# Patient Record
Sex: MALE | Race: WHITE | NOT HISPANIC OR LATINO | Employment: FULL TIME | URBAN - METROPOLITAN AREA
[De-identification: names, ages, dates, MRNs, and addresses within clinical notes are randomized per-mention and may not be internally consistent; named-entity substitution may affect disease eponyms.]

---

## 2017-01-14 DIAGNOSIS — J30.9 ALLERGIC RHINITIS, UNSPECIFIED ALLERGIC RHINITIS TRIGGER, UNSPECIFIED RHINITIS SEASONALITY: ICD-10-CM

## 2017-01-16 RX ORDER — MONTELUKAST SODIUM 10 MG/1
TABLET ORAL
Qty: 90 TABLET | Refills: 0 | Status: SHIPPED | OUTPATIENT
Start: 2017-01-16 | End: 2017-04-16 | Stop reason: SDUPTHER

## 2017-04-16 DIAGNOSIS — J30.9 ALLERGIC RHINITIS, UNSPECIFIED ALLERGIC RHINITIS TRIGGER, UNSPECIFIED RHINITIS SEASONALITY: ICD-10-CM

## 2017-04-17 RX ORDER — MONTELUKAST SODIUM 10 MG/1
TABLET ORAL
Qty: 90 TABLET | Refills: 0 | Status: SHIPPED | OUTPATIENT
Start: 2017-04-17 | End: 2017-07-28 | Stop reason: SDUPTHER

## 2017-04-17 RX ORDER — MONTELUKAST SODIUM 10 MG/1
TABLET ORAL
Qty: 90 TABLET | Refills: 0 | OUTPATIENT
Start: 2017-04-17

## 2017-07-28 DIAGNOSIS — J30.9 ALLERGIC RHINITIS, UNSPECIFIED ALLERGIC RHINITIS TRIGGER, UNSPECIFIED RHINITIS SEASONALITY: ICD-10-CM

## 2017-07-28 RX ORDER — MONTELUKAST SODIUM 10 MG/1
TABLET ORAL
Qty: 90 TABLET | Refills: 0 | Status: SHIPPED | OUTPATIENT
Start: 2017-07-28 | End: 2018-06-07 | Stop reason: SDUPTHER

## 2017-07-28 RX ORDER — MONTELUKAST SODIUM 10 MG/1
TABLET ORAL
Qty: 90 TABLET | Refills: 0 | Status: SHIPPED | OUTPATIENT
Start: 2017-07-28 | End: 2017-10-20 | Stop reason: SDUPTHER

## 2017-10-31 ENCOUNTER — OFFICE VISIT (OUTPATIENT)
Dept: INTERNAL MEDICINE | Facility: CLINIC | Age: 32
End: 2017-10-31

## 2017-10-31 VITALS
OXYGEN SATURATION: 96 % | HEART RATE: 66 BPM | WEIGHT: 195 LBS | SYSTOLIC BLOOD PRESSURE: 114 MMHG | HEIGHT: 71 IN | DIASTOLIC BLOOD PRESSURE: 80 MMHG | BODY MASS INDEX: 27.3 KG/M2

## 2017-10-31 DIAGNOSIS — K21.9 GASTROESOPHAGEAL REFLUX DISEASE WITHOUT ESOPHAGITIS: ICD-10-CM

## 2017-10-31 DIAGNOSIS — Z00.00 ANNUAL PHYSICAL EXAM: Primary | ICD-10-CM

## 2017-10-31 DIAGNOSIS — Z80.42 FAMILY HISTORY OF PROSTATE CANCER: ICD-10-CM

## 2017-10-31 DIAGNOSIS — E78.5 HYPERLIPIDEMIA, UNSPECIFIED HYPERLIPIDEMIA TYPE: ICD-10-CM

## 2017-10-31 LAB
ALBUMIN SERPL-MCNC: 4.6 G/DL (ref 3.5–5.2)
ALBUMIN/GLOB SERPL: 1.4 G/DL
ALP SERPL-CCNC: 61 U/L (ref 39–117)
ALT SERPL W P-5'-P-CCNC: 28 U/L (ref 1–41)
ANION GAP SERPL CALCULATED.3IONS-SCNC: 11.6 MMOL/L
AST SERPL-CCNC: 17 U/L (ref 1–40)
BASOPHILS # BLD AUTO: 0.03 10*3/MM3 (ref 0–0.2)
BASOPHILS NFR BLD AUTO: 0.6 % (ref 0–1.5)
BILIRUB SERPL-MCNC: 0.4 MG/DL (ref 0.1–1.2)
BUN BLD-MCNC: 12 MG/DL (ref 6–20)
BUN/CREAT SERPL: 13.2 (ref 7–25)
CALCIUM SPEC-SCNC: 9.4 MG/DL (ref 8.6–10.5)
CHLORIDE SERPL-SCNC: 102 MMOL/L (ref 98–107)
CHOLEST SERPL-MCNC: 236 MG/DL (ref 0–200)
CO2 SERPL-SCNC: 27.4 MMOL/L (ref 22–29)
CREAT BLD-MCNC: 0.91 MG/DL (ref 0.76–1.27)
EOSINOPHIL # BLD AUTO: 0.15 10*3/MM3 (ref 0–0.7)
EOSINOPHIL # BLD AUTO: 2.8 % (ref 0.3–6.2)
ERYTHROCYTE [DISTWIDTH] IN BLOOD BY AUTOMATED COUNT: 12.7 % (ref 11.5–14.5)
GFR SERPL CREATININE-BSD FRML MDRD: 95 ML/MIN/1.73
GLOBULIN UR ELPH-MCNC: 3.2 GM/DL
GLUCOSE BLD-MCNC: 93 MG/DL (ref 65–99)
HCT VFR BLD AUTO: 45.3 % (ref 40.4–52.2)
HDLC SERPL-MCNC: 47 MG/DL (ref 40–60)
HGB BLD-MCNC: 14.7 G/DL (ref 13.7–17.6)
IMM GRANULOCYTES # BLD: 0.07 10*3/MM3 (ref 0–0.03)
IMM GRANULOCYTES NFR BLD: 1.3 % (ref 0–0.5)
LDLC SERPL CALC-MCNC: 160 MG/DL (ref 0–100)
LDLC/HDLC SERPL: 3.41 {RATIO}
LYMPHOCYTES # BLD AUTO: 2.37 10*3/MM3 (ref 0.9–4.8)
LYMPHOCYTES NFR BLD AUTO: 43.6 % (ref 19.6–45.3)
MCH RBC QN AUTO: 29.8 PG (ref 27–32.7)
MCHC RBC AUTO-ENTMCNC: 32.5 G/DL (ref 32.6–36.4)
MCV RBC AUTO: 91.7 FL (ref 79.8–96.2)
MONOCYTES # BLD AUTO: 0.49 10*3/MM3 (ref 0.2–1.2)
MONOCYTES NFR BLD AUTO: 9 % (ref 5–12)
NEUTROPHILS # BLD AUTO: 2.33 10*3/MM3 (ref 1.9–8.1)
NEUTROPHILS NFR BLD AUTO: 42.7 % (ref 42.7–76)
PLATELET # BLD AUTO: 286 10*3/MM3 (ref 140–500)
POTASSIUM BLD-SCNC: 4.3 MMOL/L (ref 3.5–5.2)
PROT SERPL-MCNC: 7.8 G/DL (ref 6–8.5)
PSA SERPL-MCNC: 1.27 NG/ML (ref 0–4)
RBC # BLD AUTO: 4.94 10*6/MM3 (ref 4.6–6)
SODIUM BLD-SCNC: 141 MMOL/L (ref 136–145)
TRIGL SERPL-MCNC: 143 MG/DL (ref 0–150)
VLDLC SERPL-MCNC: 28.6 MG/DL (ref 5–40)
WBC # BLD AUTO: 5.44 10*3/MM3 (ref 4.5–10.7)

## 2017-10-31 PROCEDURE — 84153 ASSAY OF PSA TOTAL: CPT | Performed by: NURSE PRACTITIONER

## 2017-10-31 PROCEDURE — 99395 PREV VISIT EST AGE 18-39: CPT | Performed by: NURSE PRACTITIONER

## 2017-10-31 PROCEDURE — 80053 COMPREHEN METABOLIC PANEL: CPT | Performed by: NURSE PRACTITIONER

## 2017-10-31 PROCEDURE — 80061 LIPID PANEL: CPT | Performed by: NURSE PRACTITIONER

## 2017-10-31 NOTE — PROGRESS NOTES
Subjective   Bryan Valadez is a 32 y.o. male.     HPI Comments: .Well Adult Physical   Patient here for a comprehensive physical exam.The patient reports problems - reflux    Do you take any herbs or supplements that were not prescribed by a doctor? no   Are you taking calcium supplements? no   Are you taking aspirin daily? no     History:  Patient receives prostate care outside our clinic  Date last prostate exam:3/29/2016  Date last PSA: 3/29/2016    He works full time in [place], [place]. He has an 18 months old. He exercises routinely. He is due to move back in 4 months. He had recent dental and eye exam.        The following portions of the patient's history were reviewed and updated as appropriate: allergies, current medications, past family history, past medical history, past social history, past surgical history and problem list.    Review of Systems   Constitutional: Negative for activity change, appetite change, chills, fatigue, fever and unexpected weight change.   HENT: Positive for sinus pressure (doing better, ). Negative for congestion, ear discharge, ear pain, hearing loss, mouth sores, nosebleeds, postnasal drip, rhinorrhea, sneezing, sore throat, tinnitus and voice change.    Eyes: Negative for visual disturbance.   Respiratory: Negative for cough, chest tightness, shortness of breath and wheezing.    Cardiovascular: Negative for chest pain, palpitations and leg swelling.   Gastrointestinal: Negative for abdominal distention, abdominal pain, anal bleeding, blood in stool, constipation, diarrhea, nausea and vomiting.        Reflux/indigestion    Endocrine: Negative for cold intolerance, heat intolerance, polydipsia, polyphagia and polyuria.   Genitourinary: Negative for difficulty urinating, discharge, frequency, hematuria, penile pain, penile swelling, scrotal swelling, testicular pain and urgency.        Intermittent nocturia    Musculoskeletal: Negative for arthralgias, back pain, gait problem, joint  swelling, myalgias, neck pain and neck stiffness.   Skin: Negative for color change, pallor and rash.        NEGATIVE BREAST MASS, BREAST PAIN, NIPPLE DISCHARGE, SKIN CHANGES   Allergic/Immunologic: Positive for environmental allergies.        Sinus infection last week doing better   Neurological: Negative for dizziness, tremors, seizures, syncope, speech difficulty, weakness, light-headedness, numbness and headaches.   Hematological: Negative for adenopathy. Does not bruise/bleed easily.   Psychiatric/Behavioral: Negative for confusion, decreased concentration, dysphoric mood, sleep disturbance and suicidal ideas. The patient is not nervous/anxious.        Objective   Physical Exam   Constitutional: He is oriented to person, place, and time. He appears well-developed and well-nourished. No distress.   HENT:   Head: Normocephalic and atraumatic.   Right Ear: Hearing, tympanic membrane, external ear and ear canal normal.   Left Ear: Hearing, tympanic membrane, external ear and ear canal normal.   Nose: Nose normal. Right sinus exhibits no maxillary sinus tenderness and no frontal sinus tenderness. Left sinus exhibits no maxillary sinus tenderness and no frontal sinus tenderness.   Mouth/Throat: Uvula is midline and oropharynx is clear and moist.   Eyes: Conjunctivae, EOM and lids are normal. Pupils are equal, round, and reactive to light. Right eye exhibits no discharge. Left eye exhibits no discharge. No scleral icterus.   Neck: Normal range of motion. Neck supple. No JVD present. Carotid bruit is not present. No tracheal deviation present. No thyromegaly present.   Cardiovascular: Normal rate, regular rhythm, normal heart sounds, intact distal pulses and normal pulses.  Exam reveals no gallop and no friction rub.    No murmur heard.  No pedal edema noted    Pulmonary/Chest: Effort normal and breath sounds normal. No respiratory distress. He has no wheezes. He has no rales. He exhibits no tenderness.   Abdominal:  Soft. Bowel sounds are normal. He exhibits no distension and no mass. There is no tenderness. There is no rebound and no guarding. No hernia.   Genitourinary: Rectum normal and prostate normal. Rectal exam shows guaiac negative stool.   Genitourinary Comments: He declines hernia/rectal/prostate exam today   Musculoskeletal: Normal range of motion. He exhibits no edema, tenderness or deformity.   Lymphadenopathy:        Head (right side): No submental, no submandibular, no tonsillar, no preauricular, no posterior auricular and no occipital adenopathy present.        Head (left side): No submental, no submandibular, no tonsillar, no preauricular, no posterior auricular and no occipital adenopathy present.     He has no cervical adenopathy.   Neurological: He is alert and oriented to person, place, and time. He has normal reflexes. He displays normal reflexes. No cranial nerve deficit. He exhibits normal muscle tone. Coordination normal.   Reflex Scores:       Brachioradialis reflexes are 2+ on the right side and 2+ on the left side.       Patellar reflexes are 2+ on the right side and 2+ on the left side.  Skin: Skin is warm and dry. No rash noted. No erythema. No pallor.   Psychiatric: He has a normal mood and affect. His speech is normal and behavior is normal. Judgment and thought content normal. Cognition and memory are normal.   Vitals reviewed.      Assessment/Plan   Bryan was seen today for annual exam.    Diagnoses and all orders for this visit:    Annual physical exam  -     Lipid Panel; Future  -     Comprehensive Metabolic Panel; Future  -     CBC Auto Differential; Future  -     Lipid Panel  -     Comprehensive Metabolic Panel  -     CBC Auto Differential    Gastroesophageal reflux disease without esophagitis  Comments:  using prilosec intermittently; avoid spicy/acidic foods; small frequent meals; no eating close to bedtime     Hyperlipidemia, unspecified hyperlipidemia type  Comments:  will check labs  today, low cholesterol diet     Family history of prostate cancer  -     PSA; Future  -     PSA    He will be returning back to [place] in the next four months at that that time he may make appt for follow up of GERD. May need referral to GI specialist and/or ultrasound of gallbladder.

## 2017-10-31 NOTE — PATIENT INSTRUCTIONS
Food Choices for Gastroesophageal Reflux Disease, Adult  When you have gastroesophageal reflux disease (GERD), the foods you eat and your eating habits are very important. Choosing the right foods can help ease the discomfort of GERD.  WHAT GENERAL GUIDELINES DO I NEED TO FOLLOW?  · Choose fruits, vegetables, whole grains, low-fat dairy products, and low-fat meat, fish, and poultry.  · Limit fats such as oils, salad dressings, butter, nuts, and avocado.  · Keep a food diary to identify foods that cause symptoms.  · Avoid foods that cause reflux. These may be different for different people.  · Eat frequent small meals instead of three large meals each day.  · Eat your meals slowly, in a relaxed setting.  · Limit fried foods.  · Cook foods using methods other than frying.  · Avoid drinking alcohol.  · Avoid drinking large amounts of liquids with your meals.  · Avoid bending over or lying down until 2-3 hours after eating.  WHAT FOODS ARE NOT RECOMMENDED?  The following are some foods and drinks that may worsen your symptoms:  Vegetables  Tomatoes. Tomato juice. Tomato and spaghetti sauce. Chili peppers. Onion and garlic. Horseradish.  Fruits  Oranges, grapefruit, and lemon (fruit and juice).  Meats  High-fat meats, fish, and poultry. This includes hot dogs, ribs, ham, sausage, salami, and amezquita.  Dairy  Whole milk and chocolate milk. Sour cream. Cream. Butter. Ice cream. Cream cheese.   Beverages  Coffee and tea, with or without caffeine. Carbonated beverages or energy drinks.  Condiments  Hot sauce. Barbecue sauce.   Sweets/Desserts  Chocolate and cocoa. Donuts. Peppermint and spearmint.  Fats and Oils  High-fat foods, including French fries and potato chips.  Other  Vinegar. Strong spices, such as black pepper, white pepper, red pepper, cayenne, grady powder, cloves, ginger, and chili powder.  The items listed above may not be a complete list of foods and beverages to avoid. Contact your dietitian for more  information.     This information is not intended to replace advice given to you by your health care provider. Make sure you discuss any questions you have with your health care provider.     Document Released: 12/18/2006 Document Revised: 01/08/2016 Document Reviewed: 10/22/2014  [employer] Interactive Patient Education ©2017 [employer]  Health Maintenance, Male  A healthy lifestyle and preventative care can promote health and wellness.  · Maintain regular health, dental, and eye exams.  · Eat a healthy diet. Foods like vegetables, fruits, whole grains, low-fat dairy products, and lean protein foods contain the nutrients you need and are low in calories. Decrease your intake of foods high in solid fats, added sugars, and salt. Get information about a proper diet from your health care provider, if necessary.  · Regular physical exercise is one of the most important things you can do for your health. Most adults should get at least 150 minutes of moderate-intensity exercise (any activity that increases your heart rate and causes you to sweat) each week. In addition, most adults need muscle-strengthening exercises on 2 or more days a week.    · Maintain a healthy weight. The body mass index (BMI) is a screening tool to identify possible weight problems. It provides an estimate of body fat based on height and weight. Your health care provider can find your BMI and can help you achieve or maintain a healthy weight. For males 20 years and older:    A BMI below 18.5 is considered underweight.    A BMI of 18.5 to 24.9 is normal.    A BMI of 25 to 29.9 is considered overweight.    A BMI of 30 and above is considered obese.  · Maintain normal blood lipids and cholesterol by exercising and minimizing your intake of saturated fat. Eat a balanced diet with plenty of fruits and vegetables. Blood tests for lipids and cholesterol should begin at age 20 and be repeated every 5 years. If your lipid or cholesterol levels are high,  you are over age 50, or you are at high risk for heart disease, you may need your cholesterol levels checked more frequently. Ongoing high lipid and cholesterol levels should be treated with medicines if diet and exercise are not working.  · If you smoke, find out from your health care provider how to quit. If you do not use tobacco, do not start.  · Lung cancer screening is recommended for adults aged 55-80 years who are at high risk for developing lung cancer because of a history of smoking. A yearly low-dose CT scan of the lungs is recommended for people who have at least a 30-pack-year history of smoking and are current smokers or have quit within the past 15 years. A pack year of smoking is smoking an average of 1 pack of cigarettes a day for 1 year (for example, a 30-pack-year history of smoking could mean smoking 1 pack a day for 30 years or 2 packs a day for 15 years). Yearly screening should continue until the smoker has stopped smoking for at least 15 years. Yearly screening should be stopped for people who develop a health problem that would prevent them from having lung cancer treatment.  · If you choose to drink alcohol, do not have more than 2 drinks per day. One drink is considered to be 12 oz (360 mL) of beer, 5 oz (150 mL) of wine, or 1.5 oz (45 mL) of liquor.  · Avoid the use of street drugs. Do not share needles with anyone. Ask for help if you need support or instructions about stopping the use of drugs.  · High blood pressure causes heart disease and increases the risk of stroke. High blood pressure is more likely to develop in:    People who have blood pressure in the end of the normal range (100-139/85-89 mm Hg).    People who are overweight or obese.    People who are .  · If you are 18-39 years of age, have your blood pressure checked every 3-5 years. If you are 40 years of age or older, have your blood pressure checked every year. You should have your blood pressure measured  twice--once when you are at a hospital or clinic, and once when you are not at a hospital or clinic. Record the average of the two measurements. To check your blood pressure when you are not at a hospital or clinic, you can use:    An automated blood pressure machine at a pharmacy.    A home blood pressure monitor.  · If you are 45-79 years old, ask your health care provider if you should take aspirin to prevent heart disease.  · Diabetes screening involves taking a blood sample to check your fasting blood sugar level. This should be done once every 3 years after age 45 if you are at a normal weight and without risk factors for diabetes. Testing should be considered at a younger age or be carried out more frequently if you are overweight and have at least 1 risk factor for diabetes.  · Colorectal cancer can be detected and often prevented. Most routine colorectal cancer screening begins at the age of 50 and continues through age 75. However, your health care provider may recommend screening at an earlier age if you have risk factors for colon cancer. On a yearly basis, your health care provider may provide home test kits to check for hidden blood in the stool. A small camera at the end of a tube may be used to directly examine the colon (sigmoidoscopy or colonoscopy) to detect the earliest forms of colorectal cancer. Talk to your health care provider about this at age 50 when routine screening begins. A direct exam of the colon should be repeated every 5-10 years through age 75, unless early forms of precancerous polyps or small growths are found.  · People who are at an increased risk for hepatitis B should be screened for this virus. You are considered at high risk for hepatitis B if:    You were born in a country where hepatitis B occurs often. Talk with your health care provider about which countries are considered high risk.    Your parents were born in a high-risk country and you have not received a shot to  protect against hepatitis B (hepatitis B vaccine).    You have HIV or AIDS.    You use needles to inject street drugs.    You live with, or have sex with, someone who has hepatitis B.    You are a man who has sex with other men (MSM).    You get hemodialysis treatment.    You take certain medicines for conditions like cancer, organ transplantation, and autoimmune conditions.  · Hepatitis C blood testing is recommended for all people born from 1945 through 1965 and any individual with known risk factors for hepatitis C.  · Healthy men should no longer receive prostate-specific antigen (PSA) blood tests as part of routine cancer screening. Talk to your health care provider about prostate cancer screening.  · Testicular cancer screening is not recommended for adolescents or adult males who have no symptoms. Screening includes self-exam, a health care provider exam, and other screening tests. Consult with your health care provider about any symptoms you have or any concerns you have about testicular cancer.  · Practice safe sex. Use condoms and avoid high-risk sexual practices to reduce the spread of sexually transmitted infections (STIs).  · You should be screened for STIs, including gonorrhea and chlamydia if:    You are sexually active and are younger than 24 years.    You are older than 24 years, and your health care provider tells you that you are at risk for this type of infection.    Your sexual activity has changed since you were last screened, and you are at an increased risk for chlamydia or gonorrhea. Ask your health care provider if you are at risk.  · If you are at risk of being infected with HIV, it is recommended that you take a prescription medicine daily to prevent HIV infection. This is called pre-exposure prophylaxis (PrEP). You are considered at risk if:    You are a man who has sex with other men (MSM).    You are a heterosexual man who is sexually active with multiple partners.    You take drugs by  injection.    You are sexually active with a partner who has HIV.    Talk with your health care provider about whether you are at high risk of being infected with HIV. If you choose to begin PrEP, you should first be tested for HIV. You should then be tested every 3 months for as long as you are taking PrEP.  · Use sunscreen. Apply sunscreen liberally and repeatedly throughout the day. You should seek shade when your shadow is shorter than you. Protect yourself by wearing long sleeves, pants, a wide-brimmed hat, and sunglasses year round whenever you are outdoors.  · Tell your health care provider of new moles or changes in moles, especially if there is a change in shape or color. Also, tell your health care provider if a mole is larger than the size of a pencil eraser.  · A one-time screening for abdominal aortic aneurysm (AAA) and surgical repair of large AAAs by ultrasound is recommended for men aged 65-75 years who are current or former smokers.  · Stay current with your vaccines (immunizations).     This information is not intended to replace advice given to you by your health care provider. Make sure you discuss any questions you have with your health care provider.     Document Released: 06/15/2009 Document Revised: 01/08/2016 Document Reviewed: 09/20/2016  [employer] Interactive Patient Education ©2017 [employer]

## 2017-12-03 DIAGNOSIS — J30.9 ALLERGIC RHINITIS: ICD-10-CM

## 2017-12-04 RX ORDER — MONTELUKAST SODIUM 10 MG/1
TABLET ORAL
Qty: 90 TABLET | Refills: 3 | Status: SHIPPED | OUTPATIENT
Start: 2017-12-04 | End: 2018-06-29

## 2018-06-07 ENCOUNTER — OFFICE VISIT (OUTPATIENT)
Dept: INTERNAL MEDICINE | Facility: CLINIC | Age: 33
End: 2018-06-07

## 2018-06-07 VITALS
HEART RATE: 62 BPM | DIASTOLIC BLOOD PRESSURE: 74 MMHG | SYSTOLIC BLOOD PRESSURE: 124 MMHG | WEIGHT: 194 LBS | TEMPERATURE: 98.1 F | HEIGHT: 71 IN | OXYGEN SATURATION: 99 % | BODY MASS INDEX: 27.16 KG/M2

## 2018-06-07 DIAGNOSIS — J01.90 ACUTE SINUSITIS, RECURRENCE NOT SPECIFIED, UNSPECIFIED LOCATION: Primary | ICD-10-CM

## 2018-06-07 DIAGNOSIS — K21.9 GASTROESOPHAGEAL REFLUX DISEASE WITHOUT ESOPHAGITIS: ICD-10-CM

## 2018-06-07 PROCEDURE — 99213 OFFICE O/P EST LOW 20 MIN: CPT | Performed by: NURSE PRACTITIONER

## 2018-06-07 RX ORDER — GUAIFENESIN 600 MG/1
600 TABLET, EXTENDED RELEASE ORAL EVERY 12 HOURS SCHEDULED
Qty: 14 TABLET | Refills: 0
Start: 2018-06-07 | End: 2018-06-11 | Stop reason: SDUPTHER

## 2018-06-07 RX ORDER — AMOXICILLIN 875 MG/1
875 TABLET, COATED ORAL 2 TIMES DAILY
Qty: 14 TABLET | Refills: 0 | Status: SHIPPED | OUTPATIENT
Start: 2018-06-07 | End: 2018-06-11 | Stop reason: ALTCHOICE

## 2018-06-07 NOTE — PATIENT INSTRUCTIONS
Sinusitis, Adult  Sinusitis is soreness and inflammation of your sinuses. Sinuses are hollow spaces in the bones around your face. Your sinuses are located:  · Around your eyes.  · In the middle of your forehead.  · Behind your nose.  · In your cheekbones.  Your sinuses and nasal passages are lined with a stringy fluid (mucus). Mucus normally drains out of your sinuses. When your nasal tissues become inflamed or swollen, the mucus can become trapped or blocked so air cannot flow through your sinuses. This allows bacteria, viruses, and funguses to grow, which leads to infection.  Sinusitis can develop quickly and last for 7?10 days (acute) or for more than 12 weeks (chronic). Sinusitis often develops after a cold.  What are the causes?  This condition is caused by anything that creates swelling in the sinuses or stops mucus from draining, including:  · Allergies.  · Asthma.  · Bacterial or viral infection.  · Abnormally shaped bones between the nasal passages.  · Nasal growths that contain mucus (nasal polyps).  · Narrow sinus openings.  · Pollutants, such as chemicals or irritants in the air.  · A foreign object stuck in the nose.  · A fungal infection. This is rare.  What increases the risk?  The following factors may make you more likely to develop this condition:  · Having allergies or asthma.  · Having had a recent cold or respiratory tract infection.  · Having structural deformities or blockages in your nose or sinuses.  · Having a weak immune system.  · Doing a lot of swimming or diving.  · Overusing nasal sprays.  · Smoking.  What are the signs or symptoms?  The main symptoms of this condition are pain and a feeling of pressure around the affected sinuses. Other symptoms include:  · Upper toothache.  · Earache.  · Headache.  · Bad breath.  · Decreased sense of smell and taste.  · A cough that may get worse at night.  · Fatigue.  · Fever.  · Thick drainage from your nose. The drainage is often green and it may  contain pus (purulent).  · Stuffy nose or congestion.  · Postnasal drip. This is when extra mucus collects in the throat or back of the nose.  · Swelling and warmth over the affected sinuses.  · Sore throat.  · Sensitivity to light.  How is this diagnosed?  This condition is diagnosed based on symptoms, a medical history, and a physical exam. To find out if your condition is acute or chronic, your health care provider may:  · Look in your nose for signs of nasal polyps.  · Tap over the affected sinus to check for signs of infection.  · View the inside of your sinuses using an imaging device that has a light attached (endoscope).  If your health care provider suspects that you have chronic sinusitis, you may also:  · Be tested for allergies.  · Have a sample of mucus taken from your nose (nasal culture) and checked for bacteria.  · Have a mucus sample examined to see if your sinusitis is related to an allergy.  If your sinusitis does not respond to treatment and it lasts longer than 8 weeks, you may have an MRI or CT scan to check your sinuses. These scans also help to determine how severe your infection is.  In rare cases, a bone biopsy may be done to rule out more serious types of fungal sinus disease.  How is this treated?  Treatment for sinusitis depends on the cause and whether your condition is chronic or acute. If a virus is causing your sinusitis, your symptoms will go away on their own within 10 days. You may be given medicines to relieve your symptoms, including:  · Topical nasal decongestants. They shrink swollen nasal passages and let mucus drain from your sinuses.  · Antihistamines. These drugs block inflammation that is triggered by allergies. This can help to ease swelling in your nose and sinuses.  · Topical nasal corticosteroids. These are nasal sprays that ease inflammation and swelling in your nose and sinuses.  · Nasal saline washes. These rinses can help to get rid of thick mucus in your  nose.  If your condition is caused by bacteria, you will be given an antibiotic medicine. If your condition is caused by a fungus, you will be given an antifungal medicine.  Surgery may be needed to correct underlying conditions, such as narrow nasal passages. Surgery may also be needed to remove polyps.  Follow these instructions at home:  Medicines   · Take, use, or apply over-the-counter and prescription medicines only as told by your health care provider. These may include nasal sprays.  · If you were prescribed an antibiotic medicine, take it as told by your health care provider. Do not stop taking the antibiotic even if you start to feel better.  Hydrate and Humidify   · Drink enough water to keep your urine clear or pale yellow. Staying hydrated will help to thin your mucus.  · Use a cool mist humidifier to keep the humidity level in your home above 50%.  · Inhale steam for 10-15 minutes, 3-4 times a day or as told by your health care provider. You can do this in the bathroom while a hot shower is running.  · Limit your exposure to cool or dry air.  Rest   · Rest as much as possible.  · Sleep with your head raised (elevated).  · Make sure to get enough sleep each night.  General instructions   · Apply a warm, moist washcloth to your face 3-4 times a day or as told by your health care provider. This will help with discomfort.  · Wash your hands often with soap and water to reduce your exposure to viruses and other germs. If soap and water are not available, use hand .  · Do not smoke. Avoid being around people who are smoking (secondhand smoke).  · Keep all follow-up visits as told by your health care provider. This is important.  Contact a health care provider if:  · You have a fever.  · Your symptoms get worse.  · Your symptoms do not improve within 10 days.  Get help right away if:  · You have a severe headache.  · You have persistent vomiting.  · You have pain or swelling around your face or  eyes.  · You have vision problems.  · You develop confusion.  · Your neck is stiff.  · You have trouble breathing.  This information is not intended to replace advice given to you by your health care provider. Make sure you discuss any questions you have with your health care provider.  Document Released: 12/18/2006 Document Revised: 08/13/2017 Document Reviewed: 10/12/2016  [employer] Interactive Patient Education © 2017 [employer]    Food Choices for Gastroesophageal Reflux Disease, Adult  When you have gastroesophageal reflux disease (GERD), the foods you eat and your eating habits are very important. Choosing the right foods can help ease your discomfort.  What guidelines do I need to follow?  · Choose fruits, vegetables, whole grains, and low-fat dairy products.  · Choose low-fat meat, fish, and poultry.  · Limit fats such as oils, salad dressings, butter, nuts, and avocado.  · Keep a food diary. This helps you identify foods that cause symptoms.  · Avoid foods that cause symptoms. These may be different for everyone.  · Eat small meals often instead of 3 large meals a day.  · Eat your meals slowly, in a place where you are relaxed.  · Limit fried foods.  · Cook foods using methods other than frying.  · Avoid drinking alcohol.  · Avoid drinking large amounts of liquids with your meals.  · Avoid bending over or lying down until 2-3 hours after eating.  What foods are not recommended?  These are some foods and drinks that may make your symptoms worse:  Vegetables   Tomatoes. Tomato juice. Tomato and spaghetti sauce. Chili peppers. Onion and garlic. Horseradish.  Fruits   Oranges, grapefruit, and lemon (fruit and juice).  Meats   High-fat meats, fish, and poultry. This includes hot dogs, ribs, ham, sausage, salami, and amezquita.  Dairy   Whole milk and chocolate milk. Sour cream. Cream. Butter. Ice cream. Cream cheese.  Drinks   Coffee and tea. Bubbly (carbonated) drinks or energy drinks.  Condiments   Hot sauce.  Barbecue sauce.  Sweets/Desserts   Chocolate and cocoa. Donuts. Peppermint and spearmint.  Fats and Oils   High-fat foods. This includes French fries and potato chips.  Other   Vinegar. Strong spices. This includes black pepper, white pepper, red pepper, cayenne, grady powder, cloves, ginger, and chili powder.  The items listed above may not be a complete list of foods and drinks to avoid. Contact your dietitian for more information.   This information is not intended to replace advice given to you by your health care provider. Make sure you discuss any questions you have with your health care provider.  Document Released: 06/18/2013 Document Revised: 05/25/2017 Document Reviewed: 10/22/2014  [employer] Interactive Patient Education © 2017 [employer]

## 2018-06-07 NOTE — PROGRESS NOTES
Subjective   Bryan Valadez is a 32 y.o. male.     He went to [facility] about two weeks ago. No known ID contact.       Sinus Problem   This is a new problem. The current episode started 1 to 4 weeks ago. There has been no fever. His pain is at a severity of 6/10. The pain is moderate. Associated symptoms include congestion, headaches and sinus pressure. Pertinent negatives include no chills, coughing, ear pain (ear pressure ), shortness of breath, sneezing or sore throat. Treatments tried: flonase and claritin d  The treatment provided mild relief.        The following portions of the patient's history were reviewed and updated as appropriate: allergies, current medications, past social history and problem list.    Review of Systems   Constitutional: Negative for appetite change, chills, fatigue and fever.   HENT: Positive for congestion, postnasal drip, sinus pain and sinus pressure. Negative for ear discharge, ear pain (ear pressure ), facial swelling, hearing loss, rhinorrhea, sneezing, sore throat and tinnitus.    Respiratory: Negative for cough, chest tightness, shortness of breath and wheezing.    Cardiovascular: Negative for chest pain, palpitations and leg swelling.   Gastrointestinal: Negative for abdominal pain, diarrhea, nausea and vomiting.   Allergic/Immunologic: Positive for environmental allergies.   Neurological: Positive for headaches.   Hematological: Negative for adenopathy.       Objective   Physical Exam   Constitutional: He appears well-developed and well-nourished. He is cooperative. He does not have a sickly appearance. He does not appear ill.   HENT:   Head: Normocephalic.   Right Ear: Hearing and external ear normal. No drainage, swelling or tenderness. No mastoid tenderness. Tympanic membrane is bulging. Tympanic membrane is not injected, not scarred and not erythematous. Tympanic membrane mobility is normal. No middle ear effusion. No decreased hearing is noted.   Left Ear: Hearing and  external ear normal. No drainage, swelling or tenderness. No mastoid tenderness. Tympanic membrane is bulging. Tympanic membrane is not injected, not scarred and not erythematous. Tympanic membrane mobility is normal.  No middle ear effusion. No decreased hearing is noted.   Nose: No mucosal edema, rhinorrhea, sinus tenderness or nasal deformity. Right sinus exhibits maxillary sinus tenderness and frontal sinus tenderness. Left sinus exhibits maxillary sinus tenderness and frontal sinus tenderness.   Mouth/Throat: Mucous membranes are normal. Normal dentition. Posterior oropharyngeal erythema present. No tonsillar exudate.   Eyes: Conjunctivae and lids are normal. Pupils are equal, round, and reactive to light. Right eye exhibits no discharge and no exudate. Left eye exhibits no discharge and no exudate.   Neck: Trachea normal and normal range of motion. No edema present. No thyroid mass and no thyromegaly present.   Cardiovascular: Regular rhythm, normal heart sounds and normal pulses.    No murmur heard.  Pulmonary/Chest: Breath sounds normal. No respiratory distress. He has no decreased breath sounds. He has no wheezes. He has no rhonchi. He has no rales.   Lymphadenopathy:        Head (right side): No submental, no submandibular, no tonsillar, no preauricular, no posterior auricular and no occipital adenopathy present.        Head (left side): No submental, no submandibular, no tonsillar, no preauricular, no posterior auricular and no occipital adenopathy present.     He has no cervical adenopathy.   Neurological: He is alert.   Skin: Skin is warm, dry and intact. No cyanosis. Nails show no clubbing.       Assessment/Plan   Bryan was seen today for sinus problem and headache.    Diagnoses and all orders for this visit:    Acute sinusitis, recurrence not specified, unspecified location  Comments:  continue with flonase; may add saline, drink plenty of water; sudafed as needed   Orders:  -     guaiFENesin  (MUCINEX) 600 MG 12 hr tablet; Take 1 tablet by mouth Every 12 (Twelve) Hours for 7 days.  -     amoxicillin (AMOXIL) 875 MG tablet; Take 1 tablet by mouth 2 (Two) Times a Day for 7 days.    Gastroesophageal reflux disease without esophagitis  Comments:  avoid spicy/acidic foods, will send for EGD, secondary to recurrent symptoms  Orders:  -     Ambulatory Referral to Gastroenterology    He will return for worsening of symptoms.

## 2018-06-11 ENCOUNTER — OFFICE VISIT (OUTPATIENT)
Dept: INTERNAL MEDICINE | Facility: CLINIC | Age: 33
End: 2018-06-11

## 2018-06-11 VITALS
DIASTOLIC BLOOD PRESSURE: 80 MMHG | BODY MASS INDEX: 27.2 KG/M2 | SYSTOLIC BLOOD PRESSURE: 116 MMHG | TEMPERATURE: 97.9 F | OXYGEN SATURATION: 98 % | HEART RATE: 66 BPM | WEIGHT: 195 LBS

## 2018-06-11 DIAGNOSIS — J01.90 ACUTE SINUSITIS, RECURRENCE NOT SPECIFIED, UNSPECIFIED LOCATION: Primary | ICD-10-CM

## 2018-06-11 DIAGNOSIS — Z91.09 ENVIRONMENTAL ALLERGIES: ICD-10-CM

## 2018-06-11 PROCEDURE — 99213 OFFICE O/P EST LOW 20 MIN: CPT | Performed by: NURSE PRACTITIONER

## 2018-06-11 RX ORDER — DOXYCYCLINE HYCLATE 100 MG/1
100 CAPSULE ORAL 2 TIMES DAILY
Qty: 14 CAPSULE | Refills: 0 | Status: SHIPPED | OUTPATIENT
Start: 2018-06-11 | End: 2018-06-20 | Stop reason: HOSPADM

## 2018-06-11 RX ORDER — METHYLPREDNISOLONE 4 MG/1
TABLET ORAL
Qty: 21 EACH | Refills: 0 | Status: SHIPPED | OUTPATIENT
Start: 2018-06-11 | End: 2018-06-20 | Stop reason: HOSPADM

## 2018-06-11 RX ORDER — GUAIFENESIN 600 MG/1
1200 TABLET, EXTENDED RELEASE ORAL EVERY 12 HOURS SCHEDULED
Qty: 14 TABLET | Refills: 0
Start: 2018-06-11 | End: 2018-06-20 | Stop reason: HOSPADM

## 2018-06-11 RX ORDER — FLUTICASONE PROPIONATE 50 MCG
2 SPRAY, SUSPENSION (ML) NASAL DAILY
Qty: 1 BOTTLE | Refills: 3 | Status: SHIPPED | OUTPATIENT
Start: 2018-06-11 | End: 2018-06-29

## 2018-06-11 NOTE — PATIENT INSTRUCTIONS

## 2018-06-11 NOTE — PROGRESS NOTES
Subjective   Bryan Valadez is a 32 y.o. male.     He was seen in office on 6/7/2018 and treated for sinus infection and start oral amoxicillin. He reports his symptoms have gotten worst.       Sinus Problem   This is a recurrent problem. The current episode started 1 to 4 weeks ago. The problem has been gradually worsening since onset. There has been no fever (chills ). His pain is at a severity of 8/10. The pain is moderate. Associated symptoms include congestion, ear pain, headaches, neck pain and sinus pressure. Pertinent negatives include no chills, coughing, shortness of breath, sneezing or sore throat. Treatments tried: sudafed, nasal spray, mucinex         The following portions of the patient's history were reviewed and updated as appropriate: allergies, current medications, past social history and problem list.    Review of Systems   Constitutional: Negative for appetite change, chills, fatigue and fever.   HENT: Positive for congestion, ear pain, postnasal drip and sinus pressure. Negative for ear discharge, facial swelling, hearing loss, rhinorrhea, sneezing, sore throat and tinnitus.    Respiratory: Negative for cough, chest tightness, shortness of breath and wheezing.    Cardiovascular: Negative for chest pain, palpitations and leg swelling.   Gastrointestinal: Negative for abdominal pain, diarrhea, nausea and vomiting.   Musculoskeletal: Positive for neck pain. Negative for neck stiffness.   Allergic/Immunologic: Positive for environmental allergies.   Neurological: Positive for headaches.   Hematological: Negative for adenopathy.       Objective   Physical Exam   Constitutional: He appears well-developed and well-nourished. He is cooperative. He does not have a sickly appearance. He does not appear ill.   HENT:   Head: Normocephalic.   Right Ear: Hearing and external ear normal. No drainage, swelling or tenderness. No mastoid tenderness. Tympanic membrane is bulging. Tympanic membrane is not injected,  not scarred and not erythematous. Tympanic membrane mobility is normal. No middle ear effusion. No decreased hearing is noted.   Left Ear: Hearing and external ear normal. No drainage, swelling or tenderness. No mastoid tenderness. Tympanic membrane is bulging. Tympanic membrane is not injected, not scarred and not erythematous. Tympanic membrane mobility is normal.  No middle ear effusion. No decreased hearing is noted.   Nose: No mucosal edema, rhinorrhea, sinus tenderness or nasal deformity. Right sinus exhibits maxillary sinus tenderness and frontal sinus tenderness. Left sinus exhibits maxillary sinus tenderness and frontal sinus tenderness.   Mouth/Throat: Mucous membranes are normal. Normal dentition. Posterior oropharyngeal erythema (mild ) present. No tonsillar exudate.   Eyes: Conjunctivae and lids are normal. Pupils are equal, round, and reactive to light. Right eye exhibits no discharge and no exudate. Left eye exhibits no discharge and no exudate.   Neck: Trachea normal and normal range of motion. Muscular tenderness present. No edema present. No thyroid mass and no thyromegaly present.   Cardiovascular: Regular rhythm, normal heart sounds and normal pulses.    No murmur heard.  Pulmonary/Chest: Breath sounds normal. No respiratory distress. He has no decreased breath sounds. He has no wheezes. He has no rhonchi. He has no rales.   Lymphadenopathy:        Head (right side): No submental, no submandibular, no tonsillar, no preauricular, no posterior auricular and no occipital adenopathy present.        Head (left side): No submental, no submandibular, no tonsillar, no preauricular, no posterior auricular and no occipital adenopathy present.     He has no cervical adenopathy.   Neurological: He is alert.   Skin: Skin is warm, dry and intact. No cyanosis. Nails show no clubbing.       Assessment/Plan   Bryan was seen today for sinus problem.    Diagnoses and all orders for this visit:    Acute sinusitis,  recurrence not specified, unspecified location  Comments:  drink plenty of water   Orders:  -     guaiFENesin (MUCINEX) 600 MG 12 hr tablet; Take 2 tablets by mouth Every 12 (Twelve) Hours for 7 days.  -     fluticasone (FLONASE) 50 MCG/ACT nasal spray; 2 sprays into each nostril Daily.  -     MethylPREDNISolone (MEDROL, JOHANNA,) 4 MG tablet; Take as directed on package instructions.  -     doxycycline (VIBRAMYCIN) 100 MG capsule; Take 1 capsule by mouth 2 (Two) Times a Day for 7 days.    Environmental allergies  -     Ambulatory Referral to Allergy    He will return for worsening of symptoms.

## 2018-06-12 ENCOUNTER — TELEPHONE (OUTPATIENT)
Dept: INTERNAL MEDICINE | Facility: CLINIC | Age: 33
End: 2018-06-12

## 2018-06-12 NOTE — TELEPHONE ENCOUNTER
Patient's wife states he has had a headache x 5 days.    He was in the acute clinic on 6/7/18 & 6/11/18 to see Kathy. He was referred to Allergy yesterday.    He was advised to go to the ER since sx have been present x 5 days.    Please advise.    Thank you,    Justin

## 2018-06-12 NOTE — TELEPHONE ENCOUNTER
----- Message from Martha Heaton sent at 6/12/2018 11:36 AM EDT -----  Contact: Mary Valadez  Wife calling states patient has had a migraine for 5 days. Hurts to talk, light sentive, nausea.Spoke with Kelsey and advise patients wife to take him to the ER. Please advise      Mary:[phone]

## 2018-06-14 NOTE — TELEPHONE ENCOUNTER
1 week f/u scheduled for 6/19/18 @ 11 AM. Patient was informed if headaches worsen to go to the ER. He voiced he understood and thank you.

## 2018-06-15 ENCOUNTER — HOSPITAL ENCOUNTER (INPATIENT)
Facility: HOSPITAL | Age: 33
LOS: 5 days | Discharge: HOME HEALTH | End: 2018-06-20
Attending: EMERGENCY MEDICINE | Admitting: NEUROLOGICAL SURGERY

## 2018-06-15 ENCOUNTER — APPOINTMENT (OUTPATIENT)
Dept: CT IMAGING | Facility: HOSPITAL | Age: 33
End: 2018-06-15

## 2018-06-15 ENCOUNTER — APPOINTMENT (OUTPATIENT)
Dept: MRI IMAGING | Facility: HOSPITAL | Age: 33
End: 2018-06-15

## 2018-06-15 DIAGNOSIS — Z98.890 HX OF CRANIOTOMY: ICD-10-CM

## 2018-06-15 DIAGNOSIS — G93.89 RIGHT FRONTAL LOBE MASS: ICD-10-CM

## 2018-06-15 DIAGNOSIS — D49.6 PRIMARY BRAIN TUMOR (HCC): Primary | ICD-10-CM

## 2018-06-15 LAB
ABO GROUP BLD: NORMAL
ANION GAP SERPL CALCULATED.3IONS-SCNC: 11.8 MMOL/L
APTT PPP: 30.3 SECONDS (ref 22.7–35.4)
BASOPHILS # BLD AUTO: 0.01 10*3/MM3 (ref 0–0.2)
BASOPHILS NFR BLD AUTO: 0.1 % (ref 0–1.5)
BLD GP AB SCN SERPL QL: NEGATIVE
BUN BLD-MCNC: 13 MG/DL (ref 6–20)
BUN/CREAT SERPL: 13 (ref 7–25)
CALCIUM SPEC-SCNC: 9.7 MG/DL (ref 8.6–10.5)
CHLORIDE SERPL-SCNC: 100 MMOL/L (ref 98–107)
CO2 SERPL-SCNC: 27.2 MMOL/L (ref 22–29)
CREAT BLD-MCNC: 1 MG/DL (ref 0.76–1.27)
CRP SERPL-MCNC: <0.03 MG/DL (ref 0–0.5)
DEPRECATED RDW RBC AUTO: 41 FL (ref 37–54)
EOSINOPHIL # BLD AUTO: 0.06 10*3/MM3 (ref 0–0.7)
EOSINOPHIL NFR BLD AUTO: 0.9 % (ref 0.3–6.2)
ERYTHROCYTE [DISTWIDTH] IN BLOOD BY AUTOMATED COUNT: 12.2 % (ref 11.5–14.5)
ERYTHROCYTE [SEDIMENTATION RATE] IN BLOOD: 2 MM/HR (ref 0–15)
GFR SERPL CREATININE-BSD FRML MDRD: 86 ML/MIN/1.73
GLUCOSE BLD-MCNC: 90 MG/DL (ref 65–99)
HCT VFR BLD AUTO: 45.7 % (ref 40.4–52.2)
HGB BLD-MCNC: 15.1 G/DL (ref 13.7–17.6)
IMM GRANULOCYTES # BLD: 0.02 10*3/MM3 (ref 0–0.03)
IMM GRANULOCYTES NFR BLD: 0.3 % (ref 0–0.5)
INR PPP: 1.11 (ref 0.9–1.1)
LYMPHOCYTES # BLD AUTO: 2.34 10*3/MM3 (ref 0.9–4.8)
LYMPHOCYTES NFR BLD AUTO: 35 % (ref 19.6–45.3)
MCH RBC QN AUTO: 30.3 PG (ref 27–32.7)
MCHC RBC AUTO-ENTMCNC: 33 G/DL (ref 32.6–36.4)
MCV RBC AUTO: 91.8 FL (ref 79.8–96.2)
MONOCYTES # BLD AUTO: 0.62 10*3/MM3 (ref 0.2–1.2)
MONOCYTES NFR BLD AUTO: 9.3 % (ref 5–12)
NEUTROPHILS # BLD AUTO: 3.63 10*3/MM3 (ref 1.9–8.1)
NEUTROPHILS NFR BLD AUTO: 54.4 % (ref 42.7–76)
PLATELET # BLD AUTO: 257 10*3/MM3 (ref 140–500)
PMV BLD AUTO: 10.3 FL (ref 6–12)
POTASSIUM BLD-SCNC: 3.9 MMOL/L (ref 3.5–5.2)
PROTHROMBIN TIME: 14.1 SECONDS (ref 11.7–14.2)
RBC # BLD AUTO: 4.98 10*6/MM3 (ref 4.6–6)
RH BLD: NEGATIVE
SODIUM BLD-SCNC: 139 MMOL/L (ref 136–145)
T&S EXPIRATION DATE: NORMAL
WBC NRBC COR # BLD: 6.68 10*3/MM3 (ref 4.5–10.7)

## 2018-06-15 PROCEDURE — A9577 INJ MULTIHANCE: HCPCS | Performed by: NEUROLOGICAL SURGERY

## 2018-06-15 PROCEDURE — 25010000002 ONDANSETRON PER 1 MG: Performed by: NURSE PRACTITIONER

## 2018-06-15 PROCEDURE — 99255 IP/OBS CONSLTJ NEW/EST HI 80: CPT | Performed by: NURSE PRACTITIONER

## 2018-06-15 PROCEDURE — 86901 BLOOD TYPING SEROLOGIC RH(D): CPT | Performed by: NEUROLOGICAL SURGERY

## 2018-06-15 PROCEDURE — 85610 PROTHROMBIN TIME: CPT | Performed by: NEUROLOGICAL SURGERY

## 2018-06-15 PROCEDURE — 86923 COMPATIBILITY TEST ELECTRIC: CPT

## 2018-06-15 PROCEDURE — 25010000002 KETOROLAC TROMETHAMINE PER 15 MG: Performed by: EMERGENCY MEDICINE

## 2018-06-15 PROCEDURE — 25010000003 LEVETIRACETAM IN NACL 0.75% 1000 MG/100ML SOLUTION: Performed by: EMERGENCY MEDICINE

## 2018-06-15 PROCEDURE — 70450 CT HEAD/BRAIN W/O DYE: CPT

## 2018-06-15 PROCEDURE — 85730 THROMBOPLASTIN TIME PARTIAL: CPT | Performed by: NEUROLOGICAL SURGERY

## 2018-06-15 PROCEDURE — 85652 RBC SED RATE AUTOMATED: CPT | Performed by: NEUROLOGICAL SURGERY

## 2018-06-15 PROCEDURE — 25010000002 DEXAMETHASONE PER 1 MG: Performed by: EMERGENCY MEDICINE

## 2018-06-15 PROCEDURE — 0 GADOBENATE DIMEGLUMINE 529 MG/ML SOLUTION: Performed by: NEUROLOGICAL SURGERY

## 2018-06-15 PROCEDURE — 80048 BASIC METABOLIC PNL TOTAL CA: CPT | Performed by: EMERGENCY MEDICINE

## 2018-06-15 PROCEDURE — 25010000002 DEXAMETHASONE PER 1 MG: Performed by: NURSE PRACTITIONER

## 2018-06-15 PROCEDURE — 86900 BLOOD TYPING SEROLOGIC ABO: CPT

## 2018-06-15 PROCEDURE — 86901 BLOOD TYPING SEROLOGIC RH(D): CPT

## 2018-06-15 PROCEDURE — 86900 BLOOD TYPING SEROLOGIC ABO: CPT | Performed by: NEUROLOGICAL SURGERY

## 2018-06-15 PROCEDURE — 86850 RBC ANTIBODY SCREEN: CPT | Performed by: NEUROLOGICAL SURGERY

## 2018-06-15 PROCEDURE — 85025 COMPLETE CBC W/AUTO DIFF WBC: CPT | Performed by: EMERGENCY MEDICINE

## 2018-06-15 PROCEDURE — 25010000002 PROCHLORPERAZINE EDISYLATE PER 10 MG: Performed by: EMERGENCY MEDICINE

## 2018-06-15 PROCEDURE — 70553 MRI BRAIN STEM W/O & W/DYE: CPT

## 2018-06-15 PROCEDURE — 99285 EMERGENCY DEPT VISIT HI MDM: CPT

## 2018-06-15 PROCEDURE — 86140 C-REACTIVE PROTEIN: CPT | Performed by: NEUROLOGICAL SURGERY

## 2018-06-15 PROCEDURE — 25010000002 DIPHENHYDRAMINE PER 50 MG: Performed by: EMERGENCY MEDICINE

## 2018-06-15 RX ORDER — DIPHENHYDRAMINE HYDROCHLORIDE 50 MG/ML
25 INJECTION INTRAMUSCULAR; INTRAVENOUS ONCE
Status: COMPLETED | OUTPATIENT
Start: 2018-06-15 | End: 2018-06-15

## 2018-06-15 RX ORDER — SODIUM CHLORIDE 0.9 % (FLUSH) 0.9 %
10 SYRINGE (ML) INJECTION AS NEEDED
Status: DISCONTINUED | OUTPATIENT
Start: 2018-06-15 | End: 2018-06-20 | Stop reason: HOSPADM

## 2018-06-15 RX ORDER — DEXAMETHASONE SODIUM PHOSPHATE 4 MG/ML
4 INJECTION, SOLUTION INTRA-ARTICULAR; INTRALESIONAL; INTRAMUSCULAR; INTRAVENOUS; SOFT TISSUE EVERY 6 HOURS
Status: DISCONTINUED | OUTPATIENT
Start: 2018-06-15 | End: 2018-06-17

## 2018-06-15 RX ORDER — ONDANSETRON 4 MG/1
4 TABLET, FILM COATED ORAL EVERY 6 HOURS PRN
Status: DISCONTINUED | OUTPATIENT
Start: 2018-06-15 | End: 2018-06-19 | Stop reason: SDUPTHER

## 2018-06-15 RX ORDER — LEVETIRACETAM 10 MG/ML
1000 INJECTION INTRAVASCULAR EVERY 12 HOURS SCHEDULED
Status: DISCONTINUED | OUTPATIENT
Start: 2018-06-15 | End: 2018-06-15

## 2018-06-15 RX ORDER — PSEUDOEPHEDRINE HCL 120 MG/1
120 TABLET, FILM COATED, EXTENDED RELEASE ORAL EVERY 12 HOURS
COMMUNITY
End: 2018-06-29

## 2018-06-15 RX ORDER — LEVETIRACETAM 10 MG/ML
1000 INJECTION INTRAVASCULAR EVERY 12 HOURS SCHEDULED
Status: DISCONTINUED | OUTPATIENT
Start: 2018-06-15 | End: 2018-06-18

## 2018-06-15 RX ORDER — ONDANSETRON 2 MG/ML
4 INJECTION INTRAMUSCULAR; INTRAVENOUS EVERY 6 HOURS PRN
Status: DISCONTINUED | OUTPATIENT
Start: 2018-06-15 | End: 2018-06-19 | Stop reason: SDUPTHER

## 2018-06-15 RX ORDER — HYDROCODONE BITARTRATE AND ACETAMINOPHEN 5; 325 MG/1; MG/1
1 TABLET ORAL EVERY 4 HOURS PRN
Status: DISCONTINUED | OUTPATIENT
Start: 2018-06-15 | End: 2018-06-20 | Stop reason: HOSPADM

## 2018-06-15 RX ORDER — SODIUM CHLORIDE 9 MG/ML
125 INJECTION, SOLUTION INTRAVENOUS CONTINUOUS
Status: DISCONTINUED | OUTPATIENT
Start: 2018-06-15 | End: 2018-06-17

## 2018-06-15 RX ORDER — ONDANSETRON 4 MG/1
4 TABLET, ORALLY DISINTEGRATING ORAL EVERY 6 HOURS PRN
Status: DISCONTINUED | OUTPATIENT
Start: 2018-06-15 | End: 2018-06-19 | Stop reason: SDUPTHER

## 2018-06-15 RX ORDER — KETOROLAC TROMETHAMINE 15 MG/ML
15 INJECTION, SOLUTION INTRAMUSCULAR; INTRAVENOUS ONCE
Status: COMPLETED | OUTPATIENT
Start: 2018-06-15 | End: 2018-06-15

## 2018-06-15 RX ADMIN — HYDROCODONE BITARTRATE AND ACETAMINOPHEN 1 TABLET: 5; 325 TABLET ORAL at 20:59

## 2018-06-15 RX ADMIN — GADOBENATE DIMEGLUMINE 18 ML: 529 INJECTION, SOLUTION INTRAVENOUS at 18:00

## 2018-06-15 RX ADMIN — PROCHLORPERAZINE EDISYLATE 10 MG: 5 INJECTION INTRAMUSCULAR; INTRAVENOUS at 14:05

## 2018-06-15 RX ADMIN — DEXAMETHASONE SODIUM PHOSPHATE 10 MG: 10 INJECTION INTRAMUSCULAR; INTRAVENOUS at 16:01

## 2018-06-15 RX ADMIN — KETOROLAC TROMETHAMINE 15 MG: 15 INJECTION, SOLUTION INTRAMUSCULAR; INTRAVENOUS at 13:59

## 2018-06-15 RX ADMIN — SODIUM CHLORIDE 1000 ML: 9 INJECTION, SOLUTION INTRAVENOUS at 13:58

## 2018-06-15 RX ADMIN — DIPHENHYDRAMINE HYDROCHLORIDE 25 MG: 50 INJECTION INTRAMUSCULAR; INTRAVENOUS at 14:02

## 2018-06-15 RX ADMIN — LEVETIRACETAM 1000 MG: 10 INJECTION INTRAVENOUS at 15:42

## 2018-06-15 RX ADMIN — DEXAMETHASONE SODIUM PHOSPHATE 4 MG: 4 INJECTION, SOLUTION INTRAMUSCULAR; INTRAVENOUS at 22:56

## 2018-06-15 RX ADMIN — SODIUM CHLORIDE 125 ML/HR: 9 INJECTION, SOLUTION INTRAVENOUS at 20:58

## 2018-06-15 RX ADMIN — ONDANSETRON 4 MG: 2 INJECTION, SOLUTION INTRAMUSCULAR; INTRAVENOUS at 20:59

## 2018-06-15 NOTE — ED NOTES
MRI screening sheet completed and results called to Ciara in MRI.     Thalia Guevara RN  06/15/18 [id number]

## 2018-06-15 NOTE — ED NOTES
Pt c/o headache for about 1 week. Pt states that it is mild and that ibuprofen takes the edge off of it.  Pt states that he has some nausea and vomiting a couple of times in the past week. Pt states that he was treated for a sinus infection about 1 week ago with amoxicillin, steroids, Mucinex, and Flonase. Pt also c/o sinus pressure.     Thalia Guevara RN  06/15/18 [id number]       Thalia Guevara RN  06/15/18 [id number]

## 2018-06-15 NOTE — ED PROVIDER NOTES
EMERGENCY DEPARTMENT ENCOUNTER  [facility]    CHIEF COMPLAINT  Chief Complaint: Headache  History given by: patient  History limited by: nothing  Room Number: 46/46  PMD: Opal Benavidez MD      HPI:  Pt is a 32 y.o. male who presents with an intermittent HA that started one week ago as a pressure in his forehead that then moved to behind his left eye and a pain in the left side of his head. If he leans forward, the pressure increases, and sometimes it is worse when he stands and is alleviated when he sits back down.  When Pt was in a meeting three days ago, he had a scotoma in his right eye and his HA became severe. He laid down to take a nap and vomited from the pain after waking up. Pt states that when he lays down, the pressure in the left side of his head increases.  He denies photophobia and his HA does not worsen with loud sounds. Pt was seen by his PCP a few days ago and was prescribed mucinex, flonase, a medrol dose pack, and doxycycline four days ago with no relief.    Duration - 1 week  Onset - gradual  Timing - intermittent  Location of Headache - left-sided unilateral  Radiation - without radiation  Description of Headache - unilateral in the left frontal area  Intensity/Severity - moderate  Progression - unchanged  Associated Symptoms - scotomata, nausea, vomiting  Aura -None  Aggravating Factors- bending over  Alleviating Factors - unable to obtain relief with OTC meds  History of Headaches- no prior headache.  Treatment Prior to Arrival - Pt was seen by his PCP a few days ago and was prescribed mucinex, flonase, a medrol dose pack, and doxycycline four days ago with no relief.      PAST MEDICAL HISTORY  Active Ambulatory Problems     Diagnosis Date Noted   • Hyperlipidemia 03/29/2016   • Asthma 03/29/2016   • GERD (gastroesophageal reflux disease) 03/29/2016     Resolved Ambulatory Problems     Diagnosis Date Noted   • No Resolved Ambulatory Problems     Past Medical History:   Diagnosis Date   •  Allergic rhinitis    • Asthma    • Chest pain    • GERD (gastroesophageal reflux disease)    • H/O echocardiogram 2006   • History of ETT    • History of Holter monitoring    • Hyperlipidemia        PAST SURGICAL HISTORY  History reviewed. No pertinent surgical history.    FAMILY HISTORY  History reviewed. No pertinent family history.    SOCIAL HISTORY  Social History     Social History   • Marital status: Single     Spouse name: N/A   • Number of children: N/A   • Years of education: N/A     Occupational History   • Not on file.     Social History Main Topics   • Smoking status: Never Smoker   • Smokeless tobacco: Never Used   • Alcohol use Yes      Comment: Moderate   • Drug use: No   • Sexual activity: Defer     Other Topics Concern   • Not on file     Social History Narrative   • No narrative on file       ALLERGIES  Other    REVIEW OF SYSTEMS  Review of Systems   Constitutional: Negative for activity change, appetite change and fever.   HENT: Negative for congestion and sore throat.    Eyes: Positive for visual disturbance (scotoma in right eye (resolved)). Negative for photophobia.   Respiratory: Negative for cough and shortness of breath.    Cardiovascular: Negative for chest pain and leg swelling.   Gastrointestinal: Positive for nausea (resolved) and vomiting (resolved). Negative for abdominal pain and diarrhea.   Endocrine: Negative.    Genitourinary: Negative for decreased urine volume and dysuria.   Musculoskeletal: Negative for neck pain.   Skin: Negative for rash and wound.   Allergic/Immunologic: Negative.    Neurological: Positive for headaches. Negative for weakness and numbness.   Hematological: Negative.    Psychiatric/Behavioral: Negative.    All other systems reviewed and are negative.      PHYSICAL EXAM  ED Triage Vitals   Temp Heart Rate Resp BP SpO2   06/15/18 1304 06/15/18 1304 06/15/18 1304 06/15/18 1311 06/15/18 1304   96.8 °F (36 °C) 86 16 122/82 100 %      Temp src Heart Rate Source  Patient Position BP Location FiO2 (%)   06/15/18 1304 -- 06/15/18 1311 06/15/18 1311 --   Tympanic  Sitting Right arm        Physical Exam   Constitutional: No distress.   HENT:   Head: Normocephalic and atraumatic.   Right Ear: Tympanic membrane normal.   Left Ear: Tympanic membrane normal.   Nose: Right sinus exhibits maxillary sinus tenderness and frontal sinus tenderness. Left sinus exhibits maxillary sinus tenderness and frontal sinus tenderness.   Mouth/Throat: Oropharynx is clear and moist.   Mucosal thickening and redness with discharge in both nares.    Eyes: EOM are normal.   Neck: Normal range of motion and full passive range of motion without pain. Neck supple.   Pulmonary/Chest: No respiratory distress.   Abdominal: There is no tenderness.   Musculoskeletal: He exhibits no edema.   Neurological: He is alert. He has normal sensation, normal strength and normal reflexes. He displays facial symmetry.   Skin: Skin is warm and dry.   Nursing note and vitals reviewed.      LAB RESULTS  Lab Results (last 24 hours)     Procedure Component Value Units Date/Time    CBC & Differential [[id number]] Collected:  06/15/18 1357    Specimen:  Blood Updated:  06/15/18 1420    Narrative:       The following orders were created for panel order CBC & Differential.  Procedure                               Abnormality         Status                     ---------                               -----------         ------                     CBC Auto Differential[[id number]]        Normal              Final result                 Please view results for these tests on the individual orders.    Basic Metabolic Panel [[id number]] Collected:  06/15/18 1357    Specimen:  Blood Updated:  06/15/18 1441     Glucose 90 mg/dL      BUN 13 mg/dL      Creatinine 1.00 mg/dL      Sodium 139 mmol/L      Potassium 3.9 mmol/L      Chloride 100 mmol/L      CO2 27.2 mmol/L      Calcium 9.7 mg/dL      eGFR Non African Amer 86 mL/min/1.73       BUN/Creatinine Ratio 13.0     Anion Gap 11.8 mmol/L     Narrative:       GFR Normal >60  Chronic Kidney Disease <60  Kidney Failure <15    CBC Auto Differential [[id number]]  (Normal) Collected:  06/15/18 1357    Specimen:  Blood Updated:  06/15/18 1420     WBC 6.68 10*3/mm3      RBC 4.98 10*6/mm3      Hemoglobin 15.1 g/dL      Hematocrit 45.7 %      MCV 91.8 fL      MCH 30.3 pg      MCHC 33.0 g/dL      RDW 12.2 %      RDW-SD 41.0 fl      MPV 10.3 fL      Platelets 257 10*3/mm3      Neutrophil % 54.4 %      Lymphocyte % 35.0 %      Monocyte % 9.3 %      Eosinophil % 0.9 %      Basophil % 0.1 %      Immature Grans % 0.3 %      Neutrophils, Absolute 3.63 10*3/mm3      Lymphocytes, Absolute 2.34 10*3/mm3      Monocytes, Absolute 0.62 10*3/mm3      Eosinophils, Absolute 0.06 10*3/mm3      Basophils, Absolute 0.01 10*3/mm3      Immature Grans, Absolute 0.02 10*3/mm3           I ordered the above labs and reviewed the results    RADIOLOGY  CT Head Without Contrast   Preliminary Result   Large right frontal lobe mass with a 7.4 x 4 x 4 cm cystic   component.  Along its anterior superior margin it has an amorphous 4.8 x   3.0 x 3.0 cm solid component and there are amorphous crescentic   calcification along the anterior medial, anterior lateral and anterior   superior medial portion of this mass and there is surrounding right   frontal lobe white matter low-density felt to be surrounding vasogenic   edema and the combined dimensions of the mass and surrounding edema   measures up to 10 x 6.5 x 6.2 cm in anterior posterior, medial lateral   and cranial caudal dimension.  It has marked mass effect on the anterior   body and frontal horn of the right lateral ventricle, up to 12-13 mm   right to left midline shift and some subfalcine herniation at the level   of the anterior falx.  I strongly recommend a contrast-enhanced MRI of   the brain to further characterize this lesion and neurosurgical   consultation is warranted.  The  "results and recommendation were   communicated to Dr. Crisostomo by telephone 06/15/2018 at 3 PM       Radiation dose reduction techniques were utilized, including automated   exposure control and exposure modulation based on body size.                   I ordered the above noted radiological studies. Interpreted by radiologist. Discussed with radiologist (Dr. Da Silva). Reviewed by me in PACS.       PROCEDURES  Procedures      PROGRESS AND CONSULTS     1345  Ordered CT head for further evaluation because Pt states that he would \"sleep better at night\" if he knew there was nothing wrong with his brain.  Ordered labs for further evaluation and IV fluids, toradol, compazine, and benadryl for his HA.     1506  Rechecked Pt who is resting comfortably. Pt's family states that he has had four episodes of severe left sided HAs over the past several months and has been treated for \"recurrent sinus infections\". Informed Pt and Pt's family that the CT head shows a growth in the right frontal lobe of his brain and that it is a cyst like mass with some thickening around it. It has pushed some of his brain matter from the right side of his brain to the left and is likely causing his left sided HA. Informed Pt that I have placed a call to the neurosurgeon and that he will need an MRI as soon as possible and he will need to be admitted for further evaluation.   Placed call to neurosurgery for consult.    1526  Discussed Pt's case with Starr Sommer (ARPN on call or Dr. Galvan, neurosurgery) who would like us to admit Pt to Dr. Galvan for further evaluation and treatment. She agreed to start him on decadron and Kepra.    1530  Rechecked Pt who is resting comfortably. Informed Pt that I have spoken to Starr Sommer (APRN, Neurosurgery) and that he will be admitted for further evaluation. Informed Pt that we will start him on decadron to reduce inflammation and kepra to prevent seizures. They will order an MRI which will be performed in the next couple " of hours which will be helpful to determine what treatment options are available. Pt's wife states that Pt had an episode several months ago in which Pt woke up with right sided facial numbness and right arm numbness. She also mentions a time when he was in bed for three days with a severe ROPER.     1600  Starr Sommer (APRN, neurosurgery) at bedside for consult.       MEDICAL DECISION MAKING  Results were reviewed/discussed with the patient and they were also made aware of online access. Pt also made aware that some labs, such as cultures, will not be resulted during ER visit and follow up with PMD is necessary.     MDM  Number of Diagnoses or Management Options     Amount and/or Complexity of Data Reviewed  Clinical lab tests: ordered and reviewed (Labs are unremarkable.)  Tests in the radiology section of CPT®: ordered and reviewed (CT head shows a cystic tumor in the right frontal lobe.)  Discussion of test results with the performing providers: yes (Dr. Da Silva (radiology))  Decide to obtain previous medical records or to obtain history from someone other than the patient: yes  Review and summarize past medical records: yes (Pt was seen 6/11/18 by Dr. Lake for a sinus infection, recurrent sinusitis. He was put on a medrol dose pack, doxycycline, flonase, and mucinex.)  Discuss the patient with other providers: yes (Starr Sommer (neurosurgery APRN))           DIAGNOSIS  Final diagnoses:   Primary brain tumor       DISPOSITION  ADMISSION    Discussed treatment plan and reason for admission with pt/family and admitting physician.  Pt/family voiced understanding of the plan for admission for further testing/treatment as needed.       Latest Documented Vital Signs:  As of 4:10 PM  BP- 129/87 HR- 83 Temp- 96.8 °F (36 °C) (Tympanic) O2 sat- 100%    --  Documentation assistance provided by gabino Bray for Dr. Crisostomo.  Information recorded by the gabino was done at my direction and has been verified and validated by  me.       Lisa Bray  06/15/18 1611       Eliel Crisostomo MD  06/15/18 5703

## 2018-06-15 NOTE — CONSULTS
"Patient name: Bryan Valadez  Referring Provider: Dr Crisostomo  Reason for Consultation: abnormal CT head    Patient Care Team:  Opal Benavidez MD as PCP - General (Internal Medicine)    Chief complaint: headaches x6 months/ R blurry vision     Subjective .     History of present illness:    Patient is a 32 y.o. right handed male who presents with waxing and waning headaches that have progressively worsened over the past 6 months.  The patient has a history of allergy and sinus problems and has been treated for \"sinus infections\" numerous times.  More recently, he has been to an [facility] on a few occasions as well as to the emergency room twice in [place], [place], where he and his family recently moved from.  During all the evaluations, he was told that he had sinus pressure and subsequent infections.  No prior head maging was obtained.    This past week, the patient was in [place], [place] for work and was sitting in a meeting 2 days ago when he began having right-sided blurry vision that was accompanied with a severe right-sided headache.  This is the only time his vision has been affected.  He denies any balance or gait issues, any numbness, tingling or weakness of his extremities, with the exception of one occasion a couple of months ago when he had right-sided facial and left upper arm numbness that persisted for a couple of hours.  Just before his wife was going to take him to the ER, the numbness resolved.  It has not returned.  He also denies any recent fever, chills or any other signs or symptoms of a systemic infection.  They have traveled out of the country only once to [facility] last month.  He denies any engaging in any risky behavior or activity.    Otherwise, he reports that he has been doing and feeling well maintaining a full time job with lots of flying.  After the patient leaves the ER room for a CT scan, the patient's wife reports that he has significant forgetfulness, short-term memory " "loss and word finding difficulty over the past few months.  She also reports that he has \"difficulty forming his thoughts.\"  She attributed the issues to stress.        /87   Pulse 83   Temp 96.8 °F (36 °C) (Tympanic)   Resp 16   Ht 180.3 cm (71\")   Wt 86.2 kg (190 lb)   SpO2 100%   BMI 26.50 kg/m²         Review of Systems  Review of Systems   Constitutional: Positive for activity change and fatigue. Negative for fever and unexpected weight change.   HENT: Positive for congestion, sinus pain and sinus pressure.    Eyes: Negative.    Respiratory: Negative.    Cardiovascular: Negative.    Gastrointestinal: Negative.    Endocrine: Negative.    Genitourinary: Negative.    Musculoskeletal: Negative.    Skin: Negative.    Allergic/Immunologic: Negative.    Neurological: Negative.    Hematological: Negative.    Psychiatric/Behavioral: Negative.        History  PAST MEDICAL HISTORY  Past Medical History:   Diagnosis Date   • Allergic rhinitis    • Asthma     Pulmonary Dr. Alexandra   • Chest pain    • GERD (gastroesophageal reflux disease)    • H/O echocardiogram 2006   • History of ETT    • History of Holter monitoring    • Hyperlipidemia        PAST SURGICAL HISTORY  History reviewed. No pertinent surgical history.    FAMILY HISTORY  History reviewed. No pertinent family history.    SOCIAL HISTORY  Social History   Substance Use Topics   • Smoking status: Never Smoker   • Smokeless tobacco: Never Used   • Alcohol use Yes      Comment: Moderate       full time job doing real estate      Allergies:  Other    MEDICATIONS:    (Not in a hospital admission)    Objective     Results Review:  LABS:    Admission on 06/15/2018   Component Date Value Ref Range Status   • Glucose 06/15/2018 90  65 - 99 mg/dL Final   • BUN 06/15/2018 13  6 - 20 mg/dL Final   • Creatinine 06/15/2018 1.00  0.76 - 1.27 mg/dL Final   • Sodium 06/15/2018 139  136 - 145 mmol/L Final   • Potassium 06/15/2018 3.9  3.5 - 5.2 mmol/L Final   • " Chloride 06/15/2018 100  98 - 107 mmol/L Final   • CO2 06/15/2018 27.2  22.0 - 29.0 mmol/L Final   • Calcium 06/15/2018 9.7  8.6 - 10.5 mg/dL Final   • eGFR Non African Amer 06/15/2018 86  >60 mL/min/1.73 Final   • BUN/Creatinine Ratio 06/15/2018 13.0  7.0 - 25.0 Final   • Anion Gap 06/15/2018 11.8  mmol/L Final   • WBC 06/15/2018 6.68  4.50 - 10.70 10*3/mm3 Final   • RBC 06/15/2018 4.98  4.60 - 6.00 10*6/mm3 Final   • Hemoglobin 06/15/2018 15.1  13.7 - 17.6 g/dL Final   • Hematocrit 06/15/2018 45.7  40.4 - 52.2 % Final   • MCV 06/15/2018 91.8  79.8 - 96.2 fL Final   • MCH 06/15/2018 30.3  27.0 - 32.7 pg Final   • MCHC 06/15/2018 33.0  32.6 - 36.4 g/dL Final   • RDW 06/15/2018 12.2  11.5 - 14.5 % Final   • RDW-SD 06/15/2018 41.0  37.0 - 54.0 fl Final   • MPV 06/15/2018 10.3  6.0 - 12.0 fL Final   • Platelets 06/15/2018 257  140 - 500 10*3/mm3 Final   • Neutrophil % 06/15/2018 54.4  42.7 - 76.0 % Final   • Lymphocyte % 06/15/2018 35.0  19.6 - 45.3 % Final   • Monocyte % 06/15/2018 9.3  5.0 - 12.0 % Final   • Eosinophil % 06/15/2018 0.9  0.3 - 6.2 % Final   • Basophil % 06/15/2018 0.1  0.0 - 1.5 % Final   • Immature Grans % 06/15/2018 0.3  0.0 - 0.5 % Final   • Neutrophils, Absolute 06/15/2018 3.63  1.90 - 8.10 10*3/mm3 Final   • Lymphocytes, Absolute 06/15/2018 2.34  0.90 - 4.80 10*3/mm3 Final   • Monocytes, Absolute 06/15/2018 0.62  0.20 - 1.20 10*3/mm3 Final   • Eosinophils, Absolute 06/15/2018 0.06  0.00 - 0.70 10*3/mm3 Final   • Basophils, Absolute 06/15/2018 0.01  0.00 - 0.20 10*3/mm3 Final   • Immature Grans, Absolute 06/15/2018 0.02  0.00 - 0.03 10*3/mm3 Final       DIAGNOSTICS:  CT head from [facility] dated Vee 15, 2018 reveals a very large right frontal lobe mass measuring 7. 4 x 4 by 4 cm with cystic components.  Surrounding vasogenic edema measures 10 x 6.5 x 6.2 cm there is marked mass effect with 13 mm right to left midline shift    Repeat CT head and MRI brain with and without contrast  ordered and pending      Results Review:   I reviewed the patient's new clinical results.  I personally viewed and interpreted the patient's CT head    Vital Signs   Temp:  [96.8 °F (36 °C)] 96.8 °F (36 °C)  Heart Rate:  [63-86] 83  Resp:  [16] 16  BP: (111-129)/(69-87) 129/87    Physical Exam:  Physical Exam   Constitutional: He is oriented to person, place, and time. Vital signs are normal. He appears well-developed and well-nourished. He is cooperative.  Non-toxic appearance. He does not have a sickly appearance. He does not appear ill.   Very pleasant, well-appearing, younger male   HENT:   Head: Normocephalic and atraumatic.   Eyes: EOM are normal. Pupils are equal, round, and reactive to light.   Neck: Normal range of motion. Neck supple. No tracheal deviation present.   Cardiovascular: Normal rate, regular rhythm and intact distal pulses.    Pulmonary/Chest: Effort normal and breath sounds normal. No respiratory distress.   Abdominal: Soft.   Musculoskeletal: Normal range of motion. He exhibits no tenderness or deformity.   Strength equal and normal throughout, normal muscle bulk and tone  Moving all extremities well   Lymphadenopathy:     He has no cervical adenopathy.   Neurological: He is alert and oriented to person, place, and time. He has normal strength. He displays no tremor and normal reflexes. A cranial nerve deficit is present. No sensory deficit. He exhibits normal muscle tone. He has a normal Finger-Nose-Finger Test, a normal Heel to Correia Test and a normal Romberg Test. He displays no seizure activity. Gait normal. Coordination normal. GCS eye subscore is 4. GCS verbal subscore is 5. GCS motor subscore is 6.   Negative drift, negative tremors  Gait is stable and upright  Able to heel and toe walk  Finger to nose and heel to shin intact bilaterally  Alert and oriented ×4, answering all questions and following all commands   Skin: Skin is warm and dry.   Psychiatric: He has a normal mood and  affect. His speech is normal and behavior is normal. Judgment and thought content normal.   Vitals reviewed.    Neurologic Exam     Mental Status   Oriented to person, place, and time.   Oriented to person.   Oriented to place. Oriented to country, city and area.   Oriented to time. Oriented to year, month, date, day and season.   Attention: normal. Concentration: normal.   Speech: speech is normal   Level of consciousness: alert    Cranial Nerves     CN II   Visual fields full to confrontation.   Visual acuity: normal  Right visual field deficit: none  Left visual field deficit: none     CN III, IV, VI   Pupils are equal, round, and reactive to light.  Extraocular motions are normal.   Right pupil: Size: 3 mm. Shape: regular. Reactivity: brisk. Consensual response: intact.   Left pupil: Size: 3 mm. Shape: regular. Reactivity: non-reactive. Consensual response: intact.   CN III: no CN III palsy  CN VI: no CN VI palsy  Nystagmus: none   Diplopia: none  Upgaze: normal  Downgaze: normal    CN V   Facial sensation intact.   Right facial sensation deficit: none  Left facial sensation deficit: none    CN VII   Facial expression full, symmetric.   Right facial weakness: none  Left facial weakness: none    CN VIII   CN VIII normal.   Hearing: intact    CN IX, X   CN IX normal.   Palate: symmetric    CN XI   Right sternocleidomastoid strength: normal  Left sternocleidomastoid strength: normal  Right trapezius strength: normal    CN XII   CN XII normal.   Tongue: not atrophic  Tongue deviation: none  Geographic tongue     Motor Exam   Muscle bulk: normal  Overall muscle tone: normal    Strength   Strength 5/5 throughout.   Right neck flexion: 5/5  Left neck flexion: 5/5  Right neck extension: 5/5  Left neck extension: 5/5  Right deltoid: 5/5  Left deltoid: 5/5  Right biceps: 5/5  Left biceps: 5/5  Right triceps: 5/5  Left triceps: 5/5  Right wrist flexion: 5/5  Left wrist flexion: 5/5  Right wrist extension: 5/5  Left wrist  extension: 5/5  Right interossei: 5/5  Left interossei: 5/5  Right abdominals: 5/5  Left abdominals: 5/5  Right iliopsoas: 5/5  Left iliopsoas: 5/5  Right quadriceps: 5/5  Left quadriceps: 5/5  Right hamstrin/5  Left hamstrin/5  Right glutei: 5/5  Left glutei: 5/5  Right anterior tibial: 5/5  Left anterior tibial: 5/5  Right posterior tibial: 5/5  Left posterior tibial: 5/5  Right peroneal: 5/5  Left peroneal: 5/5  Right gastroc: 5/5  Left gastroc: 5/5    Sensory Exam   Light touch normal.     Gait, Coordination, and Reflexes     Gait  Gait: normal    Coordination   Romberg: negative  Finger to nose coordination: normal  Heel to shin coordination: normal    Tremor   Resting tremor: absent      Assessment/Plan     Active Problems:    Primary brain tumor      PLAN: Very pleasant well appearing younger male presented to the ER with 6 months of waxing and waning headaches.  More recently, he developed blurry vision out of the right eye.  After returning in town today he presents to the ER for further evaluation.  He requested a CT head secondary to multiple prior ER and outpatient evaluations that came up negative.     CT head imaging reveals a very large right frontal mass with cystic components measuring 7. 4 x 4 by 4 cm with extensive, surrounding vasogenic edema.  MRI brain imaging with and without contrast has been ordered and is pending.  We have started the patient on prophylactic IV keppra for seizure precautions as well as IV steroids to decrease the surrounding edema.  I've also ordered sed rate and CRP, though the likelihood of underlying infection given stable labs and vitals is highly unlikely. Remarkably, he is quite stable on exam.  The only abnormality noted was fixation of the left pupil.    Further recommendations to follow once MRI imaging is complete.  He will be followed very closely by our service.  Thank you for the consult.      I discussed the patients findings and my recommendations  with patient, family, nursing staff, consulting provider and Dr Cole Sommer, APRN  06/15/18  5:13 PM

## 2018-06-16 ENCOUNTER — ANESTHESIA (OUTPATIENT)
Dept: PERIOP | Facility: HOSPITAL | Age: 33
End: 2018-06-16

## 2018-06-16 ENCOUNTER — ANESTHESIA EVENT (OUTPATIENT)
Dept: PERIOP | Facility: HOSPITAL | Age: 33
End: 2018-06-16

## 2018-06-16 LAB
ANION GAP SERPL CALCULATED.3IONS-SCNC: 15.2 MMOL/L
BASOPHILS # BLD AUTO: 0 10*3/MM3 (ref 0–0.2)
BASOPHILS NFR BLD AUTO: 0 % (ref 0–1.5)
BUN BLD-MCNC: 10 MG/DL (ref 6–20)
BUN/CREAT SERPL: 10.6 (ref 7–25)
CALCIUM SPEC-SCNC: 8.3 MG/DL (ref 8.6–10.5)
CHLORIDE SERPL-SCNC: 107 MMOL/L (ref 98–107)
CO2 SERPL-SCNC: 20.8 MMOL/L (ref 22–29)
CREAT BLD-MCNC: 0.94 MG/DL (ref 0.76–1.27)
DEPRECATED RDW RBC AUTO: 39.1 FL (ref 37–54)
EOSINOPHIL # BLD AUTO: 0 10*3/MM3 (ref 0–0.7)
EOSINOPHIL NFR BLD AUTO: 0 % (ref 0.3–6.2)
ERYTHROCYTE [DISTWIDTH] IN BLOOD BY AUTOMATED COUNT: 12.1 % (ref 11.5–14.5)
GFR SERPL CREATININE-BSD FRML MDRD: 92 ML/MIN/1.73
GLUCOSE BLD-MCNC: 145 MG/DL (ref 65–99)
GLUCOSE BLDC GLUCOMTR-MCNC: 132 MG/DL (ref 70–130)
HCT VFR BLD AUTO: 38.4 % (ref 40.4–52.2)
HGB BLD-MCNC: 12.8 G/DL (ref 13.7–17.6)
IMM GRANULOCYTES # BLD: 0.04 10*3/MM3 (ref 0–0.03)
IMM GRANULOCYTES NFR BLD: 0.3 % (ref 0–0.5)
LYMPHOCYTES # BLD AUTO: 1.24 10*3/MM3 (ref 0.9–4.8)
LYMPHOCYTES NFR BLD AUTO: 9.1 % (ref 19.6–45.3)
MCH RBC QN AUTO: 30 PG (ref 27–32.7)
MCHC RBC AUTO-ENTMCNC: 33.3 G/DL (ref 32.6–36.4)
MCV RBC AUTO: 89.9 FL (ref 79.8–96.2)
MONOCYTES # BLD AUTO: 0.7 10*3/MM3 (ref 0.2–1.2)
MONOCYTES NFR BLD AUTO: 5.2 % (ref 5–12)
NEUTROPHILS # BLD AUTO: 11.64 10*3/MM3 (ref 1.9–8.1)
NEUTROPHILS NFR BLD AUTO: 85.7 % (ref 42.7–76)
PLATELET # BLD AUTO: 276 10*3/MM3 (ref 140–500)
PMV BLD AUTO: 10.2 FL (ref 6–12)
POTASSIUM BLD-SCNC: 3.9 MMOL/L (ref 3.5–5.2)
RBC # BLD AUTO: 4.27 10*6/MM3 (ref 4.6–6)
SODIUM BLD-SCNC: 143 MMOL/L (ref 136–145)
WBC NRBC COR # BLD: 13.58 10*3/MM3 (ref 4.5–10.7)

## 2018-06-16 PROCEDURE — C1713 ANCHOR/SCREW BN/BN,TIS/BN: HCPCS | Performed by: NEUROLOGICAL SURGERY

## 2018-06-16 PROCEDURE — P9041 ALBUMIN (HUMAN),5%, 50ML: HCPCS | Performed by: ANESTHESIOLOGY

## 2018-06-16 PROCEDURE — 25010000003 CEFAZOLIN IN DEXTROSE 2-4 GM/100ML-% SOLUTION: Performed by: NEUROLOGICAL SURGERY

## 2018-06-16 PROCEDURE — 25010000002 VANCOMYCIN PER 500 MG: Performed by: NEUROLOGICAL SURGERY

## 2018-06-16 PROCEDURE — 80048 BASIC METABOLIC PNL TOTAL CA: CPT | Performed by: NEUROLOGICAL SURGERY

## 2018-06-16 PROCEDURE — 25010000002 MIDAZOLAM PER 1 MG: Performed by: ANESTHESIOLOGY

## 2018-06-16 PROCEDURE — 25010000002 PROPOFOL 10 MG/ML EMULSION: Performed by: NURSE ANESTHETIST, CERTIFIED REGISTERED

## 2018-06-16 PROCEDURE — 25010000002 NEOSTIGMINE PER 0.5 MG: Performed by: ANESTHESIOLOGY

## 2018-06-16 PROCEDURE — 85014 HEMATOCRIT: CPT

## 2018-06-16 PROCEDURE — 25010000002 HYDROMORPHONE PER 4 MG: Performed by: NEUROLOGICAL SURGERY

## 2018-06-16 PROCEDURE — 61510 CRNEC TREPH EXC BRN TUM STTL: CPT | Performed by: SPECIALIST/TECHNOLOGIST, OTHER

## 2018-06-16 PROCEDURE — 82962 GLUCOSE BLOOD TEST: CPT

## 2018-06-16 PROCEDURE — 25010000002 MANNITOL PER 50 ML: Performed by: NURSE ANESTHETIST, CERTIFIED REGISTERED

## 2018-06-16 PROCEDURE — 25810000003 SODIUM CHLORIDE 0.9 % WITH KCL 20 MEQ 20-0.9 MEQ/L-% SOLUTION: Performed by: NEUROLOGICAL SURGERY

## 2018-06-16 PROCEDURE — 25010000003 LEVETIRACETAM IN NACL 0.75% 1000 MG/100ML SOLUTION: Performed by: EMERGENCY MEDICINE

## 2018-06-16 PROCEDURE — 25010000002 FENTANYL CITRATE (PF) 100 MCG/2ML SOLUTION: Performed by: ANESTHESIOLOGY

## 2018-06-16 PROCEDURE — 88307 TISSUE EXAM BY PATHOLOGIST: CPT | Performed by: NEUROLOGICAL SURGERY

## 2018-06-16 PROCEDURE — 82803 BLOOD GASES ANY COMBINATION: CPT

## 2018-06-16 PROCEDURE — 25010000002 FENTANYL CITRATE (PF) 100 MCG/2ML SOLUTION: Performed by: NURSE ANESTHETIST, CERTIFIED REGISTERED

## 2018-06-16 PROCEDURE — 00B70ZX EXCISION OF CEREBRAL HEMISPHERE, OPEN APPROACH, DIAGNOSTIC: ICD-10-PCS | Performed by: NEUROLOGICAL SURGERY

## 2018-06-16 PROCEDURE — 25010000002 ONDANSETRON PER 1 MG: Performed by: NURSE ANESTHETIST, CERTIFIED REGISTERED

## 2018-06-16 PROCEDURE — 88334 PATH CONSLTJ SURG CYTO XM EA: CPT | Performed by: NEUROLOGICAL SURGERY

## 2018-06-16 PROCEDURE — 25010000002 DEXAMETHASONE PER 1 MG: Performed by: NEUROLOGICAL SURGERY

## 2018-06-16 PROCEDURE — 25010000002 DEXAMETHASONE PER 1 MG: Performed by: NURSE PRACTITIONER

## 2018-06-16 PROCEDURE — 25010000002 DEXAMETHASONE PER 1 MG: Performed by: NURSE ANESTHETIST, CERTIFIED REGISTERED

## 2018-06-16 PROCEDURE — 88331 PATH CONSLTJ SURG 1 BLK 1SPC: CPT | Performed by: NEUROLOGICAL SURGERY

## 2018-06-16 PROCEDURE — 82947 ASSAY GLUCOSE BLOOD QUANT: CPT

## 2018-06-16 PROCEDURE — 61510 CRNEC TREPH EXC BRN TUM STTL: CPT | Performed by: NEUROLOGICAL SURGERY

## 2018-06-16 PROCEDURE — 25010000002 HYDROMORPHONE PER 4 MG: Performed by: NURSE ANESTHETIST, CERTIFIED REGISTERED

## 2018-06-16 PROCEDURE — 25010000002 ALBUMIN HUMAN 5% PER 50 ML: Performed by: ANESTHESIOLOGY

## 2018-06-16 PROCEDURE — 61781 SCAN PROC CRANIAL INTRA: CPT | Performed by: NEUROLOGICAL SURGERY

## 2018-06-16 PROCEDURE — 85025 COMPLETE CBC W/AUTO DIFF WBC: CPT | Performed by: NEUROLOGICAL SURGERY

## 2018-06-16 PROCEDURE — 85018 HEMOGLOBIN: CPT

## 2018-06-16 PROCEDURE — 69990 MICROSURGERY ADD-ON: CPT | Performed by: NEUROLOGICAL SURGERY

## 2018-06-16 DEVICE — PLT CVR LEVELONE BURHL LP CONTRL .3X17X1.5MM: Type: IMPLANTABLE DEVICE | Site: CRANIAL | Status: FUNCTIONAL

## 2018-06-16 DEVICE — PLT CMF LEVELONE LP NEURO SQ SEG TI SYS 2X2HL .6X1.5MM: Type: IMPLANTABLE DEVICE | Site: CRANIAL | Status: FUNCTIONAL

## 2018-06-16 DEVICE — SCRW CRS/DRV DRL FREE MICRO TI 1.5X4MM: Type: IMPLANTABLE DEVICE | Site: CRANIAL | Status: FUNCTIONAL

## 2018-06-16 RX ORDER — MEPERIDINE HYDROCHLORIDE 25 MG/ML
12.5 INJECTION INTRAMUSCULAR; INTRAVENOUS; SUBCUTANEOUS
Status: DISCONTINUED | OUTPATIENT
Start: 2018-06-16 | End: 2018-06-16 | Stop reason: HOSPADM

## 2018-06-16 RX ORDER — ONDANSETRON 2 MG/ML
4 INJECTION INTRAMUSCULAR; INTRAVENOUS ONCE AS NEEDED
Status: COMPLETED | OUTPATIENT
Start: 2018-06-16 | End: 2018-06-16

## 2018-06-16 RX ORDER — MONTELUKAST SODIUM 10 MG/1
10 TABLET ORAL NIGHTLY
Status: DISCONTINUED | OUTPATIENT
Start: 2018-06-16 | End: 2018-06-20 | Stop reason: HOSPADM

## 2018-06-16 RX ORDER — HYDROMORPHONE HYDROCHLORIDE 1 MG/ML
0.5 INJECTION, SOLUTION INTRAMUSCULAR; INTRAVENOUS; SUBCUTANEOUS
Status: DISCONTINUED | OUTPATIENT
Start: 2018-06-16 | End: 2018-06-18

## 2018-06-16 RX ORDER — FENTANYL CITRATE 50 UG/ML
INJECTION, SOLUTION INTRAMUSCULAR; INTRAVENOUS AS NEEDED
Status: DISCONTINUED | OUTPATIENT
Start: 2018-06-16 | End: 2018-06-16 | Stop reason: SURG

## 2018-06-16 RX ORDER — DEXTROSE MONOHYDRATE 25 G/50ML
25 INJECTION, SOLUTION INTRAVENOUS
Status: DISCONTINUED | OUTPATIENT
Start: 2018-06-16 | End: 2018-06-20 | Stop reason: HOSPADM

## 2018-06-16 RX ORDER — LABETALOL HYDROCHLORIDE 5 MG/ML
10 INJECTION, SOLUTION INTRAVENOUS
Status: DISCONTINUED | OUTPATIENT
Start: 2018-06-16 | End: 2018-06-20 | Stop reason: HOSPADM

## 2018-06-16 RX ORDER — PROMETHAZINE HYDROCHLORIDE 25 MG/1
12.5 TABLET ORAL ONCE AS NEEDED
Status: DISCONTINUED | OUTPATIENT
Start: 2018-06-16 | End: 2018-06-16 | Stop reason: HOSPADM

## 2018-06-16 RX ORDER — SENNA AND DOCUSATE SODIUM 50; 8.6 MG/1; MG/1
1 TABLET, FILM COATED ORAL NIGHTLY PRN
Status: DISCONTINUED | OUTPATIENT
Start: 2018-06-16 | End: 2018-06-20 | Stop reason: HOSPADM

## 2018-06-16 RX ORDER — GLYCOPYRROLATE 0.2 MG/ML
INJECTION INTRAMUSCULAR; INTRAVENOUS AS NEEDED
Status: DISCONTINUED | OUTPATIENT
Start: 2018-06-16 | End: 2018-06-16 | Stop reason: SURG

## 2018-06-16 RX ORDER — DOCUSATE SODIUM 100 MG/1
100 CAPSULE, LIQUID FILLED ORAL 2 TIMES DAILY PRN
Status: DISCONTINUED | OUTPATIENT
Start: 2018-06-16 | End: 2018-06-20 | Stop reason: HOSPADM

## 2018-06-16 RX ORDER — MANNITOL 250 MG/ML
INJECTION, SOLUTION INTRAVENOUS AS NEEDED
Status: DISCONTINUED | OUTPATIENT
Start: 2018-06-16 | End: 2018-06-16 | Stop reason: SURG

## 2018-06-16 RX ORDER — FENTANYL CITRATE 50 UG/ML
INJECTION, SOLUTION INTRAMUSCULAR; INTRAVENOUS
Status: COMPLETED
Start: 2018-06-16 | End: 2018-06-16

## 2018-06-16 RX ORDER — OXYCODONE AND ACETAMINOPHEN 7.5; 325 MG/1; MG/1
1 TABLET ORAL ONCE AS NEEDED
Status: DISCONTINUED | OUTPATIENT
Start: 2018-06-16 | End: 2018-06-16 | Stop reason: HOSPADM

## 2018-06-16 RX ORDER — FAMOTIDINE 10 MG/ML
20 INJECTION, SOLUTION INTRAVENOUS ONCE
Status: COMPLETED | OUTPATIENT
Start: 2018-06-16 | End: 2018-06-16

## 2018-06-16 RX ORDER — PANTOPRAZOLE SODIUM 40 MG/1
40 TABLET, DELAYED RELEASE ORAL EVERY MORNING
Status: DISCONTINUED | OUTPATIENT
Start: 2018-06-17 | End: 2018-06-18

## 2018-06-16 RX ORDER — CEFAZOLIN SODIUM 2 G/100ML
2 INJECTION, SOLUTION INTRAVENOUS ONCE
Status: COMPLETED | OUTPATIENT
Start: 2018-06-16 | End: 2018-06-16

## 2018-06-16 RX ORDER — FENTANYL CITRATE 50 UG/ML
50 INJECTION, SOLUTION INTRAMUSCULAR; INTRAVENOUS
Status: DISCONTINUED | OUTPATIENT
Start: 2018-06-16 | End: 2018-06-16 | Stop reason: HOSPADM

## 2018-06-16 RX ORDER — SODIUM CHLORIDE AND POTASSIUM CHLORIDE 150; 900 MG/100ML; MG/100ML
50 INJECTION, SOLUTION INTRAVENOUS CONTINUOUS
Status: DISCONTINUED | OUTPATIENT
Start: 2018-06-16 | End: 2018-06-20 | Stop reason: HOSPADM

## 2018-06-16 RX ORDER — ONDANSETRON 4 MG/1
4 TABLET, FILM COATED ORAL EVERY 6 HOURS PRN
Status: DISCONTINUED | OUTPATIENT
Start: 2018-06-16 | End: 2018-06-20 | Stop reason: HOSPADM

## 2018-06-16 RX ORDER — NALOXONE HCL 0.4 MG/ML
0.2 VIAL (ML) INJECTION AS NEEDED
Status: DISCONTINUED | OUTPATIENT
Start: 2018-06-16 | End: 2018-06-16 | Stop reason: HOSPADM

## 2018-06-16 RX ORDER — NICOTINE POLACRILEX 4 MG
15 LOZENGE BUCCAL
Status: DISCONTINUED | OUTPATIENT
Start: 2018-06-16 | End: 2018-06-20 | Stop reason: HOSPADM

## 2018-06-16 RX ORDER — CEFAZOLIN SODIUM 2 G/100ML
2 INJECTION, SOLUTION INTRAVENOUS EVERY 8 HOURS
Status: COMPLETED | OUTPATIENT
Start: 2018-06-16 | End: 2018-06-17

## 2018-06-16 RX ORDER — FLUMAZENIL 0.1 MG/ML
0.2 INJECTION INTRAVENOUS AS NEEDED
Status: DISCONTINUED | OUTPATIENT
Start: 2018-06-16 | End: 2018-06-16 | Stop reason: HOSPADM

## 2018-06-16 RX ORDER — DEXAMETHASONE SODIUM PHOSPHATE 10 MG/ML
INJECTION INTRAMUSCULAR; INTRAVENOUS AS NEEDED
Status: DISCONTINUED | OUTPATIENT
Start: 2018-06-16 | End: 2018-06-16 | Stop reason: SURG

## 2018-06-16 RX ORDER — MIDAZOLAM HYDROCHLORIDE 1 MG/ML
1 INJECTION INTRAMUSCULAR; INTRAVENOUS
Status: DISCONTINUED | OUTPATIENT
Start: 2018-06-16 | End: 2018-06-16 | Stop reason: HOSPADM

## 2018-06-16 RX ORDER — ALBUTEROL SULFATE 2.5 MG/3ML
2.5 SOLUTION RESPIRATORY (INHALATION) ONCE AS NEEDED
Status: DISCONTINUED | OUTPATIENT
Start: 2018-06-16 | End: 2018-06-16 | Stop reason: HOSPADM

## 2018-06-16 RX ORDER — SODIUM CHLORIDE 9 MG/ML
INJECTION, SOLUTION INTRAVENOUS CONTINUOUS PRN
Status: DISCONTINUED | OUTPATIENT
Start: 2018-06-16 | End: 2018-06-16 | Stop reason: SURG

## 2018-06-16 RX ORDER — EPHEDRINE SULFATE 50 MG/ML
5 INJECTION, SOLUTION INTRAVENOUS ONCE AS NEEDED
Status: DISCONTINUED | OUTPATIENT
Start: 2018-06-16 | End: 2018-06-16 | Stop reason: HOSPADM

## 2018-06-16 RX ORDER — BACITRACIN, NEOMYCIN, POLYMYXIN B 400; 3.5; 5 [USP'U]/G; MG/G; [USP'U]/G
OINTMENT TOPICAL AS NEEDED
Status: DISCONTINUED | OUTPATIENT
Start: 2018-06-16 | End: 2018-06-16 | Stop reason: HOSPADM

## 2018-06-16 RX ORDER — PROMETHAZINE HYDROCHLORIDE 25 MG/ML
5 INJECTION, SOLUTION INTRAMUSCULAR; INTRAVENOUS
Status: DISCONTINUED | OUTPATIENT
Start: 2018-06-16 | End: 2018-06-16 | Stop reason: HOSPADM

## 2018-06-16 RX ORDER — SODIUM CHLORIDE, SODIUM LACTATE, POTASSIUM CHLORIDE, CALCIUM CHLORIDE 600; 310; 30; 20 MG/100ML; MG/100ML; MG/100ML; MG/100ML
9 INJECTION, SOLUTION INTRAVENOUS CONTINUOUS
Status: DISCONTINUED | OUTPATIENT
Start: 2018-06-16 | End: 2018-06-16

## 2018-06-16 RX ORDER — BISACODYL 5 MG/1
5 TABLET, DELAYED RELEASE ORAL DAILY PRN
Status: DISCONTINUED | OUTPATIENT
Start: 2018-06-16 | End: 2018-06-20 | Stop reason: HOSPADM

## 2018-06-16 RX ORDER — MIDAZOLAM HYDROCHLORIDE 1 MG/ML
2 INJECTION INTRAMUSCULAR; INTRAVENOUS
Status: DISCONTINUED | OUTPATIENT
Start: 2018-06-16 | End: 2018-06-16 | Stop reason: HOSPADM

## 2018-06-16 RX ORDER — METOPROLOL TARTRATE 5 MG/5ML
INJECTION INTRAVENOUS
Status: DISPENSED
Start: 2018-06-16 | End: 2018-06-16

## 2018-06-16 RX ORDER — PROPOFOL 10 MG/ML
VIAL (ML) INTRAVENOUS AS NEEDED
Status: DISCONTINUED | OUTPATIENT
Start: 2018-06-16 | End: 2018-06-16 | Stop reason: SURG

## 2018-06-16 RX ORDER — LIDOCAINE HYDROCHLORIDE 40 MG/ML
SOLUTION TOPICAL
Status: DISPENSED
Start: 2018-06-16 | End: 2018-06-16

## 2018-06-16 RX ORDER — PROMETHAZINE HYDROCHLORIDE 25 MG/1
25 SUPPOSITORY RECTAL ONCE AS NEEDED
Status: DISCONTINUED | OUTPATIENT
Start: 2018-06-16 | End: 2018-06-16 | Stop reason: HOSPADM

## 2018-06-16 RX ORDER — LABETALOL HYDROCHLORIDE 5 MG/ML
5 INJECTION, SOLUTION INTRAVENOUS
Status: DISCONTINUED | OUTPATIENT
Start: 2018-06-16 | End: 2018-06-16 | Stop reason: HOSPADM

## 2018-06-16 RX ORDER — NALOXONE HCL 0.4 MG/ML
0.4 VIAL (ML) INJECTION
Status: DISCONTINUED | OUTPATIENT
Start: 2018-06-16 | End: 2018-06-18

## 2018-06-16 RX ORDER — SODIUM CHLORIDE, SODIUM ACETATE ANHYDROUS, SODIUM GLUCONATE, POTASSIUM CHLORIDE, AND MAGNESIUM CHLORIDE 526; 222; 502; 37; 30 MG/100ML; MG/100ML; MG/100ML; MG/100ML; MG/100ML
IRRIGANT IRRIGATION AS NEEDED
Status: DISCONTINUED | OUTPATIENT
Start: 2018-06-16 | End: 2018-06-16 | Stop reason: HOSPADM

## 2018-06-16 RX ORDER — DIPHENHYDRAMINE HYDROCHLORIDE 50 MG/ML
12.5 INJECTION INTRAMUSCULAR; INTRAVENOUS
Status: DISCONTINUED | OUTPATIENT
Start: 2018-06-16 | End: 2018-06-16 | Stop reason: HOSPADM

## 2018-06-16 RX ORDER — SODIUM CHLORIDE 0.9 % (FLUSH) 0.9 %
1-10 SYRINGE (ML) INJECTION AS NEEDED
Status: DISCONTINUED | OUTPATIENT
Start: 2018-06-16 | End: 2018-06-16 | Stop reason: HOSPADM

## 2018-06-16 RX ORDER — PROMETHAZINE HYDROCHLORIDE 25 MG/ML
12.5 INJECTION, SOLUTION INTRAMUSCULAR; INTRAVENOUS ONCE AS NEEDED
Status: DISCONTINUED | OUTPATIENT
Start: 2018-06-16 | End: 2018-06-16 | Stop reason: HOSPADM

## 2018-06-16 RX ORDER — LIDOCAINE HYDROCHLORIDE 10 MG/ML
0.5 INJECTION, SOLUTION EPIDURAL; INFILTRATION; INTRACAUDAL; PERINEURAL ONCE AS NEEDED
Status: DISCONTINUED | OUTPATIENT
Start: 2018-06-16 | End: 2018-06-16 | Stop reason: HOSPADM

## 2018-06-16 RX ORDER — HYDRALAZINE HYDROCHLORIDE 20 MG/ML
5 INJECTION INTRAMUSCULAR; INTRAVENOUS
Status: DISCONTINUED | OUTPATIENT
Start: 2018-06-16 | End: 2018-06-16 | Stop reason: HOSPADM

## 2018-06-16 RX ORDER — HYDROCODONE BITARTRATE AND ACETAMINOPHEN 7.5; 325 MG/1; MG/1
1 TABLET ORAL ONCE AS NEEDED
Status: COMPLETED | OUTPATIENT
Start: 2018-06-16 | End: 2018-06-16

## 2018-06-16 RX ORDER — SODIUM CHLORIDE, SODIUM LACTATE, POTASSIUM CHLORIDE, CALCIUM CHLORIDE 600; 310; 30; 20 MG/100ML; MG/100ML; MG/100ML; MG/100ML
100 INJECTION, SOLUTION INTRAVENOUS CONTINUOUS
Status: DISCONTINUED | OUTPATIENT
Start: 2018-06-16 | End: 2018-06-17

## 2018-06-16 RX ORDER — BISACODYL 10 MG
10 SUPPOSITORY, RECTAL RECTAL DAILY PRN
Status: DISCONTINUED | OUTPATIENT
Start: 2018-06-16 | End: 2018-06-20 | Stop reason: HOSPADM

## 2018-06-16 RX ORDER — ONDANSETRON 4 MG/1
4 TABLET, ORALLY DISINTEGRATING ORAL EVERY 6 HOURS PRN
Status: DISCONTINUED | OUTPATIENT
Start: 2018-06-16 | End: 2018-06-20 | Stop reason: HOSPADM

## 2018-06-16 RX ORDER — LIDOCAINE HYDROCHLORIDE 20 MG/ML
INJECTION, SOLUTION INFILTRATION; PERINEURAL AS NEEDED
Status: DISCONTINUED | OUTPATIENT
Start: 2018-06-16 | End: 2018-06-16 | Stop reason: SURG

## 2018-06-16 RX ORDER — ONDANSETRON 2 MG/ML
4 INJECTION INTRAMUSCULAR; INTRAVENOUS EVERY 6 HOURS PRN
Status: DISCONTINUED | OUTPATIENT
Start: 2018-06-16 | End: 2018-06-20 | Stop reason: HOSPADM

## 2018-06-16 RX ORDER — PROMETHAZINE HYDROCHLORIDE 25 MG/1
25 TABLET ORAL ONCE AS NEEDED
Status: DISCONTINUED | OUTPATIENT
Start: 2018-06-16 | End: 2018-06-16 | Stop reason: HOSPADM

## 2018-06-16 RX ORDER — ALBUMIN, HUMAN INJ 5% 5 %
SOLUTION INTRAVENOUS CONTINUOUS PRN
Status: DISCONTINUED | OUTPATIENT
Start: 2018-06-16 | End: 2018-06-16 | Stop reason: SURG

## 2018-06-16 RX ORDER — HYDROMORPHONE HYDROCHLORIDE 1 MG/ML
0.5 INJECTION, SOLUTION INTRAMUSCULAR; INTRAVENOUS; SUBCUTANEOUS
Status: DISCONTINUED | OUTPATIENT
Start: 2018-06-16 | End: 2018-06-16 | Stop reason: HOSPADM

## 2018-06-16 RX ORDER — ROCURONIUM BROMIDE 10 MG/ML
INJECTION, SOLUTION INTRAVENOUS AS NEEDED
Status: DISCONTINUED | OUTPATIENT
Start: 2018-06-16 | End: 2018-06-16 | Stop reason: SURG

## 2018-06-16 RX ORDER — DEXAMETHASONE SODIUM PHOSPHATE 4 MG/ML
4 INJECTION, SOLUTION INTRA-ARTICULAR; INTRALESIONAL; INTRAMUSCULAR; INTRAVENOUS; SOFT TISSUE EVERY 6 HOURS
Status: DISCONTINUED | OUTPATIENT
Start: 2018-06-16 | End: 2018-06-19

## 2018-06-16 RX ORDER — ESMOLOL HYDROCHLORIDE 10 MG/ML
INJECTION INTRAVENOUS AS NEEDED
Status: DISCONTINUED | OUTPATIENT
Start: 2018-06-16 | End: 2018-06-16 | Stop reason: SURG

## 2018-06-16 RX ADMIN — MONTELUKAST 10 MG: 10 TABLET, FILM COATED ORAL at 20:36

## 2018-06-16 RX ADMIN — FENTANYL CITRATE 100 MCG: 50 INJECTION, SOLUTION INTRAMUSCULAR; INTRAVENOUS at 09:30

## 2018-06-16 RX ADMIN — FENTANYL CITRATE 50 MCG: 50 INJECTION, SOLUTION INTRAMUSCULAR; INTRAVENOUS at 11:00

## 2018-06-16 RX ADMIN — CEFAZOLIN SODIUM 2 G: 2 INJECTION, SOLUTION INTRAVENOUS at 09:00

## 2018-06-16 RX ADMIN — MIDAZOLAM 1 MG: 1 INJECTION INTRAMUSCULAR; INTRAVENOUS at 07:17

## 2018-06-16 RX ADMIN — ROCURONIUM BROMIDE 50 MG: 10 INJECTION INTRAVENOUS at 07:55

## 2018-06-16 RX ADMIN — MANNITOL 43 G: 12.5 INJECTION, SOLUTION INTRAVENOUS at 09:00

## 2018-06-16 RX ADMIN — FENTANYL CITRATE 100 MCG: 50 INJECTION, SOLUTION INTRAMUSCULAR; INTRAVENOUS at 07:50

## 2018-06-16 RX ADMIN — HYDROMORPHONE HYDROCHLORIDE 0.5 MG: 1 INJECTION, SOLUTION INTRAMUSCULAR; INTRAVENOUS; SUBCUTANEOUS at 15:40

## 2018-06-16 RX ADMIN — SODIUM CHLORIDE 125 ML/HR: 9 INJECTION, SOLUTION INTRAVENOUS at 04:35

## 2018-06-16 RX ADMIN — ROCURONIUM BROMIDE 10 MG: 10 INJECTION INTRAVENOUS at 11:26

## 2018-06-16 RX ADMIN — FENTANYL CITRATE 50 MCG: 50 INJECTION INTRAMUSCULAR; INTRAVENOUS at 07:10

## 2018-06-16 RX ADMIN — PROPOFOL 200 MG: 10 INJECTION, EMULSION INTRAVENOUS at 07:55

## 2018-06-16 RX ADMIN — CEFAZOLIN SODIUM 2 G: 2 INJECTION, SOLUTION INTRAVENOUS at 20:36

## 2018-06-16 RX ADMIN — HYDROMORPHONE HYDROCHLORIDE 0.5 MG: 1 INJECTION, SOLUTION INTRAMUSCULAR; INTRAVENOUS; SUBCUTANEOUS at 18:03

## 2018-06-16 RX ADMIN — DEXAMETHASONE SODIUM PHOSPHATE 10 MG: 10 INJECTION INTRAMUSCULAR; INTRAVENOUS at 09:05

## 2018-06-16 RX ADMIN — ROCURONIUM BROMIDE 20 MG: 10 INJECTION INTRAVENOUS at 09:15

## 2018-06-16 RX ADMIN — SODIUM CHLORIDE: 9 INJECTION, SOLUTION INTRAVENOUS at 08:05

## 2018-06-16 RX ADMIN — LEVETIRACETAM 1000 MG: 10 INJECTION INTRAVENOUS at 09:46

## 2018-06-16 RX ADMIN — HYDROMORPHONE HYDROCHLORIDE 0.5 MG: 1 INJECTION, SOLUTION INTRAMUSCULAR; INTRAVENOUS; SUBCUTANEOUS at 16:56

## 2018-06-16 RX ADMIN — SODIUM CHLORIDE: 9 INJECTION, SOLUTION INTRAVENOUS at 11:55

## 2018-06-16 RX ADMIN — DEXAMETHASONE SODIUM PHOSPHATE 4 MG: 4 INJECTION, SOLUTION INTRAMUSCULAR; INTRAVENOUS at 18:21

## 2018-06-16 RX ADMIN — ROCURONIUM BROMIDE 10 MG: 10 INJECTION INTRAVENOUS at 09:35

## 2018-06-16 RX ADMIN — HYDROCODONE BITARTRATE AND ACETAMINOPHEN 1 TABLET: 5; 325 TABLET ORAL at 20:50

## 2018-06-16 RX ADMIN — ROCURONIUM BROMIDE 20 MG: 10 INJECTION INTRAVENOUS at 09:52

## 2018-06-16 RX ADMIN — FENTANYL CITRATE 50 MCG: 50 INJECTION, SOLUTION INTRAMUSCULAR; INTRAVENOUS at 13:04

## 2018-06-16 RX ADMIN — HYDROCODONE BITARTRATE AND ACETAMINOPHEN 1 TABLET: 7.5; 325 TABLET ORAL at 15:43

## 2018-06-16 RX ADMIN — SODIUM CHLORIDE, POTASSIUM CHLORIDE, SODIUM LACTATE AND CALCIUM CHLORIDE 9 ML/HR: 600; 310; 30; 20 INJECTION, SOLUTION INTRAVENOUS at 07:11

## 2018-06-16 RX ADMIN — DEXAMETHASONE SODIUM PHOSPHATE 4 MG: 4 INJECTION, SOLUTION INTRAMUSCULAR; INTRAVENOUS at 04:35

## 2018-06-16 RX ADMIN — FENTANYL CITRATE 50 MCG: 50 INJECTION, SOLUTION INTRAMUSCULAR; INTRAVENOUS at 09:23

## 2018-06-16 RX ADMIN — GLYCOPYRROLATE 0.6 MG: 0.2 INJECTION INTRAMUSCULAR; INTRAVENOUS at 14:17

## 2018-06-16 RX ADMIN — LIDOCAINE HYDROCHLORIDE 40 MG: 20 INJECTION, SOLUTION INFILTRATION; PERINEURAL at 07:55

## 2018-06-16 RX ADMIN — HYDROMORPHONE HYDROCHLORIDE 0.5 MG: 1 INJECTION, SOLUTION INTRAMUSCULAR; INTRAVENOUS; SUBCUTANEOUS at 22:31

## 2018-06-16 RX ADMIN — FENTANYL CITRATE 100 MCG: 50 INJECTION, SOLUTION INTRAMUSCULAR; INTRAVENOUS at 13:44

## 2018-06-16 RX ADMIN — ESMOLOL HYDROCHLORIDE 10 MG: 10 INJECTION, SOLUTION INTRAVENOUS at 09:30

## 2018-06-16 RX ADMIN — ROCURONIUM BROMIDE 20 MG: 10 INJECTION INTRAVENOUS at 12:33

## 2018-06-16 RX ADMIN — LEVETIRACETAM 1000 MG: 10 INJECTION INTRAVENOUS at 20:36

## 2018-06-16 RX ADMIN — FENTANYL CITRATE 50 MCG: 50 INJECTION, SOLUTION INTRAMUSCULAR; INTRAVENOUS at 13:25

## 2018-06-16 RX ADMIN — ESMOLOL HYDROCHLORIDE 10 MG: 10 INJECTION, SOLUTION INTRAVENOUS at 09:43

## 2018-06-16 RX ADMIN — ONDANSETRON HYDROCHLORIDE 4 MG: 2 SOLUTION INTRAMUSCULAR; INTRAVENOUS at 13:54

## 2018-06-16 RX ADMIN — POTASSIUM CHLORIDE AND SODIUM CHLORIDE 100 ML/HR: 900; 150 INJECTION, SOLUTION INTRAVENOUS at 20:37

## 2018-06-16 RX ADMIN — HYDROMORPHONE HYDROCHLORIDE 0.5 MG: 1 INJECTION, SOLUTION INTRAMUSCULAR; INTRAVENOUS; SUBCUTANEOUS at 16:18

## 2018-06-16 RX ADMIN — DEXAMETHASONE SODIUM PHOSPHATE 4 MG: 4 INJECTION, SOLUTION INTRAMUSCULAR; INTRAVENOUS at 22:31

## 2018-06-16 RX ADMIN — FAMOTIDINE 20 MG: 10 INJECTION, SOLUTION INTRAVENOUS at 07:10

## 2018-06-16 RX ADMIN — SODIUM CHLORIDE, POTASSIUM CHLORIDE, SODIUM LACTATE AND CALCIUM CHLORIDE: 600; 310; 30; 20 INJECTION, SOLUTION INTRAVENOUS at 12:30

## 2018-06-16 RX ADMIN — HYDROMORPHONE HYDROCHLORIDE 0.5 MG: 1 INJECTION, SOLUTION INTRAMUSCULAR; INTRAVENOUS; SUBCUTANEOUS at 15:20

## 2018-06-16 RX ADMIN — MIDAZOLAM 1 MG: 1 INJECTION INTRAMUSCULAR; INTRAVENOUS at 07:11

## 2018-06-16 RX ADMIN — GLYCOPYRROLATE 0.2 MG: 0.2 INJECTION INTRAMUSCULAR; INTRAVENOUS at 09:05

## 2018-06-16 RX ADMIN — NEOSTIGMINE METHYLSULFATE 3 MG: 1 INJECTION INTRAMUSCULAR; INTRAVENOUS; SUBCUTANEOUS at 14:17

## 2018-06-16 RX ADMIN — ALBUMIN (HUMAN): 0.05 SOLUTION INTRAVENOUS at 13:17

## 2018-06-16 NOTE — OP NOTE
"CRANIOTOMY FOR TUMOR  Procedure Note    Bryan Valadez  6/15/2018 - 6/16/2018  [id number]    SURGEON  Chetan Galvan IV, MD    ASSISTANT:  Bonita Rodriguez CSA  INDICATION:  Headaches, seizure    Pre-op Diagnosis:   RIGHT FRONTAL BRIN TUMOR    Post-Op Diagnosis Codes:  SAME    PROCEDURE PERFORMED:  Procedure(s):  CRANIOTOMY FOR  RESECTION OF LARGE RIGHT FRONTAL MASS WITH AUGUSTIN NAVIGATION  Operative microscope use  Anesthesia: General    Staff:   Circulator: Paola Jenkins RN  Scrub Person: Carlota Jacobs  Assistant: Bonita Rodriguez CSA    Estimated Blood Loss: 800 mL    Specimens:   Order Name Source Comment Collection Info Order Time   PREPARE RBC Other   6/16/2018  7:26 AM    Indication for Transfusion  Symptomatic Anemia       When to Transfuse?  Today      TISSUE PATHOLOGY EXAM Brain   RIGHT FRONTAL BRAIN MASS Collected By: Chetan Galvan IV, MD 6/16/2018 10:13 AM         Drains:   Closed/Suction Drain 1 Right Other (Comment) Bulb 10 Fr. (Active)       Urethral Catheter Silicone 16 Fr. (Active)       Findings: Vascular loose areolar mass, large cyst, well dermarcated in regions and invasive in others    Complications: none immediate    Details: 32-year-old male presented to the emergency room yesterday with progressive headaches and visual change.  The patient has been treated for \"sinus infections\" multiple times in the last 6 months without imaging and underwent a CT scan yesterday revealing a large right frontal mass with 13 mm of midline shift.  MRI was obtained secondary to the degree of brain compression symptoms the patient was offered craniotomy for resection the mass.  Lengthy discussion of risks benefits alternatives were undertaken with the family and the patient and they provided written consent.  After consent was noted be on the charts patient was escorted TO the operative suite and in today by anesthesia staff.  Arterial line was placed prior to this.  Smith catheter was placed.  " Patient's hair was trimmed and he was registered utilizing the frameless navigation system.  Rescue points were established.  The patient was pinned for bicoronal craniotomy secondary to the low frontal location of the mass and to assist with cosmesis.  Patient was cleansed with alcohol prepped and draped in usual sterile manner.  He was reregistered utilizing the frameless navigation system.  Planned skin incision was marked on the scalp.  Incision was infiltrated local anesthetic after timeout.  Patient received preoperative antibiotics.  Patient received intraoperative Keppra.  Bicoronal incision was performed and Janice clips were applied to the scalp.  Meticulous hemostasis maintained utilizing bipolar cautery.  The frameless navigation system was utilized to plan the craniotomy flap.  This was marked on the skull  was utilized to perform 4 bur holes.  Underlying dura was stripped.  Foot plated cutter was utilized to elevate a large craniotomy flap approximately 10 cm x 7 cm.  This was wrapped in antibiotic soaked sponge in place on the back table.  Bone edge hemostasis was maintained with Surgicel cell strips.  The dura was opened in a cruciate manner.  End flaps towards the sagittal sinus and laterally as well.  A areolar vascular gelatinous mass involving the frontal lobe was immediately perceptible on inspection.  The operative microscope strip brought in the field remaining portion operative intervention was performed utilizing microscopic illumination using microsurgical technique.  The arachnoid was opened at the junction of where the tumor and the more normal-appearing brain met.  Arterioles were dissected laterally to the normal-appearing brain and blood supply to the tumor was interrupted sequentially in a circumferential manner.  The more medial and posterior portion of the tumor did not have good tissue plane.  But there was a difference in the consistency of the brain and tumor was  resected until normal-appearing brain was noted be present.  The tumor was internally debulked and the cyst was drained.  The walls of the tumor were infolded and resected utilizing the sono pet.  Meticulous hemostasis was maintained throughout her was if and blood loss however based on the vascularity the mass.  Cardinal direction inspection was carried out and the frameless navigation system assisted with inspection as well.  Once all abnormal tissue was removed the wound was inspected for hemostasis and meticulous hemostasis was maintained utilizing bipolar cautery.  The operative bed was lined with a layer of Surgicel.  A small entry into the into the lateral ventricle had occurred and this was covered with a pledget of Gelfoam.  FloSeal was sprayed in the cavity and meticulous hemostasis noted be present copious irrigation was employed.  The dura was closed in a watertight manner utilizing Nurolon suture.  Tack up sutures were placed to the slack nature of the dura and the brain after resection of the approximately 10 x 7 cm mass.  Central tack up suture was also placed.  EpiDural drain was left and tunneled through one of the bur holes.  Cranial flap was replaced utilizing KLS Randy cranial fixation hardware the drain was tunneled and a stay suture was placed.  Leisure commenced utilizing absorbable Vicryl stitch approximate the galea aponeurotica and temporalis fascia.  Skin staples were placed as a final skin dressing.  There were no immediate competitions with the procedure needle and sponge counts correct conclusion the case.  I was Present the entire case.  The surgical first assist greatly reduced operative time and increase patient's safety by providing micro-suctioning and irrigation while under the operative microscope.  The entirety of the case was performed after opening of the dura until final reapproximation of the skull flap the operative microscope.  He was transferred to the postanesthesia  care unit in stable and satisfactory condition.        Chetan Galvan IV, MD     Date: 6/16/2018  Time: 2:29 PM

## 2018-06-16 NOTE — CONSULTS
Pulmonary Consultation     Patient Name: Bryan Valadez  Age/Sex: 32 y.o. male  : 1985  MRN: [id number]    Date of Admission: 6/15/2018  Date of Encounter Visit: 18  Encounter Provider: Maria Ines Kim MD  Referring Provider: Chetan Galvan IV, MD  Place of Service: [facility]  Patient Care Team:  Opal Benavidez MD as PCP - General (Internal Medicine)      Subjective:     Consulted for: Monitoring and hemodynamic control while in the ICU    Chief Complaint: Patient admitted with headache and was found to have a frontal lobe mass that was resected and he is in the ICU for treatment post craniotomy    History of Present Illness:  Bryan Valadez is a 32 y.o. male with negative past medical history except for history of childhood asthma that he outgrew, the is social drinker, very rare tobacco exposure, history of recurrent sinus infection in the past and sinus allergy who came in to [facility] from [facility] for work and for meeting.  He started to develop right-sided blurred vision couple of days prior to presentation with severe right-sided headache with no other focal motor deficit.  There was no problem with her gait or the balance or any abnormal sensation or numbness or tingling.  In the ER he had negative workup for sepsis he had no neck stiffness, head imaging revealed 7.4×4×4 cm mass in the right frontal lobe with midline shift of 13 mm.  Patient was taken off with a craniotomy, he was extubated postoperatively, is doing fine with no hypoxemia, hemodynamically he is stable, he is awake and following commands, still drowsy and drift to sleep due to the conversation from the residual anesthetics.  Response is appropriate so far, no obvious visual deficit.  His pain is currently fairly controlled    Pulmonary Functions Testing Results:    No results found for: FEV1, FVC, IOO7WIC, TLC, DLCO    Review of Systems:   Review of Systems   Constitutional: Negative for activity change, appetite  change and fever.   HENT: Negative for congestion and sore throat.    Eyes: Positive for visual disturbance (scotoma in right eye (resolved)). Negative for photophobia.   Respiratory: Negative for cough and shortness of breath.    Cardiovascular: Negative for chest pain and leg swelling.   Gastrointestinal: Positive for nausea (resolved) and vomiting (resolved). Negative for abdominal pain and diarrhea.   Endocrine: Negative.    Genitourinary: Negative for decreased urine volume and dysuria.   Musculoskeletal: Negative for neck pain.   Skin: Negative for rash and wound.   Allergic/Immunologic: Negative.    Neurological: Positive for headaches. Negative for weakness and numbness.   Hematological: Negative.    Psychiatric/Behavioral: Negative.    All other systems reviewed and are negative.  Past Medical History:  Past Medical History:   Diagnosis Date   • Allergic rhinitis    • Asthma     Pulmonary Dr. Alexandra   • Chest pain    • GERD (gastroesophageal reflux disease)    • H/O echocardiogram 2006   • History of ETT    • History of Holter monitoring    • Hyperlipidemia        History reviewed. No pertinent surgical history.    Home Medications:   Prescriptions Prior to Admission   Medication Sig Dispense Refill Last Dose   • doxycycline (VIBRAMYCIN) 100 MG capsule Take 1 capsule by mouth 2 (Two) Times a Day for 7 days. 14 capsule 0 6/15/2018 at 0800   • EPINEPHrine (EPIPEN) 0.3 MG/0.3ML solution auto-injector injection as needed.   Taking   • fluticasone (FLONASE) 50 MCG/ACT nasal spray 2 sprays into each nostril Daily. 1 bottle 3 6/15/2018 at 0800   • guaiFENesin (MUCINEX) 600 MG 12 hr tablet Take 2 tablets by mouth Every 12 (Twelve) Hours for 7 days. 14 tablet 0 6/15/2018 at 0800   • MethylPREDNISolone (MEDROL, JOHANNA,) 4 MG tablet Take as directed on package instructions. 21 each 0 6/15/2018 at 0800   • montelukast (SINGULAIR) 10 MG tablet TAKE 1 TABLET BY MOUTH EVERY DAY( NEED APPOINTMENT) 90 tablet 3 6/15/2018 at  0800   • omeprazole (PriLOSEC) 20 MG capsule Take 20 mg by mouth as needed.   6/15/2018 at 0800   • pseudoephedrine (SUDAFED) 120 MG 12 hr tablet Take 120 mg by mouth Every 12 (Twelve) Hours.   6/15/2018 at 0800   • Multiple Vitamins-Minerals (MULTIVITAMIN PO) Take  by mouth Daily.   Taking       Inpatient Medications:  Scheduled Meds:  ceFAZolin 2 g Intravenous Q8H   clevidipine      dexamethasone 4 mg Intravenous Q6H   dexamethasone 4 mg Intravenous Q6H   levETIRAcetam 1,000 mg Intravenous Q12H   lidocaine      metoprolol tartrate      montelukast 10 mg Oral Nightly   [START ON 6/17/2018] pantoprazole 40 mg Oral QAM     Continuous Infusions:  lactated ringers 100 mL/hr    niCARdipine 5-15 mg/hr    sodium chloride 125 mL/hr Last Rate: Stopped (06/16/18 0629)   sodium chloride 0.9 % with KCl 20 mEq 100 mL/hr      PRN Meds:.•  bisacodyl  •  bisacodyl  •  docusate sodium  •  HYDROcodone-acetaminophen  •  HYDROmorphone **AND** naloxone  •  labetalol  •  magnesium hydroxide  •  ondansetron **OR** ondansetron ODT **OR** ondansetron  •  ondansetron **OR** ondansetron ODT **OR** ondansetron  •  sennosides-docusate sodium  •  Insert peripheral IV **AND** sodium chloride    Allergies:  Allergies   Allergen Reactions   • Other      Insect stings: Bee stings, throat swelling (has Epi pen); fruit       Past Social History:  Social History     Social History   • Marital status: Single     Social History Main Topics   • Smoking status: Never Smoker   • Smokeless tobacco: Never Used   • Alcohol use Yes      Comment: Moderate   • Drug use: No   • Sexual activity: Defer     Other Topics Concern   • Not on file       Past Family History:  History reviewed. No pertinent family history.        Objective:   Temp:  [97.1 °F (36.2 °C)-98.4 °F (36.9 °C)] 98 °F (36.7 °C)  Heart Rate:  [50-91] 65  Resp:  [7-21] 16  BP: (116-144)/(63-83) 119/67  Arterial Line BP: ()/(63-71) 102/67  SpO2:  [96 %-100 %] 98 %  on  Flow (L/min):  [2] 2  Device (Oxygen Therapy): room air     Intake/Output Summary (Last 24 hours) at 06/16/18 1811  Last data filed at 06/16/18 1700   Gross per 24 hour   Intake             3925 ml   Output             2730 ml   Net             1195 ml     Body mass index is 26.5 kg/m².  1    06/15/18  1304   Weight: 86.2 kg (190 lb)     Weight change:     Physical Exam:   Physical Exam   General:    No acute distress, Drowsy but fully arousable to stimulation and oriented x4, pleasant                   Head:    Normocephalic, atraumatic.Surgical dressing on, he had JERRY drain in position, slightly swollen over the left eye but able to open the eye with no facial asymmetry    Eyes:          Conjunctivae and sclerae normal, no icterus, PERRLA   Throat:   No oral lesions, no thrush, oral mucosa moist.    Neck:   Supple, trachea midline.   Lungs:     Normal chest on inspection, CTAB, no wheezes. No rhonchi. No crackles. Respirations regular, even and unlabored.      Heart:    Regular rhythm and normal rate.  No murmurs, gallops, or rubs noted.   Abdomen:     Soft, non-tender, non-distended, positive bowel sounds.    Extremities:   No clubbing, cyanosis, or edema.     Pulses:   Pulses palpable and equal bilaterally.    Skin:   No bleeding or rash.   Neuro:   Non-focal.  Moves all extremities well.    Psychiatric:   Normal mood and affect.     Lab Review:     Results from last 7 days  Lab Units 06/16/18  1457 06/15/18  1357   SODIUM mmol/L 143 139   POTASSIUM mmol/L 3.9 3.9   CHLORIDE mmol/L 107 100   CO2 mmol/L 20.8* 27.2   BUN mg/dL 10 13   CREATININE mg/dL 0.94 1.00   GLUCOSE mg/dL 145* 90   CALCIUM mg/dL 8.3* 9.7           Results from last 7 days  Lab Units 06/16/18  1456 06/15/18  1357   WBC 10*3/mm3 13.58* 6.68   HEMOGLOBIN g/dL 12.8* 15.1   HEMATOCRIT % 38.4* 45.7   PLATELETS 10*3/mm3 276 257   MCV fL 89.9 91.8   MCH pg 30.0 30.3   MCHC g/dL 33.3 33.0   RDW % 12.1 12.2   RDW-SD fl 39.1 41.0   MPV fL 10.2 10.3   NEUTROPHIL % % 85.7* 54.4    LYMPHOCYTE % % 9.1* 35.0   MONOCYTES % % 5.2 9.3   EOSINOPHIL % % 0.0* 0.9   BASOPHIL % % 0.0 0.1   IMM GRAN % % 0.3 0.3   NEUTROS ABS 10*3/mm3 11.64* 3.63   LYMPHS ABS 10*3/mm3 1.24 2.34   MONOS ABS 10*3/mm3 0.70 0.62   EOS ABS 10*3/mm3 0.00 0.06   BASOS ABS 10*3/mm3 0.00 0.01   IMMATURE GRANS (ABS) 10*3/mm3 0.04* 0.02       Results from last 7 days  Lab Units 06/15/18  2159   INR  1.11*   APTT seconds 30.3               Invalid input(s): LDLCALC                    Results from last 7 days  Lab Units 06/15/18  1357   SED RATE mm/hr 2   CRP mg/dL <0.03                             Imaging:  Imaging Results (most recent)     Procedure Component Value Units Date/Time    MRI Brain With & Without Contrast [[id number]] Collected:  06/15/18 1828     Updated:  06/15/18 1851    Narrative:       MRI BRAIN W WO CONTRAST-     CLINICAL HISTORY: Large frontal lobe brain mass. Preop evaluation.     TECHNIQUE: Multiple axial T1, T2, gradient echo and diffusion images  were acquired. Axial coronal and sagittal T1-weighted images were also  acquired after intravenous injection of MultiHance contrast.      COMPARISON: CT scans of the head performed earlier today.     FINDINGS: As also shown on the CT imaging, there is a large  heterogeneous signal intensity partially solid and cystic mass involving  the superior three fourths of the right frontal lobe. Only the base of  the frontal lobe is spared. The mass has fairly well-circumscribed  margins. The tumor shows higher signal intensity within the brain  parenchyma along its periphery on the T2-weighted images. It measures  approximately 10.1 cm in greatest AP dimension, 6.8 cm in greatest  mediolateral dimension and 6.0 cm in greatest cephalocaudad dimension.  There is a central area of cystic degeneration and/or necrosis that  measures 7.1 x 4.3 x 3.5 cm. The tumor shows risk lobulated enhancement  along its superior margin, although much of the neoplasm demonstrates  only minimal,  if any enhancement. Several punctate calcifications were  noted in the neoplasm on the CT images. The presence of the  calcifications and the area of cystic degeneration and the relatively  limited enhancement suggests the possibility of an oligodendroglioma as  the etiology. There is marked mass effect with effacement of cortical  sulci and the right cerebral hemisphere and right-to-left shift of  midline structures by approximately 1.3 cm. There is subfalcine  herniation of the tumor and frontal lobe sulci, as well. There is no  evidence of extension into the corpus callosum. There is no evidence  recent or old infarct or hemorrhage. Normal flow voids are observed in  the major vascular structures. The tumor significantly displaces the  anterior cerebral arteries toward the left. There is no evidence of  vascular encasement.       Impression:       Large complex partially cystic and solid lobulated tumor  mass in the right frontal lobe as described in more detail above. There  is marked mass effect with effacement of cortical sulci and  right-to-left shift of the midline structures by 13 mm. No acute  intracranial abnormality is identified.     This report was finalized on 6/15/2018 6:47 PM by Dr. Sidney Ellsworth M.D.       CT Head Without Contrast [[id number]] Collected:  06/15/18 1713     Updated:  06/15/18 1722    Narrative:       CT HEAD WO CONTRAST-     CLINICAL HISTORY: Brain mass. Preop evaluation for stereotactically  guided surgery.     TECHNIQUE: Spiral CT images were obtained through the head without IV  contrast and were reconstructed in 1 mm thick axial slices.     Radiation dose reduction techniques were utilized, including automated  exposure control and exposure modulation based on body size.     COMPARISON: CT head performed earlier today     FINDINGS: Again seen is a large centrally necrotic neoplasm involving  nearly the entire right frontal lobe with subfalcine herniation. The  tumor produces  complete effacement of the frontal horn right lateral  ventricle. There is right-to-left shift of the midline structures by 12  mm. It appears largely necrotic centrally. Peripheral calcifications are  noted. There has been no interval change. There is no acute infarct or  hemorrhage.     This report was finalized on 6/15/2018 5:19 PM by Dr. Sidney Ellsworth M.D.       CT Head Without Contrast [[id number]] Collected:  06/15/18 1602     Updated:  06/15/18 1626    Narrative:       EMERGENCY NONCONTRAST HEAD CT 06/15/2018     CLINICAL HISTORY: Headache for 7 days.     TECHNIQUE: Spiral CT images were obtained from the base of the skull to  the vertex without intravenous contrast and images were reformatted and  are submitted in 3 mm thick axial CT section with brain algorithm and 2  mm thick sagittal and coronal reconstructions were performed     There are no prior studies from [facility] for  comparison.     FINDINGS: There is a large area of attenuation abnormality in the right  frontal lobe parenchyma.  Centrally, there is a low density somewhat  cystic-appearance that measures up to 7.4 x 4 x 4 cm in anterior  posterior, medial lateral and cranial caudal dimension.  Along the  anterior superior portion of the cystic component is an amorphous area  of soft tissue attenuation that measures up to 4.8 x 3 x 3 cm in  anterior posterior, medial lateral and cranial caudal dimension.  There  are some crescentic and amorphous areas of calcification along the  anterior medial, anterior lateral and anterior superior medial component  of the mass and then surrounding the mass is some parenchymal  low-density that is likely vasogenic edema.  The combined dimensions of  the edema, cystic and solid component of the mass and calcified  parenchyma measures up to 10 x 6.5 x 6.2 cm in anterior posterior,  medial lateral and cranial caudal dimension. This most probably  represents a primary brain tumor.  Given the  calcification it could  represent an oligodendroglioma.  The mixed cystic and solid components  suggest this tumor had high-grade malignant degeneration and had  surrounding edema. There is marked mass effect on the anterior body and  frontal horn of the right lateral ventricle and approximately 12-13 mm  focal right to left midline shift and there is right-to-left subfalcine  herniation at the level of the anterior falx. The remainder of the brain  parenchyma is normal in attenuation.  The remainder of the ventricular  system is normal in size.  No extra-axial fluid collections are  identified.  There is no evidence of acute intracranial hemorrhage. The  paranasal sinuses, the mastoid air cells and middle ear cavities are  clear.       Impression:       Large right frontal lobe mass with a 7.4 x 4 x 4 cm cystic  component.  Along its anterior superior margin it has an amorphous 4.8 x  3.0 x 3.0 cm solid component and there are amorphous crescentic  calcification along the anterior medial, anterior lateral and anterior  superior medial portion of this mass and there is surrounding right  frontal lobe white matter low-density felt to be surrounding vasogenic  edema and the combined dimensions of the mass and surrounding edema  measures up to 10 x 6.5 x 6.2 cm in anterior posterior, medial lateral  and cranial caudal dimension.  It has marked mass effect on the anterior  body and frontal horn of the right lateral ventricle, up to 12-13 mm  right to left midline shift and some subfalcine herniation at the level  of the anterior falx. 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  I strongly  recommend a contrast-enhanced MRI of the brain to further characterize  this lesion and neurosurgical consultation is warranted.  The results  and recommendation were communicated to Dr. Crisostomo by  telephone  06/15/2018 at 3 PM     Radiation dose reduction techniques were utilized, including automated  exposure control and exposure modulation based on body size.     This report was finalized on 6/15/2018 4:23 PM by Dr. Yefri Painting M.D.             I personally viewed and interpreted the patient's imaging studies.    Assessment:   1. Primary brain tumor status post resection,awaiting final pathology report  2. History of childhood asthma that he outgrew with history of recurrent sinusitis  3. No chronic medical problems oh chronic medical condition  4. Steroid-induced leukocytosis and stress-induced leukocytosis    Plan:      would be monitor in the ICU  Neurological exam was negative for any focal deficit  Patient is awake and responsive to stimulation  Will control blood pressure and avoid extreme fluctuations  Stress ulcer prophylaxis with Protonix while on the high-dose steroid  Seizure prophylaxis with Keppra  Okay to resume by mouth diet if okay with bedside assessment and if okay with neurosurgery  DVT prophylaxis with SCDs  When necessary Cardene for hypertension and would consider oral regimen if needed  Sliding scale insulin while on the Decadron for any steroid-induced hyperglycemia  Laxatives while on pain medication    Discussed with the patient and with the staff will monitor while in ICU    Thank you for allowing me to participate in the care of Bryan Valadez. Feel free to contact me directly with any further questions or concerns.    Maria Ines Kim MD  [facility]   06/16/18  6:11 PM    Dictated utilizing Dragon dictation

## 2018-06-16 NOTE — PLAN OF CARE
Problem: Patient Care Overview  Goal: Plan of Care Review  Outcome: Ongoing (interventions implemented as appropriate)   06/16/18 0413   Coping/Psychosocial   Plan of Care Reviewed With patient   Plan of Care Review   Progress no change   OTHER   Outcome Summary VSS, labs, PO pain control for H/A, IVF and IV steroids, NPO, plan for surgery today       Problem: Pain, Acute (Adult)  Goal: Identify Related Risk Factors and Signs and Symptoms  Outcome: Ongoing (interventions implemented as appropriate)

## 2018-06-16 NOTE — ANESTHESIA PREPROCEDURE EVALUATION
Anesthesia Evaluation     NPO Solid Status: > 8 hours             Airway   Mallampati: II  no difficulty expected  Dental      Pulmonary - normal exam   (+) asthma,   (-) sleep apnea, not a smoker  Cardiovascular - normal exam  Exercise tolerance: good (4-7 METS)    (+) hyperlipidemia,   (-) hypertension, CAD      Neuro/Psych  GI/Hepatic/Renal/Endo    (+)  GERD,      Musculoskeletal     Abdominal    Substance History      OB/GYN          Other                        Anesthesia Plan    ASA 2     general     Anesthetic plan and risks discussed with patient.

## 2018-06-16 NOTE — ANESTHESIA PROCEDURE NOTES
Airway  Urgency: elective    Airway not difficult    General Information and Staff    Patient location during procedure: OR  Anesthesiologist: JACOB MURRAY  CRNA: LATRICIA BALL    Indications and Patient Condition  Indications for airway management: airway protection    Preoxygenated: yes  MILS maintained throughout  Mask difficulty assessment: 2 - vent by mask + OA or adjuvant +/- NMBA    Final Airway Details  Final airway type: endotracheal airway      Successful airway: ETT  Cuffed: yes   Successful intubation technique: direct laryngoscopy  Endotracheal tube insertion site: oral  Blade: Awa  Blade size: #4  ETT size: 8.0 mm  Cormack-Lehane Classification: grade I - full view of glottis  Placement verified by: chest auscultation and capnometry   Cuff volume (mL): 7  Measured from: lips  ETT to lips (cm): 22  Number of attempts at approach: 1    Additional Comments  Atraumatic ET Tube placement.  Teeth as pre-op. BLEBS.  -ABD sounds.  +ET CO2.  Secured to face

## 2018-06-16 NOTE — H&P (VIEW-ONLY)
"Patient name: Bryan Valadez  Referring Provider: Dr Crisostomo  Reason for Consultation: abnormal CT head    Patient Care Team:  Opal Benavidez MD as PCP - General (Internal Medicine)    Chief complaint: headaches x6 months/ R blurry vision     Subjective .     History of present illness:    Patient is a 32 y.o. right handed male who presents with waxing and waning headaches that have progressively worsened over the past 6 months.  The patient has a history of allergy and sinus problems and has been treated for \"sinus infections\" numerous times.  More recently, he has been to an [facility] on a few occasions as well as to the emergency room twice in [place], [place], where he and his family recently moved from.  During all the evaluations, he was told that he had sinus pressure and subsequent infections.  No prior head maging was obtained.    This past week, the patient was in [place], [place] for work and was sitting in a meeting 2 days ago when he began having right-sided blurry vision that was accompanied with a severe right-sided headache.  This is the only time his vision has been affected.  He denies any balance or gait issues, any numbness, tingling or weakness of his extremities, with the exception of one occasion a couple of months ago when he had right-sided facial and left upper arm numbness that persisted for a couple of hours.  Just before his wife was going to take him to the ER, the numbness resolved.  It has not returned.  He also denies any recent fever, chills or any other signs or symptoms of a systemic infection.  They have traveled out of the country only once to [facility] last month.  He denies any engaging in any risky behavior or activity.    Otherwise, he reports that he has been doing and feeling well maintaining a full time job with lots of flying.  After the patient leaves the ER room for a CT scan, the patient's wife reports that he has significant forgetfulness, short-term memory " "loss and word finding difficulty over the past few months.  She also reports that he has \"difficulty forming his thoughts.\"  She attributed the issues to stress.        /87   Pulse 83   Temp 96.8 °F (36 °C) (Tympanic)   Resp 16   Ht 180.3 cm (71\")   Wt 86.2 kg (190 lb)   SpO2 100%   BMI 26.50 kg/m²         Review of Systems  Review of Systems   Constitutional: Positive for activity change and fatigue. Negative for fever and unexpected weight change.   HENT: Positive for congestion, sinus pain and sinus pressure.    Eyes: Negative.    Respiratory: Negative.    Cardiovascular: Negative.    Gastrointestinal: Negative.    Endocrine: Negative.    Genitourinary: Negative.    Musculoskeletal: Negative.    Skin: Negative.    Allergic/Immunologic: Negative.    Neurological: Negative.    Hematological: Negative.    Psychiatric/Behavioral: Negative.        History  PAST MEDICAL HISTORY  Past Medical History:   Diagnosis Date   • Allergic rhinitis    • Asthma     Pulmonary Dr. Alexandra   • Chest pain    • GERD (gastroesophageal reflux disease)    • H/O echocardiogram 2006   • History of ETT    • History of Holter monitoring    • Hyperlipidemia        PAST SURGICAL HISTORY  History reviewed. No pertinent surgical history.    FAMILY HISTORY  History reviewed. No pertinent family history.    SOCIAL HISTORY  Social History   Substance Use Topics   • Smoking status: Never Smoker   • Smokeless tobacco: Never Used   • Alcohol use Yes      Comment: Moderate       full time job doing real estate      Allergies:  Other    MEDICATIONS:    (Not in a hospital admission)    Objective     Results Review:  LABS:    Admission on 06/15/2018   Component Date Value Ref Range Status   • Glucose 06/15/2018 90  65 - 99 mg/dL Final   • BUN 06/15/2018 13  6 - 20 mg/dL Final   • Creatinine 06/15/2018 1.00  0.76 - 1.27 mg/dL Final   • Sodium 06/15/2018 139  136 - 145 mmol/L Final   • Potassium 06/15/2018 3.9  3.5 - 5.2 mmol/L Final   • " Chloride 06/15/2018 100  98 - 107 mmol/L Final   • CO2 06/15/2018 27.2  22.0 - 29.0 mmol/L Final   • Calcium 06/15/2018 9.7  8.6 - 10.5 mg/dL Final   • eGFR Non African Amer 06/15/2018 86  >60 mL/min/1.73 Final   • BUN/Creatinine Ratio 06/15/2018 13.0  7.0 - 25.0 Final   • Anion Gap 06/15/2018 11.8  mmol/L Final   • WBC 06/15/2018 6.68  4.50 - 10.70 10*3/mm3 Final   • RBC 06/15/2018 4.98  4.60 - 6.00 10*6/mm3 Final   • Hemoglobin 06/15/2018 15.1  13.7 - 17.6 g/dL Final   • Hematocrit 06/15/2018 45.7  40.4 - 52.2 % Final   • MCV 06/15/2018 91.8  79.8 - 96.2 fL Final   • MCH 06/15/2018 30.3  27.0 - 32.7 pg Final   • MCHC 06/15/2018 33.0  32.6 - 36.4 g/dL Final   • RDW 06/15/2018 12.2  11.5 - 14.5 % Final   • RDW-SD 06/15/2018 41.0  37.0 - 54.0 fl Final   • MPV 06/15/2018 10.3  6.0 - 12.0 fL Final   • Platelets 06/15/2018 257  140 - 500 10*3/mm3 Final   • Neutrophil % 06/15/2018 54.4  42.7 - 76.0 % Final   • Lymphocyte % 06/15/2018 35.0  19.6 - 45.3 % Final   • Monocyte % 06/15/2018 9.3  5.0 - 12.0 % Final   • Eosinophil % 06/15/2018 0.9  0.3 - 6.2 % Final   • Basophil % 06/15/2018 0.1  0.0 - 1.5 % Final   • Immature Grans % 06/15/2018 0.3  0.0 - 0.5 % Final   • Neutrophils, Absolute 06/15/2018 3.63  1.90 - 8.10 10*3/mm3 Final   • Lymphocytes, Absolute 06/15/2018 2.34  0.90 - 4.80 10*3/mm3 Final   • Monocytes, Absolute 06/15/2018 0.62  0.20 - 1.20 10*3/mm3 Final   • Eosinophils, Absolute 06/15/2018 0.06  0.00 - 0.70 10*3/mm3 Final   • Basophils, Absolute 06/15/2018 0.01  0.00 - 0.20 10*3/mm3 Final   • Immature Grans, Absolute 06/15/2018 0.02  0.00 - 0.03 10*3/mm3 Final       DIAGNOSTICS:  CT head from [facility] dated Vee 15, 2018 reveals a very large right frontal lobe mass measuring 7. 4 x 4 by 4 cm with cystic components.  Surrounding vasogenic edema measures 10 x 6.5 x 6.2 cm there is marked mass effect with 13 mm right to left midline shift    Repeat CT head and MRI brain with and without contrast  ordered and pending      Results Review:   I reviewed the patient's new clinical results.  I personally viewed and interpreted the patient's CT head    Vital Signs   Temp:  [96.8 °F (36 °C)] 96.8 °F (36 °C)  Heart Rate:  [63-86] 83  Resp:  [16] 16  BP: (111-129)/(69-87) 129/87    Physical Exam:  Physical Exam   Constitutional: He is oriented to person, place, and time. Vital signs are normal. He appears well-developed and well-nourished. He is cooperative.  Non-toxic appearance. He does not have a sickly appearance. He does not appear ill.   Very pleasant, well-appearing, younger male   HENT:   Head: Normocephalic and atraumatic.   Eyes: EOM are normal. Pupils are equal, round, and reactive to light.   Neck: Normal range of motion. Neck supple. No tracheal deviation present.   Cardiovascular: Normal rate, regular rhythm and intact distal pulses.    Pulmonary/Chest: Effort normal and breath sounds normal. No respiratory distress.   Abdominal: Soft.   Musculoskeletal: Normal range of motion. He exhibits no tenderness or deformity.   Strength equal and normal throughout, normal muscle bulk and tone  Moving all extremities well   Lymphadenopathy:     He has no cervical adenopathy.   Neurological: He is alert and oriented to person, place, and time. He has normal strength. He displays no tremor and normal reflexes. A cranial nerve deficit is present. No sensory deficit. He exhibits normal muscle tone. He has a normal Finger-Nose-Finger Test, a normal Heel to Correia Test and a normal Romberg Test. He displays no seizure activity. Gait normal. Coordination normal. GCS eye subscore is 4. GCS verbal subscore is 5. GCS motor subscore is 6.   Negative drift, negative tremors  Gait is stable and upright  Able to heel and toe walk  Finger to nose and heel to shin intact bilaterally  Alert and oriented ×4, answering all questions and following all commands   Skin: Skin is warm and dry.   Psychiatric: He has a normal mood and  affect. His speech is normal and behavior is normal. Judgment and thought content normal.   Vitals reviewed.    Neurologic Exam     Mental Status   Oriented to person, place, and time.   Oriented to person.   Oriented to place. Oriented to country, city and area.   Oriented to time. Oriented to year, month, date, day and season.   Attention: normal. Concentration: normal.   Speech: speech is normal   Level of consciousness: alert    Cranial Nerves     CN II   Visual fields full to confrontation.   Visual acuity: normal  Right visual field deficit: none  Left visual field deficit: none     CN III, IV, VI   Pupils are equal, round, and reactive to light.  Extraocular motions are normal.   Right pupil: Size: 3 mm. Shape: regular. Reactivity: brisk. Consensual response: intact.   Left pupil: Size: 3 mm. Shape: regular. Reactivity: non-reactive. Consensual response: intact.   CN III: no CN III palsy  CN VI: no CN VI palsy  Nystagmus: none   Diplopia: none  Upgaze: normal  Downgaze: normal    CN V   Facial sensation intact.   Right facial sensation deficit: none  Left facial sensation deficit: none    CN VII   Facial expression full, symmetric.   Right facial weakness: none  Left facial weakness: none    CN VIII   CN VIII normal.   Hearing: intact    CN IX, X   CN IX normal.   Palate: symmetric    CN XI   Right sternocleidomastoid strength: normal  Left sternocleidomastoid strength: normal  Right trapezius strength: normal    CN XII   CN XII normal.   Tongue: not atrophic  Tongue deviation: none  Geographic tongue     Motor Exam   Muscle bulk: normal  Overall muscle tone: normal    Strength   Strength 5/5 throughout.   Right neck flexion: 5/5  Left neck flexion: 5/5  Right neck extension: 5/5  Left neck extension: 5/5  Right deltoid: 5/5  Left deltoid: 5/5  Right biceps: 5/5  Left biceps: 5/5  Right triceps: 5/5  Left triceps: 5/5  Right wrist flexion: 5/5  Left wrist flexion: 5/5  Right wrist extension: 5/5  Left wrist  extension: 5/5  Right interossei: 5/5  Left interossei: 5/5  Right abdominals: 5/5  Left abdominals: 5/5  Right iliopsoas: 5/5  Left iliopsoas: 5/5  Right quadriceps: 5/5  Left quadriceps: 5/5  Right hamstrin/5  Left hamstrin/5  Right glutei: 5/5  Left glutei: 5/5  Right anterior tibial: 5/5  Left anterior tibial: 5/5  Right posterior tibial: 5/5  Left posterior tibial: 5/5  Right peroneal: 5/5  Left peroneal: 5/5  Right gastroc: 5/5  Left gastroc: 5/5    Sensory Exam   Light touch normal.     Gait, Coordination, and Reflexes     Gait  Gait: normal    Coordination   Romberg: negative  Finger to nose coordination: normal  Heel to shin coordination: normal    Tremor   Resting tremor: absent      Assessment/Plan     Active Problems:    Primary brain tumor      PLAN: Very pleasant well appearing younger male presented to the ER with 6 months of waxing and waning headaches.  More recently, he developed blurry vision out of the right eye.  After returning in town today he presents to the ER for further evaluation.  He requested a CT head secondary to multiple prior ER and outpatient evaluations that came up negative.     CT head imaging reveals a very large right frontal mass with cystic components measuring 7. 4 x 4 by 4 cm with extensive, surrounding vasogenic edema.  MRI brain imaging with and without contrast has been ordered and is pending.  We have started the patient on prophylactic IV keppra for seizure precautions as well as IV steroids to decrease the surrounding edema.  I've also ordered sed rate and CRP, though the likelihood of underlying infection given stable labs and vitals is highly unlikely. Remarkably, he is quite stable on exam.  The only abnormality noted was fixation of the left pupil.    Further recommendations to follow once MRI imaging is complete.  He will be followed very closely by our service.  Thank you for the consult.      I discussed the patients findings and my recommendations  with patient, family, nursing staff, consulting provider and Dr Cole Sommer, APRN  06/15/18  5:13 PM

## 2018-06-16 NOTE — ANESTHESIA PROCEDURE NOTES
Arterial Line    Patient location during procedure: holding area  Start time: 6/16/2018 7:11 AM  Stop Time:6/16/2018 7:14 AM       Line placed for hemodynamic monitoring.  Performed By   Anesthesiologist: SCOTT JOHNSON  Preanesthetic Checklist  Completed: patient identified and risks and benefits discussed  Arterial Line Prep   Sterile Tech: gloves  Prep: ChloraPrep  Patient monitoring: blood pressure monitoring, continuous pulse oximetry and EKG  Arterial Line Procedure   Laterality:left  Location:  radial artery  Catheter size: 20 G   Guidance: ultrasound guided  PROCEDURE NOTE/ULTRASOUND INTERPRETATION.  Using ultrasound guidance the potential vascular sites for insertion of the catheter were visualized to determine the patency of the vessel to be used for vascular access.  After selecting the appropriate site for insertion, the needle was visualized under ultrasound being inserted into the radial artery, followed by ultrasound confirmation of wire and catheter placement. There were no abnormalities seen on ultrasound; an image was taken; and the patient tolerated the procedure with no complications.   Successful placement: yes          Post Assessment   Dressing Type: occlusive dressing applied and secured with tape.   Complications no  Patient Tolerance: patient tolerated the procedure well with no apparent complications  Additional Notes  Using ultrasound guidance the potential vascular sites for insertion of the catheter were visualized to determine the patency of the vessel to be used for vascular access.  After selecting the appropriate site for insertion, the needle was visualized under ultrasound being inserted into the radial artery, followed by ultrasound confirmation of wire and catheter placement.  There were no abnormalities seen on ultrasound; an image was taken/ and the patient tolerated the procedure with no complications.

## 2018-06-16 NOTE — ANESTHESIA POSTPROCEDURE EVALUATION
"Patient: Bryan Valadez    Procedure Summary     Date:  06/16/18 Room / Location:  [facility] OR [address] [place] [address] MAIN OR    Anesthesia Start:  0747 Anesthesia Stop:  1446    Procedure:  CRANIOTOMY FOR  RESECTION OF LARGE RIGHT FRONTAL MASS WITH AUGUSTIN NAVIGATION (Right Head) Diagnosis:      Surgeon:  Chetan Galvan IV, MD Provider:  Sanya Burris MD    Anesthesia Type:  general ASA Status:  2          Anesthesia Type: general  Last vitals  BP   130/72 (06/16/18 1515)   Temp   36.6 °C (97.9 °F) (06/16/18 1443)   Pulse   64 (06/16/18 1515)   Resp   16 (06/16/18 1515)     SpO2   100 % (06/16/18 1515)     Post Anesthesia Care and Evaluation    Patient location during evaluation: bedside  Patient participation: complete - patient participated  Level of consciousness: awake  Pain management: adequate  Airway patency: patent  Anesthetic complications: No anesthetic complications    Cardiovascular status: acceptable  Respiratory status: acceptable  Hydration status: acceptable    Comments: /72 (BP Location: Right arm, Patient Position: Lying)   Pulse 64   Temp 36.6 °C (97.9 °F) (Oral)   Resp 16   Ht 180.3 cm (71\")   Wt 86.2 kg (190 lb)   SpO2 100%   BMI 26.50 kg/m²         "

## 2018-06-17 ENCOUNTER — APPOINTMENT (OUTPATIENT)
Dept: MRI IMAGING | Facility: HOSPITAL | Age: 33
End: 2018-06-17

## 2018-06-17 LAB
ANION GAP SERPL CALCULATED.3IONS-SCNC: 12.4 MMOL/L
BASOPHILS # BLD AUTO: 0 10*3/MM3 (ref 0–0.2)
BASOPHILS NFR BLD AUTO: 0 % (ref 0–1.5)
BUN BLD-MCNC: 10 MG/DL (ref 6–20)
BUN/CREAT SERPL: 12.8 (ref 7–25)
CALCIUM SPEC-SCNC: 8.2 MG/DL (ref 8.6–10.5)
CHLORIDE SERPL-SCNC: 104 MMOL/L (ref 98–107)
CO2 SERPL-SCNC: 23.6 MMOL/L (ref 22–29)
CREAT BLD-MCNC: 0.78 MG/DL (ref 0.76–1.27)
DEPRECATED RDW RBC AUTO: 42.1 FL (ref 37–54)
EOSINOPHIL # BLD AUTO: 0 10*3/MM3 (ref 0–0.7)
EOSINOPHIL NFR BLD AUTO: 0 % (ref 0.3–6.2)
ERYTHROCYTE [DISTWIDTH] IN BLOOD BY AUTOMATED COUNT: 12.6 % (ref 11.5–14.5)
GFR SERPL CREATININE-BSD FRML MDRD: 114 ML/MIN/1.73
GLUCOSE BLD-MCNC: 129 MG/DL (ref 65–99)
GLUCOSE BLDC GLUCOMTR-MCNC: 120 MG/DL (ref 70–130)
GLUCOSE BLDC GLUCOMTR-MCNC: 93 MG/DL (ref 70–130)
GLUCOSE BLDC GLUCOMTR-MCNC: 97 MG/DL (ref 70–130)
GLUCOSE BLDC GLUCOMTR-MCNC: 98 MG/DL (ref 70–130)
HCT VFR BLD AUTO: 36.5 % (ref 40.4–52.2)
HGB BLD-MCNC: 11.8 G/DL (ref 13.7–17.6)
IMM GRANULOCYTES # BLD: 0.02 10*3/MM3 (ref 0–0.03)
IMM GRANULOCYTES NFR BLD: 0.1 % (ref 0–0.5)
LYMPHOCYTES # BLD AUTO: 1.2 10*3/MM3 (ref 0.9–4.8)
LYMPHOCYTES NFR BLD AUTO: 8.7 % (ref 19.6–45.3)
MCH RBC QN AUTO: 30.3 PG (ref 27–32.7)
MCHC RBC AUTO-ENTMCNC: 32.3 G/DL (ref 32.6–36.4)
MCV RBC AUTO: 93.8 FL (ref 79.8–96.2)
MONOCYTES # BLD AUTO: 1.68 10*3/MM3 (ref 0.2–1.2)
MONOCYTES NFR BLD AUTO: 12.2 % (ref 5–12)
NEUTROPHILS # BLD AUTO: 10.89 10*3/MM3 (ref 1.9–8.1)
NEUTROPHILS NFR BLD AUTO: 79 % (ref 42.7–76)
PLATELET # BLD AUTO: 243 10*3/MM3 (ref 140–500)
PMV BLD AUTO: 10.3 FL (ref 6–12)
POTASSIUM BLD-SCNC: 4.2 MMOL/L (ref 3.5–5.2)
RBC # BLD AUTO: 3.89 10*6/MM3 (ref 4.6–6)
SODIUM BLD-SCNC: 140 MMOL/L (ref 136–145)
WBC NRBC COR # BLD: 13.79 10*3/MM3 (ref 4.5–10.7)

## 2018-06-17 PROCEDURE — 94799 UNLISTED PULMONARY SVC/PX: CPT

## 2018-06-17 PROCEDURE — 70553 MRI BRAIN STEM W/O & W/DYE: CPT

## 2018-06-17 PROCEDURE — 25810000003 SODIUM CHLORIDE 0.9 % WITH KCL 20 MEQ 20-0.9 MEQ/L-% SOLUTION: Performed by: NEUROLOGICAL SURGERY

## 2018-06-17 PROCEDURE — 99024 POSTOP FOLLOW-UP VISIT: CPT | Performed by: NEUROLOGICAL SURGERY

## 2018-06-17 PROCEDURE — 25010000002 DEXAMETHASONE PER 1 MG: Performed by: NEUROLOGICAL SURGERY

## 2018-06-17 PROCEDURE — 25010000003 CEFAZOLIN IN DEXTROSE 2-4 GM/100ML-% SOLUTION: Performed by: NEUROLOGICAL SURGERY

## 2018-06-17 PROCEDURE — 0 GADOBENATE DIMEGLUMINE 529 MG/ML SOLUTION: Performed by: NEUROLOGICAL SURGERY

## 2018-06-17 PROCEDURE — 25010000002 HYDROMORPHONE PER 4 MG: Performed by: NEUROLOGICAL SURGERY

## 2018-06-17 PROCEDURE — 82962 GLUCOSE BLOOD TEST: CPT

## 2018-06-17 PROCEDURE — A9577 INJ MULTIHANCE: HCPCS | Performed by: NEUROLOGICAL SURGERY

## 2018-06-17 PROCEDURE — 25010000003 LEVETIRACETAM IN NACL 0.75% 1000 MG/100ML SOLUTION: Performed by: EMERGENCY MEDICINE

## 2018-06-17 PROCEDURE — 80048 BASIC METABOLIC PNL TOTAL CA: CPT | Performed by: NEUROLOGICAL SURGERY

## 2018-06-17 PROCEDURE — 85025 COMPLETE CBC W/AUTO DIFF WBC: CPT | Performed by: NEUROLOGICAL SURGERY

## 2018-06-17 RX ORDER — DIAZEPAM 5 MG/1
5 TABLET ORAL ONCE
Status: COMPLETED | OUTPATIENT
Start: 2018-06-17 | End: 2018-06-17

## 2018-06-17 RX ORDER — BACLOFEN 10 MG/1
10 TABLET ORAL 3 TIMES DAILY PRN
Status: DISCONTINUED | OUTPATIENT
Start: 2018-06-17 | End: 2018-06-20 | Stop reason: HOSPADM

## 2018-06-17 RX ADMIN — HYDROMORPHONE HYDROCHLORIDE 0.5 MG: 1 INJECTION, SOLUTION INTRAMUSCULAR; INTRAVENOUS; SUBCUTANEOUS at 13:12

## 2018-06-17 RX ADMIN — PANTOPRAZOLE SODIUM 40 MG: 40 TABLET, DELAYED RELEASE ORAL at 06:15

## 2018-06-17 RX ADMIN — DEXAMETHASONE SODIUM PHOSPHATE 4 MG: 4 INJECTION, SOLUTION INTRAMUSCULAR; INTRAVENOUS at 13:12

## 2018-06-17 RX ADMIN — DEXAMETHASONE SODIUM PHOSPHATE 4 MG: 4 INJECTION, SOLUTION INTRAMUSCULAR; INTRAVENOUS at 06:15

## 2018-06-17 RX ADMIN — LEVETIRACETAM 1000 MG: 10 INJECTION INTRAVENOUS at 20:26

## 2018-06-17 RX ADMIN — HYDROMORPHONE HYDROCHLORIDE 0.5 MG: 1 INJECTION, SOLUTION INTRAMUSCULAR; INTRAVENOUS; SUBCUTANEOUS at 09:37

## 2018-06-17 RX ADMIN — HYDROMORPHONE HYDROCHLORIDE 0.5 MG: 1 INJECTION, SOLUTION INTRAMUSCULAR; INTRAVENOUS; SUBCUTANEOUS at 17:48

## 2018-06-17 RX ADMIN — DIAZEPAM 5 MG: 5 TABLET ORAL at 10:42

## 2018-06-17 RX ADMIN — HYDROCODONE BITARTRATE AND ACETAMINOPHEN 1 TABLET: 5; 325 TABLET ORAL at 20:26

## 2018-06-17 RX ADMIN — LEVETIRACETAM 1000 MG: 10 INJECTION INTRAVENOUS at 09:37

## 2018-06-17 RX ADMIN — HYDROMORPHONE HYDROCHLORIDE 0.5 MG: 1 INJECTION, SOLUTION INTRAMUSCULAR; INTRAVENOUS; SUBCUTANEOUS at 23:12

## 2018-06-17 RX ADMIN — BACLOFEN 10 MG: 10 TABLET ORAL at 20:26

## 2018-06-17 RX ADMIN — MONTELUKAST 10 MG: 10 TABLET, FILM COATED ORAL at 20:26

## 2018-06-17 RX ADMIN — HYDROMORPHONE HYDROCHLORIDE 0.5 MG: 1 INJECTION, SOLUTION INTRAMUSCULAR; INTRAVENOUS; SUBCUTANEOUS at 03:57

## 2018-06-17 RX ADMIN — HYDROCODONE BITARTRATE AND ACETAMINOPHEN 1 TABLET: 5; 325 TABLET ORAL at 06:15

## 2018-06-17 RX ADMIN — POTASSIUM CHLORIDE AND SODIUM CHLORIDE 100 ML/HR: 900; 150 INJECTION, SOLUTION INTRAVENOUS at 20:26

## 2018-06-17 RX ADMIN — HYDROCODONE BITARTRATE AND ACETAMINOPHEN 1 TABLET: 5; 325 TABLET ORAL at 10:42

## 2018-06-17 RX ADMIN — GADOBENATE DIMEGLUMINE 18 ML: 529 INJECTION, SOLUTION INTRAVENOUS at 11:55

## 2018-06-17 RX ADMIN — HYDROCODONE BITARTRATE AND ACETAMINOPHEN 1 TABLET: 5; 325 TABLET ORAL at 16:13

## 2018-06-17 RX ADMIN — POTASSIUM CHLORIDE AND SODIUM CHLORIDE 100 ML/HR: 900; 150 INJECTION, SOLUTION INTRAVENOUS at 08:20

## 2018-06-17 RX ADMIN — HYDROCODONE BITARTRATE AND ACETAMINOPHEN 1 TABLET: 5; 325 TABLET ORAL at 01:06

## 2018-06-17 RX ADMIN — CEFAZOLIN SODIUM 2 G: 2 INJECTION, SOLUTION INTRAVENOUS at 03:38

## 2018-06-17 RX ADMIN — DEXAMETHASONE SODIUM PHOSPHATE 4 MG: 4 INJECTION, SOLUTION INTRAMUSCULAR; INTRAVENOUS at 20:26

## 2018-06-17 NOTE — PROGRESS NOTES
[facility] PROGRESS NOTE    PATIENT IDENTIFICATION:   Name:  Bryan Valadez      MRN:  [id number]     32 y.o.  male                   Active Problems:    Primary brain tumor        Subjective   CC: headache  Interval History: Brisk drainage from ED/IC drain - serosang.  MRI pending today.    Objective     Vital signs in last 24 hours:  Temp:  [97.6 °F (36.4 °C)-98.6 °F (37 °C)] 98.6 °F (37 °C)  Heart Rate:  [39-91] 46  Resp:  [10-18] 12  BP: (102-132)/(63-80) 120/69  Arterial Line BP: ()/(57-77) 137/62      Intake/Output last 3 shifts:  I/O last 3 completed shifts:  In: 6460 [I.V.:6010; IV Piggyback:450]  Out: 4285 [Urine:2950; Drains:535; Blood:800]  Intake/Output this shift:  No intake/output data recorded.    LABS    Results from last 7 days  Lab Units 06/15/18  2159   INR  1.11*     Lab Results   Component Value Date    CALCIUM 8.2 (L) 06/17/2018     Results from last 7 days  Lab Units 06/17/18  0406 06/16/18  1457 06/16/18  1456 06/15/18  1357   SODIUM mmol/L 140 143  --  139   POTASSIUM mmol/L 4.2 3.9  --  3.9   CHLORIDE mmol/L 104 107  --  100   CO2 mmol/L 23.6 20.8*  --  27.2   BUN mg/dL 10 10  --  13   CREATININE mg/dL 0.78 0.94  --  1.00   GLUCOSE mg/dL 129* 145*  --  90   CALCIUM mg/dL 8.2* 8.3*  --  9.7   WBC 10*3/mm3 13.79*  --  13.58* 6.68   HEMOGLOBIN g/dL 11.8*  --  12.8* 15.1   PLATELETS 10*3/mm3 243  --  276 257     Physical Exam:  Awake and alert  Speech normal  No drift  Dressing dry      4 - Opens eyes on own  6 - Follows simple motor commands  5 - Alert and oriented        ASSESSMENT  Assessment/Plan     S/p crani for very large tumor  Cont steroids  MRI  Will decrease suction on drain  Monitor in ICU until drainage slows     LOS: 2 days         Chetan Galvan IV, MD

## 2018-06-17 NOTE — SIGNIFICANT NOTE
06/17/18 1107   Rehab Time/Intention   Evaluation Not Performed patient unavailable for evaluation  (pt going off the floor for MRI, will follow up tomorrow)   Rehab Treatment   Discipline physical therapist   Recommendation   PT - Next Appointment 06/18/18

## 2018-06-17 NOTE — PLAN OF CARE
Problem: Patient Care Overview  Goal: Plan of Care Review  Outcome: Ongoing (interventions implemented as appropriate)   06/17/18 0544   OTHER   Outcome Summary Pt neuro status stable overnight. Complains frequently of pain, given pain meds with positive results. JERRY with serosang drainage. Smith draining clear yellow urine. Blood pressure within defined parameters. Plan for MRI today.. Will continue to monitor per orders.     Goal: Individualization and Mutuality  Outcome: Ongoing (interventions implemented as appropriate)    Goal: Discharge Needs Assessment  Outcome: Ongoing (interventions implemented as appropriate)      Problem: Pain, Acute (Adult)  Goal: Identify Related Risk Factors and Signs and Symptoms  Outcome: Ongoing (interventions implemented as appropriate)   06/17/18 0544   Pain, Acute (Adult)   Related Risk Factors (Acute Pain) disease process;surgery   Signs and Symptoms (Acute Pain) altered time perception;impaired thought process/concentration;moaning;sleep pattern alteration;verbalization of pain descriptors     Goal: Acceptable Pain Control/Comfort Level  Outcome: Ongoing (interventions implemented as appropriate)   06/17/18 0544   Pain, Acute (Adult)   Acceptable Pain Control/Comfort Level making progress toward outcome       Problem: Skin Injury Risk (Adult)  Goal: Identify Related Risk Factors and Signs and Symptoms  Outcome: Ongoing (interventions implemented as appropriate)   06/17/18 0544   Skin Injury Risk (Adult)   Related Risk Factors (Skin Injury Risk) cognitive impairment;critical care admission;mechanical forces;medical devices     Goal: Skin Health and Integrity  Outcome: Ongoing (interventions implemented as appropriate)   06/17/18 0544   Skin Injury Risk (Adult)   Skin Health and Integrity making progress toward outcome

## 2018-06-17 NOTE — PROGRESS NOTES
[facility]     Mar/chart reviewed  F/u CC management.  Some expected post operative pain    Vital Sign Min/Max for last 24 hours  Temp  Min: 97.6 °F (36.4 °C)  Max: 98.6 °F (37 °C)   BP  Min: 102/63  Max: 132/72   Pulse  Min: 39  Max: 91   Resp  Min: 10  Max: 18   SpO2  Min: 95 %  Max: 100 %   No Data Recorded   No Data Recorded     Nad, axox3,   perrl, eomi, no icterus,  mmm, no jvd, trachea midline, neck supple,  chest cta bilaterally, no crackles, no wheezes,   rrr,   soft, nt, nd +bs,  no c/c/ e    Labs:  Wbc 13.79  hgb 11.8    A/P:  1. Brain tumor s/p resection  2. H/o asthma and recurrent sinusitis  3. Steroid inducted leukocytosis/reactive  4. Situational anxiety -- valium for MRI    Continue current, out of icu when ok with MATT.

## 2018-06-18 ENCOUNTER — TELEPHONE (OUTPATIENT)
Dept: INTERNAL MEDICINE | Facility: CLINIC | Age: 33
End: 2018-06-18

## 2018-06-18 LAB
BASE EXCESS BLDA CALC-SCNC: -2 MMOL/L (ref -5–5)
CO2 BLDA-SCNC: 24 MMOL/L (ref 24–29)
GLUCOSE BLDC GLUCOMTR-MCNC: 109 MG/DL (ref 70–130)
GLUCOSE BLDC GLUCOMTR-MCNC: 111 MG/DL (ref 70–130)
GLUCOSE BLDC GLUCOMTR-MCNC: 117 MG/DL (ref 70–130)
GLUCOSE BLDC GLUCOMTR-MCNC: 125 MG/DL (ref 70–130)
GLUCOSE BLDC GLUCOMTR-MCNC: 135 MG/DL (ref 70–130)
HCO3 BLDA-SCNC: 22.9 MMOL/L (ref 22–26)
HCT VFR BLDA CALC: 35 % (ref 38–51)
HGB BLDA-MCNC: 11.9 G/DL (ref 12–17)
PCO2 BLDA: 35.8 MM HG (ref 35–45)
PH BLDA: 7.41 PH UNITS (ref 7.35–7.6)
PO2 BLDA: 195 MMHG (ref 80–105)
POTASSIUM BLDA-SCNC: 4 MMOL/L (ref 3.5–4.9)
SAO2 % BLDA: 100 % (ref 95–98)

## 2018-06-18 PROCEDURE — 94799 UNLISTED PULMONARY SVC/PX: CPT

## 2018-06-18 PROCEDURE — 97166 OT EVAL MOD COMPLEX 45 MIN: CPT | Performed by: OCCUPATIONAL THERAPIST

## 2018-06-18 PROCEDURE — 25010000002 DEXAMETHASONE PER 1 MG: Performed by: NEUROLOGICAL SURGERY

## 2018-06-18 PROCEDURE — 97535 SELF CARE MNGMENT TRAINING: CPT | Performed by: OCCUPATIONAL THERAPIST

## 2018-06-18 PROCEDURE — 97162 PT EVAL MOD COMPLEX 30 MIN: CPT

## 2018-06-18 PROCEDURE — 25010000003 LEVETIRACETAM IN NACL 0.75% 1000 MG/100ML SOLUTION: Performed by: EMERGENCY MEDICINE

## 2018-06-18 PROCEDURE — 82962 GLUCOSE BLOOD TEST: CPT

## 2018-06-18 PROCEDURE — 25010000002 HYDROMORPHONE PER 4 MG: Performed by: NEUROLOGICAL SURGERY

## 2018-06-18 PROCEDURE — 25810000003 SODIUM CHLORIDE 0.9 % WITH KCL 20 MEQ 20-0.9 MEQ/L-% SOLUTION: Performed by: NEUROLOGICAL SURGERY

## 2018-06-18 PROCEDURE — 99024 POSTOP FOLLOW-UP VISIT: CPT | Performed by: NEUROLOGICAL SURGERY

## 2018-06-18 PROCEDURE — 97110 THERAPEUTIC EXERCISES: CPT

## 2018-06-18 RX ORDER — PANTOPRAZOLE SODIUM 40 MG/1
40 TABLET, DELAYED RELEASE ORAL
Status: DISCONTINUED | OUTPATIENT
Start: 2018-06-18 | End: 2018-06-20 | Stop reason: HOSPADM

## 2018-06-18 RX ORDER — LIDOCAINE HYDROCHLORIDE 10 MG/ML
5 INJECTION, SOLUTION INFILTRATION; PERINEURAL ONCE
Status: COMPLETED | OUTPATIENT
Start: 2018-06-18 | End: 2018-06-18

## 2018-06-18 RX ORDER — LEVETIRACETAM 500 MG/1
1000 TABLET ORAL EVERY 12 HOURS SCHEDULED
Status: DISCONTINUED | OUTPATIENT
Start: 2018-06-18 | End: 2018-06-20 | Stop reason: HOSPADM

## 2018-06-18 RX ADMIN — BACLOFEN 10 MG: 10 TABLET ORAL at 04:36

## 2018-06-18 RX ADMIN — HYDROCODONE BITARTRATE AND ACETAMINOPHEN 1 TABLET: 5; 325 TABLET ORAL at 21:39

## 2018-06-18 RX ADMIN — LIDOCAINE HYDROCHLORIDE 5 ML: 10 INJECTION, SOLUTION INFILTRATION; PERINEURAL at 08:45

## 2018-06-18 RX ADMIN — MONTELUKAST 10 MG: 10 TABLET, FILM COATED ORAL at 20:53

## 2018-06-18 RX ADMIN — DEXAMETHASONE SODIUM PHOSPHATE 4 MG: 4 INJECTION, SOLUTION INTRAMUSCULAR; INTRAVENOUS at 08:53

## 2018-06-18 RX ADMIN — DEXAMETHASONE SODIUM PHOSPHATE 4 MG: 4 INJECTION, SOLUTION INTRAMUSCULAR; INTRAVENOUS at 16:09

## 2018-06-18 RX ADMIN — DEXAMETHASONE SODIUM PHOSPHATE 4 MG: 4 INJECTION, SOLUTION INTRAMUSCULAR; INTRAVENOUS at 01:21

## 2018-06-18 RX ADMIN — DEXAMETHASONE SODIUM PHOSPHATE 4 MG: 4 INJECTION, SOLUTION INTRAMUSCULAR; INTRAVENOUS at 20:52

## 2018-06-18 RX ADMIN — HYDROMORPHONE HYDROCHLORIDE 0.5 MG: 1 INJECTION, SOLUTION INTRAMUSCULAR; INTRAVENOUS; SUBCUTANEOUS at 09:17

## 2018-06-18 RX ADMIN — PANTOPRAZOLE SODIUM 40 MG: 40 TABLET, DELAYED RELEASE ORAL at 05:48

## 2018-06-18 RX ADMIN — POTASSIUM CHLORIDE AND SODIUM CHLORIDE 50 ML/HR: 900; 150 INJECTION, SOLUTION INTRAVENOUS at 20:55

## 2018-06-18 RX ADMIN — HYDROCODONE BITARTRATE AND ACETAMINOPHEN 1 TABLET: 5; 325 TABLET ORAL at 17:35

## 2018-06-18 RX ADMIN — LEVETIRACETAM 1000 MG: 500 TABLET, FILM COATED ORAL at 20:52

## 2018-06-18 RX ADMIN — HYDROCODONE BITARTRATE AND ACETAMINOPHEN 1 TABLET: 5; 325 TABLET ORAL at 03:01

## 2018-06-18 RX ADMIN — POTASSIUM CHLORIDE AND SODIUM CHLORIDE 100 ML/HR: 900; 150 INJECTION, SOLUTION INTRAVENOUS at 05:48

## 2018-06-18 RX ADMIN — HYDROMORPHONE HYDROCHLORIDE 0.5 MG: 1 INJECTION, SOLUTION INTRAMUSCULAR; INTRAVENOUS; SUBCUTANEOUS at 05:48

## 2018-06-18 RX ADMIN — LEVETIRACETAM 1000 MG: 10 INJECTION INTRAVENOUS at 08:58

## 2018-06-18 NOTE — TELEPHONE ENCOUNTER
----- Message from Martha Heaton sent at 6/18/2018 10:11 AM EDT -----  Contact: Mary  Patient is currently in ICU and [facility] was diagnosed with brain tumor on the frontal lobe. Please advise    Mary:[phone]

## 2018-06-18 NOTE — PLAN OF CARE
Problem: Patient Care Overview  Goal: Plan of Care Review   06/18/18 0919   Coping/Psychosocial   Plan of Care Reviewed With patient   OTHER   Outcome Summary Pt presents to OT eval following crani for frontal tumor with generalized weakness and post op pain. Pt able to sit up EOB and transfer to chair with min A but complains of pressure and headache. Pt requires max A for LBD at this time. Pt may benefit from OT to address ADLs.

## 2018-06-18 NOTE — PROGRESS NOTES
[facility]      Mar/chart reviewed  F/u CC management.  Some expected post operative pain    Vital Sign Min/Max for last 24 hours  Temp  Min: 97.9 °F (36.6 °C)  Max: 98.9 °F (37.2 °C)   BP  Min: 115/74  Max: 130/83   Pulse  Min: 42  Max: 65   Resp  Min: 10  Max: 16   SpO2  Min: 96 %  Max: 100 %   No Data Recorded   Weight  Min: 86.2 kg (190 lb 0.6 oz)  Max: 86.2 kg (190 lb 0.6 oz)     Nad, axox3,   perrl, eomi, no icterus,  mmm, no jvd, trachea midline, neck supple,  chest cta bilaterally, no crackles, no wheezes,   rrr,   soft, nt, nd +bs,  no c/c/ e     Labs:  Nothing new today    A/P:  1. Brain tumor s/p resection  2. H/o asthma and recurrent sinusitis  3. Steroid inducted leukocytosis/reactive  4. Situational anxiety -- valium for MRI     Continue current, out of icu when ok with MATT.

## 2018-06-18 NOTE — PLAN OF CARE
Problem: Patient Care Overview  Goal: Plan of Care Review   06/18/18 1117   Coping/Psychosocial   Plan of Care Reviewed With patient   OTHER   Outcome Summary Pt is s/p CRANIOTOMY FOR RESECTION OF LARGE RIGHT FRONTAL MASS WITH AUGUSTIN NAVIGATION. Pt is good candidate for physical therapy to address gait impairments that include bilateral decreased stride length and decreased gait speed that compromise pt safety during ambulation, along with impairments to functional mobility and decreased activity tolerance. Pt vss prior to treatment, able to tolerate ambulation with only moderate complaints of pain. Pt stride length and speed significantly diminished, pt reports inability to move stiff neck main reason why walking so slow.

## 2018-06-18 NOTE — THERAPY EVALUATION
Acute Care - Physical Therapy Initial Evaluation  [facility]     Patient Name: Bryan Valadez  : 1985  MRN: [id number]  Today's Date: 2018   Onset of Illness/Injury or Date of Surgery: (P) 18  Date of Referral to PT: (P) 18  Referring Physician: Cole MONZON (P)      Admit Date: 6/15/2018    Visit Dx:     ICD-10-CM ICD-9-CM   1. Primary brain tumor D49.6 239.6   2. Right frontal lobe mass G93.9 348.9     Patient Active Problem List   Diagnosis   • Hyperlipidemia   • Asthma   • GERD (gastroesophageal reflux disease)   • Primary brain tumor     Past Medical History:   Diagnosis Date   • Allergic rhinitis    • Asthma     Pulmonary Dr. Alexandra   • Chest pain    • GERD (gastroesophageal reflux disease)    • H/O echocardiogram    • History of ETT    • History of Holter monitoring    • Hyperlipidemia      History reviewed. No pertinent surgical history.     PT ASSESSMENT (last 12 hours)      Physical Therapy Evaluation     Row Name 18 1100          PT Evaluation Time/Intention    Subjective Information (P)  complains of;pain  -     Document Type (P)  evaluation  -     Mode of Treatment (P)  physical therapy  -     Patient Effort (P)  good  -     Symptoms Noted During/After Treatment (P)  increased pain  -     Row Name 18 1100          General Information    Patient Profile Reviewed? (P)  yes  -     Onset of Illness/Injury or Date of Surgery (P)  18  -     Referring Physician (PVanessa Galvan IV  -     Patient Observations (P)  alert;cooperative;agree to therapy  -     Patient/Family Observations (P)  Pt sitting in chair; no acute signs of distress; vss; family in room   -     Prior Level of Function (P)  independent:  -     Equipment Currently Used at Home (P)  none  -     Pertinent History of Current Functional Problem (P)  Pt is s/p CRANIOTOMY FOR  RESECTION OF LARGE RIGHT FRONTAL MASS WITH Microstim NAVIGATION.  -     Existing Precautions/Restrictions (P)  fall   -     Row Name 06/18/18 1100          Relationship/Environment    Primary Source of Support/Comfort (P)  spouse  -     Lives With (P)  spouse  -     Family Caregiver if Needed (P)  parent(s);spouse  -     Row Name 06/18/18 1100          Resource/Environmental Concerns    Current Living Arrangements (P)  home/apartment/condo  -     Resource/Environmental Concerns (P)  --  -     Row Name 06/18/18 1100          Home Main Entrance    Number of Stairs, Main Entrance (P)  one  -[facility] Name 06/18/18 1100          Cognitive Assessment/Intervention- PT/OT    Orientation Status (Cognition) (P)  oriented x 3  -     Follows Commands (Cognition) (P)  WFL  -     Cognitive Assessment/Intervention Comment (P)  Pt fixated on catheter, reports it feels funny   -[facility] Name 06/18/18 1100          Bed Mobility Assessment/Treatment    Comment (Bed Mobility) (P)  not assessed; pt began/ended treatment in chair   -[facility] Name 06/18/18 1100          Transfer Assessment/Treatment    Transfer Assessment/Treatment (P)  sit-stand transfer;stand-sit transfer  -     Sit-Stand [place] (Transfers) (P)  minimum assist (75% patient effort);2 person assist;verbal cues  -     Stand-Sit [place] (Transfers) (P)  contact guard;2 person assist;verbal cues  -[facility] Name 06/18/18 1100          Sit-Stand Transfer    Assistive Device (Sit-Stand Transfers) (P)  other (see comments)   A  -     Row Name 06/18/18 1100          Stand-Sit Transfer    Assistive Device (Stand-Sit Transfers) (P)  other (see comments)   A  -     Row Name 06/18/18 1100          Gait/Stairs Assessment/Training    [place] Level (Gait) (P)  contact guard;2 person assist  -     Assistive Device (Gait) (P)  other (see comments)   A  -     Distance in Feet (Gait) (P)  60  -JH     Pattern (Gait) (P)  step-through  -     Deviations/Abnormal Patterns (Gait) (P)  stride length decreased;gait speed decreased;eug[place] decreased;base  of support, narrow  -JH     Row Name 06/18/18 1100          General ROM    GENERAL ROM COMMENTS (P)  B LE AROM grossly WFL  -JH     Row Name 06/18/18 1100          General Assessment (Manual Muscle Testing)    Comment, General Manual Muscle Testing (MMT) Assessment (P)  B LE MMT grossly at least 3/5  -JH     Row Name 06/18/18 1100          Motor Assessment/Intervention    Additional Documentation (P)  Balance (Group)  -JH     Row Name 06/18/18 1100          Balance    Balance (P)  static sitting balance;static standing balance  -JH     Row Name 06/18/18 1100          Static Sitting Balance    Level of [place] (Unsupported Sitting, Static Balance) (P)  supervision  -     Sitting Position (Unsupported Sitting, Static Balance) (P)  sitting in chair  -     Time Able to Maintain Position (Unsupported Sitting, Static Balance) (P)  1 to 2 minutes  -JH     Row Name 06/18/18 1100          Static Standing Balance    Level of [place] (Supported Standing, Static Balance) (P)  contact guard assist  -     Time Able to Maintain Position (Supported Standing, Static Balance) (P)  15 to 30 seconds  -     Assistive Device Utilized (Supported Standing, Static Balance) (P)  other (see comments)   HHA  -JH     Row Name 06/18/18 1100          Pain Assessment    Additional Documentation (P)  Pain Scale: Numbers Pre/Post-Treatment (Group)  -JH     Row Name 06/18/18 1100          Pain Scale: Numbers Pre/Post-Treatment    Pain Scale: Numbers, Pretreatment (P)  2/10  -     Pain Scale: Numbers, Post-Treatment (P)  2/10  -     Pain Location (P)  head  -     Pre/Post Treatment Pain Comment (P)  Pt reports increased pain levels when standing up, reports 5/10 headache pain  -     Pain Intervention(s) (P)  Repositioned;Ambulation/increased activity  -JH     Row Name             Wound 06/16/18 1335 Right head incision    Wound - Properties Group Date first assessed: 06/16/18  -LD Time first assessed: 1335  -LD Side: Right   -LD Location: head  -LD Type: incision  -LD    Row Name             Wound 06/16/18 1800 Right posterior heel     Wound - Properties Group Date first assessed: 06/16/18  -EK Time first assessed: 1800  -EK Side: Right  -EK Orientation: posterior  -EK Location: heel  -EK    Row Name 06/18/18 1100          Coping    Observed Emotional State (P)  accepting;calm;cooperative  -     Verbalized Emotional State (P)  acceptance  -     Row Name 06/18/18 1100          Plan of Care Review    Plan of Care Reviewed With (P)  patient;family  -     Row Name 06/18/18 1100          Physical Therapy Clinical Impression    Date of Referral to PT (P)  06/16/18  -     PT Diagnosis (PT Clinical Impression) (P)  Pt presents with gait impairments that include bilateral decreased stride length and decreased gait speed that compromise pt safety during ambulation, along with impairments to functional mobility and decreased activity tolerance.   -     Prognosis (PT Clinical Impression) (P)  good  -     Functional Level at Time of Evaluation (PT Clinical Impression) (P)  min assist x2  -     Criteria for Skilled Interventions Met (PT Clinical Impression) (P)  yes;treatment indicated  -     Pathology/Pathophysiology Noted (Describe Specifically for Each System) (P)  musculoskeletal  -     Impairments Found (describe specific impairments) (P)  aerobic capacity/endurance;gait, locomotion, and balance  -     Functional Limitations in Following Categories (Describe Specific Limitations) (P)  self-care;home management;work;community/leisure  -     Disability: Inability to Perform Actions/Activities of Required Roles (describe specific disability) (P)  community/leisure;work  -     Rehab Potential (PT Clinical Summary) (P)  good, to achieve stated therapy goals  -     Predicted Duration of Therapy (PT) (P)  5 days  -     Care Plan Review (PT) (P)  evaluation/treatment results reviewed  -     Care Plan Review, Other  Participant (PT Clinical Impression) (P)  family  -[facility] Name 06/18/18 1100          Physical Therapy Goals    Bed Mobility Goal Selection (PT) (P)  bed mobility, PT goal 1  -     Transfer Goal Selection (PT) (P)  transfer, PT goal 1  -     Gait Training Goal Selection (PT) (P)  gait training, PT goal 1  -     Stairs Goal Selection (PT) (P)  stairs, PT goal 1  -     Additional Documentation (P)  Stairs Goal Selection (PT) (Row)  -JH     Row Name 06/18/18 1100          Bed Mobility Goal 1 (PT)    Activity/Assistive Device (Bed Mobility Goal 1, PT) (P)  sit to supine/supine to sit  -     [place] Level/Cues Needed (Bed Mobility Goal 1, PT) (P)  supervision required  -     Time Frame (Bed Mobility Goal 1, PT) (P)  long term goal (LTG);5 days  -JH     Row Name 06/18/18 1100          Transfer Goal 1 (PT)    Activity/Assistive Device (Transfer Goal 1, PT) (P)  transfers, all  -     [place] Level/Cues Needed (Transfer Goal 1, PT) (P)  supervision required  -     Time Frame (Transfer Goal 1, PT) (P)  long term goal (LTG);5 days  -JH     Row Name 06/18/18 1100          Gait Training Goal 1 (PT)    Activity/Assistive Device (Gait Training Goal 1, PT) (P)  gait (walking locomotion);improve balance and speed  -     [place] Level (Gait Training Goal 1, PT) (P)  supervision required  -     Distance (Gait Goal 1, PT) (P)  300  -JH     Time Frame (Gait Training Goal 1, PT) (P)  long term goal (LTG);5 days  -JH     Row Name 06/18/18 1100          Stairs Goal 1 (PT)    Activity/Assistive Device (Stairs Goal 1, PT) (P)  stairs, all skills;descending stairs;ascending stairs  -     [place] Level/Cues Needed (Stairs Goal 1, PT) (P)  contact guard assist  -     Number of Stairs (Stairs Goal 1, PT) (P)  4  -JH     Time Frame (Stairs Goal 1, PT) (P)  long term goal (LTG);5 days  -JH     Row Name 06/18/18 1100          Positioning and Restraints    Pre-Treatment Position (P)  sitting in  chair/recliner  -     Post Treatment Position (P)  chair  -     In Chair (P)  sitting;call light within reach;encouraged to call for assist;with family/caregiver  -     Row Name 06/18/18 1100          Living Environment    Home Accessibility (P)  stairs to enter home  -       User Key  (r) = Recorded By, (t) = Taken By, (c) = Cosigned By    Initials Name Provider Type     Paola Jenkins, RN Registered Nurse    MINERVA Hung, RN Registered Nurse     PETR Rea, PT Student PT Student          Physical Therapy Education     Title: PT OT SLP Therapies (Active)     Topic: Physical Therapy (Active)     Point: Mobility training (Done)    Learning Progress Summary     Learner Status Readiness Method Response Comment Documented by    Patient Done Acceptance E,CRUZ [facility] 06/18/18 1121          Point: Precautions (Done)    Learning Progress Summary     Learner Status Readiness Method Response Comment Documented by    Patient Done Acceptance E,CRUZ [facility] 06/18/18 1121                      User Key     Initials Effective Dates Name Provider Type [facility] 05/15/18 -  PETR Rea, PT Student PT Student PT                PT Recommendation and Plan  Anticipated Discharge Disposition (PT): (P) home with assist  Planned Therapy Interventions (PT Eval): (P) bed mobility training, gait training, transfer training, strengthening, stair training  Therapy Frequency (PT Clinical Impression): (P) daily  Outcome Summary/Treatment Plan (PT)  Anticipated Discharge Disposition (PT): (P) home with assist  Plan of Care Reviewed With: (P) patient  Outcome Summary: (P) Pt is s/p CRANIOTOMY FOR  RESECTION OF LARGE RIGHT FRONTAL MASS WITH AUGUSTIN NAVIGATION. Pt is good candidate for physical therapy to address gait impairments that include bilateral decreased stride length and decreased gait speed that compromise pt safety during ambulation, along with impairments to functional mobility and decreased activity tolerance. Pt  vss prior to treatment, able to tolerate ambulation with only moderate complaints of pain. Pt stride length and speed significantly diminished, pt reports inability to move stiff neck main reason why walking so slow.           Outcome Measures     Row Name 06/18/18 1100 06/18/18 0900          How much help from another person do you currently need...    Turning from your back to your side while in flat bed without using bedrails? (P)  3  -JH  --     Moving from lying on back to sitting on the side of a flat bed without bedrails? (P)  3  -JH  --     Moving to and from a bed to a chair (including a wheelchair)? (P)  3  -JH  --     Standing up from a chair using your arms (e.g., wheelchair, bedside chair)? (P)  3  -JH  --     Climbing 3-5 steps with a railing? (P)  2  -JH  --     To walk in hospital room? (P)  3  -JH  --     AM-PAC 6 Clicks Score (P)  17  -JH  --        How much help from another is currently needed...    Putting on and taking off regular lower body clothing?  -- 2  -SO     Bathing (including washing, rinsing, and drying)  -- 2  -SO     Toileting (which includes using toilet bed pan or urinal)  -- 2  -SO     Putting on and taking off regular upper body clothing  -- 3  -SO     Taking care of personal grooming (such as brushing teeth)  -- 3  -SO     Eating meals  -- 3  -SO     Score  -- 15  -SO        Functional Assessment    Outcome Measure Options (P)  AM-PAC 6 Clicks Basic Mobility (PT)  - AM-PAC 6 Clicks Daily Activity (OT)  -SO       User Key  (r) = Recorded By, (t) = Taken By, (c) = Cosigned By    Initials Name Provider Type    SO Margo Vincent, OTR Occupational Therapist    HUGO Rea, PT Student PT Student           Time Calculation:         PT Charges     Row Name 06/18/18 1103             Time Calculation    Start Time (P)  1045  -      Stop Time (P)  1101  -      Time Calculation (min) (P)  16 min  -      PT Received On (P)  06/18/18  -      PT - Next Appointment (P)   06/19/18  -      PT Goal Re-Cert Due Date (P)  06/23/18  -         Time Calculation- PT    TCU Minutes- PT (P)  14 min  -        User Key  (r) = Recorded By, (t) = Taken By, (c) = Cosigned By    Initials Name Provider Type     PETR Rea, PT Student PT Student        Therapy Suggested Charges     Code   Minutes Charges    None           Therapy Charges for Today     Code Description Service Date Service Provider Modifiers Qty    32208296683 HC PT THER PROC EA 15 MIN 6/18/2018 PETR Rea, PT Student GP 1    14783954794 HC PT THER SUPP EA 15 MIN 6/18/2018 PETR Rea, PT Student GP 1    54742339114 HC PT EVAL MOD COMPLEXITY 2 6/18/2018 PETR Rea, PT Student GP 1          PT G-Codes  Outcome Measure Options: (P) AM-PAC 6 Clicks Basic Mobility (PT)      PETR Rea, PT Student  6/18/2018

## 2018-06-18 NOTE — PROGRESS NOTES
[facility] PROGRESS NOTE    PATIENT IDENTIFICATION:   Name:  Bryan Valadez      MRN:  [id number]     32 y.o.  male                   Active Problems:    Primary brain tumor        Subjective   CC: headache  Interval History: Drain has diminished significantly.  More clear.    Objective     Vital signs in last 24 hours:  Temp:  [97.9 °F (36.6 °C)-98.9 °F (37.2 °C)] 98.4 °F (36.9 °C)  Heart Rate:  [42-65] 44  Resp:  [10-16] 10  BP: (115-134)/(47-83) 116/70  Arterial Line BP: (100-150)/(68-87) 100/87      Intake/Output last 3 shifts:  I/O last 3 completed shifts:  In: 5497 [P.O.:570; I.V.:4727; IV Piggyback:200]  Out: 4780 [Urine:4400; Drains:380]  Intake/Output this shift:  No intake/output data recorded.    LABS    Results from last 7 days  Lab Units 06/15/18  2159   INR  1.11*     Lab Results   Component Value Date    CALCIUM 8.2 (L) 06/17/2018     Results from last 7 days  Lab Units 06/17/18  0406 06/16/18  1457 06/16/18  1456 06/16/18  1234 06/15/18  1357   SODIUM mmol/L 140 143  --   --  139   POTASSIUM mmol/L 4.2 3.9  --   --  3.9   CHLORIDE mmol/L 104 107  --   --  100   CO2 mmol/L 23.6 20.8*  --   --  27.2   BUN mg/dL 10 10  --   --  13   CREATININE mg/dL 0.78 0.94  --   --  1.00   GLUCOSE mg/dL 129* 145*  --   --  90   CALCIUM mg/dL 8.2* 8.3*  --   --  9.7   WBC 10*3/mm3 13.79*  --  13.58*  --  6.68   HEMOGLOBIN g/dL 11.8*  --  12.8*  --  15.1   HEMOGLOBIN, POC g/dL  --   --   --  11.9*  --    PLATELETS 10*3/mm3 243  --  276  --  257     Physical Exam:        4 - Opens eyes on own  6 - Follows simple motor commands  5 - Alert and oriented  No drift\  C/d/i  Vision improved  perrla    ASSESSMENT  Assessment/Plan     S/p sterile prep and drape I d/c'd his ED IC drain after administering local anesthetic  Up today with pt  Await pathology  Likely ttf today     LOS: 3 days         Chetan Galvan IV, MD

## 2018-06-18 NOTE — PLAN OF CARE
Problem: Patient Care Overview  Goal: Plan of Care Review  Outcome: Ongoing (interventions implemented as appropriate)   06/18/18 0506   OTHER   Outcome Summary Pt neuro status remains stable. Smith with clear yellow urine. JERRY drainage slowing. Patient with continued pain. Given PRN pain and muscle spasms meds with positive results. Remains asymptomatically bradycardic, blood pressure remains within defined parameters. Will continue to monitor per orders.     Goal: Individualization and Mutuality  Outcome: Ongoing (interventions implemented as appropriate)    Goal: Discharge Needs Assessment  Outcome: Ongoing (interventions implemented as appropriate)   06/18/18 0506   Discharge Needs Assessment   Readmission Within the Last 30 Days no previous admission in last 30 days   Concerns to be Addressed no discharge needs identified       Problem: Pain, Acute (Adult)  Goal: Identify Related Risk Factors and Signs and Symptoms  Outcome: Ongoing (interventions implemented as appropriate)   06/18/18 0506   Pain, Acute (Adult)   Related Risk Factors (Acute Pain) disease process;fear;knowledge deficit;patient perception;persistent pain;positioning;procedure/treatment;trauma;surgery   Signs and Symptoms (Acute Pain) alteration in muscle tone;altered time perception;facial mask of pain/grimace;guarding/abnormal posturing/positioning;impaired thought process/concentration;moaning;sleep pattern alteration;verbalization of pain descriptors     Goal: Acceptable Pain Control/Comfort Level  Outcome: Ongoing (interventions implemented as appropriate)   06/18/18 0506   Pain, Acute (Adult)   Acceptable Pain Control/Comfort Level making progress toward outcome       Problem: Skin Injury Risk (Adult)  Goal: Identify Related Risk Factors and Signs and Symptoms  Outcome: Ongoing (interventions implemented as appropriate)   06/18/18 0506   Skin Injury Risk (Adult)   Related Risk Factors (Skin Injury Risk) cognitive impairment;critical care  admission;mechanical forces;mobility impaired     Goal: Skin Health and Integrity  Outcome: Ongoing (interventions implemented as appropriate)   06/18/18 0506   Skin Injury Risk (Adult)   Skin Health and Integrity making progress toward outcome       Problem: Fall Risk (Adult)  Goal: Identify Related Risk Factors and Signs and Symptoms  Outcome: Ongoing (interventions implemented as appropriate)   06/18/18 0506   Fall Risk (Adult)   Related Risk Factors (Fall Risk) confusion/agitation;culprit medication(s);gait/mobility problems;neuro disease/injury;polypharmacy;sensory deficits;sleep pattern alteration;environment unfamiliar   Signs and Symptoms (Fall Risk) presence of risk factors     Goal: Absence of Fall  Outcome: Ongoing (interventions implemented as appropriate)   06/18/18 0506   Fall Risk (Adult)   Absence of Fall making progress toward outcome       Problem: Craniotomy/Craniectomy/Cranioplasty (Adult)  Goal: Signs and Symptoms of Listed Potential Problems Will be Absent, Minimized or Managed (Craniotomy/Craniectomy/Cranioplasty)  Outcome: Ongoing (interventions implemented as appropriate)   06/18/18 0506   Goal/Outcome Evaluation   Problems Assessed (Craniotomy/Craniectomy/Cranioplasty) all   Problems Present (Craniotomy/ectomy) pain;situational response     Goal: Anesthesia/Sedation Recovery  Outcome: Ongoing (interventions implemented as appropriate)

## 2018-06-19 LAB
ABO + RH BLD: NORMAL
ABO + RH BLD: NORMAL
BH BB BLOOD EXPIRATION DATE: NORMAL
BH BB BLOOD EXPIRATION DATE: NORMAL
BH BB BLOOD TYPE BARCODE: 600
BH BB BLOOD TYPE BARCODE: 600
BH BB DISPENSE STATUS: NORMAL
BH BB DISPENSE STATUS: NORMAL
BH BB PRODUCT CODE: NORMAL
BH BB PRODUCT CODE: NORMAL
BH BB UNIT NUMBER: NORMAL
BH BB UNIT NUMBER: NORMAL
GLUCOSE BLDC GLUCOMTR-MCNC: 112 MG/DL (ref 70–130)
GLUCOSE BLDC GLUCOMTR-MCNC: 116 MG/DL (ref 70–130)
GLUCOSE BLDC GLUCOMTR-MCNC: 122 MG/DL (ref 70–130)
GLUCOSE BLDC GLUCOMTR-MCNC: 130 MG/DL (ref 70–130)
UNIT  ABO: NORMAL
UNIT  ABO: NORMAL
UNIT  RH: NORMAL
UNIT  RH: NORMAL

## 2018-06-19 PROCEDURE — 97110 THERAPEUTIC EXERCISES: CPT

## 2018-06-19 PROCEDURE — 82962 GLUCOSE BLOOD TEST: CPT

## 2018-06-19 PROCEDURE — 63710000001 DEXAMETHASONE PER 0.25 MG: Performed by: NEUROLOGICAL SURGERY

## 2018-06-19 PROCEDURE — 25010000002 DEXAMETHASONE PER 1 MG: Performed by: NEUROLOGICAL SURGERY

## 2018-06-19 PROCEDURE — 99024 POSTOP FOLLOW-UP VISIT: CPT | Performed by: NEUROLOGICAL SURGERY

## 2018-06-19 RX ORDER — DEXAMETHASONE 4 MG/1
4 TABLET ORAL EVERY 6 HOURS SCHEDULED
Status: DISCONTINUED | OUTPATIENT
Start: 2018-06-19 | End: 2018-06-20 | Stop reason: HOSPADM

## 2018-06-19 RX ADMIN — BACLOFEN 10 MG: 10 TABLET ORAL at 00:12

## 2018-06-19 RX ADMIN — DEXAMETHASONE SODIUM PHOSPHATE 4 MG: 4 INJECTION, SOLUTION INTRAMUSCULAR; INTRAVENOUS at 08:13

## 2018-06-19 RX ADMIN — LEVETIRACETAM 1000 MG: 500 TABLET, FILM COATED ORAL at 08:13

## 2018-06-19 RX ADMIN — LEVETIRACETAM 1000 MG: 500 TABLET, FILM COATED ORAL at 21:10

## 2018-06-19 RX ADMIN — DOCUSATE SODIUM 100 MG: 100 CAPSULE, LIQUID FILLED ORAL at 21:10

## 2018-06-19 RX ADMIN — BACLOFEN 10 MG: 10 TABLET ORAL at 06:07

## 2018-06-19 RX ADMIN — HYDROCODONE BITARTRATE AND ACETAMINOPHEN 1 TABLET: 5; 325 TABLET ORAL at 07:46

## 2018-06-19 RX ADMIN — HYDROCODONE BITARTRATE AND ACETAMINOPHEN 1 TABLET: 5; 325 TABLET ORAL at 02:54

## 2018-06-19 RX ADMIN — PANTOPRAZOLE SODIUM 40 MG: 40 TABLET, DELAYED RELEASE ORAL at 06:05

## 2018-06-19 RX ADMIN — MONTELUKAST 10 MG: 10 TABLET, FILM COATED ORAL at 21:10

## 2018-06-19 RX ADMIN — BACLOFEN 10 MG: 10 TABLET ORAL at 17:54

## 2018-06-19 RX ADMIN — DEXAMETHASONE SODIUM PHOSPHATE 4 MG: 4 INJECTION, SOLUTION INTRAMUSCULAR; INTRAVENOUS at 00:12

## 2018-06-19 RX ADMIN — DEXAMETHASONE 4 MG: 4 TABLET ORAL at 17:54

## 2018-06-19 RX ADMIN — DEXAMETHASONE 4 MG: 4 TABLET ORAL at 13:06

## 2018-06-19 RX ADMIN — DOCUSATE SODIUM 100 MG: 100 CAPSULE, LIQUID FILLED ORAL at 00:19

## 2018-06-19 NOTE — PLAN OF CARE
Problem: Patient Care Overview  Goal: Plan of Care Review  Outcome: Ongoing (interventions implemented as appropriate)   06/19/18 0503   Coping/Psychosocial   Plan of Care Reviewed With patient   Plan of Care Review   Progress improving   OTHER   Outcome Summary Transfer tonight from ICU. Continues to complain of pain. PO pain meds and muscle relaxers given. VSS. Continue to monitor.        Problem: Pain, Acute (Adult)  Goal: Identify Related Risk Factors and Signs and Symptoms  Outcome: Ongoing (interventions implemented as appropriate)    Goal: Acceptable Pain Control/Comfort Level  Outcome: Ongoing (interventions implemented as appropriate)      Problem: Skin Injury Risk (Adult)  Goal: Identify Related Risk Factors and Signs and Symptoms  Outcome: Ongoing (interventions implemented as appropriate)    Goal: Skin Health and Integrity  Outcome: Ongoing (interventions implemented as appropriate)      Problem: Fall Risk (Adult)  Goal: Identify Related Risk Factors and Signs and Symptoms  Outcome: Ongoing (interventions implemented as appropriate)    Goal: Absence of Fall  Outcome: Ongoing (interventions implemented as appropriate)      Problem: Craniotomy/Craniectomy/Cranioplasty (Adult)  Goal: Signs and Symptoms of Listed Potential Problems Will be Absent, Minimized or Managed (Craniotomy/Craniectomy/Cranioplasty)  Outcome: Ongoing (interventions implemented as appropriate)

## 2018-06-19 NOTE — PLAN OF CARE
Problem: Patient Care Overview  Goal: Plan of Care Review  Outcome: Ongoing (interventions implemented as appropriate)   06/19/18 6948   Coping/Psychosocial   Plan of Care Reviewed With patient   Plan of Care Review   Progress improving   OTHER   Outcome Summary pt demonstrates improved mobility today with improved quality of gait, requiring less assist and inc distance

## 2018-06-19 NOTE — PLAN OF CARE
Problem: Patient Care Overview  Goal: Plan of Care Review  Outcome: Ongoing (interventions implemented as appropriate)   06/19/18 2444   Coping/Psychosocial   Plan of Care Reviewed With patient;spouse   Plan of Care Review   Progress improving   OTHER   Outcome Summary Possibly home in AM. Walking in souza and in room with PT and staff. Urinating well, without problems. Moving all extremities well. Pain controlled.        Problem: Pain, Acute (Adult)  Goal: Identify Related Risk Factors and Signs and Symptoms  Outcome: Ongoing (interventions implemented as appropriate)    Goal: Acceptable Pain Control/Comfort Level  Outcome: Ongoing (interventions implemented as appropriate)      Problem: Skin Injury Risk (Adult)  Goal: Identify Related Risk Factors and Signs and Symptoms  Outcome: Ongoing (interventions implemented as appropriate)    Goal: Skin Health and Integrity  Outcome: Ongoing (interventions implemented as appropriate)      Problem: Fall Risk (Adult)  Goal: Identify Related Risk Factors and Signs and Symptoms  Outcome: Ongoing (interventions implemented as appropriate)    Goal: Absence of Fall  Outcome: Ongoing (interventions implemented as appropriate)

## 2018-06-19 NOTE — THERAPY TREATMENT NOTE
Acute Care - Physical Therapy Treatment Note  [facility]     Patient Name: Bryan Valadez  : 1985  MRN: [id number]  Today's Date: 2018  Onset of Illness/Injury or Date of Surgery: 18  Date of Referral to PT: 18  Referring Physician: Cole MONZON    Admit Date: 6/15/2018    Visit Dx:    ICD-10-CM ICD-9-CM   1. Primary brain tumor D49.6 239.6   2. Right frontal lobe mass G93.9 348.9     Patient Active Problem List   Diagnosis   • Hyperlipidemia   • Asthma   • GERD (gastroesophageal reflux disease)   • Primary brain tumor       Therapy Treatment          Rehabilitation Treatment Summary     Row Name 18 1612             Treatment Time/Intention    Discipline physical therapist  -PC      Document Type therapy note (daily note)  -PC      Subjective Information no complaints  -PC      Mode of Treatment physical therapy  -PC      Patient/Family Observations pt sitting in chair, no acute dsitress  -PC      Therapy Frequency (PT Clinical Impression) daily  -PC      Patient Effort good  -PC      Recorded by [PC] Tangela Ni, PT 18 1616      Row Name 18 1612             Cognitive Assessment/Intervention- PT/OT    Orientation Status (Cognition) oriented x 4  -PC      Follows Commands (Cognition) WNL  -PC      Recorded by [PC] Tangela Ni, PT 18 1616      Row Name 18 1612             Bed Mobility Assessment/Treatment    Comment (Bed Mobility) pt up in chair  -PC      Recorded by [PC] Tangela Ni, PT 18 1616      Row Name 18 1612             Transfer Assessment/Treatment    Sit-Stand [place] (Transfers) contact guard  -PC      Stand-Sit [place] (Transfers) contact guard  -PC      Recorded by [PC] Tangela Ni, PT 18 1616      Row Name 18 1612             Gait/Stairs Assessment/Training    [place] Level (Gait) contact guard  -PC      Assistive Device (Gait) --   initially HHA, then just held to gait belt  -PC      Distance in Feet  (Gait) 400 ft  -PC      Pattern (Gait) step-through  -PC      Deviations/Abnormal Patterns (Gait) eugenia decreased;gait speed decreased  -PC      Comment (Gait/Stairs) slow steady gait, inc step length from yesterday, good reciprocal gait pattern with arm swing, pt also assisted to BR prior to walk in souza  -PC      Recorded by [PC] Tangela Ni, PT 06/19/18 1616      Row Name 06/19/18 1612             Therapeutic Exercise    Comment (Therapeutic Exercise) up on toes, shallow knee bends, calf stretch, high knees  -PC      Recorded by [PC] Tangela Ni, PT 06/19/18 1616      Row Name 06/19/18 1612             Positioning and Restraints    Pre-Treatment Position sitting in chair/recliner  -PC      Post Treatment Position chair  -PC      Recorded by [PC] Tangela Ni, PT 06/19/18 1616      Row Name 06/19/18 1612             Pain Scale: Numbers Pre/Post-Treatment    Pain Scale: Numbers, Pretreatment --   did not state  -PC      Recorded by [PC] Tangela Ni, PT 06/19/18 1616      Row Name                Wound 06/16/18 1335 Right head incision    Wound - Properties Group Date first assessed: 06/16/18 [LD] Time first assessed: 1335 [LD] Side: Right [LD] Location: head [LD] Type: incision [LD] Recorded by:  [LD] Paola Jenkins RN 06/16/18 1335    Row Name                Wound 06/16/18 1800 Right posterior heel     Wound - Properties Group Date first assessed: 06/16/18 [EK] Time first assessed: 1800 [EK] Side: Right [EK] Orientation: posterior [EK] Location: heel [EK] Recorded by:  [EK] Julisa Hung RN 06/16/18 1952      User Key  (r) = Recorded By, (t) = Taken By, (c) = Cosigned By    Initials Name Effective Dates Discipline    PC Tangela Ni, PT 04/03/18 -  PT    LD Paola Jenkins RN 06/16/16 -  Nurse    EK Julisa Hung RN 06/16/16 -  Nurse          Wound 06/16/18 1335 Right head incision (Active)   Dressing Appearance open to air 6/19/2018  8:00 AM   Closure Staples 6/19/2018  8:00 AM   Base clean;dry  6/19/2018  8:00 AM   Drainage Amount none 6/19/2018  8:00 AM   Dressing Care, Wound open to air 6/19/2018  8:00 AM       Wound 06/16/18 1800 Right posterior heel  (Active)   Dressing Appearance open to air 6/19/2018  8:00 AM   Closure Open to air 6/19/2018  8:00 AM   Base pink 6/19/2018  8:00 AM   Periwound blanchable 6/19/2018  8:00 AM   Dressing Care, Wound open to air 6/19/2018  8:00 AM             Physical Therapy Education     Title: PT OT SLP Therapies (Active)     Topic: Physical Therapy (Active)     Point: Mobility training (Done)    Learning Progress Summary     Learner Status Readiness Method Response Comment Documented by    Patient Done Acceptance KAYLYN DAVENPORT,NR   06/19/18 1616     Done Acceptance CRUZ DAVENPORT   06/18/18 1121          Point: Home exercise program (Done)    Learning Progress Summary     Learner Status Readiness Method Response Comment Documented by    Patient Done Acceptance KAYLYN DAVENPORT,DARIUS   06/19/18 1616          Point: Precautions (Done)    Learning Progress Summary     Learner Status Readiness Method Response Comment Documented by    Patient Done Acceptance CRUZ DAVENPORT   06/18/18 1121                      User Key     Initials Effective Dates Name Provider Type Discipline     04/03/18 -  Tangela Ni, PT Physical Therapist PT     05/15/18 -  PETR Rea, PT Student PT Student PT                    PT Recommendation and Plan  Therapy Frequency (PT Clinical Impression): daily  Plan of Care Reviewed With: patient  Progress: improving  Outcome Summary: pt demonstrates improved mobility today with improved quality of gait, requiring less assist and inc distance          Outcome Measures     Row Name 06/19/18 1600 06/18/18 1100 06/18/18 0900       How much help from another person do you currently need...    Turning from your back to your side while in flat bed without using bedrails? 4  -PC 3  -MD (r) HUGO (t) MD (c)  --    Moving from lying on back to sitting on the side of a flat bed without  bedrails? 3  -PC 3  -MD HUGO ARMSTRONG (r t) (javi)  --    Moving to and from a bed to a chair (including a wheelchair)? 3  -PC 3  -MD HUGO ochoa (r t))  --    Standing up from a chair using your arms (e.g., wheelchair, bedside chair)? 3  -PC 3  -MD HUGO ARMSTRONG (r t) (javi)  --    Climbing 3-5 steps with a railing? 3  -PC 2  -MD HUGO ochoa (r t))  --    To walk in hospital room? 3  -PC 3  -MD HUGO glass (r)) MD (javi)  --    AM-PAC 6 Clicks Score 19  -PC 17  -MD HUGO glass (r))  --       How much help from another is currently needed...    Putting on and taking off regular lower body clothing?  --  -- 2  -SO    Bathing (including washing, rinsing, and drying)  --  -- 2  -SO    Toileting (which includes using toilet bed pan or urinal)  --  -- 2  -SO    Putting on and taking off regular upper body clothing  --  -- 3  -SO    Taking care of personal grooming (such as brushing teeth)  --  -- 3  -SO    Eating meals  --  -- 3  -SO    Score  --  -- 15  -SO       Functional Assessment    Outcome Measure Options  -- AM-PAC 6 Clicks Basic Mobility (PT)  -MD HUGO ochoa (r t)) AM-PAC 6 Clicks Daily Activity (OT)  -SO      User Key  (r) = Recorded By, (t) = Taken By, (c) = Cosigned By    Initials Name Provider Type    SO Margo Vincent, OTR Occupational Therapist    MARY ANN Ni, PT Physical Therapist    MD Jessie Cornejo, PT Physical Therapist    HUGO Rea, PT Student PT Student           Time Calculation:         PT Charges     Row Name 06/19/18 1617             Time Calculation    Start Time 1550  -PC      Stop Time 1610  -PC      Time Calculation (min) 20 min  -PC      PT Received On 06/19/18  -PC      PT - Next Appointment 06/20/18  -PC        User Key  (r) = Recorded By, (t) = Taken By, (c) = Cosigned By    Initials Name Provider Type    MARY ANN Ni, PT Physical Therapist        Therapy Suggested Charges     Code   Minutes Charges    None           Therapy Charges for Today     Code Description Service Date Service Provider  Modifiers Qty    44637407079 HC PT THER PROC EA 15 MIN 6/19/2018 Tangela Ni, PT GP 1          PT G-Codes  Outcome Measure Options: AM-PAC 6 Clicks Basic Mobility (PT)    Tangela Ni, PT  6/19/2018

## 2018-06-19 NOTE — PROGRESS NOTES
[facility] PROGRESS NOTE    PATIENT IDENTIFICATION:   Name:  Bryan Valadez      MRN:  [id number]     32 y.o.  male                   Active Problems:    Primary brain tumor        Subjective   CC: mild headache  Interval History: no n/v/seizure or weakness.    Objective     Vital signs in last 24 hours:  Temp:  [97.3 °F (36.3 °C)-98.1 °F (36.7 °C)] 98.1 °F (36.7 °C)  Heart Rate:  [50-79] 53  Resp:  [18] 18  BP: (119-148)/(61-84) 120/79      Intake/Output last 3 shifts:  I/O last 3 completed shifts:  In: 1635 [P.O.:120; I.V.:1515]  Out: 3560 [Urine:3550; Drains:10]  Intake/Output this shift:  No intake/output data recorded.    LABS    Results from last 7 days  Lab Units 06/15/18  2159   INR  1.11*     Lab Results   Component Value Date    CALCIUM 8.2 (L) 06/17/2018     Results from last 7 days  Lab Units 06/17/18  0406 06/16/18  1457 06/16/18  1456 06/16/18  1234 06/15/18  1357   SODIUM mmol/L 140 143  --   --  139   POTASSIUM mmol/L 4.2 3.9  --   --  3.9   CHLORIDE mmol/L 104 107  --   --  100   CO2 mmol/L 23.6 20.8*  --   --  27.2   BUN mg/dL 10 10  --   --  13   CREATININE mg/dL 0.78 0.94  --   --  1.00   GLUCOSE mg/dL 129* 145*  --   --  90   CALCIUM mg/dL 8.2* 8.3*  --   --  9.7   WBC 10*3/mm3 13.79*  --  13.58*  --  6.68   HEMOGLOBIN g/dL 11.8*  --  12.8*  --  15.1   HEMOGLOBIN, POC g/dL  --   --   --  11.9*  --    PLATELETS 10*3/mm3 243  --  276  --  257     Physical Exam:  C/d/i      4 - Opens eyes on own  6 - Follows simple motor commands  5 - Alert and oriented  No drift      ASSESSMENT  Assessment/Plan     Change to oral steroids     LOS: 4 days     Possible d/c home on keppra/decadron    Chetan Conn , IV, MD

## 2018-06-19 NOTE — SIGNIFICANT NOTE
06/19/18 1034   Rehab Treatment   Discipline occupational therapist   Reason Treatment Not Performed other (see comments)  (pt and spouse request later time as pt is resting 952. Discussed with nurse)

## 2018-06-19 NOTE — PLAN OF CARE
Problem: Patient Care Overview  Goal: Plan of Care Review  Outcome: Ongoing (interventions implemented as appropriate)   06/18/18 2054   Coping/Psychosocial   Plan of Care Reviewed With patient;family   Plan of Care Review   Progress improving       Problem: Pain, Acute (Adult)  Goal: Identify Related Risk Factors and Signs and Symptoms  Outcome: Ongoing (interventions implemented as appropriate)    Goal: Acceptable Pain Control/Comfort Level  Outcome: Ongoing (interventions implemented as appropriate)      Problem: Skin Injury Risk (Adult)  Goal: Identify Related Risk Factors and Signs and Symptoms  Outcome: Ongoing (interventions implemented as appropriate)    Goal: Skin Health and Integrity  Outcome: Ongoing (interventions implemented as appropriate)      Problem: Fall Risk (Adult)  Goal: Identify Related Risk Factors and Signs and Symptoms  Outcome: Ongoing (interventions implemented as appropriate)    Goal: Absence of Fall  Outcome: Ongoing (interventions implemented as appropriate)      Problem: Craniotomy/Craniectomy/Cranioplasty (Adult)  Goal: Signs and Symptoms of Listed Potential Problems Will be Absent, Minimized or Managed (Craniotomy/Craniectomy/Cranioplasty)  Outcome: Ongoing (interventions implemented as appropriate)    Goal: Anesthesia/Sedation Recovery  Outcome: Ongoing (interventions implemented as appropriate)

## 2018-06-20 ENCOUNTER — TELEPHONE (OUTPATIENT)
Dept: NEUROSURGERY | Facility: CLINIC | Age: 33
End: 2018-06-20

## 2018-06-20 VITALS
RESPIRATION RATE: 18 BRPM | TEMPERATURE: 97.4 F | HEART RATE: 89 BPM | BODY MASS INDEX: 26.6 KG/M2 | DIASTOLIC BLOOD PRESSURE: 70 MMHG | WEIGHT: 190 LBS | SYSTOLIC BLOOD PRESSURE: 131 MMHG | OXYGEN SATURATION: 98 % | HEIGHT: 71 IN

## 2018-06-20 LAB — GLUCOSE BLDC GLUCOMTR-MCNC: 110 MG/DL (ref 70–130)

## 2018-06-20 PROCEDURE — 97110 THERAPEUTIC EXERCISES: CPT

## 2018-06-20 PROCEDURE — 82962 GLUCOSE BLOOD TEST: CPT

## 2018-06-20 PROCEDURE — 63710000001 DEXAMETHASONE PER 0.25 MG: Performed by: NEUROLOGICAL SURGERY

## 2018-06-20 RX ORDER — HYDROCODONE BITARTRATE AND ACETAMINOPHEN 5; 325 MG/1; MG/1
1 TABLET ORAL EVERY 6 HOURS PRN
Qty: 40 TABLET | Refills: 0 | Status: SHIPPED | OUTPATIENT
Start: 2018-06-20 | End: 2018-06-25

## 2018-06-20 RX ORDER — DEXAMETHASONE 2 MG/1
2 TABLET ORAL
Qty: 60 TABLET | Refills: 0 | Status: SHIPPED | OUTPATIENT
Start: 2018-06-20 | End: 2018-06-20

## 2018-06-20 RX ORDER — DEXAMETHASONE 2 MG/1
2 TABLET ORAL
Qty: 60 TABLET | Refills: 0 | Status: SHIPPED | OUTPATIENT
Start: 2018-06-20 | End: 2018-07-18 | Stop reason: SDUPTHER

## 2018-06-20 RX ORDER — LEVETIRACETAM 1000 MG/1
1000 TABLET ORAL EVERY 12 HOURS SCHEDULED
Qty: 60 TABLET | Refills: 3 | Status: SHIPPED | OUTPATIENT
Start: 2018-06-20 | End: 2018-07-18 | Stop reason: SDUPTHER

## 2018-06-20 RX ORDER — PSEUDOEPHEDRINE HCL 30 MG
100 TABLET ORAL 2 TIMES DAILY PRN
Qty: 60 CAPSULE | Refills: 1 | Status: SHIPPED | OUTPATIENT
Start: 2018-06-20 | End: 2018-06-20

## 2018-06-20 RX ORDER — LEVETIRACETAM 1000 MG/1
1000 TABLET ORAL EVERY 12 HOURS SCHEDULED
Qty: 60 TABLET | Refills: 3 | Status: SHIPPED | OUTPATIENT
Start: 2018-06-20 | End: 2018-06-20

## 2018-06-20 RX ORDER — BACLOFEN 10 MG/1
10 TABLET ORAL 3 TIMES DAILY PRN
Qty: 50 TABLET | Refills: 0 | Status: SHIPPED | OUTPATIENT
Start: 2018-06-20 | End: 2018-06-20

## 2018-06-20 RX ORDER — BACLOFEN 10 MG/1
10 TABLET ORAL 3 TIMES DAILY PRN
Qty: 50 TABLET | Refills: 0 | Status: SHIPPED | OUTPATIENT
Start: 2018-06-20 | End: 2018-10-29

## 2018-06-20 RX ORDER — HYDROCODONE BITARTRATE AND ACETAMINOPHEN 5; 325 MG/1; MG/1
1 TABLET ORAL EVERY 6 HOURS PRN
Qty: 40 TABLET | Refills: 0 | Status: SHIPPED | OUTPATIENT
Start: 2018-06-20 | End: 2018-06-20

## 2018-06-20 RX ORDER — PSEUDOEPHEDRINE HCL 30 MG
100 TABLET ORAL 2 TIMES DAILY PRN
Qty: 60 EACH | Refills: 1 | Status: SHIPPED | OUTPATIENT
Start: 2018-06-20 | End: 2018-10-29

## 2018-06-20 RX ADMIN — HYDROCODONE BITARTRATE AND ACETAMINOPHEN 1 TABLET: 5; 325 TABLET ORAL at 12:24

## 2018-06-20 RX ADMIN — DEXAMETHASONE 4 MG: 4 TABLET ORAL at 06:24

## 2018-06-20 RX ADMIN — BACLOFEN 10 MG: 10 TABLET ORAL at 01:56

## 2018-06-20 RX ADMIN — LEVETIRACETAM 1000 MG: 500 TABLET, FILM COATED ORAL at 09:22

## 2018-06-20 RX ADMIN — PANTOPRAZOLE SODIUM 40 MG: 40 TABLET, DELAYED RELEASE ORAL at 06:23

## 2018-06-20 RX ADMIN — BISACODYL 5 MG: 5 TABLET ORAL at 11:16

## 2018-06-20 RX ADMIN — DEXAMETHASONE 4 MG: 4 TABLET ORAL at 00:39

## 2018-06-20 RX ADMIN — DOCUSATE SODIUM 100 MG: 100 CAPSULE, LIQUID FILLED ORAL at 09:37

## 2018-06-20 RX ADMIN — DEXAMETHASONE 4 MG: 4 TABLET ORAL at 11:16

## 2018-06-20 NOTE — PLAN OF CARE
Problem: Patient Care Overview  Goal: Plan of Care Review  Outcome: Ongoing (interventions implemented as appropriate)   06/19/18 1633 06/19/18 2113 06/20/18 0325   Coping/Psychosocial   Plan of Care Reviewed With --  patient;mother --    Plan of Care Review   Progress improving --  --    OTHER   Outcome Summary --  --  Pt. remains A&Ox4 throughout shift. Pt. complains of pain, baclofen given with relief. Urinating well. Possible d/c home in am.     Goal: Individualization and Mutuality  Outcome: Ongoing (interventions implemented as appropriate)    Goal: Discharge Needs Assessment  Outcome: Ongoing (interventions implemented as appropriate)      Problem: Pain, Acute (Adult)  Goal: Identify Related Risk Factors and Signs and Symptoms  Outcome: Ongoing (interventions implemented as appropriate)    Goal: Acceptable Pain Control/Comfort Level  Outcome: Ongoing (interventions implemented as appropriate)      Problem: Skin Injury Risk (Adult)  Goal: Identify Related Risk Factors and Signs and Symptoms  Outcome: Ongoing (interventions implemented as appropriate)    Goal: Skin Health and Integrity  Outcome: Ongoing (interventions implemented as appropriate)      Problem: Fall Risk (Adult)  Goal: Identify Related Risk Factors and Signs and Symptoms  Outcome: Ongoing (interventions implemented as appropriate)    Goal: Absence of Fall  Outcome: Ongoing (interventions implemented as appropriate)      Problem: Craniotomy/Craniectomy/Cranioplasty (Adult)  Goal: Signs and Symptoms of Listed Potential Problems Will be Absent, Minimized or Managed (Craniotomy/Craniectomy/Cranioplasty)  Outcome: Ongoing (interventions implemented as appropriate)    Goal: Anesthesia/Sedation Recovery  Outcome: Ongoing (interventions implemented as appropriate)

## 2018-06-20 NOTE — THERAPY TREATMENT NOTE
Acute Care - Physical Therapy Treatment Note  [facility]     Patient Name: Bryan Valadez  : 1985  MRN: [id number]  Today's Date: 2018  Onset of Illness/Injury or Date of Surgery: 18  Date of Referral to PT: 18  Referring Physician: Cole MONZON    Admit Date: 6/15/2018    Visit Dx:    ICD-10-CM ICD-9-CM   1. Primary brain tumor D49.6 239.6   2. Right frontal lobe mass G93.9 348.9     Patient Active Problem List   Diagnosis   • Hyperlipidemia   • Asthma   • GERD (gastroesophageal reflux disease)   • Primary brain tumor       Therapy Treatment          Rehabilitation Treatment Summary     Row Name 18 0857             Treatment Time/Intention    Discipline physical therapist  -MG      Document Type therapy note (daily note)  -MG      Subjective Information complains of   fullness in stomach   -MG      Patient/Family Observations pt supine in bed, no acute distress  -MG      Therapy Frequency (PT Clinical Impression) daily  -MG      Patient Effort good  -MG      Existing Precautions/Restrictions fall  -MG      Recorded by [MG] Megan Gosselin, PT 18      Row Name 18 0857             Cognitive Assessment/Intervention- PT/OT    Orientation Status (Cognition) oriented x 4  -MG      Follows Commands (Cognition) WNL  -MG      Safety Deficit (Cognitive) insight into deficits/self awareness  -MG      Cognitive Assessment/Intervention Comment multi tasking decreaed pt's gait speed  -MG      Recorded by [MG] Megan Gosselin, PT 18      Row Name 18 0857             Bed Mobility Assessment/Treatment    Supine-Sit [place] (Bed Mobility) conditional independence  -MG      Recorded by [MG] Megan Gosselin, PT 18      Row Name 18 0857             Transfer Assessment/Treatment    Sit-Stand [place] (Transfers) supervision  -MG      Stand-Sit [place] (Transfers) supervision  -MG      Recorded by [MG] Megan Gosselin, PT 18      Row Name  06/20/18 0857             Sit-Stand Transfer    Assistive Device (Sit-Stand Transfers) --   no AD  -MG      Recorded by [MG] Megan Gosselin, PT 06/20/18 0903      Row Name 06/20/18 0857             Stand-Sit Transfer    Assistive Device (Stand-Sit Transfers) --   no AD  -MG      Recorded by [MG] Megan Gosselin, PT 06/20/18 0903      Row Name 06/20/18 0857             Gait/Stairs Assessment/Training    [place] Level (Gait) supervision  -MG      Assistive Device (Gait) --   no AD  -MG      Distance in Feet (Gait) 400  -MG      Pattern (Gait) swing-through  -MG      Deviations/Abnormal Patterns (Gait) [place] decreased  -MG      [place] Level (Stairs) supervision  -MG      Handrail Location (Stairs) left side (ascending)  -MG      Number of Steps (Stairs) 12  -MG      Ascending Technique (Stairs) step-to-step  -MG      Descending Technique (Stairs) step-to-step  -MG      Comment (Gait/Stairs) HHA on stairs, cues to increased speed   Multi-directional walking, CGA-SBA  -MG2      Recorded by [MG] Megan Gosselin, PT 06/20/18 0903  [MG2] Megan Gosselin, PT 06/20/18 0912      Row Name 06/20/18 0857             Positioning and Restraints    Pre-Treatment Position sitting in chair/recliner  -MG      Post Treatment Position chair  -MG      In Chair call light within reach;sitting;encouraged to call for assist  -MG      Recorded by [MG] Megan Gosselin, PT 06/20/18 0903      Row Name 06/20/18 0857             Pain Scale: Numbers Pre/Post-Treatment    Pain Scale: Numbers, Pretreatment 2/10  -MG      Pain Scale: Numbers, Post-Treatment 2/10  -MG      Pain Location --   B calves  -MG      Pain Intervention(s) Repositioned;Ambulation/increased activity  -MG      Recorded by [MG] Megan Gosselin, PT 06/20/18 0903      Row Name                Wound 06/16/18 1335 Right head incision    Wound - Properties Group Date first assessed: 06/16/18 [LD] Time first assessed: 1335 [LD] Side: Right [LD] Location: head [LD] Type: incision  [LD] Recorded by:  [LD] Paola Jenkins RN 06/16/18 1335    Row Name                Wound 06/16/18 1800 Right posterior heel     Wound - Properties Group Date first assessed: 06/16/18 [EK] Time first assessed: 1800 [EK] Side: Right [EK] Orientation: posterior [EK] Location: heel [EK] Recorded by:  [EK] Julisa Hung RN 06/16/18 1952    Row Name 06/20/18 0857             Outcome Summary/Treatment Plan (PT)    Anticipated Discharge Disposition (PT) home with home health   vs outpatient PT  -MG      Recorded by [MG] Megan Gosselin, PT 06/20/18 0903        User Key  (r) = Recorded By, (t) = Taken By, (c) = Cosigned By    Initials Name Effective Dates Discipline    LD Paola Jenkins RN 06/16/16 -  Nurse    EK Julisa Hnug RN 06/16/16 -  Nurse    MG Megan Gosselin, PT 04/03/18 -  PT          Wound 06/16/18 1335 Right head incision (Active)   Dressing Appearance open to air 6/19/2018  9:13 PM   Closure Staples 6/19/2018  9:13 PM   Base clean;dry 6/19/2018  9:13 PM   Drainage Amount none 6/19/2018  9:13 PM   Dressing Care, Wound open to air 6/19/2018  9:13 PM       Wound 06/16/18 1800 Right posterior heel  (Active)   Dressing Appearance open to air 6/19/2018  9:13 PM   Closure Open to air 6/19/2018  9:13 PM   Base pink 6/19/2018  9:13 PM   Periwound blanchable 6/19/2018  9:13 PM   Care, Wound other (see comments) 6/19/2018  9:13 PM   Dressing Care, Wound open to air 6/19/2018  9:13 PM             Physical Therapy Education     Title: PT OT SLP Therapies (Active)     Topic: Physical Therapy (Active)     Point: Mobility training (Done)    Learning Progress Summary     Learner Status Readiness Method Response Comment Documented by    Patient Done Acceptance E VUNR  MG 06/20/18 0903     Done Acceptance E,D DARIUS REYNOSO  PC 06/19/18 1616     Done Acceptance E,TB EDGARDO  JH 06/18/18 1121          Point: Home exercise program (Done)    Learning Progress Summary     Learner Status Readiness Method Response Comment Documented by    Patient Done  Acceptance E,D VU,NR   06/19/18 1616          Point: Precautions (Done)    Learning Progress Summary     Learner Status Readiness Method Response Comment Documented by    Patient Done Acceptance E VU,NR   06/20/18 0903     Done Acceptance E,TB VU   06/18/18 1121                      User Key     Initials Effective Dates Name Provider Type Discipline     04/03/18 -  Tangela Ni, PT Physical Therapist PT     04/03/18 -  Megan Gosselin, PT Physical Therapist PT     05/15/18 -  PETR Rea, PT Student PT Student PT                    PT Recommendation and Plan  Anticipated Discharge Disposition (PT): home with home health (vs outpatient PT)  Therapy Frequency (PT Clinical Impression): daily  Outcome Summary/Treatment Plan (PT)  Anticipated Discharge Disposition (PT): home with home health (vs outpatient PT)  Outcome Summary: Progressed pt to stairs this date. Educated wife on how to assist. Recommend HHC vs OPPT at discharge. Decreased assist needed this date. Will continue to progress as tolerated.           Outcome Measures     Row Name 06/20/18 0900 06/19/18 1600 06/18/18 1100       How much help from another person do you currently need...    Turning from your back to your side while in flat bed without using bedrails? 4  -MG 4  -PC 3  -MD (r) HUGO (t) MD (c)    Moving from lying on back to sitting on the side of a flat bed without bedrails? 4  -MG 3  -PC 3  -MD (r) HUGO (david) MD (c)    Moving to and from a bed to a chair (including a wheelchair)? 3  -MG 3  -PC 3  -MD (r) HUGO (david) MD (c)    Standing up from a chair using your arms (e.g., wheelchair, bedside chair)? 3  -MG 3  -PC 3  -MD (r) HUGO (david) MD (c)    Climbing 3-5 steps with a railing? 3  -MG 3  -PC 2  -MD (r) HUGO (david) MD (c)    To walk in hospital room? 3  -MG 3  -PC 3  -MD (r) HUGO (t) MD (c)    AM-PAC 6 Clicks Score 20  -MG 19  -PC 17  -MD (r) HUGO (t)       Functional Assessment    Outcome Measure Options AM-PAC 6 Clicks Basic Mobility (PT)  -MG  --  AM-[facility] 6 Clicks Basic Mobility (PT)  -MD (r) HUGO (t) MD (c)    Row Name 06/18/18 0900             How much help from another is currently needed...    Putting on and taking off regular lower body clothing? 2  -SO      Bathing (including washing, rinsing, and drying) 2  -SO      Toileting (which includes using toilet bed pan or urinal) 2  -SO      Putting on and taking off regular upper body clothing 3  -SO      Taking care of personal grooming (such as brushing teeth) 3  -SO      Eating meals 3  -SO      Score 15  -SO         Functional Assessment    Outcome Measure Options AM-[facility] 6 Clicks Daily Activity (OT)  -SO        User Key  (r) = Recorded By, (t) = Taken By, (c) = Cosigned By    Initials Name Provider Type    SO Margo Vincent, OTR Occupational Therapist    PC Tangela Ni, PT Physical Therapist    MD Jessie Cornejo, PT Physical Therapist    MG Megan Gosselin, PT Physical Therapist    HUGO Rea, PT Student PT Student           Time Calculation:         PT Charges     Row Name 06/20/18 0912             Time Calculation    Start Time 0838  -MG      Stop Time 0911  -MG      Time Calculation (min) 33 min  -MG      PT Received On 06/20/18  -MG      PT - Next Appointment 06/21/18  -MG         Time Calculation- PT    Total Timed Code Minutes- PT 25 minute(s)  -MG        User Key  (r) = Recorded By, (t) = Taken By, (c) = Cosigned By    Initials Name Provider Type    MG Megan Gosselin, PT Physical Therapist        Therapy Suggested Charges     Code   Minutes Charges    None           Therapy Charges for Today     Code Description Service Date Service Provider Modifiers Qty    58124220295 HC PT THER PROC EA 15 MIN 6/20/2018 Megan Gosselin, PT GP 2          PT G-Codes  Outcome Measure Options: AM-[facility] 6 Clicks Basic Mobility (PT)    Megan Gosselin, PT  6/20/2018

## 2018-06-20 NOTE — TELEPHONE ENCOUNTER
Starr, can you try to escribe (again) the Rx for Keppra, baclofen and docusate sodium to the [facility] on [address]. (might have been inadvertently sent to a different waleens as there were three in the chart - I deleted to older pharmacies to cut down on confusion). Thanks.

## 2018-06-20 NOTE — PROGRESS NOTES
Continued Stay Note  [facility]     Patient Name: Bryan Valadez  MRN: [id number]  Today's Date: 6/20/2018    Admit Date: 6/15/2018          Discharge Plan      06/20/18 0874       Plan    Patient/Family in Agreement with Plan yes    Plan Comments Met w/ patient and spouse in room.  Family has multiple DME (walkers, canes, etc) and they plan to borrow from family.  Nurse was updated.      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  Plans to dc today.  Called [facility] and advised of orders for PT.  Dr Galvan to order walker.  CCP to fax to [facility] for immediate delivery.                        Noemi Carreon RN

## 2018-06-20 NOTE — PLAN OF CARE
Problem: Patient Care Overview  Goal: Plan of Care Review   06/20/18 0903   OTHER   Outcome Summary Progressed pt to stairs this date. Educated wife on how to assist. Recommend HHC vs OPPT at discharge. Decreased assist needed this date. Will continue to progress as tolerated.

## 2018-06-20 NOTE — DISCHARGE SUMMARY
Bryan Valadez  1985    Patient Care Team:  Opal Benavidez MD as PCP - General (Internal Medicine)    Date of Admit: 6/15/2018    Date of Discharge:  6/20/2018    Discharge Diagnosis:Active Problems:    Primary brain tumor      Procedures Performed  Procedure(s):  CRANIOTOMY FOR  RESECTION OF LARGE RIGHT FRONTAL MASS WITH AUGUSTIN NAVIGATION       Consultants:   Consults     Date and Time Order Name Status Description    6/16/2018 1756 Inpatient Consult to Pulmonology Completed     6/15/2018 1506 Neurosurgery (on-call MD unless specified) Completed           Condition on Discharge: stable    Discharge disposition: home    Pertinent Test Results:     Results from last 7 days  Lab Units 06/17/18  0406   WBC 10*3/mm3 13.79*   HEMOGLOBIN g/dL 11.8*   HEMATOCRIT % 36.5*   PLATELETS 10*3/mm3 243       Results from last 7 days  Lab Units 06/17/18  0406   SODIUM mmol/L 140   POTASSIUM mmol/L 4.2   CHLORIDE mmol/L 104   CO2 mmol/L 23.6   BUN mg/dL 10   CREATININE mg/dL 0.78   GLUCOSE mg/dL 129*   CALCIUM mg/dL 8.2*     Clinical Information P    RIGHT FRONTAL BRAIN MASS   Preliminary Diagnosis   1: RIGHT FRONTAL MASS:               FAVOR OLIGODENDROGLIOMA.     2: RIGHT FRONTAL BRAIN MASS:               FAVOR OLIGODENDROGLIOMA.        COMMENT: The case will be forwarded to [facility] for further evaluation including genetic testing.  Final report will follow.               Brief HPI / Hospital Course:  He presented to the emergency room on Vee 15, 2018 with 6 months of sinus pressure like headaches and new onset right-sided blurry vision.  CT imaging in the ER revealed a large abnormal finding in the right frontal lobe.  MRI imaging with and without contrast was ordered for further evaluation and confirmed a large complex cystic and solid lobulated right frontal lobe mass.  He was admitted and underwent craniotomy and the next morning with Dr. Galvan.  The patient was prophylactically started on IV steroids and Keppra.   "    Postop, the patient was in the ICU for couple of days for close observation.  He did very well and pain was controlled with oral narcotic medications.  He has been up walking with physical therapy and denies any balance or gait issues.  He denies any postoperative a blurry vision.  The midline craniotomy incision site is healing well and is intact with staples.  Preliminary path report favors oligodendroglioma, but final path is pending and was sent to the [facility] for further testing.    He will be discharged home today in stable condition on steroids, Keppra, oral pain medication, muscle relaxers and stool softeners.  Postoperative appointment has been made to see Dr. Galvan on July 2 and staples will be removed at that time.  Hopefully, the surgical path report is back for [facility] by then.    Postop instructions and restrictions were cleaned at length.  He is absolutely to do no driving, he is to take discharge medications as instructed, no lifting, pushing or pulling greater than 10 pounds, he is okay to shower, he is to call our office at anytime with any questions or concerns.  He has not had a bowel movement since surgery and will be given an enema prior to discharge today.  Home health will be following the patient at discharge for physical therapy.  He has been given a walker to help with gait and balance.      /70 (BP Location: Right arm, Patient Position: Sitting)   Pulse 89   Temp 97.4 °F (36.3 °C) (Oral)   Resp 18   Ht 180.3 cm (71\")   Wt 86.2 kg (190 lb)   SpO2 98%   BMI 26.50 kg/m²       Discharge Physical Exam:    General Appearance No acute distress  Well appearing younger male    Neck No adenopathy, supple, trachea midline, no thyromegaly, no carotid bruit, no JVD   Lungs    Heart    Chest wall No abnormalities observed   Abdomen Normal bowel sounds, no masses, no hepatomegaly, soft   Extremities Moves all extremities well, no edema, no cyanosis, no redness   Neurological " Alert and oriented x 3  Well-healing midline craniotomy incision site, flat, normal surrounding skin, intact with staples   Gait is stable and upright      Discharge Medications     Discharge Medications      New Medications      Instructions Start Date   baclofen 10 MG tablet  Commonly known as:  LIORESAL   10 mg, Oral, 3 Times Daily PRN      dexamethasone 2 MG tablet  Commonly known as:  DECADRON   2 mg, Oral, 3 Times Daily With Meals      docusate sodium 100 MG capsule   100 mg, Oral, 2 Times Daily PRN      HYDROcodone-acetaminophen 5-325 MG per tablet  Commonly known as:  NORCO   1 tablet, Oral, Every 6 Hours PRN      levETIRAcetam 1000 MG tablet  Commonly known as:  KEPPRA   1,000 mg, Oral, Every 12 Hours Scheduled         Continue These Medications      Instructions Start Date   EPINEPHrine 0.3 MG/0.3ML solution auto-injector injection  Commonly known as:  EPIPEN   As Needed      fluticasone 50 MCG/ACT nasal spray  Commonly known as:  FLONASE   2 sprays, Nasal, Daily      montelukast 10 MG tablet  Commonly known as:  SINGULAIR   TAKE 1 TABLET BY MOUTH EVERY DAY( NEED APPOINTMENT)      MULTIVITAMIN PO   Oral, Daily      omeprazole 20 MG capsule  Commonly known as:  priLOSEC   20 mg, Oral, As Needed      pseudoephedrine 120 MG 12 hr tablet  Commonly known as:  SUDAFED   120 mg, Oral, Every 12 Hours         Stop These Medications    doxycycline 100 MG capsule  Commonly known as:  VIBRAMYCIN     guaiFENesin 600 MG 12 hr tablet  Commonly known as:  MUCINEX     MethylPREDNISolone 4 MG tablet  Commonly known as:  MEDROL (JOHANNA)            Discharge Diet:  as tolerated    Call for: Patient instructed to call for fever >100.5, chills, wound swelling/drainage/redness, change in neurologic status including but not limited to return or worsening of preoperative symptoms, including blurry vision, seizures or any other neuro changes    Follow-up Appointments  Future Appointments  Date Time Provider Department Center    6/26/2018 9:00 AM MD LEONCIO GarciaK [facility] None   12/13/2018 9:30 AM MD MUSTAPHA Villeda None     Additional Instructions for the Follow-ups that You Need to Schedule     Ambulatory Referral to Home Health    As directed      Face to Face Visit Date:  6/20/2018    Follow-up Provider for Plan of Care?:  I treated the patient in an acute care facility and will not continue treatment after discharge.    Follow-up Provider:  ALTHEA RODAS IV [[id number]]    Reason/Clinical Findings:  post op weakness/gait instability    Describe mobility limitations that make leaving home difficult:  gait instability and weakness post op    Nursing/Therapeutic Services Requested:  Physical Therapy    PT orders:  Strengthening Gait Training    Weight Bearing Status:  As Tolerated    Frequency:  1 Week 1               Test Results Pending at Discharge   Order Current Status    Tissue Pathology Exam Preliminary result           NICOLE Angel  06/20/18  12:59 PM    30 min spent in reviewing records, discussion and examination of the patient and discussion with other members of the patient's medical team.

## 2018-06-21 NOTE — TELEPHONE ENCOUNTER
As discussed, the patient was given all prescriptions at [facility]. There's nothing else to rx at this time

## 2018-06-27 ENCOUNTER — TELEPHONE (OUTPATIENT)
Dept: NEUROSURGERY | Facility: CLINIC | Age: 33
End: 2018-06-27

## 2018-06-27 NOTE — TELEPHONE ENCOUNTER
----- Message from Fitz Pradhan sent at 6/27/2018  2:54 PM EDT -----  Contact: Shilpi [facility] [phone]  Shilpi from [facility] calling about post op instructions ask if a nurse would give her a call.   I ask the questions and she said to just have them call cause she will have to right orders as she ask the questions.

## 2018-06-27 NOTE — TELEPHONE ENCOUNTER
Patient is status post a craniotomy for a tumor on 6/16/18 by [facility]. Home Health Nurse called to ask a few questions.    1. How long does the patient need to sleep sitting up?     2. When should he shower or how does he clean the incision?

## 2018-06-27 NOTE — TELEPHONE ENCOUNTER
He no longer needs to sleep sitting up and he is okay to shower at any time.  He does not need to clean the incision.  It is covered in surgical glue and dissolvable sutures.

## 2018-06-29 ENCOUNTER — APPOINTMENT (OUTPATIENT)
Dept: CT IMAGING | Facility: HOSPITAL | Age: 33
End: 2018-06-29

## 2018-06-29 ENCOUNTER — TELEPHONE (OUTPATIENT)
Dept: NEUROSURGERY | Facility: CLINIC | Age: 33
End: 2018-06-29

## 2018-06-29 ENCOUNTER — HOSPITAL ENCOUNTER (INPATIENT)
Facility: HOSPITAL | Age: 33
LOS: 3 days | Discharge: HOME HEALTH | End: 2018-07-02
Attending: EMERGENCY MEDICINE | Admitting: HOSPITALIST

## 2018-06-29 ENCOUNTER — APPOINTMENT (OUTPATIENT)
Dept: CARDIOLOGY | Facility: HOSPITAL | Age: 33
End: 2018-06-29

## 2018-06-29 ENCOUNTER — APPOINTMENT (OUTPATIENT)
Dept: GENERAL RADIOLOGY | Facility: HOSPITAL | Age: 33
End: 2018-06-29

## 2018-06-29 DIAGNOSIS — I82.4Z2 ACUTE DEEP VEIN THROMBOSIS (DVT) OF DISTAL VEIN OF LEFT LOWER EXTREMITY (HCC): ICD-10-CM

## 2018-06-29 DIAGNOSIS — D49.6 PRIMARY BRAIN TUMOR (HCC): ICD-10-CM

## 2018-06-29 DIAGNOSIS — I26.99 OTHER ACUTE PULMONARY EMBOLISM WITHOUT ACUTE COR PULMONALE (HCC): Primary | ICD-10-CM

## 2018-06-29 PROBLEM — I82.432 ACUTE DEEP VEIN THROMBOSIS (DVT) OF POPLITEAL VEIN OF LEFT LOWER EXTREMITY: Status: ACTIVE | Noted: 2018-06-29

## 2018-06-29 PROBLEM — D72.829 LEUKOCYTOSIS: Status: ACTIVE | Noted: 2018-06-29

## 2018-06-29 PROBLEM — Z98.890 STATUS POST CRANIOTOMY: Status: ACTIVE | Noted: 2018-06-29

## 2018-06-29 PROBLEM — I82.532 CHRONIC DEEP VEIN THROMBOSIS (DVT) OF LEFT POPLITEAL VEIN: Status: ACTIVE | Noted: 2018-06-29

## 2018-06-29 LAB
ALBUMIN SERPL-MCNC: 4.3 G/DL (ref 3.5–5.2)
ALBUMIN/GLOB SERPL: 1.4 G/DL
ALP SERPL-CCNC: 64 U/L (ref 39–117)
ALT SERPL W P-5'-P-CCNC: 70 U/L (ref 1–41)
ANION GAP SERPL CALCULATED.3IONS-SCNC: 14.3 MMOL/L
APTT PPP: 32.2 SECONDS (ref 22.7–35.4)
APTT PPP: 54.6 SECONDS (ref 22.7–35.4)
AST SERPL-CCNC: 29 U/L (ref 1–40)
BASOPHILS # BLD AUTO: 0.01 10*3/MM3 (ref 0–0.2)
BASOPHILS NFR BLD AUTO: 0.1 % (ref 0–1.5)
BH CV LOW VAS LEFT GASTRONEMIUS VESSEL: 1
BH CV LOW VAS LEFT PERONEAL VESSEL: 1
BH CV LOW VAS LEFT POPLITEAL SPONT: 1
BH CV LOW VAS LEFT POSTERIOR TIBIAL VESSEL: 1
BH CV LOWER VASCULAR LEFT COMMON FEMORAL AUGMENT: NORMAL
BH CV LOWER VASCULAR LEFT COMMON FEMORAL COMPETENT: NORMAL
BH CV LOWER VASCULAR LEFT COMMON FEMORAL COMPRESS: NORMAL
BH CV LOWER VASCULAR LEFT COMMON FEMORAL PHASIC: NORMAL
BH CV LOWER VASCULAR LEFT COMMON FEMORAL SPONT: NORMAL
BH CV LOWER VASCULAR LEFT DISTAL FEMORAL COMPRESS: NORMAL
BH CV LOWER VASCULAR LEFT GASTRONEMIUS COMPRESS: NORMAL
BH CV LOWER VASCULAR LEFT GASTRONEMIUS THROMBUS: NORMAL
BH CV LOWER VASCULAR LEFT GREATER SAPH AK COMPRESS: NORMAL
BH CV LOWER VASCULAR LEFT GREATER SAPH BK COMPRESS: NORMAL
BH CV LOWER VASCULAR LEFT MID FEMORAL AUGMENT: NORMAL
BH CV LOWER VASCULAR LEFT MID FEMORAL COMPETENT: NORMAL
BH CV LOWER VASCULAR LEFT MID FEMORAL COMPRESS: NORMAL
BH CV LOWER VASCULAR LEFT MID FEMORAL PHASIC: NORMAL
BH CV LOWER VASCULAR LEFT MID FEMORAL SPONT: NORMAL
BH CV LOWER VASCULAR LEFT PERONEAL COMPRESS: NORMAL
BH CV LOWER VASCULAR LEFT PERONEAL THROMBUS: NORMAL
BH CV LOWER VASCULAR LEFT POPLITEAL AUGMENT: NORMAL
BH CV LOWER VASCULAR LEFT POPLITEAL COMPETENT: NORMAL
BH CV LOWER VASCULAR LEFT POPLITEAL COMPRESS: NORMAL
BH CV LOWER VASCULAR LEFT POPLITEAL PHASIC: NORMAL
BH CV LOWER VASCULAR LEFT POPLITEAL SPONT: NORMAL
BH CV LOWER VASCULAR LEFT POPLITEAL THROMBUS: NORMAL
BH CV LOWER VASCULAR LEFT POSTERIOR TIBIAL COMPRESS: NORMAL
BH CV LOWER VASCULAR LEFT POSTERIOR TIBIAL THROMBUS: NORMAL
BH CV LOWER VASCULAR LEFT PROXIMAL FEMORAL COMPRESS: NORMAL
BH CV LOWER VASCULAR LEFT SAPHENOFEMORAL JUNCTION AUGMENT: NORMAL
BH CV LOWER VASCULAR LEFT SAPHENOFEMORAL JUNCTION COMPRESS: NORMAL
BH CV LOWER VASCULAR LEFT SAPHENOFEMORAL JUNCTION PHASIC: NORMAL
BH CV LOWER VASCULAR LEFT SAPHENOFEMORAL JUNCTION SPONT: NORMAL
BH CV LOWER VASCULAR RIGHT COMMON FEMORAL AUGMENT: NORMAL
BH CV LOWER VASCULAR RIGHT COMMON FEMORAL COMPETENT: NORMAL
BH CV LOWER VASCULAR RIGHT COMMON FEMORAL COMPRESS: NORMAL
BH CV LOWER VASCULAR RIGHT COMMON FEMORAL PHASIC: NORMAL
BH CV LOWER VASCULAR RIGHT COMMON FEMORAL SPONT: NORMAL
BILIRUB SERPL-MCNC: 0.3 MG/DL (ref 0.1–1.2)
BUN BLD-MCNC: 15 MG/DL (ref 6–20)
BUN/CREAT SERPL: 19 (ref 7–25)
CALCIUM SPEC-SCNC: 9.6 MG/DL (ref 8.6–10.5)
CHLORIDE SERPL-SCNC: 95 MMOL/L (ref 98–107)
CO2 SERPL-SCNC: 26.7 MMOL/L (ref 22–29)
CREAT BLD-MCNC: 0.79 MG/DL (ref 0.76–1.27)
D DIMER PPP FEU-MCNC: 3.12 MCGFEU/ML (ref 0–0.49)
DEPRECATED RDW RBC AUTO: 41 FL (ref 37–54)
EOSINOPHIL # BLD AUTO: 0.03 10*3/MM3 (ref 0–0.7)
EOSINOPHIL NFR BLD AUTO: 0.2 % (ref 0.3–6.2)
ERYTHROCYTE [DISTWIDTH] IN BLOOD BY AUTOMATED COUNT: 12.3 % (ref 11.5–14.5)
GFR SERPL CREATININE-BSD FRML MDRD: 112 ML/MIN/1.73
GLOBULIN UR ELPH-MCNC: 3 GM/DL
GLUCOSE BLD-MCNC: 110 MG/DL (ref 65–99)
HCT VFR BLD AUTO: 37.4 % (ref 40.4–52.2)
HGB BLD-MCNC: 12.7 G/DL (ref 13.7–17.6)
HOLD SPECIMEN: NORMAL
HOLD SPECIMEN: NORMAL
IMM GRANULOCYTES # BLD: 0.06 10*3/MM3 (ref 0–0.03)
IMM GRANULOCYTES NFR BLD: 0.4 % (ref 0–0.5)
INR PPP: 1.09 (ref 0.9–1.1)
INR PPP: 1.1 (ref 0.9–1.1)
LYMPHOCYTES # BLD AUTO: 1.6 10*3/MM3 (ref 0.9–4.8)
LYMPHOCYTES NFR BLD AUTO: 10.1 % (ref 19.6–45.3)
MCH RBC QN AUTO: 30.9 PG (ref 27–32.7)
MCHC RBC AUTO-ENTMCNC: 34 G/DL (ref 32.6–36.4)
MCV RBC AUTO: 91 FL (ref 79.8–96.2)
MONOCYTES # BLD AUTO: 1.28 10*3/MM3 (ref 0.2–1.2)
MONOCYTES NFR BLD AUTO: 8 % (ref 5–12)
NEUTROPHILS # BLD AUTO: 13 10*3/MM3 (ref 1.9–8.1)
NEUTROPHILS NFR BLD AUTO: 81.6 % (ref 42.7–76)
PLATELET # BLD AUTO: 353 10*3/MM3 (ref 140–500)
PMV BLD AUTO: 9.7 FL (ref 6–12)
POTASSIUM BLD-SCNC: 3.9 MMOL/L (ref 3.5–5.2)
PROT SERPL-MCNC: 7.3 G/DL (ref 6–8.5)
PROTHROMBIN TIME: 14 SECONDS (ref 11.7–14.2)
PROTHROMBIN TIME: 14 SECONDS (ref 11.7–14.2)
RBC # BLD AUTO: 4.11 10*6/MM3 (ref 4.6–6)
SODIUM BLD-SCNC: 136 MMOL/L (ref 136–145)
TROPONIN T SERPL-MCNC: <0.01 NG/ML (ref 0–0.03)
WBC NRBC COR # BLD: 15.92 10*3/MM3 (ref 4.5–10.7)
WHOLE BLOOD HOLD SPECIMEN: NORMAL
WHOLE BLOOD HOLD SPECIMEN: NORMAL

## 2018-06-29 PROCEDURE — 80053 COMPREHEN METABOLIC PANEL: CPT | Performed by: PHYSICIAN ASSISTANT

## 2018-06-29 PROCEDURE — 85730 THROMBOPLASTIN TIME PARTIAL: CPT | Performed by: INTERNAL MEDICINE

## 2018-06-29 PROCEDURE — 25010000002 IOPAMIDOL 61 % SOLUTION: Performed by: EMERGENCY MEDICINE

## 2018-06-29 PROCEDURE — 99024 POSTOP FOLLOW-UP VISIT: CPT | Performed by: NURSE PRACTITIONER

## 2018-06-29 PROCEDURE — 84484 ASSAY OF TROPONIN QUANT: CPT | Performed by: PHYSICIAN ASSISTANT

## 2018-06-29 PROCEDURE — 85610 PROTHROMBIN TIME: CPT | Performed by: EMERGENCY MEDICINE

## 2018-06-29 PROCEDURE — 93005 ELECTROCARDIOGRAM TRACING: CPT | Performed by: PHYSICIAN ASSISTANT

## 2018-06-29 PROCEDURE — 99284 EMERGENCY DEPT VISIT MOD MDM: CPT

## 2018-06-29 PROCEDURE — 85730 THROMBOPLASTIN TIME PARTIAL: CPT | Performed by: EMERGENCY MEDICINE

## 2018-06-29 PROCEDURE — 70450 CT HEAD/BRAIN W/O DYE: CPT

## 2018-06-29 PROCEDURE — 85610 PROTHROMBIN TIME: CPT | Performed by: INTERNAL MEDICINE

## 2018-06-29 PROCEDURE — 85025 COMPLETE CBC W/AUTO DIFF WBC: CPT | Performed by: PHYSICIAN ASSISTANT

## 2018-06-29 PROCEDURE — 71046 X-RAY EXAM CHEST 2 VIEWS: CPT

## 2018-06-29 PROCEDURE — 93971 EXTREMITY STUDY: CPT

## 2018-06-29 PROCEDURE — 71275 CT ANGIOGRAPHY CHEST: CPT

## 2018-06-29 PROCEDURE — 93010 ELECTROCARDIOGRAM REPORT: CPT | Performed by: INTERNAL MEDICINE

## 2018-06-29 PROCEDURE — 85379 FIBRIN DEGRADATION QUANT: CPT | Performed by: PHYSICIAN ASSISTANT

## 2018-06-29 PROCEDURE — 25010000002 HEPARIN (PORCINE) PER 1000 UNITS: Performed by: INTERNAL MEDICINE

## 2018-06-29 PROCEDURE — 25010000002 HEPARIN (PORCINE) PER 1000 UNITS: Performed by: EMERGENCY MEDICINE

## 2018-06-29 RX ORDER — BACLOFEN 10 MG/1
10 TABLET ORAL 3 TIMES DAILY PRN
Status: DISCONTINUED | OUTPATIENT
Start: 2018-06-29 | End: 2018-07-02 | Stop reason: HOSPADM

## 2018-06-29 RX ORDER — SODIUM CHLORIDE 9 MG/ML
100 INJECTION, SOLUTION INTRAVENOUS CONTINUOUS
Status: DISCONTINUED | OUTPATIENT
Start: 2018-06-29 | End: 2018-06-30

## 2018-06-29 RX ORDER — ONDANSETRON 2 MG/ML
4 INJECTION INTRAMUSCULAR; INTRAVENOUS EVERY 6 HOURS PRN
Status: DISCONTINUED | OUTPATIENT
Start: 2018-06-29 | End: 2018-07-02 | Stop reason: HOSPADM

## 2018-06-29 RX ORDER — HYDROCODONE BITARTRATE AND ACETAMINOPHEN 5; 325 MG/1; MG/1
1 TABLET ORAL EVERY 6 HOURS PRN
Status: DISCONTINUED | OUTPATIENT
Start: 2018-06-29 | End: 2018-07-02 | Stop reason: HOSPADM

## 2018-06-29 RX ORDER — MONTELUKAST SODIUM 10 MG/1
10 TABLET ORAL DAILY
Status: DISCONTINUED | OUTPATIENT
Start: 2018-06-29 | End: 2018-07-02 | Stop reason: HOSPADM

## 2018-06-29 RX ORDER — OXYCODONE HYDROCHLORIDE AND ACETAMINOPHEN 5; 325 MG/1; MG/1
1 TABLET ORAL EVERY 4 HOURS PRN
Status: DISCONTINUED | OUTPATIENT
Start: 2018-06-29 | End: 2018-06-29

## 2018-06-29 RX ORDER — DEXAMETHASONE 2 MG/1
2 TABLET ORAL
Status: DISCONTINUED | OUTPATIENT
Start: 2018-06-29 | End: 2018-07-02 | Stop reason: HOSPADM

## 2018-06-29 RX ORDER — LIDOCAINE 50 MG/G
1 PATCH TOPICAL
Status: DISCONTINUED | OUTPATIENT
Start: 2018-06-29 | End: 2018-07-02 | Stop reason: HOSPADM

## 2018-06-29 RX ORDER — PANTOPRAZOLE SODIUM 40 MG/1
40 TABLET, DELAYED RELEASE ORAL EVERY MORNING
Status: DISCONTINUED | OUTPATIENT
Start: 2018-06-30 | End: 2018-07-02 | Stop reason: HOSPADM

## 2018-06-29 RX ORDER — MULTIPLE VITAMINS W/ MINERALS TAB 9MG-400MCG
1 TAB ORAL DAILY
COMMUNITY
End: 2018-10-29

## 2018-06-29 RX ORDER — HYDROCODONE BITARTRATE AND ACETAMINOPHEN 5; 325 MG/1; MG/1
1 TABLET ORAL EVERY 6 HOURS PRN
COMMUNITY
End: 2018-10-29

## 2018-06-29 RX ORDER — ACETAMINOPHEN 325 MG/1
650 TABLET ORAL EVERY 4 HOURS PRN
Status: DISCONTINUED | OUTPATIENT
Start: 2018-06-29 | End: 2018-07-02 | Stop reason: HOSPADM

## 2018-06-29 RX ORDER — MONTELUKAST SODIUM 10 MG/1
10 TABLET ORAL DAILY
COMMUNITY
End: 2019-02-06 | Stop reason: SDUPTHER

## 2018-06-29 RX ORDER — LEVETIRACETAM 500 MG/1
1000 TABLET ORAL EVERY 12 HOURS SCHEDULED
Status: DISCONTINUED | OUTPATIENT
Start: 2018-06-29 | End: 2018-07-02 | Stop reason: HOSPADM

## 2018-06-29 RX ORDER — ONDANSETRON 4 MG/1
4 TABLET, ORALLY DISINTEGRATING ORAL EVERY 6 HOURS PRN
Status: DISCONTINUED | OUTPATIENT
Start: 2018-06-29 | End: 2018-07-02 | Stop reason: HOSPADM

## 2018-06-29 RX ORDER — DOCUSATE SODIUM 100 MG/1
100 CAPSULE, LIQUID FILLED ORAL 2 TIMES DAILY PRN
Status: DISCONTINUED | OUTPATIENT
Start: 2018-06-29 | End: 2018-07-02 | Stop reason: HOSPADM

## 2018-06-29 RX ORDER — SODIUM CHLORIDE 0.9 % (FLUSH) 0.9 %
1-10 SYRINGE (ML) INJECTION AS NEEDED
Status: DISCONTINUED | OUTPATIENT
Start: 2018-06-29 | End: 2018-07-02 | Stop reason: HOSPADM

## 2018-06-29 RX ORDER — CALCIUM CARBONATE 200(500)MG
2 TABLET,CHEWABLE ORAL 2 TIMES DAILY PRN
Status: DISCONTINUED | OUTPATIENT
Start: 2018-06-29 | End: 2018-07-02 | Stop reason: HOSPADM

## 2018-06-29 RX ORDER — NITROGLYCERIN 0.4 MG/1
0.4 TABLET SUBLINGUAL
Status: DISCONTINUED | OUTPATIENT
Start: 2018-06-29 | End: 2018-07-02 | Stop reason: HOSPADM

## 2018-06-29 RX ORDER — ONDANSETRON 4 MG/1
4 TABLET, FILM COATED ORAL EVERY 6 HOURS PRN
Status: DISCONTINUED | OUTPATIENT
Start: 2018-06-29 | End: 2018-07-02 | Stop reason: HOSPADM

## 2018-06-29 RX ADMIN — IOPAMIDOL 90 ML: 612 INJECTION, SOLUTION INTRAVENOUS at 14:55

## 2018-06-29 RX ADMIN — DEXAMETHASONE 2 MG: 2 TABLET ORAL at 22:44

## 2018-06-29 RX ADMIN — LIDOCAINE 1 PATCH: 50 PATCH CUTANEOUS at 22:53

## 2018-06-29 RX ADMIN — LEVETIRACETAM 1000 MG: 500 TABLET, FILM COATED ORAL at 20:51

## 2018-06-29 RX ADMIN — HEPARIN SODIUM 21.4 UNITS/KG/HR: 10000 INJECTION, SOLUTION INTRAVENOUS at 23:40

## 2018-06-29 RX ADMIN — SODIUM CHLORIDE 100 ML/HR: 9 INJECTION, SOLUTION INTRAVENOUS at 18:26

## 2018-06-29 RX ADMIN — HEPARIN SODIUM 1500 UNITS/HR: 10000 INJECTION, SOLUTION INTRAVENOUS at 16:30

## 2018-06-29 NOTE — TELEPHONE ENCOUNTER
Patient needs to go to ER for evaluation due to complaints of left calf pain and left chest pain since he is PO.

## 2018-06-29 NOTE — TELEPHONE ENCOUNTER
Patient is status post a craniotomy for a frontal mass by Dr. Chetan Galvan on 6/16/18.    Patients wife called and states that the patient is complaining of sharp pain in his left side of his ribs. It started Tuesday and is better with each day. She states that the patient is not short of breath. Denies dizziness. Patient says sitting up straight makes the pain better. When he slouches, the pain is worse. Denies fever, chills, and weakness.

## 2018-06-29 NOTE — TELEPHONE ENCOUNTER
I called patient but no answer. LVM stating to call back to answer additional questions    Fever, chills, productive cough, is pain worse with deep breath/cough? Can he reproduce the pain but pushing on the ribs or is it not painful to touch? Any leg swelling or pain?

## 2018-06-29 NOTE — TELEPHONE ENCOUNTER
Patients wife Mary return call and stated it is painful to touch and left calf is sore.  And he does not have fever, chills, productive cough or pain when he takes a deep breath or cough.  She says they would like for him to have x-rays just to be safe.  Call back number to his wife is [phone].

## 2018-06-30 LAB
ANION GAP SERPL CALCULATED.3IONS-SCNC: 11.2 MMOL/L
APTT PPP: 110.2 SECONDS (ref 22.7–35.4)
APTT PPP: 89 SECONDS (ref 22.7–35.4)
BASOPHILS # BLD AUTO: 0.02 10*3/MM3 (ref 0–0.2)
BASOPHILS NFR BLD AUTO: 0.2 % (ref 0–1.5)
BUN BLD-MCNC: 10 MG/DL (ref 6–20)
BUN/CREAT SERPL: 16.4 (ref 7–25)
CALCIUM SPEC-SCNC: 9 MG/DL (ref 8.6–10.5)
CHLORIDE SERPL-SCNC: 100 MMOL/L (ref 98–107)
CO2 SERPL-SCNC: 24.8 MMOL/L (ref 22–29)
CREAT BLD-MCNC: 0.61 MG/DL (ref 0.76–1.27)
DEPRECATED RDW RBC AUTO: 41.9 FL (ref 37–54)
EOSINOPHIL # BLD AUTO: 0.01 10*3/MM3 (ref 0–0.7)
EOSINOPHIL NFR BLD AUTO: 0.1 % (ref 0.3–6.2)
ERYTHROCYTE [DISTWIDTH] IN BLOOD BY AUTOMATED COUNT: 12.3 % (ref 11.5–14.5)
GFR SERPL CREATININE-BSD FRML MDRD: >150 ML/MIN/1.73
GLUCOSE BLD-MCNC: 114 MG/DL (ref 65–99)
HCT VFR BLD AUTO: 36.6 % (ref 40.4–52.2)
HGB BLD-MCNC: 11.5 G/DL (ref 13.7–17.6)
IMM GRANULOCYTES # BLD: 0.03 10*3/MM3 (ref 0–0.03)
IMM GRANULOCYTES NFR BLD: 0.3 % (ref 0–0.5)
LYMPHOCYTES # BLD AUTO: 2.27 10*3/MM3 (ref 0.9–4.8)
LYMPHOCYTES NFR BLD AUTO: 22.4 % (ref 19.6–45.3)
MCH RBC QN AUTO: 29.2 PG (ref 27–32.7)
MCHC RBC AUTO-ENTMCNC: 31.4 G/DL (ref 32.6–36.4)
MCV RBC AUTO: 92.9 FL (ref 79.8–96.2)
MONOCYTES # BLD AUTO: 1.02 10*3/MM3 (ref 0.2–1.2)
MONOCYTES NFR BLD AUTO: 10.1 % (ref 5–12)
NEUTROPHILS # BLD AUTO: 6.79 10*3/MM3 (ref 1.9–8.1)
NEUTROPHILS NFR BLD AUTO: 66.9 % (ref 42.7–76)
PLATELET # BLD AUTO: 292 10*3/MM3 (ref 140–500)
PMV BLD AUTO: 9.4 FL (ref 6–12)
POTASSIUM BLD-SCNC: 4.4 MMOL/L (ref 3.5–5.2)
RBC # BLD AUTO: 3.94 10*6/MM3 (ref 4.6–6)
SODIUM BLD-SCNC: 136 MMOL/L (ref 136–145)
WBC NRBC COR # BLD: 10.14 10*3/MM3 (ref 4.5–10.7)

## 2018-06-30 PROCEDURE — 85730 THROMBOPLASTIN TIME PARTIAL: CPT | Performed by: INTERNAL MEDICINE

## 2018-06-30 PROCEDURE — 94799 UNLISTED PULMONARY SVC/PX: CPT

## 2018-06-30 PROCEDURE — 80048 BASIC METABOLIC PNL TOTAL CA: CPT | Performed by: INTERNAL MEDICINE

## 2018-06-30 PROCEDURE — 99024 POSTOP FOLLOW-UP VISIT: CPT | Performed by: NEUROLOGICAL SURGERY

## 2018-06-30 PROCEDURE — 85025 COMPLETE CBC W/AUTO DIFF WBC: CPT | Performed by: INTERNAL MEDICINE

## 2018-06-30 PROCEDURE — 25010000002 HEPARIN (PORCINE) PER 1000 UNITS: Performed by: INTERNAL MEDICINE

## 2018-06-30 RX ADMIN — MONTELUKAST 10 MG: 10 TABLET, FILM COATED ORAL at 09:45

## 2018-06-30 RX ADMIN — DEXAMETHASONE 2 MG: 2 TABLET ORAL at 13:03

## 2018-06-30 RX ADMIN — HEPARIN SODIUM 19.4 UNITS/KG/HR: 10000 INJECTION, SOLUTION INTRAVENOUS at 22:42

## 2018-06-30 RX ADMIN — DEXAMETHASONE 2 MG: 2 TABLET ORAL at 09:45

## 2018-06-30 RX ADMIN — BACLOFEN 10 MG: 10 TABLET ORAL at 10:47

## 2018-06-30 RX ADMIN — DEXAMETHASONE 2 MG: 2 TABLET ORAL at 17:45

## 2018-06-30 RX ADMIN — HEPARIN SODIUM 19.4 UNITS/KG/HR: 10000 INJECTION, SOLUTION INTRAVENOUS at 07:32

## 2018-06-30 RX ADMIN — BACLOFEN 10 MG: 10 TABLET ORAL at 23:00

## 2018-06-30 RX ADMIN — LEVETIRACETAM 1000 MG: 500 TABLET, FILM COATED ORAL at 20:42

## 2018-06-30 RX ADMIN — HYDROCODONE BITARTRATE AND ACETAMINOPHEN 1 TABLET: 5; 325 TABLET ORAL at 00:45

## 2018-06-30 RX ADMIN — HYDROCODONE BITARTRATE AND ACETAMINOPHEN 1 TABLET: 5; 325 TABLET ORAL at 10:47

## 2018-06-30 RX ADMIN — PANTOPRAZOLE SODIUM 40 MG: 40 TABLET, DELAYED RELEASE ORAL at 07:33

## 2018-06-30 RX ADMIN — LEVETIRACETAM 1000 MG: 500 TABLET, FILM COATED ORAL at 09:45

## 2018-06-30 RX ADMIN — BACLOFEN 10 MG: 10 TABLET ORAL at 00:45

## 2018-06-30 RX ADMIN — SODIUM CHLORIDE 100 ML/HR: 9 INJECTION, SOLUTION INTRAVENOUS at 03:43

## 2018-07-01 ENCOUNTER — APPOINTMENT (OUTPATIENT)
Dept: CT IMAGING | Facility: HOSPITAL | Age: 33
End: 2018-07-01

## 2018-07-01 LAB
APTT PPP: 100 SECONDS (ref 22.7–35.4)
APTT PPP: 30.9 SECONDS (ref 22.7–35.4)

## 2018-07-01 PROCEDURE — 85730 THROMBOPLASTIN TIME PARTIAL: CPT | Performed by: HOSPITALIST

## 2018-07-01 PROCEDURE — 99024 POSTOP FOLLOW-UP VISIT: CPT | Performed by: NEUROLOGICAL SURGERY

## 2018-07-01 PROCEDURE — 70450 CT HEAD/BRAIN W/O DYE: CPT

## 2018-07-01 RX ORDER — DABIGATRAN ETEXILATE 150 MG/1
150 CAPSULE ORAL ONCE
Status: COMPLETED | OUTPATIENT
Start: 2018-07-01 | End: 2018-07-01

## 2018-07-01 RX ORDER — DABIGATRAN ETEXILATE 150 MG/1
150 CAPSULE ORAL EVERY 12 HOURS SCHEDULED
Status: DISCONTINUED | OUTPATIENT
Start: 2018-07-01 | End: 2018-07-01 | Stop reason: SDUPTHER

## 2018-07-01 RX ORDER — DABIGATRAN ETEXILATE 150 MG/1
150 CAPSULE ORAL EVERY 12 HOURS SCHEDULED
Status: DISCONTINUED | OUTPATIENT
Start: 2018-07-01 | End: 2018-07-01

## 2018-07-01 RX ORDER — DABIGATRAN ETEXILATE 150 MG/1
150 CAPSULE ORAL EVERY 12 HOURS SCHEDULED
Status: DISCONTINUED | OUTPATIENT
Start: 2018-07-02 | End: 2018-07-02 | Stop reason: HOSPADM

## 2018-07-01 RX ADMIN — MONTELUKAST 10 MG: 10 TABLET, FILM COATED ORAL at 08:20

## 2018-07-01 RX ADMIN — DABIGATRAN ETEXILATE MESYLATE 150 MG: 150 CAPSULE ORAL at 16:13

## 2018-07-01 RX ADMIN — LEVETIRACETAM 1000 MG: 500 TABLET, FILM COATED ORAL at 20:00

## 2018-07-01 RX ADMIN — LIDOCAINE 1 PATCH: 50 PATCH CUTANEOUS at 08:20

## 2018-07-01 RX ADMIN — DOCUSATE SODIUM 100 MG: 100 CAPSULE, LIQUID FILLED ORAL at 07:49

## 2018-07-01 RX ADMIN — PANTOPRAZOLE SODIUM 40 MG: 40 TABLET, DELAYED RELEASE ORAL at 06:05

## 2018-07-01 RX ADMIN — DEXAMETHASONE 2 MG: 2 TABLET ORAL at 07:49

## 2018-07-01 RX ADMIN — DEXAMETHASONE 2 MG: 2 TABLET ORAL at 18:02

## 2018-07-01 RX ADMIN — BACLOFEN 10 MG: 10 TABLET ORAL at 20:00

## 2018-07-01 RX ADMIN — DEXAMETHASONE 2 MG: 2 TABLET ORAL at 11:54

## 2018-07-01 RX ADMIN — LEVETIRACETAM 1000 MG: 500 TABLET, FILM COATED ORAL at 08:20

## 2018-07-01 RX ADMIN — BACLOFEN 10 MG: 10 TABLET ORAL at 08:24

## 2018-07-01 NOTE — PLAN OF CARE
Problem: Patient Care Overview  Goal: Plan of Care Review  Outcome: Ongoing (interventions implemented as appropriate)   07/01/18 0422   OTHER   Outcome Summary No c/o discomfort. Walked around nurse station once with mom. Heparin gtt infusing. PTT am. CT am. Probable home Monday.

## 2018-07-01 NOTE — PROGRESS NOTES
Name: Bryan Valadez ADMIT: 2018   : 1985  PCP: No Known Provider    MRN: [id number] LOS: 2 days   AGE/SEX: 32 y.o. male  ROOM: [facility]/   Subjective   Still with mild L sided pain. Worse w/ breathing. No fever cough.    Objective   Vital Signs  Temp:  [97.7 °F (36.5 °C)-99.4 °F (37.4 °C)] 97.9 °F (36.6 °C)  Heart Rate:  [60-93] 60  Resp:  [14-17] 14  BP: (117-129)/(75-84) 120/84  SpO2:  [87 %-99 %] 99 %  on   ;   Device (Oxygen Therapy): room air  Body mass index is 26.5 kg/m².    Physical Exam   Constitutional: He is oriented to person, place, and time. He appears well-developed. He is cooperative. No distress.   HENT:   Crani scar   Neck: No JVD present. No tracheal deviation present.   Cardiovascular: Normal rate and regular rhythm.    No murmur heard.  Pulmonary/Chest: Effort normal and breath sounds normal. No respiratory distress.   Abdominal: Soft. Normal appearance and bowel sounds are normal. He exhibits no distension. There is no tenderness.   Musculoskeletal: He exhibits no edema.   Neurological: He is alert and oriented to person, place, and time.   Skin: Skin is warm and dry.   Psychiatric: He has a normal mood and affect. His behavior is normal.   Nursing note and vitals reviewed.      Results Review:       I reviewed the patient's new clinical results.    Results from last 7 days  Lab Units 18  0556 18  1250   WBC 10*3/mm3 10.14 15.92*   HEMOGLOBIN g/dL 11.5* 12.7*   PLATELETS 10*3/mm3 292 353       Results from last 7 days  Lab Units 18  0556 18  1250   SODIUM mmol/L 136 136   POTASSIUM mmol/L 4.4 3.9   CHLORIDE mmol/L 100 95*   CO2 mmol/L 24.8 26.7   BUN mg/dL 10 15   CREATININE mg/dL 0.61* 0.79   GLUCOSE mg/dL 114* 110*   Estimated Creatinine Clearance: 208 mL/min (A) (by C-G formula based on SCr of 0.61 mg/dL (L)).    Results from last 7 days  Lab Units 18  0556 18  1250   CALCIUM mg/dL 9.0 9.6   ALBUMIN g/dL  --  4.30         dexamethasone 2 mg  Oral TID With Meals   levETIRAcetam 1,000 mg Oral Q12H   lidocaine 1 patch Transdermal Q24H   montelukast 10 mg Oral Daily   pantoprazole 40 mg Oral QAM       heparin (porcine) 18 Units/kg/hr Last Rate: 17.4 Units/kg/hr (07/01/18 0821)   Diet Regular      Assessment/Plan      Active Hospital Problems    Diagnosis Date Noted   • **Pulmonary embolus [I26.99] 06/29/2018   • Leukocytosis [D72.829] 06/29/2018   • Status post craniotomy [Z98.890] 06/29/2018   • Pulmonary embolism with infarction [I26.99] 06/29/2018   • Chronic deep vein thrombosis (DVT) of left popliteal vein [I82.532] 06/29/2018   • Acute deep vein thrombosis (DVT) of popliteal vein of left lower extremity [I82.432] 06/29/2018   • Primary brain tumor [D49.6] 06/15/2018      Resolved Hospital Problems    Diagnosis Date Noted Date Resolved   No resolved problems to display.       Mr. Valadez is a 32 y.o. male with a history of right frontal craniotomy for tumor on 6/15/18 who has been admitted with left calf DVT to popliteal and subsegmental PE.    · Currently on heparin gtt.  · Serial head CT are stable including today.  · Discussed with his neurosurgeon, Dr. KAROL Galvan. He is okay with transitioning to Pradaxa for treatment of DVT/PE. This is because reversing agent is more readily available. He is been very stable while on heparin drip. We will discontinue heparin and start Pradaxa this afternoon. Dr. Galvan will see patient in the morning and anticipate discharge tomorrow if no problems.  · Discussed the above with Dr. Green who is okay with these plans as well.      Bucky Hodge MD  [facility]  07/01/18  10:06 AM

## 2018-07-01 NOTE — PLAN OF CARE
Problem: Patient Care Overview  Goal: Plan of Care Review  Outcome: Ongoing (interventions implemented as appropriate)   07/01/18 5722   Coping/Psychosocial   Plan of Care Reviewed With patient;family   Plan of Care Review   Progress improving   OTHER   Outcome Summary VSS. No signs of neurological deficit. Pain well controlled with lidocain patrch. No complaints at this time. Heparin infusion D/C, restarted on oral anticoagulant. Will continue to monitor.       Problem: Pain, Acute (Adult)  Goal: Acceptable Pain Control/Comfort Level  Outcome: Ongoing (interventions implemented as appropriate)      Problem: VTE, DVT and PE (Adult)  Goal: Signs and Symptoms of Listed Potential Problems Will be Absent, Minimized or Managed (VTE, DVT and PE)  Outcome: Ongoing (interventions implemented as appropriate)

## 2018-07-01 NOTE — PROGRESS NOTES
POD 15  Covering for Dr. Galvan  Vitals:    06/30/18 1918 06/30/18 2322 07/01/18 0717 07/01/18 1615   BP: 122/84 117/75 120/84    BP Location: Right arm Right arm Right arm    Patient Position: Lying Lying Lying    Pulse:  67 60 75   Resp: 17 17 14 16   Temp: 97.7 °F (36.5 °C) 98.1 °F (36.7 °C) 97.9 °F (36.6 °C) 98.2 °F (36.8 °C)   TempSrc: Oral Oral Oral Oral   SpO2: (!) 87%  99% 99%   Weight:       Height:       s/p removal of oligodendroglioma  Found to have dvt and pe  On heparin  CT today ok  Spoke with Dr. Naveen Donaldson for Pradaxa  Intact  Dr. Galvan back tomorrow.

## 2018-07-01 NOTE — PROGRESS NOTES
Per request from Dr Hodge    ask pharmacy to coordinate timing of stopping heparin and initiating pradaxa      Will initiate the start of pradaxa within 2 hours of discontinuing of heparin.    Thank you  Jessie Clarke, [facility]

## 2018-07-02 ENCOUNTER — TELEPHONE (OUTPATIENT)
Dept: NEUROSURGERY | Facility: CLINIC | Age: 33
End: 2018-07-02

## 2018-07-02 VITALS
SYSTOLIC BLOOD PRESSURE: 133 MMHG | BODY MASS INDEX: 26.6 KG/M2 | WEIGHT: 190 LBS | HEIGHT: 71 IN | HEART RATE: 71 BPM | RESPIRATION RATE: 16 BRPM | OXYGEN SATURATION: 98 % | TEMPERATURE: 97.7 F | DIASTOLIC BLOOD PRESSURE: 83 MMHG

## 2018-07-02 PROBLEM — D72.829 LEUKOCYTOSIS: Status: RESOLVED | Noted: 2018-06-29 | Resolved: 2018-07-02

## 2018-07-02 PROCEDURE — 99024 POSTOP FOLLOW-UP VISIT: CPT | Performed by: NEUROLOGICAL SURGERY

## 2018-07-02 RX ORDER — DABIGATRAN ETEXILATE 150 MG/1
150 CAPSULE ORAL EVERY 12 HOURS SCHEDULED
Qty: 60 CAPSULE | Refills: 1 | Status: SHIPPED | OUTPATIENT
Start: 2018-07-02 | End: 2018-07-09 | Stop reason: HOSPADM

## 2018-07-02 RX ORDER — LIDOCAINE 50 MG/G
1 PATCH TOPICAL
Qty: 6 PATCH | Refills: 0 | Status: SHIPPED | OUTPATIENT
Start: 2018-07-03 | End: 2018-07-09

## 2018-07-02 RX ADMIN — DEXAMETHASONE 2 MG: 2 TABLET ORAL at 09:58

## 2018-07-02 RX ADMIN — DABIGATRAN ETEXILATE MESYLATE 150 MG: 150 CAPSULE ORAL at 09:57

## 2018-07-02 RX ADMIN — MONTELUKAST 10 MG: 10 TABLET, FILM COATED ORAL at 09:58

## 2018-07-02 RX ADMIN — PANTOPRAZOLE SODIUM 40 MG: 40 TABLET, DELAYED RELEASE ORAL at 08:03

## 2018-07-02 RX ADMIN — LEVETIRACETAM 1000 MG: 500 TABLET, FILM COATED ORAL at 09:58

## 2018-07-02 RX ADMIN — LIDOCAINE 1 PATCH: 50 PATCH CUTANEOUS at 09:57

## 2018-07-02 NOTE — TELEPHONE ENCOUNTER
----- Message from NICOLE Angel sent at 7/2/2018  3:30 PM EDT -----  Please make him a follow up in 2 weeks to see SUNNY for surgical path discussion s/p crani and tumor resection. Thx! (pt goes by KAROL Haile)

## 2018-07-02 NOTE — PROGRESS NOTES
Craniotomy incision site staples were removed at bedside without any difficulty, he tolerated well without issue. 2 sutures were also removed without difficulty from prior EVD drain site.     OK to shower. Please call with any questions.     Thanks!

## 2018-07-02 NOTE — TELEPHONE ENCOUNTER
Spoke with [facility] as he was leaving, gave verbal ok to resume all therapies patient was previously undergoing. Called [facility], spoke with Stanley, informed them that [facility] was unable to sign the forms today, but did give verbal ok for continuation of previous therapies, will have [facility] sign the orders when he is back in office. Stanley states that she will give Danish the information.

## 2018-07-02 NOTE — DISCHARGE SUMMARY
Name: Bryan Valadez  Age: 32 y.o.  Sex: male  :  1985  MRN: [id number]         Primary Care Physician: No Known Provider      Date of Admission:  2018  Date of Discharge:  2018      CHIEF COMPLAINT  Chest Pain and Leg Pain (s/p brain surgery )      DISCHARGE DIAGNOSIS  Active Hospital Problems    Diagnosis Date Noted   • **Pulmonary embolus [I26.99] 2018   • Leukocytosis [D72.829] 2018   • Status post craniotomy [Z98.890] 2018   • Pulmonary embolism with infarction [I26.99] 2018   • Chronic deep vein thrombosis (DVT) of left popliteal vein [I82.532] 2018   • Acute deep vein thrombosis (DVT) of popliteal vein of left lower extremity [I82.432] 2018   • Primary brain tumor [D49.6] 06/15/2018      Resolved Hospital Problems    Diagnosis Date Noted Date Resolved   No resolved problems to display.       SECONDARY DIAGNOSES  Past Medical History:   Diagnosis Date   • Allergic rhinitis    • Asthma     Pulmonary Dr. Alexandra   • Chest pain    • GERD (gastroesophageal reflux disease)    • H/O echocardiogram    • History of ETT    • History of Holter monitoring    • Hyperlipidemia        CONSULTS   Consult Orders (all)     Start     Ordered    18 1508  Neurosurgery - Chetan Galvan IV, MD   Specialty:  Neurosurgery      18 1508            PROCEDURES PERFORMED  BLE VENOUS DOPPLER  · Acute left lower extremity deep vein thrombosis noted in the popliteal,   posterial tibial, peroneal and gastrocnemius/soleal.  · All other left sided veins appeared normal.    Imaging Results (all)     Procedure Component Value Units Date/Time    CT Head Without Contrast [[id number]] Collected:  18 0508     Updated:  18 4780    Narrative:       CT SCAN OF THE BRAIN WITHOUT CONTRAST.     TECHNIQUE: Radiation dose reduction techniques were utilized, including  automated exposure control and exposure modulation based on body size.  Multiple axial images of the  brain were obtained from the vertex to the  base of the brain.     HISTORY: Postop initiation of anticoagulation.     COMPARISON: 6/29/2018.     FINDINGS: Patient is status post craniotomy for debulking of a right  frontal lobe tumor. Resection cavity contains small amount of blood, no  significant change. The lateral aspect of the right frontal lobe  demonstrate hyperdensity corresponding to residual tumor and surrounding  edema. There is some mass effect on the anterior aspect of the right  lateral ventricle, without significant change. Previously noted  hemorrhage of the right cerebellar hemisphere now appears as a 0.8 cm  area of vague density in the right cerebellar hemisphere, image 15.     Midline structures are within normal limits, there is no hydrocephalus.  Gray-white matter differentiation is maintained.         Orbits are within normal limits. No significant mucosal disease of the  para-nasal sinuses. Mastoid air cells are well aerated.              Impression:       Right frontal craniotomy with resection of underlying right frontal lobe  tumor, residual tumor circumscribing the resection cavity is noted.  Blood within the resection cavity is unchanged. Remote right cerebellar  hemisphere hemorrhage measures 0.8 cm, previously 1.2 cm.     This report was finalized on 7/1/2018 11:31 PM by Dr. Renzo Og M.D.       CT Head Without Contrast [[id number]] Collected:  06/29/18 1721     Updated:  06/29/18 1833    Narrative:       CT SCAN OF THE HEAD WITHOUT CONTRAST      CLINICAL HISTORY: Status post resection of brain tumor 2 weeks ago.  Evaluate for postop bleeding.     TECHNIQUE: CT scan of the head was obtained with 3 mm axial images. No  intravenous contrast was administered. However, contrast was  administered for the purposes of the CT angiogram that was performed  approximately 1 hour prior to this head CT.     COMPARISON: Comparison is made to prior MRI of the brain dated  06/17/2018.      FINDINGS:     The patient is status post a right frontoparietal craniotomy for the  purposes of debulking of a tumor centered within the right frontal lobe.  The resection cavity at this site measures up to approximately 7.6 x 4.5  cm in greatest axial dimensions. Subtle enhancement is noted  circumscribing the resection cavity. There is a small amount of subacute  hemorrhage appreciated within the resection cavity and this demonstrate  no convincing interval detrimental change when compared to the prior MRI  of the brain. There are surrounding areas of hypodensity which involve  the lateral aspect of the right frontal lobe which are compatible with  areas of circumscribing edema and residual tumor. There is a small  extra-axial and likely extradural fluid collection deep to the  craniotomy flap which only measures up to approximately 5 mm in  diameter. There is mass effect upon the anterior aspect of the right  lateral ventricle with some effacement of the right lateral ventricle as  well as the sulci.     The previously identified right cerebellar hemorrhage measuring up to  approximately 2.0 cm in greatest axial dimensions is seen as a vague 1.2  cm density and demonstrates interval evolutionary change.       Impression:          There is no interval detrimental change when compared to the prior brain  MRI dated 06/17/2018. Interval evolutionary changes are noted at the  site of the debulked tumor within the right frontal lobe. Again, there  is evidence for residual tumor circumscribing the resection cavity. Some  late subacute blood products are noted within the resection cavity.  Also, there is evidence for a remote cerebellar hemorrhage within the  right cerebellar hemisphere which also demonstrates interval  evolutionary change. This remote cerebellar hemorrhage is an unusual  complication of a supratentorial craniotomy and is thought to be  secondary to a pressure differential resulting in shearing of  cerebellar  veins.     These findings were discussed with Dr. Akers on 06/29/2018 at  approximately 4:36 PM     Radiation dose reduction techniques were utilized, including automated  exposure control and exposure modulation based on body size.     This report was finalized on 6/29/2018 6:30 PM by Dr. Albert Pearce M.D.       CT Angiogram Chest With Contrast [[id number]] Collected:  06/29/18 1518     Updated:  06/29/18 1531    Narrative:       CT ANGIOGRAM CHEST W CONTRAST-     CLINICAL HISTORY: Left-sided chest pain. Elevated d-dimer.     TECHNIQUE: Spiral CT images were obtained through the chest during rapid  IV injection of contrast and were reconstructed in 2 mm thick axial  slices. Multiple coronal and sagittal and 3-D reconstructions were  obtained.     Radiation dose reduction techniques were utilized, including automated  exposure control and exposure modulation based on body size.     COMPARISON: None     FINDINGS: The main pulmonary arteries and their lobar and segmental  branches are fairly well opacified. There is a filling defect in the  right lower lobe pulmonary artery extending into segmental branches  consistent with acute pulmonary thromboembolus. Multiple tiny emboli  also present within segmental and subsegmental branches of the left  lower lobe pulmonary artery. No definite upper lobe or middle lobe  pulmonary emboli are identified. Lung window images demonstrate patchy  airspace opacities in the posterior and inferior aspect of the left  lower lobe that are most likely due to a developing area of pulmonary  infarction in addition to atelectasis from splinting and/or bronchial  constriction. No other infiltrates are identified. There are no discrete  lung masses. No pleural effusions are evident. There is no mediastinal  or hilar or axillary lymphadenopathy. The RV/LV ratio is 1. There is no  evidence of significant pulmonary arterial hypertension.       Impression:       Multiple small to  moderate-sized bilateral lower lobe  thromboemboli as described. Small developing area of pulmonary  infarction is suspected in the posterior and inferior aspect of the left  lower lobe.               HOSPITAL COURSE  Mr. Valadez is a 32 y.o. non-smoker with a history of brain tumor s/p craniotomy with resection on 6/16/18 who presented to [facility] initially complaining of pleuritic left sided chest pain as well as pain in left calf. Please see the admitting history and physical for further details. He was found to have acute LLE DVT and left sided PE w/ probable infarct and was admitted to the hospital for further evaluation and treatment. Due to his recent craniotomy we asked MATT to follow him and make sure he was stable for anticoagulation. He was initially treated with a heparin drip. Serial head CTs were obtained and he was watched closely with serial neurochecks. Fortunately he has done well and tolerated anticoagulation nicely. He was transitioned off IV heparin and on to Pradaxa. This was chosen as a reversing agent is considered readily available in most ERs.    He was seen by his surgeon, Dr. Galvan today and his mlalory were removed. They discussed pathology reports. Outpatient follow up was arranged.        PHYSICAL EXAM  Temp:  [97.6 °F (36.4 °C)-98.3 °F (36.8 °C)] 97.6 °F (36.4 °C)  Heart Rate:  [66-75] 66  Resp:  [16] 16  BP: (127-128)/(77-89) 128/89  Body mass index is 26.5 kg/m².  Physical Exam   Constitutional: He is oriented to person, place, and time. No distress.   Cardiovascular: Normal rate and regular rhythm.    No murmur heard.  Pulmonary/Chest: Effort normal and breath sounds normal.   Abdominal: Soft. Bowel sounds are normal. He exhibits no distension. There is no tenderness.   Musculoskeletal: Normal range of motion. He exhibits no edema.   Neurological: He is alert and oriented to person, place, and time.   Skin: Skin is warm and dry. He is not diaphoretic.         CONDITION  ON DISCHARGE  Stable.      DISCHARGE DISPOSITION   Home with family      ALLERGIES  Allergies   Allergen Reactions   • Avocado Itching   • Other Itching     Insect stings: Bee stings, throat swelling (has Epi pen)    Allergy to fruit, Avocado, Cherry Tomato (can have blue berries)   • Tomato Itching     Cherry tomato         DISCHARGE MEDICATIONS     Your medication list      START taking these medications      Instructions Last Dose Given Next Dose Due   dabigatran etexilate 150 MG capsu  Commonly known as:  PRADAXA      Take 1 capsule by mouth Every 12 (Twelve) Hours.       lidocaine 5 %  Commonly known as:  LIDODERM  Start taking on:  7/3/2018      Place 1 patch on the skin Daily for 6 days. Remove & Discard patch within 12 hours or as directed by MD          CONTINUE taking these medications      Instructions Last Dose Given Next Dose Due   baclofen 10 MG tablet  Commonly known as:  LIORESAL      Take 1 tablet by mouth 3 (Three) Times a Day As Needed for Muscle Spasms.       dexamethasone 2 MG tablet  Commonly known as:  DECADRON      Take 1 tablet by mouth 3 (Three) Times a Day With Meals.       docusate sodium 100 MG capsule      Take 100 mg by mouth 2 (Two) Times a Day As Needed (constipation).       EPINEPHrine 0.3 MG/0.3ML solution auto-injector injection  Commonly known as:  EPIPEN      Inject 0.3 mg into the shoulder, thigh, or buttocks As Needed.       HYDROcodone-acetaminophen 5-325 MG per tablet  Commonly known as:  NORCO      Take 1 tablet by mouth Every 6 (Six) Hours As Needed.       levETIRAcetam 1000 MG tablet  Commonly known as:  KEPPRA      Take 1 tablet by mouth Every 12 (Twelve) Hours.       montelukast 10 MG tablet  Commonly known as:  SINGULAIR      Take 10 mg by mouth Daily.       multivitamin with minerals tablet tablet      Take 1 tablet by mouth Daily.       omeprazole 20 MG capsule  Commonly known as:  priLOSEC      Take 20 mg by mouth Daily As Needed.             Where to Get Your  Medications      These medications were sent to [facility] [address] [place], [place] - [address] AT [facility] [facility] [address] - [phone]  - [phone] FX  [address], [facility] [address]    Phone:  [phone]   · dabigatran etexilate 150 MG capsu  · lidocaine 5 %       Diet Instructions     Diet: Regular       Discharge Diet:  Regular         Activity Instructions     Activity as Tolerated           Future Appointments  Date Time Provider Department Center   12/13/2018 9:30 AM MD MUSTAPHA Villeda [facility] None      Contact information for follow-up providers     No Known Provider Follow up in 1 week(s).    Contact information:  [facility] [address]             Chetan Galvan IV, MD Follow up.    Specialty:  Neurosurgery  Contact information:  [address]  [facility] [address]  [facility] [id number] [phone]                   Contact information for after-discharge care     Home Medical Care     [facility] Follow up.    Specialty:  Home Health Services  Contact information:  [address]  [facility] [address] [phone]                             TEST  RESULTS PENDING AT DISCHARGE  None       CODE STATUS  Code Status and Medical Interventions:   Ordered at: 06/29/18 9121     Level Of Support Discussed With:    Patient     Code Status:    CPR     Medical Interventions (Level of Support Prior to Arrest):    Full           Bucky Hodge MD  [facility]  07/02/18  11:59 AM      Time: greater than 30 minutes.

## 2018-07-02 NOTE — TELEPHONE ENCOUNTER
Danish buck/ [facility] [phone] needs orders for resuming home health and nursing and physical therapy please.  Patient is being discharged today.    Please advise

## 2018-07-02 NOTE — PROGRESS NOTES
[facility] PROGRESS NOTE    PATIENT IDENTIFICATION:   Name:  Bryan Valadez      MRN:  [id number]     32 y.o.  male                   Principal Problem:    Pulmonary embolus  Active Problems:    Primary brain tumor    Leukocytosis    Status post craniotomy    Pulmonary embolism with infarction    Chronic deep vein thrombosis (DVT) of left popliteal vein    Acute deep vein thrombosis (DVT) of popliteal vein of left lower extremity        Subjective   CC: none  Interval History: discussd case with Dr. Hodge yesterday.  Agree with pradaxa for ease of admin and compliance as well as reversibility.    Objective     Vital signs in last 24 hours:  Temp:  [97.6 °F (36.4 °C)-98.3 °F (36.8 °C)] 97.6 °F (36.4 °C)  Heart Rate:  [66-75] 66  Resp:  [16] 16  BP: (127-128)/(77-89) 128/89      Intake/Output last 3 shifts:  I/O last 3 completed shifts:  In: 660 [P.O.:660]  Out: 450 [Urine:450]  Intake/Output this shift:  No intake/output data recorded.    LABS    Results from last 7 days  Lab Units 06/29/18  2238 06/29/18  1250   INR  1.10 1.09     Lab Results   Component Value Date    CALCIUM 9.0 06/30/2018     Results from last 7 days  Lab Units 06/30/18  0556 06/29/18  1250   SODIUM mmol/L 136 136   POTASSIUM mmol/L 4.4 3.9   CHLORIDE mmol/L 100 95*   CO2 mmol/L 24.8 26.7   BUN mg/dL 10 15   CREATININE mg/dL 0.61* 0.79   GLUCOSE mg/dL 114* 110*   CALCIUM mg/dL 9.0 9.6   WBC 10*3/mm3 10.14 15.92*   HEMOGLOBIN g/dL 11.5* 12.7*   PLATELETS 10*3/mm3 292 353   ALT (SGPT) U/L  --  70*   AST (SGOT) U/L  --  29     Physical Exam:  C/d/i  No drift      4 - Opens eyes on own  6 - Follows simple motor commands  5 - Alert and oriented        ASSESSMENT  Assessment/Plan     I reviewed the case with Dr. 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  Staples out today     LOS: 3 days         Chetan Galvan IV, MD

## 2018-07-02 NOTE — PROGRESS NOTES
Discharge Planning Assessment  [facility]     Patient Name: Bryan Valadez  MRN: [id number]  Today's Date: 7/2/2018    Admit Date: 6/29/2018          Discharge Needs Assessment     Row Name 07/02/18 1101       Living Environment    Lives With spouse    Current Living Arrangements home/apartment/condo       Transition Planning    Transportation Anticipated family or friend will provide       Discharge Needs Assessment    Equipment Currently Used at Home commode;wheelchair;walker, rolling            Discharge Plan     Row Name 07/02/18 1102       Plan    Plan Home with [facility]    Patient/Family in Agreement with Plan yes    Plan Comments Met with pt and spouse at bedside.  Introduced self, explained CCP role, facesheet verified.  Pt lives with spouse, independent with ADL's prior to hospitalization.  Has wheelchair, walker, and bedside comode at home.  Current with [facility], confirmed with Danish who is following.  Plans to return home with [facility]. Cost of Pradaxa checked with [facility], $79.95/month.  Discussed with pt and spouse who are agreeable, copay card also provided.   Pt does not currently have PCP but has plans to obtain one and declines assistance at this time.  QUIQUE Bermudez RN        Destination     No service coordination in this encounter.      Durable Medical Equipment     No service coordination in this encounter.      Dialysis/Infusion     No service coordination in this encounter.      Home Medical Care     No service coordination in this encounter.      Social Care     No service coordination in this encounter.                Demographic Summary     Row Name 07/02/18 1059       General Information    Admission Type inpatient    Arrived From home    Referral Source admission list    Reason for Consult discharge planning    Preferred Language English       Contact Information    Permission Granted to Share Info With family/designee   Mary Valadez (spouse) [phone]            Functional Status     Megan  Name 07/02/18 1101       Functional Status, IADL    Medications independent    Meal Preparation independent    Housekeeping independent    Laundry independent    Shopping independent            Psychosocial    No documentation.           Abuse/Neglect    No documentation.           Legal    No documentation.           Substance Abuse    No documentation.           Patient Forms    No documentation.         Keke Bermudez

## 2018-07-02 NOTE — TELEPHONE ENCOUNTER
Patient is scheduled with [facility] 7/18 @ 1015 with a 1000 arrival.  Appt info given to patient who voiced understanding. Letter mailed.

## 2018-07-03 NOTE — PROGRESS NOTES
Case Management Discharge Note    Final Note: Pt discharged home with [facility]    Destination     No service has been selected for the patient.      Durable Medical Equipment     No service has been selected for the patient.      Dialysis/Infusion     No service has been selected for the patient.      Home Medical Care - Selection Complete     Service Request Status Selected Specialties Address Phone Number Fax Number    [facility] Selected Home Health Services [address] [address] [phone]      Social Care     No service has been selected for the patient.        Other: Other (private auto)    Final Discharge Disposition Code: 06 - home with home health care

## 2018-07-04 ENCOUNTER — HOSPITAL ENCOUNTER (EMERGENCY)
Facility: HOSPITAL | Age: 33
Discharge: HOME | End: 2018-07-04
Attending: EMERGENCY MEDICINE | Admitting: EMERGENCY MEDICINE

## 2018-07-04 VITALS
SYSTOLIC BLOOD PRESSURE: 128 MMHG | WEIGHT: 200 LBS | OXYGEN SATURATION: 99 % | BODY MASS INDEX: 28 KG/M2 | HEART RATE: 92 BPM | TEMPERATURE: 98.2 F | RESPIRATION RATE: 18 BRPM | HEIGHT: 71 IN | DIASTOLIC BLOOD PRESSURE: 88 MMHG

## 2018-07-04 DIAGNOSIS — K62.5 RECTAL BLEEDING: Primary | ICD-10-CM

## 2018-07-04 LAB
ALBUMIN SERPL-MCNC: 4.3 G/DL (ref 3.5–5.2)
ALBUMIN/GLOB SERPL: 1.2 G/DL
ALP SERPL-CCNC: 67 U/L (ref 39–117)
ALT SERPL W P-5'-P-CCNC: 54 U/L (ref 1–41)
ANION GAP SERPL CALCULATED.3IONS-SCNC: 14 MMOL/L
AST SERPL-CCNC: 15 U/L (ref 1–40)
BASOPHILS # BLD AUTO: 0.02 10*3/MM3 (ref 0–0.2)
BASOPHILS NFR BLD AUTO: 0.2 % (ref 0–1.5)
BILIRUB SERPL-MCNC: 0.2 MG/DL (ref 0.1–1.2)
BUN BLD-MCNC: 14 MG/DL (ref 6–20)
BUN/CREAT SERPL: 15.7 (ref 7–25)
CALCIUM SPEC-SCNC: 9.8 MG/DL (ref 8.6–10.5)
CHLORIDE SERPL-SCNC: 100 MMOL/L (ref 98–107)
CO2 SERPL-SCNC: 24 MMOL/L (ref 22–29)
CREAT BLD-MCNC: 0.89 MG/DL (ref 0.76–1.27)
DEPRECATED RDW RBC AUTO: 40.2 FL (ref 37–54)
EOSINOPHIL # BLD AUTO: 0.07 10*3/MM3 (ref 0–0.7)
EOSINOPHIL NFR BLD AUTO: 0.6 % (ref 0.3–6.2)
ERYTHROCYTE [DISTWIDTH] IN BLOOD BY AUTOMATED COUNT: 12.3 % (ref 11.5–14.5)
GFR SERPL CREATININE-BSD FRML MDRD: 98 ML/MIN/1.73
GLOBULIN UR ELPH-MCNC: 3.5 GM/DL
GLUCOSE BLD-MCNC: 102 MG/DL (ref 65–99)
HCT VFR BLD AUTO: 39.4 % (ref 40.4–52.2)
HGB BLD-MCNC: 13.1 G/DL (ref 13.7–17.6)
IMM GRANULOCYTES # BLD: 0.04 10*3/MM3 (ref 0–0.03)
IMM GRANULOCYTES NFR BLD: 0.4 % (ref 0–0.5)
LYMPHOCYTES # BLD AUTO: 2.76 10*3/MM3 (ref 0.9–4.8)
LYMPHOCYTES NFR BLD AUTO: 24.2 % (ref 19.6–45.3)
MCH RBC QN AUTO: 30 PG (ref 27–32.7)
MCHC RBC AUTO-ENTMCNC: 33.2 G/DL (ref 32.6–36.4)
MCV RBC AUTO: 90.4 FL (ref 79.8–96.2)
MONOCYTES # BLD AUTO: 0.9 10*3/MM3 (ref 0.2–1.2)
MONOCYTES NFR BLD AUTO: 7.9 % (ref 5–12)
NEUTROPHILS # BLD AUTO: 7.67 10*3/MM3 (ref 1.9–8.1)
NEUTROPHILS NFR BLD AUTO: 67.1 % (ref 42.7–76)
PLATELET # BLD AUTO: 365 10*3/MM3 (ref 140–500)
PMV BLD AUTO: 8.9 FL (ref 6–12)
POTASSIUM BLD-SCNC: 4.3 MMOL/L (ref 3.5–5.2)
PROT SERPL-MCNC: 7.8 G/DL (ref 6–8.5)
RBC # BLD AUTO: 4.36 10*6/MM3 (ref 4.6–6)
SODIUM BLD-SCNC: 138 MMOL/L (ref 136–145)
WBC NRBC COR # BLD: 11.42 10*3/MM3 (ref 4.5–10.7)

## 2018-07-04 PROCEDURE — 85025 COMPLETE CBC W/AUTO DIFF WBC: CPT | Performed by: PHYSICIAN ASSISTANT

## 2018-07-04 PROCEDURE — 36415 COLL VENOUS BLD VENIPUNCTURE: CPT

## 2018-07-04 PROCEDURE — 99283 EMERGENCY DEPT VISIT LOW MDM: CPT

## 2018-07-04 PROCEDURE — 80053 COMPREHEN METABOLIC PANEL: CPT | Performed by: PHYSICIAN ASSISTANT

## 2018-07-04 RX ORDER — POLYETHYLENE GLYCOL 3350 17 G/17G
17 POWDER, FOR SOLUTION ORAL DAILY
Qty: 10 EACH | Refills: 0 | Status: SHIPPED | OUTPATIENT
Start: 2018-07-04 | End: 2018-10-29

## 2018-07-04 NOTE — ED TRIAGE NOTES
2 weeks ago, pt had brain surgery.    Last week, pt diagnosed with PE and DVT.    Pt placed on pradaxa.    Today, pt had episode of rectal bleeding.

## 2018-07-04 NOTE — ED NOTES
Patient complaining of rectal bleeding when having bowel movement.  Has been constipated after having surgery two weeks ago.      Gely Alvarez, CADY  07/04/18 155       Gely Alvarez RN  07/04/18 1557

## 2018-07-04 NOTE — ED TRIAGE NOTES
Pt c/o bright red bleeding per rectum x1 today approx one hour PTA. Pt started Pradaxa two days ago d/t PE and DVT. Pt had brain tumor removed two weeks ago.

## 2018-07-04 NOTE — ED PROVIDER NOTES
" EMERGENCY DEPARTMENT ENCOUNTER    Room Number:  13/13  Date seen:  7/4/2018  Time seen: 3:33 PM  PCP: No Known Provider   Neurosurgeon: Dr. Akers/Dr. Galvan   Historian: Patient, Family      HPI:  Chief Complaint: Rectal Bleeding  Context: Bryan Valadez is a 32 y.o. male. Pt has h/o brain tumor for which pt is s/p craniotomy performed on 06/16/18. Pt was admitted 06/29/18-07/02/18 at this facility for development of PE/DVT secondary to the craniotomy. During the admission, pt was administered IV Heparin to treat for the PE/DVT. Pt reports that upon discharge from the hospital, pt was started on Pradaxa for his blood clots. Pt reports that he is also currently taking pain medicine. Pt reports that since initiating the pain medicine, pt has had constipation (last BM was about 3 days ago). Pt reports that at about 14:00 today, while pt was straining to have a bowel movement, pt noticed that there was bright red blood mixed with brown stool in the commode. Pt reports that he has had one episode of rectal bleeding thus far. Pt denies abdominal pain, N/V/D, hematemesis, chest pain, dyspnea, dizziness/lightheadedness, and generalized weakness. There are no other complaints at this time.      Location: Rectum  Radiation: None  Quality: \"bright red blood\" per rectum  Intensity/Severity: Moderate  Duration: Onset at about 14:00 today  Onset quality: Abrupt in onset  Timing: Intermittent   Progression: Not present currently   Aggravating Factors: Having a bowel movement, taking Pradaxa   Alleviating Factors: Not having a bowel movement   Previous Episodes: None  Treatment before arrival: None  Associated Symptoms: None         MEDICAL RECORD REVIEW    Pt has h/o brain tumor for which pt underwent craniotomy on 06/16/18, performed by Dr. Galvan, neurosurgeon.     Pt was admitted 06/29/18-07/02/18 for PE/DVT secondary to craniotomy. Per hospital course:  Mr. Valadez is a 32 y.o. non-smoker with a history of brain tumor s/p craniotomy " with resection on 6/16/18 who presented to [facility] initially complaining of pleuritic left sided chest pain as well as pain in left calf. Please see the admitting history and physical for further details. He was found to have acute LLE DVT and left sided PE w/ probable infarct and was admitted to the hospital for further evaluation and treatment. Due to his recent craniotomy we asked MATT to follow him and make sure he was stable for anticoagulation. He was initially treated with a heparin drip. Serial head CTs were obtained and he was watched closely with serial neurochecks. Fortunately he has done well and tolerated anticoagulation nicely. He was transitioned off IV heparin and on to Pradaxa. This was chosen as a reversing agent is considered readily available in most ERs.     He was seen by his surgeon, Dr. Galvan today and his mallory were removed. They discussed pathology reports. Outpatient follow up was arranged.    Pt's Hgb was 11.5 about 4 days ago.         PAST MEDICAL HISTORY  Active Ambulatory Problems     Diagnosis Date Noted   • Hyperlipidemia 03/29/2016   • Asthma 03/29/2016   • GERD (gastroesophageal reflux disease) 03/29/2016   • Primary brain tumor (CMS/HCC) 06/15/2018   • Pulmonary embolus (CMS/HCC) 06/29/2018   • Status post craniotomy 06/29/2018   • Pulmonary embolism with infarction (CMS/HCC) 06/29/2018   • Chronic deep vein thrombosis (DVT) of left popliteal vein (CMS/HCC) 06/29/2018   • Acute deep vein thrombosis (DVT) of popliteal vein of left lower extremity (CMS/HCC) 06/29/2018     Resolved Ambulatory Problems     Diagnosis Date Noted   • Leukocytosis 06/29/2018     Past Medical History:   Diagnosis Date   • Allergic rhinitis    • Asthma    • Chest pain    • DVT (deep venous thrombosis) (CMS/HCC)    • GERD (gastroesophageal reflux disease)    • H/O echocardiogram 2006   • History of ETT    • History of Holter monitoring    • Hyperlipidemia    • PE (pulmonary thromboembolism)  (CMS/HCC)          PAST SURGICAL HISTORY  Past Surgical History:   Procedure Laterality Date   • CRANIOTOMY FOR TUMOR Right 6/16/2018    Procedure: CRANIOTOMY FOR  RESECTION OF LARGE RIGHT FRONTAL MASS WITH AUGUSTIN NAVIGATION;  Surgeon: Chetan Galvan IV, MD;  Location: [facility] OR;  Service: Neurosurgery         FAMILY HISTORY  History reviewed. No pertinent family history.      SOCIAL HISTORY  Social History     Social History   • Marital status:      Spouse name: N/A   • Number of children: N/A   • Years of education: N/A     Occupational History   • Not on file.     Social History Main Topics   • Smoking status: Never Smoker   • Smokeless tobacco: Never Used   • Alcohol use Yes      Comment: Moderate   • Drug use: No   • Sexual activity: Defer     Other Topics Concern   • Not on file     Social History Narrative   • No narrative on file         ALLERGIES  Avocado; Other; and Tomato        REVIEW OF SYSTEMS  Review of Systems   Constitutional: Negative for chills.   HENT: Negative for congestion, rhinorrhea and sore throat.    Eyes: Negative for pain.   Respiratory: Negative for cough and shortness of breath.    Cardiovascular: Negative for chest pain, palpitations and leg swelling.   Gastrointestinal: Negative for abdominal pain, diarrhea, nausea and vomiting.        Rectal bleeding   Genitourinary: Negative for difficulty urinating, dysuria, flank pain and frequency.   Musculoskeletal: Negative for myalgias, neck pain and neck stiffness.   Skin: Negative for rash.   Neurological: Negative for dizziness, speech difficulty, weakness, light-headedness, numbness and headaches.   Psychiatric/Behavioral: Negative.    All other systems reviewed and are negative.           PHYSICAL EXAM  ED Triage Vitals   Temp Heart Rate Resp BP SpO2   07/04/18 1508 07/04/18 1508 07/04/18 1508 07/04/18 1513 07/04/18 1508   98.6 °F (37 °C) 95 16 135/90 98 %      Temp src Heart Rate Source Patient Position BP Location FiO2  (%)   07/04/18 1508 07/04/18 1508 07/04/18 1513 07/04/18 1513 --   Tympanic Monitor Sitting Right arm        Physical Exam   Constitutional: He is oriented to person, place, and time. No distress.   HENT:   Head: Normocephalic.   Mouth/Throat: Mucous membranes are normal.   Eyes: EOM are normal. Pupils are equal, round, and reactive to light.   Neck: Normal range of motion. Neck supple.   Cardiovascular: Normal rate, regular rhythm and normal heart sounds.    Pulmonary/Chest: Effort normal and breath sounds normal. No respiratory distress. He has no decreased breath sounds. He has no wheezes. He has no rhonchi. He has no rales.   Abdominal: Soft. There is no tenderness. There is no rebound and no guarding.   Genitourinary: Rectal exam shows guaiac positive stool (brown stools that are trace heme positive).   Genitourinary Comments: Hard stool present in the rectal vault   Musculoskeletal: Normal range of motion.   Neurological: He is alert and oriented to person, place, and time. He has normal sensation.   Skin: Skin is warm and dry.   Psychiatric: Mood and affect normal.   Nursing note and vitals reviewed.          LAB RESULTS  Recent Results (from the past 24 hour(s))   Comprehensive Metabolic Panel    Collection Time: 07/04/18  3:25 PM   Result Value Ref Range    Glucose 102 (H) 65 - 99 mg/dL    BUN 14 6 - 20 mg/dL    Creatinine 0.89 0.76 - 1.27 mg/dL    Sodium 138 136 - 145 mmol/L    Potassium 4.3 3.5 - 5.2 mmol/L    Chloride 100 98 - 107 mmol/L    CO2 24.0 22.0 - 29.0 mmol/L    Calcium 9.8 8.6 - 10.5 mg/dL    Total Protein 7.8 6.0 - 8.5 g/dL    Albumin 4.30 3.50 - 5.20 g/dL    ALT (SGPT) 54 (H) 1 - 41 U/L    AST (SGOT) 15 1 - 40 U/L    Alkaline Phosphatase 67 39 - 117 U/L    Total Bilirubin 0.2 0.1 - 1.2 mg/dL    eGFR Non African Amer 98 >60 mL/min/1.73    Globulin 3.5 gm/dL    A/G Ratio 1.2 g/dL    BUN/Creatinine Ratio 15.7 7.0 - 25.0    Anion Gap 14.0 mmol/L   CBC Auto Differential    Collection Time:  07/04/18  3:25 PM   Result Value Ref Range    WBC 11.42 (H) 4.50 - 10.70 10*3/mm3    RBC 4.36 (L) 4.60 - 6.00 10*6/mm3    Hemoglobin 13.1 (L) 13.7 - 17.6 g/dL    Hematocrit 39.4 (L) 40.4 - 52.2 %    MCV 90.4 79.8 - 96.2 fL    MCH 30.0 27.0 - 32.7 pg    MCHC 33.2 32.6 - 36.4 g/dL    RDW 12.3 11.5 - 14.5 %    RDW-SD 40.2 37.0 - 54.0 fl    MPV 8.9 6.0 - 12.0 fL    Platelets 365 140 - 500 10*3/mm3    Neutrophil % 67.1 42.7 - 76.0 %    Lymphocyte % 24.2 19.6 - 45.3 %    Monocyte % 7.9 5.0 - 12.0 %    Eosinophil % 0.6 0.3 - 6.2 %    Basophil % 0.2 0.0 - 1.5 %    Immature Grans % 0.4 0.0 - 0.5 %    Neutrophils, Absolute 7.67 1.90 - 8.10 10*3/mm3    Lymphocytes, Absolute 2.76 0.90 - 4.80 10*3/mm3    Monocytes, Absolute 0.90 0.20 - 1.20 10*3/mm3    Eosinophils, Absolute 0.07 0.00 - 0.70 10*3/mm3    Basophils, Absolute 0.02 0.00 - 0.20 10*3/mm3    Immature Grans, Absolute 0.04 (H) 0.00 - 0.03 10*3/mm3       Ordered the above labs and reviewed the results.          PROCEDURES  Procedures          MEDICATIONS GIVEN IN ER  Medications - No data to display          PROGRESS AND CONSULTS  ED Course as of Jul 04 1628 Wed Jul 04, 2018   1519 Pt recently started on Pradaxa for DVT/PE post craniotomy. Had no BM x 3 days, today strained to have a BM and had BRBPR with brown stool. Denies abdominal pain, hematuria, epistaxis, gum bleeding, n/v, any concerns. Exam - no distress, RRR, lungs clear, BP wnl, abdomen benign  [KA]   1618 4:18 PM  Patient with rectal bleeding after straining for 10 minutes.  Has been constipated.  Has minimal bleeding here with improved hemoglobin.  Most likely rectal fissure.  Stool itself is brown.  Will start on miralax.  Will keep on Pradaxa.  Understands to return for increase in bleeding.    [SL]      ED Course User Index  [KA] LORI Figueroa  [SL] Michele Briggs MD     3:43 PM:  Rectal exam was performed with female chaperone at pt's bedside - pt has brown stools that are trace heme positive ->  pt and family informed.     Blood work has been ordered for further evaluation.    4:14 PM:  Rechecked pt. Pt is resting comfortably and appears in no acute distress. Informed pt that his Hgb is 13.1 today as compared to about 4 days ago when pt's Hgb was 11.5. On the rectal exam, pt has hard stools present in the rectal vault. Pt was advised to continue taking the Pradaxa. Pt was cautioned that there may be mild rectal bleeding present with taking the Pradaxa as pt still has stool present in the rectal vault. Pt was strongly advised to RTER if his rectal bleeding becomes persistent and/or acutely worsens and if pt has dizziness/lightheadedness/dyspnea/generalized weakness/headache. Pt was also advised to f/u with PMD and neurosurgeon. Pt will be prescribed with rx for Miralax. Strict RTER warnings given. Pt agrees with plan for discharge.         MEDICAL DECISION MAKING      MDM  Number of Diagnoses or Management Options     Amount and/or Complexity of Data Reviewed  Clinical lab tests: ordered and reviewed (Hgb is 13.1. )  Decide to obtain previous medical records or to obtain history from someone other than the patient: yes    Patient Progress  Patient progress: stable             DIAGNOSIS  Final diagnoses:   Rectal bleeding         DISPOSITION  Pt discharged.      DISCHARGE    Patient discharged in stable condition.    Reviewed implications of results, diagnosis, meds, responsibility to follow up, warning signs and symptoms of possible worsening, potential complications and reasons to return to ER.    Patient/Family voiced understanding of above instructions.    Discussed plan for discharge, as there is no emergent indication for admission. Pt/family is agreeable and understands need for follow up and repeat testing. Pt is aware that discharge does not mean that nothing is wrong but it indicates no emergency is present that requires admission and they must continue care with follow-up as given below or  physician of their choice.     FOLLOW-UP  your primary doctor    Schedule an appointment as soon as possible for a visit in 2 days          Current Discharge Medication List      START taking these medications    Details   polyethylene glycol (MIRALAX) packet Take 17 g by mouth Daily.  Qty: 10 each, Refills: 0                 Latest Documented Vital Signs:  As of 4:22 PM  BP- 135/90 HR- 95 Temp- 98.6 °F (37 °C) (Tympanic) O2 sat- 98%        --  Documentation assistance provided by gabino Caceres for Dr. Brigitte MD.  Information recorded by the scribe was done at my direction and has been verified and validated by me.           Nikhil Caceres  07/04/18 1628       Michele Briggs MD  07/04/18 2013

## 2018-07-06 ENCOUNTER — TELEPHONE (OUTPATIENT)
Dept: NEUROSURGERY | Facility: CLINIC | Age: 33
End: 2018-07-06

## 2018-07-06 NOTE — TELEPHONE ENCOUNTER
"I reviewed the pictures.  Please tell them it's nothing to be concerned about at this time.  This likely just changes in facial muscle after surgery that make bony prominences more apparent.  It's possible to see divots and bulges where there is hardware \"plates/screws\" especially once swelling resolves.  Patient should call back if the area is enlarging, becomes red, or painful.  "

## 2018-07-06 NOTE — TELEPHONE ENCOUNTER
Patient had surgery on 6/16 for craniotomy for resection of large right frontal mass.      Pt 's wife Mary is calling because pt went to the ER  At [facility] on Wednesday. He went because  he had some rectal bleeding. He also has a bump on his right forehead that is very hard. The ER did not seem concerned about the bump, just to call us. The bleeding they said was from the blood thinners.  They ran some tests for the bleeding and all came back normal. Pt's home health nurse said to call us because of the bump on his fore head.  Mary  [phone]

## 2018-07-06 NOTE — TELEPHONE ENCOUNTER
I called the patient and spoke with his wife. She says that the ER doctor at [facility] looked at the bump and said he could not do anything about it, that he would have to follow up with Dr. Galvan. She says it is not red, warm, or sensitive to touch. Denies dizziness, nausea, or vomiting. No vision issues.

## 2018-07-06 NOTE — TELEPHONE ENCOUNTER
Have wife send pictures. When did it show up? Is it on the incision or on the forehead? Is it growing?

## 2018-07-06 NOTE — TELEPHONE ENCOUNTER
"Above right eyebrow, started Wednesday, has remained same - feels \"hard, like bone\". Wife sending photo  "

## 2018-07-07 ENCOUNTER — APPOINTMENT (OUTPATIENT)
Dept: CARDIOLOGY | Facility: HOSPITAL | Age: 33
End: 2018-07-07

## 2018-07-07 ENCOUNTER — HOSPITAL ENCOUNTER (OUTPATIENT)
Facility: HOSPITAL | Age: 33
Setting detail: OBSERVATION
Discharge: HOME | End: 2018-07-09
Attending: EMERGENCY MEDICINE | Admitting: NURSE PRACTITIONER

## 2018-07-07 DIAGNOSIS — Z79.01 CHRONIC ANTICOAGULATION: ICD-10-CM

## 2018-07-07 DIAGNOSIS — I82.412 ACUTE DEEP VEIN THROMBOSIS (DVT) OF FEMORAL VEIN OF LEFT LOWER EXTREMITY (HCC): Primary | ICD-10-CM

## 2018-07-07 LAB
ALBUMIN SERPL-MCNC: 4.1 G/DL (ref 3.5–5.2)
ALBUMIN/GLOB SERPL: 1.4 G/DL
ALP SERPL-CCNC: 70 U/L (ref 39–117)
ALT SERPL W P-5'-P-CCNC: 29 U/L (ref 1–41)
ANION GAP SERPL CALCULATED.3IONS-SCNC: 13.3 MMOL/L
APTT PPP: 33.5 SECONDS (ref 22.7–35.4)
AST SERPL-CCNC: 10 U/L (ref 1–40)
BASOPHILS # BLD AUTO: 0.01 10*3/MM3 (ref 0–0.2)
BASOPHILS NFR BLD AUTO: 0.1 % (ref 0–1.5)
BILIRUB SERPL-MCNC: 0.3 MG/DL (ref 0.1–1.2)
BUN BLD-MCNC: 7 MG/DL (ref 6–20)
BUN/CREAT SERPL: 9.6 (ref 7–25)
CALCIUM SPEC-SCNC: 9.6 MG/DL (ref 8.6–10.5)
CHLORIDE SERPL-SCNC: 102 MMOL/L (ref 98–107)
CO2 SERPL-SCNC: 24.7 MMOL/L (ref 22–29)
CREAT BLD-MCNC: 0.73 MG/DL (ref 0.76–1.27)
DEPRECATED RDW RBC AUTO: 41.3 FL (ref 37–54)
EOSINOPHIL # BLD AUTO: 0.05 10*3/MM3 (ref 0–0.7)
EOSINOPHIL NFR BLD AUTO: 0.5 % (ref 0.3–6.2)
ERYTHROCYTE [DISTWIDTH] IN BLOOD BY AUTOMATED COUNT: 12.2 % (ref 11.5–14.5)
GFR SERPL CREATININE-BSD FRML MDRD: 123 ML/MIN/1.73
GLOBULIN UR ELPH-MCNC: 3 GM/DL
GLUCOSE BLD-MCNC: 103 MG/DL (ref 65–99)
HCT VFR BLD AUTO: 40.1 % (ref 40.4–52.2)
HGB BLD-MCNC: 12.9 G/DL (ref 13.7–17.6)
IMM GRANULOCYTES # BLD: 0.04 10*3/MM3 (ref 0–0.03)
IMM GRANULOCYTES NFR BLD: 0.4 % (ref 0–0.5)
INR PPP: 1.18 (ref 0.9–1.1)
LYMPHOCYTES # BLD AUTO: 1.7 10*3/MM3 (ref 0.9–4.8)
LYMPHOCYTES NFR BLD AUTO: 17.7 % (ref 19.6–45.3)
MCH RBC QN AUTO: 29.6 PG (ref 27–32.7)
MCHC RBC AUTO-ENTMCNC: 32.2 G/DL (ref 32.6–36.4)
MCV RBC AUTO: 92 FL (ref 79.8–96.2)
MONOCYTES # BLD AUTO: 1.02 10*3/MM3 (ref 0.2–1.2)
MONOCYTES NFR BLD AUTO: 10.6 % (ref 5–12)
NEUTROPHILS # BLD AUTO: 6.77 10*3/MM3 (ref 1.9–8.1)
NEUTROPHILS NFR BLD AUTO: 70.7 % (ref 42.7–76)
PLATELET # BLD AUTO: 312 10*3/MM3 (ref 140–500)
PMV BLD AUTO: 9.3 FL (ref 6–12)
POTASSIUM BLD-SCNC: 3.8 MMOL/L (ref 3.5–5.2)
PROT SERPL-MCNC: 7.1 G/DL (ref 6–8.5)
PROTHROMBIN TIME: 14.8 SECONDS (ref 11.7–14.2)
RBC # BLD AUTO: 4.36 10*6/MM3 (ref 4.6–6)
SODIUM BLD-SCNC: 140 MMOL/L (ref 136–145)
WBC NRBC COR # BLD: 9.59 10*3/MM3 (ref 4.5–10.7)

## 2018-07-07 PROCEDURE — 85730 THROMBOPLASTIN TIME PARTIAL: CPT | Performed by: EMERGENCY MEDICINE

## 2018-07-07 PROCEDURE — 25010000002 ENOXAPARIN PER 10 MG: Performed by: EMERGENCY MEDICINE

## 2018-07-07 PROCEDURE — G0378 HOSPITAL OBSERVATION PER HR: HCPCS

## 2018-07-07 PROCEDURE — 85025 COMPLETE CBC W/AUTO DIFF WBC: CPT | Performed by: EMERGENCY MEDICINE

## 2018-07-07 PROCEDURE — 93971 EXTREMITY STUDY: CPT

## 2018-07-07 PROCEDURE — 80053 COMPREHEN METABOLIC PANEL: CPT | Performed by: EMERGENCY MEDICINE

## 2018-07-07 PROCEDURE — 99284 EMERGENCY DEPT VISIT MOD MDM: CPT

## 2018-07-07 PROCEDURE — 96372 THER/PROPH/DIAG INJ SC/IM: CPT

## 2018-07-07 PROCEDURE — 85610 PROTHROMBIN TIME: CPT | Performed by: EMERGENCY MEDICINE

## 2018-07-07 RX ORDER — SODIUM CHLORIDE 0.9 % (FLUSH) 0.9 %
10 SYRINGE (ML) INJECTION AS NEEDED
Status: DISCONTINUED | OUTPATIENT
Start: 2018-07-07 | End: 2018-07-07

## 2018-07-07 RX ORDER — SODIUM CHLORIDE 0.9 % (FLUSH) 0.9 %
10 SYRINGE (ML) INJECTION AS NEEDED
Status: DISCONTINUED | OUTPATIENT
Start: 2018-07-07 | End: 2018-07-09 | Stop reason: HOSPADM

## 2018-07-07 RX ADMIN — ENOXAPARIN SODIUM 90 MG: 100 INJECTION SUBCUTANEOUS at 21:16

## 2018-07-07 NOTE — ED TRIAGE NOTES
Brain sx 2 weeks ago.  Last week dx c dvt in left leg behind knee.  Is on pradaxa.  Today left leg swelling

## 2018-07-07 NOTE — ED PROVIDER NOTES
EMERGENCY DEPARTMENT ENCOUNTER    CHIEF COMPLAINT  Chief Complaint: left leg pain   History given by:patient  History limited by:none     HPI:  Pt is a 32 y.o. male who presents with a cc of left leg pain.  The patient states he had brain surgery 2 weeks ago for brain tumor removal purposes.  He states he developed a PE and a DVT shortly in the postoperative period.  He states when he was diagnosed with the blood clot in his left leg it was pain and swelling in his calf.  He states the pain has moved up into his knee and medial thigh in the last day.  He was prescribed Robaxin for treatment of the blood clots which she has been taking regularly as prescribed.  He denies any additional injury or any falls.  He denies any fever, nausea, vomiting.  No seizures or any additional complaints.  Wife at the bedside concerned b/c she states pradaxa didn't work for her grandmother and she is worried he has another blood clot.     MEDICAL RECORD REVIEW      PAST MEDICAL HISTORY  Active Ambulatory Problems     Diagnosis Date Noted   • Hyperlipidemia 03/29/2016   • Asthma 03/29/2016   • GERD (gastroesophageal reflux disease) 03/29/2016   • Primary brain tumor (CMS/HCC) 06/15/2018   • Pulmonary embolus (CMS/HCC) 06/29/2018   • Status post craniotomy 06/29/2018   • Pulmonary embolism with infarction (CMS/HCC) 06/29/2018   • Chronic deep vein thrombosis (DVT) of left popliteal vein (CMS/HCC) 06/29/2018   • Acute deep vein thrombosis (DVT) of popliteal vein of left lower extremity (CMS/HCC) 06/29/2018     Resolved Ambulatory Problems     Diagnosis Date Noted   • Leukocytosis 06/29/2018     Past Medical History:   Diagnosis Date   • Allergic rhinitis    • Asthma    • Chest pain    • DVT (deep venous thrombosis) (CMS/HCC)    • GERD (gastroesophageal reflux disease)    • H/O echocardiogram 2006   • History of ETT    • History of Holter monitoring    • Hyperlipidemia    • PE (pulmonary thromboembolism) (CMS/HCC)        PAST SURGICAL  HISTORY  Past Surgical History:   Procedure Laterality Date   • CRANIOTOMY FOR TUMOR Right 6/16/2018    Procedure: CRANIOTOMY FOR  RESECTION OF LARGE RIGHT FRONTAL MASS WITH AUGUSTIN NAVIGATION;  Surgeon: Chetan Galvan IV, MD;  Location: [facility];  Service: Neurosurgery       FAMILY HISTORY  History reviewed. No pertinent family history.    SOCIAL HISTORY  Social History     Social History   • Marital status:      Spouse name: N/A   • Number of children: N/A   • Years of education: N/A     Occupational History   • Not on file.     Social History Main Topics   • Smoking status: Never Smoker   • Smokeless tobacco: Never Used   • Alcohol use Yes      Comment: Moderate   • Drug use: No   • Sexual activity: Defer     Other Topics Concern   • Not on file     Social History Narrative   • No narrative on file       ALLERGIES  Avocado; Other; and Tomato    REVIEW OF SYSTEMS  Review of Systems   Constitutional: Negative for fatigue and fever.   Respiratory: Negative for shortness of breath.    Cardiovascular: Negative for chest pain.   Musculoskeletal: Positive for joint swelling.   Skin: Negative for color change, rash and wound.       PHYSICAL EXAM  ED Triage Vitals   Temp Heart Rate Resp BP SpO2   07/07/18 1649 07/07/18 1649 07/07/18 1649 07/07/18 1657 07/07/18 1649   98.8 °F (37.1 °C) 120 16 144/93 100 %      Temp src Heart Rate Source Patient Position BP Location FiO2 (%)   07/07/18 1649 07/07/18 1649 07/07/18 1657 07/07/18 1657 --   Tympanic Monitor Sitting Right arm        Physical Exam   Constitutional: He is well-developed, well-nourished, and in no distress.   HENT:   Head: Normocephalic and atraumatic.   Cardiovascular: Normal rate and regular rhythm.    Pulses:       Dorsalis pedis pulses are 2+ on the left side.        Posterior tibial pulses are 2+ on the left side.   Pulmonary/Chest: Effort normal and breath sounds normal.   Musculoskeletal:   LLE: No erythema no focal tenderness of the LLE.  Trace edema of the calf in comparison to the RLE. Dp and PT pulse 2+     Progress Notes:       1715 Offered pt pain medication. He declines.   1930 Dr Zhu assumed care of pt.        Bonita Carvalho, APRN  07/13/18 7000

## 2018-07-07 NOTE — ED PROVIDER NOTES
Pt presents to the ED due to LLE swelling and pain to thigh and posterior knee area. Pt has recent hx of DVT/PE following brain surgery 2 weeks ago. Pt confirms compliance with Pradaxa, but states pain has worsened since dx of DVT. Pt denies trauma, fever, chest pain, and SOA.     On exam, pt has 2+ left DP pulse with tenderness to L thigh.     I agree with midlevel plan to consult hematology and oncology.     1943: Call placed to hematology.     1950: Discussed pt's case with Dr. Jefferson (Hematology) who requested plan to give pt shot of Lovenox and admit for further workup.     1952: Pt rechecked and resting comfortably, informed of plan for admission for further treatment of DVT. Pt understands and agrees with plan, all questions addressed. Call placed to A. Lovenox ordered.    2018: Discussed pt's case with Dr. Lawrence ([facility]) who agreed to admit pt to telemetry for further observation.     The BABITA and I have discussed this patient's history, physical exam, and treatment plan.  I have reviewed the documentation and personally had a face to face interaction with the patient. I affirm the documentation and agree with the treatment and plan.  The attached note describes my personal findings.    Documentation assistance provided by gabino Donovan for Dr. Zhu.  Information recorded by the scribe was done at my direction and has been verified and validated by me.       Silvia Donovan  07/07/18 2019       Sanya Zhu II, MD  07/08/18 2779

## 2018-07-07 NOTE — PROGRESS NOTES
Vascular lab left lower extremity venous doppler complete, prelim new dvt left leg in thigh which is progression compared to prior study done 6/29/18 - Dr Zhu aware

## 2018-07-08 ENCOUNTER — APPOINTMENT (OUTPATIENT)
Dept: CARDIOLOGY | Facility: HOSPITAL | Age: 33
End: 2018-07-08
Attending: INTERNAL MEDICINE

## 2018-07-08 VITALS
OXYGEN SATURATION: 98 % | BODY MASS INDEX: 23.4 KG/M2 | RESPIRATION RATE: 20 BRPM | WEIGHT: 167.13 LBS | HEART RATE: 86 BPM | HEIGHT: 71 IN | SYSTOLIC BLOOD PRESSURE: 120 MMHG | DIASTOLIC BLOOD PRESSURE: 78 MMHG | TEMPERATURE: 98.9 F

## 2018-07-08 PROBLEM — Z78.9 FAILURE OF OUTPATIENT TREATMENT: Status: ACTIVE | Noted: 2018-07-08

## 2018-07-08 PROBLEM — D68.51 FACTOR 5 LEIDEN MUTATION, HETEROZYGOUS: Status: ACTIVE | Noted: 2018-07-08

## 2018-07-08 PROBLEM — Z86.711 HISTORY OF PULMONARY EMBOLUS (PE): Status: ACTIVE | Noted: 2018-07-08

## 2018-07-08 LAB
BH CV LOW VAS LEFT DISTAL FEMORAL SPONT: 1
BH CV LOW VAS LEFT GASTRONEMIUS VESSEL: 1
BH CV LOW VAS LEFT MID FEMORAL SPONT: 1
BH CV LOW VAS LEFT PERONEAL VESSEL: 1
BH CV LOW VAS LEFT POPLITEAL SPONT: 1
BH CV LOW VAS LEFT POSTERIOR TIBIAL VESSEL: 1
BH CV LOWER VASCULAR LEFT COMMON FEMORAL AUGMENT: NORMAL
BH CV LOWER VASCULAR LEFT COMMON FEMORAL COMPETENT: NORMAL
BH CV LOWER VASCULAR LEFT COMMON FEMORAL COMPRESS: NORMAL
BH CV LOWER VASCULAR LEFT COMMON FEMORAL PHASIC: NORMAL
BH CV LOWER VASCULAR LEFT COMMON FEMORAL SPONT: NORMAL
BH CV LOWER VASCULAR LEFT DISTAL FEMORAL COMPRESS: NORMAL
BH CV LOWER VASCULAR LEFT DISTAL FEMORAL PHASIC: NORMAL
BH CV LOWER VASCULAR LEFT DISTAL FEMORAL SPONT: NORMAL
BH CV LOWER VASCULAR LEFT DISTAL FEMORAL THROMBUS: NORMAL
BH CV LOWER VASCULAR LEFT GASTRONEMIUS COMPRESS: NORMAL
BH CV LOWER VASCULAR LEFT GASTRONEMIUS THROMBUS: NORMAL
BH CV LOWER VASCULAR LEFT GREATER SAPH AK COMPRESS: NORMAL
BH CV LOWER VASCULAR LEFT GREATER SAPH BK COMPRESS: NORMAL
BH CV LOWER VASCULAR LEFT LESSER SAPH COMPRESS: NORMAL
BH CV LOWER VASCULAR LEFT MID FEMORAL COMPRESS: NORMAL
BH CV LOWER VASCULAR LEFT MID FEMORAL PHASIC: NORMAL
BH CV LOWER VASCULAR LEFT MID FEMORAL SPONT: NORMAL
BH CV LOWER VASCULAR LEFT MID FEMORAL THROMBUS: NORMAL
BH CV LOWER VASCULAR LEFT PERONEAL COMPRESS: NORMAL
BH CV LOWER VASCULAR LEFT PERONEAL THROMBUS: NORMAL
BH CV LOWER VASCULAR LEFT POPLITEAL COMPRESS: NORMAL
BH CV LOWER VASCULAR LEFT POPLITEAL PHASIC: NORMAL
BH CV LOWER VASCULAR LEFT POPLITEAL SPONT: NORMAL
BH CV LOWER VASCULAR LEFT POPLITEAL THROMBUS: NORMAL
BH CV LOWER VASCULAR LEFT POSTERIOR TIBIAL COMPRESS: NORMAL
BH CV LOWER VASCULAR LEFT POSTERIOR TIBIAL THROMBUS: NORMAL
BH CV LOWER VASCULAR LEFT PROXIMAL FEMORAL COMPRESS: NORMAL
BH CV LOWER VASCULAR LEFT SAPHENOFEMORAL JUNCTION AUGMENT: NORMAL
BH CV LOWER VASCULAR LEFT SAPHENOFEMORAL JUNCTION COMPETENT: NORMAL
BH CV LOWER VASCULAR LEFT SAPHENOFEMORAL JUNCTION COMPRESS: NORMAL
BH CV LOWER VASCULAR LEFT SAPHENOFEMORAL JUNCTION PHASIC: NORMAL
BH CV LOWER VASCULAR LEFT SAPHENOFEMORAL JUNCTION SPONT: NORMAL
BH CV LOWER VASCULAR RIGHT COMMON FEMORAL AUGMENT: NORMAL
BH CV LOWER VASCULAR RIGHT COMMON FEMORAL COMPETENT: NORMAL
BH CV LOWER VASCULAR RIGHT COMMON FEMORAL COMPRESS: NORMAL
BH CV LOWER VASCULAR RIGHT COMMON FEMORAL PHASIC: NORMAL
BH CV LOWER VASCULAR RIGHT COMMON FEMORAL SPONT: NORMAL
F5 GENE MUT ANL BLD/T: ABNORMAL
FACTOR II, DNA ANALYSIS: NORMAL

## 2018-07-08 PROCEDURE — 93971 EXTREMITY STUDY: CPT

## 2018-07-08 PROCEDURE — G0378 HOSPITAL OBSERVATION PER HR: HCPCS

## 2018-07-08 PROCEDURE — 81241 F5 GENE: CPT | Performed by: INTERNAL MEDICINE

## 2018-07-08 PROCEDURE — 99255 IP/OBS CONSLTJ NEW/EST HI 80: CPT | Performed by: INTERNAL MEDICINE

## 2018-07-08 PROCEDURE — 85305 CLOT INHIBIT PROT S TOTAL: CPT | Performed by: INTERNAL MEDICINE

## 2018-07-08 PROCEDURE — 85613 RUSSELL VIPER VENOM DILUTED: CPT | Performed by: INTERNAL MEDICINE

## 2018-07-08 PROCEDURE — 85732 THROMBOPLASTIN TIME PARTIAL: CPT | Performed by: INTERNAL MEDICINE

## 2018-07-08 PROCEDURE — 83090 ASSAY OF HOMOCYSTEINE: CPT | Performed by: INTERNAL MEDICINE

## 2018-07-08 PROCEDURE — 96372 THER/PROPH/DIAG INJ SC/IM: CPT

## 2018-07-08 PROCEDURE — 85705 THROMBOPLASTIN INHIBITION: CPT | Performed by: INTERNAL MEDICINE

## 2018-07-08 PROCEDURE — 85300 ANTITHROMBIN III ACTIVITY: CPT | Performed by: INTERNAL MEDICINE

## 2018-07-08 PROCEDURE — 85302 CLOT INHIBIT PROT C ANTIGEN: CPT | Performed by: INTERNAL MEDICINE

## 2018-07-08 PROCEDURE — 25010000002 ENOXAPARIN PER 10 MG: Performed by: INTERNAL MEDICINE

## 2018-07-08 PROCEDURE — 81240 F2 GENE: CPT | Performed by: INTERNAL MEDICINE

## 2018-07-08 PROCEDURE — 86148 ANTI-PHOSPHOLIPID ANTIBODY: CPT | Performed by: INTERNAL MEDICINE

## 2018-07-08 PROCEDURE — 85670 THROMBIN TIME PLASMA: CPT | Performed by: INTERNAL MEDICINE

## 2018-07-08 RX ORDER — ONDANSETRON 4 MG/1
4 TABLET, FILM COATED ORAL EVERY 6 HOURS PRN
Status: DISCONTINUED | OUTPATIENT
Start: 2018-07-08 | End: 2018-07-09 | Stop reason: HOSPADM

## 2018-07-08 RX ORDER — FAMOTIDINE 40 MG/1
40 TABLET, FILM COATED ORAL DAILY
Status: DISCONTINUED | OUTPATIENT
Start: 2018-07-08 | End: 2018-07-09 | Stop reason: HOSPADM

## 2018-07-08 RX ORDER — ONDANSETRON 2 MG/ML
4 INJECTION INTRAMUSCULAR; INTRAVENOUS EVERY 6 HOURS PRN
Status: DISCONTINUED | OUTPATIENT
Start: 2018-07-08 | End: 2018-07-09 | Stop reason: HOSPADM

## 2018-07-08 RX ORDER — HYDROCODONE BITARTRATE AND ACETAMINOPHEN 5; 325 MG/1; MG/1
1 TABLET ORAL EVERY 6 HOURS PRN
Status: DISCONTINUED | OUTPATIENT
Start: 2018-07-08 | End: 2018-07-09 | Stop reason: HOSPADM

## 2018-07-08 RX ORDER — DOCUSATE SODIUM 100 MG/1
100 CAPSULE, LIQUID FILLED ORAL 2 TIMES DAILY PRN
Status: DISCONTINUED | OUTPATIENT
Start: 2018-07-08 | End: 2018-07-09 | Stop reason: HOSPADM

## 2018-07-08 RX ORDER — LEVETIRACETAM 500 MG/1
1000 TABLET ORAL EVERY 12 HOURS SCHEDULED
Status: DISCONTINUED | OUTPATIENT
Start: 2018-07-08 | End: 2018-07-09 | Stop reason: HOSPADM

## 2018-07-08 RX ORDER — SODIUM CHLORIDE 0.9 % (FLUSH) 0.9 %
1-10 SYRINGE (ML) INJECTION AS NEEDED
Status: DISCONTINUED | OUTPATIENT
Start: 2018-07-08 | End: 2018-07-09 | Stop reason: HOSPADM

## 2018-07-08 RX ORDER — MONTELUKAST SODIUM 10 MG/1
10 TABLET ORAL DAILY
Status: DISCONTINUED | OUTPATIENT
Start: 2018-07-08 | End: 2018-07-09 | Stop reason: HOSPADM

## 2018-07-08 RX ORDER — DEXAMETHASONE 2 MG/1
2 TABLET ORAL
Status: DISCONTINUED | OUTPATIENT
Start: 2018-07-08 | End: 2018-07-09 | Stop reason: HOSPADM

## 2018-07-08 RX ORDER — BACLOFEN 10 MG/1
10 TABLET ORAL 3 TIMES DAILY PRN
Status: DISCONTINUED | OUTPATIENT
Start: 2018-07-08 | End: 2018-07-09 | Stop reason: HOSPADM

## 2018-07-08 RX ORDER — ACETAMINOPHEN 325 MG/1
650 TABLET ORAL EVERY 4 HOURS PRN
Status: DISCONTINUED | OUTPATIENT
Start: 2018-07-08 | End: 2018-07-09 | Stop reason: HOSPADM

## 2018-07-08 RX ORDER — ONDANSETRON 4 MG/1
4 TABLET, ORALLY DISINTEGRATING ORAL EVERY 6 HOURS PRN
Status: DISCONTINUED | OUTPATIENT
Start: 2018-07-08 | End: 2018-07-09 | Stop reason: HOSPADM

## 2018-07-08 RX ADMIN — DEXAMETHASONE 2 MG: 2 TABLET ORAL at 07:58

## 2018-07-08 RX ADMIN — DEXAMETHASONE 2 MG: 2 TABLET ORAL at 13:37

## 2018-07-08 RX ADMIN — DEXAMETHASONE 2 MG: 2 TABLET ORAL at 19:03

## 2018-07-08 RX ADMIN — ENOXAPARIN SODIUM 80 MG: 80 INJECTION SUBCUTANEOUS at 21:15

## 2018-07-08 RX ADMIN — ENOXAPARIN SODIUM 80 MG: 80 INJECTION SUBCUTANEOUS at 08:01

## 2018-07-08 RX ADMIN — MONTELUKAST 10 MG: 10 TABLET, FILM COATED ORAL at 08:01

## 2018-07-08 RX ADMIN — LEVETIRACETAM 1000 MG: 500 TABLET, FILM COATED ORAL at 21:15

## 2018-07-08 RX ADMIN — LEVETIRACETAM 1000 MG: 500 TABLET, FILM COATED ORAL at 08:00

## 2018-07-08 RX ADMIN — FAMOTIDINE 40 MG: 40 TABLET, FILM COATED ORAL at 08:01

## 2018-07-08 NOTE — PLAN OF CARE
Problem: Patient Care Overview  Goal: Plan of Care Review  Outcome: Ongoing (interventions implemented as appropriate)   07/07/18 2315 07/08/18 0522   Plan of Care Review   Progress --  no change   OTHER   Outcome Summary --  Pt admitted from the ER to [facility] with c/o lower left leg pain. Pt found to have a DVT in left lower leg. Called consult to Dr. Arenas with hematology (plans to see pt in AM). Continue to monitor.    Coping/Psychosocial   Plan of Care Reviewed With patient;family --      Goal: Individualization and Mutuality  Outcome: Ongoing (interventions implemented as appropriate)    Goal: Discharge Needs Assessment  Outcome: Ongoing (interventions implemented as appropriate)      Problem: Pain, Acute (Adult)  Goal: Identify Related Risk Factors and Signs and Symptoms  Outcome: Ongoing (interventions implemented as appropriate)    Goal: Acceptable Pain Control/Comfort Level  Outcome: Ongoing (interventions implemented as appropriate)      Problem: VTE, DVT and PE (Adult)  Goal: Signs and Symptoms of Listed Potential Problems Will be Absent, Minimized or Managed (VTE, DVT and PE)  Outcome: Ongoing (interventions implemented as appropriate)

## 2018-07-08 NOTE — CONSULTS
[facility] GROUP INITIAL INPATIENT CONSULTATION NOTE    REASON FOR CONSULTATION:    1. LLE DVT and PE with progression of symptoms and extension of the LLE DVT into the femoral vein from the popliteal/calf veins while on Pradaxa  2. 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 male who we are asked to see today in consultation for the above issues.    He had about 6 months of headaches treated as sinusitis and presented on 6/15 with visual changes and headache and was found to have a large right frontal mass which was resected on 6/16 by Dr. Galvan and consistent with a glioma.  Pathology was reviewed at [facility] showing infiltrating glioma.  Large 10 cm partially cystic and solid lobulated tumor mass at diagnosis.  Negative IDH1-R132H immunostain excludes the most common IDH1 mutation; however loss of ATRX expression suggests possibility of glioma harboring another less common IDH1 mutation.  p53 overexpressed.  Ki67 10% but variable.    Chromosome microarray showed a complex molecular karyotype including loss of 1q, loss of 2q, gain of 7q, gain of 8q, loss of 9p, REBECCA of 17p, and gain of 18p.  These abnormalities are typically associated with  IDH-mutant astrocytic gliomas.  No 1p and 19q whole arm co-deletion was noted.  CT head on 7/1 c/w residual tumor circumscribing the resection cavity and a remote cerebellar hemisphere hemorrhage.      He was discharged and subsequently admitted with left leg pain and chest pain, with LLE popliteal and calf vein clot noted and bilateral small PE. He was treated with heparin and discharged on Pradaxa.    He developed worsening leg pain up into the thigh and came to the ER yesterday where a venous duplex showed progression of the clot to the femoral vein.  He was given Lovenox and admitted.    A partial thrombophilia evaluation has been ordered and he is a heterozygote for the  factor V Leiden mutation.    He generally feels well.  He denies any headache.  No vision changes.  Occasional memory problem or forgetting something.  No focal arm or leg weakness.       He does note some right thigh discomfort and desires a RLE venous duplex for that.  No bleeding.    Past Medical History:   Diagnosis Date   • Allergic rhinitis    • Asthma     Pulmonary Dr. Alexandra   • Chest pain    • DVT (deep venous thrombosis) (CMS/HCC)    • GERD (gastroesophageal reflux disease)    • H/O echocardiogram 2006   • History of ETT    • History of Holter monitoring    • Hyperlipidemia    • PE (pulmonary thromboembolism) (CMS/HCC)        Past Surgical History:   Procedure Laterality Date   • CRANIOTOMY FOR TUMOR Right 6/16/2018    Procedure: CRANIOTOMY FOR  RESECTION OF LARGE RIGHT FRONTAL MASS WITH AUGUSTIN NAVIGATION;  Surgeon: Chetan Galvan IV, MD;  Location: [facility];  Service: Neurosurgery       SOCIAL HISTORY:   reports that he has never smoked. He has never used smokeless tobacco. He reports that he does not drink alcohol or use drugs.  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  Lived in [place] for the past 5 years and recently moved back but was flying back and forth nearly weekly to [place].  Has a 1 y/o daughter.  .  Social EtOH use.      FAMILY HISTORY:  Maternal grandfather with AML.  Maternal uncle with bone cancer.      ALLERGIES:  Allergies   Allergen Reactions   • Avocado Itching   • Other Itching     Insect stings: Bee stings, throat swelling (has Epi pen)    Allergy to fruit, Avocado, Cherry Tomato (can have blue berries)   • Tomato Itching     Cherry tomato       MEDICATIONS:  As listed in the electronic medical record.    Review of Systems   Constitutional: Negative for appetite change, chills, fatigue, fever and unexpected weight change.   HENT: Negative for congestion, dental problem, ear pain, hearing loss, mouth sores, nosebleeds, postnasal drip, rhinorrhea, tinnitus and  "trouble swallowing.    Eyes: Negative.    Respiratory: Negative for cough, chest tightness, shortness of breath, wheezing and stridor.    Cardiovascular: Negative for chest pain, palpitations and leg swelling.   Gastrointestinal: Negative for abdominal distention, abdominal pain, blood in stool, constipation, diarrhea, nausea and vomiting.   Endocrine: Negative.    Genitourinary: Negative for decreased urine volume, difficulty urinating, dysuria, flank pain, frequency, hematuria, scrotal swelling, testicular pain and urgency.   Musculoskeletal: Negative for arthralgias, back pain and joint swelling.        Right and left leg pain in the thighs   Allergic/Immunologic: Positive for food allergies (fruit).   Neurological: Negative for dizziness, seizures, syncope, weakness, light-headedness, numbness and headaches.   Hematological: Negative for adenopathy. Does not bruise/bleed easily.   Psychiatric/Behavioral: Negative for confusion and sleep disturbance. The patient is not nervous/anxious.    All other systems reviewed and are negative.      Vitals:    07/07/18 2232 07/07/18 2329 07/08/18 0457 07/08/18 1157   BP: 120/83  118/81 127/87   BP Location: Right arm  Right arm Left arm   Patient Position: Lying  Sitting Sitting   Pulse: 93  80 94   Resp: 16  16 20   Temp: 98.6 °F (37 °C)  97.6 °F (36.4 °C) 99 °F (37.2 °C)   TempSrc: Oral  Oral Oral   SpO2: 100%  100% 100%   Weight: 86.2 kg (190 lb) 75.8 kg (167 lb 2 oz)     Height: 180.3 cm (70.98\")          Physical Exam   Constitutional: He is oriented to person, place, and time. He appears well-developed and well-nourished. No distress.   HENT:   Head: Normocephalic and atraumatic.   Mouth/Throat: Oropharynx is clear and moist.   Surgical incision healing nicely   Eyes: Conjunctivae and EOM are normal. Pupils are equal, round, and reactive to light.   Neck: Normal range of motion. Neck supple. No thyromegaly present.   Cardiovascular: Normal rate, regular rhythm and " normal heart sounds.  Exam reveals no gallop and no friction rub.    No murmur heard.  Pulmonary/Chest: Effort normal and breath sounds normal. No respiratory distress. He has no wheezes. He has no rales.   Abdominal: Soft. Bowel sounds are normal. He exhibits no distension and no mass. There is no tenderness. There is no rebound and no guarding.   Musculoskeletal: Normal range of motion. He exhibits no edema or tenderness.   Lymphadenopathy:     He has no cervical adenopathy.   Neurological: He is alert and oriented to person, place, and time. He has normal reflexes.   Mild dysmetria with right finger to nose   Skin: Skin is warm and dry. No rash noted. He is not diaphoretic.   Psychiatric: He has a normal mood and affect. His behavior is normal.   Nursing note and vitals reviewed.      DIAGNOSTIC DATA:  WBC   Date Value Ref Range Status   07/07/2018 9.59 4.50 - 10.70 10*3/mm3 Final     RBC   Date Value Ref Range Status   07/07/2018 4.36 (L) 4.60 - 6.00 10*6/mm3 Final     Hemoglobin   Date Value Ref Range Status   07/07/2018 12.9 (L) 13.7 - 17.6 g/dL Final     Hematocrit   Date Value Ref Range Status   07/07/2018 40.1 (L) 40.4 - 52.2 % Final     MCV   Date Value Ref Range Status   07/07/2018 92.0 79.8 - 96.2 fL Final     MCH   Date Value Ref Range Status   07/07/2018 29.6 27.0 - 32.7 pg Final     MCHC   Date Value Ref Range Status   07/07/2018 32.2 (L) 32.6 - 36.4 g/dL Final     RDW   Date Value Ref Range Status   07/07/2018 12.2 11.5 - 14.5 % Final     RDW-SD   Date Value Ref Range Status   07/07/2018 41.3 37.0 - 54.0 fl Final     MPV   Date Value Ref Range Status   07/07/2018 9.3 6.0 - 12.0 fL Final     Platelets   Date Value Ref Range Status   07/07/2018 312 140 - 500 10*3/mm3 Final     Neutrophil %   Date Value Ref Range Status   07/07/2018 70.7 42.7 - 76.0 % Final     Lymphocyte %   Date Value Ref Range Status   07/07/2018 17.7 (L) 19.6 - 45.3 % Final     Monocyte %   Date Value Ref Range Status   07/07/2018  10.6 5.0 - 12.0 % Final     Eosinophil %   Date Value Ref Range Status   07/07/2018 0.5 0.3 - 6.2 % Final     Basophil %   Date Value Ref Range Status   07/07/2018 0.1 0.0 - 1.5 % Final     Immature Grans %   Date Value Ref Range Status   07/07/2018 0.4 0.0 - 0.5 % Final     Neutrophils, Absolute   Date Value Ref Range Status   07/07/2018 6.77 1.90 - 8.10 10*3/mm3 Final     Lymphocytes, Absolute   Date Value Ref Range Status   07/07/2018 1.70 0.90 - 4.80 10*3/mm3 Final     Monocytes, Absolute   Date Value Ref Range Status   07/07/2018 1.02 0.20 - 1.20 10*3/mm3 Final     Eosinophils, Absolute   Date Value Ref Range Status   07/07/2018 0.05 0.00 - 0.70 10*3/mm3 Final     Basophils, Absolute   Date Value Ref Range Status   07/07/2018 0.01 0.00 - 0.20 10*3/mm3 Final     Immature Grans, Absolute   Date Value Ref Range Status   07/07/2018 0.04 (H) 0.00 - 0.03 10*3/mm3 Final       Glucose   Date Value Ref Range Status   07/07/2018 103 (H) 65 - 99 mg/dL Final     Sodium   Date Value Ref Range Status   07/07/2018 140 136 - 145 mmol/L Final     Potassium   Date Value Ref Range Status   07/07/2018 3.8 3.5 - 5.2 mmol/L Final     CO2   Date Value Ref Range Status   07/07/2018 24.7 22.0 - 29.0 mmol/L Final     Chloride   Date Value Ref Range Status   07/07/2018 102 98 - 107 mmol/L Final     Anion Gap   Date Value Ref Range Status   07/07/2018 13.3 mmol/L Final     Creatinine   Date Value Ref Range Status   07/07/2018 0.73 (L) 0.76 - 1.27 mg/dL Final     BUN   Date Value Ref Range Status   07/07/2018 7 6 - 20 mg/dL Final     BUN/Creatinine Ratio   Date Value Ref Range Status   07/07/2018 9.6 7.0 - 25.0 Final     Calcium   Date Value Ref Range Status   07/07/2018 9.6 8.6 - 10.5 mg/dL Final     eGFR Non  Amer   Date Value Ref Range Status   07/07/2018 123 >60 mL/min/1.73 Final     Alkaline Phosphatase   Date Value Ref Range Status   07/07/2018 70 39 - 117 U/L Final     Total Protein   Date Value Ref Range Status   07/07/2018  7.1 6.0 - 8.5 g/dL Final     ALT (SGPT)   Date Value Ref Range Status   07/07/2018 29 1 - 41 U/L Final     AST (SGOT)   Date Value Ref Range Status   07/07/2018 10 1 - 40 U/L Final     Total Bilirubin   Date Value Ref Range Status   07/07/2018 0.3 0.1 - 1.2 mg/dL Final     Albumin   Date Value Ref Range Status   07/07/2018 4.10 3.50 - 5.20 g/dL Final     Globulin   Date Value Ref Range Status   07/07/2018 3.0 gm/dL Final     A/G Ratio   Date Value Ref Range Status   07/07/2018 1.4 g/dL Final         IMAGING:  CT head images personally reviewed  Right frontal resection cavity.  Old cerebellar hemorrhage.        IMPRESSION:  Right frontal craniotomy with resection of underlying right frontal lobe  tumor, residual tumor circumscribing the resection cavity is noted.  Blood within the resection cavity is unchanged. Remote right cerebellar  hemisphere hemorrhage measures 0.8 cm, previously 1.2 cm.    I also reviewed MRI images from 6/15.    Assessment/Plan   ASSESSMENT:  This is a 32 y.o. male with:    1. Left leg DVT and left lung PE diagnosed on 6/29/2018  · Initially diagnosed on 6/29/2018 with DVT in the popliteal, posterior tibial, and gastronemeius/soleal veins  · Multiople small to moderate bilateral lower lobe thromboemboli with pulmonary infarction.    · Initially treated with heparin drip  · Discharged on 7/2 on Pradaxa  · LLE venous duplex on 7/7 with progression of his DVT proximally to the mid femoral vein  · Admitted for Lovenox, which he continues  · He is heterozygous for the Factor V Leiden mutation, a mild risk factor for VTE but not necessarily a risk factor for recurrent VTE.   · Recommend Lovenox for now  · Will d/w Dr. Galvan but suspect tomorrow will start warfarin and arrange f/u in my office for INRs.    2. Right frontal lobe brain tumor diagnosed on 6/15/2018   · Large 10 cm partially cystic and solid lobulated tumor mass at diagnosis  · Craniotomy with resection on 6/16 by Dr. Galvan   · Pathology  reviewed at [facility] consistent with glioma  · Olig2 positive  · Negative IDH1-R132H immunostain excluding the most common IDH1 mutation; however loss of ATRX expression suggests possibility of glioma harboring another less common IDH1 mutation (IDH1 mutation has better prognosis).  · p53 overexpressed  · Ki67 10% but variable  · Chromosome microarray showed a complex molecular karyotype including loss of 1q, loss of 2q, gain of 7q, gain of 8q, loss of 9p, REBECCA of 17p, and gain of 18p.  · These abnormalities are typically associated with  IDH-mutant astrocytic gliomas  · 1p and 19q whole arm co-deletion was NOT noted    · CT head on 7/1 c/w residual tumor circumscribing the resection cavity and a remote cerebellar hemisphere hemorrhage.    · ? If sequencing the IDH1 and IDH2 genes is being pursued at [place]  · Will communicate with Dr. Galvan  · Likely to be presented at the neuro conference  · Big decision is whether or not to pursue adjuvant therapy now or to wait until progression.  Molecular studies, predominantly whether there is an IDH mutation determines this.  The typical IDH mutation is NOT present according to the [place] pathology result, but in a couple of places there is mention that other features are c/w IDH mutated tumors.  Unclear if IDH genes are being sequenced, which is what is recommended.  Will need to find this out  3. He does have some right leg discomfort above the knee and is concerned about a RLE DVT.   · Duplex ordered      RECOMMENDATIONS/PLAN:   1. Continue Lovenox 1 mg/kg bid  2. Once I speak with Dr. Galvan tomorrow, anticipate starting warfarin  3. I gave them information about the factor V Leiden mutation.  50% chance he passed it on to his daughter.  4. I gave them the pathology results from [facility]  5. Will need to see if anything else such as IDH sequencing is pending  6. I will speak to Dr. Galvan tomorrow  7. RLE venous duplex ordered  8. I suspect he can be d/c tomorrow on  Lovenox/warfarin (typically 5 mg/day) and we will arrange f/u with me in a few weeks and f/u for INR checks if he goes on warfarin  9. Will need to decide whether or not to pursue any therapy for his glioma now or proceed with observation.  Will get outside opinions as well and it may be in his best interest to go to a tertiary care center for an opinion as well.      Sean Jefferson MD

## 2018-07-08 NOTE — PROGRESS NOTES
Continued Stay Note  [facility]     Patient Name: Bryan Valadez  MRN: [id number]  Today's Date: 7/8/2018    Admit Date: 7/7/2018          Discharge Plan     Row Name 07/08/18 6837       Plan    Plan Comments CCP consult for cost of Lovenox 80mg SQ BID for 7 days.  Spoke with Varun pharmacist at [phone] and states will need a Rx prior to giving patient's cost.  Spoke with CADY Gerardo and informed her that CCP will need to get patient's cost of Lovenox and she states she will call Dr. Jefferson to try to obtain Rx..... No Rx  currently in AVS or paper Rx...... Apple Randhawa RN,CCP               Discharge Codes    No documentation.           Apple Randhawa RN

## 2018-07-08 NOTE — ED NOTES
Dexamethasone 2 mg and levetiracetam 1000 mg taken at this time. MD Marko notified and confirms acceptable to take     Emily Jacobs RN  07/07/18 1759

## 2018-07-08 NOTE — CONSULTS
Adult Nutrition  Assessment/PES    Patient Name:  Bryan Valadez  YOB: 1985  MRN: [id number]  Admit Date:  7/7/2018    Assessment Date:  7/8/2018    Nutrition education provided regarding vitamin K interaction with coumadin medication. Gave education material for reference.           Reason for Assessment     Row Name 07/08/18 1332          Reason for Assessment    Reason For Assessment physician consult     Diagnosis   Primary Problem:  Acute deep vein thrombosis (DVT) of femoral vein of left lower extremity (CMS/HCC)      Identified At Risk by Screening Criteria need for education               Anthropometrics     Row Name 07/08/18 1339          Body Mass Index (BMI)    BMI Assessment BMI 18.5-24.9: normal             Labs/Tests/Procedures/Meds     Row Name 07/08/18 1339          Labs/Procedures/Meds    Lab Results Reviewed reviewed, pertinent        Diagnostic Tests/Procedures    Diagnostic Test/Procedure Reviewed reviewed, pertinent        Medications    Pertinent Medications Reviewed reviewed, pertinent     Pertinent Medications Comments coumadin               Estimated/Assessed Needs     Row Name 07/08/18 2302          Calculation Measurements    Weight Used For Calculations 75.8 kg (167 lb 1.7 oz)        Estimated/Assessed Needs    Additional Documentation KCAL/KG (Group);Protein Requirements (Group);Fluid Requirements (Group)        KCAL/KG                                                                kcal/kg (Specify) --   6319-6383        [place]-St. Jeor Equation    RMR ([place]-St. Jeor Equation) 1719.87        Protein Requirements    Est Protein Requirement Amount (gms/kg) 1.0 gm protein     Estimated Protein Requirements (gms/day) 75.8        Fluid Requirements    Estimated Fluid Requirements (mL/day) 1900     RDA Method (mL) 1900     Vance-Segar Method (over 20 kg) 3016             Nutrition Prescription Ordered     Row Name 07/08/18 1340          Nutrition Prescription PO     Current PO Diet Regular             Evaluation of Received Nutrient/Fluid Intake     Row Name 07/08/18 1340 07/08/18 1339       Calculation Measurements    Weight Used For Calculations  -- 75.8 kg (167 lb 1.7 oz)       PO Evaluation    Number of Meals 1  --    % PO Intake 100  --            Evaluation of Prescribed Nutrient/Fluid Intake     Row Name 07/08/18 1339          Calculation Measurements    Weight Used For Calculations 75.8 kg (167 lb 1.7 oz)           Problem/Interventions:        Problem 1     Row Name 07/08/18 1340          Nutrition Diagnoses Problem 1    Problem 1 Nutrition Appropriate for Condition at this Time                     Intervention Goal     Row Name 07/08/18 1340          Intervention Goal    General Maintain nutrition;Meet nutritional needs for age/condition     PO Tolerate PO;Maintain intake     Weight Maintain weight             Nutrition Intervention     Row Name 07/08/18 1340          Nutrition Intervention    RD/Tech Action Interview for preference;Follow Tx progress;Care plan reviewd               Education/Evaluation     Row Name 07/08/18 1340          Education    Education Provided education regarding     Education Topics Vitamin K   coumadin and vitamin K interaction        Monitor/Evaluation    Monitor Per protocol     Education Follow-up Reinforce PRN         Electronically signed by:  Sharon Song RD  07/08/18 1:40 PM

## 2018-07-08 NOTE — H&P
Name: Bryan Valadez ADMIT: 2018   : 1985  PCP: No Known Provider    MRN: [id number] LOS: 1 days   AGE/SEX: 32 y.o. male  ROOM: P484/1     Chief Complaint   Patient presents with   • Leg Pain       Subjective   Mr. Valadez is a 32 y.o. male with a history of recently resected brain tumor that was admitted here last week after dx'ed with acute LLE DVT and PE that presents to [facility] complaining of worsening pain and swelling in LLE. He was discharged on Pradaxa and reports compliance. He had been doing well initially and had improvement in his left leg discomfort. He started back with his home physical therapy and was doing exercises on Tuesday when started noticing a little more discomfort in his proximal lower left mid thigh. Symptoms progressed to the point he presented back to emergency department last night where repeat Doppler suggests propagation of his popliteal DVT into his mid femoral vein. Official report is pending. His chest discomfort associated with previous pulmonary embolus is nearly resolved. No additional shortness of breath, chest pain, lightheadedness, presyncope or syncope. He's had mild swelling in his left knee otherwise has been doing fine. No new headache, neurologic complaints, nausea or vomiting.      History of Present Illness    Past Medical History:   Diagnosis Date   • Allergic rhinitis    • Asthma     Pulmonary Dr. Alexandra   • Chest pain    • DVT (deep venous thrombosis) (CMS/HCC)    • GERD (gastroesophageal reflux disease)    • H/O echocardiogram    • History of ETT    • History of Holter monitoring    • Hyperlipidemia    • PE (pulmonary thromboembolism) (CMS/HCC)      Past Surgical History:   Procedure Laterality Date   • CRANIOTOMY FOR TUMOR Right 2018    Procedure: CRANIOTOMY FOR  RESECTION OF LARGE RIGHT FRONTAL MASS WITH AUGUSTIN NAVIGATION;  Surgeon: Chetan Galvan IV, MD;  Location: [facility];  Service: Neurosurgery     History  reviewed. No pertinent family history.  Social History   Substance Use Topics   • Smoking status: Never Smoker   • Smokeless tobacco: Never Used   • Alcohol use No      Comment: Moderate     Prescriptions Prior to Admission   Medication Sig Dispense Refill Last Dose   • baclofen (LIORESAL) 10 MG tablet Take 1 tablet by mouth 3 (Three) Times a Day As Needed for Muscle Spasms. 50 tablet 0 7/7/2018 at 0900   • dabigatran etexilate (PRADAXA) 150 MG capsu Take 1 capsule by mouth Every 12 (Twelve) Hours. 60 capsule 1 7/7/2018 at 0900   • dexamethasone (DECADRON) 2 MG tablet Take 1 tablet by mouth 3 (Three) Times a Day With Meals. 60 tablet 0 7/7/2018 at 2100   • docusate sodium 100 MG capsule Take 100 mg by mouth 2 (Two) Times a Day As Needed (constipation). 60 each 1 7/7/2018 at 0900   • EPINEPHrine (EPIPEN) 0.3 MG/0.3ML solution auto-injector injection Inject 0.3 mg into the shoulder, thigh, or buttocks As Needed.   Taking   • HYDROcodone-acetaminophen (NORCO) 5-325 MG per tablet Take 1 tablet by mouth Every 6 (Six) Hours As Needed.   7/5/2018 at 0900   • levETIRAcetam (KEPPRA) 1000 MG tablet Take 1 tablet by mouth Every 12 (Twelve) Hours. 60 tablet 3 7/7/2018 at 2100   • montelukast (SINGULAIR) 10 MG tablet Take 10 mg by mouth Daily.   7/7/2018 at 0900   • Multiple Vitamins-Minerals (MULTIVITAMIN WITH MINERALS) tablet tablet Take 1 tablet by mouth Daily.   7/3/2018 at 0900   • omeprazole (PriLOSEC) 20 MG capsule Take 20 mg by mouth Daily As Needed.   7/7/2018 at 0900   • polyethylene glycol (MIRALAX) packet Take 17 g by mouth Daily. 10 each 0 7/7/2018 at 0900   • lidocaine (LIDODERM) 5 % Place 1 patch on the skin Daily for 6 days. Remove & Discard patch within 12 hours or as directed by MD Paulson patch 0 7/4/2018 at 0900     Allergies:    Allergies   Allergen Reactions   • Avocado Itching   • Other Itching     Insect stings: Bee stings, throat swelling (has Epi pen)    Allergy to fruit, Avocado, Cherry Tomato (can have blue  berries)   • Tomato Itching     Cherry tomato       Review of Systems   Constitutional: Negative.    HENT: Negative.    Eyes: Negative.    Respiratory: Negative.    Gastrointestinal: Negative.    Endocrine: Negative.    Genitourinary: Negative.    Musculoskeletal: Negative.    Skin: Negative.    Neurological: Negative.    Hematological: Negative.    Psychiatric/Behavioral: Negative.         Objective    Vital Signs  Temp:  [97.6 °F (36.4 °C)-98.8 °F (37.1 °C)] 97.6 °F (36.4 °C)  Heart Rate:  [] 80  Resp:  [16] 16  BP: (118-144)/(81-93) 118/81  SpO2:  [97 %-100 %] 100 %  on   ;   Device (Oxygen Therapy): room air  Body mass index is 23.32 kg/m².    Physical Exam   Constitutional: He is oriented to person, place, and time. He appears well-developed and well-nourished.   HENT:   Head: Normocephalic and atraumatic.   Eyes: Conjunctivae and EOM are normal. No scleral icterus.   Neck: Normal range of motion. Neck supple. No JVD present.   Cardiovascular: Normal rate and regular rhythm.    No murmur heard.  Pulmonary/Chest: Effort normal and breath sounds normal. No respiratory distress.   Abdominal: Soft. Bowel sounds are normal. He exhibits no distension. There is no tenderness.   Musculoskeletal: He exhibits no edema.   Neurological: He is alert and oriented to person, place, and time. No cranial nerve deficit.   Skin: Skin is warm and dry.   Scalp incision noted. No erythema or drainage.   Psychiatric: He has a normal mood and affect. His behavior is normal.   Vitals reviewed.      Results Review:   I reviewed the patient's new clinical results including all labs and xray's.    Lab Results (last 24 hours)     Procedure Component Value Units Date/Time    CBC & Differential [[id number]] Collected:  07/07/18 2106    Specimen:  Blood Updated:  07/07/18 2132    Narrative:       The following orders were created for panel order CBC & Differential.  Procedure                               Abnormality         Status                      ---------                               -----------         ------                     CBC Auto Differential[[id number]]        Abnormal            Final result                 Please view results for these tests on the individual orders.    Protime-INR [[id number]]  (Abnormal) Collected:  07/07/18 2106    Specimen:  Blood Updated:  07/07/18 2141     Protime 14.8 (H) Seconds      INR 1.18 (H)    aPTT [[id number]]  (Normal) Collected:  07/07/18 2106    Specimen:  Blood Updated:  07/07/18 2141     PTT 33.5 seconds     Comprehensive Metabolic Panel [[id number]]  (Abnormal) Collected:  07/07/18 2106    Specimen:  Blood Updated:  07/07/18 2155     Glucose 103 (H) mg/dL      BUN 7 mg/dL      Creatinine 0.73 (L) mg/dL      Sodium 140 mmol/L      Potassium 3.8 mmol/L      Chloride 102 mmol/L      CO2 24.7 mmol/L      Calcium 9.6 mg/dL      Total Protein 7.1 g/dL      Albumin 4.10 g/dL      ALT (SGPT) 29 U/L      AST (SGOT) 10 U/L      Alkaline Phosphatase 70 U/L      Total Bilirubin 0.3 mg/dL      eGFR Non African Amer 123 mL/min/1.73      Globulin 3.0 gm/dL      A/G Ratio 1.4 g/dL      BUN/Creatinine Ratio 9.6     Anion Gap 13.3 mmol/L     CBC Auto Differential [[id number]]  (Abnormal) Collected:  07/07/18 2106    Specimen:  Blood Updated:  07/07/18 2132     WBC 9.59 10*3/mm3      RBC 4.36 (L) 10*6/mm3      Hemoglobin 12.9 (L) g/dL      Hematocrit 40.1 (L) %      MCV 92.0 fL      MCH 29.6 pg      MCHC 32.2 (L) g/dL      RDW 12.2 %      RDW-SD 41.3 fl      MPV 9.3 fL      Platelets 312 10*3/mm3      Neutrophil % 70.7 %      Lymphocyte % 17.7 (L) %      Monocyte % 10.6 %      Eosinophil % 0.5 %      Basophil % 0.1 %      Immature Grans % 0.4 %      Neutrophils, Absolute 6.77 10*3/mm3      Lymphocytes, Absolute 1.70 10*3/mm3      Monocytes, Absolute 1.02 10*3/mm3      Eosinophils, Absolute 0.05 10*3/mm3      Basophils, Absolute 0.01 10*3/mm3      Immature Grans, Absolute 0.04 (H) 10*3/mm3     Factor 5 Leiden  [[id number]]  (Abnormal) Collected:  07/08/18 0523    Specimen:  Blood Updated:  07/08/18 0806     Factor V Leiden Heterozygous (A)    Narrative:       Factor V Leiden is a specific mutation (R506Q) in the factor V gene that is associated with an increased risk of venous thrombosis. Factor V Leiden is more resistant to inactivation by activated protein C.  As a result, factor V persists in the circulation leading to a mild hyper-   coagulable state.  The Leiden mutation accounts for 90% - 95% of APC resistance.  Factor V Leiden has been reported in patients with deep vein thrombosis, pulmonary embolus,   central retinal vein occlusion, cerebral sinus thrombosis and hepatic vein thrombosis. Other risk factors to be considered in the workup for venous thrombosis include the [id number] mutation in the factor II (prothrombin) gene, protein S and C deficiency, and antithrombin deficiencies.   Anticardiolipin antibody and lupus anticoagulant analysis may be appropriate for certain patients, as well as homocysteine levels.    The expression of Factor V Leiden thrombophilia is impacted by coexisting genetic thrombophilic disorders, acquired thrombophilic disorders (malignancy, hyperhomocysteinemia, high factor VIII levels), and circumstances including: pregnancy, oral contraceptive use, hormone replacement therapy, selective estrogen receptor modulators, travel, central venous catheters, surgery, transplantation and advanced age.    Homocysteine [[id number]] Collected:  07/08/18 0523    Specimen:  Blood Updated:  07/08/18 0551    Factor II, DNA Analysis [[id number]]  (Normal) Collected:  07/08/18 0523    Specimen:  Blood Updated:  07/08/18 0806     Factor II, DNA Analysis Normal    Narrative:       A point mutation ([id number]) in the factor II (prothrombin) gene is the second most common cause of inherited thrombophilia. The incidence of this mutation in the [place]  population is about 2% and in the   population it is approximately 0.5%. This mutation is rare in the  and  population. Being heterozygous for a prothrombin mutation increases the risk for developing venous thrombosis about 2 to 3 times above the general population risk. Being homozygous for the prothrombin gene mutation increases the relative risk for venous thrombosis further, although it is not yet known how   much further the risk is increased. In women heterozygous for the prothrombin gene mutation, the use of estrogen containing oral contraceptives increases the relative risk of venous thrombosis about 16 times and the risk of developing cerebral thrombosis is also significantly increased. In pregnancy, the prothrombin gene mutation increases risk for venous thrombosis and may increase risk for stillbirth, placental abruption, pre-eclampsia and fetal growth restriction.     The expression of Factor II thrombophilia is impacted by coexisting genetic thrombophilic disorders, acquired thrombophilic disorders (eg, malignancy, hyperhomocysteinemia, high factor VIII levels), and circumstances including: pregnancy, oral contraceptive use, hormone replacement therapy, selective estrogen receptor modulators, travel, central venous catheters, surgery, and organ transplantation.    Antithrombin III [[id number]] Collected:  07/08/18 0524    Specimen:  Blood Updated:  07/08/18 0550    Lupus Anticoagulant [[id number]] Collected:  07/08/18 0524    Specimen:  Blood Updated:  07/08/18 0550    Protein C Antigen, Total [[id number]] Collected:  07/08/18 0524    Specimen:  Blood Updated:  07/08/18 0550    Protein S Antigen, Total [[id number]] Collected:  07/08/18 0524    Specimen:  Blood Updated:  07/08/18 0550    Phosphatidylserine Antibodies [[id number]] Collected:  07/08/18 0524    Specimen:  Blood Updated:  07/08/18 0550          No orders to display     Assessment/Plan      Active Hospital Problems    Diagnosis Date Noted   • **Acute deep vein  thrombosis (DVT) of femoral vein of left lower extremity (CMS/HCC) [I82.412] 07/07/2018   • Failure of outpatient treatment [Z78.9] 07/08/2018   • History of pulmonary embolus (PE) [Z86.711] 07/08/2018   • Factor 5 Leiden mutation, heterozygous (CMS/HCC) [D68.51] 07/08/2018   • Primary brain tumor (CMS/HCC) [D49.6] 06/15/2018   • Asthma [J45.909] 03/29/2016   • GERD (gastroesophageal reflux disease) [K21.9] 03/29/2016      Resolved Hospital Problems    Diagnosis Date Noted Date Resolved   No resolved problems to display.       Mr. Valadez is a 32 y.o. male with a history of recently resected brain tumor who was discharged last week on Pradaxa after diagnosed with acute DVT left popliteal vein and pulmonary emboli. He was readmitted due to increasing discomfort in left leg and Doppler suggesting propagation to the left mid femoral vein.    · This will need to be treated as a failure of Pradaxa. He has been initiated on therapeutic Lovenox and will plan transitioning to Coumadin.  · I discussed with hematology, Dr. Sean Jefferson, who will see the patient in consultation.  · Hypercoagulable studies pending. We do have a result suggesting he is heterozygous for Factor V Leiden mutation.  · No symptoms to suggest recurrence of pulmonary emboli.  · No symptoms to suggest intracranial complication from anticoagulation. Dr. Galvan notified of admission.      I discussed the patients findings and my recommendations with patient, family and consulting provider.      Bucky Hodge MD  [facility]  07/08/18  11:42 AM

## 2018-07-09 PROBLEM — I82.413 ACUTE DEEP VEIN THROMBOSIS (DVT) OF FEMORAL VEIN OF BOTH LOWER EXTREMITIES: Status: ACTIVE | Noted: 2018-07-07

## 2018-07-09 LAB
BH CV LOW VAS RIGHT COMMON FEMORAL SPONT: 1
BH CV LOW VAS RIGHT GASTRONEMIUS VESSEL: 1
BH CV LOW VAS RIGHT PROFUNDA FEMORAL SPONT: 1
BH CV LOWER VASCULAR RIGHT COMMON FEMORAL AUGMENT: NORMAL
BH CV LOWER VASCULAR RIGHT COMMON FEMORAL COMPRESS: NORMAL
BH CV LOWER VASCULAR RIGHT COMMON FEMORAL PHASIC: NORMAL
BH CV LOWER VASCULAR RIGHT COMMON FEMORAL SPONT: NORMAL
BH CV LOWER VASCULAR RIGHT COMMON FEMORAL THROMBUS: NORMAL
BH CV LOWER VASCULAR RIGHT DISTAL FEMORAL COMPRESS: NORMAL
BH CV LOWER VASCULAR RIGHT GASTRONEMIUS COMPRESS: NORMAL
BH CV LOWER VASCULAR RIGHT GASTRONEMIUS THROMBUS: NORMAL
BH CV LOWER VASCULAR RIGHT GREATER SAPH AK COMPRESS: NORMAL
BH CV LOWER VASCULAR RIGHT GREATER SAPH BK COMPRESS: NORMAL
BH CV LOWER VASCULAR RIGHT MID FEMORAL AUGMENT: NORMAL
BH CV LOWER VASCULAR RIGHT MID FEMORAL COMPETENT: NORMAL
BH CV LOWER VASCULAR RIGHT MID FEMORAL COMPRESS: NORMAL
BH CV LOWER VASCULAR RIGHT MID FEMORAL PHASIC: NORMAL
BH CV LOWER VASCULAR RIGHT MID FEMORAL SPONT: NORMAL
BH CV LOWER VASCULAR RIGHT PERONEAL COMPRESS: NORMAL
BH CV LOWER VASCULAR RIGHT POPLITEAL AUGMENT: NORMAL
BH CV LOWER VASCULAR RIGHT POPLITEAL COMPETENT: NORMAL
BH CV LOWER VASCULAR RIGHT POPLITEAL COMPRESS: NORMAL
BH CV LOWER VASCULAR RIGHT POPLITEAL PHASIC: NORMAL
BH CV LOWER VASCULAR RIGHT POPLITEAL SPONT: NORMAL
BH CV LOWER VASCULAR RIGHT POSTERIOR TIBIAL COMPRESS: NORMAL
BH CV LOWER VASCULAR RIGHT PROFUNDA FEMORAL COMPRESS: NORMAL
BH CV LOWER VASCULAR RIGHT PROFUNDA FEMORAL THROMBUS: NORMAL
BH CV LOWER VASCULAR RIGHT PROXIMAL FEMORAL COMPRESS: NORMAL
BH CV LOWER VASCULAR RIGHT SAPHENOFEMORAL JUNCTION AUGMENT: NORMAL
BH CV LOWER VASCULAR RIGHT SAPHENOFEMORAL JUNCTION COMPRESS: NORMAL
BH CV LOWER VASCULAR RIGHT SAPHENOFEMORAL JUNCTION PHASIC: NORMAL
BH CV LOWER VASCULAR RIGHT SAPHENOFEMORAL JUNCTION SPONT: NORMAL
HCYS SERPL-SCNC: 6.3 UMOL/L (ref 0–15)

## 2018-07-09 PROCEDURE — 25010000002 ENOXAPARIN PER 10 MG: Performed by: INTERNAL MEDICINE

## 2018-07-09 PROCEDURE — 99024 POSTOP FOLLOW-UP VISIT: CPT | Performed by: NEUROLOGICAL SURGERY

## 2018-07-09 PROCEDURE — G0378 HOSPITAL OBSERVATION PER HR: HCPCS

## 2018-07-09 PROCEDURE — 96372 THER/PROPH/DIAG INJ SC/IM: CPT

## 2018-07-09 RX ORDER — WARFARIN SODIUM 5 MG/1
5 TABLET ORAL NIGHTLY
Qty: 30 TABLET | Refills: 0 | Status: SHIPPED | OUTPATIENT
Start: 2018-07-09 | End: 2018-08-02 | Stop reason: SDUPTHER

## 2018-07-09 RX ORDER — WARFARIN SODIUM 5 MG/1
5 TABLET ORAL
Status: DISCONTINUED | OUTPATIENT
Start: 2018-07-09 | End: 2018-07-09 | Stop reason: HOSPADM

## 2018-07-09 RX ADMIN — LEVETIRACETAM 1000 MG: 500 TABLET, FILM COATED ORAL at 08:14

## 2018-07-09 RX ADMIN — ENOXAPARIN SODIUM 80 MG: 80 INJECTION SUBCUTANEOUS at 10:09

## 2018-07-09 RX ADMIN — DEXAMETHASONE 2 MG: 2 TABLET ORAL at 08:14

## 2018-07-09 RX ADMIN — MONTELUKAST 10 MG: 10 TABLET, FILM COATED ORAL at 08:14

## 2018-07-09 RX ADMIN — FAMOTIDINE 40 MG: 40 TABLET, FILM COATED ORAL at 08:14

## 2018-07-09 RX ADMIN — DEXAMETHASONE 2 MG: 2 TABLET ORAL at 12:55

## 2018-07-09 RX ADMIN — WARFARIN SODIUM 5 MG: 5 TABLET ORAL at 17:01

## 2018-07-09 NOTE — PROGRESS NOTES
Continued Stay Note  [facility]     Patient Name: Bryan Valadez  MRN: [id number]  Today's Date: 7/9/2018    Admit Date: 7/7/2018          Discharge Plan     Row Name 07/09/18 1234       Plan    Plan Comments Per CCP RN Ivon Landry,  with CCP is checking  re [facility] for patient's Lovenox. .........  KRYSTAL Felix              Discharge Codes    No documentation.           KRYSTAL Felix

## 2018-07-09 NOTE — PROGRESS NOTES
[facility] PROGRESS NOTE    PATIENT IDENTIFICATION:   Name:  Bryan Valadez      MRN:  [id number]     32 y.o.  male                   Principal Problem:    Acute deep vein thrombosis (DVT) of femoral vein of left lower extremity (CMS/HCC)  Active Problems:    Asthma    GERD (gastroesophageal reflux disease)    Primary brain tumor (CMS/HCC)    Failure of outpatient treatment    History of pulmonary embolus (PE)    Factor 5 Leiden mutation, heterozygous (CMS/HCC)        Subjective   CC: leg pain.  Interval History: none. Factor 5 leiden diagnosis with new DVT.  No ha/n/v.    Objective     Vital signs in last 24 hours:  Temp:  [98.9 °F (37.2 °C)-99 °F (37.2 °C)] 98.9 °F (37.2 °C)  Heart Rate:  [86-94] 86  Resp:  [20] 20  BP: (120-127)/(78-87) 120/78      Intake/Output last 3 shifts:  I/O last 3 completed shifts:  In: 3580 [P.O.:3580]  Out: 2701 [Urine:2700; Stool:1]  Intake/Output this shift:  No intake/output data recorded.    LABS    Results from last 7 days  Lab Units 07/07/18 2106   INR  1.18*     Lab Results   Component Value Date    CALCIUM 9.6 07/07/2018     Results from last 7 days  Lab Units 07/07/18 2106 07/04/18  1525   SODIUM mmol/L 140 138   POTASSIUM mmol/L 3.8 4.3   CHLORIDE mmol/L 102 100   CO2 mmol/L 24.7 24.0   BUN mg/dL 7 14   CREATININE mg/dL 0.73* 0.89   GLUCOSE mg/dL 103* 102*   CALCIUM mg/dL 9.6 9.8   WBC 10*3/mm3 9.59 11.42*   HEMOGLOBIN g/dL 12.9* 13.1*   PLATELETS 10*3/mm3 312 365   ALT (SGPT) U/L 29 54*   AST (SGOT) U/L 10 15     Physical Exam:  C/d/i  No drift    4 - Opens eyes on own  6 - Follows simple motor commands  5 - Alert and oriented        ASSESSMENT  Assessment/Plan     Patient doing well excepting new clot     LOS: 2 days     Discussed case with Dr. Jefferson this am  Lifelong AC is likely indicated and fine from my POV  Likely will require xrt and temodar given transitional tumor and location  D/w patient and his father    Chetan Galvan IV, MD

## 2018-07-09 NOTE — PROGRESS NOTES
Continued Stay Note  [facility]     Patient Name: Bryan Valadez  MRN: [id number]  Today's Date: 7/9/2018    Admit Date: 7/7/2018          Discharge Plan     Row Name 07/09/18 1324       Plan    Plan Comments Per [facility] Ivon LAZAR. Script has been called in to [employer] and authorization has been sent to Medical review. Per [facility] Ivon LAZAR [employer] plans to expedite the request  and realizes that patient plans to go home today....  KRYSTAL Felix    Row Name 07/09/18 1304       Plan    Plan Home, wife to transport     Plan Comments Face sheet and pharmacy verified . Patient does not have a PCP but family plans to call Dr. Abreu's office to see if they can accept  Also left family with phone number  to [facility] physician  liaison. . [facility] to follow for  medication assistance. Patient lives with his wife and 2 year old daughter  in  a house. Family is watching 2 year old while patient is hospitalized No DME , NO HH. 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   KRYSTAL Felix    Row Name 07/09/18 1234       Plan    Plan Comments Per [facility] Ivon Bailey,  with [facility] is checking  re [facility] for patient's Lovenox. .........  KRYSTAL Felix              Discharge Codes    No documentation.       Expected Discharge Date and Time     Expected Discharge Date Expected Discharge Time    Jul 9, 2018             KRYSTAL Felix

## 2018-07-09 NOTE — DISCHARGE SUMMARY
Name: Bryan Valadez  Age: 32 y.o.  Sex: male  :  1985  MRN: [id number]         Primary Care Physician: No Known Provider      Date of Admission:  2018  Date of Discharge:  2018      CHIEF COMPLAINT  Leg Pain      DISCHARGE DIAGNOSIS  Active Hospital Problems    Diagnosis Date Noted   • **Acute deep vein thrombosis (DVT) of femoral vein of both lower extremities (CMS/[facility]) [I82.413] 2018   • Failure of outpatient treatment [Z78.9] 2018   • History of pulmonary embolus (PE) [Z86.711] 2018   • Factor 5 Leiden mutation, heterozygous (CMS/[facility]) [D68.51] 2018   • Primary brain tumor (CMS/[facility]) [D49.6] 06/15/2018   • Asthma [J45.909] 2016   • GERD (gastroesophageal reflux disease) [K21.9] 2016      Resolved Hospital Problems    Diagnosis Date Noted Date Resolved   No resolved problems to display.       SECONDARY DIAGNOSES  Past Medical History:   Diagnosis Date   • Allergic rhinitis    • Asthma     Pulmonary Dr. Alexandra   • Chest pain    • DVT (deep venous thrombosis) (CMS/[facility])    • GERD (gastroesophageal reflux disease)    • H/O echocardiogram    • History of ETT    • History of Holter monitoring    • Hyperlipidemia    • PE (pulmonary thromboembolism) (CMS/[facility])        CONSULTS   Consult Orders (all)     Start     Ordered    18 0915  Hematology & Oncology Inpatient Consult  Once     Comments:  Dr. Jefferson following over weekend for DVT issues, but Dr. Galvan wants Dr. Cisneros made aware of admission for evaluation/treatment of tumor   Specialty:  Hematology and Oncology  Provider:  Jocelyn Cisneros MD    18 0916    18 1147  Inpatient Nutrition Consult  Once     Comments:  Started on coumadin. Educate on vitamin K in diet.    18 1147    18 0036  Hematology & Oncology Inpatient Consult  Once     Specialty:  Hematology and Oncology  Provider:  Sean Jefferson MD    18 0036            PROCEDURES PERFORMED  RLE VENOUS DOPPLER   2018  · Acute  right lower extremity deep vein thrombosis noted in the common femoral, profunda femoral and gastrocnemius/soleal.  · All other right sided veins appeared normal.    LLE VENOUS DOPPLER   7/7/2018  · Acute left lower extremity deep vein thrombosis noted in the mid femoral, distal femoral, popliteal, posterial tibial, peroneal and gastrocnemius/soleal.  · All other left sided veins appeared normal.        HOSPITAL COURSE  Mr. Valadez is a 32 y.o. non-smoker with a history of recently resected brain tumor who presented to [facility] initially complaining of worsening pain in LLE. Please see the admitting history and physical for further details. He was found to have a propagated DVT in his LLE and was admitted to the hospital for further evaluation and treatment. He had recently been hospitalized and started on Pradaxa for left leg DVT and pulmonary embolus. He was taking this as directed became back in due to worsening pain in his left leg. As stated Doppler showed mild propagation of clot into his mid femoral vein. Doppler of his right leg revealed DVT as well. He's had no symptoms to suggest any additional pulmonary emboli. He was seen by hematology who recommended stopping Pradaxa and starting on full dose Lovenox. He has been stable during his stay and will be started on Coumadin. Hypercoagulable studies were obtained most of which are pending however he was positive heterozygous for factor V Leiden mutation. Plan at discharge is for lifelong anticoagulation.     He understands how to self administer Lovenox. Will start on Coumadin 5 mg daily and have his INR checked on Thursday. He will follow-up with Dr. Sean Jefferson in his office regarding management of Coumadin in addition to further discussions and plans regarding brain tumor treatment.        PHYSICAL EXAM     Body mass index is 23.32 kg/m².  Physical Exam   Constitutional: He is oriented to person, place, and time. No distress.   Cardiovascular:  Normal rate and regular rhythm.    No murmur heard.  Pulmonary/Chest: Effort normal and breath sounds normal.   Abdominal: Soft. Bowel sounds are normal. He exhibits no distension. There is no tenderness.   Musculoskeletal: Normal range of motion. He exhibits no edema.   Neurological: He is alert and oriented to person, place, and time.   Skin: Skin is warm and dry. He is not diaphoretic.         CONDITION ON DISCHARGE  Stable.      DISCHARGE DISPOSITION   Home with family      ALLERGIES  Allergies   Allergen Reactions   • Avocado Itching   • Other Itching     Insect stings: Bee stings, throat swelling (has Epi pen)    Allergy to fruit, Avocado, Cherry Tomato (can have blue berries)   • Tomato Itching     Cherry tomato         DISCHARGE MEDICATIONS     Your medication list      START taking these medications      Instructions Last Dose Given Next Dose Due   enoxaparin 80 MG/0.8ML solution syringe  Commonly known as:  LOVENOX      Inject 0.8 mL under the skin Every 12 (Twelve) Hours for 7 days.       enoxaparin 80 MG/0.8ML solution syringe  Commonly known as:  LOVENOX      Inject 0.8 mL under the skin Every 12 (Twelve) Hours for 7 days.       warfarin 5 MG tablet  Commonly known as:  COUMADIN      Take 1 tablet by mouth Every Night.          CONTINUE taking these medications      Instructions Last Dose Given Next Dose Due   baclofen 10 MG tablet  Commonly known as:  LIORESAL      Take 1 tablet by mouth 3 (Three) Times a Day As Needed for Muscle Spasms.       dexamethasone 2 MG tablet  Commonly known as:  DECADRON      Take 1 tablet by mouth 3 (Three) Times a Day With Meals.       docusate sodium 100 MG capsule      Take 100 mg by mouth 2 (Two) Times a Day As Needed (constipation).       EPINEPHrine 0.3 MG/0.3ML solution auto-injector injection  Commonly known as:  EPIPEN      Inject 0.3 mg into the shoulder, thigh, or buttocks As Needed.       HYDROcodone-acetaminophen 5-325 MG per tablet  Commonly known as:  NORCO       Take 1 tablet by mouth Every 6 (Six) Hours As Needed.       levETIRAcetam 1000 MG tablet  Commonly known as:  KEPPRA      Take 1 tablet by mouth Every 12 (Twelve) Hours.       lidocaine 5 %  Commonly known as:  LIDODERM      Place 1 patch on the skin Daily for 6 days. Remove & Discard patch within 12 hours or as directed by MD       montelukast 10 MG tablet  Commonly known as:  SINGULAIR      Take 10 mg by mouth Daily.       multivitamin with minerals tablet tablet      Take 1 tablet by mouth Daily.       omeprazole 20 MG capsule  Commonly known as:  priLOSEC      Take 20 mg by mouth Daily As Needed.       polyethylene glycol packet  Commonly known as:  MIRALAX      Take 17 g by mouth Daily.          STOP taking these medications    dabigatran etexilate 150 MG capsu  Commonly known as:  PRADAXA              Where to Get Your Medications      These medications were sent to [facility] [address] [place] [address] - [facility] [address] AT [facility]) &  - [phone]  - [phone] [place] [address] [address] , [facility] [address]    Phone:  [phone]   · enoxaparin 80 MG/0.8ML solution syringe  · warfarin 5 MG tablet     Information about where to get these medications is not yet available    Ask your nurse or doctor about these medications  · enoxaparin 80 MG/0.8ML solution syringe       Diet Instructions     Diet: Regular       Discharge Diet:  Regular         Activity Instructions     Activity as Tolerated           Future Appointments  Date Time Provider Department Center   7/12/2018 1:30 PM LAB CHAIR [facility]   7/12/2018 2:00 PM NICOLE Harrison MGK [facility] CBC J[facility]   7/16/2018 1:00 PM LAB CHAIR 4 [facility] LAB [facility]   7/16/2018 1:30 PM RN [facility] INFUS KRE LAG   7/18/2018 10:15 AM Chetan Galvan IV, MD MGK NS ADVKR None   7/19/2018 1:30 PM LAB CHAIR 2 [facility] LAB [facility]   7/19/2018 2:00 PM RN [facility] INFUS KRE LAG    7/23/2018 3:30 PM LAB CHAIR 4 [facility]  LAB KRES LAG   7/23/2018 4:00 PM MD MUSTAPHA Fracnis CBC KRES [facility] CBC Jasmin   12/13/2018 9:30 AM MD MUSTAPHA Villeda PC ABRAHAM None     Follow-up Information     No Known Provider .    Contact information:  [facility] [address]                   TEST  RESULTS PENDING AT DISCHARGE   Order Current Status    Antithrombin III In process    Homocysteine In process    Lupus Anticoagulant In process    Phosphatidylserine Antibodies In process    Protein C Antigen, Total In process    Protein S Antigen, Total In process             CODE STATUS  Code Status and Medical Interventions:   Ordered at: 07/08/18 0036     Level Of Support Discussed With:    Patient     Code Status:    CPR     Medical Interventions (Level of Support Prior to Arrest):    Full           Bucky Hodge MD  [facility]  07/09/18  4:31 PM      Time: greater than 30 minutes.

## 2018-07-09 NOTE — PROGRESS NOTES
Case Management Discharge Note    Final Note: Discharged to home and he will  his script on the way home. Auth. was completed per Ivon Mccallum RN, CCP.     Destination     No service has been selected for the patient.      Durable Medical Equipment     No service has been selected for the patient.      Dialysis/Infusion     No service has been selected for the patient.      Home Medical Care     No service has been selected for the patient.      Social Care     No service has been selected for the patient.        Other: Other (private auto)    Final Discharge Disposition Code: 01 - home or self-care

## 2018-07-09 NOTE — PROGRESS NOTES
Discussed with Dr. Galvan.    Will start warfarin 5 mg daily this evening.    Will need warfarin education.    Rx for Lovenox 80 mg bid x 7 days written electronically but not sent anywhere.      INR check this week on Thursday, next week on Monday and Thursday.  My office to arrange.      Will arrange f/u with me in a couple of weeks to discuss further treatment of his brain tumor.

## 2018-07-10 LAB
PROT C AG PPP IA-ACNC: 125 % (ref 60–150)
PROT S PPP-ACNC: 124 % (ref 60–150)

## 2018-07-11 LAB
AT III PPP CHRO-ACNC: >140 % (ref 90–134)
DRVVT IMM 1:2 NP PPP: 45.2 SEC (ref 0–47)
LA NT DPL PPP: 68.6 SEC (ref 0–55)
LA NT DPL/LA NT HPL PPP-RTO: 1.01 RATIO (ref 0–1.4)
LA NT PLATELET PPP: 45.3 SEC (ref 0–51.9)
LUPUS ANTICOAGULANT REFLEX: ABNORMAL
PS IGA SER-ACNC: 1 APS IGA (ref 0–20)
PS IGG SER-ACNC: 8 GPS IGG (ref 0–11)
PS IGM SER-ACNC: 12 MPS IGM (ref 0–25)
SCREEN DRVVT: 56.4 SEC (ref 0–47)
THROMBIN TIME: 17.3 SEC (ref 0–23)

## 2018-07-12 ENCOUNTER — LAB (OUTPATIENT)
Dept: LAB | Facility: HOSPITAL | Age: 33
End: 2018-07-12

## 2018-07-12 ENCOUNTER — OFFICE VISIT (OUTPATIENT)
Dept: ONCOLOGY | Facility: CLINIC | Age: 33
End: 2018-07-12

## 2018-07-12 VITALS
HEIGHT: 70 IN | RESPIRATION RATE: 18 BRPM | OXYGEN SATURATION: 99 % | WEIGHT: 172.2 LBS | DIASTOLIC BLOOD PRESSURE: 92 MMHG | BODY MASS INDEX: 24.65 KG/M2 | SYSTOLIC BLOOD PRESSURE: 130 MMHG | TEMPERATURE: 98.3 F | HEART RATE: 95 BPM

## 2018-07-12 DIAGNOSIS — I82.532 CHRONIC DEEP VEIN THROMBOSIS (DVT) OF LEFT POPLITEAL VEIN (HCC): Primary | ICD-10-CM

## 2018-07-12 DIAGNOSIS — D68.51 FACTOR 5 LEIDEN MUTATION, HETEROZYGOUS (HCC): ICD-10-CM

## 2018-07-12 DIAGNOSIS — I26.99 PULMONARY EMBOLISM WITH INFARCTION (HCC): ICD-10-CM

## 2018-07-12 DIAGNOSIS — I82.432 ACUTE DEEP VEIN THROMBOSIS (DVT) OF POPLITEAL VEIN OF LEFT LOWER EXTREMITY (HCC): ICD-10-CM

## 2018-07-12 DIAGNOSIS — D49.6 PRIMARY BRAIN TUMOR (HCC): Primary | ICD-10-CM

## 2018-07-12 DIAGNOSIS — I82.532 CHRONIC DEEP VEIN THROMBOSIS (DVT) OF LEFT POPLITEAL VEIN (HCC): ICD-10-CM

## 2018-07-12 DIAGNOSIS — I82.413 ACUTE DEEP VEIN THROMBOSIS (DVT) OF FEMORAL VEIN OF BOTH LOWER EXTREMITIES (HCC): ICD-10-CM

## 2018-07-12 LAB
BASOPHILS # BLD AUTO: 0.02 10*3/MM3 (ref 0–0.1)
BASOPHILS NFR BLD AUTO: 0.3 % (ref 0–1.1)
DEPRECATED RDW RBC AUTO: 37.5 FL (ref 37–49)
EOSINOPHIL # BLD AUTO: 0.04 10*3/MM3 (ref 0–0.36)
EOSINOPHIL NFR BLD AUTO: 0.6 % (ref 1–5)
ERYTHROCYTE [DISTWIDTH] IN BLOOD BY AUTOMATED COUNT: 11.8 % (ref 11.7–14.5)
HCT VFR BLD AUTO: 40.6 % (ref 40–49)
HGB BLD-MCNC: 13.8 G/DL (ref 13.5–16.5)
IMM GRANULOCYTES # BLD: 0.08 10*3/MM3 (ref 0–0.03)
IMM GRANULOCYTES NFR BLD: 1.1 % (ref 0–0.5)
INR PPP: 1.2 (ref 0.9–1.1)
LYMPHOCYTES # BLD AUTO: 1.66 10*3/MM3 (ref 1–3.5)
LYMPHOCYTES NFR BLD AUTO: 23.7 % (ref 20–49)
MCH RBC QN AUTO: 29.6 PG (ref 27–33)
MCHC RBC AUTO-ENTMCNC: 34 G/DL (ref 32–35)
MCV RBC AUTO: 87.1 FL (ref 83–97)
MONOCYTES # BLD AUTO: 0.61 10*3/MM3 (ref 0.25–0.8)
MONOCYTES NFR BLD AUTO: 8.7 % (ref 4–12)
NEUTROPHILS # BLD AUTO: 4.59 10*3/MM3 (ref 1.5–7)
NEUTROPHILS NFR BLD AUTO: 65.6 % (ref 39–75)
NRBC BLD MANUAL-RTO: 0 /100 WBC (ref 0–0)
PLATELET # BLD AUTO: 308 10*3/MM3 (ref 150–375)
PMV BLD AUTO: 9.2 FL (ref 8.9–12.1)
PROTHROMBIN TIME: 14.2 SECONDS (ref 11–13.5)
RBC # BLD AUTO: 4.66 10*6/MM3 (ref 4.3–5.5)
WBC NRBC COR # BLD: 7 10*3/MM3 (ref 4–10)

## 2018-07-12 PROCEDURE — 36416 COLLJ CAPILLARY BLOOD SPEC: CPT | Performed by: NURSE PRACTITIONER

## 2018-07-12 PROCEDURE — 99214 OFFICE O/P EST MOD 30 MIN: CPT | Performed by: NURSE PRACTITIONER

## 2018-07-12 PROCEDURE — 85025 COMPLETE CBC W/AUTO DIFF WBC: CPT | Performed by: NURSE PRACTITIONER

## 2018-07-12 PROCEDURE — 85610 PROTHROMBIN TIME: CPT | Performed by: NURSE PRACTITIONER

## 2018-07-12 NOTE — PROGRESS NOTES
.     REASONS FOR FOLLOWUP:    1. LLE DVT and PE with progression of symptoms and extension of the LLE DVT into the femoral vein from the popliteal/calf veins while on Pradaxa  2. 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 year old male  who is here for follow-up with the above-mentioned history, he returns today for hospital follow-up and lab review.  He was recently admitted 7/7/2017 through 7/9/2017.  He presented to the emergency department with worsening leg pain.  Ultimately, he was diagnosed with progressive thrombosis of his left lower cavity DVT Pradaxa.  Therefore, he initiated on Lovenox with a plan to bridge to warfarin.  Additionally, while inpatient, he did undergo right venous ultrasound which did reveal acute deep vein thrombosis.  The patient initiated on Coumadin 5 mg nightly 7/9/2013.  He does continue on Lovenox 80 mg twice a day.   Returning today, 7/12/2018, the patient reports he is feeling well.  He reports the pain in his leg is slightly improved, though he continues to have pain behind his left knee.  He denies any signs or symptoms of bleeding.  He denies lower extremity swelling.  He denies chest pain or shortness of breath.  He does continue to recover from previous craniotomy without any new neurologic symptoms.    Past Medical History:   Diagnosis Date   • Allergic rhinitis    • Asthma     Pulmonary Dr. Alexandra   • Chest pain    • DVT (deep venous thrombosis) (CMS/HCC)    • GERD (gastroesophageal reflux disease)    • H/O echocardiogram 2006   • History of ETT    • History of Holter monitoring    • Hyperlipidemia    • PE (pulmonary thromboembolism) (CMS/HCC)      Past Surgical History:   Procedure Laterality Date   • CRANIOTOMY FOR TUMOR Right 6/16/2018    Procedure: CRANIOTOMY FOR  RESECTION OF LARGE RIGHT FRONTAL MASS WITH [employer] NAVIGATION;  Surgeon: Chetan Galvan IV, MD;  Location:   [facility];  Service: Neurosurgery       HEMATOLOGIC/ONCOLOGIC HISTORY:  (History from previous dates can be found in the separate document.)    MEDICATIONS    Current Outpatient Prescriptions:   •  baclofen (LIORESAL) 10 MG tablet, Take 1 tablet by mouth 3 (Three) Times a Day As Needed for Muscle Spasms., Disp: 50 tablet, Rfl: 0  •  dexamethasone (DECADRON) 2 MG tablet, Take 1 tablet by mouth 3 (Three) Times a Day With Meals., Disp: 60 tablet, Rfl: 0  •  docusate sodium 100 MG capsule, Take 100 mg by mouth 2 (Two) Times a Day As Needed (constipation)., Disp: 60 each, Rfl: 1  •  enoxaparin (LOVENOX) 80 MG/0.8ML solution syringe, Inject 0.8 mL under the skin Every 12 (Twelve) Hours for 7 days., Disp: 11.2 mL, Rfl: 3  •  EPINEPHrine (EPIPEN) 0.3 MG/0.3ML solution auto-injector injection, Inject 0.3 mg into the shoulder, thigh, or buttocks As Needed., Disp: , Rfl:   •  HYDROcodone-acetaminophen (NORCO) 5-325 MG per tablet, Take 1 tablet by mouth Every 6 (Six) Hours As Needed., Disp: , Rfl:   •  levETIRAcetam (KEPPRA) 1000 MG tablet, Take 1 tablet by mouth Every 12 (Twelve) Hours., Disp: 60 tablet, Rfl: 3  •  montelukast (SINGULAIR) 10 MG tablet, Take 10 mg by mouth Daily., Disp: , Rfl:   •  Multiple Vitamins-Minerals (MULTIVITAMIN WITH MINERALS) tablet tablet, Take 1 tablet by mouth Daily., Disp: , Rfl:   •  omeprazole (PriLOSEC) 20 MG capsule, Take 20 mg by mouth Daily As Needed., Disp: , Rfl:   •  polyethylene glycol (MIRALAX) packet, Take 17 g by mouth Daily., Disp: 10 each, Rfl: 0  •  warfarin (COUMADIN) 5 MG tablet, Take 1 tablet by mouth Every Night., Disp: 30 tablet, Rfl: 0    ALLERGIES:     Allergies   Allergen Reactions   • Avocado Itching   • Other Itching     Insect stings: Bee stings, throat swelling (has Epi pen)    Allergy to fruit, Avocado, Cherry Tomato (can have blue berries)   • Tomato Itching     Cherry tomato       SOCIAL HISTORY:       Social History     Social History   • Marital status:  "     Spouse name: N/A   • Number of children: N/A   • Years of education: N/A     Occupational History   • Not on file.     Social History Main Topics   • Smoking status: Never Smoker   • Smokeless tobacco: Never Used   • Alcohol use No      Comment: Moderate   • Drug use: No   • Sexual activity: Defer     Other Topics Concern   • Not on file     Social History Narrative   • No narrative on file         FAMILY HISTORY:  History reviewed. No pertinent family history.    I have reviewed the patient's medical history in detail and updated the computerized patient record.     Review of Systems   Constitutional: Negative for appetite change, chills, fatigue and fever.   HENT:   Negative for trouble swallowing.    Respiratory: Negative for cough and shortness of breath.    Cardiovascular: Negative for chest pain and leg swelling.   Gastrointestinal: Negative for abdominal distention, blood in stool, constipation, diarrhea and nausea.   Musculoskeletal: Negative for arthralgias and myalgias.        Pain behind left knee   Skin: Negative for rash.   Neurological: Negative for dizziness and extremity weakness.   Hematological: Does not bruise/bleed easily.          Vitals:    07/12/18 1420   BP: 130/92   Pulse: 95   Resp: 18   Temp: 98.3 °F (36.8 °C)   TempSrc: Oral   SpO2: 99%   Weight: 78.1 kg (172 lb 3.2 oz)   Height: 179 cm (70.47\")   PainSc: 2  Comment: posterior left knee     Current Status 7/12/2018   ECOG score 0      PHYSICAL EXAM:    GENERAL:  Well-developed, well-nourished in no acute distress.   SKIN:  Warm, dry without rashes, purpura or petechiae. Scalp surgical incision well-healed.  EYES:  Pupils equal, round and reactive to light.  EOMs intact.  Conjunctivae normal.  EARS:  Hearing intact.  NOSE:  Septum midline.  No excoriations or nasal discharge.  CHEST:  Lungs clear to auscultation. Good airflow.  CARDIAC:  Regular rate and rhythm without murmurs, rubs or gallops. Normal S1,S2.  EXTREMITIES:  No " clubbing, cyanosis or edema.  NEUROLOGICAL:  Cranial Nerves II-XII grossly intact.  No focal neurological deficits.  PSYCHIATRIC:  Normal affect and mood.     RECENT LABS:    Results from last 7 days  Lab Units 07/12/18  1341 07/07/18  2106   WBC 10*3/mm3 7.00 9.59   NEUTROS ABS 10*3/mm3 4.59 6.77   HEMOGLOBIN g/dL 13.8 12.9*   HEMATOCRIT % 40.6 40.1*   PLATELETS 10*3/mm3 308 312       Results from last 7 days  Lab Units 07/07/18  2106   SODIUM mmol/L 140   POTASSIUM mmol/L 3.8   CHLORIDE mmol/L 102   CO2 mmol/L 24.7   BUN mg/dL 7   CREATININE mg/dL 0.73*   CALCIUM mg/dL 9.6   ALBUMIN g/dL 4.10   BILIRUBIN mg/dL 0.3   ALK PHOS U/L 70   ALT (SGPT) U/L 29   AST (SGOT) U/L 10   GLUCOSE mg/dL 103*       Results from last 7 days  Lab Units 07/12/18  1405 07/07/18  2106   INR  1.20* 1.18*   APTT seconds  --  33.5       Assessment/Plan   This is a 32 y.o. male with:     1. Left leg DVT and left lung PE diagnosed on 6/29/2018. Heterozygous for the Factor V Leiden mutation  Initially diagnosed on 6/29/2018 with DVT in the popliteal, posterior tibial, and gastronemeius/soleal veins Multiople small to moderate bilateral lower lobe thromboemboli with pulmonary infarction. Originally treated with Pradaxa. LLE venous duplex on 7/7 with progression of his DVT proximally to the mid femoral vein.  Second admission 7/7/2017 through 7/9/2017 initiated on Lovenox with a bridge to warfarin.  Additionally, Doppler ultrasound 7/8/2018 of right lower extremity revealed acute right lower extremity DVT.    Currently continuing on Lovenox 80 mg twice a day with Coumadin 5 mg daily initiated 7/9/2018.    2. Right frontal lobe brain tumor diagnosed on 6/15/2018. Large 10 cm partially cystic and solid lobulated tumor mass at diagnosis. Craniotomy with resection on 6/16 by Dr. Galvan. Pathology reviewed at [facility] consistent with glioma. Olig2 positive. Negative IDH1-R132H immunostain excluding the most common IDH1 mutation; however loss of  ATRX expression suggests possibility of glioma harboring another less common IDH1 mutation (IDH1 mutation has better prognosis). p53 overexpressed. Ki67 10% but variable.  Chromosome microarray showed a complex molecular karyotype including loss of 1q, loss of 2q, gain of 7q, gain of 8q, loss of 9p, REBECCA of 17p, and gain of 18p. These abnormalities are typically associated with  IDH-mutant astrocytic gliomas. 1p and 19q whole arm co-deletion was NOT noted     CT head on 7/1 c/w residual tumor circumscribing the resection cavity and a remote cerebellar hemisphere hemorrhage.  ? If sequencing the IDH1 and IDH2 genes is being pursued at [facility]. Patient to be presented at the neuro conference   At MD follow up 7/23/2018 treatment options will be discussed. Big decision is whether or not to pursue adjuvant therapy now or to wait until progression.  Molecular studies, predominantly whether there is an IDH mutation determines this.  The typical IDH mutation is NOT present according to the [facility] pathology result, but in a couple of places there is mention that other features are c/w IDH mutated tumors.  Unclear if IDH genes are being sequenced, which is what is recommended.      PLAN:  1. Continue Lovenox 80 mg twice a day subcutaneous.  2. Coumadin 7.5 mg tonight 7/12/2018, then continue 5 mg daily until Mondays lab. (5mg daily initiated 7/9/2018)  3. Return Monday, 7/16/2018 for PT/INR with nurse review.  4. If the patient's INR is greater than 2.0, he will discontinue Lovenox, and continue Coumadin alone.  5. M.D. follow-up 7/23/2018 for CBC, INR.  At that time, we will discuss further treatment options for his brain tumor.    Radha Bateman, APRN  07/12/2018

## 2018-07-16 ENCOUNTER — CLINICAL SUPPORT (OUTPATIENT)
Dept: ONCOLOGY | Facility: HOSPITAL | Age: 33
End: 2018-07-16

## 2018-07-16 ENCOUNTER — TELEPHONE (OUTPATIENT)
Dept: GENERAL RADIOLOGY | Facility: HOSPITAL | Age: 33
End: 2018-07-16

## 2018-07-16 ENCOUNTER — APPOINTMENT (OUTPATIENT)
Dept: LAB | Facility: HOSPITAL | Age: 33
End: 2018-07-16

## 2018-07-16 DIAGNOSIS — D68.51 FACTOR 5 LEIDEN MUTATION, HETEROZYGOUS (HCC): Primary | ICD-10-CM

## 2018-07-16 LAB
CYTO UR: NORMAL
DX PRELIMINARY: NORMAL
INR PPP: 1.6 (ref 0.9–1.1)
LAB AP CASE REPORT: NORMAL
LAB AP CLINICAL INFORMATION: NORMAL
Lab: NORMAL
PATH REPORT.ADDENDUM SPEC: NORMAL
PATH REPORT.FINAL DX SPEC: NORMAL
PATH REPORT.GROSS SPEC: NORMAL
PROTHROMBIN TIME: 19.2 SECONDS (ref 11–13.5)

## 2018-07-16 PROCEDURE — 85610 PROTHROMBIN TIME: CPT

## 2018-07-16 NOTE — TELEPHONE ENCOUNTER
----- Message from Taylor Cesar RN sent at 7/16/2018  1:33 PM EDT -----  Please schedule pt for INR with review on Friday 7/20 thanks

## 2018-07-16 NOTE — PROGRESS NOTES
INR 1.6. Pt continues to take Lovenox BID until his INR is 2 or greater. He denies any missed doses, new meds or diet changes. Reviewed with Radha SEBASTIAN NP. Per Radha pt is to take 7.5 Mg of coumadin on Mon, Wed and 5 mg on Tues,Thur. We will recheck his INR on Friday 7/20. Pt V/U and sent to scheduling to make appt for Friday.

## 2018-07-18 ENCOUNTER — OFFICE VISIT (OUTPATIENT)
Dept: NEUROSURGERY | Facility: CLINIC | Age: 33
End: 2018-07-18

## 2018-07-18 VITALS
TEMPERATURE: 98.1 F | BODY MASS INDEX: 24.34 KG/M2 | DIASTOLIC BLOOD PRESSURE: 80 MMHG | WEIGHT: 170 LBS | HEIGHT: 70 IN | SYSTOLIC BLOOD PRESSURE: 120 MMHG

## 2018-07-18 DIAGNOSIS — I82.432 ACUTE DEEP VEIN THROMBOSIS (DVT) OF POPLITEAL VEIN OF LEFT LOWER EXTREMITY (HCC): ICD-10-CM

## 2018-07-18 DIAGNOSIS — D49.6 PRIMARY BRAIN TUMOR (HCC): Primary | ICD-10-CM

## 2018-07-18 DIAGNOSIS — C71.9 ASTROCYTOMA (HCC): Primary | ICD-10-CM

## 2018-07-18 PROCEDURE — 99024 POSTOP FOLLOW-UP VISIT: CPT | Performed by: NEUROLOGICAL SURGERY

## 2018-07-18 RX ORDER — DEXAMETHASONE 2 MG/1
2 TABLET ORAL
Qty: 60 TABLET | Refills: 0 | Status: SHIPPED | OUTPATIENT
Start: 2018-07-18 | End: 2018-10-29

## 2018-07-18 RX ORDER — LEVETIRACETAM 1000 MG/1
1000 TABLET ORAL EVERY 12 HOURS SCHEDULED
Qty: 60 TABLET | Refills: 3 | Status: SHIPPED | OUTPATIENT
Start: 2018-07-18 | End: 2018-07-24 | Stop reason: SDUPTHER

## 2018-07-18 NOTE — PROGRESS NOTES
Subjective   Patient ID: Bryan Valadez is a 32 y.o. male who is here today for follow-up for a brain tumor. He is status post a craniotomy for a brain tumor on 6/16/18. He presents accompanied by his wife and daughter.     History of Present Illness 31 yo male s/p craniotomy. No wound issues.  No seizures.  No weakness.  He has mild headaches.  Mild acne from steroids.      The following portions of the patient's history were reviewed and updated as appropriate: allergies, current medications, past family history, past medical history, past social history, past surgical history and problem list.    Review of Systems   Constitutional: Negative for chills and fever.   HENT: Negative for facial swelling, rhinorrhea and trouble swallowing.    Eyes: Negative for photophobia, redness and visual disturbance.   Musculoskeletal: Negative for gait problem.   Neurological: Negative for dizziness, tremors, seizures, syncope, facial asymmetry, speech difficulty, weakness, light-headedness, numbness and headaches.   Psychiatric/Behavioral: Negative for sleep disturbance.       Objective   Physical Exam  Neurologic Exam   5/5 str  sens intact  Wound is c/d/i  Vision is much improved  Thinking is much improved  C/d/i    Assessment/Plan   Independent Review of Radiographic Studies:  n/a    Medical Decision Making:  Patient is doing well overall. He has a grade 3 astrocytoma.  This will require XRT and Chemotherapy.  He may require inclusion in a trial.  I discussed his case with Dr. Dobbins and Ronny as well as Edi and Dirk.  We will see him q 3 months and as needed of course.  I refilled his keppra and steroids for prn.     Bryan was seen today for post-op.    Diagnoses and all orders for this visit:    Astrocytoma (CMS/[facility])  -     Ambulatory Referral to Radiation Oncology    Other orders  -     levETIRAcetam (KEPPRA) 1000 MG tablet; Take 1 tablet by mouth Every 12 (Twelve) Hours.  -     dexamethasone (DECADRON) 2 MG tablet;  Take 1 tablet by mouth 3 (Three) Times a Day With Meals.      Return in about 3 months (around 10/18/2018).

## 2018-07-19 ENCOUNTER — APPOINTMENT (OUTPATIENT)
Dept: RADIATION ONCOLOGY | Facility: HOSPITAL | Age: 33
End: 2018-07-19

## 2018-07-19 ENCOUNTER — CONSULT (OUTPATIENT)
Dept: RADIATION ONCOLOGY | Facility: HOSPITAL | Age: 33
End: 2018-07-19

## 2018-07-19 VITALS
DIASTOLIC BLOOD PRESSURE: 81 MMHG | OXYGEN SATURATION: 100 % | WEIGHT: 172 LBS | BODY MASS INDEX: 24.35 KG/M2 | HEART RATE: 82 BPM | SYSTOLIC BLOOD PRESSURE: 128 MMHG | TEMPERATURE: 97.5 F | RESPIRATION RATE: 16 BRPM

## 2018-07-19 DIAGNOSIS — D49.6 PRIMARY BRAIN TUMOR (HCC): Primary | ICD-10-CM

## 2018-07-19 PROCEDURE — 77263 THER RADIOLOGY TX PLNG CPLX: CPT | Performed by: RADIOLOGY

## 2018-07-19 PROCEDURE — 99243 OFF/OP CNSLTJ NEW/EST LOW 30: CPT | Performed by: RADIOLOGY

## 2018-07-19 PROCEDURE — G0463 HOSPITAL OUTPT CLINIC VISIT: HCPCS | Performed by: RADIOLOGY

## 2018-07-19 PROCEDURE — 77370 RADIATION PHYSICS CONSULT: CPT | Performed by: RADIOLOGY

## 2018-07-19 PROCEDURE — 77334 RADIATION TREATMENT AID(S): CPT | Performed by: RADIOLOGY

## 2018-07-19 NOTE — PROGRESS NOTES
Subjective     Chetan Galvan IV, MD     Diagnosis Plan   1. Primary brain tumor (CMS/HCC)        Mr. Valadez is a very pleasant 32-year-old white male who has a history of intermittent headaches which wax and waned over the past 6 months, attributed to problems with sinuses. He was admitted through the ER at [facility] on 6/15 with severe right-sided headache and some confusion, MR of the brain described a 10 x 6.8 x 6.0 cm mass in the superior three quarters of the right frontal lobe with a 12 mm right to left midline shift.  The tumor was described as having some central cystic degeneration/necrosis or  with subfalcine herniation.  He was seen in inpatient consultation by Dr. Galvan on admission and taken to frontal craniotomy the following day,  6/16.  Postoperative MRI on 6/17 described a 4.5 x 4.2 x 6.7 resection cavity with the midline shift  decreased to 7 mm.  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  Neurologically he has done very well since his surgery and had his staples taken out on 7/2.we are asked to evaluate him for postoperative radiation therapy.  He has appointment to see Dr. Jefferson 4 days from now on Monday 7/20.  Clinically he looks well, and has nothing significant on review of systems related to his most recent procedure.                                  Review of Systems   Constitutional: Positive for unexpected weight change.   HENT: Negative.    Respiratory: Negative.    Cardiovascular: Negative.    Musculoskeletal: Negative.    Neurological: Negative.    Psychiatric/Behavioral: Negative.          Past Medical History:   Diagnosis Date   • Allergic rhinitis    • Asthma     Pulmonary Dr. Alexandra   • Chest pain    • DVT (deep venous thrombosis) (CMS/HCC)    • Factor V Leiden (CMS/HCC)    • GERD (gastroesophageal reflux disease)    • H/O echocardiogram 2006   • History of ETT    • History of Holter monitoring    • Hyperlipidemia    •  PE (pulmonary thromboembolism) (CMS/HCC)          Past Surgical History:   Procedure Laterality Date   • CRANIOTOMY FOR TUMOR Right 6/16/2018    Procedure: CRANIOTOMY FOR  RESECTION OF LARGE RIGHT FRONTAL MASS WITH AUGUSTIN NAVIGATION;  Surgeon: Chetan Galvan IV, MD;  Location: [facility] OR;  Service: Neurosurgery         Social History     Social History   • Marital status:      Social History Main Topics   • Smoking status: Never Smoker   • Smokeless tobacco: Never Used   • Alcohol use No      Comment: Moderate   • Drug use: No   • Sexual activity: Defer     Other Topics Concern   • Not on file         No family history on file.       Objective    Physical Exam  Awake, alert, spontaneous, in no apparent distress.  Thought content and affect are appropriate, speech is intact.  No signs of confusion.  Station and gait within normal limits.    Current Outpatient Prescriptions on File Prior to Visit   Medication Sig Dispense Refill   • baclofen (LIORESAL) 10 MG tablet Take 1 tablet by mouth 3 (Three) Times a Day As Needed for Muscle Spasms. 50 tablet 0   • dexamethasone (DECADRON) 2 MG tablet Take 1 tablet by mouth 3 (Three) Times a Day With Meals. 60 tablet 0   • docusate sodium 100 MG capsule Take 100 mg by mouth 2 (Two) Times a Day As Needed (constipation). 60 each 1   • EPINEPHrine (EPIPEN) 0.3 MG/0.3ML solution auto-injector injection Inject 0.3 mg into the shoulder, thigh, or buttocks As Needed.     • HYDROcodone-acetaminophen (NORCO) 5-325 MG per tablet Take 1 tablet by mouth Every 6 (Six) Hours As Needed.     • levETIRAcetam (KEPPRA) 1000 MG tablet Take 1 tablet by mouth Every 12 (Twelve) Hours. 60 tablet 3   • montelukast (SINGULAIR) 10 MG tablet Take 10 mg by mouth Daily.     • Multiple Vitamins-Minerals (MULTIVITAMIN WITH MINERALS) tablet tablet Take 1 tablet by mouth Daily.     • omeprazole (PriLOSEC) 20 MG capsule Take 20 mg by mouth Daily As Needed.     • polyethylene glycol (MIRALAX) packet  Take 17 g by mouth Daily. 10 each 0   • warfarin (COUMADIN) 5 MG tablet Take 1 tablet by mouth Every Night. 30 tablet 0     No current facility-administered medications on file prior to visit.        ALLERGIES:    Allergies   Allergen Reactions   • Avocado Itching   • Other Itching     Insect stings: Bee stings, throat swelling (has Epi pen)    Allergy to fruit, Avocado, Cherry Tomato (can have blue berries)   • Tomato Itching     Cherry tomato       /81   Pulse 82   Temp 97.5 °F (36.4 °C) (Tympanic)   Resp 16   Wt 78 kg (172 lb)   SpO2 100%   BMI 24.35 kg/m²      Current Status 7/12/2018   ECOG score 0         Assessment/Plan    Anaplastic astrocytoma, right frontal lobe, post resection.  Evaluate for postoperative radiation therapy.   My dose aim would be 60 Gy in 30 fractions, with Rapid Arc. We started the planning process was mask construction and CT simulation in our department today.  I will touch base with Dr. Jefferson  to set up a start date.  He and his wife had many good questions which I believe were answered to their satisfaction, and informed consent was obtained.         I spent 40 minutes and face-to-face time with the patient and his wife, and 35 minutes of that time was spent in counseling and coordination of care including discussion of indications, goals, logistics, alternatives, and risks both common and rare.        Robin Daley MD

## 2018-07-20 ENCOUNTER — INFUSION (OUTPATIENT)
Dept: ONCOLOGY | Facility: HOSPITAL | Age: 33
End: 2018-07-20

## 2018-07-20 ENCOUNTER — LAB (OUTPATIENT)
Dept: LAB | Facility: HOSPITAL | Age: 33
End: 2018-07-20

## 2018-07-20 VITALS
TEMPERATURE: 98.3 F | DIASTOLIC BLOOD PRESSURE: 84 MMHG | OXYGEN SATURATION: 98 % | HEART RATE: 90 BPM | BODY MASS INDEX: 24.12 KG/M2 | WEIGHT: 170.4 LBS | RESPIRATION RATE: 16 BRPM | SYSTOLIC BLOOD PRESSURE: 129 MMHG

## 2018-07-20 DIAGNOSIS — I82.432 ACUTE DEEP VEIN THROMBOSIS (DVT) OF POPLITEAL VEIN OF LEFT LOWER EXTREMITY (HCC): ICD-10-CM

## 2018-07-20 LAB
INR PPP: 2.1 (ref 0.9–1.1)
PROTHROMBIN TIME: 25 SECONDS (ref 11–13.5)

## 2018-07-20 PROCEDURE — 85610 PROTHROMBIN TIME: CPT

## 2018-07-20 NOTE — PROGRESS NOTES
INR 2.1.  Instructed pt to stop the lovenox injections.  Pt is to return on Monday for MD visit with Dr. Jefferson and start treatment.  S/W NICOLE Mesa and she gave instructions for pt to take coumadin 5mg tonight, 7.5mg Saturday and 5mg Sunday.  INR will be rechecked Monday with MD visit.  Written dosing given to pt and he v/u.  Not placed in SS bc he was just put in SS on Monday 7/16/18.

## 2018-07-23 ENCOUNTER — DOCUMENTATION (OUTPATIENT)
Dept: OTHER | Facility: HOSPITAL | Age: 33
End: 2018-07-23

## 2018-07-23 ENCOUNTER — OFFICE VISIT (OUTPATIENT)
Dept: ONCOLOGY | Facility: CLINIC | Age: 33
End: 2018-07-23

## 2018-07-23 ENCOUNTER — LAB (OUTPATIENT)
Dept: LAB | Facility: HOSPITAL | Age: 33
End: 2018-07-23

## 2018-07-23 VITALS
HEART RATE: 78 BPM | OXYGEN SATURATION: 100 % | RESPIRATION RATE: 18 BRPM | SYSTOLIC BLOOD PRESSURE: 120 MMHG | DIASTOLIC BLOOD PRESSURE: 78 MMHG | TEMPERATURE: 99.2 F | WEIGHT: 169.8 LBS | BODY MASS INDEX: 24.31 KG/M2 | HEIGHT: 70 IN

## 2018-07-23 DIAGNOSIS — C71.1 ANAPLASTIC ASTROCYTOMA OF FRONTAL LOBE (HCC): Primary | ICD-10-CM

## 2018-07-23 DIAGNOSIS — I82.413 ACUTE DEEP VEIN THROMBOSIS (DVT) OF FEMORAL VEIN OF BOTH LOWER EXTREMITIES (HCC): ICD-10-CM

## 2018-07-23 DIAGNOSIS — D49.6 PRIMARY BRAIN TUMOR (HCC): ICD-10-CM

## 2018-07-23 DIAGNOSIS — I82.432 ACUTE DEEP VEIN THROMBOSIS (DVT) OF POPLITEAL VEIN OF LEFT LOWER EXTREMITY (HCC): ICD-10-CM

## 2018-07-23 DIAGNOSIS — R74.8 ELEVATED LIVER ENZYMES: ICD-10-CM

## 2018-07-23 LAB
ALBUMIN SERPL-MCNC: 4.4 G/DL (ref 3.5–5.2)
ALBUMIN/GLOB SERPL: 1.5 G/DL (ref 1.1–2.4)
ALP SERPL-CCNC: 90 U/L (ref 38–116)
ALT SERPL W P-5'-P-CCNC: 191 U/L (ref 0–41)
ANION GAP SERPL CALCULATED.3IONS-SCNC: 12.4 MMOL/L
AST SERPL-CCNC: 46 U/L (ref 0–40)
BASOPHILS # BLD AUTO: 0.03 10*3/MM3 (ref 0–0.1)
BASOPHILS NFR BLD AUTO: 0.4 % (ref 0–1.1)
BILIRUB SERPL-MCNC: <0.2 MG/DL (ref 0.1–1.2)
BUN BLD-MCNC: 13 MG/DL (ref 6–20)
BUN/CREAT SERPL: 15.1 (ref 7.3–30)
CALCIUM SPEC-SCNC: 9.4 MG/DL (ref 8.5–10.2)
CHLORIDE SERPL-SCNC: 102 MMOL/L (ref 98–107)
CO2 SERPL-SCNC: 25.6 MMOL/L (ref 22–29)
CREAT BLD-MCNC: 0.86 MG/DL (ref 0.7–1.3)
DEPRECATED RDW RBC AUTO: 37.9 FL (ref 37–49)
EOSINOPHIL # BLD AUTO: 0.11 10*3/MM3 (ref 0–0.36)
EOSINOPHIL NFR BLD AUTO: 1.6 % (ref 1–5)
ERYTHROCYTE [DISTWIDTH] IN BLOOD BY AUTOMATED COUNT: 11.8 % (ref 11.7–14.5)
GFR SERPL CREATININE-BSD FRML MDRD: 102 ML/MIN/1.73
GLOBULIN UR ELPH-MCNC: 2.9 GM/DL (ref 1.8–3.5)
GLUCOSE BLD-MCNC: 92 MG/DL (ref 74–124)
HCT VFR BLD AUTO: 43.2 % (ref 40–49)
HGB BLD-MCNC: 14.3 G/DL (ref 13.5–16.5)
IMM GRANULOCYTES # BLD: 0.02 10*3/MM3 (ref 0–0.03)
IMM GRANULOCYTES NFR BLD: 0.3 % (ref 0–0.5)
INR PPP: 2 (ref 0.9–1.1)
LYMPHOCYTES # BLD AUTO: 2.38 10*3/MM3 (ref 1–3.5)
LYMPHOCYTES NFR BLD AUTO: 34.7 % (ref 20–49)
MCH RBC QN AUTO: 28.9 PG (ref 27–33)
MCHC RBC AUTO-ENTMCNC: 33.1 G/DL (ref 32–35)
MCV RBC AUTO: 87.4 FL (ref 83–97)
MONOCYTES # BLD AUTO: 0.55 10*3/MM3 (ref 0.25–0.8)
MONOCYTES NFR BLD AUTO: 8 % (ref 4–12)
NEUTROPHILS # BLD AUTO: 3.76 10*3/MM3 (ref 1.5–7)
NEUTROPHILS NFR BLD AUTO: 55 % (ref 39–75)
NRBC BLD MANUAL-RTO: 0 /100 WBC (ref 0–0)
PLATELET # BLD AUTO: 309 10*3/MM3 (ref 150–375)
PMV BLD AUTO: 9.4 FL (ref 8.9–12.1)
POTASSIUM BLD-SCNC: 4.4 MMOL/L (ref 3.5–4.7)
PROT SERPL-MCNC: 7.3 G/DL (ref 6.3–8)
PROTHROMBIN TIME: 24.5 SECONDS (ref 11–13.5)
RBC # BLD AUTO: 4.94 10*6/MM3 (ref 4.3–5.5)
SODIUM BLD-SCNC: 140 MMOL/L (ref 134–145)
WBC NRBC COR # BLD: 6.85 10*3/MM3 (ref 4–10)

## 2018-07-23 PROCEDURE — 99215 OFFICE O/P EST HI 40 MIN: CPT | Performed by: INTERNAL MEDICINE

## 2018-07-23 PROCEDURE — 80053 COMPREHEN METABOLIC PANEL: CPT | Performed by: INTERNAL MEDICINE

## 2018-07-23 PROCEDURE — 85610 PROTHROMBIN TIME: CPT | Performed by: INTERNAL MEDICINE

## 2018-07-23 PROCEDURE — 36415 COLL VENOUS BLD VENIPUNCTURE: CPT | Performed by: INTERNAL MEDICINE

## 2018-07-23 PROCEDURE — 85025 COMPLETE CBC W/AUTO DIFF WBC: CPT | Performed by: INTERNAL MEDICINE

## 2018-07-23 NOTE — PROGRESS NOTES
Oncology Social Work    Chief Complaint: distress score of 7; concerns with treatment decisions, fertility, anxiety and fear surrounding diagnosis.     Subjective  Patient ID: Bryan Valadez is a 32 y.o. male who presents to the [facility] ([facility]) clinic for initial consultation at the request of Humaira Chavarria RN in the Radiation Center.  Patient with a new diagnosis of Anaplastic astrocytoma, right frontal lobe, post resection.  Patient to undergo Concurrent chemo + Radiation.     Social History  Marital Status:   Children: Yes ; 2 yr old dgt  Support Community: Spouse, parents, siblings  Highest Level of Education: college graduate  Career:    Tobacco Use: The patient denies current or previous tobacco use.  Alcohol Use: occasional/rare use  Marijuana/ Other drug Use: denied.    Medical History  Psychiatric History: None    Family History  Family Psychiatric History:  None identified   Family Cancer History: prostate cancer father alive    PHQ9 -  Not assessed   Distress Measurement:  7/10             Review of Systems    Objective   Mental Status Exam  Appearance:  clean and casually dressed, appropriate  Attitude toward clinician:  cooperative and agreeable   Speech:    Rate:  regular rate and rhythm   Volume:  normal  Motor:  no abnormal movements present  Mood:  Content, calm   Affect:  euthymic  Thought Processes:  linear, logical, and goal directed  Thought Content:  normal  Suicidal Thoughts:  absent  Homicidal Thoughts:  absent  Perceptual Disturbance: no perceptual disturbance  Attention and Concentration:  good  Insight and Judgement:  good  Memory:  memory appears to be intact    Physical Exam  Station and gait observed to be WNL.  Patient alert and oriented x 3.  Independent with mobility and ADL's .      Assessment/ Plan:  Psychosocial assessment conducted with patient and wife today in the cancer resource center.  Explained role of OSW and services available.  Patient  and wife recently moved from [place], [place] to [place].  Patient and wife are originally from [place] and all of their families live here.  Patient with strong support system.  Patient works as a  at [employer]. He carries the insurance. Patient to contact his HR dept to inquire on FMLA and Short term disability benefits.    Wife is a stay at home mom to 2 year old daughter. Patient and wife do not report any financial distress at this time.  One concern patient and wife would like addressed is Fertility Preservation; OSW to ask Clinical RN specialist about organization to use.    Community resources such as [employer], [employer] and [employer].    Discussed Living will.  Patient does not have one and would like to pursue.  Invited he and wife to our next Advanced Care planning class on 7/25 at 1pm where this can be completed.   Patient reports normal appetite and no sleep issues.  Energy low, but he believes is slowly improving.    Wife states that improvement in their marriage and communication once tumor removed.  She states relationship is better now.  Discussed supportive counseling is available in the future if needed.    Thank you,  Leila Croft, MSSW, CSW, OSW-C

## 2018-07-23 NOTE — PROGRESS NOTES
[facility] OUTPATIENT FOLLOW UP CLINIC VISIT    REASON FOR FOLLOW-UP:    1.  Left lower extremity DVT with progression of symptoms and extension of the left lower extremity DVT into the femoral vein from the popliteal/calf vein swallowing for next set.  Currently anticoagulated with warfarin.  2.  Right lower extremity DVT noted in the common femoral, profunda femoral, and calf veins  3.  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  Other thrombophilia labs negative.    4.  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  NOT 1p19q co-deleted.      HISTORY OF PRESENT ILLNESS:  Bryan Valadez is a 32 y.o. male who returns today for follow up of the above issue.  He is generally feeling well at this point.  He continues to have some very mild left lower extremity edema.  He denies any leg pain at this point.  He denies any headache or visual changes.  No apparent cognitive issues.  He denies any bleeding and he continues warfarin with a therapeutic INR as of late last week.        ONCOLOGIC HISTORY:  He had about 6 months of headaches treated as sinusitis and presented on 6/15 with visual changes and headache and was found to have a large right frontal mass which was resected on 6/16 by Dr. Galvan and consistent with a glioma.  Pathology was reviewed at [facility] showing infiltrating glioma.  Large 10 cm partially cystic and solid lobulated tumor mass at diagnosis.  Negative IDH1-R132H immunostain excludes the most common IDH1 mutation; however loss of ATRX expression suggests possibility of glioma harboring another less common IDH1 mutation.  p53 overexpressed.  Ki67 10% but variable.  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  There was no evidence for a 1p19q co-deletion.       Chromosome microarray showed a complex molecular karyotype including loss of 1q, loss of 2q, gain of 7q, gain of 8q, loss of 9p, REBECCA of  17p, and gain of 18p.  These abnormalities are typically associated with  IDH-mutant astrocytic gliomas.  No 1p and 19q whole arm co-deletion was noted.  CT head on 7/1 c/w residual tumor circumscribing the resection cavity and a remote cerebellar hemisphere hemorrhage.       He was discharged and subsequently admitted with left leg pain and chest pain, with LLE popliteal and calf vein clot noted and bilateral small PE. He was treated with heparin and discharged on Pradaxa.     He developed worsening leg pain up into the thigh and presented to the ER where a venous duplex showed progression of the clot to the femoral vein.  He was given Lovenox and admitted.  Warfarin was initiated.       A partial thrombophilia evaluation showed that he is a heterozygote for the factor V Leiden mutation.  Labs otherwise unremarkable.    He was subsequently found to have a right lower extremity DVT on 7/8/2018.      ALLERGIES:  Allergies   Allergen Reactions   • Avocado Itching   • Other Itching     Insect stings: Bee stings, throat swelling (has Epi pen)    Allergy to fruit, Avocado, Cherry Tomato (can have blue berries)   • Tomato Itching     Cherry tomato       MEDICATIONS:  The medication list has been reviewed with the patient by the medical assistant, and the list has been updated in the electronic medical record, which I reviewed.  Medication dosages and frequencies were confirmed to be accurate.    REVIEW OF SYSTEMS:  PAIN:  See Vital Signs below.  GENERAL:  No fevers, chills, night sweats, or unintended weight loss.  SKIN:  No rashes or non-healing lesions.  HEME/LYMPH:  No abnormal bleeding.  No palpable lymphadenopathy.  EYES:  No vision changes or diplopia.  ENT:  No sore throat or difficulty swallowing.  RESPIRATORY:  No cough, shortness of breath, hemoptysis, or wheezing.  CARDIOVASCULAR:  No chest pain, palpitations, orthopnea, or dyspnea on exertion.  GASTROINTESTINAL:  No abdominal pain, nausea, vomiting,  "constipation, diarrhea, melena, or hematochezia.  GENITOURINARY:  No dysuria or hematuria.  MUSCULOSKELETAL:  No joint pain, swelling, or erythema.  NEUROLOGIC:  No dizziness, loss of consciousness, or seizures.  PSYCHIATRIC:  No depression, anxiety, or mood changes.    Vitals:    07/23/18 1617   BP: 120/78   Pulse: 78   Resp: 18   Temp: 99.2 °F (37.3 °C)   TempSrc: Oral   SpO2: 100%   Weight: 77 kg (169 lb 12.8 oz)   Height: 178 cm (70.08\")   PainSc: 0-No pain       PHYSICAL EXAMINATION:  GENERAL:  Well-developed well-nourished male; awake, alert and oriented, in no acute distress.  SKIN:  Warm and dry, without rashes, purpura, or petechiae.  HEAD:  Normocephalic, Well-healed surgical incision present  EYES:  Pupils equal, round and reactive to light.  Extraocular movements intact.  Conjunctivae normal.  EARS:  Hearing intact.  NOSE:  Septum midline.  No excoriations or nasal discharge.  MOUTH:  No stomatitis or ulcers.  Lips are normal.  THROAT:  Oropharynx without lesions or exudates.  NECK:  Supple with good range of motion; no thyromegaly or masses; no JVD or bruits.  LYMPHATICS:  No cervical, supraclavicular, axillary, or inguinal lymphadenopathy.  CHEST:  Lungs are clear to auscultation bilaterally.  No wheezes, rales, or rhonchi.  HEART:  Regular rate; normal rhythm.  No murmurs, gallops or rubs.  ABDOMEN:  Soft, non-tender, non-distended.  Normal active bowel sounds.  No organomegaly.  EXTREMITIES:  No clubbing, cyanosis, or edema.  NEUROLOGICAL:  No focal neurologic deficits.    DIAGNOSTIC DATA:  Results for orders placed or performed in visit on 07/23/18   Protime-INR, Fingerstick   Result Value Ref Range    Protime 24.5 (H) 11.0 - 13.5 Seconds    INR 2.00 (H) 0.90 - 1.10   Comprehensive Metabolic Panel   Result Value Ref Range    Glucose 92 74 - 124 mg/dL    BUN 13 6 - 20 mg/dL    Creatinine 0.86 0.70 - 1.30 mg/dL    Sodium 140 134 - 145 mmol/L    Potassium 4.4 3.5 - 4.7 mmol/L    Chloride 102 98 - 107 " mmol/L    CO2 25.6 22.0 - 29.0 mmol/L    Calcium 9.4 8.5 - 10.2 mg/dL    Total Protein 7.3 6.3 - 8.0 g/dL    Albumin 4.40 3.50 - 5.20 g/dL    ALT (SGPT) 191 (C) 0 - 41 U/L    AST (SGOT) 46 (H) 0 - 40 U/L    Alkaline Phosphatase 90 38 - 116 U/L    Total Bilirubin <0.2 0.1 - 1.2 mg/dL    eGFR Non African Amer 102 >60 mL/min/1.73    Globulin 2.9 1.8 - 3.5 gm/dL    A/G Ratio 1.5 1.1 - 2.4 g/dL    BUN/Creatinine Ratio 15.1 7.3 - 30.0    Anion Gap 12.4 mmol/L   CBC Auto Differential   Result Value Ref Range    WBC 6.85 4.00 - 10.00 10*3/mm3    RBC 4.94 4.30 - 5.50 10*6/mm3    Hemoglobin 14.3 13.5 - 16.5 g/dL    Hematocrit 43.2 40.0 - 49.0 %    MCV 87.4 83.0 - 97.0 fL    MCH 28.9 27.0 - 33.0 pg    MCHC 33.1 32.0 - 35.0 g/dL    RDW 11.8 11.7 - 14.5 %    RDW-SD 37.9 37.0 - 49.0 fl    MPV 9.4 8.9 - 12.1 fL    Platelets 309 150 - 375 10*3/mm3    Neutrophil % 55.0 39.0 - 75.0 %    Lymphocyte % 34.7 20.0 - 49.0 %    Monocyte % 8.0 4.0 - 12.0 %    Eosinophil % 1.6 1.0 - 5.0 %    Basophil % 0.4 0.0 - 1.1 %    Immature Grans % 0.3 0.0 - 0.5 %    Neutrophils, Absolute 3.76 1.50 - 7.00 10*3/mm3    Lymphocytes, Absolute 2.38 1.00 - 3.50 10*3/mm3    Monocytes, Absolute 0.55 0.25 - 0.80 10*3/mm3    Eosinophils, Absolute 0.11 0.00 - 0.36 10*3/mm3    Basophils, Absolute 0.03 0.00 - 0.10 10*3/mm3    Immature Grans, Absolute 0.02 0.00 - 0.03 10*3/mm3    nRBC 0.0 0.0 - 0.0 /100 WBC       IMAGING:  None reviewed    ASSESSMENT:  This is a 32 y.o. male with:  1.  Bilateral lower extremity DVT: He is now on warfarin with therapeutic INR.  He will continue dosing per our protocol, alternating 7.5 and 5 mg every other day.  This is related to his brain tumor.  He will continue warfarin for now.  2.  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  NOT 1p19q co-deleted.  Overall, this is a good molecular profile.  I discussed his case with Dr. Galvan, Dr. Daley, and Dr. Dobbins  and I reviewed NCCN guidelines.  He has already seen Dr. Daley with plans for radiation.  He will require one year of adjuvant Temodar following radiation.  The big question is whether to administer concurrent therapy with Temodar along with radiation.  Ultimately, we have decided not to do this given the overall good molecular profile and the potential adverse effects from concurrent therapy.  3.  We did discuss fertility today as he and his wife do not have more children.  It would not be advised that he and his wife try to have a child naturally while he is on Temodar.  He will be referred for sperm banking.    4.  Elevated liver labs: This is a new issue.  It would be rare for warfarin or Keppra to cause this but this is certainly possible.  We will repeat his labs in 2 weeks when he returns.    PLAN:  1.  He will proceed with radiation per Dr. Daley  2.  Per Dr. Daley, about 5-6 weeks after radiation he will have a baseline brain MRI.  3.  After that, we will plan to administer adjuvant Temodar 5/28 days ×12 months.  4.  He will have serial brain MRIs.  NCCN guidelines suggest every 2-4 months for 3 years and then every 6 months indefinitely.  5.  Continue warfarin with dose adjustments per protocol.  He will return in 2 weeks for a CBC, INR, and CMP.  If his liver labs are still elevated we will need to further evaluate and consider some medication adjustments.  I will see him back in 4 weeks for follow-up with the same labs.  6.  Refer to the [facility] for sperm banking

## 2018-07-24 ENCOUNTER — TELEPHONE (OUTPATIENT)
Dept: NEUROSURGERY | Facility: CLINIC | Age: 33
End: 2018-07-24

## 2018-07-24 ENCOUNTER — TELEPHONE (OUTPATIENT)
Dept: ONCOLOGY | Facility: CLINIC | Age: 33
End: 2018-07-24

## 2018-07-24 RX ORDER — LEVETIRACETAM 1000 MG/1
TABLET ORAL
Qty: 180 TABLET | Refills: 3 | Status: SHIPPED | OUTPATIENT
Start: 2018-07-24 | End: 2018-10-21 | Stop reason: SDUPTHER

## 2018-07-24 NOTE — TELEPHONE ENCOUNTER
----- Message from Sean Jefferson MD sent at 7/23/2018  6:07 PM EDT -----  Regarding: [facility]  I couldn't remember the name of the fertility facility when he checked out, but we need to refer him to the [facility] if not done already.  Thanks,  TOD

## 2018-07-27 PROCEDURE — 77301 RADIOTHERAPY DOSE PLAN IMRT: CPT | Performed by: RADIOLOGY

## 2018-07-27 PROCEDURE — 77300 RADIATION THERAPY DOSE PLAN: CPT | Performed by: RADIOLOGY

## 2018-07-27 PROCEDURE — 77338 DESIGN MLC DEVICE FOR IMRT: CPT | Performed by: RADIOLOGY

## 2018-07-31 PROCEDURE — 77427 RADIATION TX MANAGEMENT X5: CPT | Performed by: RADIOLOGY

## 2018-07-31 PROCEDURE — 77386 CHG INTENSITY MODULATED RADIATION TX DLVR COMPLEX: CPT | Performed by: RADIOLOGY

## 2018-07-31 PROCEDURE — 77386: CPT | Performed by: RADIOLOGY

## 2018-07-31 PROCEDURE — 77014 CHG CT GUIDANCE RADIATION THERAPY FLDS PLACEMENT: CPT | Performed by: RADIOLOGY

## 2018-08-01 ENCOUNTER — APPOINTMENT (OUTPATIENT)
Dept: RADIATION ONCOLOGY | Facility: HOSPITAL | Age: 33
End: 2018-08-01

## 2018-08-01 PROCEDURE — 77014 CHG CT GUIDANCE RADIATION THERAPY FLDS PLACEMENT: CPT | Performed by: RADIOLOGY

## 2018-08-01 PROCEDURE — 77386 CHG INTENSITY MODULATED RADIATION TX DLVR COMPLEX: CPT | Performed by: RADIOLOGY

## 2018-08-01 PROCEDURE — 77386: CPT | Performed by: RADIOLOGY

## 2018-08-02 ENCOUNTER — TELEPHONE (OUTPATIENT)
Dept: ONCOLOGY | Facility: HOSPITAL | Age: 33
End: 2018-08-02

## 2018-08-02 PROCEDURE — 77014 CHG CT GUIDANCE RADIATION THERAPY FLDS PLACEMENT: CPT | Performed by: RADIOLOGY

## 2018-08-02 PROCEDURE — 77386 CHG INTENSITY MODULATED RADIATION TX DLVR COMPLEX: CPT | Performed by: RADIOLOGY

## 2018-08-02 PROCEDURE — 77386: CPT | Performed by: RADIOLOGY

## 2018-08-02 RX ORDER — WARFARIN SODIUM 5 MG/1
5 TABLET ORAL NIGHTLY
Qty: 60 TABLET | Refills: 0 | Status: SHIPPED | OUTPATIENT
Start: 2018-08-02 | End: 2018-08-02 | Stop reason: SDUPTHER

## 2018-08-02 RX ORDER — WARFARIN SODIUM 5 MG/1
TABLET ORAL
Qty: 60 TABLET | Refills: 0 | Status: SHIPPED | OUTPATIENT
Start: 2018-08-02 | End: 2019-02-26 | Stop reason: SDUPTHER

## 2018-08-02 NOTE — TELEPHONE ENCOUNTER
Pt called needing coumadin refilled. He is taking 5mg M,W,F and 7.5mg Sun,Tues,Thurs,Sat and has 5mg tablets at home. New script escribed to pharmacy. Pt has appt for labs 8/7/18. He states he is needing a lab drawn for [facility]. Advised him to have fertility MD fax order to our office and lab can be drawn on 8/7. Pt v/u.

## 2018-08-03 ENCOUNTER — RADIATION ONCOLOGY WEEKLY ASSESSMENT (OUTPATIENT)
Dept: RADIATION ONCOLOGY | Facility: HOSPITAL | Age: 33
End: 2018-08-03

## 2018-08-03 VITALS
HEIGHT: 70 IN | BODY MASS INDEX: 25.2 KG/M2 | HEART RATE: 66 BPM | WEIGHT: 176 LBS | TEMPERATURE: 96.6 F | OXYGEN SATURATION: 99 % | SYSTOLIC BLOOD PRESSURE: 114 MMHG | DIASTOLIC BLOOD PRESSURE: 77 MMHG

## 2018-08-03 DIAGNOSIS — C71.1 ANAPLASTIC ASTROCYTOMA OF FRONTAL LOBE (HCC): Primary | ICD-10-CM

## 2018-08-03 PROCEDURE — 77014 CHG CT GUIDANCE RADIATION THERAPY FLDS PLACEMENT: CPT | Performed by: RADIOLOGY

## 2018-08-03 PROCEDURE — 77386 CHG INTENSITY MODULATED RADIATION TX DLVR COMPLEX: CPT | Performed by: RADIOLOGY

## 2018-08-03 PROCEDURE — 77386: CPT | Performed by: RADIOLOGY

## 2018-08-03 NOTE — PROGRESS NOTES
Physician Weekly Management Note    Diagnosis:     Diagnosis Plan   1. Anaplastic astrocytoma of frontal lobe (CMS/HCC)         RT Details:  Treatment #4/30      XRT alone    Notes on Treatment course, Films, Medical progress:  Doing fine, no treatment related issues.  No Steroids at This Time.  Continue As Planned.    Weekly Management:  Medication reviewed?   Yes  New medications given?   No  Problemlist reviewed?   Yes  Problem added?   No      Technical aspects reviewed:  Weekly OBI approved?   Yes  Weekly port films approved?   Yes  Change requests noted on port film?   No  Patient setup and plan reviewed?   Yes    Chart Reviewed:  Continue current treatment plan?   Yes  Treatment plan change requested?   No  CBC reviewed?   No  Concurrent Chemo?   No    Objective     Toxicities:   As above     Review of Systems   As above    Vitals:    08/03/18 0845   BP: 114/77   Pulse: 66   Temp: 96.6 °F (35.9 °C)   SpO2: 99%       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 Alexandra   • Chest pain    • DVT (deep venous thrombosis) (CMS/HCC)    • Factor V Leiden (CMS/HCC)    • GERD (gastroesophageal reflux disease)    • H/O echocardiogram 2006   • Headache    • History of ETT    • History of Holter monitoring    • Hyperlipidemia    • PE (pulmonary thromboembolism) (CMS/HCC)    • Primary brain tumor (CMS/HCC) 2018         Past Surgical History:   Procedure Laterality Date   • CRANIOTOMY FOR TUMOR Right 6/16/2018    Procedure: CRANIOTOMY FOR  RESECTION OF LARGE RIGHT FRONTAL MASS WITH AUGUSTIN NAVIGATION;  Surgeon: Chetan Galvan IV, MD;  [facility]: [facility];  Service: Neurosurgery         Current Outpatient Prescriptions on File Prior to Visit   Medication Sig Dispense  Refill   • baclofen (LIORESAL) 10 MG tablet Take 1 tablet by mouth 3 (Three) Times a Day As Needed for Muscle Spasms. 50 tablet 0   • dexamethasone (DECADRON) 2 MG tablet Take 1 tablet by mouth 3 (Three) Times a Day With Meals. 60 tablet 0   • docusate sodium 100 MG capsule Take 100 mg by mouth 2 (Two) Times a Day As Needed (constipation). 60 each 1   • EPINEPHrine (EPIPEN) 0.3 MG/0.3ML solution auto-injector injection Inject 0.3 mg into the shoulder, thigh, or buttocks As Needed.     • HYDROcodone-acetaminophen (NORCO) 5-325 MG per tablet Take 1 tablet by mouth Every 6 (Six) Hours As Needed.     • levETIRAcetam (KEPPRA) 1000 MG tablet TAKE 1 TABLET BY MOUTH EVERY 12 HOURS 180 tablet 3   • montelukast (SINGULAIR) 10 MG tablet Take 10 mg by mouth Daily.     • Multiple Vitamins-Minerals (MULTIVITAMIN WITH MINERALS) tablet tablet Take 1 tablet by mouth Daily.     • omeprazole (PriLOSEC) 20 MG capsule Take 20 mg by mouth Daily As Needed.     • polyethylene glycol (MIRALAX) packet Take 17 g by mouth Daily. 10 each 0   • warfarin (COUMADIN) 5 MG tablet Take 5mg by mouth Mon,Wed,Fri and 7.5mg Sun,Tues,Thurs,Sat unless directed otherwise by MD 60 tablet 0     No current facility-administered medications on file prior to visit.        Allergies   Allergen Reactions   • Avocado Itching   • Other Itching     Insect stings: Bee stings, throat swelling (has Epi pen)    Allergy to fruit, Avocado, Cherry Tomato (can have blue berries)   • Tomato Itching     Cherry tomato        Primary care MD:    Provider, No Known    Oncologist:    KAROL Cabezas M.D.    Seen and approved by:  Robin Daley MD  08/03/2018

## 2018-08-06 PROCEDURE — 77386: CPT | Performed by: RADIOLOGY

## 2018-08-06 PROCEDURE — 77014 CHG CT GUIDANCE RADIATION THERAPY FLDS PLACEMENT: CPT | Performed by: RADIOLOGY

## 2018-08-06 PROCEDURE — 77336 RADIATION PHYSICS CONSULT: CPT | Performed by: RADIOLOGY

## 2018-08-06 PROCEDURE — 77386 CHG INTENSITY MODULATED RADIATION TX DLVR COMPLEX: CPT | Performed by: RADIOLOGY

## 2018-08-07 ENCOUNTER — APPOINTMENT (OUTPATIENT)
Dept: ONCOLOGY | Facility: HOSPITAL | Age: 33
End: 2018-08-07

## 2018-08-07 ENCOUNTER — OFFICE VISIT (OUTPATIENT)
Dept: INTERNAL MEDICINE | Facility: CLINIC | Age: 33
End: 2018-08-07

## 2018-08-07 ENCOUNTER — INFUSION (OUTPATIENT)
Dept: ONCOLOGY | Facility: HOSPITAL | Age: 33
End: 2018-08-07

## 2018-08-07 ENCOUNTER — LAB (OUTPATIENT)
Dept: LAB | Facility: HOSPITAL | Age: 33
End: 2018-08-07

## 2018-08-07 ENCOUNTER — APPOINTMENT (OUTPATIENT)
Dept: LAB | Facility: HOSPITAL | Age: 33
End: 2018-08-07

## 2018-08-07 VITALS
BODY MASS INDEX: 24.57 KG/M2 | DIASTOLIC BLOOD PRESSURE: 74 MMHG | SYSTOLIC BLOOD PRESSURE: 132 MMHG | OXYGEN SATURATION: 99 % | WEIGHT: 171.6 LBS | HEART RATE: 73 BPM | HEIGHT: 70 IN

## 2018-08-07 DIAGNOSIS — D49.6 PRIMARY BRAIN TUMOR (HCC): ICD-10-CM

## 2018-08-07 DIAGNOSIS — L24.7 IRRITANT CONTACT DERMATITIS DUE TO PLANTS, EXCEPT FOOD: Primary | ICD-10-CM

## 2018-08-07 DIAGNOSIS — D68.51 FACTOR 5 LEIDEN MUTATION, HETEROZYGOUS (HCC): ICD-10-CM

## 2018-08-07 DIAGNOSIS — C71.1 ANAPLASTIC ASTROCYTOMA OF FRONTAL LOBE (HCC): ICD-10-CM

## 2018-08-07 DIAGNOSIS — I26.99 PULMONARY EMBOLISM WITH INFARCTION (HCC): ICD-10-CM

## 2018-08-07 DIAGNOSIS — I82.413 ACUTE DEEP VEIN THROMBOSIS (DVT) OF FEMORAL VEIN OF BOTH LOWER EXTREMITIES (HCC): ICD-10-CM

## 2018-08-07 LAB
ALBUMIN SERPL-MCNC: 4.4 G/DL (ref 3.5–5.2)
ALBUMIN/GLOB SERPL: 1.6 G/DL (ref 1.1–2.4)
ALP SERPL-CCNC: 90 U/L (ref 38–116)
ALT SERPL W P-5'-P-CCNC: 20 U/L (ref 0–41)
ANION GAP SERPL CALCULATED.3IONS-SCNC: 11.7 MMOL/L
AST SERPL-CCNC: 13 U/L (ref 0–40)
BASOPHILS # BLD AUTO: 0.04 10*3/MM3 (ref 0–0.1)
BASOPHILS NFR BLD AUTO: 0.9 % (ref 0–1.1)
BILIRUB SERPL-MCNC: 0.2 MG/DL (ref 0.1–1.2)
BUN BLD-MCNC: 10 MG/DL (ref 6–20)
BUN/CREAT SERPL: 12 (ref 7.3–30)
CALCIUM SPEC-SCNC: 9.3 MG/DL (ref 8.5–10.2)
CHLORIDE SERPL-SCNC: 105 MMOL/L (ref 98–107)
CO2 SERPL-SCNC: 26.3 MMOL/L (ref 22–29)
CREAT BLD-MCNC: 0.83 MG/DL (ref 0.7–1.3)
DEPRECATED RDW RBC AUTO: 38.6 FL (ref 37–49)
EOSINOPHIL # BLD AUTO: 0.22 10*3/MM3 (ref 0–0.36)
EOSINOPHIL NFR BLD AUTO: 5 % (ref 1–5)
ERYTHROCYTE [DISTWIDTH] IN BLOOD BY AUTOMATED COUNT: 12.2 % (ref 11.7–14.5)
GFR SERPL CREATININE-BSD FRML MDRD: 106 ML/MIN/1.73
GLOBULIN UR ELPH-MCNC: 2.8 GM/DL (ref 1.8–3.5)
GLUCOSE BLD-MCNC: 127 MG/DL (ref 74–124)
HBV SURFACE AB SER RIA-ACNC: REACTIVE
HCT VFR BLD AUTO: 45.2 % (ref 40–49)
HCV AB SER DONR QL: NORMAL
HGB BLD-MCNC: 14.8 G/DL (ref 13.5–16.5)
IMM GRANULOCYTES # BLD: 0.01 10*3/MM3 (ref 0–0.03)
IMM GRANULOCYTES NFR BLD: 0.2 % (ref 0–0.5)
INR PPP: 2.1 (ref 0.9–1.1)
LYMPHOCYTES # BLD AUTO: 2.18 10*3/MM3 (ref 1–3.5)
LYMPHOCYTES NFR BLD AUTO: 49.3 % (ref 20–49)
MCH RBC QN AUTO: 28.4 PG (ref 27–33)
MCHC RBC AUTO-ENTMCNC: 32.7 G/DL (ref 32–35)
MCV RBC AUTO: 86.6 FL (ref 83–97)
MONOCYTES # BLD AUTO: 0.36 10*3/MM3 (ref 0.25–0.8)
MONOCYTES NFR BLD AUTO: 8.1 % (ref 4–12)
NEUTROPHILS # BLD AUTO: 1.61 10*3/MM3 (ref 1.5–7)
NEUTROPHILS NFR BLD AUTO: 36.5 % (ref 39–75)
NRBC BLD MANUAL-RTO: 0 /100 WBC (ref 0–0)
PLATELET # BLD AUTO: 291 10*3/MM3 (ref 150–375)
PMV BLD AUTO: 9.7 FL (ref 8.9–12.1)
POTASSIUM BLD-SCNC: 3.8 MMOL/L (ref 3.5–4.7)
PROT SERPL-MCNC: 7.2 G/DL (ref 6.3–8)
PROTHROMBIN TIME: 24.8 SECONDS (ref 11–13.5)
RBC # BLD AUTO: 5.22 10*6/MM3 (ref 4.3–5.5)
SODIUM BLD-SCNC: 143 MMOL/L (ref 134–145)
WBC NRBC COR # BLD: 4.42 10*3/MM3 (ref 4–10)

## 2018-08-07 PROCEDURE — 80053 COMPREHEN METABOLIC PANEL: CPT | Performed by: INTERNAL MEDICINE

## 2018-08-07 PROCEDURE — 77014 CHG CT GUIDANCE RADIATION THERAPY FLDS PLACEMENT: CPT | Performed by: RADIOLOGY

## 2018-08-07 PROCEDURE — 77427 RADIATION TX MANAGEMENT X5: CPT | Performed by: RADIOLOGY

## 2018-08-07 PROCEDURE — 86803 HEPATITIS C AB TEST: CPT | Performed by: OBSTETRICS & GYNECOLOGY

## 2018-08-07 PROCEDURE — 85610 PROTHROMBIN TIME: CPT | Performed by: INTERNAL MEDICINE

## 2018-08-07 PROCEDURE — 86706 HEP B SURFACE ANTIBODY: CPT | Performed by: OBSTETRICS & GYNECOLOGY

## 2018-08-07 PROCEDURE — 85025 COMPLETE CBC W/AUTO DIFF WBC: CPT | Performed by: INTERNAL MEDICINE

## 2018-08-07 PROCEDURE — 77386: CPT | Performed by: RADIOLOGY

## 2018-08-07 PROCEDURE — 86592 SYPHILIS TEST NON-TREP QUAL: CPT | Performed by: OBSTETRICS & GYNECOLOGY

## 2018-08-07 PROCEDURE — 77386 CHG INTENSITY MODULATED RADIATION TX DLVR COMPLEX: CPT | Performed by: RADIOLOGY

## 2018-08-07 PROCEDURE — 36415 COLL VENOUS BLD VENIPUNCTURE: CPT | Performed by: INTERNAL MEDICINE

## 2018-08-07 PROCEDURE — 99214 OFFICE O/P EST MOD 30 MIN: CPT | Performed by: INTERNAL MEDICINE

## 2018-08-07 RX ORDER — METHYLPREDNISOLONE 4 MG/1
TABLET ORAL
Qty: 21 TABLET | Refills: 0 | Status: SHIPPED | OUTPATIENT
Start: 2018-08-07 | End: 2018-10-29

## 2018-08-07 NOTE — PROGRESS NOTES
Subjective   Bryan Valadez is a 32 y.o. male.     Rash   This is a new problem. The current episode started in the past 7 days. Associated symptoms include shortness of breath ( Has had DVT & PE Now on Warfarin).        The following portions of the patient's history were reviewed and updated as appropriate: allergies, current medications, past family history, past medical history, past social history, past surgical history and problem list.    Review of Systems   Constitutional:        Here for Rash    HENT: Negative.    Eyes: Negative.    Respiratory: Positive for shortness of breath ( Has had DVT & PE Now on Warfarin).    Cardiovascular: Negative.    Gastrointestinal: Negative.    Skin: Rash:  Was doing yard work last week & gotinto poison sumac or ivy    Neurological:        [place]going [place] for brain tumor Having Rad [place] & chemo later       Objective   Physical Exam   Constitutional: He appears well-developed.   HENT:   Head: Normocephalic.   Eyes: EOM are normal.   Neck: Neck supple.   Cardiovascular: Normal rate and regular rhythm.    Pulmonary/Chest: Effort normal and breath sounds normal.   Skin: Rash (Has apparent contact dermatitis on Rtneck , chest & abdomen & L arm) noted.       Assessment/Plan   Bryan was seen today for rash.    Diagnoses and all orders for this visit:    Irritant contact dermatitis due to plants, except food  -     MethylPREDNISolone (MEDROL, JOHANNA,) 4 MG tablet; Take as directed on package instructions.  -     betamethasone valerate (VALISONE) 0.1 % cream; Apply  topically to the appropriate area as directed 2 (Two) Times a Day.    Primary brain tumor (CMS/HCC)    Pulmonary embolism with infarction (CMS/HCC)    Factor 5 Leiden mutation, heterozygous (CMS/HCC)

## 2018-08-07 NOTE — PROGRESS NOTES
INR 2.10 today. Patient denies any missed doses, new medications, diet changes or s/s of bleeding. Per SS, patient to take 5 mg Mon and Fri, 7.5 mg all other days. Copy of dosing instructions provided to patient, pt v/u. He will return next week for labs and MD visit.    CMP resulted, liver enzymes back to WNL. Patient notified of results.

## 2018-08-08 LAB
HIV 1 & 2 AB SERPLBLD IA.RAPID: NEGATIVE
HIV 2 AB SERPLBLD QL IA.RAPID: NEGATIVE
HIV1 AB SERPLBLD QL IA.RAPID: NEGATIVE

## 2018-08-08 PROCEDURE — 77014 CHG CT GUIDANCE RADIATION THERAPY FLDS PLACEMENT: CPT | Performed by: RADIOLOGY

## 2018-08-08 PROCEDURE — 77386: CPT | Performed by: RADIOLOGY

## 2018-08-08 PROCEDURE — 77386 CHG INTENSITY MODULATED RADIATION TX DLVR COMPLEX: CPT | Performed by: RADIOLOGY

## 2018-08-09 LAB
CHLAMYDIA IGG SER-ACNC: <0.91 RATIO (ref 0–0.9)
RPR SER QL: NORMAL

## 2018-08-09 PROCEDURE — 77386: CPT | Performed by: RADIOLOGY

## 2018-08-09 PROCEDURE — 77386 CHG INTENSITY MODULATED RADIATION TX DLVR COMPLEX: CPT | Performed by: RADIOLOGY

## 2018-08-09 PROCEDURE — 77014 CHG CT GUIDANCE RADIATION THERAPY FLDS PLACEMENT: CPT | Performed by: RADIOLOGY

## 2018-08-10 ENCOUNTER — RADIATION ONCOLOGY WEEKLY ASSESSMENT (OUTPATIENT)
Dept: RADIATION ONCOLOGY | Facility: HOSPITAL | Age: 33
End: 2018-08-10

## 2018-08-10 DIAGNOSIS — C71.1 ANAPLASTIC ASTROCYTOMA OF FRONTAL LOBE (HCC): Primary | ICD-10-CM

## 2018-08-10 PROCEDURE — 77386: CPT | Performed by: RADIOLOGY

## 2018-08-10 PROCEDURE — 77014 CHG CT GUIDANCE RADIATION THERAPY FLDS PLACEMENT: CPT | Performed by: RADIOLOGY

## 2018-08-10 PROCEDURE — 77386 CHG INTENSITY MODULATED RADIATION TX DLVR COMPLEX: CPT | Performed by: RADIOLOGY

## 2018-08-10 NOTE — PROGRESS NOTES
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  Speech intact, no signs of confusion.  Affect and thought content appropriate.  Continue as planned.    Weekly Management:  Medication reviewed?   Yes  New medications given?   No  Problemlist reviewed?   Yes  Problem added?   No       Technical aspects reviewed:  Weekly OBI approved?   Yes  Weekly port films approved?   Yes  Change requests noted on port film?   No  Patient setup and plan reviewed?   Yes    Chart Reviewed:  Continue current treatment plan?   Yes  Treatment plan change requested?   No  CBC reviewed?   Yes  Concurrent Chemo?   No    Objective     Toxicities:   None     Review of Systems   As above    There were no vitals filed for this visit.    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 Alexandra   • Chest pain    • DVT (deep venous thrombosis) (CMS/HCC)    • Factor V Leiden (CMS/HCC)    • GERD (gastroesophageal reflux disease)    • H/O echocardiogram 2006   • Headache    • History of ETT    • History of Holter monitoring    • Hyperlipidemia    • PE (pulmonary thromboembolism) (CMS/HCC)    • Primary brain tumor (CMS/HCC) 2018         Past Surgical History:   Procedure Laterality Date   • CRANIOTOMY FOR TUMOR Right 6/16/2018    Procedure: CRANIOTOMY FOR  RESECTION OF LARGE RIGHT FRONTAL MASS WITH AUGUSTIN NAVIGATION;  Surgeon: Chetan Galvan IV, MD;  Location: [facility];  Service: Neurosurgery         Current Outpatient  Prescriptions on File Prior to Visit   Medication Sig Dispense Refill   • baclofen (LIORESAL) 10 MG tablet Take 1 tablet by mouth 3 (Three) Times a Day As Needed for Muscle Spasms. 50 tablet 0   • betamethasone valerate (VALISONE) 0.1 % cream Apply  topically to the appropriate area as directed 2 (Two) Times a Day. 45 g 0   • dexamethasone (DECADRON) 2 MG tablet Take 1 tablet by mouth 3 (Three) Times a Day With Meals. 60 tablet 0   • docusate sodium 100 MG capsule Take 100 mg by mouth 2 (Two) Times a Day As Needed (constipation). 60 each 1   • EPINEPHrine (EPIPEN) 0.3 MG/0.3ML solution auto-injector injection Inject 0.3 mg into the shoulder, thigh, or buttocks As Needed.     • HYDROcodone-acetaminophen (NORCO) 5-325 MG per tablet Take 1 tablet by mouth Every 6 (Six) Hours As Needed.     • levETIRAcetam (KEPPRA) 1000 MG tablet TAKE 1 TABLET BY MOUTH EVERY 12 HOURS 180 tablet 3   • MethylPREDNISolone (MEDROL, JOHANNA,) 4 MG tablet Take as directed on package instructions. 21 tablet 0   • montelukast (SINGULAIR) 10 MG tablet Take 10 mg by mouth Daily.     • Multiple Vitamins-Minerals (MULTIVITAMIN WITH MINERALS) tablet tablet Take 1 tablet by mouth Daily.     • omeprazole (PriLOSEC) 20 MG capsule Take 20 mg by mouth Daily As Needed.     • polyethylene glycol (MIRALAX) packet Take 17 g by mouth Daily. 10 each 0   • warfarin (COUMADIN) 5 MG tablet Take 5mg by mouth Mon,Wed,Fri and 7.5mg Sun,Tues,Thurs,Sat unless directed otherwise by MD 60 tablet 0     No current facility-administered medications on file prior to visit.        Allergies   Allergen Reactions   • Avocado Itching   • Other Itching     Insect stings: Bee stings, throat swelling (has Epi pen)    Allergy to fruit, Avocado, Cherry Tomato (can have blue berries)   • Tomato Itching     Cherry tomato        Primary care MD:    Opal Benavidez MD    Oncologist:    HEIDI Jefferson M.D.    Seen and approved by:  Robin Daley MD  08/10/2018

## 2018-08-13 PROCEDURE — 77014 CHG CT GUIDANCE RADIATION THERAPY FLDS PLACEMENT: CPT | Performed by: RADIOLOGY

## 2018-08-13 PROCEDURE — 77386 CHG INTENSITY MODULATED RADIATION TX DLVR COMPLEX: CPT | Performed by: RADIOLOGY

## 2018-08-13 PROCEDURE — 77386: CPT | Performed by: RADIOLOGY

## 2018-08-13 PROCEDURE — 77336 RADIATION PHYSICS CONSULT: CPT | Performed by: RADIOLOGY

## 2018-08-14 ENCOUNTER — RADIATION ONCOLOGY WEEKLY ASSESSMENT (OUTPATIENT)
Dept: RADIATION ONCOLOGY | Facility: HOSPITAL | Age: 33
End: 2018-08-14

## 2018-08-14 DIAGNOSIS — C71.1 ANAPLASTIC ASTROCYTOMA OF FRONTAL LOBE (HCC): Primary | ICD-10-CM

## 2018-08-14 PROCEDURE — 77427 RADIATION TX MANAGEMENT X5: CPT | Performed by: RADIOLOGY

## 2018-08-14 PROCEDURE — 77386 CHG INTENSITY MODULATED RADIATION TX DLVR COMPLEX: CPT | Performed by: RADIOLOGY

## 2018-08-14 PROCEDURE — 77014 CHG CT GUIDANCE RADIATION THERAPY FLDS PLACEMENT: CPT | Performed by: RADIOLOGY

## 2018-08-14 PROCEDURE — 77386: CPT | Performed by: RADIOLOGY

## 2018-08-14 NOTE — PROGRESS NOTES
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  Affect and thought content appropriate continue as planned.    Weekly Management:  Medication reviewed?   Yes  New medications given?   No  Problemlist reviewed?   Yes  Problem added?   No        Technical aspects reviewed:  Weekly OBI approved?   Yes  Weekly port films approved?   Yes  Change requests noted on port film?   No  Patient setup and plan reviewed?   Yes    Chart Reviewed:  Continue current treatment plan?   Yes  Treatment plan change requested?   No  CBC reviewed?   Yes  Concurrent Chemo?   No    Objective     Toxicities:   As above     Review of Systems   As above    There were no vitals filed for this visit.    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 Alexandra   • Chest pain    • DVT (deep venous thrombosis) (CMS/HCC)    • Factor V Leiden (CMS/HCC)    • GERD (gastroesophageal reflux disease)    • H/O echocardiogram 2006   • Headache    • History of ETT    • History of Holter monitoring    • Hyperlipidemia    • PE (pulmonary thromboembolism) (CMS/HCC)    • Primary brain tumor (CMS/HCC) 2018         Past Surgical History:   Procedure Laterality Date   • CRANIOTOMY FOR TUMOR Right 6/16/2018    Procedure: CRANIOTOMY FOR  RESECTION OF LARGE RIGHT FRONTAL MASS WITH AUGUSTIN NAVIGATION;  Surgeon: Chetan Galvan IV, MD;  Location: [facility];  Service: Neurosurgery         Current Outpatient Prescriptions on File Prior  to Visit   Medication Sig Dispense Refill   • baclofen (LIORESAL) 10 MG tablet Take 1 tablet by mouth 3 (Three) Times a Day As Needed for Muscle Spasms. 50 tablet 0   • betamethasone valerate (VALISONE) 0.1 % cream Apply  topically to the appropriate area as directed 2 (Two) Times a Day. 45 g 0   • dexamethasone (DECADRON) 2 MG tablet Take 1 tablet by mouth 3 (Three) Times a Day With Meals. 60 tablet 0   • docusate sodium 100 MG capsule Take 100 mg by mouth 2 (Two) Times a Day As Needed (constipation). 60 each 1   • EPINEPHrine (EPIPEN) 0.3 MG/0.3ML solution auto-injector injection Inject 0.3 mg into the shoulder, thigh, or buttocks As Needed.     • HYDROcodone-acetaminophen (NORCO) 5-325 MG per tablet Take 1 tablet by mouth Every 6 (Six) Hours As Needed.     • levETIRAcetam (KEPPRA) 1000 MG tablet TAKE 1 TABLET BY MOUTH EVERY 12 HOURS 180 tablet 3   • MethylPREDNISolone (MEDROL, JOHANNA,) 4 MG tablet Take as directed on package instructions. 21 tablet 0   • montelukast (SINGULAIR) 10 MG tablet Take 10 mg by mouth Daily.     • Multiple Vitamins-Minerals (MULTIVITAMIN WITH MINERALS) tablet tablet Take 1 tablet by mouth Daily.     • omeprazole (PriLOSEC) 20 MG capsule Take 20 mg by mouth Daily As Needed.     • polyethylene glycol (MIRALAX) packet Take 17 g by mouth Daily. 10 each 0   • warfarin (COUMADIN) 5 MG tablet Take 5mg by mouth Mon,Wed,Fri and 7.5mg Sun,Tues,Thurs,Sat unless directed otherwise by MD 60 tablet 0     No current facility-administered medications on file prior to visit.        Allergies   Allergen Reactions   • Avocado Itching   • Other Itching     Insect stings: Bee stings, throat swelling (has Epi pen)    Allergy to fruit, Avocado, Cherry Tomato (can have blue berries)   • Tomato Itching     Cherry tomato        Primary care MD:    Opal Benavidez MD    Oncologist:      HEIDI Jefferson M.D.    Seen and approved by:  Robin Daley MD  08/14/2018

## 2018-08-15 PROCEDURE — 77014 CHG CT GUIDANCE RADIATION THERAPY FLDS PLACEMENT: CPT | Performed by: RADIOLOGY

## 2018-08-15 PROCEDURE — 77386 CHG INTENSITY MODULATED RADIATION TX DLVR COMPLEX: CPT | Performed by: RADIOLOGY

## 2018-08-15 PROCEDURE — 77386: CPT | Performed by: RADIOLOGY

## 2018-08-16 ENCOUNTER — OFFICE VISIT (OUTPATIENT)
Dept: ONCOLOGY | Facility: CLINIC | Age: 33
End: 2018-08-16

## 2018-08-16 ENCOUNTER — LAB (OUTPATIENT)
Dept: LAB | Facility: HOSPITAL | Age: 33
End: 2018-08-16

## 2018-08-16 VITALS
SYSTOLIC BLOOD PRESSURE: 130 MMHG | WEIGHT: 172 LBS | RESPIRATION RATE: 16 BRPM | TEMPERATURE: 98.2 F | DIASTOLIC BLOOD PRESSURE: 86 MMHG | OXYGEN SATURATION: 100 % | HEIGHT: 70 IN | BODY MASS INDEX: 24.62 KG/M2 | HEART RATE: 77 BPM

## 2018-08-16 DIAGNOSIS — K21.9 GASTROESOPHAGEAL REFLUX DISEASE WITHOUT ESOPHAGITIS: Primary | Chronic | ICD-10-CM

## 2018-08-16 DIAGNOSIS — I82.532 CHRONIC DEEP VEIN THROMBOSIS (DVT) OF LEFT POPLITEAL VEIN (HCC): ICD-10-CM

## 2018-08-16 DIAGNOSIS — C71.1 ANAPLASTIC ASTROCYTOMA OF FRONTAL LOBE (HCC): Primary | ICD-10-CM

## 2018-08-16 DIAGNOSIS — D68.51 FACTOR 5 LEIDEN MUTATION, HETEROZYGOUS (HCC): ICD-10-CM

## 2018-08-16 DIAGNOSIS — C71.1 ANAPLASTIC ASTROCYTOMA OF FRONTAL LOBE (HCC): ICD-10-CM

## 2018-08-16 DIAGNOSIS — I82.413 ACUTE DEEP VEIN THROMBOSIS (DVT) OF FEMORAL VEIN OF BOTH LOWER EXTREMITIES (HCC): ICD-10-CM

## 2018-08-16 LAB
ALBUMIN SERPL-MCNC: 4.6 G/DL (ref 3.5–5.2)
ALBUMIN/GLOB SERPL: 1.6 G/DL (ref 1.1–2.4)
ALP SERPL-CCNC: 81 U/L (ref 38–116)
ALT SERPL W P-5'-P-CCNC: 22 U/L (ref 0–41)
ANION GAP SERPL CALCULATED.3IONS-SCNC: 10.9 MMOL/L
AST SERPL-CCNC: 14 U/L (ref 0–40)
BASOPHILS # BLD AUTO: 0.04 10*3/MM3 (ref 0–0.1)
BASOPHILS NFR BLD AUTO: 0.7 % (ref 0–1.1)
BILIRUB SERPL-MCNC: 0.3 MG/DL (ref 0.1–1.2)
BUN BLD-MCNC: 11 MG/DL (ref 6–20)
BUN/CREAT SERPL: 12.1 (ref 7.3–30)
CALCIUM SPEC-SCNC: 9.6 MG/DL (ref 8.5–10.2)
CHLORIDE SERPL-SCNC: 103 MMOL/L (ref 98–107)
CO2 SERPL-SCNC: 28.1 MMOL/L (ref 22–29)
CREAT BLD-MCNC: 0.91 MG/DL (ref 0.7–1.3)
DEPRECATED RDW RBC AUTO: 39.1 FL (ref 37–49)
EOSINOPHIL # BLD AUTO: 0.25 10*3/MM3 (ref 0–0.36)
EOSINOPHIL NFR BLD AUTO: 4.6 % (ref 1–5)
ERYTHROCYTE [DISTWIDTH] IN BLOOD BY AUTOMATED COUNT: 12.3 % (ref 11.7–14.5)
GFR SERPL CREATININE-BSD FRML MDRD: 95 ML/MIN/1.73
GLOBULIN UR ELPH-MCNC: 2.9 GM/DL (ref 1.8–3.5)
GLUCOSE BLD-MCNC: 98 MG/DL (ref 74–124)
HBV SURFACE AG SERPL QL IA: NORMAL
HCT VFR BLD AUTO: 47 % (ref 40–49)
HGB BLD-MCNC: 15.4 G/DL (ref 13.5–16.5)
IMM GRANULOCYTES # BLD: 0.02 10*3/MM3 (ref 0–0.03)
IMM GRANULOCYTES NFR BLD: 0.4 % (ref 0–0.5)
INR PPP: 2.3 (ref 0.9–1.1)
LYMPHOCYTES # BLD AUTO: 2.27 10*3/MM3 (ref 1–3.5)
LYMPHOCYTES NFR BLD AUTO: 41.4 % (ref 20–49)
MCH RBC QN AUTO: 28.5 PG (ref 27–33)
MCHC RBC AUTO-ENTMCNC: 32.8 G/DL (ref 32–35)
MCV RBC AUTO: 87 FL (ref 83–97)
MONOCYTES # BLD AUTO: 0.45 10*3/MM3 (ref 0.25–0.8)
MONOCYTES NFR BLD AUTO: 8.2 % (ref 4–12)
NEUTROPHILS # BLD AUTO: 2.45 10*3/MM3 (ref 1.5–7)
NEUTROPHILS NFR BLD AUTO: 44.7 % (ref 39–75)
NRBC BLD MANUAL-RTO: 0 /100 WBC (ref 0–0)
PLATELET # BLD AUTO: 274 10*3/MM3 (ref 150–375)
PMV BLD AUTO: 9.5 FL (ref 8.9–12.1)
POTASSIUM BLD-SCNC: 4.7 MMOL/L (ref 3.5–4.7)
PROT SERPL-MCNC: 7.5 G/DL (ref 6.3–8)
PROTHROMBIN TIME: 27 SECONDS (ref 11–13.5)
RBC # BLD AUTO: 5.4 10*6/MM3 (ref 4.3–5.5)
SODIUM BLD-SCNC: 142 MMOL/L (ref 134–145)
WBC NRBC COR # BLD: 5.48 10*3/MM3 (ref 4–10)

## 2018-08-16 PROCEDURE — 77386 CHG INTENSITY MODULATED RADIATION TX DLVR COMPLEX: CPT | Performed by: RADIOLOGY

## 2018-08-16 PROCEDURE — 36415 COLL VENOUS BLD VENIPUNCTURE: CPT | Performed by: INTERNAL MEDICINE

## 2018-08-16 PROCEDURE — 85025 COMPLETE CBC W/AUTO DIFF WBC: CPT | Performed by: INTERNAL MEDICINE

## 2018-08-16 PROCEDURE — 99214 OFFICE O/P EST MOD 30 MIN: CPT | Performed by: INTERNAL MEDICINE

## 2018-08-16 PROCEDURE — 77014 CHG CT GUIDANCE RADIATION THERAPY FLDS PLACEMENT: CPT | Performed by: RADIOLOGY

## 2018-08-16 PROCEDURE — 85610 PROTHROMBIN TIME: CPT | Performed by: INTERNAL MEDICINE

## 2018-08-16 PROCEDURE — 77386: CPT | Performed by: RADIOLOGY

## 2018-08-16 PROCEDURE — 87340 HEPATITIS B SURFACE AG IA: CPT | Performed by: OBSTETRICS & GYNECOLOGY

## 2018-08-16 PROCEDURE — 80053 COMPREHEN METABOLIC PANEL: CPT | Performed by: INTERNAL MEDICINE

## 2018-08-16 NOTE — PROGRESS NOTES
[facility] OUTPATIENT FOLLOW UP CLINIC VISIT    REASON FOR FOLLOW-UP:    1.  Left lower extremity DVT with progression of symptoms and extension of the left lower extremity DVT into the femoral vein from the popliteal/calf vein swallowing for next set.  Currently anticoagulated with warfarin.  2.  Right lower extremity DVT noted in the common femoral, profunda femoral, and calf veins  3.  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  Other thrombophilia labs negative.    4.  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  NOT 1p19q co-deleted.    5.  Radiation initiated on 7/31/2018.  Plan for Temodar ×1 year following radiation.      HISTORY OF PRESENT ILLNESS:  Bryan Valadez is a 32 y.o. male who returns today for follow up of the above issue.  He continues to feel well.  He is about 2-1/2 weeks in the radiation.  He is doing well with this with only some mild erythema on his scalp.  He denies any bleeding.  He continues warfarin which he tolerates well.  He did get into some poison sumac and had a rash as a result of this.  This has been treated with topical therapy and steroids with improvement.    ONCOLOGIC HISTORY:  He had about 6 months of headaches treated as sinusitis and presented on 6/15 with visual changes and headache and was found to have a large right frontal mass which was resected on 6/16 by Dr. Galvan and consistent with a glioma.  Pathology was reviewed at [facility] showing infiltrating glioma.  Large 10 cm partially cystic and solid lobulated tumor mass at diagnosis.  Negative IDH1-R132H immunostain excludes the most common IDH1 mutation; however loss of ATRX expression suggests possibility of glioma harboring another less common IDH1 mutation.  p53 overexpressed.  Ki67 10% but variable.  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  There was no evidence for a 1p19q co-deletion.       Chromosome  microarray showed a complex molecular karyotype including loss of 1q, loss of 2q, gain of 7q, gain of 8q, loss of 9p, REBECCA of 17p, and gain of 18p.  These abnormalities are typically associated with  IDH-mutant astrocytic gliomas.  No 1p and 19q whole arm co-deletion was noted.  CT head on 7/1 c/w residual tumor circumscribing the resection cavity and a remote cerebellar hemisphere hemorrhage.       He was discharged and subsequently admitted with left leg pain and chest pain, with LLE popliteal and calf vein clot noted and bilateral small PE. He was treated with heparin and discharged on Pradaxa.     He developed worsening leg pain up into the thigh and presented to the ER where a venous duplex showed progression of the clot to the femoral vein.  He was given Lovenox and admitted.  Warfarin was initiated.       A partial thrombophilia evaluation showed that he is a heterozygote for the factor V Leiden mutation.  Labs otherwise unremarkable.    He was subsequently found to have a right lower extremity DVT on 7/8/2018.    He remains anticoagulated with warfarin.    Radiation initiated on 7/31/2018.  Plan for Temodar ×1 year after radiation is complete.      ALLERGIES:  Allergies   Allergen Reactions   • Avocado Itching   • Other Itching     Insect stings: Bee stings, throat swelling (has Epi pen)    Allergy to fruit, Avocado, Cherry Tomato (can have blue berries)   • Tomato Itching     Cherry tomato       MEDICATIONS:  The medication list has been reviewed with the patient by the medical assistant, and the list has been updated in the electronic medical record, which I reviewed.  Medication dosages and frequencies were confirmed to be accurate.    REVIEW OF SYSTEMS:  PAIN:  See Vital Signs below.  GENERAL:  No fevers, chills, night sweats, or unintended weight loss.  SKIN:  Mild erythema on the scalp secondary to radiation.  Scattered rash secondary to poison sumac.  HEME/LYMPH:  No abnormal bleeding.  No palpable  "lymphadenopathy.  EYES:  No vision changes or diplopia.  ENT:  No sore throat or difficulty swallowing.  RESPIRATORY:  No cough, shortness of breath, hemoptysis, or wheezing.  CARDIOVASCULAR:  No chest pain, palpitations, orthopnea, or dyspnea on exertion.  GASTROINTESTINAL:  No abdominal pain, nausea, vomiting, constipation, diarrhea, melena, or hematochezia.  GENITOURINARY:  No dysuria or hematuria.  MUSCULOSKELETAL:  No joint pain, swelling, or erythema.  NEUROLOGIC:  No dizziness, loss of consciousness, or seizures.  PSYCHIATRIC:  No depression, anxiety, or mood changes.    Vitals:    08/16/18 1020   BP: 130/86   Pulse: 77   Resp: 16   Temp: 98.2 °F (36.8 °C)   SpO2: 100%   Weight: 78 kg (172 lb)   Height: 178 cm (70.08\")   PainSc: 0-No pain       PHYSICAL EXAMINATION:  GENERAL:  Well-developed well-nourished male; awake, alert and oriented, in no acute distress.  SKIN:  Scattered erythema secondary to poison sumac.  Mild erythema on the scalp secondary to radiation.  HEAD:  Normocephalic, Well-healed surgical incision present.   Some hardware is palpable beneath the incision.  EYES:  Pupils equal, round and reactive to light.  Extraocular movements intact.  Conjunctivae normal.  EARS:  Hearing intact.  NOSE:  Septum midline.  No excoriations or nasal discharge.  MOUTH:  No stomatitis or ulcers.  Lips are normal.  THROAT:  Oropharynx without lesions or exudates.  NECK:  Supple with good range of motion; no thyromegaly or masses; no JVD or bruits.  LYMPHATICS:  No cervical, supraclavicular, axillary, or inguinal lymphadenopathy.  CHEST:  Lungs are clear to auscultation bilaterally.  No wheezes, rales, or rhonchi.  HEART:  Regular rate; normal rhythm.  No murmurs, gallops or rubs.  ABDOMEN:  Soft, non-tender, non-distended.  Normal active bowel sounds.  No organomegaly.  EXTREMITIES:  No clubbing, cyanosis, or edema.  NEUROLOGICAL:  No focal neurologic deficits.    DIAGNOSTIC DATA:  Results for orders placed or " performed in visit on 08/16/18   Protime-INR, Fingerstick   Result Value Ref Range    Protime 27.0 (H) 11.0 - 13.5 Seconds    INR 2.30 (H) 0.90 - 1.10   CBC Auto Differential   Result Value Ref Range    WBC 5.48 4.00 - 10.00 10*3/mm3    RBC 5.40 4.30 - 5.50 10*6/mm3    Hemoglobin 15.4 13.5 - 16.5 g/dL    Hematocrit 47.0 40.0 - 49.0 %    MCV 87.0 83.0 - 97.0 fL    MCH 28.5 27.0 - 33.0 pg    MCHC 32.8 32.0 - 35.0 g/dL    RDW 12.3 11.7 - 14.5 %    RDW-SD 39.1 37.0 - 49.0 fl    MPV 9.5 8.9 - 12.1 fL    Platelets 274 150 - 375 10*3/mm3    Neutrophil % 44.7 39.0 - 75.0 %    Lymphocyte % 41.4 20.0 - 49.0 %    Monocyte % 8.2 4.0 - 12.0 %    Eosinophil % 4.6 1.0 - 5.0 %    Basophil % 0.7 0.0 - 1.1 %    Immature Grans % 0.4 0.0 - 0.5 %    Neutrophils, Absolute 2.45 1.50 - 7.00 10*3/mm3    Lymphocytes, Absolute 2.27 1.00 - 3.50 10*3/mm3    Monocytes, Absolute 0.45 0.25 - 0.80 10*3/mm3    Eosinophils, Absolute 0.25 0.00 - 0.36 10*3/mm3    Basophils, Absolute 0.04 0.00 - 0.10 10*3/mm3    Immature Grans, Absolute 0.02 0.00 - 0.03 10*3/mm3    nRBC 0.0 0.0 - 0.0 /100 WBC       IMAGING:  None reviewed    ASSESSMENT:  This is a 32 y.o. male with:  1.  Bilateral lower extremity DVT: He is now on warfarin with therapeutic INR.  He will continue dosing per our protocol with a slight increase in his dose today to 7-1/2 mg daily aside from 5 mg on one day per week.   This is related to his brain tumor.  He will continue warfarin for now.  2.  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  NOT 1p19q co-deleted.  Overall, this is a good molecular profile.  I discussed his case with Dr. Galvan, Dr. Daley, and Dr. Dobbins and I reviewed NCCN guidelines.   He will require one year of adjuvant Temodar following radiation.  The big question was whether to administer concurrent therapy with Temodar along with radiation.  Ultimately, we decided not to do this given the  overall good molecular profile and the potential adverse effects from concurrent therapy.  He did initiate radiation alone on 7/31/2018.  The plan is for 30 treatments.  After that he will have an MRI about 5-6 weeks after he completes radiation at that point we will pursue Temodar.  3.  Given the possibility of infertility with treatment he has been banking sperm at the [facility].  4.  Elevated liver labs: Liver labs normalized.  Unclear reason.  Medication could've contributed.    PLAN:  1.  He will proceed with radiation per Dr. Daley  2.  Per Dr. Daley, about 5-6 weeks after radiation he will have a baseline brain MRI.  3.  After that, we will plan to administer adjuvant Temodar 5/28 days ×12 months.  4.  He will have serial brain MRIs.  NCCN guidelines suggest every 2-4 months for 3 years and then every 6 months indefinitely.  5.  Continue warfarin with dose adjustments per protocol.  Repeat INR in 4 weeks.  6.  Continue to follow up wth the [facility] for sperm banking  7.  I will see him back in about 1 month for follow-up with a CBC and INR.

## 2018-08-17 PROCEDURE — 77014 CHG CT GUIDANCE RADIATION THERAPY FLDS PLACEMENT: CPT | Performed by: RADIOLOGY

## 2018-08-17 PROCEDURE — 77386: CPT | Performed by: RADIOLOGY

## 2018-08-17 PROCEDURE — 77386 CHG INTENSITY MODULATED RADIATION TX DLVR COMPLEX: CPT | Performed by: RADIOLOGY

## 2018-08-20 PROCEDURE — 77386 CHG INTENSITY MODULATED RADIATION TX DLVR COMPLEX: CPT | Performed by: RADIOLOGY

## 2018-08-20 PROCEDURE — 77014 CHG CT GUIDANCE RADIATION THERAPY FLDS PLACEMENT: CPT | Performed by: RADIOLOGY

## 2018-08-20 PROCEDURE — 77386: CPT | Performed by: RADIOLOGY

## 2018-08-20 PROCEDURE — 77336 RADIATION PHYSICS CONSULT: CPT | Performed by: RADIOLOGY

## 2018-08-21 ENCOUNTER — RADIATION ONCOLOGY WEEKLY ASSESSMENT (OUTPATIENT)
Dept: RADIATION ONCOLOGY | Facility: HOSPITAL | Age: 33
End: 2018-08-21

## 2018-08-21 VITALS
OXYGEN SATURATION: 99 % | TEMPERATURE: 97.6 F | SYSTOLIC BLOOD PRESSURE: 125 MMHG | BODY MASS INDEX: 24.69 KG/M2 | HEIGHT: 70 IN | WEIGHT: 172.5 LBS | DIASTOLIC BLOOD PRESSURE: 87 MMHG | HEART RATE: 74 BPM

## 2018-08-21 DIAGNOSIS — D49.6 PRIMARY BRAIN TUMOR (HCC): Primary | ICD-10-CM

## 2018-08-21 PROCEDURE — 77386: CPT | Performed by: RADIOLOGY

## 2018-08-21 PROCEDURE — 77014 CHG CT GUIDANCE RADIATION THERAPY FLDS PLACEMENT: CPT | Performed by: RADIOLOGY

## 2018-08-21 PROCEDURE — 77427 RADIATION TX MANAGEMENT X5: CPT | Performed by: RADIOLOGY

## 2018-08-21 PROCEDURE — 77386 CHG INTENSITY MODULATED RADIATION TX DLVR COMPLEX: CPT | Performed by: RADIOLOGY

## 2018-08-21 NOTE — PROGRESS NOTES
Physician Weekly Management Note    Diagnosis:     Diagnosis Plan   1. Primary brain tumor (CMS/HCC)         RT Details:  Treatment #16/33    Holding Temodar to follow radiation.    Notes on Treatment course, Films, Medical progress:  As this is regional brain we are treating without steroids, but will add if he develops symptoms.  Developing some hair loss, and some itchy scalp.  Some erythema on the fore head.  Denies new headaches visual or speech difficulty.  Affect and thought content appropriate, no memory changes no signs of confusion continue as planned.    Weekly Management:  Medication reviewed?   Yes  New medications given?   No  Problemlist reviewed?   Yes  Problem added?   No      Technical aspects reviewed:  Weekly OBI approved?   Yes  Weekly port films approved?   Yes  Change requests noted on port film?   No  Patient setup and plan reviewed?   Yes    Chart Reviewed:  Continue current treatment plan?   Yes  Treatment plan change requested?   No  CBC reviewed?   Yes  Concurrent Chemo?   Yes    Objective     Toxicities:   As above     Review of Systems   As above    Vitals:    08/21/18 0926   BP: 125/87   Pulse: 74   Temp: 97.6 °F (36.4 °C)   SpO2: 99%       Current Status 8/16/2018   ECOG score 0       Physical Exam  As above      Problem Summary List    Diagnosis:     Diagnosis Plan   1. Primary brain tumor (CMS/HCC)       Pathology:       Past Medical History:   Diagnosis Date   • Allergic rhinitis    • Asthma     Pulmonary Dr. Alexandra   • Chest pain    • DVT (deep venous thrombosis) (CMS/HCC)    • Factor V Leiden (CMS/HCC)    • GERD (gastroesophageal reflux disease)    • H/O echocardiogram 2006   • Headache    • History of ETT    • History of Holter monitoring    • Hyperlipidemia    • PE (pulmonary thromboembolism) (CMS/HCC)    • Primary brain tumor (CMS/HCC) 2018         Past Surgical History:   Procedure Laterality Date   • CRANIOTOMY FOR TUMOR Right 6/16/2018    Procedure: CRANIOTOMY FOR   RESECTION OF LARGE RIGHT FRONTAL MASS WITH AUGUSTIN NAVIGATION;  Surgeon: Chetan Galvan IV, MD;  Location: [facility];  Service: Neurosurgery         Current Outpatient Prescriptions on File Prior to Visit   Medication Sig Dispense Refill   • baclofen (LIORESAL) 10 MG tablet Take 1 tablet by mouth 3 (Three) Times a Day As Needed for Muscle Spasms. 50 tablet 0   • betamethasone valerate (VALISONE) 0.1 % cream Apply  topically to the appropriate area as directed 2 (Two) Times a Day. 45 g 0   • dexamethasone (DECADRON) 2 MG tablet Take 1 tablet by mouth 3 (Three) Times a Day With Meals. 60 tablet 0   • docusate sodium 100 MG capsule Take 100 mg by mouth 2 (Two) Times a Day As Needed (constipation). 60 each 1   • EPINEPHrine (EPIPEN) 0.3 MG/0.3ML solution auto-injector injection Inject 0.3 mg into the shoulder, thigh, or buttocks As Needed.     • HYDROcodone-acetaminophen (NORCO) 5-325 MG per tablet Take 1 tablet by mouth Every 6 (Six) Hours As Needed.     • levETIRAcetam (KEPPRA) 1000 MG tablet TAKE 1 TABLET BY MOUTH EVERY 12 HOURS 180 tablet 3   • MethylPREDNISolone (MEDROL, JOHANNA,) 4 MG tablet Take as directed on package instructions. 21 tablet 0   • montelukast (SINGULAIR) 10 MG tablet Take 10 mg by mouth Daily.     • Multiple Vitamins-Minerals (MULTIVITAMIN WITH MINERALS) tablet tablet Take 1 tablet by mouth Daily.     • omeprazole (PriLOSEC) 20 MG capsule Take 20 mg by mouth Daily As Needed.     • polyethylene glycol (MIRALAX) packet Take 17 g by mouth Daily. 10 each 0   • warfarin (COUMADIN) 5 MG tablet Take 5mg by mouth Mon,Wed,Fri and 7.5mg Sun,Tues,Thurs,Sat unless directed otherwise by MD 60 tablet 0     No current facility-administered medications on file prior to visit.        Allergies   Allergen Reactions   • Avocado Itching   • Other Itching     Insect stings: Bee stings, throat swelling (has Epi pen)    Allergy to fruit, Avocado, Cherry Tomato (can have blue berries)   • Tomato Itching     Cherry  [facility]         Primary care MD:    Opal Benavidez MD    Oncologist:    HEIDI Jefferson M.D.    Seen and approved by:  Robin Daley MD  08/21/2018

## 2018-08-22 PROCEDURE — 77014 CHG CT GUIDANCE RADIATION THERAPY FLDS PLACEMENT: CPT | Performed by: RADIOLOGY

## 2018-08-22 PROCEDURE — 77386: CPT | Performed by: RADIOLOGY

## 2018-08-22 PROCEDURE — 77386 CHG INTENSITY MODULATED RADIATION TX DLVR COMPLEX: CPT | Performed by: RADIOLOGY

## 2018-08-23 ENCOUNTER — TELEPHONE (OUTPATIENT)
Dept: NEUROSURGERY | Facility: CLINIC | Age: 33
End: 2018-08-23

## 2018-08-23 PROCEDURE — 77014 CHG CT GUIDANCE RADIATION THERAPY FLDS PLACEMENT: CPT | Performed by: RADIOLOGY

## 2018-08-23 PROCEDURE — 77386 CHG INTENSITY MODULATED RADIATION TX DLVR COMPLEX: CPT | Performed by: RADIOLOGY

## 2018-08-23 PROCEDURE — 77386: CPT | Performed by: RADIOLOGY

## 2018-08-24 PROCEDURE — 77386 CHG INTENSITY MODULATED RADIATION TX DLVR COMPLEX: CPT | Performed by: RADIOLOGY

## 2018-08-24 PROCEDURE — 77014 CHG CT GUIDANCE RADIATION THERAPY FLDS PLACEMENT: CPT | Performed by: RADIOLOGY

## 2018-08-24 PROCEDURE — 77386: CPT | Performed by: RADIOLOGY

## 2018-08-27 ENCOUNTER — RADIATION ONCOLOGY WEEKLY ASSESSMENT (OUTPATIENT)
Dept: RADIATION ONCOLOGY | Facility: HOSPITAL | Age: 33
End: 2018-08-27

## 2018-08-27 VITALS
SYSTOLIC BLOOD PRESSURE: 124 MMHG | HEIGHT: 70 IN | OXYGEN SATURATION: 99 % | BODY MASS INDEX: 24.69 KG/M2 | DIASTOLIC BLOOD PRESSURE: 79 MMHG | WEIGHT: 172.5 LBS | HEART RATE: 70 BPM | TEMPERATURE: 98.6 F

## 2018-08-27 DIAGNOSIS — C71.1 ANAPLASTIC ASTROCYTOMA OF FRONTAL LOBE (HCC): Primary | ICD-10-CM

## 2018-08-27 PROCEDURE — 77386: CPT | Performed by: RADIOLOGY

## 2018-08-27 PROCEDURE — 77386 CHG INTENSITY MODULATED RADIATION TX DLVR COMPLEX: CPT | Performed by: RADIOLOGY

## 2018-08-27 PROCEDURE — 77336 RADIATION PHYSICS CONSULT: CPT | Performed by: RADIOLOGY

## 2018-08-27 PROCEDURE — 77014 CHG CT GUIDANCE RADIATION THERAPY FLDS PLACEMENT: CPT | Performed by: RADIOLOGY

## 2018-08-27 NOTE — PROGRESS NOTES
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    Treated without steroids.    Notes on Treatment course, Films, Medical progress:  New additional hair loss, erythema on the skin of the forehead.  Incision is well-healed.  Denies new any neurological signs or symptoms.  Affect and thought content appropriate, speech is intact, no signs of confusion, station and gait within normal limits, continue as planned.    Weekly Management:  Medication reviewed?   Yes  New medications given?   No  Problemlist reviewed?   Yes  Problem added?   No      Technical aspects reviewed:  Weekly OBI approved?   Yes  Weekly port films approved?   Yes  Change requests noted on port film?   No  Patient setup and plan reviewed?   Yes    Chart Reviewed:  Continue current treatment plan?   Yes  Treatment plan change requested?   No  CBC reviewed?   No  Concurrent Chemo?   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 Alexandra   • Chest pain    • DVT (deep venous thrombosis) (CMS/HCC)    • Factor V Leiden (CMS/HCC)    • GERD (gastroesophageal reflux disease)    • H/O echocardiogram 2006   • Headache    • History of ETT    • History of Holter monitoring    • Hyperlipidemia    • PE (pulmonary thromboembolism) (CMS/HCC)    • Primary brain tumor (CMS/HCC) 2018         Past Surgical History:   Procedure Laterality Date   • CRANIOTOMY FOR TUMOR Right 6/16/2018    Procedure: CRANIOTOMY FOR  RESECTION OF LARGE  RIGHT FRONTAL MASS WITH AUGUSTIN NAVIGATION;  Surgeon: hCetan Galvan IV, MD;  Location: [facility];  Service: Neurosurgery         Current Outpatient Prescriptions on File Prior to Visit   Medication Sig Dispense Refill   • baclofen (LIORESAL) 10 MG tablet Take 1 tablet by mouth 3 (Three) Times a Day As Needed for Muscle Spasms. 50 tablet 0   • betamethasone valerate (VALISONE) 0.1 % cream Apply  topically to the appropriate area as directed 2 (Two) Times a Day. 45 g 0   • dexamethasone (DECADRON) 2 MG tablet Take 1 tablet by mouth 3 (Three) Times a Day With Meals. 60 tablet 0   • docusate sodium 100 MG capsule Take 100 mg by mouth 2 (Two) Times a Day As Needed (constipation). 60 each 1   • EPINEPHrine (EPIPEN) 0.3 MG/0.3ML solution auto-injector injection Inject 0.3 mg into the shoulder, thigh, or buttocks As Needed.     • HYDROcodone-acetaminophen (NORCO) 5-325 MG per tablet Take 1 tablet by mouth Every 6 (Six) Hours As Needed.     • levETIRAcetam (KEPPRA) 1000 MG tablet TAKE 1 TABLET BY MOUTH EVERY 12 HOURS 180 tablet 3   • MethylPREDNISolone (MEDROL, JOHANNA,) 4 MG tablet Take as directed on package instructions. 21 tablet 0   • montelukast (SINGULAIR) 10 MG tablet Take 10 mg by mouth Daily.     • Multiple Vitamins-Minerals (MULTIVITAMIN WITH MINERALS) tablet tablet Take 1 tablet by mouth Daily.     • omeprazole (PriLOSEC) 20 MG capsule Take 20 mg by mouth Daily As Needed.     • polyethylene glycol (MIRALAX) packet Take 17 g by mouth Daily. 10 each 0   • warfarin (COUMADIN) 5 MG tablet Take 5mg by mouth Mon,Wed,Fri and 7.5mg Sun,Tues,Thurs,Sat unless directed otherwise by MD 60 tablet 0     No current facility-administered medications on file prior to visit.        Allergies   Allergen Reactions   • Avocado Itching   • Other Itching     Insect stings: Bee stings, throat swelling (has Epi pen)    Allergy to fruit, Avocado, Cherry Tomato (can have blue berries)   • Tomato Itching     Cherry tomato         Primary care MD:    pOal Benavidez MD    Oncologist:  HEIDI Jefferson M.D.    Seen and approved by:  Robin Daley MD  08/27/2018

## 2018-08-28 PROCEDURE — 77386 CHG INTENSITY MODULATED RADIATION TX DLVR COMPLEX: CPT | Performed by: RADIOLOGY

## 2018-08-28 PROCEDURE — 77427 RADIATION TX MANAGEMENT X5: CPT | Performed by: RADIOLOGY

## 2018-08-28 PROCEDURE — 77014 CHG CT GUIDANCE RADIATION THERAPY FLDS PLACEMENT: CPT | Performed by: RADIOLOGY

## 2018-08-28 PROCEDURE — 77386: CPT | Performed by: RADIOLOGY

## 2018-08-29 PROCEDURE — 77386: CPT | Performed by: RADIOLOGY

## 2018-08-29 PROCEDURE — 77014 CHG CT GUIDANCE RADIATION THERAPY FLDS PLACEMENT: CPT | Performed by: RADIOLOGY

## 2018-08-29 PROCEDURE — 77386 CHG INTENSITY MODULATED RADIATION TX DLVR COMPLEX: CPT | Performed by: RADIOLOGY

## 2018-08-30 PROCEDURE — 77386 CHG INTENSITY MODULATED RADIATION TX DLVR COMPLEX: CPT | Performed by: RADIOLOGY

## 2018-08-30 PROCEDURE — 77386: CPT | Performed by: RADIOLOGY

## 2018-08-30 PROCEDURE — 77338 DESIGN MLC DEVICE FOR IMRT: CPT | Performed by: RADIOLOGY

## 2018-08-30 PROCEDURE — 77014 CHG CT GUIDANCE RADIATION THERAPY FLDS PLACEMENT: CPT | Performed by: RADIOLOGY

## 2018-08-31 PROCEDURE — 77386 CHG INTENSITY MODULATED RADIATION TX DLVR COMPLEX: CPT | Performed by: RADIOLOGY

## 2018-08-31 PROCEDURE — 77014 CHG CT GUIDANCE RADIATION THERAPY FLDS PLACEMENT: CPT | Performed by: RADIOLOGY

## 2018-08-31 PROCEDURE — 77386: CPT | Performed by: RADIOLOGY

## 2018-09-01 ENCOUNTER — APPOINTMENT (OUTPATIENT)
Dept: RADIATION ONCOLOGY | Facility: HOSPITAL | Age: 33
End: 2018-09-01

## 2018-09-03 PROCEDURE — 77300 RADIATION THERAPY DOSE PLAN: CPT | Performed by: RADIOLOGY

## 2018-09-04 ENCOUNTER — RADIATION ONCOLOGY WEEKLY ASSESSMENT (OUTPATIENT)
Dept: RADIATION ONCOLOGY | Facility: HOSPITAL | Age: 33
End: 2018-09-04

## 2018-09-04 ENCOUNTER — APPOINTMENT (OUTPATIENT)
Dept: RADIATION ONCOLOGY | Facility: HOSPITAL | Age: 33
End: 2018-09-04

## 2018-09-04 VITALS
BODY MASS INDEX: 25.05 KG/M2 | WEIGHT: 175 LBS | SYSTOLIC BLOOD PRESSURE: 111 MMHG | HEIGHT: 70 IN | DIASTOLIC BLOOD PRESSURE: 75 MMHG | HEART RATE: 69 BPM | TEMPERATURE: 97.6 F | OXYGEN SATURATION: 99 %

## 2018-09-04 DIAGNOSIS — C71.1 ANAPLASTIC ASTROCYTOMA OF FRONTAL LOBE (HCC): Primary | ICD-10-CM

## 2018-09-04 PROCEDURE — 77014 CHG CT GUIDANCE RADIATION THERAPY FLDS PLACEMENT: CPT | Performed by: RADIOLOGY

## 2018-09-04 PROCEDURE — 77386: CPT | Performed by: RADIOLOGY

## 2018-09-04 PROCEDURE — 77386 CHG INTENSITY MODULATED RADIATION TX DLVR COMPLEX: CPT | Performed by: RADIOLOGY

## 2018-09-04 NOTE — PROGRESS NOTES
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  Continues to demonstrate good tolerance.  Recently shaved his head.  Incision is clean and well-healed.  Some minor scalp erythema.  He denies headache or scalp pain.  Continue as planned.    Weekly Management:  Medication reviewed?   Yes  New medications given?   No  Problemlist reviewed?   Yes  Problem added?   No       Technical aspects reviewed:  Weekly OBI approved?   Yes  Weekly port films approved?   Yes  Change requests noted on port film?   No  Patient setup and plan reviewed?   Yes    Chart Reviewed:  Continue current treatment plan?   Yes  Treatment plan change requested?   No  CBC reviewed?   No  Concurrent Chemo?   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 Alexandra   • Chest pain    • DVT (deep venous thrombosis) (CMS/HCC)    • Factor V Leiden (CMS/HCC)    • GERD (gastroesophageal reflux disease)    • H/O echocardiogram 2006   • Headache    • History of ETT    • History of Holter monitoring    • Hyperlipidemia    • PE (pulmonary thromboembolism) (CMS/HCC)    • Primary brain tumor (CMS/HCC) 2018         Past Surgical History:   Procedure Laterality Date   • CRANIOTOMY FOR TUMOR Right 6/16/2018    Procedure: CRANIOTOMY FOR  RESECTION OF LARGE RIGHT FRONTAL MASS WITH [employer] NAVIGATION;  Surgeon: Chetan Galvan  MD NA;  Location: [facility] OR;  Service: Neurosurgery         Current Outpatient Prescriptions on File Prior to Visit   Medication Sig Dispense Refill   • baclofen (LIORESAL) 10 MG tablet Take 1 tablet by mouth 3 (Three) Times a Day As Needed for Muscle Spasms. 50 tablet 0   • betamethasone valerate (VALISONE) 0.1 % cream Apply  topically to the appropriate area as directed 2 (Two) Times a Day. 45 g 0   • dexamethasone (DECADRON) 2 MG tablet Take 1 tablet by mouth 3 (Three) Times a Day With Meals. 60 tablet 0   • docusate sodium 100 MG capsule Take 100 mg by mouth 2 (Two) Times a Day As Needed (constipation). 60 each 1   • EPINEPHrine (EPIPEN) 0.3 MG/0.3ML solution auto-injector injection Inject 0.3 mg into the shoulder, thigh, or buttocks As Needed.     • HYDROcodone-acetaminophen (NORCO) 5-325 MG per tablet Take 1 tablet by mouth Every 6 (Six) Hours As Needed.     • levETIRAcetam (KEPPRA) 1000 MG tablet TAKE 1 TABLET BY MOUTH EVERY 12 HOURS 180 tablet 3   • MethylPREDNISolone (MEDROL, JOHANNA,) 4 MG tablet Take as directed on package instructions. 21 tablet 0   • montelukast (SINGULAIR) 10 MG tablet Take 10 mg by mouth Daily.     • Multiple Vitamins-Minerals (MULTIVITAMIN WITH MINERALS) tablet tablet Take 1 tablet by mouth Daily.     • omeprazole (PriLOSEC) 20 MG capsule Take 20 mg by mouth Daily As Needed.     • polyethylene glycol (MIRALAX) packet Take 17 g by mouth Daily. 10 each 0   • warfarin (COUMADIN) 5 MG tablet Take 5mg by mouth Mon,Wed,Fri and 7.5mg Sun,Tues,Thurs,Sat unless directed otherwise by MD 60 tablet 0     No current facility-administered medications on file prior to visit.        Allergies   Allergen Reactions   • Avocado Itching   • Other Itching     Insect stings: Bee stings, throat swelling (has Epi pen)    Allergy to fruit, Avocado, Cherry Tomato (can have blue berries)   • Tomato Itching     Cherry tomato        Primary care MD:    Opal Benavidez MD    Oncologist:    HEIDI Jefferson  M.D.    Seen and approved by:  Robin Daley MD  09/04/2018

## 2018-09-05 PROCEDURE — 77014 CHG CT GUIDANCE RADIATION THERAPY FLDS PLACEMENT: CPT | Performed by: RADIOLOGY

## 2018-09-05 PROCEDURE — 77386 CHG INTENSITY MODULATED RADIATION TX DLVR COMPLEX: CPT | Performed by: RADIOLOGY

## 2018-09-05 PROCEDURE — 77336 RADIATION PHYSICS CONSULT: CPT | Performed by: RADIOLOGY

## 2018-09-05 PROCEDURE — 77427 RADIATION TX MANAGEMENT X5: CPT | Performed by: RADIOLOGY

## 2018-09-05 PROCEDURE — 77386: CPT | Performed by: RADIOLOGY

## 2018-09-06 PROCEDURE — 77386: CPT | Performed by: RADIOLOGY

## 2018-09-06 PROCEDURE — 77014 CHG CT GUIDANCE RADIATION THERAPY FLDS PLACEMENT: CPT | Performed by: RADIOLOGY

## 2018-09-06 PROCEDURE — 77386 CHG INTENSITY MODULATED RADIATION TX DLVR COMPLEX: CPT | Performed by: RADIOLOGY

## 2018-09-07 PROCEDURE — 77386 CHG INTENSITY MODULATED RADIATION TX DLVR COMPLEX: CPT | Performed by: RADIOLOGY

## 2018-09-07 PROCEDURE — 77014 CHG CT GUIDANCE RADIATION THERAPY FLDS PLACEMENT: CPT | Performed by: RADIOLOGY

## 2018-09-07 PROCEDURE — 77386: CPT | Performed by: RADIOLOGY

## 2018-09-10 PROCEDURE — 77014 CHG CT GUIDANCE RADIATION THERAPY FLDS PLACEMENT: CPT | Performed by: RADIOLOGY

## 2018-09-10 PROCEDURE — 77386 CHG INTENSITY MODULATED RADIATION TX DLVR COMPLEX: CPT | Performed by: RADIOLOGY

## 2018-09-10 PROCEDURE — 77386: CPT | Performed by: RADIOLOGY

## 2018-09-11 PROCEDURE — 77386: CPT | Performed by: RADIOLOGY

## 2018-09-11 PROCEDURE — 77386 CHG INTENSITY MODULATED RADIATION TX DLVR COMPLEX: CPT | Performed by: RADIOLOGY

## 2018-09-11 PROCEDURE — 77014 CHG CT GUIDANCE RADIATION THERAPY FLDS PLACEMENT: CPT | Performed by: RADIOLOGY

## 2018-09-11 RX ORDER — WARFARIN SODIUM 5 MG/1
TABLET ORAL
Qty: 60 TABLET | Refills: 1 | Status: SHIPPED | OUTPATIENT
Start: 2018-09-11 | End: 2018-12-03 | Stop reason: SDUPTHER

## 2018-09-12 PROCEDURE — 77386 CHG INTENSITY MODULATED RADIATION TX DLVR COMPLEX: CPT | Performed by: RADIOLOGY

## 2018-09-12 PROCEDURE — 77014 CHG CT GUIDANCE RADIATION THERAPY FLDS PLACEMENT: CPT | Performed by: RADIOLOGY

## 2018-09-12 PROCEDURE — 77427 RADIATION TX MANAGEMENT X5: CPT | Performed by: RADIOLOGY

## 2018-09-12 PROCEDURE — 77336 RADIATION PHYSICS CONSULT: CPT | Performed by: RADIOLOGY

## 2018-09-12 PROCEDURE — 77386: CPT | Performed by: RADIOLOGY

## 2018-09-13 ENCOUNTER — LAB (OUTPATIENT)
Dept: LAB | Facility: HOSPITAL | Age: 33
End: 2018-09-13

## 2018-09-13 ENCOUNTER — OFFICE VISIT (OUTPATIENT)
Dept: ONCOLOGY | Facility: CLINIC | Age: 33
End: 2018-09-13

## 2018-09-13 ENCOUNTER — RADIATION ONCOLOGY WEEKLY ASSESSMENT (OUTPATIENT)
Dept: RADIATION ONCOLOGY | Facility: HOSPITAL | Age: 33
End: 2018-09-13

## 2018-09-13 VITALS
DIASTOLIC BLOOD PRESSURE: 88 MMHG | TEMPERATURE: 98.2 F | BODY MASS INDEX: 25.48 KG/M2 | SYSTOLIC BLOOD PRESSURE: 124 MMHG | HEART RATE: 81 BPM | HEIGHT: 70 IN | OXYGEN SATURATION: 100 % | WEIGHT: 178 LBS

## 2018-09-13 VITALS
HEIGHT: 70 IN | TEMPERATURE: 98.4 F | SYSTOLIC BLOOD PRESSURE: 116 MMHG | RESPIRATION RATE: 12 BRPM | BODY MASS INDEX: 25.08 KG/M2 | HEART RATE: 70 BPM | OXYGEN SATURATION: 99 % | DIASTOLIC BLOOD PRESSURE: 80 MMHG | WEIGHT: 175.2 LBS

## 2018-09-13 DIAGNOSIS — C71.1 ANAPLASTIC ASTROCYTOMA OF FRONTAL LOBE (HCC): ICD-10-CM

## 2018-09-13 DIAGNOSIS — D68.51 FACTOR 5 LEIDEN MUTATION, HETEROZYGOUS (HCC): ICD-10-CM

## 2018-09-13 DIAGNOSIS — C71.1 ANAPLASTIC ASTROCYTOMA OF FRONTAL LOBE (HCC): Primary | ICD-10-CM

## 2018-09-13 DIAGNOSIS — I82.413 ACUTE DEEP VEIN THROMBOSIS (DVT) OF FEMORAL VEIN OF BOTH LOWER EXTREMITIES (HCC): Primary | ICD-10-CM

## 2018-09-13 DIAGNOSIS — I82.532 CHRONIC DEEP VEIN THROMBOSIS (DVT) OF LEFT POPLITEAL VEIN (HCC): ICD-10-CM

## 2018-09-13 LAB
BASOPHILS # BLD AUTO: 0.04 10*3/MM3 (ref 0–0.1)
BASOPHILS NFR BLD AUTO: 0.8 % (ref 0–1.1)
DEPRECATED RDW RBC AUTO: 37.8 FL (ref 37–49)
EOSINOPHIL # BLD AUTO: 0.13 10*3/MM3 (ref 0–0.36)
EOSINOPHIL NFR BLD AUTO: 2.5 % (ref 1–5)
ERYTHROCYTE [DISTWIDTH] IN BLOOD BY AUTOMATED COUNT: 12.5 % (ref 11.7–14.5)
HCT VFR BLD AUTO: 48.8 % (ref 40–49)
HGB BLD-MCNC: 16.6 G/DL (ref 13.5–16.5)
IMM GRANULOCYTES # BLD: 0.04 10*3/MM3 (ref 0–0.03)
IMM GRANULOCYTES NFR BLD: 0.8 % (ref 0–0.5)
INR PPP: 2.4 (ref 0.9–1.1)
LYMPHOCYTES # BLD AUTO: 1.76 10*3/MM3 (ref 1–3.5)
LYMPHOCYTES NFR BLD AUTO: 33.2 % (ref 20–49)
MCH RBC QN AUTO: 28.3 PG (ref 27–33)
MCHC RBC AUTO-ENTMCNC: 34 G/DL (ref 32–35)
MCV RBC AUTO: 83.3 FL (ref 83–97)
MONOCYTES # BLD AUTO: 0.55 10*3/MM3 (ref 0.25–0.8)
MONOCYTES NFR BLD AUTO: 10.4 % (ref 4–12)
NEUTROPHILS # BLD AUTO: 2.78 10*3/MM3 (ref 1.5–7)
NEUTROPHILS NFR BLD AUTO: 52.3 % (ref 39–75)
NRBC BLD MANUAL-RTO: 0 /100 WBC (ref 0–0)
PLATELET # BLD AUTO: 213 10*3/MM3 (ref 150–375)
PMV BLD AUTO: 10.5 FL (ref 8.9–12.1)
PROTHROMBIN TIME: 29.2 SECONDS (ref 11–13.5)
RBC # BLD AUTO: 5.86 10*6/MM3 (ref 4.3–5.5)
WBC NRBC COR # BLD: 5.3 10*3/MM3 (ref 4–10)

## 2018-09-13 PROCEDURE — 77014 CHG CT GUIDANCE RADIATION THERAPY FLDS PLACEMENT: CPT | Performed by: RADIOLOGY

## 2018-09-13 PROCEDURE — 77386 CHG INTENSITY MODULATED RADIATION TX DLVR COMPLEX: CPT | Performed by: RADIOLOGY

## 2018-09-13 PROCEDURE — 85025 COMPLETE CBC W/AUTO DIFF WBC: CPT | Performed by: INTERNAL MEDICINE

## 2018-09-13 PROCEDURE — 36415 COLL VENOUS BLD VENIPUNCTURE: CPT | Performed by: INTERNAL MEDICINE

## 2018-09-13 PROCEDURE — 99215 OFFICE O/P EST HI 40 MIN: CPT | Performed by: INTERNAL MEDICINE

## 2018-09-13 PROCEDURE — 77386: CPT | Performed by: RADIOLOGY

## 2018-09-13 PROCEDURE — 85610 PROTHROMBIN TIME: CPT | Performed by: INTERNAL MEDICINE

## 2018-09-13 NOTE — PROGRESS NOTES
[facility] OUTPATIENT FOLLOW UP CLINIC VISIT    REASON FOR FOLLOW-UP:    1.  Left lower extremity DVT with progression of symptoms and extension of the left lower extremity DVT into the femoral vein from the popliteal/calf vein swallowing for next set.  Currently anticoagulated with warfarin.  2.  Right lower extremity DVT noted in the common femoral, profunda femoral, and calf veins  3.  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  Other thrombophilia labs negative.    4.  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  NOT 1p19q co-deleted.    5.  Radiation initiated on 7/31/2018.  Plan for Temodar ×1 year following radiation.      HISTORY OF PRESENT ILLNESS:  Bryan Valadez is a 32 y.o. male who returns today for follow up of the above issue.  He continues to feel well.  His last day of radiation is tomorrow.  He continues anticoagulation without any bleeding complications.  He notes some dull chest discomfort at times but no sharp chest pain.  His legs are feeling very well with no pain or swelling.  No headaches.  No cognitive changes.  No neurologic issues.    ONCOLOGIC HISTORY:  He had about 6 months of headaches treated as sinusitis and presented on 6/15 with visual changes and headache and was found to have a large right frontal mass which was resected on 6/16 by Dr. Galvan and consistent with a glioma.  Pathology was reviewed at [facility] showing infiltrating glioma.  Large 10 cm partially cystic and solid lobulated tumor mass at diagnosis.  Negative IDH1-R132H immunostain excludes the most common IDH1 mutation; however loss of ATRX expression suggests possibility of glioma harboring another less common IDH1 mutation.  p53 overexpressed.  Ki67 10% but variable.  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  There was no evidence for a 1p19q co-deletion.       Chromosome microarray showed a complex  molecular karyotype including loss of 1q, loss of 2q, gain of 7q, gain of 8q, loss of 9p, REBECCA of 17p, and gain of 18p.  These abnormalities are typically associated with  IDH-mutant astrocytic gliomas.  No 1p and 19q whole arm co-deletion was noted.  CT head on 7/1 c/w residual tumor circumscribing the resection cavity and a remote cerebellar hemisphere hemorrhage.       He was discharged and subsequently admitted with left leg pain and chest pain, with LLE popliteal and calf vein clot noted and bilateral small PE. He was treated with heparin and discharged on Pradaxa.     He developed worsening leg pain up into the thigh and presented to the ER where a venous duplex showed progression of the clot to the femoral vein.  He was given Lovenox and admitted.  Warfarin was initiated.       A partial thrombophilia evaluation showed that he is a heterozygote for the factor V Leiden mutation.  Labs otherwise unremarkable.    He was subsequently found to have a right lower extremity DVT on 7/8/2018.    He remains anticoagulated with warfarin.    Radiation initiated on 7/31/2018.  Plan for Temodar ×1 year after radiation is complete.    Radiation complete as of 9/14/2018.      ALLERGIES:  Allergies   Allergen Reactions   • Avocado Itching   • Other Itching     Insect stings: Bee stings, throat swelling (has Epi pen)    Allergy to fruit, Avocado, Cherry Tomato (can have blue berries)   • Tomato Itching     Cherry tomato       MEDICATIONS:  The medication list has been reviewed with the patient by the medical assistant, and the list has been updated in the electronic medical record, which I reviewed.  Medication dosages and frequencies were confirmed to be accurate.    REVIEW OF SYSTEMS:  PAIN:  See Vital Signs below.  GENERAL:  No fevers, chills, night sweats, or unintended weight loss.  SKIN:  Mild erythema on the scalp secondary to radiation.  Scattered rash secondary to poison sumac.  HEME/LYMPH:  No abnormal bleeding.  No  palpable lymphadenopathy.  EYES:  No vision changes or diplopia.  ENT:  No sore throat or difficulty swallowing.  RESPIRATORY:  No cough, shortness of breath, hemoptysis, or wheezing.  CARDIOVASCULAR:  No chest pain, palpitations, orthopnea, or dyspnea on exertion.  GASTROINTESTINAL:  No abdominal pain, nausea, vomiting, constipation, diarrhea, melena, or hematochezia.  GENITOURINARY:  No dysuria or hematuria.  MUSCULOSKELETAL:  No joint pain, swelling, or erythema.  NEUROLOGIC:  No dizziness, loss of consciousness, or seizures.  PSYCHIATRIC:  No depression, anxiety, or mood changes.    Vitals:    09/13/18 1000   PainSc: 0-No pain       PHYSICAL EXAMINATION:  GENERAL:  Well-developed well-nourished male; awake, alert and oriented, in no acute distress.  SKIN:  Scattered erythema secondary to poison sumac.  Mild erythema on the scalp secondary to radiation.  HEAD:  Normocephalic, Well-healed surgical incision present.   Some hardware is palpable beneath the incision.  EYES:  Pupils equal, round and reactive to light.  Extraocular movements intact.  Conjunctivae normal.  EARS:  Hearing intact.  NOSE:  Septum midline.  No excoriations or nasal discharge.  MOUTH:  No stomatitis or ulcers.  Lips are normal.  THROAT:  Oropharynx without lesions or exudates.  NECK:  Supple with good range of motion; no thyromegaly or masses; no JVD or bruits.  LYMPHATICS:  No cervical, supraclavicular, axillary, or inguinal lymphadenopathy.  CHEST:  Lungs are clear to auscultation bilaterally.  No wheezes, rales, or rhonchi.  HEART:  Regular rate; normal rhythm.  No murmurs, gallops or rubs.  ABDOMEN:  Not examined today  EXTREMITIES:  No clubbing, cyanosis, or edema.  NEUROLOGICAL:  No focal neurologic deficits.    DIAGNOSTIC DATA:  Results for orders placed or performed in visit on 09/13/18   Protime-INR, Fingerstick   Result Value Ref Range    Protime 29.2 (H) 11.0 - 13.5 Seconds    INR 2.40 (H) 0.90 - 1.10   CBC Auto Differential    Result Value Ref Range    WBC 5.30 4.00 - 10.00 10*3/mm3    RBC 5.86 (H) 4.30 - 5.50 10*6/mm3    Hemoglobin 16.6 (H) 13.5 - 16.5 g/dL    Hematocrit 48.8 40.0 - 49.0 %    MCV 83.3 83.0 - 97.0 fL    MCH 28.3 27.0 - 33.0 pg    MCHC 34.0 32.0 - 35.0 g/dL    RDW 12.5 11.7 - 14.5 %    RDW-SD 37.8 37.0 - 49.0 fl    MPV 10.5 8.9 - 12.1 fL    Platelets 213 150 - 375 10*3/mm3    Neutrophil % 52.3 39.0 - 75.0 %    Lymphocyte % 33.2 20.0 - 49.0 %    Monocyte % 10.4 4.0 - 12.0 %    Eosinophil % 2.5 1.0 - 5.0 %    Basophil % 0.8 0.0 - 1.1 %    Immature Grans % 0.8 (H) 0.0 - 0.5 %    Neutrophils, Absolute 2.78 1.50 - 7.00 10*3/mm3    Lymphocytes, Absolute 1.76 1.00 - 3.50 10*3/mm3    Monocytes, Absolute 0.55 0.25 - 0.80 10*3/mm3    Eosinophils, Absolute 0.13 0.00 - 0.36 10*3/mm3    Basophils, Absolute 0.04 0.00 - 0.10 10*3/mm3    Immature Grans, Absolute 0.04 (H) 0.00 - 0.03 10*3/mm3    nRBC 0.0 0.0 - 0.0 /100 WBC       IMAGING:  None reviewed    ASSESSMENT:  This is a 32 y.o. male with:  1.  Bilateral lower extremity DVT: He is now on warfarin with therapeutic INR.  He will continue dosing per our protocol with a slight increase in his dose today to 7-1/2 mg daily aside from 5 mg on one day per week.   This is related to his brain tumor.  He will continue warfarin for now.  His INR is therapeutic today.  Repeat bilateral lower extremity venous duplex before I see him next.  Question duration of anticoagulation.  Might depend on how his brain MRI looks.  2.  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  NOT 1p19q co-deleted.  Overall, this is a good molecular profile.  I discussed his case with Dr. Galvan, Dr. Daley, and Dr. Dobbins and I reviewed NCCN guidelines.   He will require one year of adjuvant Temodar following radiation.  The big question was whether to administer concurrent therapy with Temodar along with radiation.  Ultimately, we decided not  to do this given the overall good molecular profile and the potential adverse effects from concurrent therapy.  He did initiate radiation alone on 7/31/2018.  The plan is for 30 treatments.  Radiation will be complete tomorrow.  After that he will have an MRI about 5-6 weeks after he completes radiation and at that point we will pursue Temodar.  3.  Given the possibility of infertility with treatment he has been banking sperm at the [facility].  His wife is actually pregnant at this time.  4.  Elevated liver labs: Liver labs normalized.  Unclear reason.  Medication could have contributed.    PLAN:  1.  He will proceed with radiation per Dr. Daley, to be complete tomorrow  2.  Per Dr. Daley, about 5-6 weeks after radiation he will have a baseline brain MRI.  Discussed with Dr. Daley today.    3.  After that, we will plan to administer adjuvant Temodar 5/28 days ×12 months.  We will authorize this.  I gave him some written information regarding the drug today and briefly discussed potential adverse effects.    Temodar dosing:  Cycle 1: 150 mg/m2 once daily for 5 days of a 28-day treatment cycle  Cycles 2 to 6: May increase to 200 mg/m2 once daily for 5 days; repeat every 28 days (if ANC ?1,500/mm3, platelets ?100,000/mm3 and nonhematologic toxicities for cycle 1 are ?grade 2 [excludes alopecia, nausea/vomiting]); If dose was not escalated at the onset of cycle 2, do not increase for cycles 3 to 6.    Dose for him at 150 mg/m2 = 300 mg  Dose for him at 200 mg/m2 = 400 mg      4.  He will have serial brain MRIs.  NCCN guidelines suggest every 2-4 months for 3 years and then every 6 months indefinitely.  5.  Continue warfarin with dose adjustments per protocol.  Repeat INR in 4 weeks.  Repeat venous duplex before I see him next.  6.  I will have a nurse practitioner see him back in about 4 weeks for teaching on the Temodar and we will check an INR that day.    7.  I will see him back in about 6 weeks  for follow-up to review the venous duplex and brain MRI and we will check a CBC, CMP, and INR that day.    Discussed with Dr. Daley.

## 2018-09-14 ENCOUNTER — DOCUMENTATION (OUTPATIENT)
Dept: ONCOLOGY | Facility: CLINIC | Age: 33
End: 2018-09-14

## 2018-09-14 PROCEDURE — 77386 CHG INTENSITY MODULATED RADIATION TX DLVR COMPLEX: CPT | Performed by: RADIOLOGY

## 2018-09-14 PROCEDURE — 77014 CHG CT GUIDANCE RADIATION THERAPY FLDS PLACEMENT: CPT | Performed by: RADIOLOGY

## 2018-09-14 PROCEDURE — 77386: CPT | Performed by: RADIOLOGY

## 2018-09-14 NOTE — PROGRESS NOTES
Staff message rec from Dr Jefferson-Pt will need Temodar in about 6 weeks. See below.    Sean Jefferson MD sent to Hollie Bustamante             In about 6 weeks, after his MRI in about 5-6 weeks, I will see him and we will plan to start Temodar at that time.     Cycle 1: 150 mg/m2 (300 mg) once daily for 5 days of a 28-day treatment cycle     Cycles 2 to 6: May increase to 200 mg/m2 (400 mg) once daily for 5 days; repeat every 28 days.     Will plan to start with cycle 1 at 300 mg daily x 5 days.  If he does well, will look into increaseing to 400 mg with cycle 2.     Can you look into this in the near future so we're ready to go in about 6 weeks or so?  Thanks!  AJ      I have submitted a PA to [employer] through [employer].    I have submitted the PA for the 100 mg caps #20 for 28 day supply (400 mg dose) so insurance can approve his max dose and it will eliminate a second PA for cycle 2.

## 2018-09-17 ENCOUNTER — DOCUMENTATION (OUTPATIENT)
Dept: ONCOLOGY | Facility: CLINIC | Age: 33
End: 2018-09-17

## 2018-09-20 ENCOUNTER — DOCUMENTATION (OUTPATIENT)
Dept: RADIATION ONCOLOGY | Facility: HOSPITAL | Age: 33
End: 2018-09-20

## 2018-09-20 NOTE — PROGRESS NOTES
COMPLETION / CLOSURE NOTE    Diagnosis: Anaplastic astrocytoma     Dates of treatment:  7/31/2018 - 9/14/2018.   59.4 Gy In 33 fxns  Treatment Site - Brain  Treatment Intent - Curative  Total Dose in cGy - 4500  Number of Treatments - 25  Dose per fraction - 180 cGy per fraction  Fractions per day - 1 fx/day  Fractions per week - 5 fx/week  Treatment Type - Rapid Arc  Energy - 6 MVP  Normalization - Prescribed at 100%, Volumetric Normalization-- see below  Imaging/Field Verification - Simulation before first treatment to verify field, blocking, placement and positioning, Simulation for all ports of change, Weekly port films of all ports, IGRT using CBCT daily  Additional comments: - Brain  180cGy x 25 = 4500cGy  Prescribed so that 95% of the PTV is covered by 100% of the dose    Treatment Site - BRAIN BOOST  Treatment Intent - Curative  Total Dose in cGy - 1440  Number of Treatments - 8  Dose per fraction - 180 cGy per fraction  Fractions per day - 1 fx/day  Fractions per week - 5 fx/week  Treatment Type - Rapid Arc  Energy - 6 MVP  Normalization - Prescribed at 100%, Volumetric Normalization-- see below, See below  Imaging/Field Verification - Simulation before first treatment to verify field, blocking, placement and positioning, Simulation for all ports of change, Weekly port films of all ports, IGRT using CBCT daily  Additional comments: - BRAIN BOOST    180cGy x 8 = 1440      5940 TOTAL   PRESCRIBED SO THAT 100% OF THE DOSE COVERS 95% OF THE PTV  CBCT DAILY    Tolerance: Tolerated the above treatments with the expected hair loss and skin erythema and some pruritus.  He did not develop headaches or significant fatigue or confusion.  He _ completed the total treatments in _45__ elapsed days.    Disposition:  Plan follow-up with MR brain in approximately 5 weeks, he would then be evaluated by Dr. Jefferson for possible Temodar.     Robin Daley Jr., MD

## 2018-10-01 ENCOUNTER — DOCUMENTATION (OUTPATIENT)
Dept: ONCOLOGY | Facility: CLINIC | Age: 33
End: 2018-10-01

## 2018-10-01 ENCOUNTER — TELEPHONE (OUTPATIENT)
Dept: NEUROSURGERY | Facility: CLINIC | Age: 33
End: 2018-10-01

## 2018-10-01 RX ORDER — TEMOZOLOMIDE 100 MG/1
CAPSULE ORAL
Qty: 15 CAPSULE | Refills: 0 | Status: SHIPPED | OUTPATIENT
Start: 2018-10-01 | End: 2018-10-29 | Stop reason: SDUPTHER

## 2018-10-01 NOTE — TELEPHONE ENCOUNTER
He is to FU w/Dr. Galvan after next MRI (order by oncology). MRI is on 10-23, but appt w/ is 10-10.    LVM, This appt needs to be moved until after he has MRI and sees Dr. Jefferson (after 10/29).

## 2018-10-05 NOTE — TELEPHONE ENCOUNTER
LVM #2. I informed him that we have CANCELLED his appt on 10/10.   I have him on  schedule for 11-5 at 8:30 a.m.  Letter mailed to patient since he is not returning calls.

## 2018-10-11 ENCOUNTER — OFFICE VISIT (OUTPATIENT)
Dept: ONCOLOGY | Facility: CLINIC | Age: 33
End: 2018-10-11

## 2018-10-11 ENCOUNTER — LAB (OUTPATIENT)
Dept: LAB | Facility: HOSPITAL | Age: 33
End: 2018-10-11

## 2018-10-11 VITALS — WEIGHT: 173 LBS | BODY MASS INDEX: 24.49 KG/M2

## 2018-10-11 DIAGNOSIS — I26.99 OTHER ACUTE PULMONARY EMBOLISM WITHOUT ACUTE COR PULMONALE (HCC): ICD-10-CM

## 2018-10-11 DIAGNOSIS — I82.413 ACUTE DEEP VEIN THROMBOSIS (DVT) OF FEMORAL VEIN OF BOTH LOWER EXTREMITIES (HCC): ICD-10-CM

## 2018-10-11 DIAGNOSIS — C71.1 ANAPLASTIC ASTROCYTOMA OF FRONTAL LOBE (HCC): Primary | ICD-10-CM

## 2018-10-11 LAB
BASOPHILS # BLD AUTO: 0.03 10*3/MM3 (ref 0–0.1)
BASOPHILS NFR BLD AUTO: 0.5 % (ref 0–1.1)
DEPRECATED RDW RBC AUTO: 39 FL (ref 37–49)
EOSINOPHIL # BLD AUTO: 0.17 10*3/MM3 (ref 0–0.36)
EOSINOPHIL NFR BLD AUTO: 3 % (ref 1–5)
ERYTHROCYTE [DISTWIDTH] IN BLOOD BY AUTOMATED COUNT: 13 % (ref 11.7–14.5)
HCT VFR BLD AUTO: 45.1 % (ref 40–49)
HGB BLD-MCNC: 15.4 G/DL (ref 13.5–16.5)
IMM GRANULOCYTES # BLD: 0.03 10*3/MM3 (ref 0–0.03)
IMM GRANULOCYTES NFR BLD: 0.5 % (ref 0–0.5)
INR PPP: 2.2 (ref 0.9–1.1)
LYMPHOCYTES # BLD AUTO: 2.09 10*3/MM3 (ref 1–3.5)
LYMPHOCYTES NFR BLD AUTO: 36.6 % (ref 20–49)
MCH RBC QN AUTO: 28.4 PG (ref 27–33)
MCHC RBC AUTO-ENTMCNC: 34.1 G/DL (ref 32–35)
MCV RBC AUTO: 83.2 FL (ref 83–97)
MONOCYTES # BLD AUTO: 0.57 10*3/MM3 (ref 0.25–0.8)
MONOCYTES NFR BLD AUTO: 10 % (ref 4–12)
NEUTROPHILS # BLD AUTO: 2.82 10*3/MM3 (ref 1.5–7)
NEUTROPHILS NFR BLD AUTO: 49.4 % (ref 39–75)
NRBC BLD MANUAL-RTO: 0 /100 WBC (ref 0–0)
PLATELET # BLD AUTO: 220 10*3/MM3 (ref 150–375)
PMV BLD AUTO: 9.6 FL (ref 8.9–12.1)
PROTHROMBIN TIME: 26.4 SECONDS (ref 11–13.5)
RBC # BLD AUTO: 5.42 10*6/MM3 (ref 4.3–5.5)
WBC NRBC COR # BLD: 5.71 10*3/MM3 (ref 4–10)

## 2018-10-11 PROCEDURE — 36416 COLLJ CAPILLARY BLOOD SPEC: CPT | Performed by: INTERNAL MEDICINE

## 2018-10-11 PROCEDURE — 85610 PROTHROMBIN TIME: CPT | Performed by: INTERNAL MEDICINE

## 2018-10-11 PROCEDURE — 99215 OFFICE O/P EST HI 40 MIN: CPT | Performed by: NURSE PRACTITIONER

## 2018-10-11 PROCEDURE — 85025 COMPLETE CBC W/AUTO DIFF WBC: CPT | Performed by: INTERNAL MEDICINE

## 2018-10-11 RX ORDER — SULFAMETHOXAZOLE AND TRIMETHOPRIM 800; 160 MG/1; MG/1
1 TABLET ORAL 3 TIMES WEEKLY
Qty: 36 TABLET | Refills: 4 | Status: SHIPPED | OUTPATIENT
Start: 2018-10-12 | End: 2019-03-27 | Stop reason: SDUPTHER

## 2018-10-11 RX ORDER — ONDANSETRON HYDROCHLORIDE 8 MG/1
8 TABLET, FILM COATED ORAL 3 TIMES DAILY PRN
Qty: 30 TABLET | Refills: 5 | Status: SHIPPED | OUTPATIENT
Start: 2018-10-11 | End: 2019-01-28 | Stop reason: HOSPADM

## 2018-10-11 NOTE — PROGRESS NOTES
Subjective     No primary care provider on file.    PATIENT NAME:  Bryan Valadez  YOB: 1985  PATIENTS AGE:  33 y.o.  PATIENTS SEX:  male  DATE OF SERVICE:  10/11/2018  PROVIDER:  NICOLE España      __________ORAL CHEMOTHERAPY PATIENT EDUCATION__________    PATIENT EDUCATION:  Today I met with the patient to discuss ORAL chemotherapy/biotherapy recommended for treatment of his disease.  Also discussed were medication administration, adherence, and proper handling/disposal.  The patient received the Oral Chemotherapy/Biotherapy Plan Summary including diagnosis and specific treatment plan.    SIDE EFFECTS:  Common side effects were discussed with the patient.  Discussion included hair loss/discoloration, anemia/fatigue, infection/chills/fever, appetite, bleeding risk/precautions, constipation, diarrhea, taste alteration, loss of appetite,nausea/vomiting, skin/nail changes, rash, muscle aches/weakness, photosensitivity, weight gain/loss,  dizziness,reproductive risk, liver damage, lung damage, kidney damage, DVT/PE risk, fluid retention, pleural/pericardial effusion, somnolence, electrolyte/LFT imbalance.    Discussion also included side effects specific to drugs in the treatment plan, specifically Temodar.    Additionally, we have reviewed the patient's INR from today, with Coumadin dose adjustments reviewed.  He was provided written and verbal instructions for new Coumadin dosing.  Lastly, I encouraged patient to get a flu shot based on his CBC today.  I did review that he is not neutropenic and should receive a flu shot prior to the initiation of therapy.    Lab Results   Component Value Date    WBC 5.71 10/11/2018    HGB 15.4 10/11/2018    HCT 45.1 10/11/2018    MCV 83.2 10/11/2018     10/11/2018     Reproductive risks were discussed, including appropriate use of birth control and protection during sexual relations.    A total of 40 minutes were spent with the patient, with  100% of time spent in education and counseling.    Radha Bateman, APRN  10/11/2018

## 2018-10-17 ENCOUNTER — TELEPHONE (OUTPATIENT)
Dept: ONCOLOGY | Facility: HOSPITAL | Age: 33
End: 2018-10-17

## 2018-10-17 NOTE — TELEPHONE ENCOUNTER
----- Message from More Lovett sent at 10/17/2018  1:27 PM EDT -----  Contact: [phone]  Pt was prescribed sulfamath wanted to know when to take?

## 2018-10-17 NOTE — TELEPHONE ENCOUNTER
----- Message from More Lovett sent at 10/17/2018  1:27 PM EDT -----  Contact: [phone]  Pt was prescribed sulfamath wanted to know when to take?  Clarified with Radha. To start antibiotic when he starts Temodar. Verbalized understanding.

## 2018-10-19 ENCOUNTER — DOCUMENTATION (OUTPATIENT)
Dept: ONCOLOGY | Facility: CLINIC | Age: 33
End: 2018-10-19

## 2018-10-22 RX ORDER — LEVETIRACETAM 1000 MG/1
TABLET ORAL
Qty: 60 TABLET | Refills: 0 | Status: SHIPPED | OUTPATIENT
Start: 2018-10-22 | End: 2018-11-19 | Stop reason: SDUPTHER

## 2018-10-22 NOTE — TELEPHONE ENCOUNTER
Last seen by RIMA 7/18 for astrocytoma. Patient was supposed to have appt with RIMA 10/10 but imaging was not scheduled until after this appt. Patient next OV is 11/5 with MEKA.

## 2018-10-23 ENCOUNTER — HOSPITAL ENCOUNTER (OUTPATIENT)
Dept: CARDIOLOGY | Facility: HOSPITAL | Age: 33
Discharge: HOME | End: 2018-10-23
Attending: INTERNAL MEDICINE | Admitting: INTERNAL MEDICINE

## 2018-10-23 ENCOUNTER — HOSPITAL ENCOUNTER (OUTPATIENT)
Dept: MRI IMAGING | Facility: HOSPITAL | Age: 33
Discharge: HOME | End: 2018-10-23
Attending: RADIOLOGY

## 2018-10-23 DIAGNOSIS — I82.413 ACUTE DEEP VEIN THROMBOSIS (DVT) OF FEMORAL VEIN OF BOTH LOWER EXTREMITIES (HCC): ICD-10-CM

## 2018-10-23 DIAGNOSIS — C71.1 ANAPLASTIC ASTROCYTOMA OF FRONTAL LOBE (HCC): ICD-10-CM

## 2018-10-23 LAB
BH CV LOW VAS LEFT DISTAL FEMORAL SPONT: 1
BH CV LOW VAS LEFT GASTRONEMIUS VESSEL: 1
BH CV LOW VAS LEFT MID FEMORAL SPONT: 1
BH CV LOW VAS LEFT PERONEAL VESSEL: 1
BH CV LOW VAS LEFT POPLITEAL SPONT: 1
BH CV LOW VAS LEFT POSTERIOR TIBIAL VESSEL: 1
BH CV LOW VAS RIGHT GASTRONEMIUS VESSEL: 1
BH CV LOW VAS RIGHT GREATER SAPH BK VESSEL: 1
BH CV LOW VAS RIGHT PROFUNDA FEMORAL SPONT: 1
BH CV LOW VAS RIGHT SAPHENOFEMORAL JUNCTION SPONT: 1
BH CV LOWER VASCULAR LEFT COMMON FEMORAL AUGMENT: NORMAL
BH CV LOWER VASCULAR LEFT COMMON FEMORAL COMPETENT: NORMAL
BH CV LOWER VASCULAR LEFT COMMON FEMORAL COMPRESS: NORMAL
BH CV LOWER VASCULAR LEFT COMMON FEMORAL PHASIC: NORMAL
BH CV LOWER VASCULAR LEFT COMMON FEMORAL SPONT: NORMAL
BH CV LOWER VASCULAR LEFT DISTAL FEMORAL COMPRESS: NORMAL
BH CV LOWER VASCULAR LEFT DISTAL FEMORAL THROMBUS: NORMAL
BH CV LOWER VASCULAR LEFT GASTRONEMIUS COMPRESS: NORMAL
BH CV LOWER VASCULAR LEFT GASTRONEMIUS THROMBUS: NORMAL
BH CV LOWER VASCULAR LEFT GREATER SAPH AK COMPRESS: NORMAL
BH CV LOWER VASCULAR LEFT GREATER SAPH BK COMPRESS: NORMAL
BH CV LOWER VASCULAR LEFT LESSER SAPH COMPRESS: NORMAL
BH CV LOWER VASCULAR LEFT MID FEMORAL AUGMENT: NORMAL
BH CV LOWER VASCULAR LEFT MID FEMORAL COMPETENT: NORMAL
BH CV LOWER VASCULAR LEFT MID FEMORAL COMPRESS: NORMAL
BH CV LOWER VASCULAR LEFT MID FEMORAL PHASIC: NORMAL
BH CV LOWER VASCULAR LEFT MID FEMORAL SPONT: NORMAL
BH CV LOWER VASCULAR LEFT MID FEMORAL THROMBUS: NORMAL
BH CV LOWER VASCULAR LEFT PERONEAL COMPRESS: NORMAL
BH CV LOWER VASCULAR LEFT PERONEAL THROMBUS: NORMAL
BH CV LOWER VASCULAR LEFT POPLITEAL AUGMENT: NORMAL
BH CV LOWER VASCULAR LEFT POPLITEAL COMPETENT: NORMAL
BH CV LOWER VASCULAR LEFT POPLITEAL COMPRESS: NORMAL
BH CV LOWER VASCULAR LEFT POPLITEAL PHASIC: NORMAL
BH CV LOWER VASCULAR LEFT POPLITEAL SPONT: NORMAL
BH CV LOWER VASCULAR LEFT POPLITEAL THROMBUS: NORMAL
BH CV LOWER VASCULAR LEFT POSTERIOR TIBIAL COMPRESS: NORMAL
BH CV LOWER VASCULAR LEFT POSTERIOR TIBIAL THROMBUS: NORMAL
BH CV LOWER VASCULAR LEFT PROXIMAL FEMORAL COMPRESS: NORMAL
BH CV LOWER VASCULAR LEFT SAPHENOFEMORAL JUNCTION AUGMENT: NORMAL
BH CV LOWER VASCULAR LEFT SAPHENOFEMORAL JUNCTION COMPETENT: NORMAL
BH CV LOWER VASCULAR LEFT SAPHENOFEMORAL JUNCTION COMPRESS: NORMAL
BH CV LOWER VASCULAR LEFT SAPHENOFEMORAL JUNCTION PHASIC: NORMAL
BH CV LOWER VASCULAR LEFT SAPHENOFEMORAL JUNCTION SPONT: NORMAL
BH CV LOWER VASCULAR RIGHT COMMON FEMORAL AUGMENT: NORMAL
BH CV LOWER VASCULAR RIGHT COMMON FEMORAL COMPETENT: NORMAL
BH CV LOWER VASCULAR RIGHT COMMON FEMORAL COMPRESS: NORMAL
BH CV LOWER VASCULAR RIGHT COMMON FEMORAL PHASIC: NORMAL
BH CV LOWER VASCULAR RIGHT COMMON FEMORAL SPONT: NORMAL
BH CV LOWER VASCULAR RIGHT DISTAL FEMORAL COMPRESS: NORMAL
BH CV LOWER VASCULAR RIGHT GASTRONEMIUS COMPRESS: NORMAL
BH CV LOWER VASCULAR RIGHT GASTRONEMIUS THROMBUS: NORMAL
BH CV LOWER VASCULAR RIGHT GREATER SAPH AK COMPRESS: NORMAL
BH CV LOWER VASCULAR RIGHT GREATER SAPH BK COMPRESS: NORMAL
BH CV LOWER VASCULAR RIGHT GREATER SAPH BK THROMBUS: NORMAL
BH CV LOWER VASCULAR RIGHT LESSER SAPH COMPRESS: NORMAL
BH CV LOWER VASCULAR RIGHT MID FEMORAL AUGMENT: NORMAL
BH CV LOWER VASCULAR RIGHT MID FEMORAL COMPETENT: NORMAL
BH CV LOWER VASCULAR RIGHT MID FEMORAL COMPRESS: NORMAL
BH CV LOWER VASCULAR RIGHT MID FEMORAL PHASIC: NORMAL
BH CV LOWER VASCULAR RIGHT MID FEMORAL SPONT: NORMAL
BH CV LOWER VASCULAR RIGHT PERONEAL COMPRESS: NORMAL
BH CV LOWER VASCULAR RIGHT POPLITEAL AUGMENT: NORMAL
BH CV LOWER VASCULAR RIGHT POPLITEAL COMPETENT: NORMAL
BH CV LOWER VASCULAR RIGHT POPLITEAL COMPRESS: NORMAL
BH CV LOWER VASCULAR RIGHT POPLITEAL PHASIC: NORMAL
BH CV LOWER VASCULAR RIGHT POPLITEAL SPONT: NORMAL
BH CV LOWER VASCULAR RIGHT POSTERIOR TIBIAL COMPRESS: NORMAL
BH CV LOWER VASCULAR RIGHT PROFUNDA FEMORAL AUGMENT: NORMAL
BH CV LOWER VASCULAR RIGHT PROFUNDA FEMORAL COMPETENT: NORMAL
BH CV LOWER VASCULAR RIGHT PROFUNDA FEMORAL COMPRESS: NORMAL
BH CV LOWER VASCULAR RIGHT PROFUNDA FEMORAL PHASIC: NORMAL
BH CV LOWER VASCULAR RIGHT PROFUNDA FEMORAL SPONT: NORMAL
BH CV LOWER VASCULAR RIGHT PROFUNDA FEMORAL THROMBUS: NORMAL
BH CV LOWER VASCULAR RIGHT PROXIMAL FEMORAL COMPRESS: NORMAL
BH CV LOWER VASCULAR RIGHT SAPHENOFEMORAL JUNCTION AUGMENT: NORMAL
BH CV LOWER VASCULAR RIGHT SAPHENOFEMORAL JUNCTION COMPETENT: NORMAL
BH CV LOWER VASCULAR RIGHT SAPHENOFEMORAL JUNCTION COMPRESS: NORMAL
BH CV LOWER VASCULAR RIGHT SAPHENOFEMORAL JUNCTION PHASIC: NORMAL
BH CV LOWER VASCULAR RIGHT SAPHENOFEMORAL JUNCTION SPONT: NORMAL

## 2018-10-23 PROCEDURE — 93970 EXTREMITY STUDY: CPT

## 2018-10-23 PROCEDURE — A9577 INJ MULTIHANCE: HCPCS | Performed by: RADIOLOGY

## 2018-10-23 PROCEDURE — 70553 MRI BRAIN STEM W/O & W/DYE: CPT

## 2018-10-23 PROCEDURE — 0 GADOBENATE DIMEGLUMINE 529 MG/ML SOLUTION: Performed by: RADIOLOGY

## 2018-10-23 RX ADMIN — GADOBENATE DIMEGLUMINE 16 ML: 529 INJECTION, SOLUTION INTRAVENOUS at 07:45

## 2018-10-28 RX ORDER — TEMOZOLOMIDE 100 MG/1
CAPSULE ORAL
Qty: 15 CAPSULE | Refills: 0 | Status: CANCELLED | OUTPATIENT
Start: 2018-10-28

## 2018-10-29 ENCOUNTER — LAB (OUTPATIENT)
Dept: LAB | Facility: HOSPITAL | Age: 33
End: 2018-10-29

## 2018-10-29 ENCOUNTER — OFFICE VISIT (OUTPATIENT)
Dept: ONCOLOGY | Facility: CLINIC | Age: 33
End: 2018-10-29

## 2018-10-29 ENCOUNTER — DOCUMENTATION (OUTPATIENT)
Dept: ONCOLOGY | Facility: CLINIC | Age: 33
End: 2018-10-29

## 2018-10-29 VITALS
BODY MASS INDEX: 24.62 KG/M2 | TEMPERATURE: 98 F | OXYGEN SATURATION: 99 % | HEIGHT: 70 IN | HEART RATE: 65 BPM | DIASTOLIC BLOOD PRESSURE: 60 MMHG | SYSTOLIC BLOOD PRESSURE: 102 MMHG | RESPIRATION RATE: 16 BRPM | WEIGHT: 172 LBS

## 2018-10-29 DIAGNOSIS — I82.532 CHRONIC DEEP VEIN THROMBOSIS (DVT) OF LEFT POPLITEAL VEIN (HCC): ICD-10-CM

## 2018-10-29 DIAGNOSIS — C71.1 ANAPLASTIC ASTROCYTOMA OF FRONTAL LOBE (HCC): Primary | ICD-10-CM

## 2018-10-29 DIAGNOSIS — I82.413 ACUTE DEEP VEIN THROMBOSIS (DVT) OF FEMORAL VEIN OF BOTH LOWER EXTREMITIES (HCC): ICD-10-CM

## 2018-10-29 LAB
ALBUMIN SERPL-MCNC: 4.5 G/DL (ref 3.5–5.2)
ALBUMIN/GLOB SERPL: 1.9 G/DL (ref 1.1–2.4)
ALP SERPL-CCNC: 55 U/L (ref 38–116)
ALT SERPL W P-5'-P-CCNC: 18 U/L (ref 0–41)
ANION GAP SERPL CALCULATED.3IONS-SCNC: 13.2 MMOL/L
AST SERPL-CCNC: 18 U/L (ref 0–40)
BASOPHILS # BLD AUTO: 0.02 10*3/MM3 (ref 0–0.1)
BASOPHILS NFR BLD AUTO: 0.4 % (ref 0–1.1)
BILIRUB SERPL-MCNC: 0.3 MG/DL (ref 0.1–1.2)
BUN BLD-MCNC: 11 MG/DL (ref 6–20)
BUN/CREAT SERPL: 12.1 (ref 7.3–30)
CALCIUM SPEC-SCNC: 9.2 MG/DL (ref 8.5–10.2)
CHLORIDE SERPL-SCNC: 105 MMOL/L (ref 98–107)
CO2 SERPL-SCNC: 24.8 MMOL/L (ref 22–29)
CREAT BLD-MCNC: 0.91 MG/DL (ref 0.7–1.3)
DEPRECATED RDW RBC AUTO: 42 FL (ref 37–49)
EOSINOPHIL # BLD AUTO: 0.16 10*3/MM3 (ref 0–0.36)
EOSINOPHIL NFR BLD AUTO: 3.2 % (ref 1–5)
ERYTHROCYTE [DISTWIDTH] IN BLOOD BY AUTOMATED COUNT: 13.5 % (ref 11.7–14.5)
GFR SERPL CREATININE-BSD FRML MDRD: 95 ML/MIN/1.73
GLOBULIN UR ELPH-MCNC: 2.4 GM/DL (ref 1.8–3.5)
GLUCOSE BLD-MCNC: 115 MG/DL (ref 74–124)
HCT VFR BLD AUTO: 45.4 % (ref 40–49)
HGB BLD-MCNC: 14.7 G/DL (ref 13.5–16.5)
IMM GRANULOCYTES # BLD: 0.01 10*3/MM3 (ref 0–0.03)
IMM GRANULOCYTES NFR BLD: 0.2 % (ref 0–0.5)
INR PPP: 3.7 (ref 0.9–1.1)
LYMPHOCYTES # BLD AUTO: 2.36 10*3/MM3 (ref 1–3.5)
LYMPHOCYTES NFR BLD AUTO: 46.9 % (ref 20–49)
MCH RBC QN AUTO: 27.6 PG (ref 27–33)
MCHC RBC AUTO-ENTMCNC: 32.4 G/DL (ref 32–35)
MCV RBC AUTO: 85.2 FL (ref 83–97)
MONOCYTES # BLD AUTO: 0.56 10*3/MM3 (ref 0.25–0.8)
MONOCYTES NFR BLD AUTO: 11.1 % (ref 4–12)
NEUTROPHILS # BLD AUTO: 1.92 10*3/MM3 (ref 1.5–7)
NEUTROPHILS NFR BLD AUTO: 38.2 % (ref 39–75)
NRBC BLD MANUAL-RTO: 0 /100 WBC (ref 0–0)
PLATELET # BLD AUTO: 245 10*3/MM3 (ref 150–375)
PMV BLD AUTO: 9.7 FL (ref 8.9–12.1)
POTASSIUM BLD-SCNC: 4.3 MMOL/L (ref 3.5–4.7)
PROT SERPL-MCNC: 6.9 G/DL (ref 6.3–8)
PROTHROMBIN TIME: 44.4 SECONDS (ref 11–13.5)
RBC # BLD AUTO: 5.33 10*6/MM3 (ref 4.3–5.5)
SODIUM BLD-SCNC: 143 MMOL/L (ref 134–145)
WBC NRBC COR # BLD: 5.03 10*3/MM3 (ref 4–10)

## 2018-10-29 PROCEDURE — 80053 COMPREHEN METABOLIC PANEL: CPT | Performed by: INTERNAL MEDICINE

## 2018-10-29 PROCEDURE — 99215 OFFICE O/P EST HI 40 MIN: CPT | Performed by: INTERNAL MEDICINE

## 2018-10-29 PROCEDURE — 36415 COLL VENOUS BLD VENIPUNCTURE: CPT | Performed by: INTERNAL MEDICINE

## 2018-10-29 PROCEDURE — 85025 COMPLETE CBC W/AUTO DIFF WBC: CPT | Performed by: INTERNAL MEDICINE

## 2018-10-29 PROCEDURE — 85610 PROTHROMBIN TIME: CPT | Performed by: INTERNAL MEDICINE

## 2018-10-29 RX ORDER — TEMOZOLOMIDE 100 MG/1
CAPSULE ORAL
Qty: 20 CAPSULE | Refills: 0 | Status: SHIPPED | OUTPATIENT
Start: 2018-10-29 | End: 2018-11-17 | Stop reason: SDUPTHER

## 2018-10-29 NOTE — PROGRESS NOTES
Staff message rec from Dr Jefferson about pts Temodar dose for his next cycle. See below.    Sean Jefferson MD sent to [facility]             For his next cycle of Temodar which will start in two weeks we will oncrease the dose to 400 mg daily for 5/28 days.  Thanks,  AJH      I have escribed a new rx to Benji OBRIEN.

## 2018-10-29 NOTE — PROGRESS NOTES
[facility] OUTPATIENT FOLLOW UP CLINIC VISIT    REASON FOR FOLLOW-UP:    1.  Left lower extremity DVT with progression of symptoms and extension of the left lower extremity DVT into the femoral vein from the popliteal/calf vein swallowing for next set.  Currently anticoagulated with warfarin.  2.  Right lower extremity DVT noted in the common femoral, profunda femoral, and calf veins  3.  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  Other thrombophilia labs negative.    4.  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  NOT 1p19q co-deleted.    5.  Radiation initiated on 7/31/2018.  Plan for Temodar ×1 year following radiation.  6.  4500 cGy in 25 fractions administered from 7/31/2018 through 9/14/2018  7.  MRI brain 10/23/2018 with resolving hemorrhage in the resection cavity.  However, there is hyperintensity measuring 4.6 x 4.3 x 3 cm along the margins of the resection cavity.  8.  Repeat venous duplex on 10/23 with chronic right CVT and chronic LLE DVT from the mid femoral vein distally.         HISTORY OF PRESENT ILLNESS:  Bryan Valadez is a 33 y.o. male who returns today for follow up of the above issue.     On Sunday 2 weeks ago he actually initiated this Temodar.  He had some nausea and vomiting after the first dose but took prophylactic antiemetics prior to each subsequent dose and did very well.  He has continued to work.  He denies any neurologic symptoms.  No headaches or visual changes.  No confusion.    ONCOLOGIC HISTORY:  He had about 6 months of headaches treated as sinusitis and presented on 6/15 with visual changes and headache and was found to have a large right frontal mass which was resected on 6/16 by Dr. Galvan and consistent with a glioma.  Pathology was reviewed at [facility] showing infiltrating glioma.  Large 10 cm partially cystic and solid lobulated tumor mass at diagnosis.  Negative  IDH1-R132H immunostain excludes the most common IDH1 mutation; however loss of ATRX expression suggests possibility of glioma harboring another less common IDH1 mutation.  p53 overexpressed.  Ki67 10% but variable.  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  There was no evidence for a 1p19q co-deletion.       Chromosome microarray showed a complex molecular karyotype including loss of 1q, loss of 2q, gain of 7q, gain of 8q, loss of 9p, REBECCA of 17p, and gain of 18p.  These abnormalities are typically associated with  IDH-mutant astrocytic gliomas.  No 1p and 19q whole arm co-deletion was noted.  CT head on 7/1 c/w residual tumor circumscribing the resection cavity and a remote cerebellar hemisphere hemorrhage.       He was discharged and subsequently admitted with left leg pain and chest pain, with LLE popliteal and calf vein clot noted and bilateral small PE. He was treated with heparin and discharged on Pradaxa.     He developed worsening leg pain up into the thigh and presented to the ER where a venous duplex showed progression of the clot to the femoral vein.  He was given Lovenox and admitted.  Warfarin was initiated.       A partial thrombophilia evaluation showed that he is a heterozygote for the factor V Leiden mutation.  Labs otherwise unremarkable.    He was subsequently found to have a right lower extremity DVT on 7/8/2018.    He remains anticoagulated with warfarin.    Radiation initiated on 7/31/2018.  Plan for Temodar ×1 year after radiation is complete.    Radiation complete as of 9/14/2018.    4500 cGy in 25 fractions administered from 7/31/2018 through 9/14/2018    MRI brain 10/23/2018 with resolving hemorrhage in the resection cavity.  However, there is hyperintensity measuring 4.6 x 4.3 x 3 cm along the margins of the resection cavity.    Repeat venous duplex on 10/23 with chronic right CVT and chronic LLE DVT from the mid femoral vein distally.        ALLERGIES:  Allergies   Allergen Reactions  "  • Avocado Itching   • Other Itching     Insect stings: Bee stings, throat swelling (has Epi pen)    Allergy to fruit, Avocado, Cherry Tomato (can have blue berries)   • Tomato Itching     Cherry tomato       MEDICATIONS:  The medication list has been reviewed with the patient by the medical assistant, and the list has been updated in the electronic medical record, which I reviewed.  Medication dosages and frequencies were confirmed to be accurate.    REVIEW OF SYSTEMS:  PAIN:  See Vital Signs below.  GENERAL:  No fevers, chills, night sweats, or unintended weight loss.  SKIN:  No rash or nonhealing lesions  HEME/LYMPH:  No abnormal bleeding.  No palpable lymphadenopathy.  EYES:  No vision changes or diplopia.  ENT:  No sore throat or difficulty swallowing.  RESPIRATORY:  No cough, shortness of breath, hemoptysis, or wheezing.  CARDIOVASCULAR:  No chest pain, palpitations, orthopnea, or dyspnea on exertion.  GASTROINTESTINAL:  No abdominal pain, nausea, vomiting, constipation, diarrhea, melena, or hematochezia.  GENITOURINARY:  No dysuria or hematuria.  MUSCULOSKELETAL:  No joint pain, swelling, or erythema.  NEUROLOGIC:  No dizziness, loss of consciousness, or seizures.  PSYCHIATRIC:  No depression, anxiety, or mood changes.    Vitals:    10/29/18 0802   BP: 102/60   Pulse: 65   Resp: 16   Temp: 98 °F (36.7 °C)   TempSrc: Oral   SpO2: 99%   Weight: 78 kg (172 lb)   Height: 179 cm (70.47\")   PainSc: 0-No pain       PHYSICAL EXAMINATION:  GENERAL:  Well-developed well-nourished male; awake, alert and oriented, in no acute distress.  SKIN:  Alopecia present on the scalp  HEAD:  Normocephalic, Well-healed surgical incision present.   Some hardware is palpable beneath the incision.  EYES:  Pupils equal, round and reactive to light.  Extraocular movements intact.  Conjunctivae normal.  EARS:  Hearing intact.  NOSE:  Septum midline.  No excoriations or nasal discharge.  MOUTH:  No stomatitis or ulcers.  Lips are " normal.  THROAT:  Oropharynx without lesions or exudates.  NECK:  Supple with good range of motion; no thyromegaly or masses; no JVD or bruits.  LYMPHATICS:  No cervical, supraclavicular, axillary, or inguinal lymphadenopathy.  CHEST:  Lungs are clear to auscultation bilaterally.  No wheezes, rales, or rhonchi.  HEART:  Regular rate; normal rhythm.  No murmurs, gallops or rubs.  ABDOMEN:  Not examined today  EXTREMITIES:  No clubbing, cyanosis, or edema.  NEUROLOGICAL:  No focal neurologic deficits.    DIAGNOSTIC DATA:  Results for orders placed or performed in visit on 10/29/18   Comprehensive Metabolic Panel   Result Value Ref Range    Glucose 115 74 - 124 mg/dL    BUN 11 6 - 20 mg/dL    Creatinine 0.91 0.70 - 1.30 mg/dL    Sodium 143 134 - 145 mmol/L    Potassium 4.3 3.5 - 4.7 mmol/L    Chloride 105 98 - 107 mmol/L    CO2 24.8 22.0 - 29.0 mmol/L    Calcium 9.2 8.5 - 10.2 mg/dL    Total Protein 6.9 6.3 - 8.0 g/dL    Albumin 4.50 3.50 - 5.20 g/dL    ALT (SGPT) 18 0 - 41 U/L    AST (SGOT) 18 0 - 40 U/L    Alkaline Phosphatase 55 38 - 116 U/L    Total Bilirubin 0.3 0.1 - 1.2 mg/dL    eGFR Non African Amer 95 >60 mL/min/1.73    Globulin 2.4 1.8 - 3.5 gm/dL    A/G Ratio 1.9 1.1 - 2.4 g/dL    BUN/Creatinine Ratio 12.1 7.3 - 30.0    Anion Gap 13.2 mmol/L   Protime-INR, Fingerstick   Result Value Ref Range    Protime 44.4 (H) 11.0 - 13.5 Seconds    INR 3.70 (H) 0.90 - 1.10   CBC Auto Differential   Result Value Ref Range    WBC 5.03 4.00 - 10.00 10*3/mm3    RBC 5.33 4.30 - 5.50 10*6/mm3    Hemoglobin 14.7 13.5 - 16.5 g/dL    Hematocrit 45.4 40.0 - 49.0 %    MCV 85.2 83.0 - 97.0 fL    MCH 27.6 27.0 - 33.0 pg    MCHC 32.4 32.0 - 35.0 g/dL    RDW 13.5 11.7 - 14.5 %    RDW-SD 42.0 37.0 - 49.0 fl    MPV 9.7 8.9 - 12.1 fL    Platelets 245 150 - 375 10*3/mm3    Neutrophil % 38.2 (L) 39.0 - 75.0 %    Lymphocyte % 46.9 20.0 - 49.0 %    Monocyte % 11.1 4.0 - 12.0 %    Eosinophil % 3.2 1.0 - 5.0 %    Basophil % 0.4 0.0 - 1.1 %     Immature Grans % 0.2 0.0 - 0.5 %    Neutrophils, Absolute 1.92 1.50 - 7.00 10*3/mm3    Lymphocytes, Absolute 2.36 1.00 - 3.50 10*3/mm3    Monocytes, Absolute 0.56 0.25 - 0.80 10*3/mm3    Eosinophils, Absolute 0.16 0.00 - 0.36 10*3/mm3    Basophils, Absolute 0.02 0.00 - 0.10 10*3/mm3    Immature Grans, Absolute 0.01 0.00 - 0.03 10*3/mm3    nRBC 0.0 0.0 - 0.0 /100 WBC       IMAGING:      ASSESSMENT:  This is a 33 y.o. male with:  1.  Bilateral lower extremity DVT: He is now on warfarin with therapeutic INR.  He will continue dosing per our protocol with a slight increase in his dose today to 7-1/2 mg daily aside from 5 mg on one day per week.   This is related to his brain tumor.  He will continue warfarin for now.  His INR is therapeutic today.  Repeat bilateral lower extremity venous duplex shows chronic DVT.  Continue anticoagulation.  I will decrease his warfarin dose today and follow his INR a little bit more frequently.  His INR is elevated today secondary to the Temodar.  2.  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  NOT 1p19q co-deleted.  Overall, this is a good molecular profile.  I discussed his case with Dr. Galvan, Dr. Daley, and Dr. Dobbins and I reviewed NCCN guidelines.   He will require one year of adjuvant Temodar following radiation.  The big question was whether to administer concurrent therapy with Temodar along with radiation.  Ultimately, we decided not to do this given the overall good molecular profile and the potential adverse effects from concurrent therapy.  He did initiate radiation alone on 7/31/2018 and completed it on 9/14/2018.  I have reviewed the most recent brain MRI images from 10/23/2018.  I communicated with Dr. Daley regarding the result.  There is still some hyperenhancement on the periphery of the resection cavity.  This area was radiated.  In addition, he received Temodar a couple of weeks ago.   This could be contributing to the changes.  We will need to follow this over time.  3.  Given the possibility of infertility with treatment he banked sperm at the [facility].  His wife is actually pregnant at this time.  4.  Elevated liver labs: Liver labs normalized.  Unclear reason.  Medication could have contributed.    PLAN:  1.  He proceeded with Temodar 2 weeks ago.  He tolerated this well aside from some nausea and vomiting with the first dose.  With prophylactic antiemetics he did well with each subsequent dose.  He will proceed with Temodar in 2 weeks.  We will check his INR prior to that.  We will increase the dose to 200 mg per metered squared for cycle #2.    Temodar dosing:  Cycle 1: 150 mg/m2 once daily for 5 days of a 28-day treatment cycle  Cycles 2 to 6: May increase to 200 mg/m2 once daily for 5 days; repeat every 28 days (if ANC ?1,500/mm3, platelets ?100,000/mm3 and nonhematologic toxicities for cycle 1 are ?grade 2 [excludes alopecia, nausea/vomiting]); If dose was not escalated at the onset of cycle 2, do not increase for cycles 3 to 6.    Dose for him at 150 mg/m2 = 300 mg  Dose for him at 200 mg/m2 = 400 mg      2.  He will have serial brain MRIs.  NCCN guidelines suggest every 2-4 months for 3 years and then every 6 months indefinitely.  We will obtain an MRI of the brain prior to cycle #3 of Temodar, which will start in 6 weeks.  He is off dexamethasone.  If the area of hyperintensity is not improving, we will need to look into the possibility of changing therapy and consider clinical trials at that time.   3.  Continue warfarin with dose adjustments per protocol, decreasing the dose today.  Repeat INR in 2, 4, and 6 weeks.  4.  Continue Keppra  5.  Continue Bactrim three days weekly for PCP prophylaxis.      Discussed with Dr. Daley.  The patient will see Dr. Galvan next week.

## 2018-11-05 ENCOUNTER — OFFICE VISIT (OUTPATIENT)
Dept: NEUROSURGERY | Facility: CLINIC | Age: 33
End: 2018-11-05

## 2018-11-05 VITALS
BODY MASS INDEX: 23.66 KG/M2 | HEART RATE: 89 BPM | RESPIRATION RATE: 16 BRPM | SYSTOLIC BLOOD PRESSURE: 122 MMHG | DIASTOLIC BLOOD PRESSURE: 84 MMHG | WEIGHT: 169 LBS | HEIGHT: 71 IN

## 2018-11-05 DIAGNOSIS — C71.1 ANAPLASTIC ASTROCYTOMA OF FRONTAL LOBE (HCC): ICD-10-CM

## 2018-11-05 DIAGNOSIS — D68.51 FACTOR 5 LEIDEN MUTATION, HETEROZYGOUS (HCC): Primary | ICD-10-CM

## 2018-11-05 PROCEDURE — 99213 OFFICE O/P EST LOW 20 MIN: CPT | Performed by: NEUROLOGICAL SURGERY

## 2018-11-05 RX ORDER — DEXAMETHASONE 2 MG/1
2 TABLET ORAL 2 TIMES DAILY WITH MEALS
Qty: 90 TABLET | Refills: 1 | Status: SHIPPED | OUTPATIENT
Start: 2018-11-05 | End: 2019-01-28 | Stop reason: HOSPADM

## 2018-11-05 NOTE — PROGRESS NOTES
Subjective   Patient ID: Bryan Valadez is a 33 y.o. male is here today for follow-up astrocytoma. Patient presents accompanied by his wife.     History of Present Illness 32 yo male with grade 3 astrocytoma managed with resection/XRT/temodar.  He presents for interval follow.      The following portions of the patient's history were reviewed and updated as appropriate: allergies, current medications, past family history, past medical history, past social history, past surgical history and problem list.    Review of Systems   HENT: Negative for hearing loss and tinnitus.    Eyes: Negative for visual disturbance.   Musculoskeletal: Negative for gait problem.   Neurological: Negative for dizziness, tremors, seizures, syncope, speech difficulty, weakness, light-headedness, numbness and headaches.   Psychiatric/Behavioral: Negative for confusion and decreased concentration.       Objective   Physical Exam  Neurologic Exam     5/5 str  sens intact  Wound is c/d/i  Vision is much improved  Thinking is much improved  C/d/i    Assessment/Plan   Independent Review of Radiographic Studies:  Area of contrast enhancement superolateral to his resection cavity.  Continued flair abnormality.     Medical Decision Making:  MRI findings reviewed with him and his wife.  ? XRT/chemo related change vs devolution to higher grade glioma.  Scheduled for another MRI in 6 weeks (3 Dec).  Will start some steroids today and gi prophylaxis.  I think if this is worse or not resolved this should be resected.  If stable to improved with steroids watchful waiting is best.  His Factor 5 leiden obviously complicates this.  I will see him after his next imaging.     Bryan was seen today for brain tumor.    Diagnoses and all orders for this visit:    Factor 5 Leiden mutation, heterozygous (CMS/HCC)    Anaplastic astrocytoma of frontal lobe (CMS/HCC)    Other orders  -     dexamethasone (DECADRON) 2 MG tablet; Take 1 tablet by mouth 2 (Two) Times a Day  With Meals.      No Follow-up on file.

## 2018-11-09 ENCOUNTER — CLINICAL SUPPORT (OUTPATIENT)
Dept: ONCOLOGY | Facility: HOSPITAL | Age: 33
End: 2018-11-09

## 2018-11-09 ENCOUNTER — APPOINTMENT (OUTPATIENT)
Dept: LAB | Facility: HOSPITAL | Age: 33
End: 2018-11-09

## 2018-11-09 ENCOUNTER — LAB (OUTPATIENT)
Dept: LAB | Facility: HOSPITAL | Age: 33
End: 2018-11-09

## 2018-11-09 ENCOUNTER — APPOINTMENT (OUTPATIENT)
Dept: ONCOLOGY | Facility: HOSPITAL | Age: 33
End: 2018-11-09

## 2018-11-09 DIAGNOSIS — C71.1 ANAPLASTIC ASTROCYTOMA OF FRONTAL LOBE (HCC): ICD-10-CM

## 2018-11-09 DIAGNOSIS — I82.532 CHRONIC DEEP VEIN THROMBOSIS (DVT) OF LEFT POPLITEAL VEIN (HCC): ICD-10-CM

## 2018-11-09 DIAGNOSIS — I82.432 ACUTE DEEP VEIN THROMBOSIS (DVT) OF POPLITEAL VEIN OF LEFT LOWER EXTREMITY (HCC): Primary | ICD-10-CM

## 2018-11-09 LAB
BASOPHILS # BLD AUTO: 0.02 10*3/MM3 (ref 0–0.1)
BASOPHILS NFR BLD AUTO: 0.3 % (ref 0–1.1)
DEPRECATED RDW RBC AUTO: 41.4 FL (ref 37–49)
EOSINOPHIL # BLD AUTO: 0.01 10*3/MM3 (ref 0–0.36)
EOSINOPHIL NFR BLD AUTO: 0.2 % (ref 1–5)
ERYTHROCYTE [DISTWIDTH] IN BLOOD BY AUTOMATED COUNT: 13.5 % (ref 11.7–14.5)
HCT VFR BLD AUTO: 44.6 % (ref 40–49)
HGB BLD-MCNC: 14.8 G/DL (ref 13.5–16.5)
IMM GRANULOCYTES # BLD: 0.02 10*3/MM3 (ref 0–0.03)
IMM GRANULOCYTES NFR BLD: 0.3 % (ref 0–0.5)
INR PPP: 2.8 (ref 0.9–1.1)
LYMPHOCYTES # BLD AUTO: 1.63 10*3/MM3 (ref 1–3.5)
LYMPHOCYTES NFR BLD AUTO: 27.2 % (ref 20–49)
MCH RBC QN AUTO: 28.2 PG (ref 27–33)
MCHC RBC AUTO-ENTMCNC: 33.2 G/DL (ref 32–35)
MCV RBC AUTO: 85.1 FL (ref 83–97)
MONOCYTES # BLD AUTO: 0.45 10*3/MM3 (ref 0.25–0.8)
MONOCYTES NFR BLD AUTO: 7.5 % (ref 4–12)
NEUTROPHILS # BLD AUTO: 3.86 10*3/MM3 (ref 1.5–7)
NEUTROPHILS NFR BLD AUTO: 64.5 % (ref 39–75)
NRBC BLD MANUAL-RTO: 0 /100 WBC (ref 0–0)
PLATELET # BLD AUTO: 309 10*3/MM3 (ref 150–375)
PMV BLD AUTO: 9.5 FL (ref 8.9–12.1)
PROTHROMBIN TIME: 29.1 SECONDS (ref 11.7–14.2)
RBC # BLD AUTO: 5.24 10*6/MM3 (ref 4.3–5.5)
WBC NRBC COR # BLD: 5.99 10*3/MM3 (ref 4–10)

## 2018-11-09 PROCEDURE — 85025 COMPLETE CBC W/AUTO DIFF WBC: CPT | Performed by: INTERNAL MEDICINE

## 2018-11-09 PROCEDURE — 85610 PROTHROMBIN TIME: CPT | Performed by: INTERNAL MEDICINE

## 2018-11-09 PROCEDURE — 36415 COLL VENOUS BLD VENIPUNCTURE: CPT | Performed by: INTERNAL MEDICINE

## 2018-11-09 NOTE — PROGRESS NOTES
Pt presents for RN review. He voiced no Complaints. CBC reviewed and is satisfactory for this pt at this time. Copy of labs given and pt V/U.   Lab Results   Component Value Date    WBC 5.99 11/09/2018    HGB 14.8 11/09/2018    HCT 44.6 11/09/2018    MCV 85.1 11/09/2018     11/09/2018     [facility] venipuncture INR 2.8. Per SS pt is to take 7.5 mg of Coumadin on Sun, Tue,Thur and 5 mg all other. Message left on pt VM with his permission. Dr. Galvan started pt on 4 mg of decadron daily. He will have a recheck ion 11/19. There is a moderate risk for bleeding while taking Coumadin and decadron together per [employer].

## 2018-11-19 ENCOUNTER — LAB (OUTPATIENT)
Dept: LAB | Facility: HOSPITAL | Age: 33
End: 2018-11-19

## 2018-11-19 ENCOUNTER — CLINICAL SUPPORT (OUTPATIENT)
Dept: ONCOLOGY | Facility: HOSPITAL | Age: 33
End: 2018-11-19

## 2018-11-19 DIAGNOSIS — I82.532 CHRONIC DEEP VEIN THROMBOSIS (DVT) OF LEFT POPLITEAL VEIN (HCC): ICD-10-CM

## 2018-11-19 DIAGNOSIS — C71.1 ANAPLASTIC ASTROCYTOMA OF FRONTAL LOBE (HCC): ICD-10-CM

## 2018-11-19 LAB
BASOPHILS # BLD AUTO: 0.03 10*3/MM3 (ref 0–0.1)
BASOPHILS NFR BLD AUTO: 0.4 % (ref 0–1.1)
DEPRECATED RDW RBC AUTO: 42.5 FL (ref 37–49)
EOSINOPHIL # BLD AUTO: 0.06 10*3/MM3 (ref 0–0.36)
EOSINOPHIL NFR BLD AUTO: 0.9 % (ref 1–5)
ERYTHROCYTE [DISTWIDTH] IN BLOOD BY AUTOMATED COUNT: 14.2 % (ref 11.7–14.5)
HCT VFR BLD AUTO: 46.1 % (ref 40–49)
HGB BLD-MCNC: 15.9 G/DL (ref 13.5–16.5)
IMM GRANULOCYTES # BLD: 0.1 10*3/MM3 (ref 0–0.03)
IMM GRANULOCYTES NFR BLD: 1.4 % (ref 0–0.5)
INR PPP: 2.8 (ref 0.9–1.1)
LYMPHOCYTES # BLD AUTO: 0.84 10*3/MM3 (ref 1–3.5)
LYMPHOCYTES NFR BLD AUTO: 12 % (ref 20–49)
MCH RBC QN AUTO: 29 PG (ref 27–33)
MCHC RBC AUTO-ENTMCNC: 34.5 G/DL (ref 32–35)
MCV RBC AUTO: 84 FL (ref 83–97)
MONOCYTES # BLD AUTO: 0.94 10*3/MM3 (ref 0.25–0.8)
MONOCYTES NFR BLD AUTO: 13.4 % (ref 4–12)
NEUTROPHILS # BLD AUTO: 5.03 10*3/MM3 (ref 1.5–7)
NEUTROPHILS NFR BLD AUTO: 71.9 % (ref 39–75)
NRBC BLD MANUAL-RTO: 0.3 /100 WBC (ref 0–0)
PLATELET # BLD AUTO: 236 10*3/MM3 (ref 150–375)
PMV BLD AUTO: 9.5 FL (ref 8.9–12.1)
PROTHROMBIN TIME: 33.5 SECONDS (ref 11–13.5)
RBC # BLD AUTO: 5.49 10*6/MM3 (ref 4.3–5.5)
WBC NRBC COR # BLD: 7 10*3/MM3 (ref 4–10)

## 2018-11-19 PROCEDURE — 85025 COMPLETE CBC W/AUTO DIFF WBC: CPT | Performed by: INTERNAL MEDICINE

## 2018-11-19 PROCEDURE — 36415 COLL VENOUS BLD VENIPUNCTURE: CPT | Performed by: INTERNAL MEDICINE

## 2018-11-19 PROCEDURE — 85610 PROTHROMBIN TIME: CPT | Performed by: INTERNAL MEDICINE

## 2018-11-19 RX ORDER — LEVETIRACETAM 1000 MG/1
TABLET ORAL
Qty: 60 TABLET | Refills: 0 | Status: SHIPPED | OUTPATIENT
Start: 2018-11-19 | End: 2018-12-17 | Stop reason: SDUPTHER

## 2018-11-19 NOTE — PROGRESS NOTES
Pt presents for RN review. He voiced no C/O. He continues to take Temodar 400 mg for 5 days followed by 28 days off. INR 2.8. Per SS pt is to take 7.5 mg of Coumadin on Sun.Thur and 5 mg all other days.  CBC reviewed and is satisfactory for this pt att his time. Copy of labs given and pt V/U.   Lab Results   Component Value Date    WBC 7.00 11/19/2018    HGB 15.9 11/19/2018    HCT 46.1 11/19/2018    MCV 84.0 11/19/2018     11/19/2018

## 2018-12-03 ENCOUNTER — HOSPITAL ENCOUNTER (OUTPATIENT)
Dept: MRI IMAGING | Facility: HOSPITAL | Age: 33
Discharge: HOME | End: 2018-12-03
Attending: INTERNAL MEDICINE | Admitting: INTERNAL MEDICINE

## 2018-12-03 DIAGNOSIS — C71.1 ANAPLASTIC ASTROCYTOMA OF FRONTAL LOBE (HCC): ICD-10-CM

## 2018-12-03 PROCEDURE — 0 GADOBENATE DIMEGLUMINE 529 MG/ML SOLUTION: Performed by: INTERNAL MEDICINE

## 2018-12-03 PROCEDURE — 70553 MRI BRAIN STEM W/O & W/DYE: CPT

## 2018-12-03 PROCEDURE — A9577 INJ MULTIHANCE: HCPCS | Performed by: INTERNAL MEDICINE

## 2018-12-03 RX ORDER — WARFARIN SODIUM 5 MG/1
TABLET ORAL
Qty: 60 TABLET | Refills: 0 | Status: SHIPPED | OUTPATIENT
Start: 2018-12-03 | End: 2019-01-07

## 2018-12-03 RX ADMIN — GADOBENATE DIMEGLUMINE 15 ML: 529 INJECTION, SOLUTION INTRAVENOUS at 08:32

## 2018-12-07 ENCOUNTER — OFFICE VISIT (OUTPATIENT)
Dept: RADIATION ONCOLOGY | Facility: HOSPITAL | Age: 33
End: 2018-12-07

## 2018-12-07 VITALS
HEIGHT: 71 IN | OXYGEN SATURATION: 99 % | DIASTOLIC BLOOD PRESSURE: 85 MMHG | BODY MASS INDEX: 24.78 KG/M2 | WEIGHT: 177 LBS | HEART RATE: 85 BPM | SYSTOLIC BLOOD PRESSURE: 135 MMHG

## 2018-12-07 DIAGNOSIS — C71.1 ANAPLASTIC ASTROCYTOMA OF FRONTAL LOBE (HCC): Primary | ICD-10-CM

## 2018-12-07 PROCEDURE — 99214 OFFICE O/P EST MOD 30 MIN: CPT | Performed by: RADIOLOGY

## 2018-12-07 NOTE — PROGRESS NOTES
Subjective          Diagnosis Plan   1. Anaplastic astrocytoma of frontal lobe (CMS/HCC)             HPI      Mr. Jefferson is seen back in follow-up today now 3 months after completing postoperative radiation therapy, and  6 weeks post first follow-up MRI of the brain dated 10/23 with a new MR of the brain dated 12/3.  Gregory scan unfortunately shows no change as compared to his his most recent MRI of 10/23 approximately 6 weeks ago.  It continues to demonstrate a stable 5x7 resection cavity in the superior right frontal lobe as well as abnormal FLAIR circumscribing the margins the resection cavity compatible with residual tumor. The abnormal enhancement is most pronounced along the posterior and posterior inferior lateral margin of the resection cavity -  the single largest focus measuring up to 4.2 x 2.6 x 2.7 cm.  I went over the details of this new scan with the patient and his wife and they had many good questions which I believe were answered to their satisfaction.  Clinically Mr. Valadez looks completely stable and reports nothing new on review of systems.  He has follow-up appointments  scheduled early next week with both Dr. Vaishnavi Jefferson and .                   Objective     Family History   Problem Relation Age of Onset   • Diabetes Mother          Past Medical History:   Diagnosis Date   • Allergic rhinitis    • Asthma     Pulmonary Dr. Alexandra   • Chest pain    • DVT (deep venous thrombosis) (CMS/HCC)    • Factor V Leiden (CMS/HCC)    • GERD (gastroesophageal reflux disease)    • H/O echocardiogram 2006   • Headache    • History of ETT    • History of Holter monitoring    • Hyperlipidemia    • PE (pulmonary thromboembolism) (CMS/HCC)    • Primary brain tumor (CMS/HCC) 2018         No past surgical history on file.      Review of Systems - Oncology Denies any thing new on review of systems since completing radiation therapy.  Denies headache, nausea or vomiting visual her speech changes, confusion, word finding  difficulty, motor or sensory issues.    Physical Exam  Spontaneous, alert, in no apparent distress.  Affect and thought content appropriate.  No signs of confusion.  Speech is intact.  Station and gait is within normal limits.    Assessment/Plan   Anaplastic astrocytoma post resection, XRT and Temodar, with apparent post treatment local progression, unchanged with steroids and Temodar over 6 weeks.  He has follow-up appointments with Dr. Jefferson on Monday 12/10 and Dr. Galvan on Wednesday 12/12.      Robin Daley MD      Cc:  No ref. provider found

## 2018-12-10 ENCOUNTER — OFFICE VISIT (OUTPATIENT)
Dept: ONCOLOGY | Facility: CLINIC | Age: 33
End: 2018-12-10

## 2018-12-10 ENCOUNTER — LAB (OUTPATIENT)
Dept: LAB | Facility: HOSPITAL | Age: 33
End: 2018-12-10

## 2018-12-10 VITALS
SYSTOLIC BLOOD PRESSURE: 141 MMHG | HEART RATE: 77 BPM | TEMPERATURE: 97.9 F | BODY MASS INDEX: 24.6 KG/M2 | OXYGEN SATURATION: 98 % | RESPIRATION RATE: 16 BRPM | WEIGHT: 175.7 LBS | HEIGHT: 71 IN | DIASTOLIC BLOOD PRESSURE: 86 MMHG

## 2018-12-10 DIAGNOSIS — C71.1 ANAPLASTIC ASTROCYTOMA OF FRONTAL LOBE (HCC): ICD-10-CM

## 2018-12-10 DIAGNOSIS — I82.532 CHRONIC DEEP VEIN THROMBOSIS (DVT) OF LEFT POPLITEAL VEIN (HCC): ICD-10-CM

## 2018-12-10 DIAGNOSIS — C71.1 ANAPLASTIC ASTROCYTOMA OF FRONTAL LOBE (HCC): Primary | ICD-10-CM

## 2018-12-10 DIAGNOSIS — Z86.711 HISTORY OF PULMONARY EMBOLUS (PE): ICD-10-CM

## 2018-12-10 LAB
ALBUMIN SERPL-MCNC: 4.4 G/DL (ref 3.5–5.2)
ALBUMIN/GLOB SERPL: 1.8 G/DL (ref 1.1–2.4)
ALP SERPL-CCNC: 48 U/L (ref 38–116)
ALT SERPL W P-5'-P-CCNC: 27 U/L (ref 0–41)
ANION GAP SERPL CALCULATED.3IONS-SCNC: 14.5 MMOL/L
AST SERPL-CCNC: 17 U/L (ref 0–40)
BASOPHILS # BLD AUTO: 0.02 10*3/MM3 (ref 0–0.1)
BASOPHILS NFR BLD AUTO: 0.3 % (ref 0–1.1)
BILIRUB SERPL-MCNC: 0.2 MG/DL (ref 0.1–1.2)
BUN BLD-MCNC: 11 MG/DL (ref 6–20)
BUN/CREAT SERPL: 14.7 (ref 7.3–30)
CALCIUM SPEC-SCNC: 9.1 MG/DL (ref 8.5–10.2)
CHLORIDE SERPL-SCNC: 103 MMOL/L (ref 98–107)
CO2 SERPL-SCNC: 23.5 MMOL/L (ref 22–29)
CREAT BLD-MCNC: 0.75 MG/DL (ref 0.7–1.3)
DEPRECATED RDW RBC AUTO: 49.3 FL (ref 37–49)
EOSINOPHIL # BLD AUTO: 0.04 10*3/MM3 (ref 0–0.36)
EOSINOPHIL NFR BLD AUTO: 0.7 % (ref 1–5)
ERYTHROCYTE [DISTWIDTH] IN BLOOD BY AUTOMATED COUNT: 14.9 % (ref 11.7–14.5)
GFR SERPL CREATININE-BSD FRML MDRD: 119 ML/MIN/1.73
GLOBULIN UR ELPH-MCNC: 2.5 GM/DL (ref 1.8–3.5)
GLUCOSE BLD-MCNC: 94 MG/DL (ref 74–124)
HCT VFR BLD AUTO: 45.8 % (ref 40–49)
HGB BLD-MCNC: 14.9 G/DL (ref 13.5–16.5)
IMM GRANULOCYTES # BLD: 0.04 10*3/MM3 (ref 0–0.03)
IMM GRANULOCYTES NFR BLD: 0.7 % (ref 0–0.5)
INR PPP: 2.6 (ref 0.9–1.1)
LYMPHOCYTES # BLD AUTO: 1.61 10*3/MM3 (ref 1–3.5)
LYMPHOCYTES NFR BLD AUTO: 27.9 % (ref 20–49)
MCH RBC QN AUTO: 30 PG (ref 27–33)
MCHC RBC AUTO-ENTMCNC: 32.5 G/DL (ref 32–35)
MCV RBC AUTO: 92.2 FL (ref 83–97)
MONOCYTES # BLD AUTO: 0.58 10*3/MM3 (ref 0.25–0.8)
MONOCYTES NFR BLD AUTO: 10.1 % (ref 4–12)
NEUTROPHILS # BLD AUTO: 3.48 10*3/MM3 (ref 1.5–7)
NEUTROPHILS NFR BLD AUTO: 60.3 % (ref 39–75)
NRBC BLD MANUAL-RTO: 0 /100 WBC (ref 0–0)
PLATELET # BLD AUTO: 191 10*3/MM3 (ref 150–375)
PMV BLD AUTO: 9.1 FL (ref 8.9–12.1)
POTASSIUM BLD-SCNC: 4.3 MMOL/L (ref 3.5–4.7)
PROT SERPL-MCNC: 6.9 G/DL (ref 6.3–8)
PROTHROMBIN TIME: 31.1 SECONDS (ref 11–13.5)
RBC # BLD AUTO: 4.97 10*6/MM3 (ref 4.3–5.5)
SODIUM BLD-SCNC: 141 MMOL/L (ref 134–145)
WBC NRBC COR # BLD: 5.77 10*3/MM3 (ref 4–10)

## 2018-12-10 PROCEDURE — 85025 COMPLETE CBC W/AUTO DIFF WBC: CPT | Performed by: INTERNAL MEDICINE

## 2018-12-10 PROCEDURE — 85610 PROTHROMBIN TIME: CPT | Performed by: INTERNAL MEDICINE

## 2018-12-10 PROCEDURE — 99215 OFFICE O/P EST HI 40 MIN: CPT | Performed by: INTERNAL MEDICINE

## 2018-12-10 PROCEDURE — 36415 COLL VENOUS BLD VENIPUNCTURE: CPT | Performed by: INTERNAL MEDICINE

## 2018-12-10 PROCEDURE — 80053 COMPREHEN METABOLIC PANEL: CPT | Performed by: INTERNAL MEDICINE

## 2018-12-10 NOTE — PROGRESS NOTES
[facility] OUTPATIENT FOLLOW UP CLINIC VISIT    REASON FOR FOLLOW-UP:    1.  Left lower extremity DVT with progression of symptoms and extension of the left lower extremity DVT into the femoral vein from the popliteal/calf vein swallowing for next set.  Currently anticoagulated with warfarin.  2.  Right lower extremity DVT noted in the common femoral, profunda femoral, and calf veins  3.  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  Other thrombophilia labs negative.    4.  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  NOT 1p19q co-deleted.    5.  Radiation initiated on 7/31/2018.  Plan for Temodar ×1 year following radiation.  6.  4500 cGy in 25 fractions administered from 7/31/2018 through 9/14/2018  7.  MRI brain 10/23/2018 with resolving hemorrhage in the resection cavity.  However, there is hyperintensity measuring 4.6 x 4.3 x 3 cm along the margins of the resection cavity.  8.  Repeat venous duplex on 10/23 with chronic right CVT and chronic LLE DVT from the mid femoral vein distally.     9.  MRI brain 12/3/2018 with stable findings.      HISTORY OF PRESENT ILLNESS:  Bryan Valadez is a 33 y.o. male who returns today for follow up of the above issues.     He continues to Temodar which he tolerates well with antiemetics.  He denies any new neurologic symptoms.  He is having some muscle cramping mostly in his left leg.  He denies any bleeding on the warfarin and his INR is therapeutic today.    ONCOLOGIC HISTORY:  He had about 6 months of headaches treated as sinusitis and presented on 6/15 with visual changes and headache and was found to have a large right frontal mass which was resected on 6/16 by Dr. Galvan and consistent with a glioma.  Pathology was reviewed at [facility] showing infiltrating glioma.  Large 10 cm partially cystic and solid lobulated tumor mass at diagnosis.  Negative IDH1-R132H immunostain  excludes the most common IDH1 mutation; however loss of ATRX expression suggests possibility of glioma harboring another less common IDH1 mutation.  p53 overexpressed.  Ki67 10% but variable.  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  There was no evidence for a 1p19q co-deletion.       Chromosome microarray showed a complex molecular karyotype including loss of 1q, loss of 2q, gain of 7q, gain of 8q, loss of 9p, REBECCA of 17p, and gain of 18p.  These abnormalities are typically associated with  IDH-mutant astrocytic gliomas.  No 1p and 19q whole arm co-deletion was noted.  CT head on 7/1 c/w residual tumor circumscribing the resection cavity and a remote cerebellar hemisphere hemorrhage.       He was discharged and subsequently admitted with left leg pain and chest pain, with LLE popliteal and calf vein clot noted and bilateral small PE. He was treated with heparin and discharged on Pradaxa.     He developed worsening leg pain up into the thigh and presented to the ER where a venous duplex showed progression of the clot to the femoral vein.  He was given Lovenox and admitted.  Warfarin was initiated.       A partial thrombophilia evaluation showed that he is a heterozygote for the factor V Leiden mutation.  Labs otherwise unremarkable.    He was subsequently found to have a right lower extremity DVT on 7/8/2018.    He remains anticoagulated with warfarin.    Radiation initiated on 7/31/2018.  Plan for Temodar ×1 year after radiation is complete.    Radiation complete as of 9/14/2018.    4500 cGy in 25 fractions administered from 7/31/2018 through 9/14/2018    MRI brain 10/23/2018 with resolving hemorrhage in the resection cavity.  However, there is hyperintensity measuring 4.6 x 4.3 x 3 cm along the margins of the resection cavity.    Repeat venous duplex on 10/23 with chronic right CVT and chronic LLE DVT from the mid femoral vein distally.      Repeat brain MRI on 12/3/2018 with stable  "findings.      ALLERGIES:  Allergies   Allergen Reactions   • Avocado Itching   • Other Itching     Insect stings: Bee stings, throat swelling (has Epi pen)    Allergy to fruit, Avocado, Cherry Tomato (can have blue berries)   • Tomato Itching     Cherry tomato       MEDICATIONS:  The medication list has been reviewed with the patient by the medical assistant, and the list has been updated in the electronic medical record, which I reviewed.  Medication dosages and frequencies were confirmed to be accurate.    REVIEW OF SYSTEMS:  PAIN:  See Vital Signs below.  GENERAL:  No fevers, chills, night sweats, or unintended weight loss.  SKIN:  No rash or nonhealing lesions  HEME/LYMPH:  No abnormal bleeding.  No palpable lymphadenopathy.  EYES:  No vision changes or diplopia.  ENT:  No sore throat or difficulty swallowing.  RESPIRATORY:  No cough, shortness of breath, hemoptysis, or wheezing.  CARDIOVASCULAR:  No chest pain, palpitations, orthopnea, or dyspnea on exertion.  GASTROINTESTINAL:  No abdominal pain, nausea, vomiting, constipation, diarrhea, melena, or hematochezia.  GENITOURINARY:  No dysuria or hematuria.  MUSCULOSKELETAL:  No joint pain, swelling, or erythema.  Muscle cramping  NEUROLOGIC:  No dizziness, loss of consciousness, or seizures.  PSYCHIATRIC:  No depression, anxiety, or mood changes.    Vitals:    12/10/18 0951   BP: 141/86   Pulse: 77   Resp: 16   Temp: 97.9 °F (36.6 °C)   TempSrc: Oral   SpO2: 98%   Weight: 79.7 kg (175 lb 11.2 oz)   Height: 179.1 cm (70.51\")   PainSc: 0-No pain       PHYSICAL EXAMINATION:  GENERAL:  Well-developed well-nourished male; awake, alert and oriented, in no acute distress.  SKIN:  Alopecia present on the scalp  HEAD:  Normocephalic, Well-healed surgical incision present.    EYES:  Pupils equal, round and reactive to light.  Extraocular movements intact.  Conjunctivae normal.  EARS:  Hearing intact.  NOSE:  Septum midline.  No excoriations or nasal discharge.  MOUTH:  No " stomatitis or ulcers.  Lips are normal.  THROAT:  Oropharynx without lesions or exudates.  NECK:  Supple with good range of motion; no thyromegaly or masses; no JVD or bruits.  LYMPHATICS:  No cervical, supraclavicular, axillary, or inguinal lymphadenopathy.  CHEST:  Lungs are clear to auscultation bilaterally.  No wheezes, rales, or rhonchi.  HEART:  Regular rate; normal rhythm.  No murmurs, gallops or rubs.  ABDOMEN:  Not examined today  EXTREMITIES:  No clubbing, cyanosis, or edema.  No cords.  NEUROLOGICAL:  No focal neurologic deficits.    DIAGNOSTIC DATA:  Results for orders placed or performed in visit on 12/10/18   Protime-INR, Fingerstick   Result Value Ref Range    Protime 31.1 (H) 11.0 - 13.5 Seconds    INR 2.60 (H) 0.90 - 1.10   CBC Auto Differential   Result Value Ref Range    WBC 5.77 4.00 - 10.00 10*3/mm3    RBC 4.97 4.30 - 5.50 10*6/mm3    Hemoglobin 14.9 13.5 - 16.5 g/dL    Hematocrit 45.8 40.0 - 49.0 %    MCV 92.2 83.0 - 97.0 fL    MCH 30.0 27.0 - 33.0 pg    MCHC 32.5 32.0 - 35.0 g/dL    RDW 14.9 (H) 11.7 - 14.5 %    RDW-SD 49.3 (H) 37.0 - 49.0 fl    MPV 9.1 8.9 - 12.1 fL    Platelets 191 150 - 375 10*3/mm3    Neutrophil % 60.3 39.0 - 75.0 %    Lymphocyte % 27.9 20.0 - 49.0 %    Monocyte % 10.1 4.0 - 12.0 %    Eosinophil % 0.7 (L) 1.0 - 5.0 %    Basophil % 0.3 0.0 - 1.1 %    Immature Grans % 0.7 (H) 0.0 - 0.5 %    Neutrophils, Absolute 3.48 1.50 - 7.00 10*3/mm3    Lymphocytes, Absolute 1.61 1.00 - 3.50 10*3/mm3    Monocytes, Absolute 0.58 0.25 - 0.80 10*3/mm3    Eosinophils, Absolute 0.04 0.00 - 0.36 10*3/mm3    Basophils, Absolute 0.02 0.00 - 0.10 10*3/mm3    Immature Grans, Absolute 0.04 (H) 0.00 - 0.03 10*3/mm3    nRBC 0.0 0.0 - 0.0 /100 WBC       IMAGING:  MRI brain images from 12/3/2018 personally reviewed.  No change from prior.    IMPRESSION:  1. No significant interval change when compared to most recent  postoperative MRI of brain, 40 days ago, on 10/23/2018.  2. In June 2018, this patient  had a 9 cm superior right frontoparietal  craniotomy for debulking of the large 10 x 7 x 6 cm mixed cystic and  solid partially enhancing anaplastic grade 3 astrocytoma from the right  frontal lobe. There is a stable size 5 x 3.4 x 2.8 cm resection cavity  in the superior right frontal lobe, and there is abnormal FLAIR and T2  hyperintensity diffusion hyperintensity circumscribing the margins of  the resection cavity, most pronounced along the posterior and inferior  lateral margin of the resection cavity, compatible with residual tumor,  tracks up to 8.4 x 4.7 cm in anterior posterior, medial lateral  dimension, and there is abnormal enhancement most pronounced along the  posterior and posterior inferior lateral margin of the resection cavity.  The single largest focus of enhancement measures up to 4.2 x 2.6 x 2.7  cm along the posterior inferior lateral margin of the resection cavity  extending posterolateral right frontal parenchyma, consistent with  enhancing high-grade tumor unchanged since 10/23/2018. Continued  follow-up suggested. The remainder of the MRI of the head is normal.     ASSESSMENT:  This is a 33 y.o. male with:  1.  Bilateral lower extremity DVT: He is now on warfarin with a therapeutic INR.  His INR is therapeutic today at 2.6.  Dr. Galvan has suggested the possibility of an IVC filter prior to any future surgery and I agree with this.  We will plan to bridge him with Lovenox as well.  We can arrange this once we know when surgery will be.  He is having some muscle cramping and we will go ahead and repeat a bilateral lower extremity venous duplex in the near future.  2.  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  NOT 1p19q co-deleted.  Overall, this is a good molecular profile.  I discussed his case with Dr. Galvan, Dr. Daley, and Dr. Dobbins and I reviewed N[facility] guidelines.   He will require one year of adjuvant Temodar  following radiation.  The big question was whether to administer concurrent therapy with Temodar along with radiation.  Ultimately, we decided not to do this given the overall good molecular profile and the potential adverse effects from concurrent therapy.  He did initiate radiation alone on 7/31/2018 and completed it on 9/14/2018.  I have reviewed the most recent brain MRI images from 10/23/2018.  I communicated with Dr. Daley regarding the result.  There is still some hyperenhancement on the periphery of the resection cavity.  This is stable on imaging from 12/3/2018.  The significance of this is unclear as it may represent inflammatory tissue or persistent/recurrent tumor.  He will see Dr. Galvan with neurosurgery on Wednesday and it sounds like resection is planned.  He prefers to wait until after the first of the year for this if possible.  3.  Given the possibility of infertility with treatment he banked sperm at the [facility].  His wife is actually pregnant at this time.  4.  Elevated liver labs: Liver labs normalized.  Unclear reason.  Medication could have contributed.    PLAN:  1.  Proceed with Temodar this week for maintenance therapy.  I have communicated with Dr. Galvan regarding this.    Temodar dosing:  Cycle 1: 150 mg/m2 once daily for 5 days of a 28-day treatment cycle  Cycles 2 to 6: May increase to 200 mg/m2 once daily for 5 days; repeat every 28 days (if ANC ?1,500/mm3, platelets ?100,000/mm3 and nonhematologic toxicities for cycle 1 are ?grade 2 [excludes alopecia, nausea/vomiting]); If dose was not escalated at the onset of cycle 2, do not increase for cycles 3 to 6.    Dose for him at 150 mg/m2 = 300 mg  Dose for him at 200 mg/m2 = 400 mg    2.  Continue warfarin at the same dose  3.  Bilateral lower extremity venous duplex  4.  He will see DrHumberto: Wednesday to discuss the possibility of surgery.  Apparently Dr. Daley has stated that further radiation could be possible to  following surgery.  5.  Once we see the pathology results we will determine whether or not to proceed with further Temodar or perhaps to switch to another regimen such as PCV.  Referral for a second opinion and consideration clinical trials might be reasonable as well.  6.  Continue Keppra  7.  Continue Bactrim three days weekly for PCP prophylaxis.   8.  For perioperative anticoagulation management, once we know when surgery will be he will discontinue the warfarin per our bridging protocol and start Lovenox.  9.  For now, I have arranged a follow-up appointment for him with a CBC, CMP, and PT/INR around the middle or end of January but this can certainly be changed depending on when surgery might be.    Discussed with Dr. Galvan who will see him on Wednesday.

## 2018-12-12 ENCOUNTER — TELEPHONE (OUTPATIENT)
Dept: ONCOLOGY | Facility: HOSPITAL | Age: 33
End: 2018-12-12

## 2018-12-12 ENCOUNTER — TELEPHONE (OUTPATIENT)
Dept: NEUROSURGERY | Facility: CLINIC | Age: 33
End: 2018-12-12

## 2018-12-12 ENCOUNTER — OFFICE VISIT (OUTPATIENT)
Dept: NEUROSURGERY | Facility: CLINIC | Age: 33
End: 2018-12-12

## 2018-12-12 VITALS
HEIGHT: 71 IN | WEIGHT: 172 LBS | RESPIRATION RATE: 16 BRPM | HEART RATE: 86 BPM | SYSTOLIC BLOOD PRESSURE: 128 MMHG | DIASTOLIC BLOOD PRESSURE: 86 MMHG | BODY MASS INDEX: 24.08 KG/M2

## 2018-12-12 DIAGNOSIS — C71.9 ASTROCYTOMA BRAIN TUMOR (HCC): Primary | ICD-10-CM

## 2018-12-12 DIAGNOSIS — D68.51 FACTOR 5 LEIDEN MUTATION, HETEROZYGOUS (HCC): ICD-10-CM

## 2018-12-12 DIAGNOSIS — C71.1 ANAPLASTIC ASTROCYTOMA OF FRONTAL LOBE (HCC): ICD-10-CM

## 2018-12-12 PROCEDURE — 99214 OFFICE O/P EST MOD 30 MIN: CPT | Performed by: NEUROLOGICAL SURGERY

## 2018-12-12 RX ORDER — CEFAZOLIN SODIUM 2 G/100ML
2 INJECTION, SOLUTION INTRAVENOUS ONCE
Status: CANCELLED | OUTPATIENT
Start: 2019-01-15 | End: 2018-12-12

## 2018-12-12 RX ORDER — DEXAMETHASONE SODIUM PHOSPHATE 4 MG/ML
4 INJECTION, SOLUTION INTRA-ARTICULAR; INTRALESIONAL; INTRAMUSCULAR; INTRAVENOUS; SOFT TISSUE
Status: CANCELLED | OUTPATIENT
Start: 2019-01-15

## 2018-12-12 NOTE — TELEPHONE ENCOUNTER
Notified Dr. Jefferson's office of upcoming surgery date of January 15. Dr. Jefferson to manage coumadin prior to surgery.

## 2018-12-12 NOTE — TELEPHONE ENCOUNTER
----- Message from Tricia Randhawa sent at 12/12/2018  2:55 PM EST -----  Contact: [phone]  Jv with Dr. Galvan's office is calling because pt is having surgery on 01-15-19 and he needs to be bridged for his coumadin.  Please call the pt with instructions    Called Jv back to see what type of surgery pt will be having. LVM asking her to return call. Per Dr. Jefferson's last dictation, we will bridge him with Lovenox for this procedure. Will call pt tomorrow to see if he has been bridged before and to give instructions.

## 2018-12-12 NOTE — TELEPHONE ENCOUNTER
Patient last seen [place] [facility] [place]12 for astrocytoma, to schedule surgery.  Per [facility], needs to speak with Dr. Dobbins, Dr. Jefferson, and either Dr. Albright, Sanya, or Tera.  Will firm up when [facility] would like to speak with these providers in the morning.

## 2018-12-13 ENCOUNTER — TELEPHONE (OUTPATIENT)
Dept: ONCOLOGY | Facility: CLINIC | Age: 33
End: 2018-12-13

## 2018-12-13 ENCOUNTER — TELEPHONE (OUTPATIENT)
Dept: NEUROSURGERY | Facility: CLINIC | Age: 33
End: 2018-12-13

## 2018-12-13 ENCOUNTER — HOSPITAL ENCOUNTER (OUTPATIENT)
Dept: CARDIOLOGY | Facility: HOSPITAL | Age: 33
Discharge: HOME | End: 2018-12-13
Attending: INTERNAL MEDICINE | Admitting: INTERNAL MEDICINE

## 2018-12-13 ENCOUNTER — TELEPHONE (OUTPATIENT)
Dept: ONCOLOGY | Facility: HOSPITAL | Age: 33
End: 2018-12-13

## 2018-12-13 ENCOUNTER — DOCUMENTATION (OUTPATIENT)
Dept: ONCOLOGY | Facility: CLINIC | Age: 33
End: 2018-12-13

## 2018-12-13 DIAGNOSIS — Z86.711 HISTORY OF PULMONARY EMBOLUS (PE): ICD-10-CM

## 2018-12-13 LAB
BH CV LOW VAS LEFT DISTAL FEMORAL SPONT: 1
BH CV LOW VAS LEFT GASTRONEMIUS VESSEL: 1
BH CV LOW VAS LEFT MID FEMORAL SPONT: 1
BH CV LOW VAS LEFT PERONEAL VESSEL: 1
BH CV LOW VAS LEFT POPLITEAL SPONT: 1
BH CV LOW VAS LEFT POSTERIOR TIBIAL VESSEL: 1
BH CV LOW VAS RIGHT GASTRONEMIUS VESSEL: 1
BH CV LOW VAS RIGHT GREATER SAPH BK VESSEL: 1
BH CV LOW VAS RIGHT PROFUNDA FEMORAL SPONT: 1
BH CV LOW VAS RIGHT SAPHENOFEMORAL JUNCTION SPONT: 1
BH CV LOWER VASCULAR LEFT COMMON FEMORAL AUGMENT: NORMAL
BH CV LOWER VASCULAR LEFT COMMON FEMORAL COMPETENT: NORMAL
BH CV LOWER VASCULAR LEFT COMMON FEMORAL COMPRESS: NORMAL
BH CV LOWER VASCULAR LEFT COMMON FEMORAL PHASIC: NORMAL
BH CV LOWER VASCULAR LEFT COMMON FEMORAL SPONT: NORMAL
BH CV LOWER VASCULAR LEFT DISTAL FEMORAL COMPRESS: NORMAL
BH CV LOWER VASCULAR LEFT DISTAL FEMORAL THROMBUS: NORMAL
BH CV LOWER VASCULAR LEFT GASTRONEMIUS COMPRESS: NORMAL
BH CV LOWER VASCULAR LEFT GASTRONEMIUS THROMBUS: NORMAL
BH CV LOWER VASCULAR LEFT GREATER SAPH AK COMPRESS: NORMAL
BH CV LOWER VASCULAR LEFT GREATER SAPH BK COMPRESS: NORMAL
BH CV LOWER VASCULAR LEFT LESSER SAPH COMPRESS: NORMAL
BH CV LOWER VASCULAR LEFT MID FEMORAL AUGMENT: NORMAL
BH CV LOWER VASCULAR LEFT MID FEMORAL COMPETENT: NORMAL
BH CV LOWER VASCULAR LEFT MID FEMORAL COMPRESS: NORMAL
BH CV LOWER VASCULAR LEFT MID FEMORAL PHASIC: NORMAL
BH CV LOWER VASCULAR LEFT MID FEMORAL SPONT: NORMAL
BH CV LOWER VASCULAR LEFT MID FEMORAL THROMBUS: NORMAL
BH CV LOWER VASCULAR LEFT PERONEAL COMPRESS: NORMAL
BH CV LOWER VASCULAR LEFT PERONEAL THROMBUS: NORMAL
BH CV LOWER VASCULAR LEFT POPLITEAL AUGMENT: NORMAL
BH CV LOWER VASCULAR LEFT POPLITEAL COMPETENT: NORMAL
BH CV LOWER VASCULAR LEFT POPLITEAL COMPRESS: NORMAL
BH CV LOWER VASCULAR LEFT POPLITEAL PHASIC: NORMAL
BH CV LOWER VASCULAR LEFT POPLITEAL SPONT: NORMAL
BH CV LOWER VASCULAR LEFT POPLITEAL THROMBUS: NORMAL
BH CV LOWER VASCULAR LEFT POSTERIOR TIBIAL COMPRESS: NORMAL
BH CV LOWER VASCULAR LEFT POSTERIOR TIBIAL THROMBUS: NORMAL
BH CV LOWER VASCULAR LEFT PROXIMAL FEMORAL COMPRESS: NORMAL
BH CV LOWER VASCULAR LEFT SAPHENOFEMORAL JUNCTION AUGMENT: NORMAL
BH CV LOWER VASCULAR LEFT SAPHENOFEMORAL JUNCTION COMPETENT: NORMAL
BH CV LOWER VASCULAR LEFT SAPHENOFEMORAL JUNCTION COMPRESS: NORMAL
BH CV LOWER VASCULAR LEFT SAPHENOFEMORAL JUNCTION PHASIC: NORMAL
BH CV LOWER VASCULAR LEFT SAPHENOFEMORAL JUNCTION SPONT: NORMAL
BH CV LOWER VASCULAR RIGHT COMMON FEMORAL AUGMENT: NORMAL
BH CV LOWER VASCULAR RIGHT COMMON FEMORAL COMPETENT: NORMAL
BH CV LOWER VASCULAR RIGHT COMMON FEMORAL COMPRESS: NORMAL
BH CV LOWER VASCULAR RIGHT COMMON FEMORAL PHASIC: NORMAL
BH CV LOWER VASCULAR RIGHT COMMON FEMORAL SPONT: NORMAL
BH CV LOWER VASCULAR RIGHT DISTAL FEMORAL COMPRESS: NORMAL
BH CV LOWER VASCULAR RIGHT GASTRONEMIUS COMPRESS: NORMAL
BH CV LOWER VASCULAR RIGHT GASTRONEMIUS THROMBUS: NORMAL
BH CV LOWER VASCULAR RIGHT GREATER SAPH AK COMPRESS: NORMAL
BH CV LOWER VASCULAR RIGHT GREATER SAPH BK COMPRESS: NORMAL
BH CV LOWER VASCULAR RIGHT GREATER SAPH BK THROMBUS: NORMAL
BH CV LOWER VASCULAR RIGHT LESSER SAPH COMPRESS: NORMAL
BH CV LOWER VASCULAR RIGHT MID FEMORAL AUGMENT: NORMAL
BH CV LOWER VASCULAR RIGHT MID FEMORAL COMPETENT: NORMAL
BH CV LOWER VASCULAR RIGHT MID FEMORAL COMPRESS: NORMAL
BH CV LOWER VASCULAR RIGHT MID FEMORAL PHASIC: NORMAL
BH CV LOWER VASCULAR RIGHT MID FEMORAL SPONT: NORMAL
BH CV LOWER VASCULAR RIGHT PERONEAL COMPRESS: NORMAL
BH CV LOWER VASCULAR RIGHT POPLITEAL AUGMENT: NORMAL
BH CV LOWER VASCULAR RIGHT POPLITEAL COMPETENT: NORMAL
BH CV LOWER VASCULAR RIGHT POPLITEAL COMPRESS: NORMAL
BH CV LOWER VASCULAR RIGHT POPLITEAL PHASIC: NORMAL
BH CV LOWER VASCULAR RIGHT POPLITEAL SPONT: NORMAL
BH CV LOWER VASCULAR RIGHT POSTERIOR TIBIAL COMPRESS: NORMAL
BH CV LOWER VASCULAR RIGHT PROFUNDA FEMORAL COMPRESS: NORMAL
BH CV LOWER VASCULAR RIGHT PROFUNDA FEMORAL THROMBUS: NORMAL
BH CV LOWER VASCULAR RIGHT PROXIMAL FEMORAL COMPRESS: NORMAL
BH CV LOWER VASCULAR RIGHT SAPHENOFEMORAL JUNCTION AUGMENT: NORMAL
BH CV LOWER VASCULAR RIGHT SAPHENOFEMORAL JUNCTION COMPETENT: NORMAL
BH CV LOWER VASCULAR RIGHT SAPHENOFEMORAL JUNCTION COMPRESS: NORMAL
BH CV LOWER VASCULAR RIGHT SAPHENOFEMORAL JUNCTION PHASIC: NORMAL
BH CV LOWER VASCULAR RIGHT SAPHENOFEMORAL JUNCTION SPONT: NORMAL

## 2018-12-13 PROCEDURE — 93970 EXTREMITY STUDY: CPT

## 2018-12-13 NOTE — TELEPHONE ENCOUNTER
"Katie HEARD  Left a message.  \"Noemi,  carina,  I am calling Per Ronny Amaral in regards to your doppler on your leg.  The result was stable. If there is any questions, you can give us a call back.  \"  "

## 2018-12-13 NOTE — TELEPHONE ENCOUNTER
Called pt back. He said he will be having a craniotomy on 1/15/19. Per Dr. Jefferson's last dictation, pt will need to be bridged with lovenox for this procedure. Pt states he has given himself injections before and does not need a teaching. Filled out our Lovenox bridging protocol and showed to Dr. Jefferson. Per Dr. Jefferson, pt will take lovenox 80mg BID. Following his procedure, he will most likely be in the hospital for several days and the physicians rounding on him in the hospital will manage his anticoagulation following the procedure. lovenox sent to pt's pharmacy. I mailed him a copy of bridging instructions and a scanned copy will go in his chart.       Jv with Dr. De La Fuente office called back. Informed her of bridging instructions and lovenox dose. She will convey this to Dr. Galvan.

## 2018-12-13 NOTE — TELEPHONE ENCOUNTER
----- Message from Sean Jefferson MD sent at 12/13/2018 11:23 AM EST -----  Please call the patient regarding his result.  Please let him know that the ultrasound of his legs appears stable.  Thanks,  TOD

## 2018-12-13 NOTE — TELEPHONE ENCOUNTER
Martha from [facility] is working with Pt to bridge him for his January 15th surgery. She wanted me to relay to you that he can resume coumadin the night of OR if ok to do so and he will need to resume lovenox 80mg 2 X day the day after surgery until therapeutic.

## 2018-12-17 ENCOUNTER — RESULTS ENCOUNTER (OUTPATIENT)
Dept: NEUROSURGERY | Facility: CLINIC | Age: 33
End: 2018-12-17

## 2018-12-17 DIAGNOSIS — C71.9 ASTROCYTOMA BRAIN TUMOR (HCC): ICD-10-CM

## 2018-12-17 RX ORDER — LEVETIRACETAM 1000 MG/1
TABLET ORAL
Qty: 60 TABLET | Refills: 0 | Status: SHIPPED | OUTPATIENT
Start: 2018-12-17 | End: 2019-01-07

## 2018-12-20 ENCOUNTER — TELEPHONE (OUTPATIENT)
Dept: NEUROSURGERY | Facility: CLINIC | Age: 33
End: 2018-12-20

## 2018-12-20 DIAGNOSIS — D68.51 FACTOR V LEIDEN (HCC): Primary | ICD-10-CM

## 2018-12-20 NOTE — TELEPHONE ENCOUNTER
Patient is scheduled for surgery on 1/15 with Dr Galvan for a crani. Dr Galvan put in an urgent referral for pt to have a filter for dvt put in by vascular.   [facility] for their surgery scheduler to call us back. We need to get this scheduled ASAP. Need to know how fast we can get this done.        per note on referral  Discussed with Vaishnavi Haley and Sanya.  Needs filter for dvt, need for repeat crani and factor V leiden.    SUrgery scheduled for 15 Eddie 19 and needs to be placed before this time. Thank you.    PT DOESNT WANT ANYTHING IN INFUSION AREA UNLESS ABSOLUTELY NECESSARY

## 2019-01-04 ENCOUNTER — TELEPHONE (OUTPATIENT)
Dept: NEUROSURGERY | Facility: CLINIC | Age: 34
End: 2019-01-04

## 2019-01-04 NOTE — TELEPHONE ENCOUNTER
MEKAC spoke with Dr. Jefferson who advised holding medication.  Spoke with patient, informed not to start medication Sunday, voiced understanding.

## 2019-01-04 NOTE — TELEPHONE ENCOUNTER
This is a drug for chemo that Dr. Jefferson prescribed for him so I do not know. Dr. Galvan spoke with Dr. Jefferson about this patient and I do not know what they discussed so am forwarding this on.

## 2019-01-04 NOTE — TELEPHONE ENCOUNTER
Pt is to be seen on 1/7/19 with RIMA for Astrocytoma of the brain. He has surgery schedule for a crani with RIMA on 1/15/19.    Pt called and ask if he sould start taking his Temodor on Sunday night.    [phone]

## 2019-01-07 ENCOUNTER — HOSPITAL ENCOUNTER (OUTPATIENT)
Dept: MRI IMAGING | Facility: HOSPITAL | Age: 34
Discharge: HOME | End: 2019-01-07
Attending: NEUROLOGICAL SURGERY | Admitting: NEUROLOGICAL SURGERY

## 2019-01-07 ENCOUNTER — APPOINTMENT (OUTPATIENT)
Dept: PREADMISSION TESTING | Facility: HOSPITAL | Age: 34
End: 2019-01-07

## 2019-01-07 ENCOUNTER — OFFICE VISIT (OUTPATIENT)
Dept: NEUROSURGERY | Facility: CLINIC | Age: 34
End: 2019-01-07

## 2019-01-07 VITALS
HEIGHT: 71 IN | OXYGEN SATURATION: 100 % | WEIGHT: 180 LBS | HEART RATE: 62 BPM | RESPIRATION RATE: 16 BRPM | TEMPERATURE: 98 F | BODY MASS INDEX: 25.2 KG/M2 | DIASTOLIC BLOOD PRESSURE: 82 MMHG | SYSTOLIC BLOOD PRESSURE: 126 MMHG

## 2019-01-07 VITALS
SYSTOLIC BLOOD PRESSURE: 108 MMHG | RESPIRATION RATE: 16 BRPM | BODY MASS INDEX: 25.06 KG/M2 | DIASTOLIC BLOOD PRESSURE: 80 MMHG | WEIGHT: 179 LBS | HEART RATE: 70 BPM | HEIGHT: 71 IN

## 2019-01-07 DIAGNOSIS — C71.9 ASTROCYTOMA BRAIN TUMOR (HCC): ICD-10-CM

## 2019-01-07 DIAGNOSIS — C71.9 ASTROCYTOMA BRAIN TUMOR (HCC): Primary | ICD-10-CM

## 2019-01-07 LAB
ALBUMIN SERPL-MCNC: 4.4 G/DL (ref 3.5–5.2)
ALBUMIN/GLOB SERPL: 1.6 G/DL
ALP SERPL-CCNC: 42 U/L (ref 39–117)
ALT SERPL W P-5'-P-CCNC: 28 U/L (ref 1–41)
ANION GAP SERPL CALCULATED.3IONS-SCNC: 10.5 MMOL/L
APTT PPP: 29 SECONDS (ref 22.7–35.4)
AST SERPL-CCNC: 20 U/L (ref 1–40)
BASOPHILS # BLD AUTO: 0.01 10*3/MM3 (ref 0–0.2)
BASOPHILS NFR BLD AUTO: 0.1 % (ref 0–1.5)
BILIRUB SERPL-MCNC: 0.3 MG/DL (ref 0.1–1.2)
BUN BLD-MCNC: 11 MG/DL (ref 6–20)
BUN/CREAT SERPL: 14.9 (ref 7–25)
CALCIUM SPEC-SCNC: 9.3 MG/DL (ref 8.6–10.5)
CHLORIDE SERPL-SCNC: 104 MMOL/L (ref 98–107)
CO2 SERPL-SCNC: 26.5 MMOL/L (ref 22–29)
CREAT BLD-MCNC: 0.74 MG/DL (ref 0.76–1.27)
DEPRECATED RDW RBC AUTO: 49 FL (ref 37–54)
EOSINOPHIL # BLD AUTO: 0.01 10*3/MM3 (ref 0–0.7)
EOSINOPHIL NFR BLD AUTO: 0.1 % (ref 0.3–6.2)
ERYTHROCYTE [DISTWIDTH] IN BLOOD BY AUTOMATED COUNT: 14.5 % (ref 11.5–14.5)
GFR SERPL CREATININE-BSD FRML MDRD: 120 ML/MIN/1.73
GLOBULIN UR ELPH-MCNC: 2.8 GM/DL
GLUCOSE BLD-MCNC: 93 MG/DL (ref 65–99)
HCT VFR BLD AUTO: 44.8 % (ref 40.4–52.2)
HGB BLD-MCNC: 15.1 G/DL (ref 13.7–17.6)
IMM GRANULOCYTES # BLD AUTO: 0.04 10*3/MM3 (ref 0–0.03)
IMM GRANULOCYTES NFR BLD AUTO: 0.5 % (ref 0–0.5)
INR PPP: 1.78 (ref 0.9–1.1)
LYMPHOCYTES # BLD AUTO: 1.39 10*3/MM3 (ref 0.9–4.8)
LYMPHOCYTES NFR BLD AUTO: 17.7 % (ref 19.6–45.3)
MCH RBC QN AUTO: 30.9 PG (ref 27–32.7)
MCHC RBC AUTO-ENTMCNC: 33.7 G/DL (ref 32.6–36.4)
MCV RBC AUTO: 91.6 FL (ref 79.8–96.2)
MONOCYTES # BLD AUTO: 0.56 10*3/MM3 (ref 0.2–1.2)
MONOCYTES NFR BLD AUTO: 7.1 % (ref 5–12)
NEUTROPHILS # BLD AUTO: 5.88 10*3/MM3 (ref 1.9–8.1)
NEUTROPHILS NFR BLD AUTO: 75 % (ref 42.7–76)
PLATELET # BLD AUTO: 219 10*3/MM3 (ref 140–500)
PMV BLD AUTO: 9.5 FL (ref 6–12)
POTASSIUM BLD-SCNC: 4.6 MMOL/L (ref 3.5–5.2)
PROT SERPL-MCNC: 7.2 G/DL (ref 6–8.5)
PROTHROMBIN TIME: 20.4 SECONDS (ref 11.7–14.2)
RBC # BLD AUTO: 4.89 10*6/MM3 (ref 4.6–6)
SODIUM BLD-SCNC: 141 MMOL/L (ref 136–145)
WBC NRBC COR # BLD: 7.85 10*3/MM3 (ref 4.5–10.7)

## 2019-01-07 PROCEDURE — 0 GADOBENATE DIMEGLUMINE 529 MG/ML SOLUTION: Performed by: NEUROLOGICAL SURGERY

## 2019-01-07 PROCEDURE — 99214 OFFICE O/P EST MOD 30 MIN: CPT | Performed by: NEUROLOGICAL SURGERY

## 2019-01-07 PROCEDURE — 85610 PROTHROMBIN TIME: CPT | Performed by: NEUROLOGICAL SURGERY

## 2019-01-07 PROCEDURE — A9577 INJ MULTIHANCE: HCPCS | Performed by: NEUROLOGICAL SURGERY

## 2019-01-07 PROCEDURE — 70553 MRI BRAIN STEM W/O & W/DYE: CPT

## 2019-01-07 PROCEDURE — 85730 THROMBOPLASTIN TIME PARTIAL: CPT | Performed by: NEUROLOGICAL SURGERY

## 2019-01-07 PROCEDURE — 36415 COLL VENOUS BLD VENIPUNCTURE: CPT | Performed by: NEUROLOGICAL SURGERY

## 2019-01-07 PROCEDURE — 85025 COMPLETE CBC W/AUTO DIFF WBC: CPT | Performed by: NEUROLOGICAL SURGERY

## 2019-01-07 PROCEDURE — 80053 COMPREHEN METABOLIC PANEL: CPT | Performed by: NEUROLOGICAL SURGERY

## 2019-01-07 RX ORDER — LEVETIRACETAM 1000 MG/1
1000 TABLET ORAL 2 TIMES DAILY
COMMUNITY
End: 2019-01-15

## 2019-01-07 RX ADMIN — GADOBENATE DIMEGLUMINE 16 ML: 529 INJECTION, SOLUTION INTRAVENOUS at 10:57

## 2019-01-07 NOTE — DISCHARGE INSTRUCTIONS
Take the following medications the morning of surgery with a small sip of water:    KEPPRA AND PRILOSEC    General Instructions:  • Do not eat solid food after midnight the night before surgery.  • You may drink clear liquids day of surgery but must stop at least one hour before your hospital arrival time.  • It is beneficial for you to have a clear drink that contains carbohydrates the day of surgery.  We suggest a 12 to 20 ounce bottle of Gatorade or Powerade for non-diabetic patients     Clear liquids are liquids you can see through.  Nothing red in color.     Plain water                               Sports drinks  Sodas                                   Gelatin (Jell-O)  Fruit juices without pulp such as white grape juice and apple juice  Popsicles that contain no fruit or yogurt  Tea or coffee (no cream or milk added)  Gatorade / Powerade  G2 / Powerade Zero    • Bring any papers given to you in the doctor’s office.  • Wear clean comfortable clothes and socks.  • Do not wear contact lenses or make-up.  Bring a case for your glasses. .  • Remove all piercings.  Leave jewelry and any other valuables at home.  • Hair extensions with metal clips must be removed prior to surgery.  • The Pre-Admission Testing nurse will instruct you to bring medications if unable to obtain an accurate list in Pre-Admission Testing.   • REPORT TO MAIN SURGERY ON 1-15-19 AT 0530 AM           Preventing a Surgical Site Infection:  • For 2 to 3 days before surgery, avoid shaving with a razor because the razor can irritate skin and make it easier to develop an infection.    • Any areas of open skin can increase the risk of a post-operative wound infection by allowing bacteria to enter and travel throughout the body.  Notify your surgeon if you have any skin wounds / rashes even if it is not near the expected surgical site.  The area will need assessed to determine if surgery should be delayed until it is healed.  • The night prior to  surgery sleep in a clean bed with clean clothing.  Do not allow pets to sleep with you.  • Shower on the morning of surgery using a fresh bar of anti-bacterial soap (such as Dial) and clean washcloth.  Dry with a clean towel and dress in clean clothing.  • Ask your surgeon if you will be receiving antibiotics prior to surgery.  • Make sure you, your family, and all healthcare providers clean their hands with soap and water or an alcohol based hand  before caring for you or your wound.    Day of surgery:  Upon arrival, a Pre-op nurse and Anesthesiologist will review your health history, obtain vital signs, and answer questions you may have.  The only belongings needed at this time will be your home medications and if applicable your C-PAP/BI-PAP machine.  If you are staying overnight your family can leave the rest of your belongings in the car and bring them to your room later.  A Pre-op nurse will start an IV and you may receive medication in preparation for surgery, including something to help you relax.  Your family will be able to see you in the Pre-op area.  While you are in surgery your family should notify the waiting room  if they leave the waiting room area and provide a contact phone number.    Please be aware that surgery does come with discomfort.  We want to make every effort to control your discomfort so please discuss any uncontrolled symptoms with your nurse.   Your doctor will most likely have prescribed pain medications.          If you are staying overnight following surgery, you will be transported to your hospital room following the recovery period.  [facility] has all private rooms.    You have received a list of surgical assistants for your reference.  If you have any questions please call Pre-Admission Testing at [phone].  Deductibles and co-payments are collected on the day of service. Please be prepared to pay the required co-pay, deductible or deposit on  the day of service as defined by your plan.

## 2019-01-07 NOTE — PROGRESS NOTES
Subjective   Patient ID: Bryan Valadze is a 33 y.o. male is here today for a new H&P for crani bi-frontal mass 1/15. Patient had MRI brain 1/7/19 and presents accompanied by his wife.     History of Present Illness 34 yo male presents for pre op evaluation. I reviewed his imaging longitudinally from pre-op.  He had severe deformity of the sulci and gyri on pre - op studies.  His ventricles have a normal configuration now.  He has 2 areas in question: flair are and contrast enhancing portion.      The following portions of the patient's history were reviewed and updated as appropriate: allergies, current medications, past family history, past medical history, past social history, past surgical history and problem list.    Review of Systems   Constitutional: Negative for chills and fever.   HENT: Negative for tinnitus.    Eyes: Negative for visual disturbance.   Respiratory: Negative for cough, shortness of breath and wheezing.    Cardiovascular: Negative for chest pain and palpitations.   Gastrointestinal: Negative for constipation.   Genitourinary: Negative for difficulty urinating and enuresis.   Musculoskeletal: Negative for gait problem.   Skin: Negative for rash and wound.   Neurological: Negative for dizziness, tremors, seizures, syncope, speech difficulty, weakness, light-headedness, numbness and headaches.   Hematological: Bruises/bleeds easily.   Psychiatric/Behavioral: Negative for confusion and decreased concentration.       Objective   Physical Exam  Neurologic Exam     Physical Exam   Constitutional: He is oriented to person, place, and time.   Neurological: He is oriented to person, place, and time. Gait normal.      Neurologic Exam      Mental Status   Oriented to person, place, and time.      Motor Exam      Strength   Right deltoid: 5/5  Left deltoid: 5/5  Right biceps: 5/5  Left biceps: 5/5  Right triceps: 5/5  Left triceps: 5/5  Right wrist extension: 5/5  Left wrist extension: 5/5  Right  interossei: 5/5  Left interossei: 5/5  Right iliopsoas: 5/5  Left iliopsoas: 5/5     Sensory Exam   Right arm light touch: normal  Left arm light touch: normal  Right leg light touch: normal  Left leg light touch: normal  Right arm pinprick: normal  Left arm pinprick: normal  Right leg pinprick: normal  Left leg pinprick: normal     Gait, Coordination, and Reflexes      Gait  Gait: normal     C/d/i    Assessment/Plan   Independent Review of Radiographic Studies:    Contrast enhancing portion and flair areas reviewed with he and his wife.  Medical Decision Making:  We had a long discussion of the surgical plan.  I plan on at least resecting the contrast enhancing portion.  We discussed the flair abnormality.   We discussed r/b/a.  We discussed medical issues (DVT's and PE's).  We discussed recurrence.  We discussed infection, csf issues, damage to brain, weakness, need for rehab, seizures, need for trach and peg, other risks.  I plan on discussing the case with neuroradiology and Dr. Bailon preoperatively.  We discussed awake craniotomy at a reference center as the alternative.  I discussed the case at length with he and his wife.      Bryan was seen today for pre-op exam.    Diagnoses and all orders for this visit:    Astrocytoma brain tumor (CMS/HCC)      No Follow-up on file.

## 2019-01-09 DIAGNOSIS — C71.9 ASTROCYTOMA (HCC): Primary | ICD-10-CM

## 2019-01-10 ENCOUNTER — OFFICE VISIT (OUTPATIENT)
Dept: NEUROLOGY | Facility: CLINIC | Age: 34
End: 2019-01-10

## 2019-01-10 VITALS
DIASTOLIC BLOOD PRESSURE: 68 MMHG | BODY MASS INDEX: 25.62 KG/M2 | HEIGHT: 71 IN | SYSTOLIC BLOOD PRESSURE: 114 MMHG | WEIGHT: 183 LBS

## 2019-01-10 DIAGNOSIS — I82.532 CHRONIC DEEP VEIN THROMBOSIS (DVT) OF LEFT POPLITEAL VEIN (HCC): ICD-10-CM

## 2019-01-10 DIAGNOSIS — C71.1 ANAPLASTIC ASTROCYTOMA OF FRONTAL LOBE (HCC): ICD-10-CM

## 2019-01-10 DIAGNOSIS — G40.209 PARTIAL SYMPTOMATIC EPILEPSY WITH COMPLEX PARTIAL SEIZURES, NOT INTRACTABLE, WITHOUT STATUS EPILEPTICUS (HCC): ICD-10-CM

## 2019-01-10 DIAGNOSIS — I82.432 ACUTE DEEP VEIN THROMBOSIS (DVT) OF POPLITEAL VEIN OF LEFT LOWER EXTREMITY (HCC): Primary | ICD-10-CM

## 2019-01-10 PROCEDURE — 99244 OFF/OP CNSLTJ NEW/EST MOD 40: CPT | Performed by: PSYCHIATRY & NEUROLOGY

## 2019-01-10 RX ORDER — WARFARIN SODIUM 5 MG/1
TABLET ORAL
Qty: 60 TABLET | Refills: 0 | Status: SHIPPED | OUTPATIENT
Start: 2019-01-10 | End: 2019-04-01

## 2019-01-11 ENCOUNTER — TELEPHONE (OUTPATIENT)
Dept: NEUROSURGERY | Facility: CLINIC | Age: 34
End: 2019-01-11

## 2019-01-11 DIAGNOSIS — C71.9 ASTROCYTOMA BRAIN TUMOR (HCC): Primary | ICD-10-CM

## 2019-01-11 NOTE — TELEPHONE ENCOUNTER
Patient is scheduled for surgery on 1/15 by Dr Galvan for cranitomy for tumor stereotactic. Dr Galvan had patient see Dr Dobbins.         Dr Dobbins told the patient that he needs to speak with Dr Galvan before he has his surgery.  Dr Dobbins  wants to discuss additional testing before surgery.  Spectroscopy and a MRI perfusion before surgery. These are only at [facility]

## 2019-01-11 NOTE — TELEPHONE ENCOUNTER
Text sent to Dr. Galvan.  Spoke with Dr. Dobbins's office, per his MA, [facility] [place] [place] can call main number and they will pull him out of whatever room he is in to speak to [facility] [place] [place].

## 2019-01-11 NOTE — TELEPHONE ENCOUNTER
Per [facility] [place] [facility], spoke with Dr. Dobbins, he will call Dr. Jefferson when he gets home. Patient states that he and his wife have further questions to discuss with [facility] [place], goes to [facility] [place] Monday morning for clearance of filter.  Patient tentatively scheduled with [facility] [place] [facility] Monday at 1515, [facility] [place] [facility] will call patient tonight and text me tonight whether or not to keep him on the schedule.

## 2019-01-11 NOTE — TELEPHONE ENCOUNTER
Spoke to Dr. Galvan, he had just gotten into the OR, will call Dr. Dobbins today.  Patient informed.

## 2019-01-14 ENCOUNTER — TELEPHONE (OUTPATIENT)
Dept: ONCOLOGY | Facility: HOSPITAL | Age: 34
End: 2019-01-14

## 2019-01-14 ENCOUNTER — TELEPHONE (OUTPATIENT)
Dept: NEUROSURGERY | Facility: CLINIC | Age: 34
End: 2019-01-14

## 2019-01-14 DIAGNOSIS — J30.9 ALLERGIC RHINITIS: ICD-10-CM

## 2019-01-14 NOTE — TELEPHONE ENCOUNTER
Pt's surgery tomorrow has been postponed due to needing to have functional MRI scheduled first. Spoke with Dr. Jefferson's office and informed them of surgery date change as they are bridging his coumadin. Also spoke with wife Mary and advised her to contact Dr. Jefferson's office for instructions on what to do with coumadin bridging.

## 2019-01-14 NOTE — TELEPHONE ENCOUNTER
[facility] cancel surgery for now, functional MRI at [facility], possibly for this week.  Will put in order and arrange tomrrow, 1/15. Spoke with patient who states that he had his filter put in will wait to hear from us.

## 2019-01-14 NOTE — TELEPHONE ENCOUNTER
Reviewed with ADA Garcia, pt should stay on Lovenox until his surgery and continue to hold the Coumadin. Left VM for pt to call back and let us know how much Lovenox he has left so we call refill his script.

## 2019-01-14 NOTE — TELEPHONE ENCOUNTER
Received a call from Dr De La Fuente office to make us aware that his surgery was cancelled and we were holding Couamdin and bridging with Lovenox. Attempted to reach him by phone to advise him to restart his Coumadin as his surgery will not be rescheduled until he has a functional MRI. Left a VM for him to return call.

## 2019-01-15 RX ORDER — LEVETIRACETAM 1000 MG/1
TABLET ORAL
Qty: 60 TABLET | Refills: 3 | Status: SHIPPED | OUTPATIENT
Start: 2019-01-15 | End: 2019-02-06 | Stop reason: SDUPTHER

## 2019-01-15 RX ORDER — MONTELUKAST SODIUM 10 MG/1
TABLET ORAL
Qty: 90 TABLET | Refills: 0 | OUTPATIENT
Start: 2019-01-15

## 2019-01-15 NOTE — TELEPHONE ENCOUNTER
Place, patient actually needs a refill.  I suspect that this is an accident when the preadmission testing nurse updated his mental list.

## 2019-01-15 NOTE — TELEPHONE ENCOUNTER
Per [facility] scheduling, will need to be reviewed by MRI manager before scheduling. Will require office notes, previous imaging, demographics with contact number along with signed [place] [facility]     OV from [facility] 1/7/19 and Dr. Dobbins 1/10/19 (needed because he was the one who indicated that a functional MRI would be helpful), MRI brain 1/7/19 & 12/03/18 faxed with demographics and insurance card.  Fax to [facility] @ [phone] attgraciela Boyer.  Once this has been received by them, they will review the information and hopefully get his appt scheduled today.  Can forward to Kemi for approval from insurance after there is a date.     Will CC Kemi on this for when appt is scheduled so she can work on authorization.

## 2019-01-16 ENCOUNTER — TELEPHONE (OUTPATIENT)
Dept: NEUROSURGERY | Facility: CLINIC | Age: 34
End: 2019-01-16

## 2019-01-16 ENCOUNTER — TELEPHONE (OUTPATIENT)
Dept: ONCOLOGY | Facility: HOSPITAL | Age: 34
End: 2019-01-16

## 2019-01-16 ENCOUNTER — PREP FOR SURGERY (OUTPATIENT)
Dept: OTHER | Facility: HOSPITAL | Age: 34
End: 2019-01-16

## 2019-01-16 NOTE — TELEPHONE ENCOUNTER
Spoke with Rosalind, waiting on word from MRI about when to schedule patient.  They have authorization, will call me as soon as she has date/time.

## 2019-01-16 NOTE — TELEPHONE ENCOUNTER
Pt has been off of coumadin since last Friday (1/11/19) and started Lovenox 80mg twice daily.  He was/is being bridged for a craniotomy that was origionally scheduled for 1/15/19.  This has now been on hold because they are wanting the patient to have a functional MRI prior to surgery- and this is in the works of being scheduled. He did have his IVC filter placed.      Pt states that he is wanting to know if he should take his Temodar (he was supposed to take this on the 28th of December, but was told to hold due to scheduled surgery).   Also wanting to know if he should restart his coumadin since there is no date of when the MRI or surgery will be??    I reviewed with Dr. Jefferson.  Per Dr. Jefferson, have patient start his coumadin back today at the previous dose (coumadin 7.5 mg Sun/Thur and 5 mg all other days) and continue his Lovenox 80 mg BID.  Have him return to the office on Monday 1/21/19 for pt/inr and RN review to see how we need to adjust his coumadin and lovenox.  Hopefully by then the patient will know a date for the MRI and surgery and we can plan for that.  Also,  would like for patient to continue HOLDING Temodar for now.    would like for the RN to update him on this information when patient here on Monday.     I called patient back and explained all of the above information.  He v/u and repeated information back. Pt needing more Lovenox called into his pharmacy- I will E- Prescribe that to his pharmacy today.  I sent a message to scheduling to call patient and get him set up to see RN on Monday 1/21/19.     Pt will call with any updates on MRI and/or Surgery date.

## 2019-01-16 NOTE — TELEPHONE ENCOUNTER
Patient is calling to ask Dr Galvan if he needs to start chemo since his surgery has been postponed.  Dr Galvan had him postpone his chemo since he was having surgery.  He usually does it the 28th of the month.   Please advise

## 2019-01-16 NOTE — TELEPHONE ENCOUNTER
Per C, wait until imaging scheduled before determining chemo.     Spoke with Rosalind at [facility] again, she states that they have all information they need, tried to call MRI x3 today, no response.  MRI manager had left for the day.  Will call again tomorrow regarding scheduling.  Spoke with patient and informed, voiced understanding.  If no scheduling date tomorrow, will try to schedule at  if not scheduled.    Mila had number for lead MRI tech at [facility], Jerome--[phone], [facility] with him requesting a call back.

## 2019-01-16 NOTE — TELEPHONE ENCOUNTER
Spoke with Rosalind at [facility] again, she states that they have all information they need, tried to call MRI x3 today, no response.  MRI manager had left for the day.  Will call again tomorrow regarding scheduling.  Spoke with patient and informed, voiced understanding.  If no scheduling date tomorrow, will try to schedule at  if not scheduled.    [facility] scheduling number [phone]

## 2019-01-17 ENCOUNTER — TELEPHONE (OUTPATIENT)
Dept: ONCOLOGY | Facility: HOSPITAL | Age: 34
End: 2019-01-17

## 2019-01-17 ENCOUNTER — TELEPHONE (OUTPATIENT)
Dept: NEUROSURGERY | Facility: CLINIC | Age: 34
End: 2019-01-17

## 2019-01-17 NOTE — TELEPHONE ENCOUNTER
Gely at [facility]      Pt is to be at [facility] on Mon 1/21  At 10:30 and they will do the scan at 12. He is to meet with some people there before the scan

## 2019-01-17 NOTE — TELEPHONE ENCOUNTER
Patient scheduled for MRI Monday 1/21 @ 1030 arrival. Patient informed. Informed [facility] [place] that patient has been scheduled, he is currently in OR, will follow up with him regarding starting chemo etc. And r/s of surgery.  Patient informed that we will get back to him regarding his other concerns.     Spoke with MEKA, patient scheduled for surgery 1/24, must bring filter ID card to MRI appt and do not start back on chemo.  Jv will inform patient of all of this when she calls him to inform him that surgery has been scheduled.

## 2019-01-17 NOTE — TELEPHONE ENCOUNTER
Patient scheduled for MRI Monday 1/21 @ 1030 arrival. Patient informed. Informed [facility] that patient has been scheduled, he is currently in OR, will follow up with him regarding starting chemo etc. And r/s of surgery.  Patient informed that we will get back to him regarding his other concerns.

## 2019-01-17 NOTE — TELEPHONE ENCOUNTER
----- Message from Tricia Randhawa sent at 1/17/2019  2:30 PM EST -----  Contact: [phone]  Pt's surgery has been postponed to 01-24-19 and he needs new bridging instructions.  Call the pt  Jv with Dr. Chetan Galvan is calling about this.

## 2019-01-17 NOTE — TELEPHONE ENCOUNTER
Pt's surgery rescheduled to January 24th. Notified Dr. Jfeferson's office of new surgery date so that they can manage his coumadin. They stated that they will call patient directly with instructions. Also told patient to call me if he does not hear from them soon.

## 2019-01-17 NOTE — TELEPHONE ENCOUNTER
Pt's surgery now scheduled for 1/24/19. Reviewed bridging instructions with Johanna CABALLERO. Per Johanna, instructed pt to hold coumadin and continue taking lovenox 80mg BID. Instructed pt to continue lovenox BID daily until the day before surgery, 1/23/19 when he will take morning dose only and hold lovenox the day of surgery. Pt v/u to these instructions. Pt informed and v/u. Will send Dr. Jefferson message to inform him of pt's surgery date and to question if pt needs lab prior to surgery.

## 2019-01-17 NOTE — TELEPHONE ENCOUNTER
On hold with [facility] scheduling for 30 minutes. Spoke with  who states Rosalind is out of office for bereavement. Informed that I was aware of her being out of office and that she had given information to her supervisor.  Placed on hold, informed that they had tried to contact donna multiple times and LVM but no answer. Impressed upon them the urgency of the imaging: patient has been off chemo X1 month and is due to start next round imminently. 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  Patient is young with a young child and pregnant wife.  Put on hold again and informed that I would get a call back shortly.     [facility] called again and I spoke with Graham (MRI tech) who states that information will have to be gone over with radiologist and then sent back to scheduling. States that should have answer by today. Again impressed upon him the urgency of the situation and that he was supposed to have resection 1/15. States that he doesn't believe that there are openings tomorrow, but that he will see what he can do and call me back.  If I have not heard from them after lunch, I will call them again.

## 2019-01-18 ENCOUNTER — TELEPHONE (OUTPATIENT)
Dept: ONCOLOGY | Facility: HOSPITAL | Age: 34
End: 2019-01-18

## 2019-01-18 NOTE — TELEPHONE ENCOUNTER
[facility] CALLING NEEDING PA FOR LOVENOX. WILL MESSAGE TYLER TO SEE IF SHE KNOWS ABOUT THIS.

## 2019-01-18 NOTE — TELEPHONE ENCOUNTER
----- Message from More Lovett sent at 1/18/2019 12:07 PM EST -----  Contact: [phone]  [facility]    Clarify enoxaparin dosage.

## 2019-01-18 NOTE — TELEPHONE ENCOUNTER
Patient notified, v/u.     ----- Message from Sean Jefferson MD sent at 1/17/2019 10:09 PM EST -----  No labs needed.  Agree with continuing Lovenox only.  [facility]    ----- Message -----  From: Mary Godinez, RN  Sent: 1/17/2019   3:04 PM  To: Sean Jefferson MD    Pt's surgery with Dr. Galvan now scheduled 1/24/19. Reviewed with Johanna and instructed pt to hold coumadin and continue lovenox. Do you still want him coming in Monday 1/21 for lab? Please respond to clinical pool. Thanks, Mary

## 2019-01-21 ENCOUNTER — TELEPHONE (OUTPATIENT)
Dept: GENERAL RADIOLOGY | Facility: HOSPITAL | Age: 34
End: 2019-01-21

## 2019-01-21 ENCOUNTER — TELEPHONE (OUTPATIENT)
Dept: ONCOLOGY | Facility: CLINIC | Age: 34
End: 2019-01-21

## 2019-01-21 ENCOUNTER — APPOINTMENT (OUTPATIENT)
Dept: ONCOLOGY | Facility: HOSPITAL | Age: 34
End: 2019-01-21

## 2019-01-21 ENCOUNTER — APPOINTMENT (OUTPATIENT)
Dept: LAB | Facility: HOSPITAL | Age: 34
End: 2019-01-21

## 2019-01-21 NOTE — TELEPHONE ENCOUNTER
----- Message from Sean Jefferson MD sent at 1/21/2019  1:15 PM EST -----  Regarding: RE: FYI   Scheduling, please reach out to him and reschedule him in a few weeks, 2 units, CBC/CMP.  I'm sure they'll consult us in the hospital for anticoagulation management anyway.  [facility]       ----- Message -----  From: Samina Bowman  Sent: 1/21/2019   1:08 PM  To: Sean Jefferson MD  Subject: FYCARINA                                              The patient is scheduled for surgery same day as appt with you

## 2019-01-21 NOTE — TELEPHONE ENCOUNTER
----- Message from Martha Orr RN sent at 1/21/2019  8:56 AM EST -----  This pt will not be in today, please cancel his apt

## 2019-01-22 ENCOUNTER — TELEPHONE (OUTPATIENT)
Dept: NEUROSURGERY | Facility: CLINIC | Age: 34
End: 2019-01-22

## 2019-01-22 ENCOUNTER — DOCUMENTATION (OUTPATIENT)
Dept: ONCOLOGY | Facility: CLINIC | Age: 34
End: 2019-01-22

## 2019-01-22 DIAGNOSIS — J30.9 ALLERGIC RHINITIS: ICD-10-CM

## 2019-01-22 RX ORDER — MONTELUKAST SODIUM 10 MG/1
TABLET ORAL
Qty: 90 TABLET | Refills: 3 | Status: SHIPPED | OUTPATIENT
Start: 2019-01-22 | End: 2020-01-22 | Stop reason: SDUPTHER

## 2019-01-22 NOTE — TELEPHONE ENCOUNTER
Called [facility] for report however it has not been read yet. I explained surgery is day after tomorrow and we need the report so I tried to get expedited

## 2019-01-22 NOTE — TELEPHONE ENCOUNTER
Patient is calling regarding his MRI functional at [facility]. He wants to make sure that Dr Galvan got everything he needs from [facility].   Dr Galvan told him that he needed everything and not everything was ready as of yesterday afternoon.  Pt only brought the one disc.    Please advise Bryan [phone]

## 2019-01-24 ENCOUNTER — APPOINTMENT (OUTPATIENT)
Dept: CT IMAGING | Facility: HOSPITAL | Age: 34
End: 2019-01-24

## 2019-01-24 ENCOUNTER — APPOINTMENT (OUTPATIENT)
Dept: ONCOLOGY | Facility: CLINIC | Age: 34
End: 2019-01-24

## 2019-01-24 ENCOUNTER — HOSPITAL ENCOUNTER (INPATIENT)
Facility: HOSPITAL | Age: 34
LOS: 4 days | Discharge: HOME | End: 2019-01-28
Attending: NEUROLOGICAL SURGERY | Admitting: NEUROLOGICAL SURGERY

## 2019-01-24 ENCOUNTER — ANESTHESIA (OUTPATIENT)
Dept: PERIOP | Facility: HOSPITAL | Age: 34
End: 2019-01-24

## 2019-01-24 ENCOUNTER — APPOINTMENT (OUTPATIENT)
Dept: LAB | Facility: HOSPITAL | Age: 34
End: 2019-01-24

## 2019-01-24 ENCOUNTER — ANESTHESIA EVENT (OUTPATIENT)
Dept: PERIOP | Facility: HOSPITAL | Age: 34
End: 2019-01-24

## 2019-01-24 DIAGNOSIS — R26.89 DECREASED MOBILITY: Primary | ICD-10-CM

## 2019-01-24 DIAGNOSIS — C71.9 ASTROCYTOMA BRAIN TUMOR (HCC): ICD-10-CM

## 2019-01-24 LAB
ABO GROUP BLD: NORMAL
ANION GAP SERPL CALCULATED.3IONS-SCNC: 12.6 MMOL/L
APTT PPP: 26.5 SECONDS (ref 22.7–35.4)
BASOPHILS # BLD AUTO: 0 10*3/MM3 (ref 0–0.2)
BASOPHILS NFR BLD AUTO: 0 % (ref 0–1.5)
BLD GP AB SCN SERPL QL: NEGATIVE
BUN BLD-MCNC: 10 MG/DL (ref 6–20)
BUN/CREAT SERPL: 13.3 (ref 7–25)
CALCIUM SPEC-SCNC: 8 MG/DL (ref 8.6–10.5)
CHLORIDE SERPL-SCNC: 105 MMOL/L (ref 98–107)
CO2 SERPL-SCNC: 20.4 MMOL/L (ref 22–29)
CREAT BLD-MCNC: 0.75 MG/DL (ref 0.76–1.27)
DEPRECATED RDW RBC AUTO: 46.6 FL (ref 37–54)
EOSINOPHIL # BLD AUTO: 0 10*3/MM3 (ref 0–0.7)
EOSINOPHIL NFR BLD AUTO: 0 % (ref 0.3–6.2)
ERYTHROCYTE [DISTWIDTH] IN BLOOD BY AUTOMATED COUNT: 13.5 % (ref 11.5–14.5)
GFR SERPL CREATININE-BSD FRML MDRD: 119 ML/MIN/1.73
GLUCOSE BLD-MCNC: 138 MG/DL (ref 65–99)
GLUCOSE BLDC GLUCOMTR-MCNC: 133 MG/DL (ref 70–130)
HCT VFR BLD AUTO: 40.4 % (ref 40.4–52.2)
HGB BLD-MCNC: 13.4 G/DL (ref 13.7–17.6)
IMM GRANULOCYTES # BLD AUTO: 0.04 10*3/MM3 (ref 0–0.03)
IMM GRANULOCYTES NFR BLD AUTO: 0.3 % (ref 0–0.5)
INR PPP: 1.01 (ref 0.9–1.1)
LYMPHOCYTES # BLD AUTO: 0.6 10*3/MM3 (ref 0.9–4.8)
LYMPHOCYTES NFR BLD AUTO: 4.6 % (ref 19.6–45.3)
MCH RBC QN AUTO: 31.5 PG (ref 27–32.7)
MCHC RBC AUTO-ENTMCNC: 33.2 G/DL (ref 32.6–36.4)
MCV RBC AUTO: 94.8 FL (ref 79.8–96.2)
MONOCYTES # BLD AUTO: 0.69 10*3/MM3 (ref 0.2–1.2)
MONOCYTES NFR BLD AUTO: 5.3 % (ref 5–12)
NEUTROPHILS # BLD AUTO: 11.75 10*3/MM3 (ref 1.9–8.1)
NEUTROPHILS NFR BLD AUTO: 89.8 % (ref 42.7–76)
PLATELET # BLD AUTO: 219 10*3/MM3 (ref 140–500)
PMV BLD AUTO: 9.5 FL (ref 6–12)
POTASSIUM BLD-SCNC: 3.9 MMOL/L (ref 3.5–5.2)
PROTHROMBIN TIME: 13.1 SECONDS (ref 11.7–14.2)
RBC # BLD AUTO: 4.26 10*6/MM3 (ref 4.6–6)
RH BLD: NEGATIVE
SODIUM BLD-SCNC: 138 MMOL/L (ref 136–145)
T&S EXPIRATION DATE: NORMAL
WBC NRBC COR # BLD: 13.08 10*3/MM3 (ref 4.5–10.7)

## 2019-01-24 PROCEDURE — 25010000002 VANCOMYCIN 1 G RECONSTITUTED SOLUTION 1 EACH VIAL: Performed by: NEUROLOGICAL SURGERY

## 2019-01-24 PROCEDURE — 25010000002 ONDANSETRON PER 1 MG: Performed by: NURSE ANESTHETIST, CERTIFIED REGISTERED

## 2019-01-24 PROCEDURE — 25010000002 DEXAMETHASONE PER 1 MG: Performed by: NURSE ANESTHETIST, CERTIFIED REGISTERED

## 2019-01-24 PROCEDURE — C1713 ANCHOR/SCREW BN/BN,TIS/BN: HCPCS | Performed by: NEUROLOGICAL SURGERY

## 2019-01-24 PROCEDURE — 70450 CT HEAD/BRAIN W/O DYE: CPT

## 2019-01-24 PROCEDURE — 25010000002 HYDROMORPHONE PER 4 MG: Performed by: NEUROLOGICAL SURGERY

## 2019-01-24 PROCEDURE — 25810000003 SODIUM CHLORIDE 0.9 % WITH KCL 20 MEQ 20-0.9 MEQ/L-% SOLUTION: Performed by: NEUROLOGICAL SURGERY

## 2019-01-24 PROCEDURE — 86850 RBC ANTIBODY SCREEN: CPT | Performed by: ANESTHESIOLOGY

## 2019-01-24 PROCEDURE — 80048 BASIC METABOLIC PNL TOTAL CA: CPT | Performed by: NEUROLOGICAL SURGERY

## 2019-01-24 PROCEDURE — 86901 BLOOD TYPING SEROLOGIC RH(D): CPT | Performed by: ANESTHESIOLOGY

## 2019-01-24 PROCEDURE — 25010000002 FENTANYL CITRATE (PF) 100 MCG/2ML SOLUTION: Performed by: ANESTHESIOLOGY

## 2019-01-24 PROCEDURE — 0NS104Z REPOSITION FRONTAL BONE WITH INTERNAL FIXATION DEVICE, OPEN APPROACH: ICD-10-PCS | Performed by: NEUROLOGICAL SURGERY

## 2019-01-24 PROCEDURE — 25010000003 CEFAZOLIN IN DEXTROSE 2-4 GM/100ML-% SOLUTION: Performed by: NEUROLOGICAL SURGERY

## 2019-01-24 PROCEDURE — 88334 PATH CONSLTJ SURG CYTO XM EA: CPT | Performed by: NEUROLOGICAL SURGERY

## 2019-01-24 PROCEDURE — 85610 PROTHROMBIN TIME: CPT | Performed by: NEUROLOGICAL SURGERY

## 2019-01-24 PROCEDURE — 86900 BLOOD TYPING SEROLOGIC ABO: CPT | Performed by: ANESTHESIOLOGY

## 2019-01-24 PROCEDURE — 69990 MICROSURGERY ADD-ON: CPT | Performed by: NEUROLOGICAL SURGERY

## 2019-01-24 PROCEDURE — 61510 CRNEC TREPH EXC BRN TUM STTL: CPT | Performed by: SPECIALIST/TECHNOLOGIST, OTHER

## 2019-01-24 PROCEDURE — 88331 PATH CONSLTJ SURG 1 BLK 1SPC: CPT | Performed by: NEUROLOGICAL SURGERY

## 2019-01-24 PROCEDURE — 25010000002 CEFAZOLIN PER 500 MG: Performed by: NEUROLOGICAL SURGERY

## 2019-01-24 PROCEDURE — 61510 CRNEC TREPH EXC BRN TUM STTL: CPT | Performed by: NEUROLOGICAL SURGERY

## 2019-01-24 PROCEDURE — 82962 GLUCOSE BLOOD TEST: CPT

## 2019-01-24 PROCEDURE — 25010000002 PROPOFOL 10 MG/ML EMULSION: Performed by: NURSE ANESTHETIST, CERTIFIED REGISTERED

## 2019-01-24 PROCEDURE — 25010000002 DEXAMETHASONE PER 1 MG: Performed by: NEUROLOGICAL SURGERY

## 2019-01-24 PROCEDURE — 85730 THROMBOPLASTIN TIME PARTIAL: CPT | Performed by: NEUROLOGICAL SURGERY

## 2019-01-24 PROCEDURE — 88307 TISSUE EXAM BY PATHOLOGIST: CPT | Performed by: NEUROLOGICAL SURGERY

## 2019-01-24 PROCEDURE — 25010000002 MIDAZOLAM PER 1 MG: Performed by: ANESTHESIOLOGY

## 2019-01-24 PROCEDURE — 25010000002 HYDROMORPHONE PER 4 MG: Performed by: NURSE ANESTHETIST, CERTIFIED REGISTERED

## 2019-01-24 PROCEDURE — 25010000002 FENTANYL CITRATE (PF) 100 MCG/2ML SOLUTION: Performed by: NURSE ANESTHETIST, CERTIFIED REGISTERED

## 2019-01-24 PROCEDURE — 00B70ZZ EXCISION OF CEREBRAL HEMISPHERE, OPEN APPROACH: ICD-10-PCS | Performed by: NEUROLOGICAL SURGERY

## 2019-01-24 PROCEDURE — 85025 COMPLETE CBC W/AUTO DIFF WBC: CPT | Performed by: NEUROLOGICAL SURGERY

## 2019-01-24 DEVICE — DURASEAL® DURAL SEALANT SYSTEM 5ML 5 PACK
Type: IMPLANTABLE DEVICE | Site: CRANIAL | Status: FUNCTIONAL
Brand: DURASEAL®

## 2019-01-24 DEVICE — WAX BONE HEMO NAT 2.5G: Type: IMPLANTABLE DEVICE | Site: CRANIAL | Status: FUNCTIONAL

## 2019-01-24 DEVICE — SCRW CRS/DRV DRL FREE MICRO TI 1.5X4MM: Type: IMPLANTABLE DEVICE | Site: CRANIAL | Status: FUNCTIONAL

## 2019-01-24 DEVICE — IMPLANTABLE DEVICE: Type: IMPLANTABLE DEVICE | Site: CRANIAL | Status: FUNCTIONAL

## 2019-01-24 RX ORDER — LIDOCAINE HYDROCHLORIDE 10 MG/ML
0.5 INJECTION, SOLUTION EPIDURAL; INFILTRATION; INTRACAUDAL; PERINEURAL ONCE AS NEEDED
Status: DISCONTINUED | OUTPATIENT
Start: 2019-01-24 | End: 2019-01-24 | Stop reason: HOSPADM

## 2019-01-24 RX ORDER — ONDANSETRON 2 MG/ML
INJECTION INTRAMUSCULAR; INTRAVENOUS AS NEEDED
Status: DISCONTINUED | OUTPATIENT
Start: 2019-01-24 | End: 2019-01-24 | Stop reason: SURG

## 2019-01-24 RX ORDER — LORAZEPAM 2 MG/ML
1 CONCENTRATE ORAL EVERY 6 HOURS PRN
Status: DISCONTINUED | OUTPATIENT
Start: 2019-01-24 | End: 2019-01-28 | Stop reason: HOSPADM

## 2019-01-24 RX ORDER — SODIUM CHLORIDE 0.9 % (FLUSH) 0.9 %
1-10 SYRINGE (ML) INJECTION AS NEEDED
Status: DISCONTINUED | OUTPATIENT
Start: 2019-01-24 | End: 2019-01-24 | Stop reason: HOSPADM

## 2019-01-24 RX ORDER — MEPERIDINE HYDROCHLORIDE 25 MG/ML
12.5 INJECTION INTRAMUSCULAR; INTRAVENOUS; SUBCUTANEOUS
Status: DISCONTINUED | OUTPATIENT
Start: 2019-01-24 | End: 2019-01-24 | Stop reason: HOSPADM

## 2019-01-24 RX ORDER — MIDAZOLAM HYDROCHLORIDE 1 MG/ML
1 INJECTION INTRAMUSCULAR; INTRAVENOUS
Status: DISCONTINUED | OUTPATIENT
Start: 2019-01-24 | End: 2019-01-24 | Stop reason: HOSPADM

## 2019-01-24 RX ORDER — SODIUM CHLORIDE, SODIUM ACETATE ANHYDROUS, SODIUM GLUCONATE, POTASSIUM CHLORIDE, AND MAGNESIUM CHLORIDE 526; 222; 502; 37; 30 MG/100ML; MG/100ML; MG/100ML; MG/100ML; MG/100ML
IRRIGANT IRRIGATION AS NEEDED
Status: DISCONTINUED | OUTPATIENT
Start: 2019-01-24 | End: 2019-01-24 | Stop reason: HOSPADM

## 2019-01-24 RX ORDER — NALOXONE HCL 0.4 MG/ML
0.4 VIAL (ML) INJECTION
Status: DISCONTINUED | OUTPATIENT
Start: 2019-01-24 | End: 2019-01-28 | Stop reason: HOSPADM

## 2019-01-24 RX ORDER — LEVETIRACETAM 500 MG/1
1000 TABLET ORAL EVERY 12 HOURS
Status: DISCONTINUED | OUTPATIENT
Start: 2019-01-24 | End: 2019-01-28 | Stop reason: HOSPADM

## 2019-01-24 RX ORDER — LABETALOL HYDROCHLORIDE 5 MG/ML
10 INJECTION, SOLUTION INTRAVENOUS
Status: DISCONTINUED | OUTPATIENT
Start: 2019-01-24 | End: 2019-01-28 | Stop reason: HOSPADM

## 2019-01-24 RX ORDER — DEXAMETHASONE SODIUM PHOSPHATE 4 MG/ML
4 INJECTION, SOLUTION INTRA-ARTICULAR; INTRALESIONAL; INTRAMUSCULAR; INTRAVENOUS; SOFT TISSUE EVERY 6 HOURS
Status: DISCONTINUED | OUTPATIENT
Start: 2019-01-24 | End: 2019-01-28 | Stop reason: HOSPADM

## 2019-01-24 RX ORDER — CEFAZOLIN SODIUM IN 0.9 % NACL 3 G/100 ML
3 INTRAVENOUS SOLUTION, PIGGYBACK (ML) INTRAVENOUS EVERY 8 HOURS
Status: COMPLETED | OUTPATIENT
Start: 2019-01-24 | End: 2019-01-25

## 2019-01-24 RX ORDER — PROPOFOL 10 MG/ML
VIAL (ML) INTRAVENOUS AS NEEDED
Status: DISCONTINUED | OUTPATIENT
Start: 2019-01-24 | End: 2019-01-24 | Stop reason: SURG

## 2019-01-24 RX ORDER — HYDROMORPHONE HYDROCHLORIDE 1 MG/ML
0.5 INJECTION, SOLUTION INTRAMUSCULAR; INTRAVENOUS; SUBCUTANEOUS
Status: DISCONTINUED | OUTPATIENT
Start: 2019-01-24 | End: 2019-01-28 | Stop reason: HOSPADM

## 2019-01-24 RX ORDER — BISACODYL 10 MG
10 SUPPOSITORY, RECTAL RECTAL DAILY PRN
Status: DISCONTINUED | OUTPATIENT
Start: 2019-01-24 | End: 2019-01-28 | Stop reason: HOSPADM

## 2019-01-24 RX ORDER — SODIUM CHLORIDE AND POTASSIUM CHLORIDE 150; 900 MG/100ML; MG/100ML
100 INJECTION, SOLUTION INTRAVENOUS CONTINUOUS
Status: DISCONTINUED | OUTPATIENT
Start: 2019-01-24 | End: 2019-01-28 | Stop reason: HOSPADM

## 2019-01-24 RX ORDER — DEXAMETHASONE 4 MG/1
2 TABLET ORAL 2 TIMES DAILY WITH MEALS
Status: DISCONTINUED | OUTPATIENT
Start: 2019-01-24 | End: 2019-01-24

## 2019-01-24 RX ORDER — ONDANSETRON 4 MG/1
4 TABLET, ORALLY DISINTEGRATING ORAL EVERY 6 HOURS PRN
Status: DISCONTINUED | OUTPATIENT
Start: 2019-01-24 | End: 2019-01-28 | Stop reason: HOSPADM

## 2019-01-24 RX ORDER — HYDROCODONE BITARTRATE AND ACETAMINOPHEN 7.5; 325 MG/1; MG/1
1 TABLET ORAL ONCE AS NEEDED
Status: DISCONTINUED | OUTPATIENT
Start: 2019-01-24 | End: 2019-01-24 | Stop reason: HOSPADM

## 2019-01-24 RX ORDER — DEXAMETHASONE 2 MG/1
2 TABLET ORAL 2 TIMES DAILY WITH MEALS
Status: DISCONTINUED | OUTPATIENT
Start: 2019-01-24 | End: 2019-01-24 | Stop reason: ALTCHOICE

## 2019-01-24 RX ORDER — ONDANSETRON HYDROCHLORIDE 8 MG/1
8 TABLET, FILM COATED ORAL 3 TIMES DAILY PRN
Status: DISCONTINUED | OUTPATIENT
Start: 2019-01-24 | End: 2019-01-26

## 2019-01-24 RX ORDER — LIDOCAINE HYDROCHLORIDE 20 MG/ML
INJECTION, SOLUTION INFILTRATION; PERINEURAL AS NEEDED
Status: DISCONTINUED | OUTPATIENT
Start: 2019-01-24 | End: 2019-01-24 | Stop reason: SURG

## 2019-01-24 RX ORDER — ONDANSETRON 2 MG/ML
4 INJECTION INTRAMUSCULAR; INTRAVENOUS EVERY 6 HOURS PRN
Status: DISCONTINUED | OUTPATIENT
Start: 2019-01-24 | End: 2019-01-28 | Stop reason: HOSPADM

## 2019-01-24 RX ORDER — LABETALOL HYDROCHLORIDE 5 MG/ML
INJECTION, SOLUTION INTRAVENOUS AS NEEDED
Status: DISCONTINUED | OUTPATIENT
Start: 2019-01-24 | End: 2019-01-24 | Stop reason: SURG

## 2019-01-24 RX ORDER — FENTANYL CITRATE 50 UG/ML
50 INJECTION, SOLUTION INTRAMUSCULAR; INTRAVENOUS
Status: DISCONTINUED | OUTPATIENT
Start: 2019-01-24 | End: 2019-01-24 | Stop reason: HOSPADM

## 2019-01-24 RX ORDER — SENNA AND DOCUSATE SODIUM 50; 8.6 MG/1; MG/1
1 TABLET, FILM COATED ORAL NIGHTLY PRN
Status: DISCONTINUED | OUTPATIENT
Start: 2019-01-24 | End: 2019-01-25

## 2019-01-24 RX ORDER — SODIUM CHLORIDE, SODIUM LACTATE, POTASSIUM CHLORIDE, CALCIUM CHLORIDE 600; 310; 30; 20 MG/100ML; MG/100ML; MG/100ML; MG/100ML
9 INJECTION, SOLUTION INTRAVENOUS CONTINUOUS
Status: DISCONTINUED | OUTPATIENT
Start: 2019-01-24 | End: 2019-01-24

## 2019-01-24 RX ORDER — FENTANYL CITRATE 50 UG/ML
50 INJECTION, SOLUTION INTRAMUSCULAR; INTRAVENOUS
Status: COMPLETED | OUTPATIENT
Start: 2019-01-24 | End: 2019-01-24

## 2019-01-24 RX ORDER — HYDROMORPHONE HCL 110MG/55ML
PATIENT CONTROLLED ANALGESIA SYRINGE INTRAVENOUS AS NEEDED
Status: DISCONTINUED | OUTPATIENT
Start: 2019-01-24 | End: 2019-01-24 | Stop reason: SURG

## 2019-01-24 RX ORDER — ONDANSETRON 2 MG/ML
4 INJECTION INTRAMUSCULAR; INTRAVENOUS ONCE AS NEEDED
Status: DISCONTINUED | OUTPATIENT
Start: 2019-01-24 | End: 2019-01-24 | Stop reason: HOSPADM

## 2019-01-24 RX ORDER — MIDAZOLAM HYDROCHLORIDE 1 MG/ML
2 INJECTION INTRAMUSCULAR; INTRAVENOUS
Status: DISCONTINUED | OUTPATIENT
Start: 2019-01-24 | End: 2019-01-24 | Stop reason: HOSPADM

## 2019-01-24 RX ORDER — DEXAMETHASONE SODIUM PHOSPHATE 10 MG/ML
INJECTION INTRAMUSCULAR; INTRAVENOUS AS NEEDED
Status: DISCONTINUED | OUTPATIENT
Start: 2019-01-24 | End: 2019-01-24 | Stop reason: SURG

## 2019-01-24 RX ORDER — FLUMAZENIL 0.1 MG/ML
0.2 INJECTION INTRAVENOUS AS NEEDED
Status: DISCONTINUED | OUTPATIENT
Start: 2019-01-24 | End: 2019-01-24 | Stop reason: HOSPADM

## 2019-01-24 RX ORDER — ACETAMINOPHEN 325 MG/1
650 TABLET ORAL ONCE AS NEEDED
Status: DISCONTINUED | OUTPATIENT
Start: 2019-01-24 | End: 2019-01-24 | Stop reason: HOSPADM

## 2019-01-24 RX ORDER — PROMETHAZINE HYDROCHLORIDE 25 MG/1
25 TABLET ORAL ONCE AS NEEDED
Status: DISCONTINUED | OUTPATIENT
Start: 2019-01-24 | End: 2019-01-24 | Stop reason: HOSPADM

## 2019-01-24 RX ORDER — SULFAMETHOXAZOLE AND TRIMETHOPRIM 800; 160 MG/1; MG/1
1 TABLET ORAL 3 TIMES WEEKLY
Status: DISCONTINUED | OUTPATIENT
Start: 2019-01-25 | End: 2019-01-28 | Stop reason: HOSPADM

## 2019-01-24 RX ORDER — EPHEDRINE SULFATE 50 MG/ML
5 INJECTION, SOLUTION INTRAVENOUS ONCE AS NEEDED
Status: DISCONTINUED | OUTPATIENT
Start: 2019-01-24 | End: 2019-01-24 | Stop reason: HOSPADM

## 2019-01-24 RX ORDER — BISACODYL 5 MG/1
5 TABLET, DELAYED RELEASE ORAL DAILY PRN
Status: DISCONTINUED | OUTPATIENT
Start: 2019-01-24 | End: 2019-01-28 | Stop reason: HOSPADM

## 2019-01-24 RX ORDER — FAMOTIDINE 10 MG/ML
20 INJECTION, SOLUTION INTRAVENOUS ONCE
Status: COMPLETED | OUTPATIENT
Start: 2019-01-24 | End: 2019-01-24

## 2019-01-24 RX ORDER — MONTELUKAST SODIUM 10 MG/1
10 TABLET ORAL DAILY
Status: DISCONTINUED | OUTPATIENT
Start: 2019-01-25 | End: 2019-01-24 | Stop reason: SDUPTHER

## 2019-01-24 RX ORDER — CEFAZOLIN SODIUM 2 G/100ML
2 INJECTION, SOLUTION INTRAVENOUS ONCE
Status: COMPLETED | OUTPATIENT
Start: 2019-01-24 | End: 2019-01-24

## 2019-01-24 RX ORDER — DIPHENHYDRAMINE HCL 25 MG
25 CAPSULE ORAL
Status: DISCONTINUED | OUTPATIENT
Start: 2019-01-24 | End: 2019-01-24 | Stop reason: HOSPADM

## 2019-01-24 RX ORDER — MONTELUKAST SODIUM 10 MG/1
10 TABLET ORAL NIGHTLY
Status: DISCONTINUED | OUTPATIENT
Start: 2019-01-24 | End: 2019-01-28 | Stop reason: HOSPADM

## 2019-01-24 RX ORDER — HYDROCODONE BITARTRATE AND ACETAMINOPHEN 5; 325 MG/1; MG/1
1 TABLET ORAL EVERY 4 HOURS PRN
Status: DISCONTINUED | OUTPATIENT
Start: 2019-01-24 | End: 2019-01-25

## 2019-01-24 RX ORDER — OXYCODONE AND ACETAMINOPHEN 7.5; 325 MG/1; MG/1
1 TABLET ORAL ONCE AS NEEDED
Status: DISCONTINUED | OUTPATIENT
Start: 2019-01-24 | End: 2019-01-24 | Stop reason: HOSPADM

## 2019-01-24 RX ORDER — HYDROMORPHONE HYDROCHLORIDE 1 MG/ML
0.5 INJECTION, SOLUTION INTRAMUSCULAR; INTRAVENOUS; SUBCUTANEOUS
Status: DISCONTINUED | OUTPATIENT
Start: 2019-01-24 | End: 2019-01-24 | Stop reason: HOSPADM

## 2019-01-24 RX ORDER — PROMETHAZINE HYDROCHLORIDE 25 MG/ML
12.5 INJECTION, SOLUTION INTRAMUSCULAR; INTRAVENOUS ONCE AS NEEDED
Status: DISCONTINUED | OUTPATIENT
Start: 2019-01-24 | End: 2019-01-24 | Stop reason: HOSPADM

## 2019-01-24 RX ORDER — NALOXONE HCL 0.4 MG/ML
0.2 VIAL (ML) INJECTION AS NEEDED
Status: DISCONTINUED | OUTPATIENT
Start: 2019-01-24 | End: 2019-01-24 | Stop reason: HOSPADM

## 2019-01-24 RX ORDER — ONDANSETRON 4 MG/1
4 TABLET, FILM COATED ORAL EVERY 6 HOURS PRN
Status: DISCONTINUED | OUTPATIENT
Start: 2019-01-24 | End: 2019-01-28 | Stop reason: HOSPADM

## 2019-01-24 RX ORDER — BACITRACIN, NEOMYCIN, POLYMYXIN B 400; 3.5; 5 [USP'U]/G; MG/G; [USP'U]/G
OINTMENT TOPICAL AS NEEDED
Status: DISCONTINUED | OUTPATIENT
Start: 2019-01-24 | End: 2019-01-24 | Stop reason: HOSPADM

## 2019-01-24 RX ORDER — DOCUSATE SODIUM 100 MG/1
100 CAPSULE, LIQUID FILLED ORAL 2 TIMES DAILY PRN
Status: DISCONTINUED | OUTPATIENT
Start: 2019-01-24 | End: 2019-01-25

## 2019-01-24 RX ORDER — PROMETHAZINE HYDROCHLORIDE 25 MG/1
25 SUPPOSITORY RECTAL ONCE AS NEEDED
Status: DISCONTINUED | OUTPATIENT
Start: 2019-01-24 | End: 2019-01-24 | Stop reason: HOSPADM

## 2019-01-24 RX ORDER — DEXAMETHASONE SODIUM PHOSPHATE 4 MG/ML
4 INJECTION, SOLUTION INTRA-ARTICULAR; INTRALESIONAL; INTRAMUSCULAR; INTRAVENOUS; SOFT TISSUE
Status: COMPLETED | OUTPATIENT
Start: 2019-01-24 | End: 2019-01-24

## 2019-01-24 RX ORDER — PANTOPRAZOLE SODIUM 40 MG/1
40 TABLET, DELAYED RELEASE ORAL EVERY MORNING
Status: DISCONTINUED | OUTPATIENT
Start: 2019-01-25 | End: 2019-01-28 | Stop reason: HOSPADM

## 2019-01-24 RX ORDER — ROCURONIUM BROMIDE 10 MG/ML
INJECTION, SOLUTION INTRAVENOUS AS NEEDED
Status: DISCONTINUED | OUTPATIENT
Start: 2019-01-24 | End: 2019-01-24 | Stop reason: SURG

## 2019-01-24 RX ADMIN — LEVETIRACETAM 1000 MG: 500 TABLET, FILM COATED ORAL at 21:37

## 2019-01-24 RX ADMIN — CEFAZOLIN SODIUM 2 G: 2 INJECTION, SOLUTION INTRAVENOUS at 12:37

## 2019-01-24 RX ADMIN — SODIUM CHLORIDE AND POTASSIUM CHLORIDE 100 ML/HR: 9; 1.49 INJECTION, SOLUTION INTRAVENOUS at 21:36

## 2019-01-24 RX ADMIN — MONTELUKAST SODIUM 10 MG: 10 TABLET, FILM COATED ORAL at 21:36

## 2019-01-24 RX ADMIN — SUGAMMADEX 200 MG: 100 INJECTION, SOLUTION INTRAVENOUS at 17:32

## 2019-01-24 RX ADMIN — LABETALOL HYDROCHLORIDE 5 MG: 5 INJECTION, SOLUTION INTRAVENOUS at 13:21

## 2019-01-24 RX ADMIN — NICARDIPINE HYDROCHLORIDE 7.5 MG/HR: 2.5 INJECTION INTRAVENOUS at 18:35

## 2019-01-24 RX ADMIN — FENTANYL CITRATE 50 MCG: 50 INJECTION INTRAMUSCULAR; INTRAVENOUS at 13:09

## 2019-01-24 RX ADMIN — FAMOTIDINE 20 MG: 10 INJECTION, SOLUTION INTRAVENOUS at 10:38

## 2019-01-24 RX ADMIN — HYDROMORPHONE HYDROCHLORIDE 0.5 MG: 2 INJECTION INTRAMUSCULAR; INTRAVENOUS; SUBCUTANEOUS at 16:32

## 2019-01-24 RX ADMIN — CEFAZOLIN SODIUM 2 G: 2 INJECTION, SOLUTION INTRAVENOUS at 16:57

## 2019-01-24 RX ADMIN — ROCURONIUM BROMIDE 20 MG: 10 INJECTION INTRAVENOUS at 16:30

## 2019-01-24 RX ADMIN — FENTANYL CITRATE 50 MCG: 50 INJECTION, SOLUTION INTRAMUSCULAR; INTRAVENOUS at 18:40

## 2019-01-24 RX ADMIN — LABETALOL HYDROCHLORIDE 5 MG: 5 INJECTION, SOLUTION INTRAVENOUS at 16:56

## 2019-01-24 RX ADMIN — SODIUM CHLORIDE, POTASSIUM CHLORIDE, SODIUM LACTATE AND CALCIUM CHLORIDE: 600; 310; 30; 20 INJECTION, SOLUTION INTRAVENOUS at 16:54

## 2019-01-24 RX ADMIN — FENTANYL CITRATE 100 MCG: 50 INJECTION INTRAMUSCULAR; INTRAVENOUS at 12:32

## 2019-01-24 RX ADMIN — HYDROMORPHONE HYDROCHLORIDE 0.5 MG: 1 INJECTION, SOLUTION INTRAMUSCULAR; INTRAVENOUS; SUBCUTANEOUS at 18:15

## 2019-01-24 RX ADMIN — FENTANYL CITRATE 100 MCG: 50 INJECTION INTRAMUSCULAR; INTRAVENOUS at 13:18

## 2019-01-24 RX ADMIN — LIDOCAINE HYDROCHLORIDE 8 MG: 20 INJECTION, SOLUTION INFILTRATION; PERINEURAL at 12:32

## 2019-01-24 RX ADMIN — ONDANSETRON 4 MG: 2 INJECTION INTRAMUSCULAR; INTRAVENOUS at 16:41

## 2019-01-24 RX ADMIN — CEFAZOLIN SODIUM 3 G: 10 INJECTION, POWDER, FOR SOLUTION INTRAVENOUS at 23:08

## 2019-01-24 RX ADMIN — DEXAMETHASONE SODIUM PHOSPHATE 4 MG: 4 INJECTION, SOLUTION INTRA-ARTICULAR; INTRALESIONAL; INTRAMUSCULAR; INTRAVENOUS; SOFT TISSUE at 21:36

## 2019-01-24 RX ADMIN — FENTANYL CITRATE 50 MCG: 50 INJECTION INTRAMUSCULAR; INTRAVENOUS at 10:48

## 2019-01-24 RX ADMIN — MIDAZOLAM HYDROCHLORIDE 2 MG: 2 INJECTION, SOLUTION INTRAMUSCULAR; INTRAVENOUS at 10:48

## 2019-01-24 RX ADMIN — HYDROCODONE BITARTRATE AND ACETAMINOPHEN 1 TABLET: 5; 325 TABLET ORAL at 21:09

## 2019-01-24 RX ADMIN — DEXAMETHASONE SODIUM PHOSPHATE 4 MG: 4 INJECTION INTRA-ARTICULAR; INTRALESIONAL; INTRAMUSCULAR; INTRAVENOUS; SOFT TISSUE at 11:15

## 2019-01-24 RX ADMIN — HYDROMORPHONE HYDROCHLORIDE 0.5 MG: 1 INJECTION, SOLUTION INTRAMUSCULAR; INTRAVENOUS; SUBCUTANEOUS at 18:00

## 2019-01-24 RX ADMIN — SODIUM CHLORIDE, POTASSIUM CHLORIDE, SODIUM LACTATE AND CALCIUM CHLORIDE 9 ML/HR: 600; 310; 30; 20 INJECTION, SOLUTION INTRAVENOUS at 09:46

## 2019-01-24 RX ADMIN — SODIUM CHLORIDE 7.5 MG/HR: 9 INJECTION, SOLUTION INTRAVENOUS at 18:35

## 2019-01-24 RX ADMIN — DEXAMETHASONE SODIUM PHOSPHATE 8 MG: 10 INJECTION INTRAMUSCULAR; INTRAVENOUS at 13:03

## 2019-01-24 RX ADMIN — FENTANYL CITRATE 50 MCG: 50 INJECTION, SOLUTION INTRAMUSCULAR; INTRAVENOUS at 19:00

## 2019-01-24 RX ADMIN — ROCURONIUM BROMIDE 10 MG: 10 INJECTION INTRAVENOUS at 14:31

## 2019-01-24 RX ADMIN — SODIUM CHLORIDE 7.5 MG/HR: 9 INJECTION, SOLUTION INTRAVENOUS at 21:49

## 2019-01-24 RX ADMIN — ROCURONIUM BROMIDE 50 MG: 10 INJECTION INTRAVENOUS at 12:32

## 2019-01-24 RX ADMIN — SODIUM CHLORIDE, POTASSIUM CHLORIDE, SODIUM LACTATE AND CALCIUM CHLORIDE: 600; 310; 30; 20 INJECTION, SOLUTION INTRAVENOUS at 13:33

## 2019-01-24 RX ADMIN — LABETALOL HYDROCHLORIDE 5 MG: 5 INJECTION, SOLUTION INTRAVENOUS at 16:59

## 2019-01-24 RX ADMIN — PROPOFOL 200 MG: 10 INJECTION, EMULSION INTRAVENOUS at 12:32

## 2019-01-24 RX ADMIN — MIDAZOLAM HYDROCHLORIDE 2 MG: 2 INJECTION, SOLUTION INTRAMUSCULAR; INTRAVENOUS at 10:38

## 2019-01-24 RX ADMIN — LABETALOL HYDROCHLORIDE 5 MG: 5 INJECTION, SOLUTION INTRAVENOUS at 13:31

## 2019-01-24 RX ADMIN — HYDROMORPHONE HYDROCHLORIDE 0.5 MG: 1 INJECTION, SOLUTION INTRAMUSCULAR; INTRAVENOUS; SUBCUTANEOUS at 23:10

## 2019-01-24 RX ADMIN — ROCURONIUM BROMIDE 10 MG: 10 INJECTION INTRAVENOUS at 13:31

## 2019-01-24 NOTE — ANESTHESIA PREPROCEDURE EVALUATION
Anesthesia Evaluation     Patient summary reviewed and Nursing notes reviewed   no history of anesthetic complications:  NPO Solid Status: > 8 hours  NPO Liquid Status: > 2 hours           Airway   Mallampati: II  TM distance: >3 FB  Neck ROM: full  No difficulty expected  Dental - normal exam     Pulmonary - normal exam    breath sounds clear to auscultation  (+) pulmonary embolism, asthma,   Cardiovascular - normal exam    Rhythm: regular  Rate: normal    (+) DVT, hyperlipidemia,   (-) PVD      Neuro/Psych  (+) headaches,       ROS Comment: Brain tumor  GI/Hepatic/Renal/Endo    (+)  GERD,      Musculoskeletal (-) negative ROS    Abdominal  - normal exam   Substance History - negative use     OB/GYN negative ob/gyn ROS         Other   (+) blood dyscrasia (Factor V Leiden  deficiency)                     Anesthesia Plan    ASA 3     general   (A-line)  intravenous induction   Anesthetic plan, all risks, benefits, and alternatives have been provided, discussed and informed consent has been obtained with: patient.

## 2019-01-24 NOTE — ANESTHESIA POSTPROCEDURE EVALUATION
"Patient: Bryan Valadez    Procedure Summary     Date:  01/24/19 Room / Location:  [facility] OR  / [facility] MAIN OR    Anesthesia Start:  1226 Anesthesia Stop:  1758    Procedure:  CRANIOTOMY FOR TUMOR STEREOTACTIC, bicoronal for right frontal mass (Right Head) Diagnosis:       Astrocytoma brain tumor (CMS/HCC)      (Astrocytoma brain tumor (CMS/HCC) [C71.9])    Surgeon:  Chetan Galvan IV, MD Provider:  Vernon Hunt MD    Anesthesia Type:  general ASA Status:  3          Anesthesia Type: general  Last vitals  BP   130/86 (01/24/19 1815)   Temp   36.7 °C (98 °F) (01/24/19 1755)   Pulse   82 (01/24/19 1815)   Resp   16 (01/24/19 1815)     SpO2   100 % (01/24/19 1815)     Post Anesthesia Care and Evaluation    Patient location during evaluation: PACU  Patient participation: complete - patient participated  Level of consciousness: awake and alert  Pain management: adequate  Airway patency: patent  Anesthetic complications: No anesthetic complications    Cardiovascular status: acceptable  Respiratory status: acceptable  Hydration status: acceptable    Comments: /86 (BP Location: Right arm, Patient Position: Lying)   Pulse 82   Temp 36.7 °C (98 °F) (Oral)   Resp 16   Ht 180.3 cm (70.98\")   Wt 82.3 kg (181 lb 6 oz)   SpO2 100%   BMI 25.31 kg/m²       "

## 2019-01-24 NOTE — ANESTHESIA PROCEDURE NOTES
Airway  Urgency: elective    Date/Time: 1/24/2019 12:35 PM  Airway not difficult    General Information and Staff    Patient location during procedure: OR  Anesthesiologist: Vernon Hunt MD  CRNA: Elenita Segovia CRNA    Indications and Patient Condition  Indications for airway management: airway protection    Preoxygenated: yes  Mask difficulty assessment: 1 - vent by mask    Final Airway Details  Final airway type: endotracheal airway      Successful airway: ETT  Cuffed: yes   Successful intubation technique: direct laryngoscopy  Endotracheal tube insertion site: oral  Blade: Awa  Blade size: 4  ETT size (mm): 7.5  Cormack-Lehane Classification: grade I - full view of glottis  Placement verified by: chest auscultation and capnometry   Cuff volume (mL): 6  Measured from: lips  ETT to lips (cm): 21  Number of attempts at approach: 1    Additional Comments  Smooth IV induction. Trachea intubated. Cuff up. Dentition intact without injury.

## 2019-01-24 NOTE — ANESTHESIA PROCEDURE NOTES
Arterial Line    Pre-sedation assessment completed: 1/24/2019 10:57 AM    Patient reassessed immediately prior to procedure    Patient location during procedure: holding area  Start time: 1/24/2019 10:57 AM  Stop Time:1/24/2019 11:10 AM       Line placed for hemodynamic monitoring.  Performed By   Anesthesiologist: Berny Mak MD  Preanesthetic Checklist  Completed: patient identified, site marked, surgical consent, pre-op evaluation, timeout performed, IV checked, risks and benefits discussed and monitors and equipment checked  Arterial Line Prep   Sterile Tech: cap, gloves and mask  Prep: ChloraPrep  Patient monitoring: blood pressure monitoring, continuous pulse oximetry and EKG  Arterial Line Procedure   Laterality:right  Location:  radial artery  Catheter size: 20 G   Guidance: ultrasound guided  PROCEDURE NOTE/ULTRASOUND INTERPRETATION.  Using ultrasound guidance the potential vascular sites for insertion of the catheter were visualized to determine the patency of the vessel to be used for vascular access.  After selecting the appropriate site for insertion, the needle was visualized under ultrasound being inserted into the radial artery, followed by ultrasound confirmation of wire and catheter placement. There were no abnormalities seen on ultrasound; an image was taken; and the patient tolerated the procedure with no complications.   Number of attempts: 2  Successful placement: yes          Post Assessment   Dressing Type: occlusive dressing applied, secured with tape and wrist guard applied.   Complications no  Circ/Move/Sens Assessment: normal and unchanged.   Patient Tolerance: patient tolerated the procedure well with no apparent complications

## 2019-01-25 ENCOUNTER — APPOINTMENT (OUTPATIENT)
Dept: CARDIOLOGY | Facility: HOSPITAL | Age: 34
End: 2019-01-25
Attending: NEUROLOGICAL SURGERY

## 2019-01-25 ENCOUNTER — APPOINTMENT (OUTPATIENT)
Dept: CT IMAGING | Facility: HOSPITAL | Age: 34
End: 2019-01-25

## 2019-01-25 ENCOUNTER — APPOINTMENT (OUTPATIENT)
Dept: MRI IMAGING | Facility: HOSPITAL | Age: 34
End: 2019-01-25
Attending: NEUROLOGICAL SURGERY

## 2019-01-25 LAB
ANION GAP SERPL CALCULATED.3IONS-SCNC: 12.8 MMOL/L
ANION GAP SERPL CALCULATED.3IONS-SCNC: 14.9 MMOL/L
BASOPHILS # BLD AUTO: 0.01 10*3/MM3 (ref 0–0.2)
BASOPHILS NFR BLD AUTO: 0.1 % (ref 0–1.5)
BH CV LOW VAS LEFT DISTAL FEMORAL SPONT: 1
BH CV LOW VAS LEFT GASTRONEMIUS VESSEL: 1
BH CV LOW VAS LEFT MID FEMORAL SPONT: 1
BH CV LOW VAS LEFT PERONEAL VESSEL: 1
BH CV LOW VAS LEFT POPLITEAL SPONT: 1
BH CV LOW VAS LEFT POSTERIOR TIBIAL VESSEL: 1
BH CV LOW VAS RIGHT GASTRONEMIUS VESSEL: 1
BH CV LOW VAS RIGHT GREATER SAPH BK VESSEL: 1
BH CV LOW VAS RIGHT PROFUNDA FEMORAL SPONT: 1
BH CV LOWER VASCULAR LEFT COMMON FEMORAL AUGMENT: NORMAL
BH CV LOWER VASCULAR LEFT COMMON FEMORAL COMPETENT: NORMAL
BH CV LOWER VASCULAR LEFT COMMON FEMORAL COMPRESS: NORMAL
BH CV LOWER VASCULAR LEFT COMMON FEMORAL PHASIC: NORMAL
BH CV LOWER VASCULAR LEFT COMMON FEMORAL SPONT: NORMAL
BH CV LOWER VASCULAR LEFT DISTAL FEMORAL AUGMENT: NORMAL
BH CV LOWER VASCULAR LEFT DISTAL FEMORAL COMPETENT: NORMAL
BH CV LOWER VASCULAR LEFT DISTAL FEMORAL COMPRESS: NORMAL
BH CV LOWER VASCULAR LEFT DISTAL FEMORAL PHASIC: NORMAL
BH CV LOWER VASCULAR LEFT DISTAL FEMORAL SPONT: NORMAL
BH CV LOWER VASCULAR LEFT DISTAL FEMORAL THROMBUS: NORMAL
BH CV LOWER VASCULAR LEFT GASTRONEMIUS COMPRESS: NORMAL
BH CV LOWER VASCULAR LEFT GASTRONEMIUS THROMBUS: NORMAL
BH CV LOWER VASCULAR LEFT GREATER SAPH AK COMPRESS: NORMAL
BH CV LOWER VASCULAR LEFT GREATER SAPH BK COMPRESS: NORMAL
BH CV LOWER VASCULAR LEFT LESSER SAPH COMPRESS: NORMAL
BH CV LOWER VASCULAR LEFT MID FEMORAL AUGMENT: NORMAL
BH CV LOWER VASCULAR LEFT MID FEMORAL COMPETENT: NORMAL
BH CV LOWER VASCULAR LEFT MID FEMORAL COMPRESS: NORMAL
BH CV LOWER VASCULAR LEFT MID FEMORAL PHASIC: NORMAL
BH CV LOWER VASCULAR LEFT MID FEMORAL SPONT: NORMAL
BH CV LOWER VASCULAR LEFT MID FEMORAL THROMBUS: NORMAL
BH CV LOWER VASCULAR LEFT PERONEAL COMPRESS: NORMAL
BH CV LOWER VASCULAR LEFT PERONEAL THROMBUS: NORMAL
BH CV LOWER VASCULAR LEFT POPLITEAL AUGMENT: NORMAL
BH CV LOWER VASCULAR LEFT POPLITEAL COMPETENT: NORMAL
BH CV LOWER VASCULAR LEFT POPLITEAL COMPRESS: NORMAL
BH CV LOWER VASCULAR LEFT POPLITEAL PHASIC: NORMAL
BH CV LOWER VASCULAR LEFT POPLITEAL SPONT: NORMAL
BH CV LOWER VASCULAR LEFT POPLITEAL THROMBUS: NORMAL
BH CV LOWER VASCULAR LEFT POSTERIOR TIBIAL COMPRESS: NORMAL
BH CV LOWER VASCULAR LEFT POSTERIOR TIBIAL THROMBUS: NORMAL
BH CV LOWER VASCULAR LEFT PROXIMAL FEMORAL COMPRESS: NORMAL
BH CV LOWER VASCULAR LEFT SAPHENOFEMORAL JUNCTION COMPRESS: NORMAL
BH CV LOWER VASCULAR LEFT SAPHENOFEMORAL JUNCTION PHASIC: NORMAL
BH CV LOWER VASCULAR LEFT SAPHENOFEMORAL JUNCTION SPONT: NORMAL
BH CV LOWER VASCULAR RIGHT COMMON FEMORAL AUGMENT: NORMAL
BH CV LOWER VASCULAR RIGHT COMMON FEMORAL COMPETENT: NORMAL
BH CV LOWER VASCULAR RIGHT COMMON FEMORAL COMPRESS: NORMAL
BH CV LOWER VASCULAR RIGHT COMMON FEMORAL PHASIC: NORMAL
BH CV LOWER VASCULAR RIGHT COMMON FEMORAL SPONT: NORMAL
BH CV LOWER VASCULAR RIGHT DISTAL FEMORAL COMPRESS: NORMAL
BH CV LOWER VASCULAR RIGHT GASTRONEMIUS COMPRESS: NORMAL
BH CV LOWER VASCULAR RIGHT GASTRONEMIUS THROMBUS: NORMAL
BH CV LOWER VASCULAR RIGHT GREATER SAPH AK COMPRESS: NORMAL
BH CV LOWER VASCULAR RIGHT GREATER SAPH BK COMPRESS: NORMAL
BH CV LOWER VASCULAR RIGHT GREATER SAPH BK THROMBUS: NORMAL
BH CV LOWER VASCULAR RIGHT LESSER SAPH COMPRESS: NORMAL
BH CV LOWER VASCULAR RIGHT MID FEMORAL AUGMENT: NORMAL
BH CV LOWER VASCULAR RIGHT MID FEMORAL COMPETENT: NORMAL
BH CV LOWER VASCULAR RIGHT MID FEMORAL COMPRESS: NORMAL
BH CV LOWER VASCULAR RIGHT MID FEMORAL PHASIC: NORMAL
BH CV LOWER VASCULAR RIGHT MID FEMORAL SPONT: NORMAL
BH CV LOWER VASCULAR RIGHT PERONEAL COMPRESS: NORMAL
BH CV LOWER VASCULAR RIGHT POPLITEAL AUGMENT: NORMAL
BH CV LOWER VASCULAR RIGHT POPLITEAL COMPETENT: NORMAL
BH CV LOWER VASCULAR RIGHT POPLITEAL COMPRESS: NORMAL
BH CV LOWER VASCULAR RIGHT POPLITEAL PHASIC: NORMAL
BH CV LOWER VASCULAR RIGHT POPLITEAL SPONT: NORMAL
BH CV LOWER VASCULAR RIGHT POSTERIOR TIBIAL COMPRESS: NORMAL
BH CV LOWER VASCULAR RIGHT PROFUNDA FEMORAL COMPRESS: NORMAL
BH CV LOWER VASCULAR RIGHT PROFUNDA FEMORAL THROMBUS: NORMAL
BH CV LOWER VASCULAR RIGHT PROXIMAL FEMORAL COMPRESS: NORMAL
BH CV LOWER VASCULAR RIGHT SAPHENOFEMORAL JUNCTION COMPRESS: NORMAL
BH CV LOWER VASCULAR RIGHT SAPHENOFEMORAL JUNCTION PHASIC: NORMAL
BH CV LOWER VASCULAR RIGHT SAPHENOFEMORAL JUNCTION SPONT: NORMAL
BUN BLD-MCNC: 7 MG/DL (ref 6–20)
BUN BLD-MCNC: 8 MG/DL (ref 6–20)
BUN/CREAT SERPL: 10.7 (ref 7–25)
BUN/CREAT SERPL: 13 (ref 7–25)
CALCIUM SPEC-SCNC: 7.8 MG/DL (ref 8.6–10.5)
CALCIUM SPEC-SCNC: 7.9 MG/DL (ref 8.6–10.5)
CHLORIDE SERPL-SCNC: 101 MMOL/L (ref 98–107)
CHLORIDE SERPL-SCNC: 99 MMOL/L (ref 98–107)
CO2 SERPL-SCNC: 19.2 MMOL/L (ref 22–29)
CO2 SERPL-SCNC: 20.1 MMOL/L (ref 22–29)
CREAT BLD-MCNC: 0.54 MG/DL (ref 0.76–1.27)
CREAT BLD-MCNC: 0.75 MG/DL (ref 0.76–1.27)
DEPRECATED RDW RBC AUTO: 47.4 FL (ref 37–54)
EOSINOPHIL # BLD AUTO: 0 10*3/MM3 (ref 0–0.7)
EOSINOPHIL NFR BLD AUTO: 0 % (ref 0.3–6.2)
ERYTHROCYTE [DISTWIDTH] IN BLOOD BY AUTOMATED COUNT: 13.6 % (ref 11.5–14.5)
GFR SERPL CREATININE-BSD FRML MDRD: 119 ML/MIN/1.73
GFR SERPL CREATININE-BSD FRML MDRD: >150 ML/MIN/1.73
GLUCOSE BLD-MCNC: 127 MG/DL (ref 65–99)
GLUCOSE BLD-MCNC: 141 MG/DL (ref 65–99)
HCT VFR BLD AUTO: 41.4 % (ref 40.4–52.2)
HGB BLD-MCNC: 13.6 G/DL (ref 13.7–17.6)
IMM GRANULOCYTES # BLD AUTO: 0.06 10*3/MM3 (ref 0–0.03)
IMM GRANULOCYTES NFR BLD AUTO: 0.4 % (ref 0–0.5)
LYMPHOCYTES # BLD AUTO: 1.02 10*3/MM3 (ref 0.9–4.8)
LYMPHOCYTES NFR BLD AUTO: 7 % (ref 19.6–45.3)
MCH RBC QN AUTO: 31.2 PG (ref 27–32.7)
MCHC RBC AUTO-ENTMCNC: 32.9 G/DL (ref 32.6–36.4)
MCV RBC AUTO: 95 FL (ref 79.8–96.2)
MONOCYTES # BLD AUTO: 1.5 10*3/MM3 (ref 0.2–1.2)
MONOCYTES NFR BLD AUTO: 10.3 % (ref 5–12)
NEUTROPHILS # BLD AUTO: 11.98 10*3/MM3 (ref 1.9–8.1)
NEUTROPHILS NFR BLD AUTO: 82.2 % (ref 42.7–76)
PLATELET # BLD AUTO: 249 10*3/MM3 (ref 140–500)
PMV BLD AUTO: 9.5 FL (ref 6–12)
POTASSIUM BLD-SCNC: 3.7 MMOL/L (ref 3.5–5.2)
POTASSIUM BLD-SCNC: 3.8 MMOL/L (ref 3.5–5.2)
RBC # BLD AUTO: 4.36 10*6/MM3 (ref 4.6–6)
SODIUM BLD-SCNC: 131 MMOL/L (ref 136–145)
SODIUM BLD-SCNC: 136 MMOL/L (ref 136–145)
WBC NRBC COR # BLD: 14.57 10*3/MM3 (ref 4.5–10.7)

## 2019-01-25 PROCEDURE — 0 GADOBENATE DIMEGLUMINE 529 MG/ML SOLUTION: Performed by: NEUROLOGICAL SURGERY

## 2019-01-25 PROCEDURE — 70450 CT HEAD/BRAIN W/O DYE: CPT

## 2019-01-25 PROCEDURE — 97110 THERAPEUTIC EXERCISES: CPT

## 2019-01-25 PROCEDURE — 70553 MRI BRAIN STEM W/O & W/DYE: CPT

## 2019-01-25 PROCEDURE — 80048 BASIC METABOLIC PNL TOTAL CA: CPT | Performed by: NEUROLOGICAL SURGERY

## 2019-01-25 PROCEDURE — 25010000002 CEFAZOLIN PER 500 MG: Performed by: NEUROLOGICAL SURGERY

## 2019-01-25 PROCEDURE — 25810000003 SODIUM CHLORIDE 0.9 % WITH KCL 20 MEQ 20-0.9 MEQ/L-% SOLUTION: Performed by: NEUROLOGICAL SURGERY

## 2019-01-25 PROCEDURE — 80048 BASIC METABOLIC PNL TOTAL CA: CPT | Performed by: NURSE PRACTITIONER

## 2019-01-25 PROCEDURE — 85025 COMPLETE CBC W/AUTO DIFF WBC: CPT | Performed by: NEUROLOGICAL SURGERY

## 2019-01-25 PROCEDURE — A9577 INJ MULTIHANCE: HCPCS | Performed by: NEUROLOGICAL SURGERY

## 2019-01-25 PROCEDURE — 99024 POSTOP FOLLOW-UP VISIT: CPT | Performed by: NURSE PRACTITIONER

## 2019-01-25 PROCEDURE — 93970 EXTREMITY STUDY: CPT

## 2019-01-25 PROCEDURE — 25010000002 DEXAMETHASONE PER 1 MG: Performed by: NEUROLOGICAL SURGERY

## 2019-01-25 PROCEDURE — 99255 IP/OBS CONSLTJ NEW/EST HI 80: CPT | Performed by: INTERNAL MEDICINE

## 2019-01-25 PROCEDURE — 97161 PT EVAL LOW COMPLEX 20 MIN: CPT

## 2019-01-25 PROCEDURE — 25010000002 HYDROMORPHONE PER 4 MG: Performed by: NEUROLOGICAL SURGERY

## 2019-01-25 RX ORDER — DOCUSATE SODIUM 100 MG/1
100 CAPSULE, LIQUID FILLED ORAL DAILY
Status: DISCONTINUED | OUTPATIENT
Start: 2019-01-25 | End: 2019-01-28 | Stop reason: HOSPADM

## 2019-01-25 RX ORDER — HYDROCODONE BITARTRATE AND ACETAMINOPHEN 7.5; 325 MG/1; MG/1
1 TABLET ORAL EVERY 4 HOURS PRN
Status: DISCONTINUED | OUTPATIENT
Start: 2019-01-25 | End: 2019-01-28 | Stop reason: HOSPADM

## 2019-01-25 RX ORDER — HYDROCODONE BITARTRATE AND ACETAMINOPHEN 5; 325 MG/1; MG/1
1 TABLET ORAL ONCE AS NEEDED
Status: DISCONTINUED | OUTPATIENT
Start: 2019-01-25 | End: 2019-01-26

## 2019-01-25 RX ORDER — SENNA AND DOCUSATE SODIUM 50; 8.6 MG/1; MG/1
2 TABLET, FILM COATED ORAL NIGHTLY
Status: DISCONTINUED | OUTPATIENT
Start: 2019-01-25 | End: 2019-01-28 | Stop reason: HOSPADM

## 2019-01-25 RX ADMIN — LEVETIRACETAM 1000 MG: 500 TABLET, FILM COATED ORAL at 17:56

## 2019-01-25 RX ADMIN — LEVETIRACETAM 1000 MG: 500 TABLET, FILM COATED ORAL at 06:09

## 2019-01-25 RX ADMIN — DEXAMETHASONE SODIUM PHOSPHATE 4 MG: 4 INJECTION, SOLUTION INTRA-ARTICULAR; INTRALESIONAL; INTRAMUSCULAR; INTRAVENOUS; SOFT TISSUE at 14:50

## 2019-01-25 RX ADMIN — SODIUM CHLORIDE AND POTASSIUM CHLORIDE 100 ML/HR: 9; 1.49 INJECTION, SOLUTION INTRAVENOUS at 17:15

## 2019-01-25 RX ADMIN — SENNOSIDES AND DOCUSATE SODIUM 2 TABLET: 8.6; 5 TABLET ORAL at 20:32

## 2019-01-25 RX ADMIN — HYDROMORPHONE HYDROCHLORIDE 0.5 MG: 1 INJECTION, SOLUTION INTRAMUSCULAR; INTRAVENOUS; SUBCUTANEOUS at 22:38

## 2019-01-25 RX ADMIN — SULFAMETHOXAZOLE AND TRIMETHOPRIM 160 MG: 800; 160 TABLET ORAL at 08:00

## 2019-01-25 RX ADMIN — DOCUSATE SODIUM 100 MG: 100 CAPSULE, LIQUID FILLED ORAL at 12:28

## 2019-01-25 RX ADMIN — HYDROMORPHONE HYDROCHLORIDE 0.5 MG: 1 INJECTION, SOLUTION INTRAMUSCULAR; INTRAVENOUS; SUBCUTANEOUS at 04:02

## 2019-01-25 RX ADMIN — HYDROCODONE BITARTRATE AND ACETAMINOPHEN 1 TABLET: 7.5; 325 TABLET ORAL at 16:08

## 2019-01-25 RX ADMIN — SODIUM CHLORIDE 7.5 MG/HR: 9 INJECTION, SOLUTION INTRAVENOUS at 07:51

## 2019-01-25 RX ADMIN — HYDROMORPHONE HYDROCHLORIDE 0.5 MG: 1 INJECTION, SOLUTION INTRAMUSCULAR; INTRAVENOUS; SUBCUTANEOUS at 09:42

## 2019-01-25 RX ADMIN — PANTOPRAZOLE SODIUM 40 MG: 40 TABLET, DELAYED RELEASE ORAL at 06:09

## 2019-01-25 RX ADMIN — DEXAMETHASONE SODIUM PHOSPHATE 4 MG: 4 INJECTION, SOLUTION INTRA-ARTICULAR; INTRALESIONAL; INTRAMUSCULAR; INTRAVENOUS; SOFT TISSUE at 20:33

## 2019-01-25 RX ADMIN — HYDROCODONE BITARTRATE AND ACETAMINOPHEN 1 TABLET: 7.5; 325 TABLET ORAL at 20:32

## 2019-01-25 RX ADMIN — CEFAZOLIN SODIUM 3 G: 10 INJECTION, POWDER, FOR SOLUTION INTRAVENOUS at 07:45

## 2019-01-25 RX ADMIN — SODIUM CHLORIDE AND POTASSIUM CHLORIDE 100 ML/HR: 9; 1.49 INJECTION, SOLUTION INTRAVENOUS at 07:44

## 2019-01-25 RX ADMIN — HYDROCODONE BITARTRATE AND ACETAMINOPHEN 1 TABLET: 5; 325 TABLET ORAL at 07:48

## 2019-01-25 RX ADMIN — HYDROMORPHONE HYDROCHLORIDE 0.5 MG: 1 INJECTION, SOLUTION INTRAMUSCULAR; INTRAVENOUS; SUBCUTANEOUS at 18:38

## 2019-01-25 RX ADMIN — GADOBENATE DIMEGLUMINE 17 ML: 529 INJECTION, SOLUTION INTRAVENOUS at 10:29

## 2019-01-25 RX ADMIN — HYDROCODONE BITARTRATE AND ACETAMINOPHEN 1 TABLET: 5; 325 TABLET ORAL at 03:03

## 2019-01-25 RX ADMIN — DEXAMETHASONE SODIUM PHOSPHATE 4 MG: 4 INJECTION, SOLUTION INTRA-ARTICULAR; INTRALESIONAL; INTRAMUSCULAR; INTRAVENOUS; SOFT TISSUE at 03:03

## 2019-01-25 RX ADMIN — HYDROCODONE BITARTRATE AND ACETAMINOPHEN 1 TABLET: 5; 325 TABLET ORAL at 11:50

## 2019-01-25 RX ADMIN — SODIUM CHLORIDE 7.5 MG/HR: 9 INJECTION, SOLUTION INTRAVENOUS at 04:02

## 2019-01-25 RX ADMIN — MONTELUKAST SODIUM 10 MG: 10 TABLET, FILM COATED ORAL at 20:32

## 2019-01-25 RX ADMIN — DEXAMETHASONE SODIUM PHOSPHATE 4 MG: 4 INJECTION, SOLUTION INTRA-ARTICULAR; INTRALESIONAL; INTRAMUSCULAR; INTRAVENOUS; SOFT TISSUE at 08:30

## 2019-01-25 RX ADMIN — HYDROMORPHONE HYDROCHLORIDE 0.5 MG: 1 INJECTION, SOLUTION INTRAMUSCULAR; INTRAVENOUS; SUBCUTANEOUS at 01:21

## 2019-01-26 ENCOUNTER — APPOINTMENT (OUTPATIENT)
Dept: CT IMAGING | Facility: HOSPITAL | Age: 34
End: 2019-01-26

## 2019-01-26 LAB
ALBUMIN SERPL-MCNC: 3.7 G/DL (ref 3.5–5.2)
ALBUMIN/GLOB SERPL: 1.3 G/DL
ALP SERPL-CCNC: 34 U/L (ref 39–117)
ALT SERPL W P-5'-P-CCNC: 34 U/L (ref 1–41)
ANION GAP SERPL CALCULATED.3IONS-SCNC: 13.3 MMOL/L
APTT PPP: 23.3 SECONDS (ref 22.7–35.4)
APTT PPP: 35.1 SECONDS (ref 22.7–35.4)
APTT PPP: 51.7 SECONDS (ref 22.7–35.4)
AST SERPL-CCNC: 11 U/L (ref 1–40)
BASOPHILS # BLD AUTO: 0 10*3/MM3 (ref 0–0.2)
BASOPHILS NFR BLD AUTO: 0 % (ref 0–1.5)
BILIRUB SERPL-MCNC: 0.3 MG/DL (ref 0.1–1.2)
BUN BLD-MCNC: 12 MG/DL (ref 6–20)
BUN/CREAT SERPL: 17.4 (ref 7–25)
CALCIUM SPEC-SCNC: 9 MG/DL (ref 8.6–10.5)
CHLORIDE SERPL-SCNC: 105 MMOL/L (ref 98–107)
CO2 SERPL-SCNC: 22.7 MMOL/L (ref 22–29)
CREAT BLD-MCNC: 0.69 MG/DL (ref 0.76–1.27)
DEPRECATED RDW RBC AUTO: 50.1 FL (ref 37–54)
EOSINOPHIL # BLD AUTO: 0 10*3/MM3 (ref 0–0.7)
EOSINOPHIL NFR BLD AUTO: 0 % (ref 0.3–6.2)
ERYTHROCYTE [DISTWIDTH] IN BLOOD BY AUTOMATED COUNT: 13.9 % (ref 11.5–14.5)
GFR SERPL CREATININE-BSD FRML MDRD: 130 ML/MIN/1.73
GLOBULIN UR ELPH-MCNC: 2.8 GM/DL
GLUCOSE BLD-MCNC: 123 MG/DL (ref 65–99)
HCT VFR BLD AUTO: 41.6 % (ref 40.4–52.2)
HGB BLD-MCNC: 13.2 G/DL (ref 13.7–17.6)
IMM GRANULOCYTES # BLD AUTO: 0.06 10*3/MM3 (ref 0–0.03)
IMM GRANULOCYTES NFR BLD AUTO: 0.4 % (ref 0–0.5)
INR PPP: 1.06 (ref 0.9–1.1)
LYMPHOCYTES # BLD AUTO: 1.12 10*3/MM3 (ref 0.9–4.8)
LYMPHOCYTES NFR BLD AUTO: 8.2 % (ref 19.6–45.3)
MCH RBC QN AUTO: 31.4 PG (ref 27–32.7)
MCHC RBC AUTO-ENTMCNC: 31.7 G/DL (ref 32.6–36.4)
MCV RBC AUTO: 98.8 FL (ref 79.8–96.2)
MONOCYTES # BLD AUTO: 1.53 10*3/MM3 (ref 0.2–1.2)
MONOCYTES NFR BLD AUTO: 11.2 % (ref 5–12)
NEUTROPHILS # BLD AUTO: 10.93 10*3/MM3 (ref 1.9–8.1)
NEUTROPHILS NFR BLD AUTO: 80.2 % (ref 42.7–76)
PLATELET # BLD AUTO: 240 10*3/MM3 (ref 140–500)
PMV BLD AUTO: 9.8 FL (ref 6–12)
POTASSIUM BLD-SCNC: 4.7 MMOL/L (ref 3.5–5.2)
PROT SERPL-MCNC: 6.5 G/DL (ref 6–8.5)
PROTHROMBIN TIME: 13.6 SECONDS (ref 11.7–14.2)
RBC # BLD AUTO: 4.21 10*6/MM3 (ref 4.6–6)
SODIUM BLD-SCNC: 141 MMOL/L (ref 136–145)
WBC NRBC COR # BLD: 13.64 10*3/MM3 (ref 4.5–10.7)

## 2019-01-26 PROCEDURE — 25010000002 DEXAMETHASONE PER 1 MG: Performed by: NEUROLOGICAL SURGERY

## 2019-01-26 PROCEDURE — 85730 THROMBOPLASTIN TIME PARTIAL: CPT | Performed by: INTERNAL MEDICINE

## 2019-01-26 PROCEDURE — 97110 THERAPEUTIC EXERCISES: CPT | Performed by: PHYSICAL THERAPIST

## 2019-01-26 PROCEDURE — 85610 PROTHROMBIN TIME: CPT | Performed by: INTERNAL MEDICINE

## 2019-01-26 PROCEDURE — 85025 COMPLETE CBC W/AUTO DIFF WBC: CPT | Performed by: INTERNAL MEDICINE

## 2019-01-26 PROCEDURE — 25010000002 HEPARIN (PORCINE) PER 1000 UNITS: Performed by: INTERNAL MEDICINE

## 2019-01-26 PROCEDURE — 25810000003 SODIUM CHLORIDE 0.9 % WITH KCL 20 MEQ 20-0.9 MEQ/L-% SOLUTION: Performed by: NEUROLOGICAL SURGERY

## 2019-01-26 PROCEDURE — 80053 COMPREHEN METABOLIC PANEL: CPT | Performed by: INTERNAL MEDICINE

## 2019-01-26 PROCEDURE — 70450 CT HEAD/BRAIN W/O DYE: CPT

## 2019-01-26 PROCEDURE — 99024 POSTOP FOLLOW-UP VISIT: CPT | Performed by: NEUROLOGICAL SURGERY

## 2019-01-26 PROCEDURE — 99232 SBSQ HOSP IP/OBS MODERATE 35: CPT | Performed by: INTERNAL MEDICINE

## 2019-01-26 RX ORDER — HEPARIN SODIUM 10000 [USP'U]/100ML
12 INJECTION, SOLUTION INTRAVENOUS
Status: DISCONTINUED | OUTPATIENT
Start: 2019-01-26 | End: 2019-01-28 | Stop reason: HOSPADM

## 2019-01-26 RX ADMIN — PANTOPRAZOLE SODIUM 40 MG: 40 TABLET, DELAYED RELEASE ORAL at 06:07

## 2019-01-26 RX ADMIN — LEVETIRACETAM 1000 MG: 500 TABLET, FILM COATED ORAL at 06:08

## 2019-01-26 RX ADMIN — HEPARIN SODIUM 12 UNITS/KG/HR: 10000 INJECTION, SOLUTION INTRAVENOUS at 10:04

## 2019-01-26 RX ADMIN — LEVETIRACETAM 1000 MG: 500 TABLET, FILM COATED ORAL at 17:53

## 2019-01-26 RX ADMIN — HYDROCODONE BITARTRATE AND ACETAMINOPHEN 1 TABLET: 7.5; 325 TABLET ORAL at 03:22

## 2019-01-26 RX ADMIN — DEXAMETHASONE SODIUM PHOSPHATE 4 MG: 4 INJECTION, SOLUTION INTRA-ARTICULAR; INTRALESIONAL; INTRAMUSCULAR; INTRAVENOUS; SOFT TISSUE at 10:24

## 2019-01-26 RX ADMIN — HYDROCODONE BITARTRATE AND ACETAMINOPHEN 1 TABLET: 7.5; 325 TABLET ORAL at 15:05

## 2019-01-26 RX ADMIN — SODIUM CHLORIDE AND POTASSIUM CHLORIDE 100 ML/HR: 9; 1.49 INJECTION, SOLUTION INTRAVENOUS at 16:32

## 2019-01-26 RX ADMIN — SENNOSIDES AND DOCUSATE SODIUM 2 TABLET: 8.6; 5 TABLET ORAL at 20:30

## 2019-01-26 RX ADMIN — HYDROCODONE BITARTRATE AND ACETAMINOPHEN 1 TABLET: 7.5; 325 TABLET ORAL at 10:24

## 2019-01-26 RX ADMIN — MONTELUKAST SODIUM 10 MG: 10 TABLET, FILM COATED ORAL at 20:30

## 2019-01-26 RX ADMIN — HYDROCODONE BITARTRATE AND ACETAMINOPHEN 1 TABLET: 7.5; 325 TABLET ORAL at 23:04

## 2019-01-26 RX ADMIN — DEXAMETHASONE SODIUM PHOSPHATE 4 MG: 4 INJECTION, SOLUTION INTRA-ARTICULAR; INTRALESIONAL; INTRAMUSCULAR; INTRAVENOUS; SOFT TISSUE at 20:30

## 2019-01-26 RX ADMIN — HYDROCODONE BITARTRATE AND ACETAMINOPHEN 1 TABLET: 7.5; 325 TABLET ORAL at 18:50

## 2019-01-26 RX ADMIN — DEXAMETHASONE SODIUM PHOSPHATE 4 MG: 4 INJECTION, SOLUTION INTRA-ARTICULAR; INTRALESIONAL; INTRAMUSCULAR; INTRAVENOUS; SOFT TISSUE at 03:22

## 2019-01-26 RX ADMIN — DEXAMETHASONE SODIUM PHOSPHATE 4 MG: 4 INJECTION, SOLUTION INTRA-ARTICULAR; INTRALESIONAL; INTRAMUSCULAR; INTRAVENOUS; SOFT TISSUE at 15:19

## 2019-01-26 RX ADMIN — DOCUSATE SODIUM 100 MG: 100 CAPSULE, LIQUID FILLED ORAL at 10:24

## 2019-01-27 LAB
ALBUMIN SERPL-MCNC: 3.3 G/DL (ref 3.5–5.2)
ALBUMIN/GLOB SERPL: 1.3 G/DL
ALP SERPL-CCNC: 27 U/L (ref 39–117)
ALT SERPL W P-5'-P-CCNC: 27 U/L (ref 1–41)
ANION GAP SERPL CALCULATED.3IONS-SCNC: 10.8 MMOL/L
APTT PPP: 59.9 SECONDS (ref 22.7–35.4)
APTT PPP: 69.6 SECONDS (ref 22.7–35.4)
AST SERPL-CCNC: 12 U/L (ref 1–40)
BASOPHILS # BLD AUTO: 0.01 10*3/MM3 (ref 0–0.2)
BASOPHILS NFR BLD AUTO: 0.1 % (ref 0–1.5)
BILIRUB SERPL-MCNC: 0.2 MG/DL (ref 0.1–1.2)
BUN BLD-MCNC: 9 MG/DL (ref 6–20)
BUN/CREAT SERPL: 15 (ref 7–25)
CALCIUM SPEC-SCNC: 8.5 MG/DL (ref 8.6–10.5)
CHLORIDE SERPL-SCNC: 106 MMOL/L (ref 98–107)
CO2 SERPL-SCNC: 22.2 MMOL/L (ref 22–29)
CREAT BLD-MCNC: 0.6 MG/DL (ref 0.76–1.27)
DEPRECATED RDW RBC AUTO: 50 FL (ref 37–54)
EOSINOPHIL # BLD AUTO: 0 10*3/MM3 (ref 0–0.7)
EOSINOPHIL NFR BLD AUTO: 0 % (ref 0.3–6.2)
ERYTHROCYTE [DISTWIDTH] IN BLOOD BY AUTOMATED COUNT: 13.9 % (ref 11.5–14.5)
GFR SERPL CREATININE-BSD FRML MDRD: >150 ML/MIN/1.73
GLOBULIN UR ELPH-MCNC: 2.5 GM/DL
GLUCOSE BLD-MCNC: 141 MG/DL (ref 65–99)
HCT VFR BLD AUTO: 38.7 % (ref 40.4–52.2)
HGB BLD-MCNC: 12.2 G/DL (ref 13.7–17.6)
IMM GRANULOCYTES # BLD AUTO: 0.05 10*3/MM3 (ref 0–0.03)
IMM GRANULOCYTES NFR BLD AUTO: 0.5 % (ref 0–0.5)
LYMPHOCYTES # BLD AUTO: 1.16 10*3/MM3 (ref 0.9–4.8)
LYMPHOCYTES NFR BLD AUTO: 11.8 % (ref 19.6–45.3)
MCH RBC QN AUTO: 31.2 PG (ref 27–32.7)
MCHC RBC AUTO-ENTMCNC: 31.5 G/DL (ref 32.6–36.4)
MCV RBC AUTO: 99 FL (ref 79.8–96.2)
MONOCYTES # BLD AUTO: 0.93 10*3/MM3 (ref 0.2–1.2)
MONOCYTES NFR BLD AUTO: 9.5 % (ref 5–12)
NEUTROPHILS # BLD AUTO: 7.69 10*3/MM3 (ref 1.9–8.1)
NEUTROPHILS NFR BLD AUTO: 78.1 % (ref 42.7–76)
PLATELET # BLD AUTO: 204 10*3/MM3 (ref 140–500)
PMV BLD AUTO: 9.6 FL (ref 6–12)
POTASSIUM BLD-SCNC: 4.3 MMOL/L (ref 3.5–5.2)
PROT SERPL-MCNC: 5.8 G/DL (ref 6–8.5)
RBC # BLD AUTO: 3.91 10*6/MM3 (ref 4.6–6)
SODIUM BLD-SCNC: 139 MMOL/L (ref 136–145)
WBC NRBC COR # BLD: 9.84 10*3/MM3 (ref 4.5–10.7)

## 2019-01-27 PROCEDURE — 99024 POSTOP FOLLOW-UP VISIT: CPT | Performed by: NEUROLOGICAL SURGERY

## 2019-01-27 PROCEDURE — 25810000003 SODIUM CHLORIDE 0.9 % WITH KCL 20 MEQ 20-0.9 MEQ/L-% SOLUTION: Performed by: NEUROLOGICAL SURGERY

## 2019-01-27 PROCEDURE — 85730 THROMBOPLASTIN TIME PARTIAL: CPT | Performed by: NEUROLOGICAL SURGERY

## 2019-01-27 PROCEDURE — 80053 COMPREHEN METABOLIC PANEL: CPT | Performed by: INTERNAL MEDICINE

## 2019-01-27 PROCEDURE — 25010000002 HEPARIN (PORCINE) PER 1000 UNITS: Performed by: INTERNAL MEDICINE

## 2019-01-27 PROCEDURE — 25010000002 DEXAMETHASONE PER 1 MG: Performed by: NEUROLOGICAL SURGERY

## 2019-01-27 PROCEDURE — 85730 THROMBOPLASTIN TIME PARTIAL: CPT | Performed by: INTERNAL MEDICINE

## 2019-01-27 PROCEDURE — 85025 COMPLETE CBC W/AUTO DIFF WBC: CPT | Performed by: INTERNAL MEDICINE

## 2019-01-27 PROCEDURE — 99232 SBSQ HOSP IP/OBS MODERATE 35: CPT | Performed by: INTERNAL MEDICINE

## 2019-01-27 RX ADMIN — DEXAMETHASONE SODIUM PHOSPHATE 4 MG: 4 INJECTION, SOLUTION INTRA-ARTICULAR; INTRALESIONAL; INTRAMUSCULAR; INTRAVENOUS; SOFT TISSUE at 15:10

## 2019-01-27 RX ADMIN — LEVETIRACETAM 1000 MG: 500 TABLET, FILM COATED ORAL at 17:53

## 2019-01-27 RX ADMIN — PANTOPRAZOLE SODIUM 40 MG: 40 TABLET, DELAYED RELEASE ORAL at 06:09

## 2019-01-27 RX ADMIN — LEVETIRACETAM 1000 MG: 500 TABLET, FILM COATED ORAL at 06:09

## 2019-01-27 RX ADMIN — SODIUM CHLORIDE AND POTASSIUM CHLORIDE 100 ML/HR: 9; 1.49 INJECTION, SOLUTION INTRAVENOUS at 12:41

## 2019-01-27 RX ADMIN — HEPARIN SODIUM 19 UNITS/KG/HR: 10000 INJECTION, SOLUTION INTRAVENOUS at 20:43

## 2019-01-27 RX ADMIN — DOCUSATE SODIUM 100 MG: 100 CAPSULE, LIQUID FILLED ORAL at 08:57

## 2019-01-27 RX ADMIN — SENNOSIDES AND DOCUSATE SODIUM 2 TABLET: 8.6; 5 TABLET ORAL at 20:42

## 2019-01-27 RX ADMIN — MONTELUKAST SODIUM 10 MG: 10 TABLET, FILM COATED ORAL at 20:42

## 2019-01-27 RX ADMIN — SODIUM CHLORIDE AND POTASSIUM CHLORIDE 100 ML/HR: 9; 1.49 INJECTION, SOLUTION INTRAVENOUS at 02:36

## 2019-01-27 RX ADMIN — HEPARIN SODIUM 18 UNITS/KG/HR: 10000 INJECTION, SOLUTION INTRAVENOUS at 03:56

## 2019-01-27 RX ADMIN — HYDROCODONE BITARTRATE AND ACETAMINOPHEN 1 TABLET: 7.5; 325 TABLET ORAL at 20:42

## 2019-01-27 RX ADMIN — DEXAMETHASONE SODIUM PHOSPHATE 4 MG: 4 INJECTION, SOLUTION INTRA-ARTICULAR; INTRALESIONAL; INTRAMUSCULAR; INTRAVENOUS; SOFT TISSUE at 20:42

## 2019-01-27 RX ADMIN — DEXAMETHASONE SODIUM PHOSPHATE 4 MG: 4 INJECTION, SOLUTION INTRA-ARTICULAR; INTRALESIONAL; INTRAMUSCULAR; INTRAVENOUS; SOFT TISSUE at 08:57

## 2019-01-27 RX ADMIN — DEXAMETHASONE SODIUM PHOSPHATE 4 MG: 4 INJECTION, SOLUTION INTRA-ARTICULAR; INTRALESIONAL; INTRAMUSCULAR; INTRAVENOUS; SOFT TISSUE at 02:58

## 2019-01-27 RX ADMIN — HYDROCODONE BITARTRATE AND ACETAMINOPHEN 1 TABLET: 7.5; 325 TABLET ORAL at 15:18

## 2019-01-27 RX ADMIN — HYDROCODONE BITARTRATE AND ACETAMINOPHEN 1 TABLET: 7.5; 325 TABLET ORAL at 03:01

## 2019-01-28 ENCOUNTER — TELEPHONE (OUTPATIENT)
Dept: ONCOLOGY | Facility: HOSPITAL | Age: 34
End: 2019-01-28

## 2019-01-28 ENCOUNTER — TELEPHONE (OUTPATIENT)
Dept: GENERAL RADIOLOGY | Facility: HOSPITAL | Age: 34
End: 2019-01-28

## 2019-01-28 ENCOUNTER — NURSE TRIAGE (OUTPATIENT)
Dept: CALL CENTER | Facility: HOSPITAL | Age: 34
End: 2019-01-28

## 2019-01-28 VITALS
SYSTOLIC BLOOD PRESSURE: 116 MMHG | OXYGEN SATURATION: 98 % | RESPIRATION RATE: 20 BRPM | HEART RATE: 72 BPM | HEIGHT: 71 IN | DIASTOLIC BLOOD PRESSURE: 68 MMHG | BODY MASS INDEX: 25.39 KG/M2 | WEIGHT: 181.38 LBS | TEMPERATURE: 98.5 F

## 2019-01-28 LAB
ALBUMIN SERPL-MCNC: 3.5 G/DL (ref 3.5–5.2)
ALBUMIN/GLOB SERPL: 1.3 G/DL
ALP SERPL-CCNC: 31 U/L (ref 39–117)
ALT SERPL W P-5'-P-CCNC: 28 U/L (ref 1–41)
ANION GAP SERPL CALCULATED.3IONS-SCNC: 13 MMOL/L
APTT PPP: 71.4 SECONDS (ref 22.7–35.4)
AST SERPL-CCNC: 8 U/L (ref 1–40)
BASOPHILS # BLD AUTO: 0 10*3/MM3 (ref 0–0.2)
BASOPHILS NFR BLD AUTO: 0 % (ref 0–1.5)
BILIRUB SERPL-MCNC: 0.2 MG/DL (ref 0.1–1.2)
BUN BLD-MCNC: 13 MG/DL (ref 6–20)
BUN/CREAT SERPL: 21 (ref 7–25)
CALCIUM SPEC-SCNC: 9.1 MG/DL (ref 8.6–10.5)
CHLORIDE SERPL-SCNC: 106 MMOL/L (ref 98–107)
CO2 SERPL-SCNC: 21 MMOL/L (ref 22–29)
CREAT BLD-MCNC: 0.62 MG/DL (ref 0.76–1.27)
DEPRECATED RDW RBC AUTO: 46.9 FL (ref 37–54)
EOSINOPHIL # BLD AUTO: 0.01 10*3/MM3 (ref 0–0.7)
EOSINOPHIL NFR BLD AUTO: 0.1 % (ref 0.3–6.2)
ERYTHROCYTE [DISTWIDTH] IN BLOOD BY AUTOMATED COUNT: 13.4 % (ref 11.5–14.5)
GFR SERPL CREATININE-BSD FRML MDRD: 148 ML/MIN/1.73
GLOBULIN UR ELPH-MCNC: 2.8 GM/DL
GLUCOSE BLD-MCNC: 119 MG/DL (ref 65–99)
HCT VFR BLD AUTO: 37.9 % (ref 40.4–52.2)
HGB BLD-MCNC: 12.5 G/DL (ref 13.7–17.6)
IMM GRANULOCYTES # BLD AUTO: 0.06 10*3/MM3 (ref 0–0.03)
IMM GRANULOCYTES NFR BLD AUTO: 0.7 % (ref 0–0.5)
LYMPHOCYTES # BLD AUTO: 1.67 10*3/MM3 (ref 0.9–4.8)
LYMPHOCYTES NFR BLD AUTO: 18.7 % (ref 19.6–45.3)
MCH RBC QN AUTO: 31.3 PG (ref 27–32.7)
MCHC RBC AUTO-ENTMCNC: 33 G/DL (ref 32.6–36.4)
MCV RBC AUTO: 94.8 FL (ref 79.8–96.2)
MONOCYTES # BLD AUTO: 0.8 10*3/MM3 (ref 0.2–1.2)
MONOCYTES NFR BLD AUTO: 9 % (ref 5–12)
NEUTROPHILS # BLD AUTO: 6.45 10*3/MM3 (ref 1.9–8.1)
NEUTROPHILS NFR BLD AUTO: 72.2 % (ref 42.7–76)
PLATELET # BLD AUTO: 214 10*3/MM3 (ref 140–500)
PMV BLD AUTO: 10.3 FL (ref 6–12)
POTASSIUM BLD-SCNC: 4.2 MMOL/L (ref 3.5–5.2)
PROT SERPL-MCNC: 6.3 G/DL (ref 6–8.5)
RBC # BLD AUTO: 4 10*6/MM3 (ref 4.6–6)
SODIUM BLD-SCNC: 140 MMOL/L (ref 136–145)
WBC NRBC COR # BLD: 8.93 10*3/MM3 (ref 4.5–10.7)

## 2019-01-28 PROCEDURE — 85730 THROMBOPLASTIN TIME PARTIAL: CPT | Performed by: INTERNAL MEDICINE

## 2019-01-28 PROCEDURE — 99233 SBSQ HOSP IP/OBS HIGH 50: CPT | Performed by: INTERNAL MEDICINE

## 2019-01-28 PROCEDURE — 25010000002 DEXAMETHASONE PER 1 MG: Performed by: NEUROLOGICAL SURGERY

## 2019-01-28 PROCEDURE — 80053 COMPREHEN METABOLIC PANEL: CPT | Performed by: INTERNAL MEDICINE

## 2019-01-28 PROCEDURE — 85025 COMPLETE CBC W/AUTO DIFF WBC: CPT | Performed by: INTERNAL MEDICINE

## 2019-01-28 RX ORDER — TEMOZOLOMIDE 100 MG/1
CAPSULE ORAL
Qty: 20 CAPSULE | Refills: 3 | Status: SHIPPED | OUTPATIENT
Start: 2019-01-28 | End: 2019-05-18 | Stop reason: SDUPTHER

## 2019-01-28 RX ORDER — METHYLPREDNISOLONE 4 MG/1
TABLET ORAL
Qty: 1 EACH | Refills: 0 | Status: SHIPPED | OUTPATIENT
Start: 2019-01-28 | End: 2019-02-06

## 2019-01-28 RX ORDER — PSEUDOEPHEDRINE HCL 30 MG
100 TABLET ORAL 3 TIMES DAILY PRN
Qty: 60 EACH | Refills: 1 | Status: SHIPPED | OUTPATIENT
Start: 2019-01-28 | End: 2019-04-29

## 2019-01-28 RX ORDER — HYDROCODONE BITARTRATE AND ACETAMINOPHEN 7.5; 325 MG/1; MG/1
1 TABLET ORAL EVERY 6 HOURS PRN
Qty: 45 TABLET | Refills: 0 | Status: SHIPPED | OUTPATIENT
Start: 2019-01-28 | End: 2019-02-04

## 2019-01-28 RX ADMIN — PANTOPRAZOLE SODIUM 40 MG: 40 TABLET, DELAYED RELEASE ORAL at 05:38

## 2019-01-28 RX ADMIN — DEXAMETHASONE SODIUM PHOSPHATE 4 MG: 4 INJECTION, SOLUTION INTRA-ARTICULAR; INTRALESIONAL; INTRAMUSCULAR; INTRAVENOUS; SOFT TISSUE at 04:02

## 2019-01-28 RX ADMIN — HYDROCODONE BITARTRATE AND ACETAMINOPHEN 1 TABLET: 7.5; 325 TABLET ORAL at 04:02

## 2019-01-28 RX ADMIN — DEXAMETHASONE SODIUM PHOSPHATE 4 MG: 4 INJECTION, SOLUTION INTRA-ARTICULAR; INTRALESIONAL; INTRAMUSCULAR; INTRAVENOUS; SOFT TISSUE at 09:50

## 2019-01-28 RX ADMIN — LEVETIRACETAM 1000 MG: 500 TABLET, FILM COATED ORAL at 05:38

## 2019-01-28 RX ADMIN — DOCUSATE SODIUM 100 MG: 100 CAPSULE, LIQUID FILLED ORAL at 08:19

## 2019-01-28 RX ADMIN — SULFAMETHOXAZOLE AND TRIMETHOPRIM 160 MG: 800; 160 TABLET ORAL at 08:19

## 2019-01-28 RX ADMIN — HYDROCODONE BITARTRATE AND ACETAMINOPHEN 1 TABLET: 7.5; 325 TABLET ORAL at 08:19

## 2019-01-28 NOTE — TELEPHONE ENCOUNTER
" Caller is confused as to how he should take the coumadin, unable to answer from the AVS, will be calling the provider.Warfarin Sodium          5 MG tablet, Take 5mg by mouth Mon,Wed,Fri and 7.5mg Sun,Tues,Thurs,Sat unless directed otherwise by MD   Patient taking differently:  Take 5mg by mouth Mon,Wed,Fri and 7.5mg Sun,Tues,Thurs,Sat unless directed otherwise by MD       5 MG tablet, TAKE 1 AND 1/2 TABLETS BY MOUTH ON SUNDAY AND THURSDAY AND 1 TABLET THE REMAINING DAYS UNLESS DIRECTED BY PHYSICIAN       Reason for Disposition  • Caller has URGENT medication question about med that PCP prescribed and triager unable to answer question    Additional Information  • Negative: Drug overdose and nurse unable to answer question  • Negative: Caller requesting information not related to medicine  • Negative: Caller requesting a prescription for Strep throat and has a positive culture result  • Negative: Rash while taking a medication or within 3 days of stopping it  • Negative: Immunization reaction suspected  • Negative: [1] Asthma and [2] having symptoms of asthma (cough, wheezing, etc)  • Negative: MORE THAN A DOUBLE DOSE of a prescription or over-the-counter (OTC) drug  • Negative: [1] DOUBLE DOSE (an extra dose or lesser amount) of over-the-counter (OTC) drug AND [2] any symptoms (e.g., dizziness, nausea, pain, sleepiness)  • Negative: [1] DOUBLE DOSE (an extra dose or lesser amount) of prescription drug AND [2] any symptoms (e.g., dizziness, nausea, pain, sleepiness)  • Negative: Took another person's prescription drug  • Negative: [1] DOUBLE DOSE (an extra dose or lesser amount) of prescription drug AND [2] NO symptoms (Exception: a double dose of antibiotics)  • Negative: Diabetes drug error or overdose (e.g., insulin or extra dose)  • Negative: [1] Request for URGENT new prescription or refill of \"essential\" medication (i.e., likelihood of harm to patient if not taken) AND [2] triager unable to fill per unit " "policy  • Negative: [1] Prescription not at pharmacy AND [2] was prescribed today by PCP  • Negative: Pharmacy calling with prescription questions and triager unable to answer question    Answer Assessment - Initial Assessment Questions  1. SYMPTOMS: \"Do you have any symptoms?\"     Needing anticouguland  2. SEVERITY: If symptoms are present, ask \"Are they mild, moderate or severe?\"      na    Protocols used: MEDICATION QUESTION CALL-ADULT-      "

## 2019-01-28 NOTE — TELEPHONE ENCOUNTER
----- Message from Sean Jefferson MD sent at 1/28/2019  1:59 PM EST -----   7.5mg Sun/Thur and 5mg all other days    ----- Message -----  From: Shilpi Raymond RN  Sent: 1/28/2019   1:13 PM  To: MD Dr. Ronny Francis, patients wife is calling with clarification on his warfarin.  There are 2 directions on d/c papers.  One says 5mg MWF and 7.5mg all other days.  The other directions say 7.5mg Sun/thur and 5mg all other days.  Which should he follow?  Last SS from Dec 10th says 7.5mg Sun/Thur and 5mg all other days. Let me know.

## 2019-01-28 NOTE — TELEPHONE ENCOUNTER
----- Message from Sean Jefferson MD sent at 1/28/2019 10:26 AM EST -----  Regarding: Hospital follow up plans  Discharged today.  Please arrange:    1.  INR with RN review this week on Thurs or Fri and late next week as well.     2.  Keep f/u with me as scheduled.    3.  Dr. Dobbins wants to see him back on the same day as well.  Would inquire if that's to be with me in our office or in his office.  Needs CBC, CMP, and INR at f/u with me.      Thanks,  TOD

## 2019-01-29 ENCOUNTER — READMISSION MANAGEMENT (OUTPATIENT)
Dept: CALL CENTER | Facility: HOSPITAL | Age: 34
End: 2019-01-29

## 2019-01-29 NOTE — OUTREACH NOTE
Prep Survey      Responses   Facility patient discharged from?  [place]   Is patient eligible?  Yes   Discharge diagnosis  Astrocytoma brain tumor, s/p craniotomy for tumor steriotactic   Does the patient have one of the following disease processes/diagnoses(primary or secondary)?  General Surgery   Does the patient have Home health ordered?  No   Is there a DME ordered?  No   Comments regarding appointments  See AVS   Prep survey completed?  Yes          Jessie Mckinney RN

## 2019-01-30 ENCOUNTER — READMISSION MANAGEMENT (OUTPATIENT)
Dept: CALL CENTER | Facility: HOSPITAL | Age: 34
End: 2019-01-30

## 2019-01-30 NOTE — OUTREACH NOTE
General Surgery Week 1 Survey      Responses   Facility patient discharged from?  [place]   Does the patient have one of the following disease processes/diagnoses(primary or secondary)?  General Surgery   Is there a successful TCM telephone encounter documented?  No   Week 1 attempt successful?  Yes   Call start time  1156   Call end time  1200   Discharge diagnosis  Astrocytoma brain tumor, s/p craniotomy for tumor steriotactic   Meds reviewed with patient/caregiver?  Yes   Is the patient having any side effects they believe may be caused by any medication additions or changes?  No   Does the patient have all medications related to this admission filled (includes all antibiotics, pain medications, etc.)  Yes   Is the patient taking all medications as directed (includes completed medication regime)?  Yes   Does the patient have a follow up appointment scheduled with their surgeon?  Yes   Has the patient kept scheduled appointments due by today?  N/A   Has home health visited the patient within 72 hours of discharge?  N/A   Psychosocial issues?  No   Did the patient receive a copy of their discharge instructions?  Yes   Nursing interventions  Reviewed instructions with patient   What is the patient's perception of their health status since discharge?  Improving   Nursing interventions  Nurse provided patient education   Is the patient /caregiver able to teach back basic post-op care?  Drive as instructed by MD in discharge instructions, Take showers only when approved by MD-sponge bathe until then, No tub bath, swimming, or hot tub until instructed by MD, Keep incision areas clean,dry and protected   Is the patient/caregiver able to teach back signs and symptoms of incisional infection?  Increased redness, swelling or pain at the incisonal site, Increased drainage or bleeding, Incisional warmth, Pus or odor from incision, Fever   Is the patient/caregiver able to teach back steps to recovery at home?  Rest and  rebuild strength, gradually increase activity   Is the patient/caregiver able to teach back the hierarchy of who to call/visit for symptoms/problems? PCP, Specialist, Home health nurse, Urgent Care, ED, 911  Yes   Week 1 call completed?  Yes          Clarice Li RN

## 2019-02-01 ENCOUNTER — OFFICE VISIT (OUTPATIENT)
Dept: ONCOLOGY | Facility: CLINIC | Age: 34
End: 2019-02-01

## 2019-02-01 ENCOUNTER — LAB (OUTPATIENT)
Dept: LAB | Facility: HOSPITAL | Age: 34
End: 2019-02-01

## 2019-02-01 VITALS
DIASTOLIC BLOOD PRESSURE: 88 MMHG | TEMPERATURE: 98.5 F | OXYGEN SATURATION: 100 % | BODY MASS INDEX: 25.19 KG/M2 | HEIGHT: 71 IN | HEART RATE: 75 BPM | SYSTOLIC BLOOD PRESSURE: 143 MMHG | WEIGHT: 179.9 LBS | RESPIRATION RATE: 16 BRPM

## 2019-02-01 DIAGNOSIS — I82.413 ACUTE DEEP VEIN THROMBOSIS (DVT) OF FEMORAL VEIN OF BOTH LOWER EXTREMITIES (HCC): ICD-10-CM

## 2019-02-01 DIAGNOSIS — I82.532 CHRONIC DEEP VEIN THROMBOSIS (DVT) OF LEFT POPLITEAL VEIN (HCC): ICD-10-CM

## 2019-02-01 DIAGNOSIS — Z86.711 HISTORY OF PULMONARY EMBOLUS (PE): ICD-10-CM

## 2019-02-01 DIAGNOSIS — C71.1 ANAPLASTIC ASTROCYTOMA OF FRONTAL LOBE (HCC): ICD-10-CM

## 2019-02-01 DIAGNOSIS — I26.99 PULMONARY EMBOLISM WITH INFARCTION (HCC): ICD-10-CM

## 2019-02-01 DIAGNOSIS — D68.51 FACTOR 5 LEIDEN MUTATION, HETEROZYGOUS (HCC): ICD-10-CM

## 2019-02-01 DIAGNOSIS — I82.432 ACUTE DEEP VEIN THROMBOSIS (DVT) OF POPLITEAL VEIN OF LEFT LOWER EXTREMITY (HCC): Primary | ICD-10-CM

## 2019-02-01 LAB
BASOPHILS # BLD AUTO: 0.02 10*3/MM3 (ref 0–0.1)
BASOPHILS NFR BLD AUTO: 0.2 % (ref 0–1.1)
CYTO UR: NORMAL
DEPRECATED RDW RBC AUTO: 41.1 FL (ref 37–49)
DX PRELIMINARY: NORMAL
EOSINOPHIL # BLD AUTO: 0.15 10*3/MM3 (ref 0–0.36)
EOSINOPHIL NFR BLD AUTO: 1.7 % (ref 1–5)
ERYTHROCYTE [DISTWIDTH] IN BLOOD BY AUTOMATED COUNT: 12.4 % (ref 11.7–14.5)
HCT VFR BLD AUTO: 42 % (ref 40–49)
HGB BLD-MCNC: 15 G/DL (ref 13.5–16.5)
IMM GRANULOCYTES # BLD AUTO: 0.13 10*3/MM3 (ref 0–0.03)
IMM GRANULOCYTES NFR BLD AUTO: 1.5 % (ref 0–0.5)
INR PPP: 1.4 (ref 0.9–1.1)
LAB AP CASE REPORT: NORMAL
LAB AP DIAGNOSIS COMMENT: NORMAL
LYMPHOCYTES # BLD AUTO: 2.28 10*3/MM3 (ref 1–3.5)
LYMPHOCYTES NFR BLD AUTO: 25.5 % (ref 20–49)
Lab: NORMAL
MCH RBC QN AUTO: 31.8 PG (ref 27–33)
MCHC RBC AUTO-ENTMCNC: 35.7 G/DL (ref 32–35)
MCV RBC AUTO: 89.2 FL (ref 83–97)
MONOCYTES # BLD AUTO: 0.71 10*3/MM3 (ref 0.25–0.8)
MONOCYTES NFR BLD AUTO: 8 % (ref 4–12)
NEUTROPHILS # BLD AUTO: 5.64 10*3/MM3 (ref 1.5–7)
NEUTROPHILS NFR BLD AUTO: 63.1 % (ref 39–75)
NRBC BLD AUTO-RTO: 0 /100 WBC (ref 0–0)
PATH REPORT.FINAL DX SPEC: NORMAL
PATH REPORT.GROSS SPEC: NORMAL
PLATELET # BLD AUTO: 281 10*3/MM3 (ref 150–375)
PMV BLD AUTO: 9 FL (ref 8.9–12.1)
PROTHROMBIN TIME: 16.5 SECONDS (ref 11–13.5)
RBC # BLD AUTO: 4.71 10*6/MM3 (ref 4.3–5.5)
WBC NRBC COR # BLD: 8.93 10*3/MM3 (ref 4–10)

## 2019-02-01 PROCEDURE — 36415 COLL VENOUS BLD VENIPUNCTURE: CPT | Performed by: INTERNAL MEDICINE

## 2019-02-01 PROCEDURE — 85610 PROTHROMBIN TIME: CPT | Performed by: INTERNAL MEDICINE

## 2019-02-01 PROCEDURE — 85025 COMPLETE CBC W/AUTO DIFF WBC: CPT | Performed by: INTERNAL MEDICINE

## 2019-02-01 PROCEDURE — 99213 OFFICE O/P EST LOW 20 MIN: CPT | Performed by: NURSE PRACTITIONER

## 2019-02-01 NOTE — PROGRESS NOTES
[facility] OUTPATIENT FOLLOW UP CLINIC VISIT    REASON FOR FOLLOW-UP:    1.  Left lower extremity DVT with progression of symptoms and extension of the left lower extremity DVT into the femoral vein from the popliteal/calf vein swallowing for next set.  Currently anticoagulated with warfarin.  2.  Right lower extremity DVT noted in the common femoral, profunda femoral, and calf veins  3.  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  Other thrombophilia labs negative.    4.  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  NOT 1p19q co-deleted.    5.  Radiation initiated on 7/31/2018.  Plan for Temodar ×1 year following radiation.  6.  4500 cGy in 25 fractions administered from 7/31/2018 through 9/14/2018  7.  MRI brain 10/23/2018 with resolving hemorrhage in the resection cavity.  However, there is hyperintensity measuring 4.6 x 4.3 x 3 cm along the margins of the resection cavity.  8.  Repeat venous duplex on 10/23 with chronic right CVT and chronic LLE DVT from the mid femoral vein distally.     9.  MRI brain 12/3/2018 with stable findings.  10.  Admitted 1/24/2019 for craniotomy for resection of the bicoronal right frontal mass by Dr. Galvan.      HISTORY OF PRESENT ILLNESS:  Bryan Valadez is a 33 y.o. male who returns today for lab follow-up.  The patient was admitted to the hospital 1/24/2019 for planned resection of the bicoronal right frontal mass with craniotomy by Dr. Galvan.  Patient spent 1 night in the ICU and was transferred to [facility] where he stated discharge on 1/28/2019.  Patient was discharged on Lovenox 80 mg twice daily as well as warfarin.  He is taking hydrocodone for pain control and a Medrol Dosepak per neurosurgery.  Final tissue pathology is pending and was sent to [facility] for further evaluation.    ONCOLOGIC HISTORY:  He had about 6 months of headaches treated as sinusitis and presented  on 6/15 with visual changes and headache and was found to have a large right frontal mass which was resected on 6/16 by Dr. Galvan and consistent with a glioma.  Pathology was reviewed at [facility] showing infiltrating glioma.  Large 10 cm partially cystic and solid lobulated tumor mass at diagnosis.  Negative IDH1-R132H immunostain excludes the most common IDH1 mutation; however loss of ATRX expression suggests possibility of glioma harboring another less common IDH1 mutation.  p53 overexpressed.  Ki67 10% but variable.  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  There was no evidence for a 1p19q co-deletion.       Chromosome microarray showed a complex molecular karyotype including loss of 1q, loss of 2q, gain of 7q, gain of 8q, loss of 9p, REBECCA of 17p, and gain of 18p.  These abnormalities are typically associated with  IDH-mutant astrocytic gliomas.  No 1p and 19q whole arm co-deletion was noted.  CT head on 7/1 c/w residual tumor circumscribing the resection cavity and a remote cerebellar hemisphere hemorrhage.       He was discharged and subsequently admitted with left leg pain and chest pain, with LLE popliteal and calf vein clot noted and bilateral small PE. He was treated with heparin and discharged on Pradaxa.     He developed worsening leg pain up into the thigh and presented to the ER where a venous duplex showed progression of the clot to the femoral vein.  He was given Lovenox and admitted.  Warfarin was initiated.       A partial thrombophilia evaluation showed that he is a heterozygote for the factor V Leiden mutation.  Labs otherwise unremarkable.    He was subsequently found to have a right lower extremity DVT on 7/8/2018.    He remains anticoagulated with warfarin.    Radiation initiated on 7/31/2018.  Plan for Temodar ×1 year after radiation is complete.    Radiation complete as of 9/14/2018.    4500 cGy in 25 fractions administered from 7/31/2018 through 9/14/2018    MRI brain 10/23/2018 with  "resolving hemorrhage in the resection cavity.  However, there is hyperintensity measuring 4.6 x 4.3 x 3 cm along the margins of the resection cavity.    Repeat venous duplex on 10/23 with chronic right CVT and chronic LLE DVT from the mid femoral vein distally.      Repeat brain MRI on 12/3/2018 with stable findings.      ALLERGIES:  Allergies   Allergen Reactions   • Avocado Itching   • Other Itching     Insect stings: Bee stings, throat swelling (has Epi pen)    Allergy to fruit, Avocado, Cherry Tomato (can have blue berries)   • Tomato Itching     Cherry tomato       MEDICATIONS:  The medication list has been reviewed with the patient by the medical assistant, and the list has been updated in the electronic medical record, which I reviewed.  Medication dosages and frequencies were confirmed to be accurate.    REVIEW OF SYSTEMS:  PAIN:  See Vital Signs below.  GENERAL:  No fevers, chills, night sweats, or unintended weight loss.  SKIN:  Bifrontal craniotomy incision site  HEME/LYMPH:  No abnormal bleeding.   EYES:  No vision changes or diplopia.  ENT:  No sore throat or difficulty swallowing.  RESPIRATORY:  No cough, shortness of breath, hemoptysis, or wheezing.  CARDIOVASCULAR:  No chest pain, palpitations, orthopnea, or dyspnea on exertion.  GASTROINTESTINAL:  No abdominal pain, melena, or hematochezia.  GENITOURINARY:  No dysuria or hematuria.  NEUROLOGIC:  No dizziness, loss of consciousness, or seizures.      Vitals:    02/01/19 1326   BP: 143/88   Pulse: 75   Resp: 16   Temp: 98.5 °F (36.9 °C)   TempSrc: Oral   SpO2: 100%   Weight: 81.6 kg (179 lb 14.4 oz)   Height: 180.3 cm (70.98\")   PainSc: 0-No pain       PHYSICAL EXAMINATION:  GENERAL:  Well-developed well-nourished male; awake, alert and oriented, in no acute distress.  SKIN:  Alopecia present on the scalp.  Bifrontal craniotomy incision site surroundings skin well approximated.  HEAD:  Normocephalic, Well-healed surgical incision present.    EYES:  " Pupils equal, round and reactive to light.  Extraocular movements intact.  Conjunctivae normal.   EARS:  Hearing intact.  NECK:  Supple with good range of motion; no thyromegaly or masses; no JVD.  EXTREMITIES:  No clubbing, cyanosis, or edema.    NEUROLOGICAL:  No focal neurologic deficits.          IMAGING:  MRI brain images from 12/3/2018.    IMPRESSION:  1. No significant interval change when compared to most recent  postoperative MRI of brain, 40 days ago, on 10/23/2018.  2. In June 2018, this patient had a 9 cm superior right frontoparietal  craniotomy for debulking of the large 10 x 7 x 6 cm mixed cystic and  solid partially enhancing anaplastic grade 3 astrocytoma from the right  frontal lobe. There is a stable size 5 x 3.4 x 2.8 cm resection cavity  in the superior right frontal lobe, and there is abnormal FLAIR and T2  hyperintensity diffusion hyperintensity circumscribing the margins of  the resection cavity, most pronounced along the posterior and inferior  lateral margin of the resection cavity, compatible with residual tumor,  tracks up to 8.4 x 4.7 cm in anterior posterior, medial lateral  dimension, and there is abnormal enhancement most pronounced along the  posterior and posterior inferior lateral margin of the resection cavity.  The single largest focus of enhancement measures up to 4.2 x 2.6 x 2.7  cm along the posterior inferior lateral margin of the resection cavity  extending posterolateral right frontal parenchyma, consistent with  enhancing high-grade tumor unchanged since 10/23/2018. Continued  follow-up suggested. The remainder of the MRI of the head is normal.     ASSESSMENT:  This is a 33 y.o. male with:  1.  Bilateral lower extremity DVT: Patient have a repeat bilateral lower extremity venous Doppler on 1/25/2019 showed chronic right lower extremity deep vein thrombosis in the profunda femoral and gastrocnemius/soleal, chronically right lower extremity superficial thrombophlebitis noted  in the greater saphenous below the knee, and chronic left lower extremity deep vein thrombosis noted in the mid femoral, distal femoral, popliteal, posterior tibial, perineal, and gastrocnemius/soleal.  He did have an IVC filter placed prior to surgery.  He is currently being bridged with Lovenox 80 mg twice daily as well as restarted on his warfarin at the 7.5 mg on Sunday and Thursday with 5 mg all other days.    2.  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  NOT 1p19q co-deleted.  Overall, this is a good molecular profile.  I discussed his case with Dr. Galvan, Dr. Daley, and Dr. Dobbins and I reviewed NCCN guidelines.   He will require one year of adjuvant Temodar following radiation.  The big question was whether to administer concurrent therapy with Temodar along with radiation.  Ultimately, we decided not to do this given the overall good molecular profile and the potential adverse effects from concurrent therapy.  He did initiate radiation alone on 7/31/2018 and completed it on 9/14/2018.  I have reviewed the most recent brain MRI images from 10/23/2018.  I communicated with Dr. Daley regarding the result.  There is still some hyperenhancement on the periphery of the resection cavity.  This is stable on imaging from 12/3/2018.  The significance of this is unclear as it may represent inflammatory tissue or persistent/recurrent tumor.  Plan made to initiate Temodar:   Temodar dosing:  Cycle 1: 150 mg/m2 once daily for 5 days of a 28-day treatment cycle  Cycles 2 to 6: May increase to 200 mg/m2 once daily for 5 days; repeat every 28 days (if ANC ?1,500/mm3, platelets ?100,000/mm3 and nonhematologic toxicities for cycle 1 are ?grade 2 [excludes alopecia, nausea/vomiting]); If dose was not escalated at the onset of cycle 2, do not increase for cycles 3 to 6.    Dose for him at 150 mg/m2 = 300 mg  Dose for him at 200 mg/m2 = 400 mg    He  was seen by Dr. Galvan with plans for resection.  Temodar is on hold.  The patient went on to be admitted on 1/24/2019 for craniotomy for resection of the bicoronal right frontal mass.  The patient recovered from the procedure well and was discharged on 1/28/2019 with a Medrol Dosepak and hydrocodone for pain relief.  Temodar is currently on hold.  Tissue was sent to [facility] for further review.  He will follow-up with Dr. Galvan on 2/6/2019.  The patient will follow-up with Dr. Dobbins on 2/13/19.    3.  Given the possibility of infertility with treatment he banked sperm at the [facility].  His wife is actually pregnant at this time.    4.  Elevated liver labs: Liver labs normalized.  Unclear reason.  Medication could have contributed.    PLAN:  1.  Patient restarted Coumadin on 1/28/2019.  His INR is therapeutic today at 1.4.  He is only had 4 doses of Coumadin and at this point in time.  He is taking 7.5 mg twice per week and 5 mg all other days.  At this point in time, he is only had 17.5 mg dose in.  I have asked him to take 5 mg today, 7.5 mg Saturday, and 5 mg on Sunday.  We will recheck his INR on Monday.  He will continue Lovenox twice daily until therapeutic.  2.  Patient will return on Monday for repeat PT/INR with nurse review.  At that point in time, we will schedule further follow-up for his INR.  3.  Once we see the pathology results we will determine whether or not to proceed with further Temodar or perhaps to switch to another regimen such as PCV.  Referral for a second opinion and consideration clinical trials might be reasonable as well.  4.  Continue Keppra  5.  Continue Bactrim three days weekly for PCP prophylaxis.   6.  Keep follow-up with Dr. Jefferson on 2/13/2019.

## 2019-02-04 ENCOUNTER — CLINICAL SUPPORT (OUTPATIENT)
Dept: ONCOLOGY | Facility: HOSPITAL | Age: 34
End: 2019-02-04

## 2019-02-04 ENCOUNTER — APPOINTMENT (OUTPATIENT)
Dept: ONCOLOGY | Facility: HOSPITAL | Age: 34
End: 2019-02-04

## 2019-02-04 ENCOUNTER — APPOINTMENT (OUTPATIENT)
Dept: LAB | Facility: HOSPITAL | Age: 34
End: 2019-02-04

## 2019-02-04 ENCOUNTER — LAB (OUTPATIENT)
Dept: LAB | Facility: HOSPITAL | Age: 34
End: 2019-02-04

## 2019-02-04 DIAGNOSIS — C71.1 ANAPLASTIC ASTROCYTOMA OF FRONTAL LOBE (HCC): ICD-10-CM

## 2019-02-04 DIAGNOSIS — Z86.711 HISTORY OF PULMONARY EMBOLUS (PE): ICD-10-CM

## 2019-02-04 LAB
INR PPP: 1.6 (ref 0.9–1.1)
PROTHROMBIN TIME: 19.5 SECONDS (ref 11–13.5)

## 2019-02-04 PROCEDURE — 85610 PROTHROMBIN TIME: CPT

## 2019-02-04 NOTE — PROGRESS NOTES
INR 1.6. Pt is bridging with Lvenox since surgery. Reviewed with Bonita Herrera NP. Per Bonita Pt will continue current dose 7.5 mg of Coumadin on Sat, Sun and 5 mg all other and return Friday for a PT/INR. He will also continue the lovenox injections. Pt V/U.

## 2019-02-06 ENCOUNTER — OFFICE VISIT (OUTPATIENT)
Dept: NEUROSURGERY | Facility: CLINIC | Age: 34
End: 2019-02-06

## 2019-02-06 ENCOUNTER — READMISSION MANAGEMENT (OUTPATIENT)
Dept: CALL CENTER | Facility: HOSPITAL | Age: 34
End: 2019-02-06

## 2019-02-06 VITALS
BODY MASS INDEX: 25.06 KG/M2 | HEART RATE: 81 BPM | RESPIRATION RATE: 16 BRPM | DIASTOLIC BLOOD PRESSURE: 80 MMHG | WEIGHT: 179 LBS | SYSTOLIC BLOOD PRESSURE: 140 MMHG | HEIGHT: 71 IN

## 2019-02-06 DIAGNOSIS — C71.9 BRAIN TUMOR, ASTROCYTOMA (HCC): Primary | ICD-10-CM

## 2019-02-06 PROCEDURE — 99024 POSTOP FOLLOW-UP VISIT: CPT | Performed by: NEUROLOGICAL SURGERY

## 2019-02-06 RX ORDER — HYDROCODONE BITARTRATE AND ACETAMINOPHEN 5; 325 MG/1; MG/1
1 TABLET ORAL EVERY 4 HOURS PRN
Qty: 25 TABLET | Refills: 0 | Status: SHIPPED | OUTPATIENT
Start: 2019-02-06 | End: 2019-02-16

## 2019-02-06 RX ORDER — LEVETIRACETAM 1000 MG/1
1000 TABLET ORAL EVERY 12 HOURS
Qty: 60 TABLET | Refills: 3 | Status: SHIPPED | OUTPATIENT
Start: 2019-02-06 | End: 2019-07-01 | Stop reason: SDUPTHER

## 2019-02-06 NOTE — OUTREACH NOTE
General Surgery Week 2 Survey      Responses   Facility patient discharged from?  [place]   Does the patient have one of the following disease processes/diagnoses(primary or secondary)?  General Surgery   Week 2 attempt successful?  Yes   Call start time  1622   Call end time  1624   Discharge diagnosis  Astrocytoma brain tumor, s/p craniotomy for tumor steriotactic   Is patient permission given to speak with other caregiver?  No   Meds reviewed with patient/caregiver?  Yes   Is the patient taking all medications as directed (includes completed medication regime)?  Yes   Has the patient kept scheduled appointments due by today?  Yes   What is the patient's perception of their health status since discharge?  Improving   Week 2 call completed?  Yes   Revoked  No further contact(revokes)-requires comment   Graduated/Revoked comments  Goals met   Wrap up additional comments  Just left surgeon's office. Good follow up visit. No complaints.           Jagruti Wilkerson RN

## 2019-02-06 NOTE — PROGRESS NOTES
Subjective   Patient ID: Bryan Valadez is a 33 y.o. male is here today for first post-op s/p crani 1/15/19. Patient has not had any new imaging and presents accompanied by his wife and young daughter.    History of Present Illness 32 yo male s/p crani for recurrent tumor.  He is doing well.  He reports no wound issues.  No drainage.  No fevers.      The following portions of the patient's history were reviewed and updated as appropriate: allergies, current medications, past family history, past medical history, past social history, past surgical history and problem list.    Review of Systems   Constitutional: Negative for chills and fever.   HENT: Negative for rhinorrhea.    Eyes: Negative for visual disturbance.   Musculoskeletal: Negative for back pain.   Skin: Negative for wound.   Neurological: Positive for facial asymmetry (facial swelling). Negative for dizziness, tremors, seizures, syncope, speech difficulty, weakness, light-headedness, numbness and headaches.   Psychiatric/Behavioral: Negative for confusion and decreased concentration.       Objective   Physical Exam   Constitutional: He is oriented to person, place, and time.   Eyes: EOM are normal. Pupils are equal, round, and reactive to light.   Neurological: He is oriented to person, place, and time.     Neurologic Exam     Mental Status   Oriented to person, place, and time.     Cranial Nerves     CN III, IV, VI   Pupils are equal, round, and reactive to light.  Extraocular motions are normal.     CN V   Facial sensation intact.     CN VII   Facial expression full, symmetric.     CN VIII   CN VIII normal.     CN IX, X   CN IX normal.   CN X normal.     CN XI   CN XI normal.     CN XII   CN XII normal.        Motor Exam      Strength   Right deltoid: 5/5  Left deltoid: 5/5  Right biceps: 5/5  Left biceps: 5/5  Right triceps: 5/5  Left triceps: 5/5  Right wrist extension: 5/5  Left wrist extension: 5/5  Right interossei: 5/5  Left  interossei: 5/5  Right iliopsoas: 5/5  Left iliopsoas: 5/5     Sensory Exam   Right arm light touch: normal  Left arm light touch: normal  Right leg light touch: normal  Left leg light touch: normal  Right arm pinprick: normal  Left arm pinprick: normal  Right leg pinprick: normal  Left leg pinprick: normal     Gait, Coordination, and Reflexes      Gait  Gait: normal     C/d/i    Assessment/Plan   Independent Review of Radiographic Studies:  I reviewed his pre-op and post op images.  He has some jaw pain.      Medical Decision Making:  He is doing well.  No calf pain.  He remains on lovenox.  He will call Dr. Haley for retrieval of his filter.  He may resume his temodar.  His path was tumor without progression to higher grade.  I stopped resecting 1cm posterior to his contrast enhancement based on functional MRI.  We will restart his surveillance.  He has some short memory issues and abstract reasoning issues.  We discussed getting some cognitive therapy and I am glad to refer.  His wife will reach out to me if he needs referral.      There are no diagnoses linked to this encounter.  No Follow-up on file.

## 2019-02-07 ENCOUNTER — TELEPHONE (OUTPATIENT)
Dept: NEUROSURGERY | Facility: CLINIC | Age: 34
End: 2019-02-07

## 2019-02-07 ENCOUNTER — TELEPHONE (OUTPATIENT)
Dept: ONCOLOGY | Facility: HOSPITAL | Age: 34
End: 2019-02-07

## 2019-02-07 NOTE — TELEPHONE ENCOUNTER
----- Message from Meri Jaimes sent at 2/7/2019 12:55 PM EST -----  [phone]  Several  Issues they need to ask about.

## 2019-02-08 ENCOUNTER — APPOINTMENT (OUTPATIENT)
Dept: LAB | Facility: HOSPITAL | Age: 34
End: 2019-02-08

## 2019-02-08 ENCOUNTER — OFFICE VISIT (OUTPATIENT)
Dept: ONCOLOGY | Facility: CLINIC | Age: 34
End: 2019-02-08

## 2019-02-08 VITALS
HEIGHT: 71 IN | DIASTOLIC BLOOD PRESSURE: 85 MMHG | TEMPERATURE: 98.1 F | RESPIRATION RATE: 18 BRPM | BODY MASS INDEX: 25.23 KG/M2 | HEART RATE: 81 BPM | SYSTOLIC BLOOD PRESSURE: 125 MMHG | WEIGHT: 180.2 LBS | OXYGEN SATURATION: 100 %

## 2019-02-08 DIAGNOSIS — D68.51 FACTOR 5 LEIDEN MUTATION, HETEROZYGOUS (HCC): ICD-10-CM

## 2019-02-08 DIAGNOSIS — I26.99 OTHER ACUTE PULMONARY EMBOLISM WITHOUT ACUTE COR PULMONALE (HCC): Primary | ICD-10-CM

## 2019-02-08 DIAGNOSIS — C71.1 ANAPLASTIC ASTROCYTOMA OF FRONTAL LOBE (HCC): Primary | ICD-10-CM

## 2019-02-08 DIAGNOSIS — Z86.711 HISTORY OF PULMONARY EMBOLUS (PE): ICD-10-CM

## 2019-02-08 LAB
INR PPP: 1.5 (ref 0.9–1.1)
PROTHROMBIN TIME: 18.2 SECONDS (ref 11–13.5)

## 2019-02-08 PROCEDURE — 99212-NC PR NO CHARGE CBC OFFICE OUTPATIENT VISIT 10 MINUTES: Performed by: NURSE PRACTITIONER

## 2019-02-08 PROCEDURE — 85610 PROTHROMBIN TIME: CPT

## 2019-02-08 NOTE — PROGRESS NOTES
Patient here for INR review.  He has been taking coumadin 7.5 mg on Saturday and Sunday and 5 mg all other days.  He continues on Lovenox injections.    Lab Results   Component Value Date    INR 1.50 (H) 02/08/2019    INR 1.60 (H) 02/04/2019    INR 1.40 (H) 02/01/2019    PROTIME 18.2 (H) 02/08/2019    PROTIME 19.5 (H) 02/04/2019    PROTIME 16.5 (H) 02/01/2019     His INR has actually gone down over the past 4 days.  The patient reports that prior to surgery his Coumadin dosing was usually 5 mg 2 days a week and 7-1/2 the rest.  I advised him to take 7-1/2 mg Friday, Saturday, and Sunday, continue his Lovenox dosing.  He will return on Monday, 2/11/2019 for repeat INR check to see if he can discontinue his Lovenox injections.  He schedule return for follow-up visit with Dr. Jefferson on Wednesday.    NICOLE Newton

## 2019-02-11 ENCOUNTER — CLINICAL SUPPORT (OUTPATIENT)
Dept: ONCOLOGY | Facility: HOSPITAL | Age: 34
End: 2019-02-11

## 2019-02-11 ENCOUNTER — LAB (OUTPATIENT)
Dept: LAB | Facility: HOSPITAL | Age: 34
End: 2019-02-11

## 2019-02-11 DIAGNOSIS — I26.99 PULMONARY EMBOLISM WITH INFARCTION (HCC): Primary | ICD-10-CM

## 2019-02-11 DIAGNOSIS — Z86.711 HISTORY OF PULMONARY EMBOLUS (PE): ICD-10-CM

## 2019-02-11 DIAGNOSIS — C71.1 ANAPLASTIC ASTROCYTOMA OF FRONTAL LOBE (HCC): ICD-10-CM

## 2019-02-11 LAB
INR PPP: 1.6 (ref 0.9–1.1)
PROTHROMBIN TIME: 19 SECONDS (ref 11–13.5)

## 2019-02-11 PROCEDURE — 85610 PROTHROMBIN TIME: CPT

## 2019-02-11 NOTE — PROGRESS NOTES
INR 1.6. Prior dose confirmed. Pt continues to bridge with Lovenox BID. Reviewed with Johanna Tran NP. Per Johanna Tran pt is to follow SS and return on Friday for PT/INR. Pt is to take 10 mg of Coumadin on Mon and 7.5 mg all other days. Pt V/U.

## 2019-02-13 ENCOUNTER — OFFICE VISIT (OUTPATIENT)
Dept: NEUROLOGY | Facility: CLINIC | Age: 34
End: 2019-02-13

## 2019-02-13 ENCOUNTER — LAB (OUTPATIENT)
Dept: LAB | Facility: HOSPITAL | Age: 34
End: 2019-02-13

## 2019-02-13 ENCOUNTER — OFFICE VISIT (OUTPATIENT)
Dept: ONCOLOGY | Facility: CLINIC | Age: 34
End: 2019-02-13

## 2019-02-13 VITALS
OXYGEN SATURATION: 95 % | DIASTOLIC BLOOD PRESSURE: 84 MMHG | HEART RATE: 81 BPM | RESPIRATION RATE: 16 BRPM | TEMPERATURE: 98.5 F | SYSTOLIC BLOOD PRESSURE: 128 MMHG | BODY MASS INDEX: 25.07 KG/M2 | WEIGHT: 179.1 LBS | HEIGHT: 71 IN

## 2019-02-13 VITALS
HEIGHT: 71 IN | BODY MASS INDEX: 25.14 KG/M2 | OXYGEN SATURATION: 95 % | SYSTOLIC BLOOD PRESSURE: 130 MMHG | HEART RATE: 90 BPM | DIASTOLIC BLOOD PRESSURE: 68 MMHG

## 2019-02-13 DIAGNOSIS — C71.9 ASTROCYTOMA BRAIN TUMOR (HCC): Primary | ICD-10-CM

## 2019-02-13 DIAGNOSIS — I82.432 ACUTE DEEP VEIN THROMBOSIS (DVT) OF POPLITEAL VEIN OF LEFT LOWER EXTREMITY (HCC): ICD-10-CM

## 2019-02-13 DIAGNOSIS — C71.9 ANAPLASTIC GLIOMA OF BRAIN (HCC): Primary | ICD-10-CM

## 2019-02-13 DIAGNOSIS — C71.1 ANAPLASTIC ASTROCYTOMA OF FRONTAL LOBE (HCC): Primary | ICD-10-CM

## 2019-02-13 DIAGNOSIS — Z86.711 HISTORY OF PULMONARY EMBOLUS (PE): ICD-10-CM

## 2019-02-13 DIAGNOSIS — D68.51 FACTOR 5 LEIDEN MUTATION, HETEROZYGOUS (HCC): ICD-10-CM

## 2019-02-13 LAB
ALBUMIN SERPL-MCNC: 4.4 G/DL (ref 3.5–5.2)
ALBUMIN/GLOB SERPL: 1.8 G/DL (ref 1.1–2.4)
ALP SERPL-CCNC: 61 U/L (ref 38–116)
ALT SERPL W P-5'-P-CCNC: 29 U/L (ref 0–41)
ANION GAP SERPL CALCULATED.3IONS-SCNC: 15.1 MMOL/L
AST SERPL-CCNC: 17 U/L (ref 0–40)
BASOPHILS # BLD AUTO: 0.04 10*3/MM3 (ref 0–0.2)
BASOPHILS NFR BLD AUTO: 0.6 % (ref 0–1.5)
BILIRUB SERPL-MCNC: <0.2 MG/DL (ref 0.1–1.2)
BUN BLD-MCNC: 11 MG/DL (ref 6–20)
BUN/CREAT SERPL: 13.8 (ref 7.3–30)
CALCIUM SPEC-SCNC: 9.1 MG/DL (ref 8.5–10.2)
CHLORIDE SERPL-SCNC: 100 MMOL/L (ref 98–107)
CO2 SERPL-SCNC: 21.9 MMOL/L (ref 22–29)
CREAT BLD-MCNC: 0.8 MG/DL (ref 0.7–1.3)
DEPRECATED RDW RBC AUTO: 37.2 FL (ref 37–54)
EOSINOPHIL # BLD AUTO: 0.07 10*3/MM3 (ref 0–0.4)
EOSINOPHIL NFR BLD AUTO: 1 % (ref 0.3–6.2)
ERYTHROCYTE [DISTWIDTH] IN BLOOD BY AUTOMATED COUNT: 11.6 % (ref 12.3–15.4)
GFR SERPL CREATININE-BSD FRML MDRD: 110 ML/MIN/1.73
GLOBULIN UR ELPH-MCNC: 2.4 GM/DL (ref 1.8–3.5)
GLUCOSE BLD-MCNC: 86 MG/DL (ref 74–124)
HCT VFR BLD AUTO: 38.8 % (ref 37.5–51)
HGB BLD-MCNC: 13.8 G/DL (ref 13–17.7)
IMM GRANULOCYTES # BLD AUTO: 0.06 10*3/MM3 (ref 0–0.05)
IMM GRANULOCYTES NFR BLD AUTO: 0.9 % (ref 0–0.5)
INR PPP: 2 (ref 0.9–1.1)
LYMPHOCYTES # BLD AUTO: 2.1 10*3/MM3 (ref 0.7–3.1)
LYMPHOCYTES NFR BLD AUTO: 31.4 % (ref 19.6–45.3)
MCH RBC QN AUTO: 31.2 PG (ref 26.6–33)
MCHC RBC AUTO-ENTMCNC: 35.6 G/DL (ref 31.5–35.7)
MCV RBC AUTO: 87.6 FL (ref 79–97)
MONOCYTES # BLD AUTO: 0.64 10*3/MM3 (ref 0.1–0.9)
MONOCYTES NFR BLD AUTO: 9.6 % (ref 5–12)
NEUTROPHILS # BLD AUTO: 3.78 10*3/MM3 (ref 1.4–7)
NEUTROPHILS NFR BLD AUTO: 56.5 % (ref 42.7–76)
NRBC BLD AUTO-RTO: 0 /100 WBC (ref 0–0)
PLATELET # BLD AUTO: 306 10*3/MM3 (ref 140–450)
PMV BLD AUTO: 8.9 FL (ref 6–12)
POTASSIUM BLD-SCNC: 4.2 MMOL/L (ref 3.5–4.7)
PROT SERPL-MCNC: 6.8 G/DL (ref 6.3–8)
PROTHROMBIN TIME: 24 SECONDS (ref 11–13.5)
RBC # BLD AUTO: 4.43 10*6/MM3 (ref 4.14–5.8)
SODIUM BLD-SCNC: 137 MMOL/L (ref 134–145)
WBC NRBC COR # BLD: 6.69 10*3/MM3 (ref 3.4–10.8)

## 2019-02-13 PROCEDURE — 80053 COMPREHEN METABOLIC PANEL: CPT | Performed by: INTERNAL MEDICINE

## 2019-02-13 PROCEDURE — 99215 OFFICE O/P EST HI 40 MIN: CPT | Performed by: INTERNAL MEDICINE

## 2019-02-13 PROCEDURE — 85025 COMPLETE CBC W/AUTO DIFF WBC: CPT | Performed by: INTERNAL MEDICINE

## 2019-02-13 PROCEDURE — 99214 OFFICE O/P EST MOD 30 MIN: CPT | Performed by: PSYCHIATRY & NEUROLOGY

## 2019-02-13 PROCEDURE — 36415 COLL VENOUS BLD VENIPUNCTURE: CPT | Performed by: INTERNAL MEDICINE

## 2019-02-13 PROCEDURE — 85610 PROTHROMBIN TIME: CPT | Performed by: INTERNAL MEDICINE

## 2019-02-13 NOTE — PROGRESS NOTES
"Follow Up Visit:  Bryan comes accompanied by his wife  He is follow up for right frontal anaplastic astrocytoma having had his second surgery by DR Galvan.    June 16, 2018: first surgery  Pathology Anaplastic Astrocytoma, IDH-mutation present and Ki-67 5%-10% a with mutation in both ATRX and TP53  He underwent post surgical radiation only followed by standard adjuvant c\Temodar under direction of DR Galvan and DR Jefferson respectively.  After Temodar x3 he underwent re=resection on January 24, 2019  Pathology report from [facility] shows the same histology, same molecular profile except Ki-67 now is 2%-3%  Post operative MRI scan (24 hours) shows a good resection    He is now one weak off decadron does pack: he is having positional right posterior headaches which are relieved if he sleeps on a recliner. No other symptoms to a comprehensive review             Review: Problem List and Followup    Review of Systems: as updared above else non-remarkable      ROS Update      Vitals:    02/13/19 1355   BP: 130/68   Pulse: 90   SpO2: 95%   Height: 180.3 cm (70.98\")         Physical/Neurological Examination  Alert, oriented  Normal speech and language  Normal mental status   Wound looks dry and good  Cranial nerves including visual fields are normal  No pronatro drifts  No parietal lobe signs  No difficulty with word finding  Mary and fine motor are normal  No cortical sensory deficit  Gait is normal        Review Results-Workup/Diagnoses/Plan  I brought up the last MRI from 01/25/2019 and reviewed with them: I concur with the official report, there is hardly residual enhancing disease.    IMPRESSION / PLAN  Right frontal Anapalstic astroytoma s/p resection x2, radaition and Temodar chemo as outlined above with current     I reviewed the recommendation for continuation of standard adjuvant chemo under the direction of Dr. Germain.  I suggets brin MRI in 3 motnh after  Cycle #6.  WE can at that time discuss whether to stop at 6 " "or continue with Temodar.    AS for his headcahe It sounds this is a post-operative dependent headcahe: I asked him not to restart decadron now but to keep it at hand and call if he develops sympotms.    I will see in May after MRI after Temodar #6 or sooner if needed.    Total time 25 minutes more than 50% in reviewing the standards of care and the recommendation for no RT now and for continuation with Temodar  They had questions about \"impulse control\" in light of further resection of right frontal lobe: he si doing well right now and it might or not happen.  "

## 2019-02-13 NOTE — PROGRESS NOTES
[facility] OUTPATIENT FOLLOW UP CLINIC VISIT    REASON FOR FOLLOW-UP:    1.  Left lower extremity DVT with progression of symptoms and extension of the left lower extremity DVT into the femoral vein from the popliteal/calf vein swallowing for next set.  Currently anticoagulated with warfarin.  2.  Right lower extremity DVT noted in the common femoral, profunda femoral, and calf veins  3.  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  Other thrombophilia labs negative.    4.  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 Galvna.  IDH mutated.  NOT 1p19q co-deleted.    5.  Radiation initiated on 7/31/2018.  Plan for Temodar ×1 year following radiation.  6.  4500 cGy in 25 fractions administered from 7/31/2018 through 9/14/2018  7.  MRI brain 10/23/2018 with resolving hemorrhage in the resection cavity.  However, there is hyperintensity measuring 4.6 x 4.3 x 3 cm along the margins of the resection cavity.  8.  Repeat venous duplex on 10/23 with chronic right CVT and chronic LLE DVT from the mid femoral vein distally.     9.  He initiated adjuvant therapy with Temodar in mid October 2018.  10.  MRI brain 12/3/2018 with stable findings.  10.  On 1/24/2019 he did have a repeat resection by Dr. Galvan.  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  However, there was no evidence of progression to a higher grade in the new resected specimen.  Mostly the proliferative activity was very low with only rare mitoses and a low Ki-67 of just 2-3%.      HISTORY OF PRESENT ILLNESS:  Bryan Valadez is a 33 y.o. male who returns today for follow up of the above issues.     On 1/24/2019 he did have a repeat resection by Dr. Galvan.  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  However, there was no evidence of progression to a higher grade in the new resected specimen.  Mostly the proliferative activity was very low with  only rare mitoses and a low Ki-67 of just 2-3%.    He is recovering nicely after the procedure.  He does note headaches when he wakes up in the morning if he sleeps lying flat in the bed.  He does not notice headaches when he sleeps in a recliner.  He denies any motor abnormalities.  He has occasional word finding difficulty.  He has returned to work but is working from home at this point.  He denies any bleeding.  He has been anticoagulated with Lovenox and warfarin.  He is going to have a repeat lower extremity venous duplex later this week and hopes to have his IVC filter removed in the near future.    ONCOLOGIC HISTORY:  He had about 6 months of headaches treated as sinusitis and presented on 6/15 with visual changes and headache and was found to have a large right frontal mass which was resected on 6/16 by Dr. Galvan and consistent with a glioma.  Pathology was reviewed at [facility] showing infiltrating glioma.  Large 10 cm partially cystic and solid lobulated tumor mass at diagnosis.  Negative IDH1-R132H immunostain excludes the most common IDH1 mutation; however loss of ATRX expression suggests possibility of glioma harboring another less common IDH1 mutation.  p53 overexpressed.  Ki67 10% but variable.  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  There was no evidence for a 1p19q co-deletion.       Chromosome microarray showed a complex molecular karyotype including loss of 1q, loss of 2q, gain of 7q, gain of 8q, loss of 9p, REBECCA of 17p, and gain of 18p.  These abnormalities are typically associated with  IDH-mutant astrocytic gliomas.  No 1p and 19q whole arm co-deletion was noted.  CT head on 7/1 c/w residual tumor circumscribing the resection cavity and a remote cerebellar hemisphere hemorrhage.       He was discharged and subsequently admitted with left leg pain and chest pain, with LLE popliteal and calf vein clot noted and bilateral small PE. He was treated with heparin and discharged on  Pradaxa.     He developed worsening leg pain up into the thigh and presented to the ER where a venous duplex showed progression of the clot to the femoral vein.  He was given Lovenox and admitted.  Warfarin was initiated.       A partial thrombophilia evaluation showed that he is a heterozygote for the factor V Leiden mutation.  Labs otherwise unremarkable.    He was subsequently found to have a right lower extremity DVT on 7/8/2018.    He remains anticoagulated with warfarin.    Radiation initiated on 7/31/2018.  Plan for Temodar ×1 year after radiation is complete.    Radiation complete as of 9/14/2018.    4500 cGy in 25 fractions administered from 7/31/2018 through 9/14/2018    MRI brain 10/23/2018 with resolving hemorrhage in the resection cavity.  However, there is hyperintensity measuring 4.6 x 4.3 x 3 cm along the margins of the resection cavity.    Repeat venous duplex on 10/23 with chronic right CVT and chronic LLE DVT from the mid femoral vein distally.      Repeat brain MRI on 12/3/2018 with stable findings.    On 1/24/2019 he did have a repeat resection by Dr. Galvan.  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  However, there was no evidence of progression to a higher grade in the new resected specimen.  Mostly the proliferative activity was very low with only rare mitoses and a low Ki-67 of just 2-3%.      ALLERGIES:  Allergies   Allergen Reactions   • Avocado Itching   • Other Itching     Insect stings: Bee stings, throat swelling (has Epi pen)    Allergy to fruit, Avocado, Cherry Tomato (can have blue berries)   • Tomato Itching     Cherry tomato       MEDICATIONS:  The medication list has been reviewed with the patient by the medical assistant, and the list has been updated in the electronic medical record, which I reviewed.  Medication dosages and frequencies were confirmed to be accurate.    REVIEW OF SYSTEMS:  PAIN:  See Vital Signs below.  GENERAL:  No fevers, chills, night  "sweats, or unintended weight loss.  SKIN:  No rash or nonhealing lesions  HEME/LYMPH:  No abnormal bleeding.  No palpable lymphadenopathy.  EYES:  No vision changes or diplopia.  ENT:  No sore throat or difficulty swallowing.  RESPIRATORY:  No cough, shortness of breath, hemoptysis, or wheezing.  CARDIOVASCULAR:  No chest pain, palpitations, orthopnea, or dyspnea on exertion.  GASTROINTESTINAL:  No abdominal pain, nausea, vomiting, constipation, diarrhea, melena, or hematochezia.  GENITOURINARY:  No dysuria or hematuria.  MUSCULOSKELETAL:  No joint pain, swelling, or erythema.  Muscle cramping  NEUROLOGIC:  No dizziness, loss of consciousness, or seizures.  PSYCHIATRIC:  No depression, anxiety, or mood changes.    Vitals:    02/13/19 1608   BP: 128/84   Pulse: 81   Resp: 16   Temp: 98.5 °F (36.9 °C)   TempSrc: Oral   SpO2: 95%   Weight: 81.2 kg (179 lb 1.6 oz)   Height: 180.3 cm (70.98\")   PainSc: 0-No pain  Comment: brain cancer       PHYSICAL EXAMINATION:  GENERAL:  Well-developed well-nourished male; awake, alert and oriented, in no acute distress.  SKIN:  Alopecia present on the scalp.  Healing surgical incision present.  HEAD:  Normocephalic, Well-healed surgical incision present.    EYES:  Pupils equal, round and reactive to light.  Extraocular movements intact.  Conjunctivae normal.  EARS:  Hearing intact.  NOSE:  Septum midline.  No excoriations or nasal discharge.  MOUTH:  No stomatitis or ulcers.  Lips are normal.  THROAT:  Oropharynx without lesions or exudates.  NECK:  Supple with good range of motion; no thyromegaly or masses; no JVD or bruits.  LYMPHATICS:  No cervical, supraclavicular, axillary, or inguinal lymphadenopathy.  CHEST:  Lungs are clear to auscultation bilaterally.  No wheezes, rales, or rhonchi.  HEART:  Regular rate; normal rhythm.  No murmurs, gallops or rubs.  ABDOMEN:  Not examined today  EXTREMITIES:  No clubbing, cyanosis, or edema.  NEUROLOGICAL:  No focal neurologic " deficits.    DIAGNOSTIC DATA:  Results for orders placed or performed in visit on 02/13/19   Comprehensive Metabolic Panel   Result Value Ref Range    Glucose 86 74 - 124 mg/dL    BUN 11 6 - 20 mg/dL    Creatinine 0.80 0.70 - 1.30 mg/dL    Sodium 137 134 - 145 mmol/L    Potassium 4.2 3.5 - 4.7 mmol/L    Chloride 100 98 - 107 mmol/L    CO2 21.9 (L) 22.0 - 29.0 mmol/L    Calcium 9.1 8.5 - 10.2 mg/dL    Total Protein 6.8 6.3 - 8.0 g/dL    Albumin 4.40 3.50 - 5.20 g/dL    ALT (SGPT) 29 0 - 41 U/L    AST (SGOT) 17 0 - 40 U/L    Alkaline Phosphatase 61 38 - 116 U/L    Total Bilirubin <0.2 0.1 - 1.2 mg/dL    eGFR Non African Amer 110 >60 mL/min/1.73    Globulin 2.4 1.8 - 3.5 gm/dL    A/G Ratio 1.8 1.1 - 2.4 g/dL    BUN/Creatinine Ratio 13.8 7.3 - 30.0    Anion Gap 15.1 mmol/L   Protime-INR, Fingerstick   Result Value Ref Range    Protime 24.0 (H) 11.0 - 13.5 Seconds    INR 2.00 (H) 0.90 - 1.10   CBC Auto Differential   Result Value Ref Range    WBC 6.69 3.40 - 10.80 10*3/mm3    RBC 4.43 4.14 - 5.80 10*6/mm3    Hemoglobin 13.8 13.0 - 17.7 g/dL    Hematocrit 38.8 37.5 - 51.0 %    MCV 87.6 79.0 - 97.0 fL    MCH 31.2 26.6 - 33.0 pg    MCHC 35.6 31.5 - 35.7 g/dL    RDW 11.6 (L) 12.3 - 15.4 %    RDW-SD 37.2 37.0 - 54.0 fl    MPV 8.9 6.0 - 12.0 fL    Platelets 306 140 - 450 10*3/mm3    Neutrophil % 56.5 42.7 - 76.0 %    Lymphocyte % 31.4 19.6 - 45.3 %    Monocyte % 9.6 5.0 - 12.0 %    Eosinophil % 1.0 0.3 - 6.2 %    Basophil % 0.6 0.0 - 1.5 %    Immature Grans % 0.9 (H) 0.0 - 0.5 %    Neutrophils, Absolute 3.78 1.40 - 7.00 10*3/mm3    Lymphocytes, Absolute 2.10 0.70 - 3.10 10*3/mm3    Monocytes, Absolute 0.64 0.10 - 0.90 10*3/mm3    Eosinophils, Absolute 0.07 0.00 - 0.40 10*3/mm3    Basophils, Absolute 0.04 0.00 - 0.20 10*3/mm3    Immature Grans, Absolute 0.06 (H) 0.00 - 0.05 10*3/mm3    nRBC 0.0 0.0 - 0.0 /100 WBC       IMAGING:  MRI brain images from 12/3/2018 personally reviewed.  No change from prior.    IMPRESSION:  1. No  significant interval change when compared to most recent  postoperative MRI of brain, 40 days ago, on 10/23/2018.  2. In June 2018, this patient had a 9 cm superior right frontoparietal  craniotomy for debulking of the large 10 x 7 x 6 cm mixed cystic and  solid partially enhancing anaplastic grade 3 astrocytoma from the right  frontal lobe. There is a stable size 5 x 3.4 x 2.8 cm resection cavity  in the superior right frontal lobe, and there is abnormal FLAIR and T2  hyperintensity diffusion hyperintensity circumscribing the margins of  the resection cavity, most pronounced along the posterior and inferior  lateral margin of the resection cavity, compatible with residual tumor,  tracks up to 8.4 x 4.7 cm in anterior posterior, medial lateral  dimension, and there is abnormal enhancement most pronounced along the  posterior and posterior inferior lateral margin of the resection cavity.  The single largest focus of enhancement measures up to 4.2 x 2.6 x 2.7  cm along the posterior inferior lateral margin of the resection cavity  extending posterolateral right frontal parenchyma, consistent with  enhancing high-grade tumor unchanged since 10/23/2018. Continued  follow-up suggested. The remainder of the MRI of the head is normal.     ASSESSMENT:  This is a 33 y.o. male with:  1.  Bilateral lower extremity DVT: He remains on warfarin.  His INR today is 2.0.  He can discontinue the Lovenox.  Return next week for an INR check.    2.  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  NOT 1p19q co-deleted.  Overall, this is a good molecular profile.  I discussed his case with Dr. Galvan, Dr. Daley, and Dr. Dobbins and I reviewed NCCN guidelines.   He will require one year of adjuvant Temodar following radiation.  The big question was whether to administer concurrent therapy with Temodar along with radiation.  Ultimately, we decided not to do this given  the overall good molecular profile and the potential adverse effects from concurrent therapy.  He did initiate radiation alone on 7/31/2018 and completed it on 9/14/2018.     He initiated adjuvant Temodar in mid October 2018.    Due to persistent abnormalities on MRI, on 1/24/2019 he did have a repeat resection by Dr. Galvan.  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  However, there was no evidence of progression to a higher grade in the new resected specimen.  Mostly the proliferative activity was very low with only rare mitoses and a low Ki-67 of just 2-3%.    He saw Dr. Dobbins earlier today.  He is in agreement that we will continue standard adjuvant therapy with Temodar at this time.    Therefore, on Sunday, he will proceed with cycle #4 of Temodar at 400 mg daily for 5 out of 28 days.      He will complete 3 more cycles of therapy.  An MRI is scheduled at the end of April.  He will see Dr. Galvan and Dr. Dobbins after that.      3.  Given the possibility of infertility with treatment he banked sperm at the [facility].  His wife is actually pregnant at this time.  Her due date is in early May.    4.  Elevated liver labs: Liver labs normalized.  Unclear reason.  Medication could have contributed.    PLAN:  1.  Proceed with Temodar next week for maintenance therapy.      Temodar dosing:  Cycle 1: 150 mg/m2 once daily for 5 days of a 28-day treatment cycle  Cycles 2 to 6: May increase to 200 mg/m2 once daily for 5 days; repeat every 28 days (if ANC ?1,500/mm3, platelets ?100,000/mm3 and nonhematologic toxicities for cycle 1 are ?grade 2 [excludes alopecia, nausea/vomiting]); If dose was not escalated at the onset of cycle 2, do not increase for cycles 3 to 6.    Dose for him at 150 mg/m2 = 300 mg  Dose for him at 200 mg/m2 = 400 mg    Plan to complete 6 cycles and then determine whether to continue.      3 more cycles.  He will start on Sunday.  He has an MRI scheduled at the  end of April following his 6th cycle.  After that we will determine how to proceed.    2.  Continue Keppra    3.  Continue Bactrim three days weekly for PCP prophylaxis.     4.  Return next week for an INR check.  I will see him back in about a month for follow-up with a CBC, CMP, and INR.

## 2019-02-15 ENCOUNTER — APPOINTMENT (OUTPATIENT)
Dept: ONCOLOGY | Facility: HOSPITAL | Age: 34
End: 2019-02-15

## 2019-02-15 ENCOUNTER — APPOINTMENT (OUTPATIENT)
Dept: LAB | Facility: HOSPITAL | Age: 34
End: 2019-02-15

## 2019-02-20 ENCOUNTER — LAB (OUTPATIENT)
Dept: LAB | Facility: HOSPITAL | Age: 34
End: 2019-02-20

## 2019-02-20 ENCOUNTER — INFUSION (OUTPATIENT)
Dept: ONCOLOGY | Facility: HOSPITAL | Age: 34
End: 2019-02-20

## 2019-02-20 DIAGNOSIS — C71.1 ANAPLASTIC ASTROCYTOMA OF FRONTAL LOBE (HCC): ICD-10-CM

## 2019-02-20 DIAGNOSIS — Z86.711 HISTORY OF PULMONARY EMBOLUS (PE): ICD-10-CM

## 2019-02-20 DIAGNOSIS — I82.532 CHRONIC DEEP VEIN THROMBOSIS (DVT) OF LEFT POPLITEAL VEIN (HCC): Primary | ICD-10-CM

## 2019-02-20 DIAGNOSIS — D68.51 FACTOR 5 LEIDEN MUTATION, HETEROZYGOUS (HCC): ICD-10-CM

## 2019-02-20 LAB
INR PPP: 1.6 (ref 0.9–1.1)
PROTHROMBIN TIME: 18.7 SECONDS (ref 11–13.5)

## 2019-02-20 PROCEDURE — 85610 PROTHROMBIN TIME: CPT

## 2019-02-20 NOTE — PROGRESS NOTES
Pt here for INR review. INR 1.6 PT 18.7. Pt states he took 7.5mg every day per Dr. Jefferson's instruction. No missed doses, no diet changes. Per SS, increase to 10mg Sun/Tue/Thur and 7.5mg all other days. Asked pt to return in one week to recheck. Pt v/u. Sent message to scheduling and order for INR placed.

## 2019-02-26 ENCOUNTER — TELEPHONE (OUTPATIENT)
Dept: ONCOLOGY | Facility: CLINIC | Age: 34
End: 2019-02-26

## 2019-02-26 DIAGNOSIS — I26.99 PULMONARY EMBOLISM WITH INFARCTION (HCC): Primary | ICD-10-CM

## 2019-02-26 RX ORDER — WARFARIN SODIUM 5 MG/1
TABLET ORAL
Qty: 60 TABLET | Refills: 5 | Status: SHIPPED | OUTPATIENT
Start: 2019-02-26 | End: 2019-09-08 | Stop reason: SDUPTHER

## 2019-02-26 NOTE — TELEPHONE ENCOUNTER
On-call MD note.  Patient called reporting running out of Coumadin/warfarin.  I E scribed to his [facility] pharmacy, 10 mg on Mondays Wednesdays and Fridays, the risk for days would be 7.5 mg.  60 tablets prescribed with 5 refills.    RACHEL FINN M.D., Ph.D.    2/26/2019

## 2019-02-27 ENCOUNTER — APPOINTMENT (OUTPATIENT)
Dept: LAB | Facility: HOSPITAL | Age: 34
End: 2019-02-27

## 2019-02-27 ENCOUNTER — INFUSION (OUTPATIENT)
Dept: ONCOLOGY | Facility: HOSPITAL | Age: 34
End: 2019-02-27

## 2019-02-27 ENCOUNTER — APPOINTMENT (OUTPATIENT)
Dept: ONCOLOGY | Facility: HOSPITAL | Age: 34
End: 2019-02-27

## 2019-02-27 ENCOUNTER — LAB (OUTPATIENT)
Dept: LAB | Facility: HOSPITAL | Age: 34
End: 2019-02-27

## 2019-02-27 DIAGNOSIS — Z86.711 HISTORY OF PULMONARY EMBOLUS (PE): ICD-10-CM

## 2019-02-27 DIAGNOSIS — D68.51 FACTOR 5 LEIDEN MUTATION, HETEROZYGOUS (HCC): ICD-10-CM

## 2019-02-27 DIAGNOSIS — C71.1 ANAPLASTIC ASTROCYTOMA OF FRONTAL LOBE (HCC): ICD-10-CM

## 2019-02-27 LAB
INR PPP: 2.6 (ref 0.9–1.1)
PROTHROMBIN TIME: 31.4 SECONDS (ref 11–13.5)

## 2019-02-27 PROCEDURE — 85610 PROTHROMBIN TIME: CPT

## 2019-02-27 RX ORDER — WARFARIN SODIUM 5 MG/1
TABLET ORAL
Qty: 60 TABLET | Refills: 0 | OUTPATIENT
Start: 2019-02-27

## 2019-03-17 ENCOUNTER — APPOINTMENT (OUTPATIENT)
Dept: CT IMAGING | Facility: HOSPITAL | Age: 34
End: 2019-03-17

## 2019-03-17 ENCOUNTER — HOSPITAL ENCOUNTER (EMERGENCY)
Facility: HOSPITAL | Age: 34
Discharge: HOME | End: 2019-03-17
Attending: EMERGENCY MEDICINE | Admitting: EMERGENCY MEDICINE

## 2019-03-17 VITALS
BODY MASS INDEX: 25.2 KG/M2 | OXYGEN SATURATION: 98 % | RESPIRATION RATE: 16 BRPM | HEART RATE: 68 BPM | TEMPERATURE: 98.3 F | SYSTOLIC BLOOD PRESSURE: 123 MMHG | HEIGHT: 71 IN | DIASTOLIC BLOOD PRESSURE: 79 MMHG | WEIGHT: 180 LBS

## 2019-03-17 DIAGNOSIS — D68.9 COAGULOPATHY (HCC): ICD-10-CM

## 2019-03-17 DIAGNOSIS — S00.01XA ABRASION OF SCALP, INITIAL ENCOUNTER: Primary | ICD-10-CM

## 2019-03-17 LAB
INR PPP: 2.98 (ref 0.9–1.1)
PROTHROMBIN TIME: 30.5 SECONDS (ref 11.7–14.2)

## 2019-03-17 PROCEDURE — 99283 EMERGENCY DEPT VISIT LOW MDM: CPT

## 2019-03-17 PROCEDURE — 70450 CT HEAD/BRAIN W/O DYE: CPT

## 2019-03-17 PROCEDURE — 85610 PROTHROMBIN TIME: CPT | Performed by: NURSE PRACTITIONER

## 2019-03-17 NOTE — ED TRIAGE NOTES
Pt hit his head on a cabinet. He is on Coumadin. Pt denies LOC or dizziness. Pt has a hx of craniotomy.

## 2019-03-17 NOTE — ED PROVIDER NOTES
EMERGENCY DEPARTMENT ENCOUNTER    CHIEF COMPLAINT  Chief Complaint: head injury  History given by: patient  History limited by: nothing  Room Number: 06/06  PMD: Opal Benavidez MD      HPI:  Pt is a 33 y.o. male who presents complaining of a head injury after hitting his head on a cabinet while vacuuming PTA. Pt denies LOC or pain anywhere else.  Pt also reports that he had a recent  brain surgery for the removal of a tumor. Pt denies any visual changes or headaches but is on Coumadin for a hx of blood clots. Pt reports that his tetanus shot is up to date. There are no other complaints at this time.      Duration:  PTA  Onset: sudden  Timing: constant  Location: head  Radiation: none  Quality: pain  Intensity/Severity: moderate  Progression: unchanged  Associated Symptoms: none  Aggravating Factors: none  Alleviating Factors: none  Previous Episodes: none  Treatment before arrival: none    PAST MEDICAL HISTORY  Active Ambulatory Problems     Diagnosis Date Noted   • Hyperlipidemia 03/29/2016   • Asthma 03/29/2016   • GERD (gastroesophageal reflux disease) 03/29/2016   • Primary brain tumor (CMS/HCC) 06/15/2018   • Pulmonary embolus (CMS/HCC) 06/29/2018   • Status post craniotomy 06/29/2018   • Pulmonary embolism with infarction (CMS/HCC) 06/29/2018   • Chronic deep vein thrombosis (DVT) of left popliteal vein (CMS/HCC) 06/29/2018   • Acute deep vein thrombosis (DVT) of popliteal vein of left lower extremity (CMS/HCC) 06/29/2018   • Acute deep vein thrombosis (DVT) of femoral vein of both lower extremities (CMS/HCC) 07/07/2018   • Failure of outpatient treatment 07/08/2018   • History of pulmonary embolus (PE) 07/08/2018   • Factor 5 Leiden mutation, heterozygous (CMS/HCC) 07/08/2018   • 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   • Elevated liver enzymes 07/23/2018   • Astrocytoma brain tumor (CMS/HCC) 12/12/2018     Resolved Ambulatory Problems     Diagnosis Date Noted   • Leukocytosis  06/29/2018     Past Medical History:   Diagnosis Date   • Allergic rhinitis    • Asthma    • Chest pain    • DVT (deep venous thrombosis) (CMS/[facility]) 06/2018   • Factor V Leiden (CMS/[facility])    • GERD (gastroesophageal reflux disease)    • H/O echocardiogram 2006   • Headache    • History of ETT    • History of Holter monitoring    • Hyperlipidemia    • PE (pulmonary thromboembolism) (CMS/[facility]) 06/2018   • Primary brain tumor (CMS/[facility]) 2018       PAST SURGICAL HISTORY  Past Surgical History:   Procedure Laterality Date   • CRANIOTOMY FOR TUMOR Right 6/16/2018    Procedure: CRANIOTOMY FOR  RESECTION OF LARGE RIGHT FRONTAL MASS WITH AUGUSTIN NAVIGATION;  Surgeon: Chetan Galvan IV, MD;  Location: [facility] OR;  Service: Neurosurgery   • CRANIOTOMY FOR TUMOR Right 1/24/2019    Procedure: CRANIOTOMY FOR TUMOR STEREOTACTIC, bicoronal for right frontal mass;  Surgeon: Chetan Galvan IV, MD;  Location: [facility] OR;  Service: Neurosurgery   • WISDOM TOOTH EXTRACTION  1996       FAMILY HISTORY  Family History   Problem Relation Age of Onset   • Diabetes Mother    • Malig Hyperthermia Neg Hx        SOCIAL HISTORY  Social History     Socioeconomic History   • Marital status:      Spouse name: Mary   • Number of children: Not on file   • Years of education: Not on file   • Highest education level: Not on file   Social Needs   • Financial resource strain: Not on file   • Food insecurity - worry: Not on file   • Food insecurity - inability: Not on file   • Transportation needs - medical: Not on file   • Transportation needs - non-medical: Not on file   Occupational History     Employer: [employer]     Comment: off work since 1/15   Tobacco Use   • Smoking status: Never Smoker   • Smokeless tobacco: Never Used   Substance and Sexual Activity   • Alcohol use: No     Comment: Moderate   • Drug use: No   • Sexual activity: Defer   Other Topics Concern   • Not on file   Social History Narrative   • Not on file        ALLERGIES  Avocado; Other; and Tomato    REVIEW OF SYSTEMS  Review of Systems   Constitutional: Negative for fever.   Eyes: Positive for redness. Negative for visual disturbance.   Respiratory: Negative for shortness of breath.    Cardiovascular: Negative for chest pain.   Skin: Positive for wound ( head).   Neurological: Negative for headaches.       PHYSICAL EXAM  ED Triage Vitals [03/17/19 1501]   Temp Heart Rate Resp BP SpO2   98.3 °F (36.8 °C) 74 14 133/84 98 %      Temp src Heart Rate Source Patient Position BP Location FiO2 (%)   Tympanic -- -- -- --       Physical Exam   Constitutional: No distress.   HENT:   Head: Normocephalic and atraumatic.   Eyes: EOM are normal.   Neck: Normal range of motion.   Pulmonary/Chest: No respiratory distress.   Abdominal: There is no tenderness.   Musculoskeletal: He exhibits no edema.   Neurological: He is alert.   Skin: Skin is warm and dry.   Well healed incision to the right parietal frontal aspect of the scalp. New linear superficial laceration in this region.   Nursing note and vitals reviewed.      LAB RESULTS  Lab Results (last 24 hours)     Procedure Component Value Units Date/Time    Protime-INR [[id number]]  (Abnormal) Collected:  03/17/19 1549    Specimen:  Blood Updated:  03/17/19 1611     Protime 30.5 Seconds      INR 2.98          I ordered the above labs and reviewed the results    RADIOLOGY  CT Head Without Contrast   Final Result   1. No acute intracranial finding or significant change from earlier in   the day.                           This report was finalized on 3/17/2019 8:59 PM by Juan Antonio Neves M.D.          CT Head Without Contrast   Final Result           No acute intracranial hemorrhage or hydrocephalus. Evolving   postsurgical changes, as described. If there is further clinical   concern, MRI could be considered for further evaluation.       This report was finalized on 3/17/2019 4:56 PM by Dr. Abel Villalobos M.D.               I  ordered the above noted radiological studies. Interpreted by radiologist.  PROCEDURES  Procedures      PROGRESS AND CONSULTS  ED Course as of Mar 17 2143   Sun Mar 17, 2019   2133 9:34 PM  I spoke at length with both the patient and the spouse.  The patient has remained asymptomatic without any pain.  I informed him of the -2 head CT results.  I informed him that no test result is 100%.  And to return to the emergency department immediately if he develops any headache, altered mental status, or weakness in extremity.  Both the patient and the spouse understand and will return if any of the symptoms return.  The patient feels fine and does not want to be admitted or observed.  [MM]      ED Course User Index  [MM] Cheko Ta MD     1523 Ordered lab work and CT Head for further evaluation.    1645 Discussed patient care with Dr. Ta who agrees with plan of care.    1656 Patient care has been turned over to Dr. Ta.        MEDICAL DECISION MAKING  Results were reviewed/discussed with the patient and they were also made aware of online access. Pt also made aware that some labs, such as cultures, will not be resulted during ER visit and follow up with PMD is necessary.     MDM  Number of Diagnoses or Management Options     Amount and/or Complexity of Data Reviewed  Clinical lab tests: reviewed and ordered  Tests in the radiology section of CPT®: reviewed and ordered (CT Head-)  Independent visualization of images, tracings, or specimens: yes           DIAGNOSIS  Final diagnoses:   Abrasion of scalp, initial encounter   Coagulopathy due to Coumadin (CMS/[facility])       DISPOSITION  Patient care has been turned over to Dr. Ta.    Latest Documented Vital Signs:  As of 9:43 PM  BP- 123/79 HR- 68 Temp- 98.3 °F (36.8 °C) (Tympanic) O2 sat- 98%    --  Documentation assistance provided by gabino Zhu for ADA Hayes.  Information recorded by the gabino was done at my direction and has been verified  and validated by me.            Chetan Zhu  03/17/19 1718       Bonita Carvalho APRN  03/17/19 [id number]

## 2019-03-17 NOTE — ED PROVIDER NOTES
EMERGENCY DEPARTMENT ENCOUNTER    CHIEF COMPLAINT  Chief Complaint: ***  History given by: ***  History limited by: ***  Room Number: 06/06  PMD: Opal Benavidez MD      HPI:  Pt is a 33 y.o. male who presents complaining of ***    Duration:  ***  Onset: ***  Timing: ***  Location: ***  Radiation: ***  Quality: ***  Intensity/Severity: ***  Progression: ***  Associated Symptoms: ***  Aggravating Factors: ***  Alleviating Factors: ***  Previous Episodes: ***  Treatment before arrival: ***    PAST MEDICAL HISTORY  Active Ambulatory Problems     Diagnosis Date Noted   • Hyperlipidemia 03/29/2016   • Asthma 03/29/2016   • GERD (gastroesophageal reflux disease) 03/29/2016   • Primary brain tumor (CMS/HCC) 06/15/2018   • Pulmonary embolus (CMS/HCC) 06/29/2018   • Status post craniotomy 06/29/2018   • Pulmonary embolism with infarction (CMS/HCC) 06/29/2018   • Chronic deep vein thrombosis (DVT) of left popliteal vein (CMS/HCC) 06/29/2018   • Acute deep vein thrombosis (DVT) of popliteal vein of left lower extremity (CMS/HCC) 06/29/2018   • Acute deep vein thrombosis (DVT) of femoral vein of both lower extremities (CMS/HCC) 07/07/2018   • Failure of outpatient treatment 07/08/2018   • History of pulmonary embolus (PE) 07/08/2018   • Factor 5 Leiden mutation, heterozygous (CMS/HCC) 07/08/2018   • 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   • Elevated liver enzymes 07/23/2018   • Astrocytoma brain tumor (CMS/HCC) 12/12/2018     Resolved Ambulatory Problems     Diagnosis Date Noted   • Leukocytosis 06/29/2018     Past Medical History:   Diagnosis Date   • Allergic rhinitis    • Asthma    • Chest pain    • DVT (deep venous thrombosis) (CMS/HCC) 06/2018   • Factor V Leiden (CMS/HCC)    • GERD (gastroesophageal reflux disease)    • H/O echocardiogram 2006   • Headache    • History of ETT    • History of Holter monitoring    • Hyperlipidemia    • PE (pulmonary thromboembolism) (CMS/HCC) 06/2018   • Primary  brain tumor (CMS/HCC) 2018       PAST SURGICAL HISTORY  Past Surgical History:   Procedure Laterality Date   • CRANIOTOMY FOR TUMOR Right 6/16/2018    Procedure: CRANIOTOMY FOR  RESECTION OF LARGE RIGHT FRONTAL MASS WITH AUGUSTIN NAVIGATION;  Surgeon: Chetan Galvan IV, MD;  Location: [facility];  Service: Neurosurgery   • CRANIOTOMY FOR TUMOR Right 1/24/2019    Procedure: CRANIOTOMY FOR TUMOR STEREOTACTIC, bicoronal for right frontal mass;  Surgeon: Chetan Galvan IV, MD;  Location: [facility];  Service: Neurosurgery   • WISDOM TOOTH EXTRACTION  1996       FAMILY HISTORY  Family History   Problem Relation Age of Onset   • Diabetes Mother    • Malig Hyperthermia Neg Hx        SOCIAL HISTORY  Social History     Socioeconomic History   • Marital status:      Spouse name: Mary   • Number of children: Not on file   • Years of education: Not on file   • Highest education level: Not on file   Social Needs   • Financial resource strain: Not on file   • Food insecurity - worry: Not on file   • Food insecurity - inability: Not on file   • Transportation needs - medical: Not on file   • Transportation needs - non-medical: Not on file   Occupational History     Employer: [employer]     Comment: off work since 1/15   Tobacco Use   • Smoking status: Never Smoker   • Smokeless tobacco: Never Used   Substance and Sexual Activity   • Alcohol use: No     Comment: Moderate   • Drug use: No   • Sexual activity: Defer   Other Topics Concern   • Not on file   Social History Narrative   • Not on file       ALLERGIES  Avocado; Other; and Tomato    REVIEW OF SYSTEMS  Review of Systems    PHYSICAL EXAM  ED Triage Vitals [03/17/19 1501]   Temp Heart Rate Resp BP SpO2   98.3 °F (36.8 °C) 74 14 133/84 98 %      Temp src Heart Rate Source Patient Position BP Location FiO2 (%)   Tympanic -- -- -- --       Physical Exam    LAB RESULTS  Lab Results (last 24 hours)     Procedure Component Value Units Date/Time     Protime-INR [[id number]]  (Abnormal) Collected:  03/17/19 1549    Specimen:  Blood Updated:  03/17/19 1611     Protime 30.5 Seconds      INR 2.98          I ordered the above labs and reviewed the results    RADIOLOGY  CT Head Without Contrast    (Results Pending)        I ordered the above noted radiological studies. Interpreted by radiologist. Discussed with radiologist (***). Reviewed by me in PACS.       PROCEDURES  Procedures      PROGRESS AND CONSULTS           MEDICAL DECISION MAKING  Results were reviewed/discussed with the patient and they were also made aware of online access. Pt also made aware that some labs, such as cultures, will not be resulted during ER visit and follow up with PMD is necessary.     MDM  Number of Diagnoses or Management Options     Amount and/or Complexity of Data Reviewed  Discussion of test results with the performing providers: yes (Dr. Villalobos)  Decide to obtain previous medical records or to obtain history from someone other than the patient: yes  Obtain history from someone other than the patient: yes (Son)  Review and summarize past medical records: yes  Discuss the patient with other providers: yes (Dr. Roland (General surgery))           DIAGNOSIS  Final diagnoses:   None       DISPOSITION  ***    Latest Documented Vital Signs:  As of 4:16 PM  BP- 133/84 HR- 74 Temp- 98.3 °F (36.8 °C) (Tympanic) O2 sat- 98%    --  Documentation assistance provided by scribe *** for ***.  Information recorded by the scribe was done at my direction and has been verified and validated by me.     Shaun Lane  03/17/19 7721

## 2019-03-17 NOTE — ED PROVIDER NOTES
MD ATTESTATION NOTE  The BABITA and I have discussed this patient's history, physical exam, and treatment plan.  I have reviewed the documentation and personally had a face to face interaction with the patient. I affirm the documentation and agree with the treatment and plan.  The attached note describes my personal findings.    Hx- Pt presents c/o a head injury that occurred while the pt was picking something up from the ground. Pt sustained the injury around 1400. Pt states that he grazed his head off of the corner of the cabinet while he was lifting up. He denies LOC, numbness, tingling, or other injury. Pt is currently status post craniotomy (done on 1/24/19) and is currently on Coumadin. He denies HA at this time.    PEx-  Constitution- NAD  HENT- There is a small linear laceration to parietal temporal region that is not actively bleeding.  Neuro- A/Ox3    Plan- To check the pt's Coumadin level and obtain a CT head.    LABS:   Lab Results (last 24 hours)     Procedure Component Value Units Date/Time    Protime-INR [[id number]]  (Abnormal) Collected:  03/17/19 1549    Specimen:  Blood Updated:  03/17/19 1611     Protime 30.5 Seconds      INR 2.98        I have reviewed the above results.    RADIOLOGY:  CT Head Without Contrast   Final Result   1. No acute intracranial finding or significant change from earlier in   the day.                           This report was finalized on 3/17/2019 8:59 PM by Juan Antonio Neves M.D.          CT Head Without Contrast   Final Result           No acute intracranial hemorrhage or hydrocephalus. Evolving   postsurgical changes, as described. If there is further clinical   concern, MRI could be considered for further evaluation.       This report was finalized on 3/17/2019 4:56 PM by Dr. Abel Villalobos M.D.            I reviewed the above results. Interpreted by Radiologist.    PROGRESS NOTES:  1656- Pt care assumed from NICOLE Hayes.    1751- Rechecked pt who is resting  comfortably. Pt reports no change in his condition at this timrther examined the pt's wound. Upon this examination, it does not appear to need to be sutured. Discussed the plan to consult Neurosurgery about the pt's case. Informed the pt hat he will likely need to be admitted. Pt understands and agrees with the plan, all questions answered.    1755- Discussed pt with Dr. Akers (Neurosurgeon) who states that if the pt has a repeat CT head in 2-3 hours and that scan is WNL, that the pt can be d/c'd home.    1757- Returned to pt. Informed him of my discussion with Dr. Akers. Informed them of the plan to repeat the CT Head with 2-3 hours, and if the CT is WNL, that the pt can be d/c'd home. I had a lengthy discussion with the risks of this plan, and the pt expressed understanding of this plan and the risks. Discussed plan to order another head CT around 1800. Pt understands and agrees with the plan, all questions answered.    2124- Rechecked pt. Pt is resting comfortably. Pt reports that he remains asymptomatic. Notified pt of his negative repeat CT Head. Discussed the plan to discharge the pt home. I instructed the pt to f/u with his PCP. Strict RTED instructions given to pt and his spouse. Pt understands and agrees with the plan, all questions answered.    ED Course as of Mar 17 2300   Sun Mar 17, 2019   2133 9:34 PM  I spoke at length with both the patient and the spouse.  The patient has remained asymptomatic without any pain.  I informed him of the -2 head CT results.  I informed him that no test result is 100%.  And to return to the emergency department immediately if he develops any headache, altered mental status, or weakness in extremity.  Both the patient and the spouse understand and will return if any of the symptoms return.  The patient feels fine and does not want to be admitted or observed.  [MM]      ED Course User Index  [MM] Cheko Ta MD           DIAGNOSES:  Final diagnoses:   Abrasion of  scalp, initial encounter   Coagulopathy due to Coumadin (CMS/HCC)     DISPOSITION:  DISCHARGE    Patient discharged in stable condition.    Reviewed implications of results, diagnosis, meds, responsibility to follow up, warning signs and symptoms of possible worsening, potential complications and reasons to return to ER.    Patient/Family voiced understanding of above instructions.    Discussed plan for discharge, as there is no emergent indication for admission. Patient referred to primary care provider for BP management due to today's BP. Pt/family is agreeable and understands need for follow up and repeat testing.  Pt is aware that discharge does not mean that nothing is wrong but it indicates no emergency is present that requires admission and they must continue care with follow-up as given below or physician of their choice.     FOLLOW-UP  Opal Benavidez MD  [facility] [place] [id number] [phone]      As needed, Return if pain worsens, weakness in extremity, change in behavior, shortness of breath, fever, any concerns         Medication List      No changes were made to your prescriptions during this visit.           Documentation assistance provided by gabino Bills for Dr. Ta.  Information recorded by the scrjoeye was done at my direction and has been verified and validated by me.     Robin Bills  03/17/19 [id number]       Cheko Ta MD  03/17/19 3998

## 2019-03-19 ENCOUNTER — OFFICE VISIT (OUTPATIENT)
Dept: ONCOLOGY | Facility: CLINIC | Age: 34
End: 2019-03-19

## 2019-03-19 ENCOUNTER — LAB (OUTPATIENT)
Dept: LAB | Facility: HOSPITAL | Age: 34
End: 2019-03-19

## 2019-03-19 ENCOUNTER — TELEPHONE (OUTPATIENT)
Dept: NEUROSURGERY | Facility: CLINIC | Age: 34
End: 2019-03-19

## 2019-03-19 VITALS
SYSTOLIC BLOOD PRESSURE: 129 MMHG | TEMPERATURE: 98.4 F | HEIGHT: 71 IN | DIASTOLIC BLOOD PRESSURE: 82 MMHG | WEIGHT: 178 LBS | OXYGEN SATURATION: 99 % | RESPIRATION RATE: 16 BRPM | BODY MASS INDEX: 24.92 KG/M2 | HEART RATE: 76 BPM

## 2019-03-19 DIAGNOSIS — Z86.711 HISTORY OF PULMONARY EMBOLUS (PE): ICD-10-CM

## 2019-03-19 DIAGNOSIS — C71.1 ANAPLASTIC ASTROCYTOMA OF FRONTAL LOBE (HCC): ICD-10-CM

## 2019-03-19 DIAGNOSIS — C71.1 ANAPLASTIC ASTROCYTOMA OF FRONTAL LOBE (HCC): Primary | ICD-10-CM

## 2019-03-19 DIAGNOSIS — D68.51 FACTOR 5 LEIDEN MUTATION, HETEROZYGOUS (HCC): ICD-10-CM

## 2019-03-19 LAB
ALBUMIN SERPL-MCNC: 4.4 G/DL (ref 3.5–5.2)
ALBUMIN/GLOB SERPL: 1.9 G/DL (ref 1.1–2.4)
ALP SERPL-CCNC: 57 U/L (ref 38–116)
ALT SERPL W P-5'-P-CCNC: 20 U/L (ref 0–41)
ANION GAP SERPL CALCULATED.3IONS-SCNC: 12.2 MMOL/L
AST SERPL-CCNC: 17 U/L (ref 0–40)
BASOPHILS # BLD AUTO: 0.02 10*3/MM3 (ref 0–0.2)
BASOPHILS NFR BLD AUTO: 0.4 % (ref 0–1.5)
BILIRUB SERPL-MCNC: 0.2 MG/DL (ref 0.2–1.2)
BUN BLD-MCNC: 8 MG/DL (ref 6–20)
BUN/CREAT SERPL: 7.5 (ref 7.3–30)
CALCIUM SPEC-SCNC: 9.1 MG/DL (ref 8.5–10.2)
CHLORIDE SERPL-SCNC: 103 MMOL/L (ref 98–107)
CO2 SERPL-SCNC: 25.8 MMOL/L (ref 22–29)
CREAT BLD-MCNC: 1.06 MG/DL (ref 0.7–1.3)
DEPRECATED RDW RBC AUTO: 39.2 FL (ref 37–54)
EOSINOPHIL # BLD AUTO: 0.14 10*3/MM3 (ref 0–0.4)
EOSINOPHIL NFR BLD AUTO: 2.9 % (ref 0.3–6.2)
ERYTHROCYTE [DISTWIDTH] IN BLOOD BY AUTOMATED COUNT: 12 % (ref 12.3–15.4)
GFR SERPL CREATININE-BSD FRML MDRD: 80 ML/MIN/1.73
GLOBULIN UR ELPH-MCNC: 2.3 GM/DL (ref 1.8–3.5)
GLUCOSE BLD-MCNC: 110 MG/DL (ref 74–124)
HCT VFR BLD AUTO: 42.5 % (ref 37.5–51)
HGB BLD-MCNC: 14.3 G/DL (ref 13–17.7)
IMM GRANULOCYTES # BLD AUTO: 0.03 10*3/MM3 (ref 0–0.05)
IMM GRANULOCYTES NFR BLD AUTO: 0.6 % (ref 0–0.5)
INR PPP: 2.7 (ref 0.9–1.1)
LYMPHOCYTES # BLD AUTO: 1.93 10*3/MM3 (ref 0.7–3.1)
LYMPHOCYTES NFR BLD AUTO: 39.5 % (ref 19.6–45.3)
MCH RBC QN AUTO: 30.2 PG (ref 26.6–33)
MCHC RBC AUTO-ENTMCNC: 33.6 G/DL (ref 31.5–35.7)
MCV RBC AUTO: 89.7 FL (ref 79–97)
MONOCYTES # BLD AUTO: 0.61 10*3/MM3 (ref 0.1–0.9)
MONOCYTES NFR BLD AUTO: 12.5 % (ref 5–12)
NEUTROPHILS # BLD AUTO: 2.15 10*3/MM3 (ref 1.4–7)
NEUTROPHILS NFR BLD AUTO: 44.1 % (ref 42.7–76)
NRBC BLD AUTO-RTO: 0 /100 WBC (ref 0–0)
PLATELET # BLD AUTO: 182 10*3/MM3 (ref 140–450)
PMV BLD AUTO: 9.1 FL (ref 6–12)
POTASSIUM BLD-SCNC: 4 MMOL/L (ref 3.5–4.7)
PROT SERPL-MCNC: 6.7 G/DL (ref 6.3–8)
PROTHROMBIN TIME: 32.6 SECONDS (ref 11–13.5)
RBC # BLD AUTO: 4.74 10*6/MM3 (ref 4.14–5.8)
SODIUM BLD-SCNC: 141 MMOL/L (ref 134–145)
WBC NRBC COR # BLD: 4.88 10*3/MM3 (ref 3.4–10.8)

## 2019-03-19 PROCEDURE — 85025 COMPLETE CBC W/AUTO DIFF WBC: CPT | Performed by: INTERNAL MEDICINE

## 2019-03-19 PROCEDURE — 85610 PROTHROMBIN TIME: CPT | Performed by: INTERNAL MEDICINE

## 2019-03-19 PROCEDURE — 36415 COLL VENOUS BLD VENIPUNCTURE: CPT | Performed by: INTERNAL MEDICINE

## 2019-03-19 PROCEDURE — 99214 OFFICE O/P EST MOD 30 MIN: CPT | Performed by: INTERNAL MEDICINE

## 2019-03-19 PROCEDURE — 80053 COMPREHEN METABOLIC PANEL: CPT | Performed by: INTERNAL MEDICINE

## 2019-03-19 RX ORDER — HYDROCODONE BITARTRATE AND ACETAMINOPHEN 5; 325 MG/1; MG/1
TABLET ORAL
Refills: 0 | COMMUNITY
Start: 2019-02-26 | End: 2019-04-01

## 2019-03-19 NOTE — PROGRESS NOTES
[facility] OUTPATIENT FOLLOW UP CLINIC VISIT    REASON FOR FOLLOW-UP:    1.  Left lower extremity DVT with progression of symptoms and extension of the left lower extremity DVT into the femoral vein from the popliteal/calf vein swallowing for next set.  Currently anticoagulated with warfarin.  2.  Right lower extremity DVT noted in the common femoral, profunda femoral, and calf veins  3.  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  Other thrombophilia labs negative.    4.  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  NOT 1p19q co-deleted.    5.  Radiation initiated on 7/31/2018.  Plan for Temodar ×1 year following radiation.  6.  4500 cGy in 25 fractions administered from 7/31/2018 through 9/14/2018  7.  MRI brain 10/23/2018 with resolving hemorrhage in the resection cavity.  However, there is hyperintensity measuring 4.6 x 4.3 x 3 cm along the margins of the resection cavity.  8.  Repeat venous duplex on 10/23 with chronic right CVT and chronic LLE DVT from the mid femoral vein distally.     9.  He initiated adjuvant therapy with Temodar in mid October 2018.  10.  MRI brain 12/3/2018 with stable findings.  10.  On 1/24/2019 he did have a repeat resection by Dr. Galvan.  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  However, there was no evidence of progression to a higher grade in the new resected specimen.  Mostly the proliferative activity was very low with only rare mitoses and a low Ki-67 of just 2-3%.      HISTORY OF PRESENT ILLNESS:  Bryan Valadez is a 33 y.o. male who returns today for follow up of the above issues.     On 1/24/2019 he did have a repeat resection by Dr. Galvan.  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  However, there was no evidence of progression to a higher grade in the new resected specimen.  Mostly the proliferative activity was very low with  only rare mitoses and a low Ki-67 of just 2-3%.    He resumed Temodar.  He is on cycle 5 at this point.  He tolerates it well with some mild fatigue.  He takes an antiemetic before the medication which helps with nausea.    On Sunday he did have a mild head injury as he hit his head on a cabinet edge when standing up.  He did go to the emergency department.  His INR was therapeutic.  He had brain imaging that looked okay without any hemorrhage and he has recovered nicely without any further bleeding.    ONCOLOGIC HISTORY:  He had about 6 months of headaches treated as sinusitis and presented on 6/15 with visual changes and headache and was found to have a large right frontal mass which was resected on 6/16 by Dr. Galvan and consistent with a glioma.  Pathology was reviewed at [facility] showing infiltrating glioma.  Large 10 cm partially cystic and solid lobulated tumor mass at diagnosis.  Negative IDH1-R132H immunostain excludes the most common IDH1 mutation; however loss of ATRX expression suggests possibility of glioma harboring another less common IDH1 mutation.  p53 overexpressed.  Ki67 10% but variable.  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  There was no evidence for a 1p19q co-deletion.       Chromosome microarray showed a complex molecular karyotype including loss of 1q, loss of 2q, gain of 7q, gain of 8q, loss of 9p, REBECCA of 17p, and gain of 18p.  These abnormalities are typically associated with  IDH-mutant astrocytic gliomas.  No 1p and 19q whole arm co-deletion was noted.  CT head on 7/1 c/w residual tumor circumscribing the resection cavity and a remote cerebellar hemisphere hemorrhage.       He was discharged and subsequently admitted with left leg pain and chest pain, with LLE popliteal and calf vein clot noted and bilateral small PE. He was treated with heparin and discharged on Pradaxa.     He developed worsening leg pain up into the thigh and presented to the ER where a venous duplex showed  progression of the clot to the femoral vein.  He was given Lovenox and admitted.  Warfarin was initiated.       A partial thrombophilia evaluation showed that he is a heterozygote for the factor V Leiden mutation.  Labs otherwise unremarkable.    He was subsequently found to have a right lower extremity DVT on 7/8/2018.    He remains anticoagulated with warfarin.    Radiation initiated on 7/31/2018.  Plan for Temodar ×1 year after radiation is complete.    Radiation complete as of 9/14/2018.    4500 cGy in 25 fractions administered from 7/31/2018 through 9/14/2018    MRI brain 10/23/2018 with resolving hemorrhage in the resection cavity.  However, there is hyperintensity measuring 4.6 x 4.3 x 3 cm along the margins of the resection cavity.    Repeat venous duplex on 10/23 with chronic right CVT and chronic LLE DVT from the mid femoral vein distally.      Repeat brain MRI on 12/3/2018 with stable findings.    On 1/24/2019 he did have a repeat resection by Dr. Galvan.  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  However, there was no evidence of progression to a higher grade in the new resected specimen.  Mostly the proliferative activity was very low with only rare mitoses and a low Ki-67 of just 2-3%.      ALLERGIES:  Allergies   Allergen Reactions   • Avocado Itching   • Other Itching     Insect stings: Bee stings, throat swelling (has Epi pen)    Allergy to fruit, Avocado, Cherry Tomato (can have blue berries)   • Tomato Itching     Cherry tomato       MEDICATIONS:  The medication list has been reviewed with the patient by the medical assistant, and the list has been updated in the electronic medical record, which I reviewed.  Medication dosages and frequencies were confirmed to be accurate.    REVIEW OF SYSTEMS:  PAIN:  See Vital Signs below.  GENERAL:  No fevers, chills, night sweats, or unintended weight loss.  SKIN: Mild laceration on the top of his head near the incision  HEME/LYMPH:  No  "abnormal bleeding.  No palpable lymphadenopathy.  EYES:  No vision changes or diplopia.  ENT:  No sore throat or difficulty swallowing.  RESPIRATORY:  No cough, shortness of breath, hemoptysis, or wheezing.  CARDIOVASCULAR:  No chest pain, palpitations, orthopnea, or dyspnea on exertion.  GASTROINTESTINAL:  No abdominal pain, nausea, vomiting, constipation, diarrhea, melena, or hematochezia.  GENITOURINARY:  No dysuria or hematuria.  MUSCULOSKELETAL:  No joint pain, swelling, or erythema.  Muscle cramping  NEUROLOGIC:  No dizziness, loss of consciousness, or seizures.  PSYCHIATRIC:  No depression, anxiety, or mood changes.    Vitals:    03/19/19 0758   BP: 129/82   Pulse: 76   Resp: 16   Temp: 98.4 °F (36.9 °C)   TempSrc: Oral   SpO2: 99%   Weight: 80.7 kg (178 lb)   Height: 180.3 cm (70.98\")   PainSc: 0-No pain  Comment: anaplastic astrocytoma of frontal lobe       PHYSICAL EXAMINATION:  GENERAL:  Well-developed well-nourished male; awake, alert and oriented, in no acute distress.  SKIN:  Alopecia present on the scalp.  Healing surgical incision present.  Mild laceration present on the right side of the scalp near the incision line.  No bleeding.  HEAD:  Normocephalic, Well-healed surgical incision present.    EYES:  Pupils equal, round and reactive to light.  Extraocular movements intact.  Conjunctivae normal.  EARS:  Hearing intact.  NOSE:  Septum midline.  No excoriations or nasal discharge.  MOUTH:  No stomatitis or ulcers.  Lips are normal.  THROAT:  Oropharynx without lesions or exudates.  NECK:  Supple with good range of motion; no thyromegaly or masses; no JVD or bruits.  LYMPHATICS:  No cervical, supraclavicular, axillary, or inguinal lymphadenopathy.  CHEST:  Lungs are clear to auscultation bilaterally.  No wheezes, rales, or rhonchi.  HEART:  Regular rate; normal rhythm.  No murmurs, gallops or rubs.  ABDOMEN:  Not examined today  EXTREMITIES:  No clubbing, cyanosis, or edema.  NEUROLOGICAL:  No focal " neurologic deficits.    DIAGNOSTIC DATA:  Results for orders placed or performed in visit on 03/19/19   CBC Auto Differential   Result Value Ref Range    WBC 4.88 3.40 - 10.80 10*3/mm3    RBC 4.74 4.14 - 5.80 10*6/mm3    Hemoglobin 14.3 13.0 - 17.7 g/dL    Hematocrit 42.5 37.5 - 51.0 %    MCV 89.7 79.0 - 97.0 fL    MCH 30.2 26.6 - 33.0 pg    MCHC 33.6 31.5 - 35.7 g/dL    RDW 12.0 (L) 12.3 - 15.4 %    RDW-SD 39.2 37.0 - 54.0 fl    MPV 9.1 6.0 - 12.0 fL    Platelets 182 140 - 450 10*3/mm3    Neutrophil % 44.1 42.7 - 76.0 %    Lymphocyte % 39.5 19.6 - 45.3 %    Monocyte % 12.5 (H) 5.0 - 12.0 %    Eosinophil % 2.9 0.3 - 6.2 %    Basophil % 0.4 0.0 - 1.5 %    Immature Grans % 0.6 (H) 0.0 - 0.5 %    Neutrophils, Absolute 2.15 1.40 - 7.00 10*3/mm3    Lymphocytes, Absolute 1.93 0.70 - 3.10 10*3/mm3    Monocytes, Absolute 0.61 0.10 - 0.90 10*3/mm3    Eosinophils, Absolute 0.14 0.00 - 0.40 10*3/mm3    Basophils, Absolute 0.02 0.00 - 0.20 10*3/mm3    Immature Grans, Absolute 0.03 0.00 - 0.05 10*3/mm3    nRBC 0.0 0.0 - 0.0 /100 WBC       IMAGING:  MRI brain images from 12/3/2018 personally reviewed.  No change from prior.    IMPRESSION:  1. No significant interval change when compared to most recent  postoperative MRI of brain, 40 days ago, on 10/23/2018.  2. In June 2018, this patient had a 9 cm superior right frontoparietal  craniotomy for debulking of the large 10 x 7 x 6 cm mixed cystic and  solid partially enhancing anaplastic grade 3 astrocytoma from the right  frontal lobe. There is a stable size 5 x 3.4 x 2.8 cm resection cavity  in the superior right frontal lobe, and there is abnormal FLAIR and T2  hyperintensity diffusion hyperintensity circumscribing the margins of  the resection cavity, most pronounced along the posterior and inferior  lateral margin of the resection cavity, compatible with residual tumor,  tracks up to 8.4 x 4.7 cm in anterior posterior, medial lateral  dimension, and there is abnormal enhancement  most pronounced along the  posterior and posterior inferior lateral margin of the resection cavity.  The single largest focus of enhancement measures up to 4.2 x 2.6 x 2.7  cm along the posterior inferior lateral margin of the resection cavity  extending posterolateral right frontal parenchyma, consistent with  enhancing high-grade tumor unchanged since 10/23/2018. Continued  follow-up suggested. The remainder of the MRI of the head is normal.     ASSESSMENT:  This is a 33 y.o. male with:  1.  Bilateral lower extremity DVT: He remains on warfarin.  His INR today is 2.0.  He can discontinue the Lovenox.  Return next week for an INR check.    2.  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  NOT 1p19q co-deleted.  Overall, this is a good molecular profile.  I discussed his case with Dr. Galvan, Dr. Daley, and Dr. Dobbins and I reviewed [facility] guidelines.   He will require one year of adjuvant Temodar following radiation.  The big question was whether to administer concurrent therapy with Temodar along with radiation.  Ultimately, we decided not to do this given the overall good molecular profile and the potential adverse effects from concurrent therapy.  He did initiate radiation alone on 7/31/2018 and completed it on 9/14/2018.     He initiated adjuvant Temodar in mid October 2018.    Due to persistent abnormalities on MRI, on 1/24/2019 he did have a repeat resection by Dr. Galvan.  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  However, there was no evidence of progression to a higher grade in the new resected specimen.  Mostly the proliferative activity was very low with only rare mitoses and a low Ki-67 of just 2-3%.    He saw Dr. Dobbins earlier today.  He is in agreement that we will continue standard adjuvant therapy with Temodar at this time.    He proceeds with Temodar at 400 mg daily for 5 out of 28 days.  He is currently  on the drug this week.    An MRI is scheduled at the end of April.  He will see Dr. Galvan the same day and I will see him back at that point as well.    3.  Given the possibility of infertility with treatment he banked sperm at the [facility] [place].  His wife is actually pregnant at this time.  Her due date is in early May.    4.  Elevated liver labs: Liver labs normalized.  Unclear reason.  Medication could have contributed.    PLAN:  1.  Proceed with Temodar this week for maintenance therapy.      Temodar dosing:  Cycle 1: 150 mg/m2 once daily for 5 days of a 28-day treatment cycle  Cycles 2 to 6: May increase to 200 mg/m2 once daily for 5 days; repeat every 28 days (if ANC ?1,500/mm3, platelets ?100,000/mm3 and nonhematologic toxicities for cycle 1 are ?grade 2 [excludes alopecia, nausea/vomiting]); If dose was not escalated at the onset of cycle 2, do not increase for cycles 3 to 6.    Dose for him at 150 mg/m2 = 300 mg  Dose for him at 200 mg/m2 = 400 mg    Plan to complete 6 cycles and then determine whether to continue.      He has an MRI scheduled at the end of April following his 6th cycle.  After that we will determine how to proceed.  I will see him back on April 30.      2.  Continue Keppra    3.  Continue Bactrim three days weekly for PCP prophylaxis.     4.  Continue anticoagulation and we have adjusted his warfarin dose slightly.    He has an MRI on April 29 and I will see him back on April 30.  He will have a CBC and INR check in a few weeks as well.

## 2019-03-19 NOTE — TELEPHONE ENCOUNTER
----- Message from NICOLE Jaramillo sent at 3/18/2019  5:38 PM EDT -----  Regarding: call  Patient was in ER yesterday when he struck his head on the bottom of a cabinet.  Had 2 CTs that were stable.  Please call and check on patient to be sure he is doing well.

## 2019-03-27 DIAGNOSIS — C71.1 ANAPLASTIC ASTROCYTOMA OF FRONTAL LOBE (HCC): ICD-10-CM

## 2019-03-28 RX ORDER — SULFAMETHOXAZOLE AND TRIMETHOPRIM 800; 160 MG/1; MG/1
TABLET ORAL
Qty: 36 TABLET | Refills: 2 | Status: SHIPPED | OUTPATIENT
Start: 2019-03-28 | End: 2019-05-15

## 2019-04-01 ENCOUNTER — TELEPHONE (OUTPATIENT)
Dept: ONCOLOGY | Facility: HOSPITAL | Age: 34
End: 2019-04-01

## 2019-04-01 ENCOUNTER — OFFICE VISIT (OUTPATIENT)
Dept: INTERNAL MEDICINE | Facility: CLINIC | Age: 34
End: 2019-04-01

## 2019-04-01 VITALS
WEIGHT: 177 LBS | BODY MASS INDEX: 24.7 KG/M2 | OXYGEN SATURATION: 98 % | SYSTOLIC BLOOD PRESSURE: 120 MMHG | DIASTOLIC BLOOD PRESSURE: 84 MMHG | HEART RATE: 75 BPM

## 2019-04-01 DIAGNOSIS — R00.2 PALPITATIONS: Primary | ICD-10-CM

## 2019-04-01 PROCEDURE — 99213 OFFICE O/P EST LOW 20 MIN: CPT | Performed by: NURSE PRACTITIONER

## 2019-04-01 PROCEDURE — 93000 ELECTROCARDIOGRAM COMPLETE: CPT | Performed by: NURSE PRACTITIONER

## 2019-04-01 RX ORDER — ONDANSETRON HYDROCHLORIDE 8 MG/1
TABLET, FILM COATED ORAL AS NEEDED
Refills: 4 | COMMUNITY
Start: 2019-03-17 | End: 2019-04-29 | Stop reason: SDUPTHER

## 2019-04-01 NOTE — TELEPHONE ENCOUNTER
----- Message from Brock Hinojosa sent at 4/1/2019  8:12 AM EDT -----  Contact: [phone]  Pt is calling because his heart is racing

## 2019-04-01 NOTE — PATIENT INSTRUCTIONS
Palpitations  A palpitation is the feeling that your heartbeat is irregular or is faster than normal. It may feel like your heart is fluttering or skipping a beat. Palpitations are usually not a serious problem. They may be caused by many things, including smoking, caffeine, alcohol, stress, and certain medicines. Although most causes of palpitations are not serious, palpitations can be a sign of a serious medical problem. In some cases, you may need further medical evaluation.  Follow these instructions at home:  Pay attention to any changes in your symptoms. Take these actions to help with your condition:  · Avoid the following:  ? Caffeinated coffee, tea, soft drinks, diet pills, and energy drinks.  ? Chocolate.  ? Alcohol.  · Do not use any tobacco products, such as cigarettes, chewing tobacco, and e-cigarettes. If you need help quitting, ask your health care provider.  · Try to reduce your stress and anxiety. Things that can help you relax include:  ? Yoga.  ? Meditation.  ? Physical activity, such as swimming, jogging, or walking.  ? Biofeedback. This is a method that helps you learn to use your mind to control things in your body, such as your heartbeats.  · Get plenty of rest and sleep.  · Take over-the-counter and prescription medicines only as told by your health care provider.  · Keep all follow-up visits as told by your health care provider. This is important.    Contact a health care provider if:  · You continue to have a fast or irregular heartbeat after 24 hours.  · Your palpitations occur more often.  Get help right away if:  · You have chest pain or shortness of breath.  · You have a severe headache.  · You feel dizzy or you faint.  This information is not intended to replace advice given to you by your health care provider. Make sure you discuss any questions you have with your health care provider.  Document Released: 12/15/2001 Document Revised: 05/22/2017 Document Reviewed: 09/01/2016  [employer]  Interactive Patient Education © 2019 [employer]

## 2019-04-03 ENCOUNTER — HOSPITAL ENCOUNTER (OUTPATIENT)
Dept: CARDIOLOGY | Facility: HOSPITAL | Age: 34
Discharge: HOME | End: 2019-04-03
Admitting: NURSE PRACTITIONER

## 2019-04-03 DIAGNOSIS — R00.2 PALPITATIONS: ICD-10-CM

## 2019-04-03 PROCEDURE — 93225 XTRNL ECG REC<48 HRS REC: CPT

## 2019-04-03 PROCEDURE — 93226 XTRNL ECG REC<48 HR SCAN A/R: CPT

## 2019-04-08 ENCOUNTER — CLINICAL SUPPORT (OUTPATIENT)
Dept: ONCOLOGY | Facility: HOSPITAL | Age: 34
End: 2019-04-08

## 2019-04-08 ENCOUNTER — LAB (OUTPATIENT)
Dept: LAB | Facility: HOSPITAL | Age: 34
End: 2019-04-08

## 2019-04-08 DIAGNOSIS — C71.1 ANAPLASTIC ASTROCYTOMA OF FRONTAL LOBE (HCC): ICD-10-CM

## 2019-04-08 DIAGNOSIS — Z86.711 HISTORY OF PULMONARY EMBOLUS (PE): ICD-10-CM

## 2019-04-08 LAB
BASOPHILS # BLD AUTO: 0.02 10*3/MM3 (ref 0–0.2)
BASOPHILS NFR BLD AUTO: 0.6 % (ref 0–1.5)
DEPRECATED RDW RBC AUTO: 42 FL (ref 37–54)
EOSINOPHIL # BLD AUTO: 0.09 10*3/MM3 (ref 0–0.4)
EOSINOPHIL NFR BLD AUTO: 2.5 % (ref 0.3–6.2)
ERYTHROCYTE [DISTWIDTH] IN BLOOD BY AUTOMATED COUNT: 13 % (ref 12.3–15.4)
HCT VFR BLD AUTO: 41.1 % (ref 37.5–51)
HGB BLD-MCNC: 14 G/DL (ref 13–17.7)
IMM GRANULOCYTES # BLD AUTO: 0.01 10*3/MM3 (ref 0–0.05)
IMM GRANULOCYTES NFR BLD AUTO: 0.3 % (ref 0–0.5)
INR PPP: 2.3 (ref 0.9–1.1)
LYMPHOCYTES # BLD AUTO: 1.34 10*3/MM3 (ref 0.7–3.1)
LYMPHOCYTES NFR BLD AUTO: 36.9 % (ref 19.6–45.3)
MCH RBC QN AUTO: 30.6 PG (ref 26.6–33)
MCHC RBC AUTO-ENTMCNC: 34.1 G/DL (ref 31.5–35.7)
MCV RBC AUTO: 89.7 FL (ref 79–97)
MONOCYTES # BLD AUTO: 0.45 10*3/MM3 (ref 0.1–0.9)
MONOCYTES NFR BLD AUTO: 12.4 % (ref 5–12)
NEUTROPHILS # BLD AUTO: 1.72 10*3/MM3 (ref 1.4–7)
NEUTROPHILS NFR BLD AUTO: 47.3 % (ref 42.7–76)
NRBC BLD AUTO-RTO: 0 /100 WBC (ref 0–0)
PLATELET # BLD AUTO: 204 10*3/MM3 (ref 140–450)
PMV BLD AUTO: 9.4 FL (ref 6–12)
PROTHROMBIN TIME: 27.9 SECONDS (ref 11–13.5)
RBC # BLD AUTO: 4.58 10*6/MM3 (ref 4.14–5.8)
TSH SERPL DL<=0.05 MIU/L-ACNC: 2.79 MIU/ML (ref 0.27–4.2)
WBC NRBC COR # BLD: 3.63 10*3/MM3 (ref 3.4–10.8)

## 2019-04-08 PROCEDURE — 85025 COMPLETE CBC W/AUTO DIFF WBC: CPT | Performed by: INTERNAL MEDICINE

## 2019-04-08 PROCEDURE — 93227 XTRNL ECG REC<48 HR R&I: CPT | Performed by: INTERNAL MEDICINE

## 2019-04-08 PROCEDURE — 85610 PROTHROMBIN TIME: CPT | Performed by: INTERNAL MEDICINE

## 2019-04-08 PROCEDURE — 84443 ASSAY THYROID STIM HORMONE: CPT | Performed by: NURSE PRACTITIONER

## 2019-04-08 PROCEDURE — 36415 COLL VENOUS BLD VENIPUNCTURE: CPT | Performed by: INTERNAL MEDICINE

## 2019-04-08 NOTE — PROGRESS NOTES
Pt is here for lab with RN review.  CBC reviewed with pt, counts are stable for this pt at this time. Pt has no complaints.  Copy of labs given to pt and f/u appt reviewed. Pt is instructed to call with concerns prior to next visit.    Lab Results   Component Value Date    WBC 3.63 04/08/2019    HGB 14.0 04/08/2019    HCT 41.1 04/08/2019    MCV 89.7 04/08/2019     04/08/2019         INR ALSO REVIEWED WITH PATIENT. THIS IS DOCUMENTED IN FLOWSHEETS. INR 2.3

## 2019-04-09 ENCOUNTER — TELEPHONE (OUTPATIENT)
Dept: INTERNAL MEDICINE | Facility: CLINIC | Age: 34
End: 2019-04-09

## 2019-04-09 NOTE — TELEPHONE ENCOUNTER
Patient informed of holter monitor (SVT and PVC's). He will hold on any medication such as beta blocker and avoid caffiene use. Will let me know if he changes his mind.

## 2019-04-29 ENCOUNTER — TELEPHONE (OUTPATIENT)
Dept: NEUROSURGERY | Facility: CLINIC | Age: 34
End: 2019-04-29

## 2019-04-29 ENCOUNTER — HOSPITAL ENCOUNTER (OUTPATIENT)
Dept: MRI IMAGING | Facility: HOSPITAL | Age: 34
Discharge: HOME | End: 2019-04-29
Admitting: NEUROLOGICAL SURGERY

## 2019-04-29 ENCOUNTER — OFFICE VISIT (OUTPATIENT)
Dept: NEUROSURGERY | Facility: CLINIC | Age: 34
End: 2019-04-29

## 2019-04-29 ENCOUNTER — OFFICE VISIT (OUTPATIENT)
Dept: NEUROLOGY | Facility: CLINIC | Age: 34
End: 2019-04-29

## 2019-04-29 VITALS
BODY MASS INDEX: 24.78 KG/M2 | OXYGEN SATURATION: 97 % | HEIGHT: 71 IN | DIASTOLIC BLOOD PRESSURE: 78 MMHG | WEIGHT: 177 LBS | SYSTOLIC BLOOD PRESSURE: 130 MMHG

## 2019-04-29 VITALS
WEIGHT: 177 LBS | RESPIRATION RATE: 16 BRPM | DIASTOLIC BLOOD PRESSURE: 78 MMHG | HEART RATE: 96 BPM | HEIGHT: 71 IN | BODY MASS INDEX: 24.78 KG/M2 | SYSTOLIC BLOOD PRESSURE: 130 MMHG

## 2019-04-29 DIAGNOSIS — C71.1 ANAPLASTIC ASTROCYTOMA OF FRONTAL LOBE (HCC): Primary | ICD-10-CM

## 2019-04-29 DIAGNOSIS — C71.9 MALIGNANT GLIOMA (HCC): Primary | ICD-10-CM

## 2019-04-29 DIAGNOSIS — C71.9 BRAIN TUMOR, ASTROCYTOMA (HCC): ICD-10-CM

## 2019-04-29 PROCEDURE — 99213 OFFICE O/P EST LOW 20 MIN: CPT | Performed by: PSYCHIATRY & NEUROLOGY

## 2019-04-29 PROCEDURE — 70553 MRI BRAIN STEM W/O & W/DYE: CPT

## 2019-04-29 PROCEDURE — 0 GADOBENATE DIMEGLUMINE 529 MG/ML SOLUTION: Performed by: NEUROLOGICAL SURGERY

## 2019-04-29 PROCEDURE — 99213 OFFICE O/P EST LOW 20 MIN: CPT | Performed by: NEUROLOGICAL SURGERY

## 2019-04-29 PROCEDURE — A9577 INJ MULTIHANCE: HCPCS | Performed by: NEUROLOGICAL SURGERY

## 2019-04-29 RX ORDER — ONDANSETRON HYDROCHLORIDE 8 MG/1
8 TABLET, FILM COATED ORAL EVERY 12 HOURS PRN
Qty: 60 TABLET | Refills: 4 | Status: SHIPPED | OUTPATIENT
Start: 2019-04-29 | End: 2020-03-13 | Stop reason: SDDI

## 2019-04-29 RX ORDER — ALPRAZOLAM 0.5 MG/1
0.5 TABLET ORAL 2 TIMES DAILY PRN
Qty: 20 TABLET | Refills: 0 | Status: SHIPPED | OUTPATIENT
Start: 2019-04-29 | End: 2020-06-05 | Stop reason: SDDI

## 2019-04-29 RX ADMIN — GADOBENATE DIMEGLUMINE 16 ML: 529 INJECTION, SOLUTION INTRAVENOUS at 09:21

## 2019-04-29 NOTE — PROGRESS NOTES
Follow Up Visit:Anapalstic astrocytoma    Bryan comes accompanied by his wife, who is ready to deliver their baby anytime    Neurosurgeon: Dr. Chetan Galvan  Medical Oncology: Dr. 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  After Temodar x3 he underwent re=resection on January 24, 2019  Pathology report from [place] shows the same histology, same molecular profile except Ki-67 now is 2%-3%  Post operative MRI scan (24 hours) shows a good resection     He comes now after having had Temodor #3 since his second surgery: this is given by DR Jefferson  He tolerated it very well  He has been decadron for sometime now  ADL: his KPS 90  He has had nos seizures on Keppra            Review of Systems   Constitutional: Negative for chills, fatigue and fever.   HENT: Negative for hearing loss, tinnitus and trouble swallowing.    Eyes: Negative for pain, redness and itching.   Respiratory: Negative for cough, shortness of breath and wheezing.    Cardiovascular: Positive for palpitations. Negative for chest pain and leg swelling.   Gastrointestinal: Negative for constipation, diarrhea and vomiting.   Endocrine: Negative for cold intolerance, heat intolerance and polyphagia.   Genitourinary: Negative for decreased urine volume, difficulty urinating and urgency.   Musculoskeletal: Negative for back pain, neck pain and neck stiffness.   Skin: Negative for color change, rash and wound.   Allergic/Immunologic: Negative for environmental allergies, food allergies and immunocompromised state.   Neurological: Negative for dizziness, tremors, seizures, syncope, facial asymmetry, speech difficulty, weakness,  "light-headedness, numbness and headaches.   Hematological: Negative for adenopathy. Does not bruise/bleed easily.   Psychiatric/Behavioral: Negative for self-injury, sleep disturbance and suicidal ideas. The patient is not nervous/anxious.              Vitals:    04/29/19 1300   BP: 130/78   SpO2: 97%   Weight: 80.3 kg (177 lb)   Height: 180.3 cm (71\")         Physical/Neurological Examination  Alert and oriented  Concentration mildly impaired Attention Normal  Normal comprehension  Speech intact  Cranial nerves intact with full visual fields  No pronator drift  Gait is normal cerebellar function is normal  Mary are normal  No focal weakness      Review Results-Workup/Diagnoses/Plan  He has his brain MRI which I independently reviewed and also reviewed with them: there is a definite small area of new enhancement medial to and inferior to resection cavity measuring 6 mm in maximum diameter.I concur with the official reading  This is new compared to postop MRI of  01/25/2019. This is best seen on the coronals not axial imaging.    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  After Temodar x3 he underwent re=resection on January 24, 2019  Pathology report from [place] shows the same histology, same molecular profile except Ki-67 now is 2%-3%       PLAN:  His MRI was done little sooner than 3 weeks after finishing Temodar  I recommend that Dr Jefferson coordinates the arrangements for the Temodar and timing for the next MRI   Dr Galvan and I can coordinate office visit with the date of the MRI  I recommend MRI in 2 months instead of 3 months and if the new 6 mm lesion " grows I suggest he sees Dr Daley. I have discussed patient with DR Daley today  MRI should not ideally be done sooner than 3 weeks post-temodar

## 2019-04-29 NOTE — TELEPHONE ENCOUNTER
Per C, go ahead and move appointment up to after MRI performed in June. Patient states that Dr. Dobbins wanted to talk to Dr. Jefferson regarding chemo schedule and would get back to them regarding when to perform MRI.  Patient will call office once MRI is scheduled and we can move up his appointment.

## 2019-04-29 NOTE — TELEPHONE ENCOUNTER
Patient was returning DJ's call.  He said to let Dr Galvan know that Dr Dobbins is going to order his MRI for June 29th (Dr Dobbins saw something on the MRI) ( wanted it ordered for 7/29) Dr Dobbins is going to get with Dr Jefferson to work out the MRI with his chemo.     please let me know if I need to cancel his MRI for July     Thank you

## 2019-04-30 ENCOUNTER — OFFICE VISIT (OUTPATIENT)
Dept: ONCOLOGY | Facility: CLINIC | Age: 34
End: 2019-04-30

## 2019-04-30 ENCOUNTER — LAB (OUTPATIENT)
Dept: LAB | Facility: HOSPITAL | Age: 34
End: 2019-04-30

## 2019-04-30 VITALS
WEIGHT: 179.9 LBS | RESPIRATION RATE: 16 BRPM | OXYGEN SATURATION: 100 % | BODY MASS INDEX: 25.19 KG/M2 | TEMPERATURE: 98.2 F | HEART RATE: 68 BPM | DIASTOLIC BLOOD PRESSURE: 85 MMHG | SYSTOLIC BLOOD PRESSURE: 126 MMHG | HEIGHT: 71 IN

## 2019-04-30 DIAGNOSIS — Z86.711 HISTORY OF PULMONARY EMBOLUS (PE): ICD-10-CM

## 2019-04-30 DIAGNOSIS — C71.1 ANAPLASTIC ASTROCYTOMA OF FRONTAL LOBE (HCC): ICD-10-CM

## 2019-04-30 DIAGNOSIS — C71.1 ANAPLASTIC ASTROCYTOMA OF FRONTAL LOBE (HCC): Primary | ICD-10-CM

## 2019-04-30 LAB
ALBUMIN SERPL-MCNC: 4.7 G/DL (ref 3.5–5.2)
ALBUMIN/GLOB SERPL: 1.9 G/DL (ref 1.1–2.4)
ALP SERPL-CCNC: 57 U/L (ref 38–116)
ALT SERPL W P-5'-P-CCNC: 19 U/L (ref 0–41)
ANION GAP SERPL CALCULATED.3IONS-SCNC: 12.3 MMOL/L
AST SERPL-CCNC: 16 U/L (ref 0–40)
BASOPHILS # BLD AUTO: 0.01 10*3/MM3 (ref 0–0.2)
BASOPHILS NFR BLD AUTO: 0.3 % (ref 0–1.5)
BILIRUB SERPL-MCNC: 0.3 MG/DL (ref 0.2–1.2)
BUN BLD-MCNC: 15 MG/DL (ref 6–20)
BUN/CREAT SERPL: 14.6 (ref 7.3–30)
CALCIUM SPEC-SCNC: 9.6 MG/DL (ref 8.5–10.2)
CHLORIDE SERPL-SCNC: 105 MMOL/L (ref 98–107)
CO2 SERPL-SCNC: 25.7 MMOL/L (ref 22–29)
CREAT BLD-MCNC: 1.03 MG/DL (ref 0.7–1.3)
DEPRECATED RDW RBC AUTO: 44.4 FL (ref 37–54)
EOSINOPHIL # BLD AUTO: 0.08 10*3/MM3 (ref 0–0.4)
EOSINOPHIL NFR BLD AUTO: 2.2 % (ref 0.3–6.2)
ERYTHROCYTE [DISTWIDTH] IN BLOOD BY AUTOMATED COUNT: 13.5 % (ref 12.3–15.4)
GFR SERPL CREATININE-BSD FRML MDRD: 82 ML/MIN/1.73
GLOBULIN UR ELPH-MCNC: 2.5 GM/DL (ref 1.8–3.5)
GLUCOSE BLD-MCNC: 94 MG/DL (ref 74–124)
HCT VFR BLD AUTO: 44 % (ref 37.5–51)
HGB BLD-MCNC: 14.8 G/DL (ref 13–17.7)
IMM GRANULOCYTES # BLD AUTO: 0.02 10*3/MM3 (ref 0–0.05)
IMM GRANULOCYTES NFR BLD AUTO: 0.5 % (ref 0–0.5)
INR PPP: 1.9 (ref 0.9–1.1)
LYMPHOCYTES # BLD AUTO: 1.29 10*3/MM3 (ref 0.7–3.1)
LYMPHOCYTES NFR BLD AUTO: 34.9 % (ref 19.6–45.3)
MCH RBC QN AUTO: 30.3 PG (ref 26.6–33)
MCHC RBC AUTO-ENTMCNC: 33.6 G/DL (ref 31.5–35.7)
MCV RBC AUTO: 90 FL (ref 79–97)
MONOCYTES # BLD AUTO: 0.46 10*3/MM3 (ref 0.1–0.9)
MONOCYTES NFR BLD AUTO: 12.4 % (ref 5–12)
NEUTROPHILS # BLD AUTO: 1.84 10*3/MM3 (ref 1.7–7)
NEUTROPHILS NFR BLD AUTO: 49.7 % (ref 42.7–76)
NRBC BLD AUTO-RTO: 0 /100 WBC (ref 0–0.2)
PLATELET # BLD AUTO: 290 10*3/MM3 (ref 140–450)
PMV BLD AUTO: 9.2 FL (ref 6–12)
POTASSIUM BLD-SCNC: 4.8 MMOL/L (ref 3.5–4.7)
PROT SERPL-MCNC: 7.2 G/DL (ref 6.3–8)
PROTHROMBIN TIME: 22.4 SECONDS (ref 11–13.5)
RBC # BLD AUTO: 4.89 10*6/MM3 (ref 4.14–5.8)
SODIUM BLD-SCNC: 143 MMOL/L (ref 134–145)
WBC NRBC COR # BLD: 3.7 10*3/MM3 (ref 3.4–10.8)

## 2019-04-30 PROCEDURE — 80053 COMPREHEN METABOLIC PANEL: CPT | Performed by: INTERNAL MEDICINE

## 2019-04-30 PROCEDURE — 85025 COMPLETE CBC W/AUTO DIFF WBC: CPT | Performed by: INTERNAL MEDICINE

## 2019-04-30 PROCEDURE — 85610 PROTHROMBIN TIME: CPT | Performed by: INTERNAL MEDICINE

## 2019-04-30 PROCEDURE — 99215 OFFICE O/P EST HI 40 MIN: CPT | Performed by: INTERNAL MEDICINE

## 2019-04-30 PROCEDURE — 36415 COLL VENOUS BLD VENIPUNCTURE: CPT | Performed by: INTERNAL MEDICINE

## 2019-04-30 NOTE — PROGRESS NOTES
[facility] OUTPATIENT FOLLOW UP CLINIC VISIT    REASON FOR FOLLOW-UP:    1.  Left lower extremity DVT with progression of symptoms and extension of the left lower extremity DVT into the femoral vein from the popliteal/calf vein swallowing for next set.  Currently anticoagulated with warfarin.  2.  Right lower extremity DVT noted in the common femoral, profunda femoral, and calf veins  3.  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  Other thrombophilia labs negative.    4.  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  NOT 1p19q co-deleted.    5.  Radiation initiated on 7/31/2018.  Plan for Temodar ×1 year following radiation.  6.  4500 cGy in 25 fractions administered from 7/31/2018 through 9/14/2018  7.  MRI brain 10/23/2018 with resolving hemorrhage in the resection cavity.  However, there is hyperintensity measuring 4.6 x 4.3 x 3 cm along the margins of the resection cavity.  8.  Repeat venous duplex on 10/23 with chronic right CVT and chronic LLE DVT from the mid femoral vein distally.     9.  He initiated adjuvant therapy with Temodar in mid October 2018.  10.  MRI brain 12/3/2018 with stable findings.  11.  On 1/24/2019 he did have a repeat resection by Dr. Galvan.  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  However, there was no evidence of progression to a higher grade in the new resected specimen.  Mostly the proliferative activity was very low with only rare mitoses and a low Ki-67 of just 2-3%.  12.  He resumed Temodar      HISTORY OF PRESENT ILLNESS:  Bryan Valadez is a 33 y.o. male who returns today for follow up of the above issues.     He continues to do very well.  He played 18 holes of golf last week.  No motor abnormalities.  Very mild if any cognitive issues.  He continues to work.        ONCOLOGIC HISTORY:  He had about 6 months of headaches treated as sinusitis and  presented on 6/15 with visual changes and headache and was found to have a large right frontal mass which was resected on 6/16 by Dr. Galvan and consistent with a glioma.  Pathology was reviewed at [facility] showing infiltrating glioma.  Large 10 cm partially cystic and solid lobulated tumor mass at diagnosis.  Negative IDH1-R132H immunostain excludes the most common IDH1 mutation; however loss of ATRX expression suggests possibility of glioma harboring another less common IDH1 mutation.  p53 overexpressed.  Ki67 10% but variable.  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  There was no evidence for a 1p19q co-deletion.       Chromosome microarray showed a complex molecular karyotype including loss of 1q, loss of 2q, gain of 7q, gain of 8q, loss of 9p, REBECCA of 17p, and gain of 18p.  These abnormalities are typically associated with  IDH-mutant astrocytic gliomas.  No 1p and 19q whole arm co-deletion was noted.  CT head on 7/1 c/w residual tumor circumscribing the resection cavity and a remote cerebellar hemisphere hemorrhage.       He was discharged and subsequently admitted with left leg pain and chest pain, with LLE popliteal and calf vein clot noted and bilateral small PE. He was treated with heparin and discharged on Pradaxa.     He developed worsening leg pain up into the thigh and presented to the ER where a venous duplex showed progression of the clot to the femoral vein.  He was given Lovenox and admitted.  Warfarin was initiated.       A partial thrombophilia evaluation showed that he is a heterozygote for the factor V Leiden mutation.  Labs otherwise unremarkable.    He was subsequently found to have a right lower extremity DVT on 7/8/2018.    He remains anticoagulated with warfarin.    Radiation initiated on 7/31/2018.  Plan for Temodar ×1 year after radiation is complete.    Radiation complete as of 9/14/2018.    4500 cGy in 25 fractions administered from 7/31/2018 through 9/14/2018    MRI brain  10/23/2018 with resolving hemorrhage in the resection cavity.  However, there is hyperintensity measuring 4.6 x 4.3 x 3 cm along the margins of the resection cavity.    Repeat venous duplex on 10/23 with chronic right CVT and chronic LLE DVT from the mid femoral vein distally.      Repeat brain MRI on 12/3/2018 with stable findings.    On 1/24/2019 he did have a repeat resection by Dr. Galvan.  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  However, there was no evidence of progression to a higher grade in the new resected specimen.  Mostly the proliferative activity was very low with only rare mitoses and a low Ki-67 of just 2-3%.      ALLERGIES:  Allergies   Allergen Reactions   • Avocado Itching   • Other Itching     Insect stings: Bee stings, throat swelling (has Epi pen)    Allergy to fruit, Avocado, Cherry Tomato (can have blue berries)   • Tomato Itching     Cherry tomato       MEDICATIONS:  The medication list has been reviewed with the patient by the medical assistant, and the list has been updated in the electronic medical record, which I reviewed.  Medication dosages and frequencies were confirmed to be accurate.    REVIEW OF SYSTEMS:  PAIN:  See Vital Signs below.  GENERAL:  No fevers, chills, night sweats, or unintended weight loss.  SKIN: No rash or nonhealing lesions  HEME/LYMPH:  No abnormal bleeding.  No palpable lymphadenopathy.  EYES:  No vision changes or diplopia.  ENT:  No sore throat or difficulty swallowing.  RESPIRATORY:  No cough, shortness of breath, hemoptysis, or wheezing.  CARDIOVASCULAR:  No chest pain, palpitations, orthopnea, or dyspnea on exertion.  GASTROINTESTINAL:  No abdominal pain, nausea, vomiting, constipation, diarrhea, melena, or hematochezia.  GENITOURINARY:  No dysuria or hematuria.  MUSCULOSKELETAL:  No joint pain, swelling, or erythema.  Muscle cramping  NEUROLOGIC:  No dizziness, loss of consciousness, or seizures.  PSYCHIATRIC:  No depression, anxiety,  "or mood changes.    Vitals:    04/30/19 0945   BP: 126/85   Pulse: 68   Resp: 16   Temp: 98.2 °F (36.8 °C)   TempSrc: Oral   SpO2: 100%   Weight: 81.6 kg (179 lb 14.4 oz)   Height: 180.3 cm (70.98\")   PainSc: 0-No pain  Comment: anaplastic atrocytoma       PHYSICAL EXAMINATION:  GENERAL:  Well-developed well-nourished male; awake, alert and oriented, in no acute distress.  SKIN:  Alopecia present on the scalp.  Healing surgical incision present.  Mild laceration present on the right side of the scalp near the incision line.  No bleeding.  HEAD:  Normocephalic, Well-healed surgical incision present.    EYES:  Pupils equal, round and reactive to light.  Extraocular movements intact.  Conjunctivae normal.  EARS:  Hearing intact.  NOSE:  Septum midline.  No excoriations or nasal discharge.  MOUTH:  No stomatitis or ulcers.  Lips are normal.  THROAT:  Oropharynx without lesions or exudates.  NECK:  Supple with good range of motion; no thyromegaly or masses; no JVD or bruits.  LYMPHATICS:  No cervical, supraclavicular, axillary, or inguinal lymphadenopathy.  CHEST:  Lungs are clear to auscultation bilaterally.  No wheezes, rales, or rhonchi.  HEART:  Regular rate; normal rhythm.  No murmurs, gallops or rubs.  ABDOMEN:  Not examined today  EXTREMITIES:  No clubbing, cyanosis, or edema.  NEUROLOGICAL:  No focal neurologic deficits.    DIAGNOSTIC DATA:  Results for orders placed or performed in visit on 04/30/19   Protime-INR, Fingerstick   Result Value Ref Range    Protime 22.4 (H) 11.0 - 13.5 Seconds    INR 1.90 (H) 0.90 - 1.10   CBC Auto Differential   Result Value Ref Range    WBC 3.70 3.40 - 10.80 10*3/mm3    RBC 4.89 4.14 - 5.80 10*6/mm3    Hemoglobin 14.8 13.0 - 17.7 g/dL    Hematocrit 44.0 37.5 - 51.0 %    MCV 90.0 79.0 - 97.0 fL    MCH 30.3 26.6 - 33.0 pg    MCHC 33.6 31.5 - 35.7 g/dL    RDW 13.5 12.3 - 15.4 %    RDW-SD 44.4 37.0 - 54.0 fl    MPV 9.2 6.0 - 12.0 fL    Platelets 290 140 - 450 10*3/mm3    Neutrophil % " 49.7 42.7 - 76.0 %    Lymphocyte % 34.9 19.6 - 45.3 %    Monocyte % 12.4 (H) 5.0 - 12.0 %    Eosinophil % 2.2 0.3 - 6.2 %    Basophil % 0.3 0.0 - 1.5 %    Immature Grans % 0.5 0.0 - 0.5 %    Neutrophils, Absolute 1.84 1.70 - 7.00 10*3/mm3    Lymphocytes, Absolute 1.29 0.70 - 3.10 10*3/mm3    Monocytes, Absolute 0.46 0.10 - 0.90 10*3/mm3    Eosinophils, Absolute 0.08 0.00 - 0.40 10*3/mm3    Basophils, Absolute 0.01 0.00 - 0.20 10*3/mm3    Immature Grans, Absolute 0.02 0.00 - 0.05 10*3/mm3    nRBC 0.0 0.0 - 0.2 /100 WBC       IMAGING:    MRI brain images from 4/29/2019 personally reviewed.  New 6 mm area of enhancement at the resection cavity.    IMPRESSION:  1. The patient has undergone a frontoparietal and temporal craniotomy on  the right for resection of a large and complex right frontal mass with  subsequent craniotomy for further debulking. There is a very thin plane  of enhancement related to the posterior aspect of the resection cavity,  nonspecific. This may be postsurgical in nature. A more focal area of  enhancement involving the inferior and medial aspect of the resection  cavity is noted measuring approximately 6 x 5 x 2 mm in size which was  not present on 01/25/2019. A smaller area of enhancement related to the  inferior aspect of the resection cavity is noted on the sagittal T1  postcontrast sequence. There was precontrast T1 hyperintensity present  at this location on the prior examination suggesting blood products.  Areas of enhancement inferiorly and inferior/medially are nonspecific.  While potentially representing postsurgical changes, enhancing tumor or  high-grade tumor cannot be excluded. There is diffusion hyperintensity  along the margins of the resection cavity, nonspecific. The areas of  diffusion weighted hyperintensity may represent nonenhancing tumor.  Continued close surveillance is recommended.  2. No evidence of acute infarction, hydrocephalus or midline shift.  3. Increasing T2 FLAIR  hyperintensity adjacent to the left frontal horn  superiorly, nonspecific. There is no evidence of associated enhancement.  This may represent radiation related changes. Nonenhancing tumor cannot  be entirely excluded but is thought to be less likely.      ASSESSMENT:  This is a 33 y.o. male with:  1.  Bilateral lower extremity DVT: He remains on warfarin.  His INR today is 2.0.  He can discontinue the Lovenox.  Return next week for an INR check.    2.  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  NOT 1p19q co-deleted.  Overall, this is a good molecular profile.  I discussed his case with Dr. Galvan, Dr. Daley, and Dr. Dobbins and I reviewed NCCN guidelines.   He will require one year of adjuvant Temodar following radiation.  The big question was whether to administer concurrent therapy with Temodar along with radiation.  Ultimately, we decided not to do this given the overall good molecular profile and the potential adverse effects from concurrent therapy.  He did initiate radiation alone on 7/31/2018 and completed it on 9/14/2018.     He initiated adjuvant Temodar in mid October 2018.    Due to persistent abnormalities on MRI, on 1/24/2019 he did have a repeat resection by Dr. Galvan.  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  However, there was no evidence of progression to a higher grade in the new resected specimen.  Mostly the proliferative activity was very low with only rare mitoses and a low Ki-67 of just 2-3%.    He saw Dr. Dobbins earlier today.  He is in agreement that we will continue standard adjuvant therapy with Temodar at this time.    He proceeds with Temodar at 400 mg daily for 5 out of 28 days.      We will plan to repeat an MRI after 2 more cycles of therapy.    He will initiate Temodar on May 5 and June 12.  We will have an MRI scheduled on June 28 in the morning and I will see him back in the  afternoon following a stat read of the MRI.    3.  Given the possibility of infertility with treatment he banked sperm at the [facility].  His wife is actually pregnant at this time.  Her due date is within the week.    4.  Elevated liver labs: Liver labs normalized.  Unclear reason.  Medication could have contributed.    5.  Palpitations: He was having some palpitations and he did have a monitor done which showed some SVT.  He was advised to stop caffeine and consider starting a beta-blocker.  I advised him if his palpitations return to let us or Dr. Benavidez know and he may need to see cardiology.    PLAN:  1.  Proceed with Temodar this week for maintenance therapy.      Temodar dosing:  Cycle 1: 150 mg/m2 once daily for 5 days of a 28-day treatment cycle  Cycles 2 to 6: May increase to 200 mg/m2 once daily for 5 days; repeat every 28 days (if ANC ?1,500/mm3, platelets ?100,000/mm3 and nonhematologic toxicities for cycle 1 are ?grade 2 [excludes alopecia, nausea/vomiting]); If dose was not escalated at the onset of cycle 2, do not increase for cycles 3 to 6.    Dose for him at 150 mg/m2 = 300 mg  Dose for him at 200 mg/m2 = 400 mg    He will initiate Temodar on May 5 and June 12.  We will have an MRI scheduled on June 28 in the morning and I will see him back in the afternoon following a stat read of the MRI.    2.  Continue Keppra    3.  Continue Bactrim three days weekly for PCP prophylaxis.     4.  Continue anticoagulation and we have adjusted his warfarin dose slightly.

## 2019-05-15 ENCOUNTER — TELEPHONE (OUTPATIENT)
Dept: ONCOLOGY | Facility: HOSPITAL | Age: 34
End: 2019-05-15

## 2019-05-15 ENCOUNTER — APPOINTMENT (OUTPATIENT)
Dept: WOMENS IMAGING | Facility: HOSPITAL | Age: 34
End: 2019-05-15

## 2019-05-15 ENCOUNTER — OFFICE VISIT (OUTPATIENT)
Dept: INTERNAL MEDICINE | Facility: CLINIC | Age: 34
End: 2019-05-15

## 2019-05-15 VITALS
HEIGHT: 71 IN | WEIGHT: 174 LBS | SYSTOLIC BLOOD PRESSURE: 128 MMHG | BODY MASS INDEX: 24.36 KG/M2 | DIASTOLIC BLOOD PRESSURE: 78 MMHG | OXYGEN SATURATION: 97 % | HEART RATE: 77 BPM

## 2019-05-15 DIAGNOSIS — M54.50 ACUTE BILATERAL LOW BACK PAIN WITHOUT SCIATICA: Primary | ICD-10-CM

## 2019-05-15 DIAGNOSIS — C71.1 ANAPLASTIC ASTROCYTOMA OF FRONTAL LOBE (HCC): ICD-10-CM

## 2019-05-15 DIAGNOSIS — M54.50 ACUTE BILATERAL LOW BACK PAIN WITHOUT SCIATICA: ICD-10-CM

## 2019-05-15 PROCEDURE — 99214 OFFICE O/P EST MOD 30 MIN: CPT | Performed by: NURSE PRACTITIONER

## 2019-05-15 PROCEDURE — 72110 X-RAY EXAM L-2 SPINE 4/>VWS: CPT | Performed by: NURSE PRACTITIONER

## 2019-05-15 PROCEDURE — 72110 X-RAY EXAM L-2 SPINE 4/>VWS: CPT | Performed by: RADIOLOGY

## 2019-05-15 RX ORDER — BACLOFEN 10 MG/1
TABLET ORAL
Qty: 270 TABLET | Refills: 1 | Status: SHIPPED | OUTPATIENT
Start: 2019-05-15 | End: 2019-12-16

## 2019-05-15 RX ORDER — BACLOFEN 10 MG/1
10 TABLET ORAL 3 TIMES DAILY PRN
Qty: 50 TABLET | Refills: 0 | Status: SHIPPED | OUTPATIENT
Start: 2019-05-15 | End: 2019-05-15 | Stop reason: SDUPTHER

## 2019-05-15 NOTE — TELEPHONE ENCOUNTER
----- Message from Jessie Villaseñor sent at 5/15/2019  3:03 PM EDT -----  [phone]    Pt said he left message and hasn't heard back.    Missed chemo on May 5th so he took it on the 12th wants to know when to take next treatment.    Pt called today stating that he started his Temodar on 5/12 instead of 5/5 like he was supposed to. He will take for 5 days. He is supposed to start his next round on 6/12 but did not know if he should start on this date or push back a week. Pt also wondering if this will effect his MRI date scheduled for 6/28. Informed pt that Dr. Jefferson was on vacation but that I would send him a message asking his opinion. He v/u and said he will also call Dr. Dobbins's office who is working with Dr. Jefferson on this and ask his opinion. Message sent to Dr. Jefferson.

## 2019-05-16 ENCOUNTER — TELEPHONE (OUTPATIENT)
Dept: GENERAL RADIOLOGY | Facility: HOSPITAL | Age: 34
End: 2019-05-16

## 2019-05-16 ENCOUNTER — TELEPHONE (OUTPATIENT)
Dept: ONCOLOGY | Facility: HOSPITAL | Age: 34
End: 2019-05-16

## 2019-05-16 NOTE — TELEPHONE ENCOUNTER
----- Message from Martha Orr RN sent at 5/16/2019  9:57 AM EDT -----  Can you please move pt's apts scheduled for 6/28 (MRI and MD visit) back one week per Dr. Jefferson.   ----- Message -----  From: Sean Jefferson MD  Sent: 5/15/2019   9:55 PM  To: Martha Orr RN, #    Everything will need to be pushed back by one week.  Please let scheduling know.  [facility]    ----- Message -----  From: Martha Orr RN  Sent: 5/15/2019   3:47 PM  To: MD Dr. Ronny Francis,   Pt called stating that he started his Temodar dosing a week late. He was supposed to start on 5/5 and did not start until 5/12, a week late. Pt is wondering when he should start his next dose (supposed to start 6/12) and if this would interfere with when he could get his MRI (supposed to be on 6/28). I informed pt that you were on vacation and he was going to call Dr. Dobbins to get his opinion also. Please respond to clinical pool as I may be on maternity leave when you return. Thank you!

## 2019-05-16 NOTE — TELEPHONE ENCOUNTER
Called pt and let him know that Dr. Jefferson wants to move his apts out 1 week from scheduled time now. He v/u. Message sent to Dr. Jefferson asking if he would also want pt to move back his next cycle of Temodar and start it one week later (6/19). Awaiting his response.     ----- Message from Sean Jefferson MD sent at 5/15/2019  9:55 PM EDT -----  Everything will need to be pushed back by one week.  Please let scheduling know.  [facility]    ----- Message -----  From: Martha Orr RN  Sent: 5/15/2019   3:47 PM  To: MD Dr. Ronny Francis,   Pt called stating that he started his Temodar dosing a week late. He was supposed to start on 5/5 and did not start until 5/12, a week late. Pt is wondering when he should start his next dose (supposed to start 6/12) and if this would interfere with when he could get his MRI (supposed to be on 6/28). I informed pt that you were on vacation and he was going to call Dr. Dobbins to get his opinion also. Please respond to clinical pool as I may be on maternity leave when you return. Thank you!

## 2019-05-17 ENCOUNTER — TELEPHONE (OUTPATIENT)
Dept: ONCOLOGY | Facility: HOSPITAL | Age: 34
End: 2019-05-17

## 2019-05-17 NOTE — PROGRESS NOTES
Subjective   Bryan Valadez is a 33 y.o. male who presents due to low back pain.    He was lifting a heavy crate last Friday when he felt a pull in the low back. He was feeling somewhat better over the weekend but c/o increased pain Monday when he twisted as he was throwing something into a dumpster. He c/o low back pain and tightness since then with intermittent testicular pain. Denies weakness of lower extremities.  He started chemo 5/12 and is scheduled for a repeat MRI in June.      Back Pain   This is a new problem. The current episode started in the past 7 days. The problem occurs daily. The problem is unchanged. The pain is present in the lumbar spine. The quality of the pain is described as aching and burning. The pain is moderate. The symptoms are aggravated by sitting, standing and position. Pertinent negatives include no abdominal pain (no change in bowel function), bladder incontinence, bowel incontinence, chest pain, fever, headaches, numbness, tingling or weakness. Risk factors include recent trauma. He has tried analgesics (Tylenol) for the symptoms. The treatment provided mild relief.   Groin Pain   Pertinent negatives include no abdominal pain (no change in bowel function), chest pain, chills, constipation, coughing, diarrhea, fever, frequency, headaches, nausea, rash, shortness of breath, sore throat, urgency or vomiting.        The following portions of the patient's history were reviewed and updated as appropriate: allergies, current medications, past social history and problem list.    Past Medical History:   Diagnosis Date   • Allergic rhinitis    • Asthma     Pulmonary Dr. Alexandra   • Chest pain    • DVT (deep venous thrombosis) (CMS/[facility]) 06/2018    LEFT LEG   • Factor V Leiden (CMS/[facility])    • GERD (gastroesophageal reflux disease)    • H/O echocardiogram 2006   • Headache    • History of ETT    • History of Holter monitoring    • Hyperlipidemia    • PE (pulmonary thromboembolism) (CMS/[facility])  06/2018    AFTER CRANIOTOMY   • Primary brain tumor (CMS/HCC) 2018         Current Outpatient Medications:   •  ALPRAZolam (XANAX) 0.5 MG tablet, Take 1 tablet by mouth 2 (Two) Times a Day As Needed for Anxiety., Disp: 20 tablet, Rfl: 0  •  levETIRAcetam (KEPPRA) 1000 MG tablet, Take 1 tablet by mouth Every 12 (Twelve) Hours., Disp: 60 tablet, Rfl: 3  •  montelukast (SINGULAIR) 10 MG tablet, TAKE 1 TABLET BY MOUTH EVERY DAY( NEED APPOINTMENT), Disp: 90 tablet, Rfl: 3  •  omeprazole (PriLOSEC) 20 MG capsule, Take 20 mg by mouth Daily As Needed., Disp: , Rfl:   •  ondansetron (ZOFRAN) 8 MG tablet, Take 1 tablet by mouth Every 12 (Twelve) Hours As Needed for Nausea., Disp: 60 tablet, Rfl: 4  •  temozolomide (TEMODAR) 100 MG chemo capsule, TAKE 4 CAPSULES BY MOUTH ON DAYS 1-5 EVERY 28 DAYS, Disp: 20 capsule, Rfl: 3  •  warfarin (COUMADIN) 5 MG tablet, Take 10 mg by mouth Mon,Wed,Fri and 7.5mg Sun,Tues,Thurs,Sat unless directed otherwise by MD, Disp: 60 tablet, Rfl: 5  •  baclofen (LIORESAL) 10 MG tablet, TAKE 1 TABLET BY MOUTH THREE TIMES DAILY AS NEEDED FOR MUSCLE SPASMS, Disp: 270 tablet, Rfl: 1    Allergies   Allergen Reactions   • Avocado Itching   • Other Itching     Insect stings: Bee stings, throat swelling (has Epi pen)    Allergy to fruit, Avocado, Cherry Tomato (can have blue berries)   • Tomato Itching     Cherry tomato       Review of Systems   Constitutional: Positive for activity change. Negative for appetite change, chills, fever and unexpected weight change.   HENT: Negative for ear pain, sinus pressure and sore throat.    Eyes: Negative for visual disturbance.   Respiratory: Negative for cough, shortness of breath and wheezing.    Cardiovascular: Negative for chest pain, palpitations and leg swelling.   Gastrointestinal: Negative for abdominal pain (no change in bowel function), blood in stool, bowel incontinence, constipation, diarrhea, nausea and vomiting.   Genitourinary: Negative for bladder  "incontinence, decreased urine volume, difficulty urinating (no change in bladder function), frequency, genital sores, hematuria and urgency.   Musculoskeletal: Positive for back pain and gait problem.   Skin: Negative for rash.   Neurological: Negative for dizziness, tingling, syncope, weakness, light-headedness, numbness and headaches.   Psychiatric/Behavioral: Negative for dysphoric mood.       Objective   Vitals:    05/15/19 1342   BP: 128/78   BP Location: Left arm   Patient Position: Sitting   Cuff Size: Adult   Pulse: 77   SpO2: 97%   Weight: 78.9 kg (174 lb)   Height: 180.1 cm (70.9\")     Physical Exam   Constitutional: He appears well-developed and well-nourished. He is cooperative. He does not have a sickly appearance. He does not appear ill.   HENT:   Head: Normocephalic.   Right Ear: Hearing, tympanic membrane and external ear normal.   Left Ear: Hearing, tympanic membrane and external ear normal.   Nose: Nose normal. No mucosal edema, rhinorrhea, sinus tenderness or nasal deformity. Right sinus exhibits no maxillary sinus tenderness and no frontal sinus tenderness. Left sinus exhibits no maxillary sinus tenderness and no frontal sinus tenderness.   Mouth/Throat: Oropharynx is clear and moist and mucous membranes are normal. Normal dentition.   Eyes: Conjunctivae and lids are normal. Pupils are equal, round, and reactive to light. Right eye exhibits no discharge and no exudate. Left eye exhibits no discharge and no exudate.   Neck: Trachea normal and normal range of motion. Carotid bruit is not present. No edema present. No thyroid mass and no thyromegaly present.   Cardiovascular: Regular rhythm, normal heart sounds and normal pulses.   No murmur heard.  Pulmonary/Chest: Breath sounds normal. No respiratory distress. He has no decreased breath sounds. He has no wheezes. He has no rhonchi. He has no rales.   Genitourinary: Testes normal and penis normal. Right testis shows no mass, no swelling and no " tenderness. Left testis shows no mass, no swelling and no tenderness.   Musculoskeletal:        Lumbar back: He exhibits tenderness.   Lymphadenopathy:        Head (right side): No submental, no submandibular, no tonsillar, no preauricular, no posterior auricular and no occipital adenopathy present.        Head (left side): No submental, no submandibular, no tonsillar, no preauricular, no posterior auricular and no occipital adenopathy present.   Neurological: He is alert. He has normal strength. No sensory deficit.   Skin: Skin is warm, dry and intact. No cyanosis. Nails show no clubbing.       Assessment/Plan   Bryan was seen today for back pain and groin pain.    Diagnoses and all orders for this visit:    Acute bilateral low back pain without sciatica  -     XR Spine Lumbar 4+ View  -     Discontinue: baclofen (LIORESAL) 10 MG tablet; Take 1 tablet by mouth 3 (Three) Times a Day As Needed for Muscle Spasms.  -     Discontinue: baclofen (LIORESAL) 10 MG tablet; Take 1 tablet by mouth 3 (Three) Times a Day As Needed for Muscle Spasms.    Anaplastic astrocytoma of frontal lobe (CMS/HCC)    No acute abnl noted on xray-will send to radiology for review. He will continue Tylenol (hold NSAIDs due to Coumadin use) and start muscle relaxer at night, apply heat. Consider further evaluation if sx persist/worsen.

## 2019-05-17 NOTE — TELEPHONE ENCOUNTER
Attempted to call pt. No answer, left VM    ----- Message from Sean Jefferson MD sent at 5/16/2019 10:31 PM EDT -----  That is correct.  Everything, including when he starts the next cycle, is pushed back by one week.  [facility]    ----- Message -----  From: Martha Orr RN  Sent: 5/16/2019   8:10 AM  To: Sean Jefferson MD    Does this include when he needs to start his next Temodar dosing? He is supposed to start on 6/12, should he now start on 6/19?  ----- Message -----  From: Sean Jefferson MD  Sent: 5/15/2019   9:55 PM  To: Martha Orr RN, #    Everything will need to be pushed back by one week.  Please let scheduling know.  [facility]    ----- Message -----  From: Martha Orr RN  Sent: 5/15/2019   3:47 PM  To: MD Dr. Ronny Francis,   Pt called stating that he started his Temodar dosing a week late. He was supposed to start on 5/5 and did not start until 5/12, a week late. Pt is wondering when he should start his next dose (supposed to start 6/12) and if this would interfere with when he could get his MRI (supposed to be on 6/28). I informed pt that you were on vacation and he was going to call Dr. Dobbins to get his opinion also. Please respond to clinical pool as I may be on maternity leave when you return. Thank you!

## 2019-05-22 RX ORDER — TEMOZOLOMIDE 100 MG/1
CAPSULE ORAL
Qty: 20 CAPSULE | Refills: 1 | Status: SHIPPED | OUTPATIENT
Start: 2019-05-22 | End: 2019-08-02 | Stop reason: SDUPTHER

## 2019-05-22 NOTE — TELEPHONE ENCOUNTER
Temozolomide refill request rec electronically from [facility]ji MART[facility]NEZ Per last office note-pt is to continue. He is to have 6 cycles which 4 have been dispensed. Request approved with 1 refill.

## 2019-05-29 ENCOUNTER — LAB (OUTPATIENT)
Dept: LAB | Facility: HOSPITAL | Age: 34
End: 2019-05-29

## 2019-05-29 ENCOUNTER — CLINICAL SUPPORT (OUTPATIENT)
Dept: ONCOLOGY | Facility: HOSPITAL | Age: 34
End: 2019-05-29

## 2019-05-29 ENCOUNTER — TELEPHONE (OUTPATIENT)
Dept: ONCOLOGY | Facility: CLINIC | Age: 34
End: 2019-05-29

## 2019-05-29 DIAGNOSIS — Z86.711 HISTORY OF PULMONARY EMBOLUS (PE): ICD-10-CM

## 2019-05-29 DIAGNOSIS — C71.1 ANAPLASTIC ASTROCYTOMA OF FRONTAL LOBE (HCC): ICD-10-CM

## 2019-05-29 DIAGNOSIS — I82.532 CHRONIC DEEP VEIN THROMBOSIS (DVT) OF LEFT POPLITEAL VEIN (HCC): ICD-10-CM

## 2019-05-29 LAB
INR PPP: 5.09 (ref 0.9–1.1)
PROTHROMBIN TIME: 46.9 SECONDS (ref 11.7–14.2)

## 2019-05-29 PROCEDURE — 85610 PROTHROMBIN TIME: CPT | Performed by: INTERNAL MEDICINE

## 2019-05-29 NOTE — TELEPHONE ENCOUNTER
----- Message from Taylor Cesar RN sent at 5/29/2019  9:41 AM EDT -----  PT/INR with review in one week. Thanks

## 2019-05-29 NOTE — PROGRESS NOTES
INR 5.09. Prior dose confirmed. Per Dr. Jefferson pt is to take 7.5 mg of Coumadin daily and return in one week for a PT/INR. Pt is here for lab with RN review.  Pt has no complaints.  Copy of labs given to pt and f/u appt reviewed. Pt is instructed to call with concerns prior to next visit. Pt V/U and message sent to scheduling.

## 2019-05-30 ENCOUNTER — TELEPHONE (OUTPATIENT)
Dept: NEUROLOGY | Facility: CLINIC | Age: 34
End: 2019-05-30

## 2019-05-30 NOTE — TELEPHONE ENCOUNTER
----- Message from Yordan Dobbins MD sent at 5/30/2019  8:48 AM EDT -----  Contact: [phone]  Thanks Miguel Floyd, I think Bryan wants to see me day after MRI: I am fine with whatever works best for him. Please reach out to him  Thanks  [facility]  ----- Message -----  From: Sean Jefferson MD  Sent: 5/29/2019  10:15 PM  To: Yordan Dobbins MD    Treatment schedule for q28 days should be to start tx on  5/12 (one week late, done)  6/9 7/7  Therefore, MRI on 7/5 as currently scheduled with STAT reading by radiology so he can start his next cycle on 7/7.  Miguel    ----- Message -----  From: Yordan Dobbins MD  Sent: 5/29/2019  10:18 AM  To: Sean Jefferson MD, CARMEN Dwyer  I just got off the phone with Bryan: he is confused about  1. When to restart his chemo  2. When should his nazia MRI be;   As you know due to the birth of their baby he started TMZ on 5/12 instead of May 05  He is of the impression that you want him to restart on June 19 instead of June12. He said your office referred to me about that.  I think he should restart June 12 unless you want it otherwise    As for MRI I think it should be pushed back to July 12 as well but since you are ordering the chemo it is really up to you.  If your office will let Mariel know what you decide we will accommodate your dates.  In the meantime  expects clarification call from your office  Thank you  Thank you  ----- Message -----  From: Mariel Green MA  Sent: 5/29/2019   9:07 AM  To: Yordan Dobbins MD    Pt is calling about chemo treatment, he started treatment on 5-12 instead of 5-5 due to the birth of his daughter. Pt had a few questions about when to start next treatment. And if the MRI should be moved from 6-.

## 2019-06-06 ENCOUNTER — LAB (OUTPATIENT)
Dept: LAB | Facility: HOSPITAL | Age: 34
End: 2019-06-06

## 2019-06-06 ENCOUNTER — CLINICAL SUPPORT (OUTPATIENT)
Dept: ONCOLOGY | Facility: HOSPITAL | Age: 34
End: 2019-06-06

## 2019-06-06 DIAGNOSIS — C71.1 ANAPLASTIC ASTROCYTOMA OF FRONTAL LOBE (HCC): ICD-10-CM

## 2019-06-06 DIAGNOSIS — Z86.711 HISTORY OF PULMONARY EMBOLUS (PE): ICD-10-CM

## 2019-06-06 LAB
INR PPP: 3.4 (ref 0.9–1.1)
PROTHROMBIN TIME: 40.8 SECONDS (ref 11–13.5)

## 2019-06-06 PROCEDURE — 85610 PROTHROMBIN TIME: CPT

## 2019-06-24 DIAGNOSIS — C71.1 ANAPLASTIC ASTROCYTOMA OF FRONTAL LOBE (HCC): ICD-10-CM

## 2019-06-24 RX ORDER — SULFAMETHOXAZOLE AND TRIMETHOPRIM 800; 160 MG/1; MG/1
TABLET ORAL
Qty: 36 TABLET | Refills: 1 | Status: SHIPPED | OUTPATIENT
Start: 2019-06-24 | End: 2019-08-25 | Stop reason: SDUPTHER

## 2019-07-02 RX ORDER — LEVETIRACETAM 1000 MG/1
TABLET ORAL
Qty: 60 TABLET | Refills: 11 | Status: SHIPPED | OUTPATIENT
Start: 2019-07-02 | End: 2019-07-15 | Stop reason: SDUPTHER

## 2019-07-03 ENCOUNTER — TELEPHONE (OUTPATIENT)
Dept: NEUROSURGERY | Facility: CLINIC | Age: 34
End: 2019-07-03

## 2019-07-03 NOTE — TELEPHONE ENCOUNTER
Pt called and he is having his MRI on 7/5. DR Lubin moved it up. Pt says he needs to call and cancel the MRI for the 29th

## 2019-07-05 ENCOUNTER — HOSPITAL ENCOUNTER (OUTPATIENT)
Dept: MRI IMAGING | Facility: HOSPITAL | Age: 34
Discharge: HOME | End: 2019-07-05
Admitting: INTERNAL MEDICINE

## 2019-07-05 ENCOUNTER — OFFICE VISIT (OUTPATIENT)
Dept: ONCOLOGY | Facility: CLINIC | Age: 34
End: 2019-07-05

## 2019-07-05 ENCOUNTER — LAB (OUTPATIENT)
Dept: OTHER | Facility: HOSPITAL | Age: 34
End: 2019-07-05

## 2019-07-05 VITALS
RESPIRATION RATE: 16 BRPM | HEART RATE: 64 BPM | HEIGHT: 70 IN | DIASTOLIC BLOOD PRESSURE: 79 MMHG | TEMPERATURE: 97.9 F | OXYGEN SATURATION: 99 % | WEIGHT: 175.1 LBS | BODY MASS INDEX: 25.07 KG/M2 | SYSTOLIC BLOOD PRESSURE: 124 MMHG

## 2019-07-05 DIAGNOSIS — C71.1 ANAPLASTIC ASTROCYTOMA OF FRONTAL LOBE (HCC): Primary | ICD-10-CM

## 2019-07-05 DIAGNOSIS — C71.1 ANAPLASTIC ASTROCYTOMA OF FRONTAL LOBE (HCC): ICD-10-CM

## 2019-07-05 DIAGNOSIS — I82.432 ACUTE DEEP VEIN THROMBOSIS (DVT) OF POPLITEAL VEIN OF LEFT LOWER EXTREMITY (HCC): Primary | ICD-10-CM

## 2019-07-05 DIAGNOSIS — Z86.711 HISTORY OF PULMONARY EMBOLUS (PE): ICD-10-CM

## 2019-07-05 LAB
ALBUMIN SERPL-MCNC: 4.9 G/DL (ref 3.5–5.2)
ALBUMIN/GLOB SERPL: 2 G/DL
ALP SERPL-CCNC: 55 U/L (ref 39–117)
ALT SERPL W P-5'-P-CCNC: 19 U/L (ref 1–41)
ANION GAP SERPL CALCULATED.3IONS-SCNC: 13.5 MMOL/L (ref 5–15)
AST SERPL-CCNC: 11 U/L (ref 1–40)
BASOPHILS # BLD AUTO: 0.02 10*3/MM3 (ref 0–0.2)
BASOPHILS NFR BLD AUTO: 0.4 % (ref 0–1.5)
BILIRUB SERPL-MCNC: 0.3 MG/DL (ref 0.2–1.2)
BUN BLD-MCNC: 13 MG/DL (ref 6–20)
BUN/CREAT SERPL: 13.1 (ref 7–25)
CALCIUM SPEC-SCNC: 9.6 MG/DL (ref 8.6–10.5)
CHLORIDE SERPL-SCNC: 104 MMOL/L (ref 98–107)
CO2 SERPL-SCNC: 25.5 MMOL/L (ref 22–29)
CREAT BLD-MCNC: 0.99 MG/DL (ref 0.76–1.27)
DEPRECATED RDW RBC AUTO: 39.5 FL (ref 37–54)
EOSINOPHIL # BLD AUTO: 0.05 10*3/MM3 (ref 0–0.4)
EOSINOPHIL NFR BLD AUTO: 1 % (ref 0.3–6.2)
ERYTHROCYTE [DISTWIDTH] IN BLOOD BY AUTOMATED COUNT: 12.4 % (ref 12.3–15.4)
GFR SERPL CREATININE-BSD FRML MDRD: 86 ML/MIN/1.73
GLOBULIN UR ELPH-MCNC: 2.5 GM/DL
GLUCOSE BLD-MCNC: 97 MG/DL (ref 65–99)
HCT VFR BLD AUTO: 42.9 % (ref 37.5–51)
HGB BLD-MCNC: 14.8 G/DL (ref 13–17.7)
IMM GRANULOCYTES # BLD AUTO: 0.02 10*3/MM3 (ref 0–0.05)
IMM GRANULOCYTES NFR BLD AUTO: 0.4 % (ref 0–0.5)
INR PPP: 2.4
LYMPHOCYTES # BLD AUTO: 1.2 10*3/MM3 (ref 0.7–3.1)
LYMPHOCYTES NFR BLD AUTO: 23.3 % (ref 19.6–45.3)
MCH RBC QN AUTO: 30.2 PG (ref 26.6–33)
MCHC RBC AUTO-ENTMCNC: 34.5 G/DL (ref 31.5–35.7)
MCV RBC AUTO: 87.6 FL (ref 79–97)
MONOCYTES # BLD AUTO: 0.53 10*3/MM3 (ref 0.1–0.9)
MONOCYTES NFR BLD AUTO: 10.3 % (ref 5–12)
NEUTROPHILS # BLD AUTO: 3.34 10*3/MM3 (ref 1.7–7)
NEUTROPHILS NFR BLD AUTO: 64.6 % (ref 42.7–76)
NRBC BLD AUTO-RTO: 0 /100 WBC (ref 0–0.2)
PLATELET # BLD AUTO: 190 10*3/MM3 (ref 140–450)
PMV BLD AUTO: 9.2 FL (ref 6–12)
POTASSIUM BLD-SCNC: 4.5 MMOL/L (ref 3.5–5.2)
PROT SERPL-MCNC: 7.4 G/DL (ref 6–8.5)
PROTHROMBIN TIME: 28.5 SECONDS (ref 11–15)
RBC # BLD AUTO: 4.9 10*6/MM3 (ref 4.14–5.8)
SODIUM BLD-SCNC: 143 MMOL/L (ref 136–145)
WBC NRBC COR # BLD: 5.16 10*3/MM3 (ref 3.4–10.8)

## 2019-07-05 PROCEDURE — 80053 COMPREHEN METABOLIC PANEL: CPT | Performed by: INTERNAL MEDICINE

## 2019-07-05 PROCEDURE — 99215 OFFICE O/P EST HI 40 MIN: CPT | Performed by: INTERNAL MEDICINE

## 2019-07-05 PROCEDURE — 36415 COLL VENOUS BLD VENIPUNCTURE: CPT

## 2019-07-05 PROCEDURE — A9577 INJ MULTIHANCE: HCPCS | Performed by: INTERNAL MEDICINE

## 2019-07-05 PROCEDURE — 82565 ASSAY OF CREATININE: CPT

## 2019-07-05 PROCEDURE — 70553 MRI BRAIN STEM W/O & W/DYE: CPT

## 2019-07-05 PROCEDURE — 0 GADOBENATE DIMEGLUMINE 529 MG/ML SOLUTION: Performed by: INTERNAL MEDICINE

## 2019-07-05 PROCEDURE — 85610 PROTHROMBIN TIME: CPT

## 2019-07-05 PROCEDURE — 85025 COMPLETE CBC W/AUTO DIFF WBC: CPT | Performed by: INTERNAL MEDICINE

## 2019-07-05 RX ADMIN — GADOBENATE DIMEGLUMINE 15 ML: 529 INJECTION, SOLUTION INTRAVENOUS at 09:27

## 2019-07-05 NOTE — PROGRESS NOTES
[facility] OUTPATIENT FOLLOW UP CLINIC VISIT    REASON FOR FOLLOW-UP:    1.  Left lower extremity DVT with progression of symptoms and extension of the left lower extremity DVT into the femoral vein from the popliteal/calf vein swallowing for next set.  Currently anticoagulated with warfarin.  2.  Right lower extremity DVT noted in the common femoral, profunda femoral, and calf veins  3.  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  Other thrombophilia labs negative.    4.  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  NOT 1p19q co-deleted.    5.  Radiation initiated on 7/31/2018.  Plan for Temodar ×1 year following radiation.  6.  4500 cGy in 25 fractions administered from 7/31/2018 through 9/14/2018  7.  MRI brain 10/23/2018 with resolving hemorrhage in the resection cavity.  However, there is hyperintensity measuring 4.6 x 4.3 x 3 cm along the margins of the resection cavity.  8.  Repeat venous duplex on 10/23 with chronic right CVT and chronic LLE DVT from the mid femoral vein distally.     9.  He initiated adjuvant therapy with Temodar in mid October 2018.  10.  MRI brain 12/3/2018 with stable findings.  11.  On 1/24/2019 he did have a repeat resection by Dr. Galvan.  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  However, there was no evidence of progression to a higher grade in the new resected specimen.  Mostly the proliferative activity was very low with only rare mitoses and a low Ki-67 of just 2-3%.  12.  He resumed Temodar      HISTORY OF PRESENT ILLNESS:  Bryan Valadez is a 33 y.o. male who returns today for follow up of the above issues.     He continues to do very well.  He tolerates the Tylenol well without any adverse effects.  No bleeding on anticoagulation.  He had his MRI of the brain this morning and returns today to review the results anticipating starting his next cycle of  Temodar on Sunday.            ONCOLOGIC HISTORY:  He had about 6 months of headaches treated as sinusitis and presented on 6/15 with visual changes and headache and was found to have a large right frontal mass which was resected on 6/16 by Dr. Galvan and consistent with a glioma.  Pathology was reviewed at [facility] showing infiltrating glioma.  Large 10 cm partially cystic and solid lobulated tumor mass at diagnosis.  Negative IDH1-R132H immunostain excludes the most common IDH1 mutation; however loss of ATRX expression suggests possibility of glioma harboring another less common IDH1 mutation.  p53 overexpressed.  Ki67 10% but variable.  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  There was no evidence for a 1p19q co-deletion.       Chromosome microarray showed a complex molecular karyotype including loss of 1q, loss of 2q, gain of 7q, gain of 8q, loss of 9p, REBECCA of 17p, and gain of 18p.  These abnormalities are typically associated with  IDH-mutant astrocytic gliomas.  No 1p and 19q whole arm co-deletion was noted.  CT head on 7/1 c/w residual tumor circumscribing the resection cavity and a remote cerebellar hemisphere hemorrhage.       He was discharged and subsequently admitted with left leg pain and chest pain, with LLE popliteal and calf vein clot noted and bilateral small PE. He was treated with heparin and discharged on Pradaxa.     He developed worsening leg pain up into the thigh and presented to the ER where a venous duplex showed progression of the clot to the femoral vein.  He was given Lovenox and admitted.  Warfarin was initiated.       A partial thrombophilia evaluation showed that he is a heterozygote for the factor V Leiden mutation.  Labs otherwise unremarkable.    He was subsequently found to have a right lower extremity DVT on 7/8/2018.    He remains anticoagulated with warfarin.    Radiation initiated on 7/31/2018.  Plan for Temodar ×1 year after radiation is complete.    Radiation complete  as of 9/14/2018.    4500 cGy in 25 fractions administered from 7/31/2018 through 9/14/2018    MRI brain 10/23/2018 with resolving hemorrhage in the resection cavity.  However, there is hyperintensity measuring 4.6 x 4.3 x 3 cm along the margins of the resection cavity.    Repeat venous duplex on 10/23 with chronic right CVT and chronic LLE DVT from the mid femoral vein distally.      Repeat brain MRI on 12/3/2018 with stable findings.    On 1/24/2019 he did have a repeat resection by Dr. Galvan.  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  However, there was no evidence of progression to a higher grade in the new resected specimen.  Mostly the proliferative activity was very low with only rare mitoses and a low Ki-67 of just 2-3%.      ALLERGIES:  Allergies   Allergen Reactions   • Avocado Itching   • Other Itching     Insect stings: Bee stings, throat swelling (has Epi pen)    Allergy to fruit, Avocado, Cherry Tomato (can have blue berries)   • Tomato Itching     Cherry tomato       MEDICATIONS:  The medication list has been reviewed with the patient by the medical assistant, and the list has been updated in the electronic medical record, which I reviewed.  Medication dosages and frequencies were confirmed to be accurate.    REVIEW OF SYSTEMS:  PAIN:  See Vital Signs below.  GENERAL:  No fevers, chills, night sweats, or unintended weight loss.  SKIN: No rash or nonhealing lesions  HEME/LYMPH:  No abnormal bleeding.  No palpable lymphadenopathy.  EYES:  No vision changes or diplopia.  ENT:  No sore throat or difficulty swallowing.  RESPIRATORY:  No cough, shortness of breath, hemoptysis, or wheezing.  CARDIOVASCULAR:  No chest pain, palpitations, orthopnea, or dyspnea on exertion.  GASTROINTESTINAL:  No abdominal pain, nausea, vomiting, constipation, diarrhea, melena, or hematochezia.  GENITOURINARY:  No dysuria or hematuria.  MUSCULOSKELETAL:  No joint pain, swelling, or erythema.  Muscle  "cramping  NEUROLOGIC:  No dizziness, loss of consciousness, or seizures.  PSYCHIATRIC:  No depression, anxiety, or mood changes.    Vitals:    07/05/19 1519   BP: 124/79   Pulse: 64   Resp: 16   Temp: 97.9 °F (36.6 °C)   TempSrc: Oral   SpO2: 99%   Weight: 79.4 kg (175 lb 1.6 oz)   Height: 178 cm (70.08\")   PainSc: 0-No pain  Comment: brain cancer       PHYSICAL EXAMINATION:  GENERAL:  Well-developed well-nourished male; awake, alert and oriented, in no acute distress.  SKIN:  Alopecia present on the scalp.  Healing surgical incision present.  No bleeding.  HEAD:  Normocephalic, Well-healed surgical incision present.    EYES:  Pupils equal, round and reactive to light.  Extraocular movements intact.  Conjunctivae normal.  EARS:  Hearing intact.  NOSE:  Septum midline.  No excoriations or nasal discharge.  MOUTH:  No stomatitis or ulcers.  Lips are normal.  THROAT:  Oropharynx without lesions or exudates.  NECK:  Supple with good range of motion; no thyromegaly or masses; no JVD or bruits.  LYMPHATICS:  No cervical, supraclavicular, axillary lymphadenopathy.  CHEST:  Lungs are clear to auscultation bilaterally.  No wheezes, rales, or rhonchi.  HEART:  Regular rate; normal rhythm.  No murmurs, gallops or rubs.  ABDOMEN:  Not examined today  EXTREMITIES:  No clubbing, cyanosis, or edema.  NEUROLOGICAL:  No focal neurologic deficits.    DIAGNOSTIC DATA:  Results for orders placed or performed in visit on 07/05/19   Protime-INR, Fingerstick   Result Value Ref Range    Protime 28.5 (H) 11.0 - 15.0 Seconds    INR 2.40      CBC and CMP pending.    IMAGING:    MRI brain from 7/5/2019 images personally reviewed and reviewed with patient today.  Stable findings.        ASSESSMENT:  This is a 33 y.o. male with:  1.  Bilateral lower extremity DVT: He remains on warfarin.  His INR today is 2.0.  He can discontinue the Lovenox.  Return next week for an INR check.    2.  Anaplastic astrocytoma involving the right frontal lobe, " status post resection on 6/16/2018 by Dr. Galvan.  IDH mutated.  NOT 1p19q co-deleted.  Overall, this is a good molecular profile.  I discussed his case with Dr. Galvan, Dr. Daley, and Dr. Dobbins and I reviewed NCCN guidelines.   He will require one year of adjuvant Temodar following radiation.  The big question was whether to administer concurrent therapy with Temodar along with radiation.  Ultimately, we decided not to do this given the overall good molecular profile and the potential adverse effects from concurrent therapy.  He did initiate radiation alone on 7/31/2018 and completed it on 9/14/2018.     He initiated adjuvant Temodar in mid October 2018.    Due to persistent abnormalities on MRI, on 1/24/2019 he did have a repeat resection by Dr. Galvan.  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  However, there was no evidence of progression to a higher grade in the new resected specimen.  Mostly the proliferative activity was very low with only rare mitoses and a low Ki-67 of just 2-3%.    He proceeds with Temodar at 400 mg daily for 5 out of 28 days.      MRI brain imaging from today appears stable.    He will resume his next cycle of Temodar on Sunday, July 7.  His next cycle will then be due on Sunday, August 4.  The following cycle will be due Sunday, September 1.    Based on my schedule we will obtain a repeat MRI of his brain on September 23.  I will see him back on September 25 to review the results.  At that time, he will have completed nearly 1 year of Temodar.  We will need to discuss that how to proceed at that point.  If everything appears stable we may proceed with with observation.    3.  Given the possibility of infertility with treatment he banked sperm at the [facility].  He has a 3-year-old and a 2-month-old now.    4.  Elevated liver labs: Liver labs normalized.  Unclear reason.  Medication could have contributed.  Follow-up liver labs from today.    5.   Palpitations: He did not complain of this today.    PLAN:  1.  Proceed with Temodar 400 mg every 28 days as scheduled for 3 more cycles with the dates as noted above.  I will see him back to review MRI imaging also as discussed above.  CBC, CMP, and INR monthly.    2.  Continue Keppra    3.  Continue Bactrim three days weekly for PCP prophylaxis.     4.  Continue anticoagulation with warfarin

## 2019-07-06 LAB — CREAT BLDA-MCNC: 0.9 MG/DL (ref 0.6–1.3)

## 2019-07-12 ENCOUNTER — OFFICE VISIT (OUTPATIENT)
Dept: NEUROLOGY | Facility: CLINIC | Age: 34
End: 2019-07-12

## 2019-07-12 VITALS
BODY MASS INDEX: 25.05 KG/M2 | DIASTOLIC BLOOD PRESSURE: 70 MMHG | HEIGHT: 70 IN | WEIGHT: 175 LBS | HEART RATE: 79 BPM | OXYGEN SATURATION: 98 % | SYSTOLIC BLOOD PRESSURE: 118 MMHG

## 2019-07-12 DIAGNOSIS — C71.9 ANAPLASTIC ASTROCYTOMA (HCC): Primary | ICD-10-CM

## 2019-07-12 PROCEDURE — 99215 OFFICE O/P EST HI 40 MIN: CPT | Performed by: PSYCHIATRY & NEUROLOGY

## 2019-07-12 NOTE — PROGRESS NOTES
Follow Up Visit: Anaplastic astrocytoma    Bryan comes accompanied by his wife: they have had their  daughter in the interim and they are all doing well.  Bryan has continued to do exceptional well with his work: on the top of the chart and still in need to put reminders on his I-phone and doing well with that as well.        Neurosurgeon: Dr. Chetan Galvan  Medical Oncology: Dr. 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  After Temodar x3 he underwent re-resection on 2019  Pathology report from [facility] shows the same histology, same molecular profile except Ki-67 now is 2%-3%  Post operative MRI scan (24 hours) shows a good resection     He comes now after having had Temodor #9 overall and #6 since his second surgery: this is given by DR Jefferson at [facility]  He tells me Dr Jefferson has indicated he plans on giving him a total of 12 cycles of Temodar.  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   HENT: Negative for hearing loss, tinnitus and trouble swallowing.    Eyes: Negative for pain, redness and itching.   Respiratory: Negative for cough, shortness of breath and wheezing.    Cardiovascular: Negative for chest pain, palpitations and leg swelling.   Gastrointestinal: Negative for diarrhea, nausea and vomiting.   Endocrine:  "Negative for cold intolerance, heat intolerance and polydipsia.   Genitourinary: Negative for decreased urine volume, difficulty urinating and urgency.   Musculoskeletal: Negative for back pain, neck pain and neck stiffness.   Skin: Negative for color change, rash and wound.   Allergic/Immunologic: Negative for environmental allergies, food allergies and immunocompromised state.   Neurological: Negative for dizziness, tremors, seizures, syncope, facial asymmetry, speech difficulty, weakness, light-headedness, numbness and headaches.   Hematological: Negative for adenopathy. Does not bruise/bleed easily.   Psychiatric/Behavioral: Negative for agitation, behavioral problems, confusion, decreased concentration, dysphoric mood, hallucinations, self-injury, sleep disturbance and suicidal ideas. The patient is not nervous/anxious and is not hyperactive.              Vitals:    07/12/19 0907   BP: 118/70   Pulse: 79   SpO2: 98%   Weight: 79.4 kg (175 lb)   Height: 178 cm (70.08\")         Physical/Neurological Examination  Alert, oriented, normal speech and language functions  Visual fields full  EOMs full w/o diplopia or nystagmus  Lower cranial nerevs are normal  No facial asymmetry  No pronator drift  VIVIANA's are normal  Normal gait and coordination  Normal tone w/o tremors  Cortical sensory functions are normal    Review Results-Workup/Diagnoses/Plan  I brought up his last 2 brain MRI scans from April and latest July 05 and reviewed with them at great length. Overall there has been no change in-between the 2 studies. The tiny enhancing lesion close to the ventricle I am no longer seeing this    1. Right frontal AA with details as above  Plans: he will continue with Temodar under direction of DR Jefferson  He should follow with DR Galvan for his MRI reviews.  I informed them I will be leaving  my last day being October 05    2. PE/DVT   I explained  to him that he has two factors related to this:  Factor V Leiden deficiency: " defer management to Dr Jefferson  He has thrombogenic anaplastic astrocytoma which puts him at a higher risk for DVT/PE: usually this is managed in concordance with the status of his nazia tumor whether there is residual or not and how he has been doing on DVT/PE follow ups    I answered all their questions in great details: this visit lasted more than 45 minutes more than 50% on discussion and education answering their questions about future follow-ups, natural history, anticoagilation

## 2019-07-14 ENCOUNTER — HOSPITAL ENCOUNTER (EMERGENCY)
Facility: HOSPITAL | Age: 34
Discharge: HOME | End: 2019-07-14
Attending: EMERGENCY MEDICINE | Admitting: EMERGENCY MEDICINE

## 2019-07-14 VITALS
HEIGHT: 71 IN | SYSTOLIC BLOOD PRESSURE: 109 MMHG | DIASTOLIC BLOOD PRESSURE: 83 MMHG | TEMPERATURE: 97 F | OXYGEN SATURATION: 100 % | HEART RATE: 67 BPM | RESPIRATION RATE: 18 BRPM | BODY MASS INDEX: 24.41 KG/M2

## 2019-07-14 DIAGNOSIS — L50.9 URTICARIA: Primary | ICD-10-CM

## 2019-07-14 PROCEDURE — 96375 TX/PRO/DX INJ NEW DRUG ADDON: CPT

## 2019-07-14 PROCEDURE — 96374 THER/PROPH/DIAG INJ IV PUSH: CPT

## 2019-07-14 PROCEDURE — 25010000002 METHYLPREDNISOLONE PER 125 MG: Performed by: EMERGENCY MEDICINE

## 2019-07-14 PROCEDURE — 99283 EMERGENCY DEPT VISIT LOW MDM: CPT

## 2019-07-14 PROCEDURE — 96372 THER/PROPH/DIAG INJ SC/IM: CPT

## 2019-07-14 PROCEDURE — 25010000002 EPINEPHRINE PER 0.1 MG: Performed by: EMERGENCY MEDICINE

## 2019-07-14 RX ORDER — FAMOTIDINE 10 MG/ML
20 INJECTION, SOLUTION INTRAVENOUS ONCE
Status: COMPLETED | OUTPATIENT
Start: 2019-07-14 | End: 2019-07-14

## 2019-07-14 RX ORDER — SODIUM CHLORIDE 0.9 % (FLUSH) 0.9 %
10 SYRINGE (ML) INJECTION AS NEEDED
Status: DISCONTINUED | OUTPATIENT
Start: 2019-07-14 | End: 2019-07-14 | Stop reason: HOSPADM

## 2019-07-14 RX ORDER — METHYLPREDNISOLONE SODIUM SUCCINATE 125 MG/2ML
125 INJECTION, POWDER, LYOPHILIZED, FOR SOLUTION INTRAMUSCULAR; INTRAVENOUS ONCE
Status: COMPLETED | OUTPATIENT
Start: 2019-07-14 | End: 2019-07-14

## 2019-07-14 RX ORDER — PREDNISONE 20 MG/1
20 TABLET ORAL 2 TIMES DAILY
Qty: 10 TABLET | Refills: 0 | Status: SHIPPED | OUTPATIENT
Start: 2019-07-14 | End: 2019-07-15

## 2019-07-14 RX ADMIN — METHYLPREDNISOLONE SODIUM SUCCINATE 125 MG: 125 INJECTION, POWDER, FOR SOLUTION INTRAMUSCULAR; INTRAVENOUS at 01:46

## 2019-07-14 RX ADMIN — FAMOTIDINE 20 MG: 10 INJECTION INTRAVENOUS at 01:46

## 2019-07-14 RX ADMIN — EPINEPHRINE 0.3 MG: 1 INJECTION, SOLUTION, CONCENTRATE INTRAVENOUS at 01:42

## 2019-07-15 ENCOUNTER — OFFICE VISIT (OUTPATIENT)
Dept: INTERNAL MEDICINE | Facility: CLINIC | Age: 34
End: 2019-07-15

## 2019-07-15 ENCOUNTER — OFFICE VISIT (OUTPATIENT)
Dept: NEUROSURGERY | Facility: CLINIC | Age: 34
End: 2019-07-15

## 2019-07-15 VITALS
HEIGHT: 71 IN | TEMPERATURE: 97.2 F | BODY MASS INDEX: 24.08 KG/M2 | WEIGHT: 172 LBS | SYSTOLIC BLOOD PRESSURE: 120 MMHG | DIASTOLIC BLOOD PRESSURE: 74 MMHG | OXYGEN SATURATION: 98 % | HEART RATE: 56 BPM

## 2019-07-15 VITALS
WEIGHT: 172 LBS | BODY MASS INDEX: 24.08 KG/M2 | SYSTOLIC BLOOD PRESSURE: 120 MMHG | DIASTOLIC BLOOD PRESSURE: 80 MMHG | HEIGHT: 71 IN | HEART RATE: 73 BPM | RESPIRATION RATE: 16 BRPM

## 2019-07-15 DIAGNOSIS — L50.9 URTICARIA: Primary | ICD-10-CM

## 2019-07-15 DIAGNOSIS — K64.9 HEMORRHOIDS, UNSPECIFIED HEMORRHOID TYPE: ICD-10-CM

## 2019-07-15 DIAGNOSIS — C71.9 ASTROCYTOMA (HCC): Primary | ICD-10-CM

## 2019-07-15 DIAGNOSIS — B07.8 OTHER VIRAL WARTS: ICD-10-CM

## 2019-07-15 PROCEDURE — 99024 POSTOP FOLLOW-UP VISIT: CPT | Performed by: NEUROLOGICAL SURGERY

## 2019-07-15 PROCEDURE — 99214 OFFICE O/P EST MOD 30 MIN: CPT | Performed by: NURSE PRACTITIONER

## 2019-07-15 PROCEDURE — 17110 DESTRUCTION B9 LES UP TO 14: CPT | Performed by: NURSE PRACTITIONER

## 2019-07-15 RX ORDER — HYDROCORTISONE ACETATE 25 MG/1
25 SUPPOSITORY RECTAL 2 TIMES DAILY
Qty: 10 SUPPOSITORY | Refills: 0 | Status: SHIPPED | OUTPATIENT
Start: 2019-07-15 | End: 2019-07-20

## 2019-07-15 RX ORDER — RANITIDINE 150 MG/1
150 CAPSULE ORAL 2 TIMES DAILY
Qty: 60 CAPSULE | Refills: 11 | Status: SHIPPED | OUTPATIENT
Start: 2019-07-15 | End: 2019-12-06 | Stop reason: RX

## 2019-07-15 RX ORDER — EPINEPHRINE 1 MG/ML
1 INJECTION, SOLUTION INTRAMUSCULAR; SUBCUTANEOUS ONCE
Qty: 1 ML | Refills: 0 | Status: SHIPPED | OUTPATIENT
Start: 2019-07-15 | End: 2019-07-16

## 2019-07-15 RX ORDER — PREDNISONE 10 MG/1
TABLET ORAL
Qty: 20 TABLET | Refills: 0 | Status: SHIPPED | OUTPATIENT
Start: 2019-07-15 | End: 2019-08-19 | Stop reason: SDUPTHER

## 2019-07-15 RX ORDER — LEVETIRACETAM 1000 MG/1
1000 TABLET ORAL EVERY 12 HOURS
Qty: 60 TABLET | Refills: 11 | Status: SHIPPED | OUTPATIENT
Start: 2019-07-15 | End: 2020-08-03

## 2019-07-15 NOTE — PROGRESS NOTES
Subjective   Patient ID: Bryan Valadez is a 33 y.o. male is here today for follow-up astrocytoma.  Patient a brain MRI 7/5/19 and presents accompanied by his wife and two young daughters.     History of Present Illness 34 yo s/p astrocytoma resection.  He had ocular seizures only by his account.  He denies headaches.  No n/v.      The following portions of the patient's history were reviewed and updated as appropriate: allergies, current medications, past family history, past medical history, past social history, past surgical history and problem list.    Review of Systems   HENT: Negative for tinnitus.    Eyes: Negative for visual disturbance.   Musculoskeletal: Negative for gait problem.   Neurological: Negative for dizziness, tremors, seizures, syncope, speech difficulty, weakness, light-headedness, numbness and headaches.   Psychiatric/Behavioral: Negative for confusion and decreased concentration.       Objective   Physical Exam   Constitutional: He is oriented to person, place, and time.   Neurological: He is oriented to person, place, and time. He has normal strength. Gait normal.     Neurologic Exam     Mental Status   Oriented to person, place, and time.   Registration: recalls 3 of 3 objects. Recall at 5 minutes: recalls 3 of 3 objects.   Level of consciousness: alert    Motor Exam   Muscle bulk: normal  Overall muscle tone: normal    Strength   Strength 5/5 throughout.     Sensory Exam   Right arm light touch: normal  Left arm light touch: normal  Right leg light touch: normal  Left leg light touch: normal  Right arm pinprick: normal  Left arm pinprick: normal  Right leg pinprick: normal  Left leg pinprick: normal    Gait, Coordination, and Reflexes     Gait  Gait: normal    Reflexes   Right Rahman: absent  Left Rahman: absent  Right ankle clonus: absent  Left ankle clonus: absent           Assessment/Plan   Independent Review of Radiographic Studies:    No progression on his imaging.  Resolution of  small focus of increased attention.    Medical Decision Making:  We will continue to follow him closely.  I will refer him to Dr. Bailon w/r/t d/c his AED's.  I suspect he should remain on these for life given his tumor type and extent of resection.      Bryan was seen today for brain tumor.    Diagnoses and all orders for this visit:    Astrocytoma (CMS/HCC)  -     Ambulatory Referral to Neurology      No Follow-up on file.

## 2019-07-16 RX ORDER — EPINEPHRINE 0.3 MG/.3ML
0.3 INJECTION SUBCUTANEOUS ONCE
Qty: 1 EACH | Refills: 0
Start: 2019-07-16 | End: 2019-07-16

## 2019-07-16 RX ORDER — EPINEPHRINE 0.3 MG/.3ML
0.3 INJECTION SUBCUTANEOUS ONCE
Qty: 1 EACH | Refills: 0 | Status: SHIPPED | OUTPATIENT
Start: 2019-07-16 | End: 2019-07-16

## 2019-07-16 NOTE — PROGRESS NOTES
Subjective   Bryan Valadez is a 33 y.o. male who presents due to urticaria. He also c/o rectal irritation.    He was seen in the  for acute onset of urticaria which responded well to prednisone.  He remains on prednisone 10 mg twice daily and has had some exacerbations since his ER visit he denies any changes in diet or exposure to soaps, etc. he does have a history of several food allergies.  He denies d dyspnea or difficulty swallowing.  He has a history of constipation after his Temodar treatments despite using MiraLAX.  He complains of a rectal lesion which is worse after painful bowel movements.  He denies rectal bleeding.  He also complains of a lesion on his right forearm which is nonpainful.      Urticaria   This is a new problem. The current episode started in the past 7 days. The problem has been waxing and waning since onset. The rash is diffuse. The rash is characterized by itchiness and redness. He was exposed to nothing. Pertinent negatives include no congestion, cough, fatigue, fever, sore throat or vomiting. Past treatments include antihistamine and oral steroids. The treatment provided moderate relief. His past medical history is significant for allergies.        The following portions of the patient's history were reviewed and updated as appropriate: allergies, current medications, past social history and problem list.    Past Medical History:   Diagnosis Date   • Allergic rhinitis    • Asthma     Pulmonary Dr. Alexandra   • Chest pain    • DVT (deep venous thrombosis) (CMS/[facility]) 06/2018    LEFT LEG   • Factor V Leiden (CMS/[facility])    • GERD (gastroesophageal reflux disease)    • H/O echocardiogram 2006   • Headache    • History of ETT    • History of Holter monitoring    • Hyperlipidemia    • PE (pulmonary thromboembolism) (CMS/[facility]) 06/2018    AFTER CRANIOTOMY   • Primary brain tumor (CMS/[facility]) 2018         Current Outpatient Medications:   •  ALPRAZolam (XANAX) 0.5 MG tablet, Take 1 tablet by  mouth 2 (Two) Times a Day As Needed for Anxiety., Disp: 20 tablet, Rfl: 0  •  baclofen (LIORESAL) 10 MG tablet, TAKE 1 TABLET BY MOUTH THREE TIMES DAILY AS NEEDED FOR MUSCLE SPASMS, Disp: 270 tablet, Rfl: 1  •  montelukast (SINGULAIR) 10 MG tablet, TAKE 1 TABLET BY MOUTH EVERY DAY( NEED APPOINTMENT), Disp: 90 tablet, Rfl: 3  •  omeprazole (PriLOSEC) 20 MG capsule, Take 20 mg by mouth Daily As Needed., Disp: , Rfl:   •  ondansetron (ZOFRAN) 8 MG tablet, Take 1 tablet by mouth Every 12 (Twelve) Hours As Needed for Nausea., Disp: 60 tablet, Rfl: 4  •  sulfamethoxazole-trimethoprim (BACTRIM DS,SEPTRA DS) 800-160 MG per tablet, TAKE 1 TABLET BY MOUTH THREE TIMES DAILY ON MONDAY, WEDNESDAY, AND FRIDAY., Disp: 36 tablet, Rfl: 1  •  temozolomide (TEMODAR) 100 MG chemo capsule, TAKE 4 CAPSULES BY MOUTH ON DAYS 1-5 EVERY 28 DAYS, Disp: 20 capsule, Rfl: 1  •  warfarin (COUMADIN) 5 MG tablet, Take 10 mg by mouth Mon,Wed,Fri and 7.5mg Sun,Tues,Thurs,Sat unless directed otherwise by MD, Disp: 60 tablet, Rfl: 5  •  EPINEPHrine (EPIPEN 2-JOHANNA) 0.3 MG/0.3ML solution auto-injector injection, Inject 0.3 mL under the skin into the appropriate area as directed 1 (One) Time for 1 dose., Disp: 1 each, Rfl: 0  •  hydrocortisone (ANUSOL-HC) 25 MG suppository, Insert 1 suppository into the rectum 2 (Two) Times a Day for 5 days., Disp: 10 suppository, Rfl: 0  •  levETIRAcetam (KEPPRA) 1000 MG tablet, Take 1 tablet by mouth Every 12 (Twelve) Hours., Disp: 60 tablet, Rfl: 11  •  predniSONE (DELTASONE) 10 MG tablet, 1 tab tid x3 days then 1 tab bid x3 days then 1 tab qd x5 days, Disp: 20 tablet, Rfl: 0  •  ranitidine (ZANTAC) 150 MG capsule, Take 1 capsule by mouth 2 (Two) Times a Day., Disp: 60 capsule, Rfl: 11    Allergies   Allergen Reactions   • Avocado Itching   • Other Itching     Insect stings: Bee stings, throat swelling (has Epi pen)    Allergy to fruit, Avocado, Cherry Tomato (can have blue berries)   • Tomato Itching     Cherry tomato  "      Review of Systems   Constitutional: Negative for chills, fatigue, fever and unexpected weight change.   HENT: Negative for congestion, ear pain, postnasal drip, sinus pressure, sore throat and trouble swallowing.    Eyes: Negative for visual disturbance.   Respiratory: Negative for cough, chest tightness and wheezing.    Cardiovascular: Negative for chest pain, palpitations and leg swelling.   Gastrointestinal: Positive for constipation. Negative for abdominal pain, blood in stool, nausea and vomiting.   Genitourinary: Negative for dysuria, frequency and urgency.   Musculoskeletal: Negative for arthralgias and joint swelling.   Skin: Positive for rash. Negative for color change.   Neurological: Negative for syncope, weakness and headaches.   Hematological: Does not bruise/bleed easily.       Objective   Vitals:    07/15/19 0815   BP: 120/74   BP Location: Left arm   Patient Position: Sitting   Cuff Size: Adult   Pulse: 56   Temp: 97.2 °F (36.2 °C)   TempSrc: Oral   SpO2: 98%   Weight: 78 kg (172 lb)   Height: 180.3 cm (71\")     Physical Exam   Constitutional: He appears well-developed and well-nourished. He is cooperative. He does not have a sickly appearance. He does not appear ill.   HENT:   Head: Normocephalic.   Right Ear: Hearing, tympanic membrane and external ear normal.   Left Ear: Hearing, tympanic membrane and external ear normal.   Nose: Nose normal. No mucosal edema, rhinorrhea, sinus tenderness or nasal deformity. Right sinus exhibits no maxillary sinus tenderness and no frontal sinus tenderness. Left sinus exhibits no maxillary sinus tenderness and no frontal sinus tenderness.   Mouth/Throat: Oropharynx is clear and moist and mucous membranes are normal. Normal dentition.   Eyes: Conjunctivae and lids are normal. Right eye exhibits no discharge and no exudate. Left eye exhibits no discharge and no exudate.   Neck: Trachea normal. Carotid bruit is not present. No edema present. No thyroid mass " present.   Cardiovascular: Regular rhythm, normal heart sounds and normal pulses.   No murmur heard.  Pulmonary/Chest: Breath sounds normal. No respiratory distress. He has no decreased breath sounds. He has no wheezes. He has no rhonchi. He has no rales.   Genitourinary: Rectal exam shows external hemorrhoid.   Lymphadenopathy:        Head (right side): No submental, no submandibular, no tonsillar, no preauricular, no posterior auricular and no occipital adenopathy present.        Head (left side): No submental, no submandibular, no tonsillar, no preauricular, no posterior auricular and no occipital adenopathy present.   Neurological: He is alert.   Skin: Skin is warm, dry and intact. Lesion (flesh colored papule right forearm ) and rash noted. Rash is urticarial (bilateral lower extremities). No cyanosis. Nails show no clubbing.       Assessment/Plan   Bryan was seen today for urticaria.    Diagnoses and all orders for this visit:    Urticaria  -     Discontinue: EPINEPHrine, Anaphylaxis, (ADRENALIN) 1 MG/ML injection; Inject 1 mL under the skin into the appropriate area as directed 1 (One) Time for 1 dose.  -     Ambulatory Referral to Allergy  -     ranitidine (ZANTAC) 150 MG capsule; Take 1 capsule by mouth 2 (Two) Times a Day.  -     predniSONE (DELTASONE) 10 MG tablet; 1 tab tid x3 days then 1 tab bid x3 days then 1 tab qd x5 days  -     Discontinue: EPINEPHrine (EPIPEN 2-JOHANNA) 0.3 MG/0.3ML solution auto-injector injection; Inject 0.3 mL under the skin into the appropriate area as directed 1 (One) Time for 1 dose.  -     EPINEPHrine (EPIPEN 2-JOHANNA) 0.3 MG/0.3ML solution auto-injector injection; Inject 0.3 mL under the skin into the appropriate area as directed 1 (One) Time for 1 dose.    Hemorrhoids, unspecified hemorrhoid type  -     hydrocortisone (ANUSOL-HC) 25 MG suppository; Insert 1 suppository into the rectum 2 (Two) Times a Day for 5 days.    Other viral warts    Discussed urticaria and multiple  etiologies, he has a history of environmental and food allergies.  He will be referred to allergist for further evaluation.  In the meantime he will start Ranitidine and continue Prednisone for short period of time.  He is taking Bactrim 3 times a week and will monitor if rash worsens after a dose of this antibiotic.  No previous history of sulfa allergy.  Cryotherapy performed x3 on lesion of right forearm.  Band-Aid applied to area which he will keep clean and dry until the lesion forms of blister and falls off.  At that point he may use over-the-counter antiviral treatments if needed.          Satisfactory

## 2019-07-24 ENCOUNTER — TELEPHONE (OUTPATIENT)
Dept: INTERNAL MEDICINE | Facility: CLINIC | Age: 34
End: 2019-07-24

## 2019-07-24 NOTE — TELEPHONE ENCOUNTER
Varun says Epinephrine on long term back order and can not obtain medication, can you provide alternative one ?  Last OV with you 07/15    Thanks

## 2019-07-24 NOTE — TELEPHONE ENCOUNTER
Jv, pharmacist at [facility] left a voicemail.  He said Epinephrine is on long term backorder, but he had a twin pack available of generic epipens, so he will fill that.

## 2019-07-29 ENCOUNTER — OFFICE VISIT (OUTPATIENT)
Dept: INTERNAL MEDICINE | Facility: CLINIC | Age: 34
End: 2019-07-29

## 2019-07-29 ENCOUNTER — TELEPHONE (OUTPATIENT)
Dept: ONCOLOGY | Facility: CLINIC | Age: 34
End: 2019-07-29

## 2019-07-29 ENCOUNTER — DOCUMENTATION (OUTPATIENT)
Dept: ONCOLOGY | Facility: CLINIC | Age: 34
End: 2019-07-29

## 2019-07-29 ENCOUNTER — TELEPHONE (OUTPATIENT)
Dept: NEUROLOGY | Facility: CLINIC | Age: 34
End: 2019-07-29

## 2019-07-29 VITALS
TEMPERATURE: 97.9 F | HEART RATE: 66 BPM | SYSTOLIC BLOOD PRESSURE: 118 MMHG | WEIGHT: 177 LBS | OXYGEN SATURATION: 98 % | HEIGHT: 71 IN | DIASTOLIC BLOOD PRESSURE: 78 MMHG | BODY MASS INDEX: 24.78 KG/M2

## 2019-07-29 DIAGNOSIS — B02.9 HERPES ZOSTER WITHOUT COMPLICATION: Primary | ICD-10-CM

## 2019-07-29 PROCEDURE — 99214 OFFICE O/P EST MOD 30 MIN: CPT | Performed by: NURSE PRACTITIONER

## 2019-07-29 RX ORDER — DIAPER,BRIEF,INFANT-TODD,DISP
EACH MISCELLANEOUS 2 TIMES DAILY
COMMUNITY
End: 2019-12-16

## 2019-07-29 RX ORDER — VALACYCLOVIR HYDROCHLORIDE 1 G/1
1000 TABLET, FILM COATED ORAL 3 TIMES DAILY
Qty: 21 TABLET | Refills: 0 | Status: SHIPPED | OUTPATIENT
Start: 2019-07-29 | End: 2019-08-05

## 2019-07-29 RX ORDER — GABAPENTIN 300 MG/1
CAPSULE ORAL
Qty: 30 CAPSULE | Refills: 0 | Status: SHIPPED | OUTPATIENT
Start: 2019-07-29 | End: 2019-08-08 | Stop reason: SDUPTHER

## 2019-07-29 RX ORDER — PREDNISOLONE ACETATE 10 MG/ML
1 SUSPENSION/ DROPS OPHTHALMIC 4 TIMES DAILY
COMMUNITY
End: 2019-09-25

## 2019-07-29 NOTE — TELEPHONE ENCOUNTER
PCP dx him with shingles and started him on famvir. Wants to make sure it is ok to start Temodar on 8/4 as scheduled. Discussed with NP advised me to in basket message Dr Jefferson. Instructed patient to call back on Thursday if has not heard from us. Verbalized understanding.

## 2019-07-29 NOTE — PROGRESS NOTES
Patient's primary physician called today.  Patient has shingles.  Patient has been started on Famvir 500 mg p.o. 3 times daily.  Roshni Galvez MD

## 2019-07-29 NOTE — TELEPHONE ENCOUNTER
----- Message from Sharon Martinez RN sent at 7/29/2019  9:32 AM EDT -----  Regarding: pt was positive for shingles  Deb,    This patient is a Dr. Dobbins patient.   He states he has just left his PCP office who has diagnosis him with shingles.  Office note is in Epic  His pcp is managing his treatment but instructed the patient to call his oncologist (Dr. Dobbins) to notify him.     He was calling just to let Dr. Dobbins know.     Thank you

## 2019-07-30 NOTE — PROGRESS NOTES
Subjective   Bryan Valadez is a 33 y.o. male who presents due to a rash.    He was out of town last week when he noticed an erythematous rash on the left side of his face which has been painful. He c/o dryness and irritation but denies visual changes, started applying Prednisone and Refresh eye drops with little improvement.  He denies any additional episodes of urticaria.  He is scheduled to receive Temodar capsule 8/4/19.      Rash   This is a new problem. The current episode started in the past 7 days. The problem has been gradually worsening since onset. The affected locations include the face and scalp. The rash is characterized by redness and pain. He was exposed to nothing. Pertinent negatives include no congestion, cough, eye pain, facial edema, fatigue, fever, sore throat or vomiting. Treatments tried: Prednisone eye drops, Refresh eye drops. The treatment provided no relief.        The following portions of the patient's history were reviewed and updated as appropriate: allergies, current medications, past social history and problem list.    Past Medical History:   Diagnosis Date   • Allergic rhinitis    • Asthma     Pulmonary Dr. Alexandra   • Chest pain    • DVT (deep venous thrombosis) (CMS/[facility]) 06/2018    LEFT LEG   • Factor V Leiden (CMS/[facility])    • GERD (gastroesophageal reflux disease)    • H/O echocardiogram 2006   • Headache    • History of ETT    • History of Holter monitoring    • Hyperlipidemia    • PE (pulmonary thromboembolism) (CMS/[facility]) 06/2018    AFTER CRANIOTOMY   • Primary brain tumor (CMS/[facility]) 2018         Current Outpatient Medications:   •  ALPRAZolam (XANAX) 0.5 MG tablet, Take 1 tablet by mouth 2 (Two) Times a Day As Needed for Anxiety., Disp: 20 tablet, Rfl: 0  •  baclofen (LIORESAL) 10 MG tablet, TAKE 1 TABLET BY MOUTH THREE TIMES DAILY AS NEEDED FOR MUSCLE SPASMS, Disp: 270 tablet, Rfl: 1  •  Carboxymethylcellulose Sodium (REFRESH LIQUIGEL OP), Apply  to eye(s) as directed by  provider., Disp: , Rfl:   •  hydrocortisone (CORTIZONE-10) 1 % ointment, Apply  topically to the appropriate area as directed 2 (Two) Times a Day., Disp: , Rfl:   •  levETIRAcetam (KEPPRA) 1000 MG tablet, Take 1 tablet by mouth Every 12 (Twelve) Hours., Disp: 60 tablet, Rfl: 11  •  montelukast (SINGULAIR) 10 MG tablet, TAKE 1 TABLET BY MOUTH EVERY DAY( NEED APPOINTMENT), Disp: 90 tablet, Rfl: 3  •  omeprazole (PriLOSEC) 20 MG capsule, Take 20 mg by mouth Daily As Needed., Disp: , Rfl:   •  ondansetron (ZOFRAN) 8 MG tablet, Take 1 tablet by mouth Every 12 (Twelve) Hours As Needed for Nausea., Disp: 60 tablet, Rfl: 4  •  prednisoLONE acetate (PRED FORTE) 1 % ophthalmic suspension, 1 drop 4 (Four) Times a Day., Disp: , Rfl:   •  ranitidine (ZANTAC) 150 MG capsule, Take 1 capsule by mouth 2 (Two) Times a Day., Disp: 60 capsule, Rfl: 11  •  sulfamethoxazole-trimethoprim (BACTRIM DS,SEPTRA DS) 800-160 MG per tablet, TAKE 1 TABLET BY MOUTH THREE TIMES DAILY ON MONDAY, WEDNESDAY, AND FRIDAY., Disp: 36 tablet, Rfl: 1  •  temozolomide (TEMODAR) 100 MG chemo capsule, TAKE 4 CAPSULES BY MOUTH ON DAYS 1-5 EVERY 28 DAYS, Disp: 20 capsule, Rfl: 1  •  warfarin (COUMADIN) 5 MG tablet, Take 10 mg by mouth Mon,Wed,Fri and 7.5mg Sun,Tues,Thurs,Sat unless directed otherwise by MD, Disp: 60 tablet, Rfl: 5  •  gabapentin (NEURONTIN) 300 MG capsule, May start with once daily and increase to bid-tid as needed, Disp: 30 capsule, Rfl: 0  •  valACYclovir (VALTREX) 1000 MG tablet, Take 1 tablet by mouth 3 (Three) Times a Day for 7 days., Disp: 21 tablet, Rfl: 0    Allergies   Allergen Reactions   • Avocado Itching   • Other Itching     Insect stings: Bee stings, throat swelling (has Epi pen)    Allergy to fruit, Avocado, Cherry Tomato (can have blue berries)   • Tomato Itching     Cherry tomato       Review of Systems   Constitutional: Negative for chills, fatigue, fever and unexpected weight change.   HENT: Negative for congestion, ear pain,  "postnasal drip, sinus pressure, sore throat and trouble swallowing.    Eyes: Positive for redness and itching. Negative for pain and visual disturbance.   Respiratory: Negative for cough, chest tightness and wheezing.    Cardiovascular: Negative for chest pain, palpitations and leg swelling.   Gastrointestinal: Negative for abdominal pain, blood in stool, nausea and vomiting.   Genitourinary: Negative for dysuria, frequency and urgency.   Musculoskeletal: Negative for arthralgias and joint swelling.   Skin: Positive for rash. Negative for color change.   Neurological: Negative for syncope, weakness and headaches.   Hematological: Does not bruise/bleed easily.       Objective   Vitals:    07/29/19 0805   BP: 118/78   BP Location: Left arm   Patient Position: Sitting   Cuff Size: Adult   Pulse: 66   Temp: 97.9 °F (36.6 °C)   TempSrc: Oral   SpO2: 98%   Weight: 80.3 kg (177 lb)   Height: 180.3 cm (71\")     Physical Exam   Constitutional: He appears well-developed and well-nourished. He is cooperative. He does not have a sickly appearance. He does not appear ill.   HENT:   Head: Normocephalic.   Right Ear: Hearing, tympanic membrane and external ear normal.   Left Ear: Hearing, tympanic membrane and external ear normal.   Nose: Nose normal. No mucosal edema, rhinorrhea, sinus tenderness or nasal deformity. Right sinus exhibits no maxillary sinus tenderness and no frontal sinus tenderness. Left sinus exhibits no maxillary sinus tenderness and no frontal sinus tenderness.   Mouth/Throat: Oropharynx is clear and moist and mucous membranes are normal. Normal dentition.   Eyes: Conjunctivae and lids are normal. Right eye exhibits no discharge and no exudate. Left eye exhibits no discharge and no exudate.   Neck: Trachea normal. Carotid bruit is not present. No edema present. No thyroid mass present.   Cardiovascular: Regular rhythm, normal heart sounds and normal pulses.   No murmur heard.  Pulmonary/Chest: Breath sounds " normal. No respiratory distress. He has no decreased breath sounds. He has no wheezes. He has no rhonchi. He has no rales.   Lymphadenopathy:        Head (right side): No submental, no submandibular, no tonsillar, no preauricular, no posterior auricular and no occipital adenopathy present.        Head (left side): No submental, no submandibular, no tonsillar, no preauricular, no posterior auricular and no occipital adenopathy present.   Neurological: He is alert.   Skin: Skin is warm, dry and intact. Rash noted. Rash is vesicular. No cyanosis. Nails show no clubbing.   Erythematous vesicular rash on left side of face and left eyelid extending in a dermatomal distribution       Assessment/Plan   Bryan was seen today for eye problem and rash.    Diagnoses and all orders for this visit:    Herpes zoster without complication  -     valACYclovir (VALTREX) 1000 MG tablet; Take 1 tablet by mouth 3 (Three) Times a Day for 7 days.  -     gabapentin (NEURONTIN) 300 MG capsule; May start with once daily and increase to bid-tid as needed  -     Ambulatory Referral to Ophthalmology    Discussed avoiding close contact with individuals without hx of chicken pox and/or those immunocompromised. He has been referred to Dr. Flaherty who will work him in today for further evaluation. He has a f/u appt Fri with CBC for labs.  He will start Gabapentin for pain control, will titrate dosage as needed.    GUIDO query complete. Treatment plan to include limited course of prescribed  controlled substance. Risks including addiction, benefits, and alternatives presented to patient.     5:00pm: Discussed appt with Dr. Flaherty from earlier today. His eye is clear on exam today. He was given Famvir by Dr. Flaherty, advised to hold Valtrex and take Famvir instead.

## 2019-08-01 ENCOUNTER — DOCUMENTATION (OUTPATIENT)
Dept: ONCOLOGY | Facility: CLINIC | Age: 34
End: 2019-08-01

## 2019-08-01 NOTE — PROGRESS NOTES
I called the patient today to discuss recent diagnosis of shingles and Temodar dosing.  Patient reports he initiated Famvir which will be taken for 2 weeks initiated 7/29/2019 for shingles of his left scalp and occipital region.  He reports his shingles lesions are crusting over, there are no new lesions. Though he does continue to have open lesions.     He is scheduled to initiate his next cycle of Temodar Sunday, 8/4/2019.  Given that he will not have completed antivirals and continues to have lesions, we will delay the initiation of Temodar to Sunday, 8/11/2019.  He will return tomorrow as scheduled for CBC, CMP and INR monitoring.  We will monitor his INR closely with the recent initiation of Famvir and gabapentin.    Patient voiced understanding in regards to his Temodar dosing which will remain day 1 through 5 of a 28-day cycle.    Radha Bateman, APRN  08/01/2019

## 2019-08-02 ENCOUNTER — LAB (OUTPATIENT)
Dept: OTHER | Facility: HOSPITAL | Age: 34
End: 2019-08-02

## 2019-08-02 ENCOUNTER — CLINICAL SUPPORT (OUTPATIENT)
Dept: ONCOLOGY | Facility: HOSPITAL | Age: 34
End: 2019-08-02

## 2019-08-02 VITALS
HEART RATE: 70 BPM | DIASTOLIC BLOOD PRESSURE: 78 MMHG | OXYGEN SATURATION: 99 % | RESPIRATION RATE: 16 BRPM | WEIGHT: 176.4 LBS | TEMPERATURE: 98.4 F | SYSTOLIC BLOOD PRESSURE: 124 MMHG | BODY MASS INDEX: 24.6 KG/M2

## 2019-08-02 DIAGNOSIS — C71.1 ANAPLASTIC ASTROCYTOMA OF FRONTAL LOBE (HCC): ICD-10-CM

## 2019-08-02 DIAGNOSIS — Z86.711 HISTORY OF PULMONARY EMBOLUS (PE): ICD-10-CM

## 2019-08-02 LAB
ALBUMIN SERPL-MCNC: 4.5 G/DL (ref 3.5–5.2)
ALBUMIN/GLOB SERPL: 1.9 G/DL
ALP SERPL-CCNC: 57 U/L (ref 39–117)
ALT SERPL W P-5'-P-CCNC: 26 U/L (ref 1–41)
ANION GAP SERPL CALCULATED.3IONS-SCNC: 6 MMOL/L (ref 5–15)
AST SERPL-CCNC: 17 U/L (ref 1–40)
BASOPHILS # BLD AUTO: 0.03 10*3/MM3 (ref 0–0.2)
BASOPHILS NFR BLD AUTO: 0.8 % (ref 0–1.5)
BILIRUB SERPL-MCNC: 0.3 MG/DL (ref 0.1–1.2)
BUN BLD-MCNC: 11 MG/DL (ref 6–20)
BUN/CREAT SERPL: 10.6 (ref 7–25)
CALCIUM SPEC-SCNC: 9.2 MG/DL (ref 8.6–10.5)
CHLORIDE SERPL-SCNC: 104 MMOL/L (ref 98–107)
CO2 SERPL-SCNC: 30 MMOL/L (ref 22–29)
CREAT BLD-MCNC: 1.04 MG/DL (ref 0.76–1.27)
DEPRECATED RDW RBC AUTO: 41.4 FL (ref 37–54)
EOSINOPHIL # BLD AUTO: 0.12 10*3/MM3 (ref 0–0.4)
EOSINOPHIL NFR BLD AUTO: 3.2 % (ref 0.3–6.2)
ERYTHROCYTE [DISTWIDTH] IN BLOOD BY AUTOMATED COUNT: 12.6 % (ref 12.3–15.4)
GFR SERPL CREATININE-BSD FRML MDRD: 81 ML/MIN/1.73
GLOBULIN UR ELPH-MCNC: 2.4 GM/DL
GLUCOSE BLD-MCNC: 96 MG/DL (ref 65–99)
HCT VFR BLD AUTO: 45.2 % (ref 37.5–51)
HGB BLD-MCNC: 15.1 G/DL (ref 13–17.7)
IMM GRANULOCYTES # BLD AUTO: 0.03 10*3/MM3 (ref 0–0.05)
IMM GRANULOCYTES NFR BLD AUTO: 0.8 % (ref 0–0.5)
INR PPP: 2.9
LYMPHOCYTES # BLD AUTO: 1.64 10*3/MM3 (ref 0.7–3.1)
LYMPHOCYTES NFR BLD AUTO: 44.1 % (ref 19.6–45.3)
MCH RBC QN AUTO: 30.1 PG (ref 26.6–33)
MCHC RBC AUTO-ENTMCNC: 33.4 G/DL (ref 31.5–35.7)
MCV RBC AUTO: 90.2 FL (ref 79–97)
MONOCYTES # BLD AUTO: 0.42 10*3/MM3 (ref 0.1–0.9)
MONOCYTES NFR BLD AUTO: 11.3 % (ref 5–12)
NEUTROPHILS # BLD AUTO: 1.48 10*3/MM3 (ref 1.7–7)
NEUTROPHILS NFR BLD AUTO: 39.8 % (ref 42.7–76)
NRBC BLD AUTO-RTO: 0 /100 WBC (ref 0–0.2)
PLAT MORPH BLD: NORMAL
PLATELET # BLD AUTO: 144 10*3/MM3 (ref 140–450)
PMV BLD AUTO: 9.3 FL (ref 6–12)
POTASSIUM BLD-SCNC: 4.3 MMOL/L (ref 3.5–5.2)
PROT SERPL-MCNC: 6.9 G/DL (ref 6–8.5)
PROTHROMBIN TIME: 34.6 SECONDS (ref 11–15)
RBC # BLD AUTO: 5.01 10*6/MM3 (ref 4.14–5.8)
RBC MORPH BLD: NORMAL
SODIUM BLD-SCNC: 140 MMOL/L (ref 136–145)
WBC MORPH BLD: NORMAL
WBC NRBC COR # BLD: 3.72 10*3/MM3 (ref 3.4–10.8)

## 2019-08-02 PROCEDURE — 36415 COLL VENOUS BLD VENIPUNCTURE: CPT

## 2019-08-02 PROCEDURE — 85025 COMPLETE CBC W/AUTO DIFF WBC: CPT | Performed by: INTERNAL MEDICINE

## 2019-08-02 PROCEDURE — 85610 PROTHROMBIN TIME: CPT

## 2019-08-02 PROCEDURE — 80053 COMPREHEN METABOLIC PANEL: CPT | Performed by: INTERNAL MEDICINE

## 2019-08-02 PROCEDURE — 85007 BL SMEAR W/DIFF WBC COUNT: CPT | Performed by: INTERNAL MEDICINE

## 2019-08-02 RX ORDER — TEMOZOLOMIDE 100 MG/1
CAPSULE ORAL
Qty: 20 CAPSULE | Refills: 1 | Status: SHIPPED | OUTPATIENT
Start: 2019-08-02 | End: 2019-09-26 | Stop reason: SDUPTHER

## 2019-08-02 NOTE — TELEPHONE ENCOUNTER
Temodar refill request rec electronically from [facility]. Per last office note pt will continue. Request approved.

## 2019-08-02 NOTE — PROGRESS NOTES
Arrived per ambulatory for nurse review of CBC, CMP, PT/INR   All reviewed and copies of same given. Due to the fact that his coumadin dosing has changed and that he is now on Famvir and gabepentin, he has been scheduled for INR/PT in 2 weeks. He states that he knows to call for questions/ concerns.     Lab Results   Component Value Date    GLUCOSE 96 08/02/2019    BUN 11 08/02/2019    CREATININE 1.04 08/02/2019    EGFRIFNONA 81 08/02/2019    EGFRIFAFRI 138 03/29/2016    BCR 10.6 08/02/2019    K 4.3 08/02/2019    CO2 30.0 (H) 08/02/2019    CALCIUM 9.2 08/02/2019    PROTENTOTREF 7.7 03/29/2016    ALBUMIN 4.50 08/02/2019    LABIL2 1.8 03/29/2016    AST 17 08/02/2019    ALT 26 08/02/2019       WBC   Date Value Ref Range Status   08/02/2019 3.72 3.40 - 10.80 10*3/mm3 Final     RBC   Date Value Ref Range Status   08/02/2019 5.01 4.14 - 5.80 10*6/mm3 Final     Hemoglobin   Date Value Ref Range Status   08/02/2019 15.1 13.0 - 17.7 g/dL Final     Hematocrit   Date Value Ref Range Status   08/02/2019 45.2 37.5 - 51.0 % Final     MCV   Date Value Ref Range Status   08/02/2019 90.2 79.0 - 97.0 fL Final     MCH   Date Value Ref Range Status   08/02/2019 30.1 26.6 - 33.0 pg Final     MCHC   Date Value Ref Range Status   08/02/2019 33.4 31.5 - 35.7 g/dL Final     RDW   Date Value Ref Range Status   08/02/2019 12.6 12.3 - 15.4 % Final     RDW-SD   Date Value Ref Range Status   08/02/2019 41.4 37.0 - 54.0 fl Final     MPV   Date Value Ref Range Status   08/02/2019 9.3 6.0 - 12.0 fL Final     Platelets   Date Value Ref Range Status   08/02/2019 144 140 - 450 10*3/mm3 Final     Neutrophil %   Date Value Ref Range Status   08/02/2019 39.8 (L) 42.7 - 76.0 % Final     Lymphocyte %   Date Value Ref Range Status   08/02/2019 44.1 19.6 - 45.3 % Final     Monocyte %   Date Value Ref Range Status   08/02/2019 11.3 5.0 - 12.0 % Final     Eosinophil %   Date Value Ref Range Status   08/02/2019 3.2 0.3 - 6.2 % Final     Basophil %   Date Value  Ref Range Status   08/02/2019 0.8 0.0 - 1.5 % Final     Immature Grans %   Date Value Ref Range Status   08/02/2019 0.8 (H) 0.0 - 0.5 % Final     Neutrophils, Absolute   Date Value Ref Range Status   08/02/2019 1.48 (L) 1.70 - 7.00 10*3/mm3 Final     Lymphocytes, Absolute   Date Value Ref Range Status   08/02/2019 1.64 0.70 - 3.10 10*3/mm3 Final     Monocytes, Absolute   Date Value Ref Range Status   08/02/2019 0.42 0.10 - 0.90 10*3/mm3 Final     Eosinophils, Absolute   Date Value Ref Range Status   08/02/2019 0.12 0.00 - 0.40 10*3/mm3 Final     Basophils, Absolute   Date Value Ref Range Status   08/02/2019 0.03 0.00 - 0.20 10*3/mm3 Final     Immature Grans, Absolute   Date Value Ref Range Status   08/02/2019 0.03 0.00 - 0.05 10*3/mm3 Final     nRBC   Date Value Ref Range Status   08/02/2019 0.0 0.0 - 0.2 /100 WBC Final   c

## 2019-08-08 DIAGNOSIS — B02.9 HERPES ZOSTER WITHOUT COMPLICATION: ICD-10-CM

## 2019-08-08 RX ORDER — GABAPENTIN 300 MG/1
300 CAPSULE ORAL 3 TIMES DAILY
Qty: 45 CAPSULE | Refills: 0 | Status: SHIPPED | OUTPATIENT
Start: 2019-08-08 | End: 2019-09-25

## 2019-08-09 ENCOUNTER — TELEPHONE (OUTPATIENT)
Dept: NEUROSURGERY | Facility: CLINIC | Age: 34
End: 2019-08-09

## 2019-08-09 NOTE — TELEPHONE ENCOUNTER
Pt has shingles. The shingles are on his face. They were on his eyes but not on eye balls. The shingles are on the left side where he had his surgery. Someone told him that it can infect his brain.  He would like to know if he needs to do any imaging.

## 2019-08-09 NOTE — TELEPHONE ENCOUNTER
Will address with C Monday.  Patient states that eye got swollen and went to PCP who dx him with shingles.  States that he saw Dr. Flaherty (optometry) within 30 minutes of PCP appt who confirmed no shingles in eye, just around eye causing irritation. Has improved with valtrex. Just concerned d/t friend telling him that their father had shingles go to their brain.  Voiced understanding C not in office until Monday, if any new neurologic symptoms occur over weekend will go to ER.

## 2019-08-12 NOTE — TELEPHONE ENCOUNTER
Per JSC, no need for new imaging unless fever, increased seizure activity, neurologic changes.  Patient states he has not had any of these things, will call office or go to ER if does.  States he has only one scab on his eye lid.  Reminded to use good hand hygiene until all scabs fall off.

## 2019-08-16 ENCOUNTER — CLINICAL SUPPORT (OUTPATIENT)
Dept: ONCOLOGY | Facility: HOSPITAL | Age: 34
End: 2019-08-16

## 2019-08-16 ENCOUNTER — LAB (OUTPATIENT)
Dept: OTHER | Facility: HOSPITAL | Age: 34
End: 2019-08-16

## 2019-08-16 VITALS
BODY MASS INDEX: 25.05 KG/M2 | WEIGHT: 179.6 LBS | HEART RATE: 60 BPM | SYSTOLIC BLOOD PRESSURE: 110 MMHG | OXYGEN SATURATION: 99 % | DIASTOLIC BLOOD PRESSURE: 70 MMHG | RESPIRATION RATE: 16 BRPM | TEMPERATURE: 98 F

## 2019-08-16 DIAGNOSIS — C71.1 ANAPLASTIC ASTROCYTOMA OF FRONTAL LOBE (HCC): ICD-10-CM

## 2019-08-16 DIAGNOSIS — Z86.711 HISTORY OF PULMONARY EMBOLUS (PE): ICD-10-CM

## 2019-08-16 LAB
INR PPP: 1.7
PROTHROMBIN TIME: 19.9 SECONDS (ref 11–15)

## 2019-08-16 PROCEDURE — 85610 PROTHROMBIN TIME: CPT

## 2019-08-16 NOTE — PROGRESS NOTES
Arrived per ambulatory for PT/INR review. See coag assessment. To return 9-3 for labs with RN review. Instructed that he was to have CBC,CMP, PT/INR. Left unit stable. Of note that his shingles no longer apparent. He is back on his Temodar and took the 5th dose of the 28 days.

## 2019-08-18 ENCOUNTER — HOSPITAL ENCOUNTER (EMERGENCY)
Facility: HOSPITAL | Age: 34
Discharge: HOME | End: 2019-08-18
Attending: EMERGENCY MEDICINE | Admitting: EMERGENCY MEDICINE

## 2019-08-18 VITALS
DIASTOLIC BLOOD PRESSURE: 68 MMHG | TEMPERATURE: 96.5 F | BODY MASS INDEX: 24.5 KG/M2 | HEIGHT: 71 IN | RESPIRATION RATE: 16 BRPM | OXYGEN SATURATION: 100 % | SYSTOLIC BLOOD PRESSURE: 107 MMHG | WEIGHT: 175 LBS | HEART RATE: 66 BPM

## 2019-08-18 DIAGNOSIS — R21 RASH: ICD-10-CM

## 2019-08-18 DIAGNOSIS — T78.40XA ALLERGIC REACTION, INITIAL ENCOUNTER: Primary | ICD-10-CM

## 2019-08-18 LAB
ALBUMIN SERPL-MCNC: 4.3 G/DL (ref 3.5–5.2)
ALBUMIN/GLOB SERPL: 1.7 G/DL
ALP SERPL-CCNC: 54 U/L (ref 39–117)
ALT SERPL W P-5'-P-CCNC: 24 U/L (ref 1–41)
ANION GAP SERPL CALCULATED.3IONS-SCNC: 10.2 MMOL/L (ref 5–15)
AST SERPL-CCNC: 21 U/L (ref 1–40)
BASOPHILS # BLD AUTO: 0.02 10*3/MM3 (ref 0–0.2)
BASOPHILS NFR BLD AUTO: 0.6 % (ref 0–1.5)
BILIRUB SERPL-MCNC: 0.2 MG/DL (ref 0.2–1.2)
BUN BLD-MCNC: 12 MG/DL (ref 6–20)
BUN/CREAT SERPL: 13.5 (ref 7–25)
CALCIUM SPEC-SCNC: 8.7 MG/DL (ref 8.6–10.5)
CHLORIDE SERPL-SCNC: 107 MMOL/L (ref 98–107)
CO2 SERPL-SCNC: 23.8 MMOL/L (ref 22–29)
CREAT BLD-MCNC: 0.89 MG/DL (ref 0.76–1.27)
DEPRECATED RDW RBC AUTO: 40.9 FL (ref 37–54)
EOSINOPHIL # BLD AUTO: 0.06 10*3/MM3 (ref 0–0.4)
EOSINOPHIL NFR BLD AUTO: 1.9 % (ref 0.3–6.2)
ERYTHROCYTE [DISTWIDTH] IN BLOOD BY AUTOMATED COUNT: 12.7 % (ref 12.3–15.4)
GFR SERPL CREATININE-BSD FRML MDRD: 97 ML/MIN/1.73
GLOBULIN UR ELPH-MCNC: 2.5 GM/DL
GLUCOSE BLD-MCNC: 97 MG/DL (ref 65–99)
HCT VFR BLD AUTO: 43.8 % (ref 37.5–51)
HGB BLD-MCNC: 14.9 G/DL (ref 13–17.7)
IMM GRANULOCYTES # BLD AUTO: 0.02 10*3/MM3 (ref 0–0.05)
IMM GRANULOCYTES NFR BLD AUTO: 0.6 % (ref 0–0.5)
INR PPP: 1.85 (ref 0.9–1.1)
LYMPHOCYTES # BLD AUTO: 1.13 10*3/MM3 (ref 0.7–3.1)
LYMPHOCYTES NFR BLD AUTO: 34.9 % (ref 19.6–45.3)
MCH RBC QN AUTO: 30.1 PG (ref 26.6–33)
MCHC RBC AUTO-ENTMCNC: 34 G/DL (ref 31.5–35.7)
MCV RBC AUTO: 88.5 FL (ref 79–97)
MONOCYTES # BLD AUTO: 0.51 10*3/MM3 (ref 0.1–0.9)
MONOCYTES NFR BLD AUTO: 15.7 % (ref 5–12)
NEUTROPHILS # BLD AUTO: 1.5 10*3/MM3 (ref 1.7–7)
NEUTROPHILS NFR BLD AUTO: 46.3 % (ref 42.7–76)
NRBC BLD AUTO-RTO: 0 /100 WBC (ref 0–0.2)
PLATELET # BLD AUTO: 256 10*3/MM3 (ref 140–450)
PMV BLD AUTO: 9.1 FL (ref 6–12)
POTASSIUM BLD-SCNC: 4.2 MMOL/L (ref 3.5–5.2)
PROT SERPL-MCNC: 6.8 G/DL (ref 6–8.5)
PROTHROMBIN TIME: 21 SECONDS (ref 11.7–14.2)
RBC # BLD AUTO: 4.95 10*6/MM3 (ref 4.14–5.8)
SODIUM BLD-SCNC: 141 MMOL/L (ref 136–145)
WBC NRBC COR # BLD: 3.24 10*3/MM3 (ref 3.4–10.8)

## 2019-08-18 PROCEDURE — 85025 COMPLETE CBC W/AUTO DIFF WBC: CPT | Performed by: NURSE PRACTITIONER

## 2019-08-18 PROCEDURE — 25010000002 DIPHENHYDRAMINE PER 50 MG: Performed by: NURSE PRACTITIONER

## 2019-08-18 PROCEDURE — 96372 THER/PROPH/DIAG INJ SC/IM: CPT

## 2019-08-18 PROCEDURE — 85610 PROTHROMBIN TIME: CPT | Performed by: NURSE PRACTITIONER

## 2019-08-18 PROCEDURE — 80053 COMPREHEN METABOLIC PANEL: CPT | Performed by: NURSE PRACTITIONER

## 2019-08-18 PROCEDURE — 25010000002 METHYLPREDNISOLONE PER 125 MG: Performed by: NURSE PRACTITIONER

## 2019-08-18 PROCEDURE — 25010000002 EPINEPHRINE PER 0.1 MG: Performed by: NURSE PRACTITIONER

## 2019-08-18 PROCEDURE — 99284 EMERGENCY DEPT VISIT MOD MDM: CPT

## 2019-08-18 PROCEDURE — 96374 THER/PROPH/DIAG INJ IV PUSH: CPT

## 2019-08-18 PROCEDURE — 96375 TX/PRO/DX INJ NEW DRUG ADDON: CPT

## 2019-08-18 RX ORDER — METHYLPREDNISOLONE SODIUM SUCCINATE 125 MG/2ML
125 INJECTION, POWDER, LYOPHILIZED, FOR SOLUTION INTRAMUSCULAR; INTRAVENOUS ONCE
Status: COMPLETED | OUTPATIENT
Start: 2019-08-18 | End: 2019-08-18

## 2019-08-18 RX ORDER — METHYLPREDNISOLONE 4 MG/1
TABLET ORAL
Qty: 21 TABLET | Refills: 0 | Status: SHIPPED | OUTPATIENT
Start: 2019-08-18 | End: 2019-09-25

## 2019-08-18 RX ORDER — FAMOTIDINE 10 MG/ML
20 INJECTION, SOLUTION INTRAVENOUS ONCE
Status: DISCONTINUED | OUTPATIENT
Start: 2019-08-18 | End: 2019-08-18

## 2019-08-18 RX ORDER — EPINEPHRINE 0.3 MG/.3ML
0.3 INJECTION SUBCUTANEOUS ONCE
Qty: 1 EACH | Refills: 0 | Status: SHIPPED | OUTPATIENT
Start: 2019-08-18 | End: 2019-08-18

## 2019-08-18 RX ORDER — DIPHENHYDRAMINE HYDROCHLORIDE 50 MG/ML
25 INJECTION INTRAMUSCULAR; INTRAVENOUS ONCE
Status: COMPLETED | OUTPATIENT
Start: 2019-08-18 | End: 2019-08-18

## 2019-08-18 RX ADMIN — EPINEPHRINE 0.3 MG: 1 INJECTION, SOLUTION, CONCENTRATE INTRAVENOUS at 12:36

## 2019-08-18 RX ADMIN — METHYLPREDNISOLONE SODIUM SUCCINATE 125 MG: 125 INJECTION, POWDER, FOR SOLUTION INTRAMUSCULAR; INTRAVENOUS at 10:39

## 2019-08-18 RX ADMIN — DIPHENHYDRAMINE HYDROCHLORIDE 25 MG: 50 INJECTION, SOLUTION INTRAMUSCULAR; INTRAVENOUS at 12:57

## 2019-08-18 NOTE — ED NOTES
No improvement to patient rash, pt states no relief at this time. Pt has redness to right eye lid no there previously. ER [facility] notified.      Raulito Quinones RN  08/18/19 [id number]

## 2019-08-18 NOTE — DISCHARGE INSTRUCTIONS
Pt instructions:  Rest  Allow up for allergy testing  Follow up with Primary Care Doctor for further management and to have your blood pressure rechecked.   Return to ER with pain, swelling, numbness/tingling, fever, chills, weakness, nausea, vomiting, diarrhea, abdominal pain, back pain, urinary concerns, chest pain, shortness of breath, dizziness, headache, worsening of symptoms or other concerns.

## 2019-08-18 NOTE — ED NOTES
Pt has rash to back and arms started last night, states thinks he may be allergic to hibiscus plants, states this has happened to him one time prior after handling plants and subsided quickly, was supposed to follow up with allergist, however states got shingles and had to cancel appointment. Pt states last night started feeling burning sensation in feet close contact with plants and showered immediately and felt relief. Rash started last night and spread this morning states he took 50mg benadryl and Pepcid po prior to arrival with no relief.      Raulito Quinones, CADY  08/18/19 6544

## 2019-08-18 NOTE — ED PROVIDER NOTES
"EMERGENCY DEPARTMENT ENCOUNTER    CHIEF COMPLAINT  Chief Complaint: rash  History given by: pt  History limited by: nothing  Time Seen: 1032  Room Number: 32/32  PMD: Opal Benavidez MD      HPI:  Pt is a 33 y.o. male who presents with c/o worsening rash covering his back, BUE, and bilateral knees, starting yesterday. Also c/o associated redness and itching. Sx began after coming from outside and taking shower, when pt noticed the bottoms of his feet were burning. Pt washed his feet, relieving his sx. Later last night, pt's hands began to itch, so he took Benadryl and went to sleep. When pt woke this morning, the rash had spread to his back, \"buttcheeks,\" BUE, and knees. He suspects his sx are caused by hibiscus plants outside his home. Pt is on chemo for brain cancer, causing him to have shingles outbreak on L face a week ago. Also reports that he has been having more rashes to unknown irritants since beginning chemotherapy. Seen here on 7/14/19 for urticaria that was not relieved w/ Benadryl, reports today that sx are similar.    Duration: 2 days  Timing: constant  Location: skin  Radiation: n/a  Quality: red and itchy  Intensity/Severity: moderate  Progression: worsening  Associated Symptoms: redness and itching  Aggravating Factors: hibiscus plants  Alleviating Factors: n/a  Previous Episodes: one month ago  Treatment before arrival: Benadryl    PAST MEDICAL HISTORY  Active Ambulatory Problems     Diagnosis Date Noted   • Hyperlipidemia 03/29/2016   • Asthma 03/29/2016   • GERD (gastroesophageal reflux disease) 03/29/2016   • Primary brain tumor (CMS/HCC) 06/15/2018   • Pulmonary embolus (CMS/HCC) 06/29/2018   • Status post craniotomy 06/29/2018   • Pulmonary embolism with infarction (CMS/HCC) 06/29/2018   • Chronic deep vein thrombosis (DVT) of left popliteal vein (CMS/HCC) 06/29/2018   • Acute deep vein thrombosis (DVT) of popliteal vein of left lower extremity (CMS/HCC) 06/29/2018   • Acute deep vein " thrombosis (DVT) of femoral vein of both lower extremities (CMS/HCC) 07/07/2018   • Failure of outpatient treatment 07/08/2018   • History of pulmonary embolus (PE) 07/08/2018   • Factor 5 Leiden mutation, heterozygous (CMS/HCC) 07/08/2018   • 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   • Elevated liver enzymes 07/23/2018   • Astrocytoma brain tumor (CMS/HCC) 12/12/2018     Resolved Ambulatory Problems     Diagnosis Date Noted   • Leukocytosis 06/29/2018     Past Medical History:   Diagnosis Date   • Allergic rhinitis    • Asthma    • Chest pain    • DVT (deep venous thrombosis) (CMS/HCC) 06/2018   • Factor V Leiden (CMS/HCC)    • GERD (gastroesophageal reflux disease)    • H/O echocardiogram 2006   • Headache    • History of ETT    • History of Holter monitoring    • Hyperlipidemia    • PE (pulmonary thromboembolism) (CMS/HCC) 06/2018   • Primary brain tumor (CMS/HCC) 2018       PAST SURGICAL HISTORY  Past Surgical History:   Procedure Laterality Date   • CRANIOTOMY FOR TUMOR Right 6/16/2018    Procedure: CRANIOTOMY FOR  RESECTION OF LARGE RIGHT FRONTAL MASS WITH AUGUSTIN NAVIGATION;  Surgeon: Chetan Galvan IV, MD;  Location: [facility] OR;  Service: Neurosurgery   • CRANIOTOMY FOR TUMOR Right 1/24/2019    Procedure: CRANIOTOMY FOR TUMOR STEREOTACTIC, bicoronal for right frontal mass;  Surgeon: Chetan Galvan IV, MD;  Location: [facility] OR;  Service: Neurosurgery   • WISDOM TOOTH EXTRACTION  1996       FAMILY HISTORY  Family History   Problem Relation Age of Onset   • Diabetes Mother    • Malig Hyperthermia Neg Hx        SOCIAL HISTORY  Social History     Socioeconomic History   • Marital status:      Spouse name: Mary   • Number of children: Not on file   • Years of education: Not on file   • Highest education level: Not on file   Occupational History     Employer: [employer]     Comment: full time   Tobacco Use   • Smoking status: Never Smoker   • Smokeless tobacco:  Never Used   Substance and Sexual Activity   • Alcohol use: No     Comment: Moderate   • Drug use: No   • Sexual activity: Defer         ALLERGIES  Avocado; Other; and Tomato    REVIEW OF SYSTEMS  Review of Systems   Constitutional: Negative.  Negative for activity change, appetite change ( decreased), chills and fever.   HENT: Negative for congestion, ear pain, rhinorrhea, sinus pressure and sore throat.    Eyes: Negative.    Respiratory: Negative.  Negative for cough and shortness of breath.    Cardiovascular: Negative.  Negative for chest pain, palpitations and leg swelling ( pedal).   Gastrointestinal: Negative for abdominal pain, diarrhea, nausea and vomiting.   Endocrine: Negative.    Genitourinary: Negative.  Negative for decreased urine volume, difficulty urinating, dysuria, frequency and urgency.   Musculoskeletal: Negative.  Negative for back pain.   Skin: Positive for rash (w/ erythema and itching).   Allergic/Immunologic: Negative.    Neurological: Negative.  Negative for dizziness, weakness, light-headedness, numbness and headaches.   Hematological: Negative.    Psychiatric/Behavioral: Negative.  The patient is not nervous/anxious.    All other systems reviewed and are negative.      PHYSICAL EXAM  ED Triage Vitals   Temp Heart Rate Resp BP SpO2   08/18/19 1007 08/18/19 1007 08/18/19 1007 08/18/19 1016 08/18/19 [facility]   96.5 °F (35.8 °C) 86 18 122/80 100 %      Temp src Heart Rate Source Patient Position BP Location FiO2 (%)   08/18/19 [facility] -- -- -- --   Tympanic           Physical Exam   Constitutional: He is well-developed, well-nourished, and in no distress. No distress.   HENT:   Head: Normocephalic.   Mouth/Throat: Oropharynx is clear and moist and mucous membranes are normal.   Eyes: Pupils are equal, round, and reactive to light.   Neck: Normal range of motion.   Cardiovascular: Normal rate, regular rhythm and normal heart sounds.   Pulmonary/Chest: Effort normal and breath sounds normal. He has no  wheezes.   Abdominal: Soft. Bowel sounds are normal. There is no tenderness.   Musculoskeletal: Normal range of motion. He exhibits no edema.   Neurological: He is alert.   Skin: Skin is warm and dry. No rash noted.   Diffuse erythema rash   Psychiatric: Mood, memory, affect and judgment normal.   Nursing note and vitals reviewed.      LAB RESULTS  Recent Results (from the past 24 hour(s))   Comprehensive Metabolic Panel    Collection Time: 08/18/19 10:38 AM   Result Value Ref Range    Glucose 97 65 - 99 mg/dL    BUN 12 6 - 20 mg/dL    Creatinine 0.89 0.76 - 1.27 mg/dL    Sodium 141 136 - 145 mmol/L    Potassium 4.2 3.5 - 5.2 mmol/L    Chloride 107 98 - 107 mmol/L    CO2 23.8 22.0 - 29.0 mmol/L    Calcium 8.7 8.6 - 10.5 mg/dL    Total Protein 6.8 6.0 - 8.5 g/dL    Albumin 4.30 3.50 - 5.20 g/dL    ALT (SGPT) 24 1 - 41 U/L    AST (SGOT) 21 1 - 40 U/L    Alkaline Phosphatase 54 39 - 117 U/L    Total Bilirubin 0.2 0.2 - 1.2 mg/dL    eGFR Non African Amer 97 >60 mL/min/1.73    Globulin 2.5 gm/dL    A/G Ratio 1.7 g/dL    BUN/Creatinine Ratio 13.5 7.0 - 25.0    Anion Gap 10.2 5.0 - 15.0 mmol/L   Protime-INR    Collection Time: 08/18/19 10:38 AM   Result Value Ref Range    Protime 21.0 (H) 11.7 - 14.2 Seconds    INR 1.85 (H) 0.90 - 1.10   CBC Auto Differential    Collection Time: 08/18/19 10:38 AM   Result Value Ref Range    WBC 3.24 (L) 3.40 - 10.80 10*3/mm3    RBC 4.95 4.14 - 5.80 10*6/mm3    Hemoglobin 14.9 13.0 - 17.7 g/dL    Hematocrit 43.8 37.5 - 51.0 %    MCV 88.5 79.0 - 97.0 fL    MCH 30.1 26.6 - 33.0 pg    MCHC 34.0 31.5 - 35.7 g/dL    RDW 12.7 12.3 - 15.4 %    RDW-SD 40.9 37.0 - 54.0 fl    MPV 9.1 6.0 - 12.0 fL    Platelets 256 140 - 450 10*3/mm3    Neutrophil % 46.3 42.7 - 76.0 %    Lymphocyte % 34.9 19.6 - 45.3 %    Monocyte % 15.7 (H) 5.0 - 12.0 %    Eosinophil % 1.9 0.3 - 6.2 %    Basophil % 0.6 0.0 - 1.5 %    Immature Grans % 0.6 (H) 0.0 - 0.5 %    Neutrophils, Absolute 1.50 (L) 1.70 - 7.00 10*3/mm3     Lymphocytes, Absolute 1.13 0.70 - 3.10 10*3/mm3    Monocytes, Absolute 0.51 0.10 - 0.90 10*3/mm3    Eosinophils, Absolute 0.06 0.00 - 0.40 10*3/mm3    Basophils, Absolute 0.02 0.00 - 0.20 10*3/mm3    Immature Grans, Absolute 0.02 0.00 - 0.05 10*3/mm3    nRBC 0.0 0.0 - 0.2 /100 WBC       I ordered the above labs and reviewed the results    MEDICAL RECORD REVIEW      PROGRESS AND CONSULTS    1032- Discussed plan to check labs and treat rash w/ Solu-Medrol injection and Pepcid for sx management. Pt understands and agrees with the plan, all questions answered. Ordered Solu Medrol and labs for sx management and further evaluation.    1040- Per RN, pt took Pepcid this morning. Cancelled order for Pepcid.    1208- Per RN, pt is now c/o R eyelid rash.    1211- Reviewed pt's history and workup with Dr. Crisostomo. After a bedside evaluation; Dr. Crisostomo agrees with the plan of care.    1227- Ordered pt Anaphylaxis injection for sx management.    1255- Per RN, pt's mother will pick him up from ED. Ordered pt Benadryl.    1430- Rechecked pt. Pt is resting comfortably, rash appears much improved, and pt states he is ready to go home. Discussed the plan to discharge the pt home with prescriptions for medrol.  I instructed the pt to follow up for allergy test. Pt understands and agrees with the plan, all questions answered.      COURSE & MEDICAL DECISION MAKING  Pertinent Labs and Imaging studies that were ordered and reviewed are noted above.  Results were reviewed/discussed with the patient and they were also made aware of online assess.   Pt also made aware that some labs, such as cultures, will not be resulted during ER visit and follow up with PMD is necessary.     MEDICATIONS GIVEN IN ER  Medications   methylPREDNISolone sodium succinate (SOLU-Medrol) injection 125 mg (125 mg Intravenous Given 8/18/19 1039)   EPINEPHrine (ANAPHYLAXIS) 1 mg/ml injection kit (0.3 mg Subcutaneous Given 8/18/19 1236)   diphenhydrAMINE (BENADRYL)  "injection 25 mg (25 mg Intravenous Given 8/18/19 1257)       /76   Pulse 68   Temp 96.5 °F (35.8 °C) (Tympanic)   Resp 18   Ht 180.3 cm (71\")   Wt 79.4 kg (175 lb)   SpO2 100%   BMI 24.41 kg/m²     Discussed all results and noted any abnormalities with patient.  Discussed absoute need to recheck abnormalities with PCP.    Reviewed implications of results, diagnosis, meds, responsibility to follow up, warning signs and symptoms of possible worsening, potential complications and reasons to return to ER with patient    Discussed plan for discharge, as there is no emergent indication for admission.  Pt is agreeable and understands need for follow up and repeat testing.  Pt is aware that discharge does not mean that nothing is wrong but it indicates no emergency is present and they must continue care with PCP.  Pt is discharged with instructions to follow up with primary care doctor to have their blood pressure rechecked.       DIAGNOSIS  Final diagnoses:   Allergic reaction, initial encounter   Rash       FOLLOW UP   Opal Benavidez MD  [address] [place] [address]  [phone]    Schedule an appointment as soon as possible for a visit         RX     Medication List      New Prescriptions    methylPREDNISolone 4 MG tablet  Commonly known as:  MEDROL (JOHANNA)  Take as directed on package instructions.          I personally reviewed the past medical history, past surgical history, social history, family history, current medications and allergies as they appear in this chart.  The scribe's note accurately reflects the work and decisions made by me.       Documentation assistance provided by gabino Brennan for NICOLE Grider.  Information recorded by the scribe was done at my direction and has been verified and validated by me.     Thalia Brennan  08/18/19 1432       Marjorie Marcelo APRN  08/18/19 1442    "

## 2019-08-18 NOTE — ED PROVIDER NOTES
Pt presents to the ED c/o diffuse body wide rash and itching that began yesterday and worsened today. Pt noticed sx after walking barefoot in behind his parents that had walked through his hibiscus plants by the door. Denies SOA, sore throat, difficulty swallowing, wheezing, dizziness, and other complaints. He's taken pepcid and benadryl at home for sx w/o change. Denies changes in detergent, soaps, or other personal hygiene products. Pt completed chemo Thursday.    On exam,   Pt is awake and alert in no acute distress, RRR, no murmur, CTAB, abd soft non-tender, non-distended, oropharynx no uvular edema, skin: diffuse hives on his trunk, back, BUE, medial and plantar aspect of R foot.    Plan: administer EPI and evaluate     Attestation:  The BABITA and I have discussed this patient's history, physical exam, and treatment plan.  I have reviewed the documentation and personally had a face to face interaction with the patient. I affirm the documentation and agree with the treatment and plan.  The attached note describes my personal findings.       Documentation assistance provided by gabino Ireland for Dr. Crisostomo.  Information recorded by the scribe was done at my direction and has been verified and validated by me.     Stephanie Ireland  08/18/19 0110       Eliel Crisostomo MD  08/18/19 2943

## 2019-08-19 ENCOUNTER — TELEPHONE (OUTPATIENT)
Dept: INTERNAL MEDICINE | Facility: CLINIC | Age: 34
End: 2019-08-19

## 2019-08-19 DIAGNOSIS — L50.9 URTICARIA: ICD-10-CM

## 2019-08-19 RX ORDER — PREDNISONE 10 MG/1
TABLET ORAL
Qty: 20 TABLET | Refills: 0 | Status: SHIPPED | OUTPATIENT
Start: 2019-08-19 | End: 2019-08-30

## 2019-08-19 NOTE — TELEPHONE ENCOUNTER
----- Message from Silvia Varghese sent at 8/19/2019  8:03 AM EDT -----  Contact: Pt   Pt is calling to ask for more steroids. States from 7/129 OV     Pt goes on vacation tomorrow.    [id number] [place], [place] - [address] AT [facility] [address] - [phone]  - [phone] FX    Pt# [phone]

## 2019-08-19 NOTE — TELEPHONE ENCOUNTER
Discussed with patient-he had an episode of urticaria yesterday and presented to the ER. He was given a Medrol Jet but instead would like to take Prednisone at lower dose for a longer period of time. He is leaving town today for vacation. Rx sent to pharmacy, continue to monitor sx. He has been referred to an allergist.

## 2019-08-25 DIAGNOSIS — C71.1 ANAPLASTIC ASTROCYTOMA OF FRONTAL LOBE (HCC): ICD-10-CM

## 2019-08-26 RX ORDER — SULFAMETHOXAZOLE AND TRIMETHOPRIM 800; 160 MG/1; MG/1
TABLET ORAL
Qty: 36 TABLET | Refills: 3 | Status: SHIPPED | OUTPATIENT
Start: 2019-08-26 | End: 2019-12-16

## 2019-09-03 ENCOUNTER — LAB (OUTPATIENT)
Dept: OTHER | Facility: HOSPITAL | Age: 34
End: 2019-09-03

## 2019-09-03 ENCOUNTER — CLINICAL SUPPORT (OUTPATIENT)
Dept: ONCOLOGY | Facility: HOSPITAL | Age: 34
End: 2019-09-03

## 2019-09-03 VITALS
HEART RATE: 68 BPM | OXYGEN SATURATION: 99 % | WEIGHT: 179.2 LBS | DIASTOLIC BLOOD PRESSURE: 82 MMHG | BODY MASS INDEX: 24.99 KG/M2 | SYSTOLIC BLOOD PRESSURE: 123 MMHG | TEMPERATURE: 98 F

## 2019-09-03 DIAGNOSIS — Z86.711 HISTORY OF PULMONARY EMBOLUS (PE): ICD-10-CM

## 2019-09-03 DIAGNOSIS — C71.1 ANAPLASTIC ASTROCYTOMA OF FRONTAL LOBE (HCC): ICD-10-CM

## 2019-09-03 LAB
ALBUMIN SERPL-MCNC: 4.4 G/DL (ref 3.5–5.2)
ALBUMIN/GLOB SERPL: 2 G/DL
ALP SERPL-CCNC: 43 U/L (ref 39–117)
ALT SERPL W P-5'-P-CCNC: 25 U/L (ref 1–41)
ANION GAP SERPL CALCULATED.3IONS-SCNC: 11.5 MMOL/L (ref 5–15)
AST SERPL-CCNC: 16 U/L (ref 1–40)
BASOPHILS # BLD AUTO: 0.03 10*3/MM3 (ref 0–0.2)
BASOPHILS NFR BLD AUTO: 0.6 % (ref 0–1.5)
BILIRUB SERPL-MCNC: 0.3 MG/DL (ref 0.1–1.2)
BUN BLD-MCNC: 16 MG/DL (ref 6–20)
BUN/CREAT SERPL: 17.6 (ref 7–25)
CALCIUM SPEC-SCNC: 9.1 MG/DL (ref 8.6–10.5)
CHLORIDE SERPL-SCNC: 104 MMOL/L (ref 98–107)
CO2 SERPL-SCNC: 27.5 MMOL/L (ref 22–29)
CREAT BLD-MCNC: 0.91 MG/DL (ref 0.76–1.27)
DEPRECATED RDW RBC AUTO: 42.9 FL (ref 37–54)
EOSINOPHIL # BLD AUTO: 0.19 10*3/MM3 (ref 0–0.4)
EOSINOPHIL NFR BLD AUTO: 3.9 % (ref 0.3–6.2)
ERYTHROCYTE [DISTWIDTH] IN BLOOD BY AUTOMATED COUNT: 13.1 % (ref 12.3–15.4)
GFR SERPL CREATININE-BSD FRML MDRD: 95 ML/MIN/1.73
GLOBULIN UR ELPH-MCNC: 2.2 GM/DL
GLUCOSE BLD-MCNC: 105 MG/DL (ref 65–99)
HCT VFR BLD AUTO: 42 % (ref 37.5–51)
HGB BLD-MCNC: 14.2 G/DL (ref 13–17.7)
IMM GRANULOCYTES # BLD AUTO: 0.05 10*3/MM3 (ref 0–0.05)
IMM GRANULOCYTES NFR BLD AUTO: 1 % (ref 0–0.5)
INR PPP: 2.1
LYMPHOCYTES # BLD AUTO: 1.49 10*3/MM3 (ref 0.7–3.1)
LYMPHOCYTES NFR BLD AUTO: 30.7 % (ref 19.6–45.3)
MCH RBC QN AUTO: 30.7 PG (ref 26.6–33)
MCHC RBC AUTO-ENTMCNC: 33.8 G/DL (ref 31.5–35.7)
MCV RBC AUTO: 90.7 FL (ref 79–97)
MONOCYTES # BLD AUTO: 0.6 10*3/MM3 (ref 0.1–0.9)
MONOCYTES NFR BLD AUTO: 12.3 % (ref 5–12)
NEUTROPHILS # BLD AUTO: 2.5 10*3/MM3 (ref 1.7–7)
NEUTROPHILS NFR BLD AUTO: 51.5 % (ref 42.7–76)
NRBC BLD AUTO-RTO: 0 /100 WBC (ref 0–0.2)
PLATELET # BLD AUTO: 169 10*3/MM3 (ref 140–450)
PMV BLD AUTO: 9.3 FL (ref 6–12)
POTASSIUM BLD-SCNC: 4.1 MMOL/L (ref 3.5–5.2)
PROT SERPL-MCNC: 6.6 G/DL (ref 6–8.5)
PROTHROMBIN TIME: 25.7 SECONDS (ref 11–15)
RBC # BLD AUTO: 4.63 10*6/MM3 (ref 4.14–5.8)
SODIUM BLD-SCNC: 143 MMOL/L (ref 136–145)
WBC NRBC COR # BLD: 4.86 10*3/MM3 (ref 3.4–10.8)

## 2019-09-03 PROCEDURE — 80053 COMPREHEN METABOLIC PANEL: CPT | Performed by: INTERNAL MEDICINE

## 2019-09-03 PROCEDURE — 36415 COLL VENOUS BLD VENIPUNCTURE: CPT

## 2019-09-03 PROCEDURE — 85025 COMPLETE CBC W/AUTO DIFF WBC: CPT | Performed by: INTERNAL MEDICINE

## 2019-09-03 PROCEDURE — 85610 PROTHROMBIN TIME: CPT

## 2019-09-03 NOTE — PROGRESS NOTES
Patient arrived ambulatory for his RN Review. Patient states he is to start his Temodar 400 mg PO daily  for days 1-5 of 28 on September 8, 2019. Patient is no longer taking his Prednisone and Famvir. Patient is taking his Keppra and Bactrim. The Bactrim is only three days weekly for PCP prophylaxis. Patient's SS was changed per protocol to 7.5 mg Coumadin every day. Patient is to be seen again on September 25, 2019 with Dr. Jefferson post his September 23, 2019 brain scan. Patient has no complaints with shortness of air, active bleeding, excessive bruising, urinating adequately. His appetite has slowed down some due to the D/C of Prednisone. SS Reviewed with NICOLE Stauffer. Patient vu to keep scan appointment and then will see Dr. Jefferson of September 25, 2019.  Lab Results   Component Value Date    INR 2.10 09/03/2019    INR 1.85 (H) 08/18/2019    INR 1.70 08/16/2019    PROTIME 25.7 (H) 09/03/2019    PROTIME 21.0 (H) 08/18/2019    PROTIME 19.9 (H) 08/16/2019     Lab Results   Component Value Date    WBC 4.86 09/03/2019    HGB 14.2 09/03/2019    HCT 42.0 09/03/2019    MCV 90.7 09/03/2019     09/03/2019     Lab Results   Component Value Date    NEUTROABS 2.50 09/03/2019     Lab Results   Component Value Date    GLUCOSE 105 (H) 09/03/2019    BUN 16 09/03/2019    CREATININE 0.91 09/03/2019    EGFRIFNONA 95 09/03/2019    EGFRIFAFRI 138 03/29/2016    BCR 17.6 09/03/2019    K 4.1 09/03/2019    CO2 27.5 09/03/2019    CALCIUM 9.1 09/03/2019    PROTENTOTREF 7.7 03/29/2016    ALBUMIN 4.40 09/03/2019    LABIL2 1.8 03/29/2016    AST 16 09/03/2019    ALT 25 09/03/2019

## 2019-09-06 ENCOUNTER — TELEPHONE (OUTPATIENT)
Dept: NEUROSURGERY | Facility: CLINIC | Age: 34
End: 2019-09-06

## 2019-09-06 NOTE — TELEPHONE ENCOUNTER
Pt was scheduled for follow up with Dr. Galvan for brain tumor on September 25. He has MRI scheduled for September 23rd. He would like his follow up to be scheduled with doctor upstairs for around the same date. Pt number is [phone]

## 2019-09-09 RX ORDER — WARFARIN SODIUM 5 MG/1
TABLET ORAL
Qty: 60 TABLET | Refills: 1 | Status: SHIPPED | OUTPATIENT
Start: 2019-09-09 | End: 2019-12-06 | Stop reason: SDUPTHER

## 2019-09-10 RX ORDER — WARFARIN SODIUM 5 MG/1
TABLET ORAL
Qty: 135 TABLET | Refills: 1 | OUTPATIENT
Start: 2019-09-10

## 2019-09-23 ENCOUNTER — HOSPITAL ENCOUNTER (OUTPATIENT)
Dept: MRI IMAGING | Facility: HOSPITAL | Age: 34
Discharge: HOME | End: 2019-09-23
Admitting: INTERNAL MEDICINE

## 2019-09-23 DIAGNOSIS — C71.1 ANAPLASTIC ASTROCYTOMA OF FRONTAL LOBE (HCC): ICD-10-CM

## 2019-09-23 DIAGNOSIS — Z86.711 HISTORY OF PULMONARY EMBOLUS (PE): ICD-10-CM

## 2019-09-23 PROCEDURE — A9577 INJ MULTIHANCE: HCPCS | Performed by: INTERNAL MEDICINE

## 2019-09-23 PROCEDURE — 70553 MRI BRAIN STEM W/O & W/DYE: CPT

## 2019-09-23 PROCEDURE — 0 GADOBENATE DIMEGLUMINE 529 MG/ML SOLUTION: Performed by: INTERNAL MEDICINE

## 2019-09-23 RX ADMIN — GADOBENATE DIMEGLUMINE 17 ML: 529 INJECTION, SOLUTION INTRAVENOUS at 08:11

## 2019-09-25 ENCOUNTER — LAB (OUTPATIENT)
Dept: LAB | Facility: HOSPITAL | Age: 34
End: 2019-09-25

## 2019-09-25 ENCOUNTER — OFFICE VISIT (OUTPATIENT)
Dept: ONCOLOGY | Facility: CLINIC | Age: 34
End: 2019-09-25

## 2019-09-25 VITALS
DIASTOLIC BLOOD PRESSURE: 89 MMHG | RESPIRATION RATE: 16 BRPM | TEMPERATURE: 97.9 F | BODY MASS INDEX: 24.92 KG/M2 | HEART RATE: 73 BPM | SYSTOLIC BLOOD PRESSURE: 130 MMHG | WEIGHT: 174.1 LBS | HEIGHT: 70 IN | OXYGEN SATURATION: 100 %

## 2019-09-25 DIAGNOSIS — C71.1 ANAPLASTIC ASTROCYTOMA OF FRONTAL LOBE (HCC): ICD-10-CM

## 2019-09-25 DIAGNOSIS — I82.532 CHRONIC DEEP VEIN THROMBOSIS (DVT) OF LEFT POPLITEAL VEIN (HCC): Primary | ICD-10-CM

## 2019-09-25 DIAGNOSIS — Z86.711 HISTORY OF PULMONARY EMBOLUS (PE): ICD-10-CM

## 2019-09-25 DIAGNOSIS — C71.9 ASTROCYTOMA BRAIN TUMOR (HCC): Primary | ICD-10-CM

## 2019-09-25 LAB
ALBUMIN SERPL-MCNC: 4.7 G/DL (ref 3.5–5.2)
ALBUMIN/GLOB SERPL: 2 G/DL (ref 1.1–2.4)
ALP SERPL-CCNC: 50 U/L (ref 38–116)
ALT SERPL W P-5'-P-CCNC: 26 U/L (ref 0–41)
ANION GAP SERPL CALCULATED.3IONS-SCNC: 12 MMOL/L (ref 5–15)
AST SERPL-CCNC: 19 U/L (ref 0–40)
BASOPHILS # BLD AUTO: 0.01 10*3/MM3 (ref 0–0.2)
BASOPHILS NFR BLD AUTO: 0.3 % (ref 0–1.5)
BILIRUB SERPL-MCNC: 0.3 MG/DL (ref 0.2–1.2)
BUN BLD-MCNC: 10 MG/DL (ref 6–20)
BUN/CREAT SERPL: 10.6 (ref 7.3–30)
CALCIUM SPEC-SCNC: 9 MG/DL (ref 8.5–10.2)
CHLORIDE SERPL-SCNC: 104 MMOL/L (ref 98–107)
CO2 SERPL-SCNC: 26 MMOL/L (ref 22–29)
CREAT BLD-MCNC: 0.94 MG/DL (ref 0.7–1.3)
DEPRECATED RDW RBC AUTO: 42.8 FL (ref 37–54)
EOSINOPHIL # BLD AUTO: 0.29 10*3/MM3 (ref 0–0.4)
EOSINOPHIL NFR BLD AUTO: 7.9 % (ref 0.3–6.2)
ERYTHROCYTE [DISTWIDTH] IN BLOOD BY AUTOMATED COUNT: 12.7 % (ref 12.3–15.4)
GFR SERPL CREATININE-BSD FRML MDRD: 91 ML/MIN/1.73
GLOBULIN UR ELPH-MCNC: 2.3 GM/DL (ref 1.8–3.5)
GLUCOSE BLD-MCNC: 94 MG/DL (ref 74–124)
HCT VFR BLD AUTO: 45 % (ref 37.5–51)
HGB BLD-MCNC: 15.1 G/DL (ref 13–17.7)
IMM GRANULOCYTES # BLD AUTO: 0.02 10*3/MM3 (ref 0–0.05)
IMM GRANULOCYTES NFR BLD AUTO: 0.5 % (ref 0–0.5)
INR PPP: 2.6 (ref 0.9–1.1)
LYMPHOCYTES # BLD AUTO: 1.13 10*3/MM3 (ref 0.7–3.1)
LYMPHOCYTES NFR BLD AUTO: 30.8 % (ref 19.6–45.3)
MCH RBC QN AUTO: 30.9 PG (ref 26.6–33)
MCHC RBC AUTO-ENTMCNC: 33.6 G/DL (ref 31.5–35.7)
MCV RBC AUTO: 92 FL (ref 79–97)
MONOCYTES # BLD AUTO: 0.45 10*3/MM3 (ref 0.1–0.9)
MONOCYTES NFR BLD AUTO: 12.3 % (ref 5–12)
NEUTROPHILS # BLD AUTO: 1.77 10*3/MM3 (ref 1.7–7)
NEUTROPHILS NFR BLD AUTO: 48.2 % (ref 42.7–76)
NRBC BLD AUTO-RTO: 0 /100 WBC (ref 0–0.2)
PLATELET # BLD AUTO: 243 10*3/MM3 (ref 140–450)
PMV BLD AUTO: 8.6 FL (ref 6–12)
POTASSIUM BLD-SCNC: 3.9 MMOL/L (ref 3.5–4.7)
PROT SERPL-MCNC: 7 G/DL (ref 6.3–8)
PROTHROMBIN TIME: 31.6 SECONDS (ref 11–13.5)
RBC # BLD AUTO: 4.89 10*6/MM3 (ref 4.14–5.8)
SODIUM BLD-SCNC: 142 MMOL/L (ref 134–145)
WBC NRBC COR # BLD: 3.67 10*3/MM3 (ref 3.4–10.8)

## 2019-09-25 PROCEDURE — 99215 OFFICE O/P EST HI 40 MIN: CPT | Performed by: INTERNAL MEDICINE

## 2019-09-25 PROCEDURE — 85610 PROTHROMBIN TIME: CPT | Performed by: INTERNAL MEDICINE

## 2019-09-25 PROCEDURE — 36415 COLL VENOUS BLD VENIPUNCTURE: CPT | Performed by: INTERNAL MEDICINE

## 2019-09-25 PROCEDURE — 80053 COMPREHEN METABOLIC PANEL: CPT | Performed by: INTERNAL MEDICINE

## 2019-09-25 PROCEDURE — 85025 COMPLETE CBC W/AUTO DIFF WBC: CPT | Performed by: INTERNAL MEDICINE

## 2019-09-25 PROCEDURE — G0463 HOSPITAL OUTPT CLINIC VISIT: HCPCS | Performed by: INTERNAL MEDICINE

## 2019-09-25 RX ORDER — ACYCLOVIR 400 MG/1
400 TABLET ORAL 2 TIMES DAILY
Qty: 60 TABLET | Refills: 3 | Status: SHIPPED | OUTPATIENT
Start: 2019-09-25 | End: 2019-12-16

## 2019-09-25 RX ORDER — METHYLPREDNISOLONE 4 MG/1
TABLET ORAL
Qty: 21 EACH | Refills: 2 | Status: SHIPPED | OUTPATIENT
Start: 2019-09-25 | End: 2019-12-16

## 2019-09-25 NOTE — PROGRESS NOTES
Subjective   Patient ID: Bryan Valadez is a 33 y.o. male is here today for follow-up. Mr. Valadez is a previous Dr. Galvan patient last seen 7/15/2019. He had a Craniotomy 1/24/2019 by Dr. Galvan. Patient denies any headaches, dizziness, vision changes or nausea.    History of Present Illness    This patient returns today.  He really does not have much in the way of complaints at all.  He recently saw Dr. Jefferson and had an MRI ordered for December.    The following portions of the patient's history were reviewed and updated as appropriate: allergies, current medications, past family history, past medical history, past social history, past surgical history and problem list.    Review of Systems   Eyes: Negative for visual disturbance.   Respiratory: Negative for chest tightness and shortness of breath.    Cardiovascular: Negative for chest pain.   Gastrointestinal: Negative for nausea.   Neurological: Negative for dizziness and headaches.   All other systems reviewed and are negative.      Objective   Physical Exam   Constitutional: He is oriented to person, place, and time. He appears well-developed and well-nourished.   Neurological: He is oriented to person, place, and time.     Neurologic Exam     Mental Status   Oriented to person, place, and time.       Assessment/Plan   Independent Review of Radiographic Studies:      I reviewed his MRI which shows no evidence of recurrent tumor.    Medical Decision Making:      I told the patient and his wife that I will see him again after his MRI is done in December.  In the meantime they will discuss with Dr. Jefferson the possibility of doing other chemotherapy treatments.    Bryan was seen today for follow-up.    Diagnoses and all orders for this visit:    Anaplastic astrocytoma of frontal lobe (CMS/HCC)      Return in about 3 months (around 12/26/2019).

## 2019-09-25 NOTE — PROGRESS NOTES
[facility] OUTPATIENT FOLLOW UP CLINIC VISIT    REASON FOR FOLLOW-UP:    1.  Left lower extremity DVT with progression of symptoms and extension of the left lower extremity DVT into the femoral vein from the popliteal/calf vein swallowing for next set.  Currently anticoagulated with warfarin.  2.  Right lower extremity DVT noted in the common femoral, profunda femoral, and calf veins  3.  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  Other thrombophilia labs negative.    4.  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  NOT 1p19q co-deleted.    5.  Radiation initiated on 7/31/2018.  Plan for Temodar ×1 year following radiation.  6.  4500 cGy in 25 fractions administered from 7/31/2018 through 9/14/2018  7.  MRI brain 10/23/2018 with resolving hemorrhage in the resection cavity.  However, there is hyperintensity measuring 4.6 x 4.3 x 3 cm along the margins of the resection cavity.  8.  Repeat venous duplex on 10/23 with chronic right CVT and chronic LLE DVT from the mid femoral vein distally.     9.  He initiated adjuvant therapy with Temodar in mid October 2018.  10.  MRI brain 12/3/2018 with stable findings.  11.  On 1/24/2019 he did have a repeat resection by Dr. Galvan.  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  However, there was no evidence of progression to a higher grade in the new resected specimen.  Mostly the proliferative activity was very low with only rare mitoses and a low Ki-67 of just 2-3%.  12.  He resumed Temodar      HISTORY OF PRESENT ILLNESS:  Bryan Valadez is a 33 y.o. male who returns today for follow up of the above issues.     He was diagnosed with herpes zoster on 7/29/2019 on the left side of his face and treated with Famvir.  There was no eye involvement.  Symptoms have resolved and he denies any residual pain in this area.  His cycle of Temodar was delayed from  8/4/2019 until 8/11/2019.  In addition, he has had some significant issues with allergies and urticaria.  He required some steroids.  He continues daily Singulair.  He continues daily Zantac.    He continues warfarin which he tolerates well without any bleeding.    At this point he feels well otherwise and he tolerates the Temodar quite well.    ONCOLOGIC HISTORY:  He had about 6 months of headaches treated as sinusitis and presented on 6/15 with visual changes and headache and was found to have a large right frontal mass which was resected on 6/16 by Dr. Galvan and consistent with a glioma.  Pathology was reviewed at [facility] showing infiltrating glioma.  Large 10 cm partially cystic and solid lobulated tumor mass at diagnosis.  Negative IDH1-R132H immunostain excludes the most common IDH1 mutation; however loss of ATRX expression suggests possibility of glioma harboring another less common IDH1 mutation.  p53 overexpressed.  Ki67 10% but variable.  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  There was no evidence for a 1p19q co-deletion.       Chromosome microarray showed a complex molecular karyotype including loss of 1q, loss of 2q, gain of 7q, gain of 8q, loss of 9p, REBECCA of 17p, and gain of 18p.  These abnormalities are typically associated with  IDH-mutant astrocytic gliomas.  No 1p and 19q whole arm co-deletion was noted.  CT head on 7/1 c/w residual tumor circumscribing the resection cavity and a remote cerebellar hemisphere hemorrhage.       He was discharged and subsequently admitted with left leg pain and chest pain, with LLE popliteal and calf vein clot noted and bilateral small PE. He was treated with heparin and discharged on Pradaxa.     He developed worsening leg pain up into the thigh and presented to the ER where a venous duplex showed progression of the clot to the femoral vein.  He was given Lovenox and admitted.  Warfarin was initiated.       A partial thrombophilia evaluation showed that he  is a heterozygote for the factor V Leiden mutation.  Labs otherwise unremarkable.    He was subsequently found to have a right lower extremity DVT on 7/8/2018.    He remains anticoagulated with warfarin.    Radiation initiated on 7/31/2018.  Plan for Temodar ×1 year after radiation is complete.    Radiation complete as of 9/14/2018.    4500 cGy in 25 fractions administered from 7/31/2018 through 9/14/2018    MRI brain 10/23/2018 with resolving hemorrhage in the resection cavity.  However, there is hyperintensity measuring 4.6 x 4.3 x 3 cm along the margins of the resection cavity.    Repeat venous duplex on 10/23 with chronic right CVT and chronic LLE DVT from the mid femoral vein distally.      Repeat brain MRI on 12/3/2018 with stable findings.    On 1/24/2019 he did have a repeat resection by Dr. Galvan.  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  However, there was no evidence of progression to a higher grade in the new resected specimen.  Mostly the proliferative activity was very low with only rare mitoses and a low Ki-67 of just 2-3%.    MRI brain's been stable including an MRI brain from 9/23/2019.    As of 9/25/2019 due to delays in therapy from his resection on 1/24/2019 we will go ahead and proceed with 3 more months of Temodar and he will complete therapy before the end of the year in early December.  We will plan for an MRI then in mid to late December and if everything appears stable we will proceed with observation at that time.      ALLERGIES:  Allergies   Allergen Reactions   • Avocado Itching   • Other Itching     Insect stings: Bee stings, throat swelling (has Epi pen)    Allergy to fruit, Avocado, Cherry Tomato (can have blue berries)   • Tomato Itching     Cherry tomato       MEDICATIONS:  The medication list has been reviewed with the patient by the medical assistant, and the list has been updated in the electronic medical record, which I reviewed.  Medication dosages and  "frequencies were confirmed to be accurate.    REVIEW OF SYSTEMS:  PAIN:  See Vital Signs below.  GENERAL:  No fevers, chills, night sweats, or unintended weight loss.  SKIN: Urticaria comes and goes.  Zoster rash on the left face has resolved.  HEME/LYMPH:  No abnormal bleeding.  No palpable lymphadenopathy.  EYES:  No vision changes or diplopia.  ENT:  No sore throat or difficulty swallowing.  RESPIRATORY:  No cough, shortness of breath, hemoptysis, or wheezing.  CARDIOVASCULAR:  No chest pain, palpitations, orthopnea, or dyspnea on exertion.  GASTROINTESTINAL:  No abdominal pain, nausea, vomiting, constipation, diarrhea, melena, or hematochezia.  GENITOURINARY:  No dysuria or hematuria.  MUSCULOSKELETAL:  No joint pain, swelling, or erythema.  Muscle cramping  NEUROLOGIC:  No dizziness, loss of consciousness, or seizures.  PSYCHIATRIC:  No depression, anxiety, or mood changes.    Vitals:    09/25/19 0800   BP: 130/89   Pulse: 73   Resp: 16   Temp: 97.9 °F (36.6 °C)   SpO2: 100%   Weight: 79 kg (174 lb 1.6 oz)   Height: 178 cm (70.08\")   PainSc: 0-No pain       PHYSICAL EXAMINATION:  GENERAL:  Well-developed well-nourished male; awake, alert and oriented, in no acute distress.  SKIN: No rash today.  Healed surgical incision on the scalp.  HEAD:  Normocephalic, Well-healed surgical incision present.    EYES:  Pupils equal, round and reactive to light.  Extraocular movements intact.  Conjunctivae normal.  EARS:  Hearing intact.  NOSE:  Septum midline.  No excoriations or nasal discharge.  MOUTH:  No stomatitis or ulcers.  Lips are normal.  THROAT:  Oropharynx without lesions or exudates.  LYMPHATICS:  No cervical, supraclavicular, axillary lymphadenopathy.  CHEST:  Lungs are clear to auscultation bilaterally.  No wheezes, rales, or rhonchi.  HEART:  Regular rate; normal rhythm.  No murmurs, gallops or rubs.  ABDOMEN:  Not examined today  EXTREMITIES:  No clubbing, cyanosis, or edema.  NEUROLOGICAL:  No focal " neurologic deficits.    DIAGNOSTIC DATA:  Results for orders placed or performed in visit on 09/25/19   Protime-INR, Fingerstick   Result Value Ref Range    Protime 31.6 (H) 11.0 - 13.5 Seconds    INR 2.60 (H) 0.90 - 1.10   CBC Auto Differential   Result Value Ref Range    WBC 3.67 3.40 - 10.80 10*3/mm3    RBC 4.89 4.14 - 5.80 10*6/mm3    Hemoglobin 15.1 13.0 - 17.7 g/dL    Hematocrit 45.0 37.5 - 51.0 %    MCV 92.0 79.0 - 97.0 fL    MCH 30.9 26.6 - 33.0 pg    MCHC 33.6 31.5 - 35.7 g/dL    RDW 12.7 12.3 - 15.4 %    RDW-SD 42.8 37.0 - 54.0 fl    MPV 8.6 6.0 - 12.0 fL    Platelets 243 140 - 450 10*3/mm3    Neutrophil % 48.2 42.7 - 76.0 %    Lymphocyte % 30.8 19.6 - 45.3 %    Monocyte % 12.3 (H) 5.0 - 12.0 %    Eosinophil % 7.9 (H) 0.3 - 6.2 %    Basophil % 0.3 0.0 - 1.5 %    Immature Grans % 0.5 0.0 - 0.5 %    Neutrophils, Absolute 1.77 1.70 - 7.00 10*3/mm3    Lymphocytes, Absolute 1.13 0.70 - 3.10 10*3/mm3    Monocytes, Absolute 0.45 0.10 - 0.90 10*3/mm3    Eosinophils, Absolute 0.29 0.00 - 0.40 10*3/mm3    Basophils, Absolute 0.01 0.00 - 0.20 10*3/mm3    Immature Grans, Absolute 0.02 0.00 - 0.05 10*3/mm3    nRBC 0.0 0.0 - 0.2 /100 WBC     CBC and CMP pending.    IMAGING:    MRI brain images from 9/23/2019 images personally reviewed.  Stable findings.  No evidence for progression.    IMPRESSION:  The study appears similar to the prior examination with a  resection cavity, thin septation with a small area of enhancement  related to the septation. The enhancement is nonspecific but likely  postsurgical in nature. Areas of surrounding T2 FLAIR hyperintensity are  noted which are stable. The T2 FLAIR hyperintensity is nonspecific and  may represent nonenhancing tumor. Continued close surveillance is  required.      ASSESSMENT:  This is a 33 y.o. male with:  1.  Bilateral lower extremity DVT: He remains on warfarin.  His INR today is 2.0.  He can discontinue the Lovenox.  Return next week for an INR check.    2.   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  NOT 1p19q co-deleted.  Overall, this is a good molecular profile.  I discussed his case with Dr. Galvan, Dr. Daley, and Dr. Dobbins and I reviewed NCCN guidelines.   He will require one year of adjuvant Temodar following radiation.  The big question was whether to administer concurrent therapy with Temodar along with radiation.  Ultimately, we decided not to do this given the overall good molecular profile and the potential adverse effects from concurrent therapy.  He did initiate radiation alone on 7/31/2018 and completed it on 9/14/2018.     He initiated adjuvant Temodar in mid October 2018.    Due to persistent abnormalities on MRI, on 1/24/2019 he did have a repeat resection by Dr. Galvan.  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  However, there was no evidence of progression to a higher grade in the new resected specimen.  Mostly the proliferative activity was very low with only rare mitoses and a low Ki-67 of just 2-3%.    He proceeds with Temodar at 400 mg daily for 5 out of 28 days.      MRI brain imaging from 9/23/2019 appears stable.    At this point he has completed nearly 1 year of adjuvant Temodar.  However, we have had breaks in therapy due to a repeat resection in January 2019.    As of 9/25/2019 we have elected to proceed with 3 more months of Temodar followed by an MRI of the brain before the end of the year.  If everything appears stable at that point we will proceed with observation.    3.  Given the possibility of infertility with treatment he banked sperm at the [facility].  He has a 3-year-old and a 2-month-old now.    4.  Elevated liver labs: Liver labs normalized.  Unclear reason.  Medication could have contributed.  Follow-up liver labs from today.    5.  Recent zoster rash on the left face: He completed a course of Famvir.   Prescription for acyclovir 400 mg twice daily for prophylaxis written today.    6.  Urticaria: He has significant environmental allergies related to pollens, etc.  He has had his biggest issues the week after his Temodar treatment.  Prescription for a Medrol Dosepak to take after Temodar is complete.  He continues Singulair and Zantac.    PLAN:  1.  Proceed with Temodar 400 mg every 28 days as scheduled for 3 more cycles which will occur in early October, early November, and early December.  I will see him back to review MRI imaging in December.    2.  Continue Keppra    3.  Continue Bactrim three days weekly for PCP prophylaxis.     4.  Continue anticoagulation with warfarin.  Monthly INR checks.    5.  Prescription for acyclovir 400 mg twice daily for prophylaxis written today.  Once his lymphocyte count improves he can have his shingles vaccine.    6.  He needs a dental cleaning and this is fine with a therapeutic INR.    7.  Prescription for Medrol Dosepak to take the week after his Temodar written today.    8.  I will see him back in December after a brain MRI and if everything is stable at that point we will proceed with observation with surveillance MRIs every 3 to 4 months.

## 2019-09-26 ENCOUNTER — OFFICE VISIT (OUTPATIENT)
Dept: NEUROLOGY | Facility: CLINIC | Age: 34
End: 2019-09-26

## 2019-09-26 ENCOUNTER — OFFICE VISIT (OUTPATIENT)
Dept: NEUROSURGERY | Facility: CLINIC | Age: 34
End: 2019-09-26

## 2019-09-26 VITALS — DIASTOLIC BLOOD PRESSURE: 88 MMHG | HEART RATE: 57 BPM | SYSTOLIC BLOOD PRESSURE: 128 MMHG

## 2019-09-26 VITALS
HEIGHT: 70 IN | DIASTOLIC BLOOD PRESSURE: 76 MMHG | BODY MASS INDEX: 24.54 KG/M2 | OXYGEN SATURATION: 98 % | SYSTOLIC BLOOD PRESSURE: 128 MMHG | WEIGHT: 171.4 LBS | HEART RATE: 76 BPM

## 2019-09-26 DIAGNOSIS — C71.1 ANAPLASTIC ASTROCYTOMA OF FRONTAL LOBE (HCC): Primary | ICD-10-CM

## 2019-09-26 DIAGNOSIS — C71.9 ANAPLASTIC ASTROCYTOMA (HCC): Primary | ICD-10-CM

## 2019-09-26 PROCEDURE — 99213 OFFICE O/P EST LOW 20 MIN: CPT | Performed by: PSYCHIATRY & NEUROLOGY

## 2019-09-26 PROCEDURE — 99213 OFFICE O/P EST LOW 20 MIN: CPT | Performed by: NEUROLOGICAL SURGERY

## 2019-09-26 RX ORDER — TEMOZOLOMIDE 100 MG/1
CAPSULE ORAL
Qty: 20 CAPSULE | Refills: 2 | Status: SHIPPED | OUTPATIENT
Start: 2019-09-26 | End: 2019-12-06 | Stop reason: SDUPTHER

## 2019-09-26 NOTE — TELEPHONE ENCOUNTER
Temozolomide refill request rec from [facility] Per last office note from Dr Jefferson-pt is to continue 400 mg days 1-5 every 28 days through December. I have sent a new rx to [facility]

## 2019-09-26 NOTE — PROGRESS NOTES
Follow Up Visit: Anaplastic astrocytoma  He comes accompanied by his wife    Neurosurgeon: Dr. Chetan Galvan. Now care has been transferred to Dr. Michele Hidalgo whom he is seeing today  Medical Oncology: Dr. Sean Jefferson  Radiation Oncology: Dr. Robin Daley  Neuro-oncology Dr Yordan Dobbins     SUMMARY:  I) Neuro-oncology history  June 16, 2018: first surgery  Pathology Anaplastic Astrocytoma, IDH-mutation present and Ki-67 at 5%-10% and  with mutation in both ATRX and TP53  He underwent post surgical radiation only followed by standard adjuvant Temodar under direction of Dr. Daley and Dr Jefferson respectively.  After Temodar x3 he underwent re-resection on January 24, 2019  Pathology report from the second resection was again reviewed by the [facility] : that showed the same histology, same molecular profile except Ki-67 now is 2%-3%  Post operative MRI scan (24 hours) shows a good resection     He comes now after having had Temodor #11 overall and #8 since his second surgery: this is given by Dr. Jefferson at [facility] and he mayhvae the more reliable count of the Temodar cycles.   and this is deferred to Dr Jefferson, whom he saw yesterday..  He tolerated the chemo very well  He has been totally off decadron for considerable period of time now.  ADL: his KPS 90: he and the wife say he is doing very well with his work  He has had no seizures on Keppra and no side effects    Interim developments:defer to Dr Jefferson  He develops bad rash when he is exposed to Pollen: necessitating steroid Tx  He contracted HZV left face and has been on Acyclovir per Dr Jefferson  He continues on Septra tid MWF (3 days per week)      II) DVT and PE 2/2  Deficient Leiden Factor V on Warfarin: managed by DR Jefferson         Review of Systems   Constitutional: Negative for chills, fatigue and fever.   HENT: Negative for hearing loss, tinnitus and trouble swallowing.    Eyes: Negative for pain, redness and itching.   Respiratory: Negative for cough, shortness of  "breath and wheezing.    Cardiovascular: Negative for chest pain, palpitations and leg swelling.   Gastrointestinal: Negative for diarrhea, nausea and vomiting.   Endocrine: Negative for cold intolerance, heat intolerance and polydipsia.   Genitourinary: Negative for decreased urine volume, difficulty urinating and urgency.   Musculoskeletal: Negative for gait problem, neck pain and neck stiffness.   Skin: Negative for color change, rash and wound.   Allergic/Immunologic: Negative for environmental allergies, food allergies and immunocompromised state.   Neurological: Negative for dizziness, tremors, seizures, syncope, facial asymmetry, speech difficulty, weakness, light-headedness, numbness and headaches.   Hematological: Negative for adenopathy. Does not bruise/bleed easily.   Psychiatric/Behavioral: Negative for agitation, behavioral problems, confusion, decreased concentration, dysphoric mood, hallucinations, self-injury, sleep disturbance and suicidal ideas. The patient is not nervous/anxious and is not hyperactive.              Vitals:    09/26/19 1309   BP: 128/76   Pulse: 76   SpO2: 98%   Weight: 77.7 kg (171 lb 6.4 oz)   Height: 178 cm (70.08\")         Physical/Neurological Examination  He is alert and oriented  Left HZV has healed there is hardly a trace residual  Visual fields are full  EOMs full w/o diplopia or nystagmus  PERRL  No facial asymmetry  Lower cranial nerves are normal  No pronator drift  Mary are full and coordination is full  Gait normal  Cortical sensory fully normal        Review Results-Workup/Diagnoses/Plan  I brought up the brain MRI and reviewed with them  I agree with the official reprot: no evidence for recurrence    IMPRESSION:  Anaplastic Astrocytoma: please see details above    PLAN:  He knows I am leaving [facility] as of October 04: he will continue care with Sheila Jefferson and Rocky  [facility] I answered the question in great details and as I told them Dr" Ronny is in charge of his chemotherapy.

## 2019-10-23 ENCOUNTER — CLINICAL SUPPORT (OUTPATIENT)
Dept: INTERNAL MEDICINE | Facility: CLINIC | Age: 34
End: 2019-10-23

## 2019-10-23 ENCOUNTER — LAB (OUTPATIENT)
Dept: LAB | Facility: HOSPITAL | Age: 34
End: 2019-10-23

## 2019-10-23 ENCOUNTER — CLINICAL SUPPORT (OUTPATIENT)
Dept: ONCOLOGY | Facility: HOSPITAL | Age: 34
End: 2019-10-23

## 2019-10-23 DIAGNOSIS — Z23 NEED FOR IMMUNIZATION AGAINST INFLUENZA: Primary | ICD-10-CM

## 2019-10-23 DIAGNOSIS — C71.9 ASTROCYTOMA BRAIN TUMOR (HCC): ICD-10-CM

## 2019-10-23 LAB
BASOPHILS # BLD AUTO: 0.03 10*3/MM3 (ref 0–0.2)
BASOPHILS NFR BLD AUTO: 0.5 % (ref 0–1.5)
DEPRECATED RDW RBC AUTO: 40.2 FL (ref 37–54)
EOSINOPHIL # BLD AUTO: 0.87 10*3/MM3 (ref 0–0.4)
EOSINOPHIL NFR BLD AUTO: 15.8 % (ref 0.3–6.2)
ERYTHROCYTE [DISTWIDTH] IN BLOOD BY AUTOMATED COUNT: 12.2 % (ref 12.3–15.4)
HCT VFR BLD AUTO: 43.6 % (ref 37.5–51)
HGB BLD-MCNC: 15.3 G/DL (ref 13–17.7)
IMM GRANULOCYTES # BLD AUTO: 0.1 10*3/MM3 (ref 0–0.05)
IMM GRANULOCYTES NFR BLD AUTO: 1.8 % (ref 0–0.5)
INR PPP: 1.9 (ref 0.9–1.1)
LYMPHOCYTES # BLD AUTO: 1.31 10*3/MM3 (ref 0.7–3.1)
LYMPHOCYTES NFR BLD AUTO: 23.8 % (ref 19.6–45.3)
MCH RBC QN AUTO: 31.6 PG (ref 26.6–33)
MCHC RBC AUTO-ENTMCNC: 35.1 G/DL (ref 31.5–35.7)
MCV RBC AUTO: 90.1 FL (ref 79–97)
MONOCYTES # BLD AUTO: 0.77 10*3/MM3 (ref 0.1–0.9)
MONOCYTES NFR BLD AUTO: 14 % (ref 5–12)
NEUTROPHILS # BLD AUTO: 2.43 10*3/MM3 (ref 1.7–7)
NEUTROPHILS NFR BLD AUTO: 44.1 % (ref 42.7–76)
NRBC BLD AUTO-RTO: 0 /100 WBC (ref 0–0.2)
PLATELET # BLD AUTO: 251 10*3/MM3 (ref 140–450)
PMV BLD AUTO: 9.4 FL (ref 6–12)
PROTHROMBIN TIME: 23.4 SECONDS (ref 11–13.5)
RBC # BLD AUTO: 4.84 10*6/MM3 (ref 4.14–5.8)
WBC NRBC COR # BLD: 5.51 10*3/MM3 (ref 3.4–10.8)

## 2019-10-23 PROCEDURE — 36415 COLL VENOUS BLD VENIPUNCTURE: CPT

## 2019-10-23 PROCEDURE — 90674 CCIIV4 VAC NO PRSV 0.5 ML IM: CPT | Performed by: INTERNAL MEDICINE

## 2019-10-23 PROCEDURE — 85025 COMPLETE CBC W/AUTO DIFF WBC: CPT

## 2019-10-23 PROCEDURE — 90471 IMMUNIZATION ADMIN: CPT | Performed by: INTERNAL MEDICINE

## 2019-10-23 PROCEDURE — 85610 PROTHROMBIN TIME: CPT

## 2019-10-23 NOTE — PROGRESS NOTES
Pt is here for lab with RN review.  CBC reviewed with pt, counts are stable for this pt at this time. Pt has no complaints. He continues to take Temodar as prescribed along with Bactrim, Acyclovir and a Medrol dose mauricio.   INR 1.9. Prior dose confirmed, Per Bonita Herrera NP pt is to take 10 mg of Coumadin on Thur and 7.5 mg all other days.  Copy of labs given to pt and f/u appt reviewed. Pt is instructed to call the office with any concerns or new symptoms prior to next visit. Pt vu.   Lab Results   Component Value Date    WBC 5.51 10/23/2019    HGB 15.3 10/23/2019    HCT 43.6 10/23/2019    MCV 90.1 10/23/2019     10/23/2019

## 2019-11-20 ENCOUNTER — APPOINTMENT (OUTPATIENT)
Dept: ONCOLOGY | Facility: HOSPITAL | Age: 34
End: 2019-11-20

## 2019-11-20 ENCOUNTER — APPOINTMENT (OUTPATIENT)
Dept: LAB | Facility: HOSPITAL | Age: 34
End: 2019-11-20

## 2019-11-21 ENCOUNTER — CLINICAL SUPPORT (OUTPATIENT)
Dept: ONCOLOGY | Facility: HOSPITAL | Age: 34
End: 2019-11-21

## 2019-11-21 ENCOUNTER — LAB (OUTPATIENT)
Dept: OTHER | Facility: HOSPITAL | Age: 34
End: 2019-11-21

## 2019-11-21 VITALS
WEIGHT: 178.8 LBS | TEMPERATURE: 98 F | HEART RATE: 57 BPM | DIASTOLIC BLOOD PRESSURE: 74 MMHG | OXYGEN SATURATION: 98 % | RESPIRATION RATE: 16 BRPM | SYSTOLIC BLOOD PRESSURE: 119 MMHG | BODY MASS INDEX: 25.6 KG/M2

## 2019-11-21 DIAGNOSIS — C71.9 ASTROCYTOMA BRAIN TUMOR (HCC): ICD-10-CM

## 2019-11-21 LAB
BASOPHILS # BLD AUTO: 0.02 10*3/MM3 (ref 0–0.2)
BASOPHILS NFR BLD AUTO: 0.4 % (ref 0–1.5)
DEPRECATED RDW RBC AUTO: 39 FL (ref 37–54)
EOSINOPHIL # BLD AUTO: 0.41 10*3/MM3 (ref 0–0.4)
EOSINOPHIL NFR BLD AUTO: 9.1 % (ref 0.3–6.2)
ERYTHROCYTE [DISTWIDTH] IN BLOOD BY AUTOMATED COUNT: 12.1 % (ref 12.3–15.4)
HCT VFR BLD AUTO: 40 % (ref 37.5–51)
HGB BLD-MCNC: 14 G/DL (ref 13–17.7)
IMM GRANULOCYTES # BLD AUTO: 0.11 10*3/MM3 (ref 0–0.05)
IMM GRANULOCYTES NFR BLD AUTO: 2.4 % (ref 0–0.5)
INR PPP: 1.6
LYMPHOCYTES # BLD AUTO: 1.47 10*3/MM3 (ref 0.7–3.1)
LYMPHOCYTES NFR BLD AUTO: 32.5 % (ref 19.6–45.3)
MCH RBC QN AUTO: 30.9 PG (ref 26.6–33)
MCHC RBC AUTO-ENTMCNC: 35 G/DL (ref 31.5–35.7)
MCV RBC AUTO: 88.3 FL (ref 79–97)
MONOCYTES # BLD AUTO: 0.56 10*3/MM3 (ref 0.1–0.9)
MONOCYTES NFR BLD AUTO: 12.4 % (ref 5–12)
NEUTROPHILS # BLD AUTO: 1.96 10*3/MM3 (ref 1.7–7)
NEUTROPHILS NFR BLD AUTO: 43.2 % (ref 42.7–76)
NRBC BLD AUTO-RTO: 0 /100 WBC (ref 0–0.2)
PLATELET # BLD AUTO: 257 10*3/MM3 (ref 140–450)
PMV BLD AUTO: 9.4 FL (ref 6–12)
PROTHROMBIN TIME: 19.6 SECONDS (ref 11–15)
RBC # BLD AUTO: 4.53 10*6/MM3 (ref 4.14–5.8)
WBC NRBC COR # BLD: 4.53 10*3/MM3 (ref 3.4–10.8)

## 2019-11-21 PROCEDURE — 36415 COLL VENOUS BLD VENIPUNCTURE: CPT

## 2019-11-21 PROCEDURE — 85610 PROTHROMBIN TIME: CPT

## 2019-11-21 PROCEDURE — 85025 COMPLETE CBC W/AUTO DIFF WBC: CPT | Performed by: INTERNAL MEDICINE

## 2019-11-21 NOTE — NURSING NOTE
INR reviewed with More RIVERA and dose increased to 62.5 mg weekly and will return on Thursday next week for PT and INR recheck also a RN review  Lab Results   Component Value Date    INR 1.60 11/21/2019    INR 1.90 (H) 10/23/2019    INR 2.60 (H) 09/25/2019    PROTIME 19.6 (H) 11/21/2019    PROTIME 23.4 (H) 10/23/2019    PROTIME 31.6 (H) 09/25/2019

## 2019-11-26 DIAGNOSIS — D68.51 FACTOR 5 LEIDEN MUTATION, HETEROZYGOUS (HCC): ICD-10-CM

## 2019-11-26 DIAGNOSIS — Z86.711 HISTORY OF PULMONARY EMBOLUS (PE): ICD-10-CM

## 2019-11-26 DIAGNOSIS — C71.9 ASTROCYTOMA BRAIN TUMOR (HCC): Primary | ICD-10-CM

## 2019-11-27 ENCOUNTER — CLINICAL SUPPORT (OUTPATIENT)
Dept: ONCOLOGY | Facility: HOSPITAL | Age: 34
End: 2019-11-27

## 2019-11-27 ENCOUNTER — LAB (OUTPATIENT)
Dept: OTHER | Facility: HOSPITAL | Age: 34
End: 2019-11-27

## 2019-11-27 VITALS
WEIGHT: 177 LBS | OXYGEN SATURATION: 100 % | DIASTOLIC BLOOD PRESSURE: 78 MMHG | BODY MASS INDEX: 25.34 KG/M2 | RESPIRATION RATE: 12 BRPM | TEMPERATURE: 98.2 F | HEART RATE: 64 BPM | SYSTOLIC BLOOD PRESSURE: 126 MMHG

## 2019-11-27 DIAGNOSIS — Z86.711 HISTORY OF PULMONARY EMBOLUS (PE): ICD-10-CM

## 2019-11-27 DIAGNOSIS — C71.9 ASTROCYTOMA BRAIN TUMOR (HCC): ICD-10-CM

## 2019-11-27 DIAGNOSIS — D68.51 FACTOR 5 LEIDEN MUTATION, HETEROZYGOUS (HCC): ICD-10-CM

## 2019-11-27 LAB
DEPRECATED RDW RBC AUTO: 38 FL (ref 37–54)
EOSINOPHIL # BLD MANUAL: 0.45 10*3/MM3 (ref 0–0.4)
EOSINOPHIL NFR BLD MANUAL: 9 % (ref 0.3–6.2)
ERYTHROCYTE [DISTWIDTH] IN BLOOD BY AUTOMATED COUNT: 11.9 % (ref 12.3–15.4)
HCT VFR BLD AUTO: 41.3 % (ref 37.5–51)
HGB BLD-MCNC: 14.6 G/DL (ref 13–17.7)
INR PPP: 2.4
LYMPHOCYTES # BLD MANUAL: 1.09 10*3/MM3 (ref 0.7–3.1)
LYMPHOCYTES NFR BLD MANUAL: 10 % (ref 5–12)
LYMPHOCYTES NFR BLD MANUAL: 22 % (ref 19.6–45.3)
MCH RBC QN AUTO: 31.1 PG (ref 26.6–33)
MCHC RBC AUTO-ENTMCNC: 35.4 G/DL (ref 31.5–35.7)
MCV RBC AUTO: 87.9 FL (ref 79–97)
MONOCYTES # BLD AUTO: 0.5 10*3/MM3 (ref 0.1–0.9)
NEUTROPHILS # BLD AUTO: 2.88 10*3/MM3 (ref 1.7–7)
NEUTROPHILS NFR BLD MANUAL: 58 % (ref 42.7–76)
PLAT MORPH BLD: NORMAL
PLATELET # BLD AUTO: 225 10*3/MM3 (ref 140–450)
PMV BLD AUTO: 9.5 FL (ref 6–12)
PROTHROMBIN TIME: 28.6 SECONDS (ref 11–15)
RBC # BLD AUTO: 4.7 10*6/MM3 (ref 4.14–5.8)
RBC MORPH BLD: NORMAL
SCAN SLIDE: NORMAL
VARIANT LYMPHS NFR BLD MANUAL: 1 % (ref 0–5)
WBC MORPH BLD: NORMAL
WBC NRBC COR # BLD: 4.97 10*3/MM3 (ref 3.4–10.8)

## 2019-11-27 PROCEDURE — 85025 COMPLETE CBC W/AUTO DIFF WBC: CPT | Performed by: INTERNAL MEDICINE

## 2019-11-27 PROCEDURE — 36415 COLL VENOUS BLD VENIPUNCTURE: CPT

## 2019-11-27 PROCEDURE — 85610 PROTHROMBIN TIME: CPT

## 2019-11-27 PROCEDURE — 85007 BL SMEAR W/DIFF WBC COUNT: CPT | Performed by: INTERNAL MEDICINE

## 2019-11-27 NOTE — NURSING NOTE
Arrived per amb. for  PT/INR . Denies concerns. There are no changes made as per ACELIS. He will continue with 10 mgm on Sunday, Tuesday, Thursday, Saturday and 7.5 mgm on Monday, Wednesday, and Friday. F/U appointment reviewed and informed to call for any questions and/ or concerns. Copy of ACELIS instructions given.  Lab Results   Component Value Date    INR 2.40 11/27/2019    INR 1.60 11/21/2019    INR 1.90 (H) 10/23/2019    PROTIME 28.6 (H) 11/27/2019    PROTIME 19.6 (H) 11/21/2019    PROTIME 23.4 (H) 10/23/2019

## 2019-12-06 DIAGNOSIS — K21.9 GASTROESOPHAGEAL REFLUX DISEASE WITHOUT ESOPHAGITIS: Primary | Chronic | ICD-10-CM

## 2019-12-06 DIAGNOSIS — I26.99 PULMONARY EMBOLISM WITH INFARCTION (HCC): Primary | ICD-10-CM

## 2019-12-06 RX ORDER — FAMOTIDINE 40 MG/1
40 TABLET, FILM COATED ORAL DAILY
Qty: 90 TABLET | Refills: 1 | Status: SHIPPED | OUTPATIENT
Start: 2019-12-06 | End: 2020-03-13 | Stop reason: SDDI

## 2019-12-06 RX ORDER — WARFARIN SODIUM 5 MG/1
TABLET ORAL
Qty: 90 TABLET | Refills: 1 | Status: SHIPPED | OUTPATIENT
Start: 2019-12-06 | End: 2020-03-02

## 2019-12-06 RX ORDER — WARFARIN SODIUM 5 MG/1
TABLET ORAL
Qty: 60 TABLET | Refills: 1 | Status: CANCELLED | OUTPATIENT
Start: 2019-12-06

## 2019-12-06 NOTE — TELEPHONE ENCOUNTER
I have changed his Zantac (Ranitidine) to Pepcid (Famotidine) 40mg daily, Rx sent to pharmacy. Please refill Warfarin if not already done. Thanks.

## 2019-12-06 NOTE — TELEPHONE ENCOUNTER
Pt called to get a refill on Warfarin 5 mg tablet and he needs a alternative for the Ranitidine 150 mg capsule as the one he is currently taking has been recalled.    Pharmacy confirmed [facility] [address].

## 2019-12-09 RX ORDER — TEMOZOLOMIDE 100 MG/1
CAPSULE ORAL
Qty: 20 CAPSULE | Refills: 0 | Status: SHIPPED | OUTPATIENT
Start: 2019-12-09 | End: 2019-12-16

## 2019-12-09 NOTE — TELEPHONE ENCOUNTER
Temozolomide refill request rec from [facility]. Per last office note in September from Dr Jefferson-pt will continue with 3 more cycles (Oct, Nov, and Dec) then scans. Request approved.

## 2019-12-12 ENCOUNTER — HOSPITAL ENCOUNTER (OUTPATIENT)
Dept: MRI IMAGING | Facility: HOSPITAL | Age: 34
Discharge: HOME | End: 2019-12-12
Admitting: INTERNAL MEDICINE

## 2019-12-12 DIAGNOSIS — C71.9 ASTROCYTOMA BRAIN TUMOR (HCC): ICD-10-CM

## 2019-12-12 PROCEDURE — A9577 INJ MULTIHANCE: HCPCS | Performed by: INTERNAL MEDICINE

## 2019-12-12 PROCEDURE — 82565 ASSAY OF CREATININE: CPT

## 2019-12-12 PROCEDURE — 70553 MRI BRAIN STEM W/O & W/DYE: CPT

## 2019-12-12 PROCEDURE — 0 GADOBENATE DIMEGLUMINE 529 MG/ML SOLUTION: Performed by: INTERNAL MEDICINE

## 2019-12-12 RX ADMIN — GADOBENATE DIMEGLUMINE 16 ML: 529 INJECTION, SOLUTION INTRAVENOUS at 08:22

## 2019-12-13 LAB — CREAT BLDA-MCNC: 0.9 MG/DL (ref 0.6–1.3)

## 2019-12-16 ENCOUNTER — LAB (OUTPATIENT)
Dept: LAB | Facility: HOSPITAL | Age: 34
End: 2019-12-16

## 2019-12-16 ENCOUNTER — OFFICE VISIT (OUTPATIENT)
Dept: ONCOLOGY | Facility: CLINIC | Age: 34
End: 2019-12-16

## 2019-12-16 VITALS
BODY MASS INDEX: 25.67 KG/M2 | TEMPERATURE: 97.3 F | HEIGHT: 70 IN | RESPIRATION RATE: 16 BRPM | SYSTOLIC BLOOD PRESSURE: 138 MMHG | DIASTOLIC BLOOD PRESSURE: 87 MMHG | WEIGHT: 179.3 LBS | HEART RATE: 78 BPM | OXYGEN SATURATION: 97 %

## 2019-12-16 DIAGNOSIS — Z86.711 HISTORY OF PULMONARY EMBOLUS (PE): ICD-10-CM

## 2019-12-16 DIAGNOSIS — C71.1 ANAPLASTIC ASTROCYTOMA OF FRONTAL LOBE (HCC): Primary | ICD-10-CM

## 2019-12-16 DIAGNOSIS — C71.9 ASTROCYTOMA BRAIN TUMOR (HCC): ICD-10-CM

## 2019-12-16 LAB
ALBUMIN SERPL-MCNC: 4.7 G/DL (ref 3.5–5.2)
ALBUMIN/GLOB SERPL: 1.8 G/DL (ref 1.1–2.4)
ALP SERPL-CCNC: 54 U/L (ref 38–116)
ALT SERPL W P-5'-P-CCNC: 18 U/L (ref 0–41)
ANION GAP SERPL CALCULATED.3IONS-SCNC: 11.4 MMOL/L (ref 5–15)
AST SERPL-CCNC: 17 U/L (ref 0–40)
BASOPHILS # BLD AUTO: 0.03 10*3/MM3 (ref 0–0.2)
BASOPHILS NFR BLD AUTO: 0.7 % (ref 0–1.5)
BILIRUB SERPL-MCNC: 0.2 MG/DL (ref 0.2–1.2)
BUN BLD-MCNC: 15 MG/DL (ref 6–20)
BUN/CREAT SERPL: 17 (ref 7.3–30)
CALCIUM SPEC-SCNC: 9.4 MG/DL (ref 8.5–10.2)
CHLORIDE SERPL-SCNC: 104 MMOL/L (ref 98–107)
CO2 SERPL-SCNC: 26.6 MMOL/L (ref 22–29)
CREAT BLD-MCNC: 0.88 MG/DL (ref 0.7–1.3)
DEPRECATED RDW RBC AUTO: 38.6 FL (ref 37–54)
EOSINOPHIL # BLD AUTO: 0.22 10*3/MM3 (ref 0–0.4)
EOSINOPHIL NFR BLD AUTO: 5.3 % (ref 0.3–6.2)
ERYTHROCYTE [DISTWIDTH] IN BLOOD BY AUTOMATED COUNT: 11.8 % (ref 12.3–15.4)
GFR SERPL CREATININE-BSD FRML MDRD: 98 ML/MIN/1.73
GLOBULIN UR ELPH-MCNC: 2.6 GM/DL (ref 1.8–3.5)
GLUCOSE BLD-MCNC: 98 MG/DL (ref 74–124)
HCT VFR BLD AUTO: 44.4 % (ref 37.5–51)
HGB BLD-MCNC: 15.3 G/DL (ref 13–17.7)
IMM GRANULOCYTES # BLD AUTO: 0.04 10*3/MM3 (ref 0–0.05)
IMM GRANULOCYTES NFR BLD AUTO: 1 % (ref 0–0.5)
INR PPP: 3.7 (ref 0.9–1.1)
LYMPHOCYTES # BLD AUTO: 1.25 10*3/MM3 (ref 0.7–3.1)
LYMPHOCYTES NFR BLD AUTO: 30.3 % (ref 19.6–45.3)
MCH RBC QN AUTO: 31.1 PG (ref 26.6–33)
MCHC RBC AUTO-ENTMCNC: 34.5 G/DL (ref 31.5–35.7)
MCV RBC AUTO: 90.2 FL (ref 79–97)
MONOCYTES # BLD AUTO: 0.34 10*3/MM3 (ref 0.1–0.9)
MONOCYTES NFR BLD AUTO: 8.3 % (ref 5–12)
NEUTROPHILS # BLD AUTO: 2.24 10*3/MM3 (ref 1.7–7)
NEUTROPHILS NFR BLD AUTO: 54.4 % (ref 42.7–76)
NRBC BLD AUTO-RTO: 0 /100 WBC (ref 0–0.2)
PLATELET # BLD AUTO: 236 10*3/MM3 (ref 140–450)
PMV BLD AUTO: 9.1 FL (ref 6–12)
POTASSIUM BLD-SCNC: 4.7 MMOL/L (ref 3.5–4.7)
PROT SERPL-MCNC: 7.3 G/DL (ref 6.3–8)
PROTHROMBIN TIME: 44.1 SECONDS (ref 11–13.5)
RBC # BLD AUTO: 4.92 10*6/MM3 (ref 4.14–5.8)
SODIUM BLD-SCNC: 142 MMOL/L (ref 134–145)
WBC NRBC COR # BLD: 4.12 10*3/MM3 (ref 3.4–10.8)

## 2019-12-16 PROCEDURE — 85610 PROTHROMBIN TIME: CPT

## 2019-12-16 PROCEDURE — 85025 COMPLETE CBC W/AUTO DIFF WBC: CPT

## 2019-12-16 PROCEDURE — 36415 COLL VENOUS BLD VENIPUNCTURE: CPT

## 2019-12-16 PROCEDURE — 99215 OFFICE O/P EST HI 40 MIN: CPT | Performed by: INTERNAL MEDICINE

## 2019-12-16 PROCEDURE — 80053 COMPREHEN METABOLIC PANEL: CPT

## 2019-12-16 NOTE — PROGRESS NOTES
[facility] OUTPATIENT FOLLOW UP CLINIC VISIT    REASON FOR FOLLOW-UP:    1.  Left lower extremity DVT with progression of symptoms and extension of the left lower extremity DVT into the femoral vein from the popliteal/calf vein swallowing for next set.  Currently anticoagulated with warfarin.  2.  Right lower extremity DVT noted in the common femoral, profunda femoral, and calf veins  3.  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  Other thrombophilia labs negative.    4.  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  NOT 1p19q co-deleted.    5.  Radiation initiated on 7/31/2018.  Plan for Temodar ×1 year following radiation.  6.  4500 cGy in 25 fractions administered from 7/31/2018 through 9/14/2018  7.  MRI brain 10/23/2018 with resolving hemorrhage in the resection cavity.  However, there is hyperintensity measuring 4.6 x 4.3 x 3 cm along the margins of the resection cavity.  8.  Repeat venous duplex on 10/23 with chronic right CVT and chronic LLE DVT from the mid femoral vein distally.     9.  He initiated adjuvant therapy with Temodar in mid October 2018.  10.  MRI brain 12/3/2018 with stable findings.  11.  On 1/24/2019 he did have a repeat resection by Dr. Galvan.  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  However, there was no evidence of progression to a higher grade in the new resected specimen.  Mostly the proliferative activity was very low with only rare mitoses and a low Ki-67 of just 2-3%.  12.  He resumed Temodar      HISTORY OF PRESENT ILLNESS:  Bryan Valadez is a 34 y.o. male who returns today for follow up of the above issues.     He completed his Temodar and is now off of this as well as his antimicrobials.  He feels well.  No bleeding.  No cognitive issues.  No headaches.    ONCOLOGIC HISTORY:  He had about 6 months of headaches treated as sinusitis and presented on 6/15  with visual changes and headache and was found to have a large right frontal mass which was resected on 6/16 by Dr. Galvan and consistent with a glioma.  Pathology was reviewed at [facility] showing infiltrating glioma.  Large 10 cm partially cystic and solid lobulated tumor mass at diagnosis.  Negative IDH1-R132H immunostain excludes the most common IDH1 mutation; however loss of ATRX expression suggests possibility of glioma harboring another less common IDH1 mutation.  p53 overexpressed.  Ki67 10% but variable.  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  There was no evidence for a 1p19q co-deletion.       Chromosome microarray showed a complex molecular karyotype including loss of 1q, loss of 2q, gain of 7q, gain of 8q, loss of 9p, REBECCA of 17p, and gain of 18p.  These abnormalities are typically associated with  IDH-mutant astrocytic gliomas.  No 1p and 19q whole arm co-deletion was noted.  CT head on 7/1 c/w residual tumor circumscribing the resection cavity and a remote cerebellar hemisphere hemorrhage.       He was discharged and subsequently admitted with left leg pain and chest pain, with LLE popliteal and calf vein clot noted and bilateral small PE. He was treated with heparin and discharged on Pradaxa.     He developed worsening leg pain up into the thigh and presented to the ER where a venous duplex showed progression of the clot to the femoral vein.  He was given Lovenox and admitted.  Warfarin was initiated.       A partial thrombophilia evaluation showed that he is a heterozygote for the factor V Leiden mutation.  Labs otherwise unremarkable.    He was subsequently found to have a right lower extremity DVT on 7/8/2018.    He remains anticoagulated with warfarin.    Radiation initiated on 7/31/2018.  Plan for Temodar ×1 year after radiation is complete.    Radiation complete as of 9/14/2018.    4500 cGy in 25 fractions administered from 7/31/2018 through 9/14/2018    MRI brain 10/23/2018 with  resolving hemorrhage in the resection cavity.  However, there is hyperintensity measuring 4.6 x 4.3 x 3 cm along the margins of the resection cavity.    Repeat venous duplex on 10/23 with chronic right CVT and chronic LLE DVT from the mid femoral vein distally.      Repeat brain MRI on 12/3/2018 with stable findings.    On 1/24/2019 he did have a repeat resection by Dr. Galvan.  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  However, there was no evidence of progression to a higher grade in the new resected specimen.  Mostly the proliferative activity was very low with only rare mitoses and a low Ki-67 of just 2-3%.    MRI brain's been stable including an MRI brain from 9/23/2019.    As of 9/25/2019 due to delays in therapy from his resection on 1/24/2019 we will go ahead and proceed with 3 more months of Temodar and he will complete therapy before the end of the year in early December.  We will plan for an MRI then in mid to late December and if everything appears stable we will proceed with observation at that time.    Follow-up brain imaging on 12/12/2019 with some improvement.  Potential for residual tumor.  Proceed with observation.  MRI in 3 months.      ALLERGIES:  Allergies   Allergen Reactions   • Avocado Itching   • Other Itching     Insect stings: Bee stings, throat swelling (has Epi pen)    Allergy to fruit, Avocado, Cherry Tomato (can have blue berries)   • Tomato Itching     Cherry tomato       MEDICATIONS:  The medication list has been reviewed with the patient by the medical assistant, and the list has been updated in the electronic medical record, which I reviewed.  Medication dosages and frequencies were confirmed to be accurate.    REVIEW OF SYSTEMS:  PAIN:  See Vital Signs below.  GENERAL:  No fevers, chills, night sweats, or unintended weight loss.  SKIN: Urticaria has resolved  HEME/LYMPH:  No abnormal bleeding.  No palpable lymphadenopathy.  EYES:  No vision changes or  "diplopia.  ENT:  No sore throat or difficulty swallowing.  RESPIRATORY:  No cough, shortness of breath, hemoptysis, or wheezing.  CARDIOVASCULAR:  No chest pain, palpitations, orthopnea, or dyspnea on exertion.  GASTROINTESTINAL:  No abdominal pain, nausea, vomiting, constipation, diarrhea, melena, or hematochezia.  GENITOURINARY:  No dysuria or hematuria.  MUSCULOSKELETAL:  No joint pain, swelling, or erythema.  Muscle cramping improved  NEUROLOGIC:  No dizziness, loss of consciousness, or seizures.  PSYCHIATRIC:  No depression, anxiety, or mood changes.    Vitals:    12/16/19 1055   BP: 138/87   Pulse: 78   Resp: 16   Temp: 97.3 °F (36.3 °C)   TempSrc: Oral   SpO2: 97%   Weight: 81.3 kg (179 lb 4.8 oz)   Height: 178 cm (70.08\")   PainSc: 0-No pain  Comment: atrocytoma brain tumor       PHYSICAL EXAMINATION:  GENERAL:  Well-developed well-nourished male; awake, alert and oriented, in no acute distress.  SKIN: No rash today.  Healed surgical incision on the scalp.  HEAD:  Normocephalic, Well-healed surgical incision present.   Conjunctivae normal.  EARS:  Hearing intact.  NOSE:  Septum midline.  No excoriations or nasal discharge.  MOUTH:  No stomatitis or ulcers.  Lips are normal.  THROAT:  Oropharynx without lesions or exudates.  LYMPHATICS:  No cervical, supraclavicular, axillary lymphadenopathy.  CHEST:  Lungs are clear to auscultation bilaterally.  No wheezes, rales, or rhonchi.  HEART:  Regular rate; normal rhythm.  No murmurs, gallops or rubs.  ABDOMEN:  Not examined today  EXTREMITIES:  No clubbing, cyanosis, or edema.  NEUROLOGICAL:  No focal neurologic deficits.    DIAGNOSTIC DATA:  Results for orders placed or performed in visit on 12/16/19   Protime-INR, Fingerstick   Result Value Ref Range    Protime 44.1 (H) 11.0 - 13.5 Seconds    INR 3.70 (H) 0.90 - 1.10   CBC Auto Differential   Result Value Ref Range    WBC 4.12 3.40 - 10.80 10*3/mm3    RBC 4.92 4.14 - 5.80 10*6/mm3    Hemoglobin 15.3 13.0 - 17.7 " g/dL    Hematocrit 44.4 37.5 - 51.0 %    MCV 90.2 79.0 - 97.0 fL    MCH 31.1 26.6 - 33.0 pg    MCHC 34.5 31.5 - 35.7 g/dL    RDW 11.8 (L) 12.3 - 15.4 %    RDW-SD 38.6 37.0 - 54.0 fl    MPV 9.1 6.0 - 12.0 fL    Platelets 236 140 - 450 10*3/mm3    Neutrophil % 54.4 42.7 - 76.0 %    Lymphocyte % 30.3 19.6 - 45.3 %    Monocyte % 8.3 5.0 - 12.0 %    Eosinophil % 5.3 0.3 - 6.2 %    Basophil % 0.7 0.0 - 1.5 %    Immature Grans % 1.0 (H) 0.0 - 0.5 %    Neutrophils, Absolute 2.24 1.70 - 7.00 10*3/mm3    Lymphocytes, Absolute 1.25 0.70 - 3.10 10*3/mm3    Monocytes, Absolute 0.34 0.10 - 0.90 10*3/mm3    Eosinophils, Absolute 0.22 0.00 - 0.40 10*3/mm3    Basophils, Absolute 0.03 0.00 - 0.20 10*3/mm3    Immature Grans, Absolute 0.04 0.00 - 0.05 10*3/mm3    nRBC 0.0 0.0 - 0.2 /100 WBC       IMAGING:    MRI brain images from 12/12/2019 images personally reviewed.  Stable findings with some decreased intensity of the abnormal uptake at the margins of the resection cavity.    IMPRESSION:  1. No significant change over 3 prior MRIs dating back to 04/29/2019.  2. Initially back on 06/17/2018, patient had 10 x 7 cm right  frontoparietal craniotomy for substantial surgical debulking of a 10 x 7  x 7 cm superior right frontal lobe anaplastic astrocytoma that had a  large area of central necrosis and a solid periphery which partially  enhanced following contrast and was diffusion hyperintense. Patient had  a subsequent repeat right frontoparietal craniotomy on 01/25/2018 for  further surgical debulking of residual tumor. Currently, there is a  large superior right frontal lobe resection cavity that measures 6.8 x  4.4 x 4.5 cm. There is no discernible residual or recurrent enhancing  high-grade tumor along the margins of the surgical resection cavity.  There are multifocal areas of FLAIR and T2 hyperintensity along the  posterior as well as the anterior inferior and also the inferior margin  of the resection cavity as described in  extensive detail above, some of  which represents gliosis, while a good portion of it may represent  residual nonenhancing tumor, and certainly the diffusion hyperintense  foci within the FLAIR hyperintensity are likely areas of residual  nonenhancing tumor. Overall, the diffusion hyperintense areas within the  FLAIR hyperintense foci along the margins of the resection cavity have  slightly decreased in size over the past 3 MRIs suggesting there may be  some response of the residual nonenhancing tumor to interval treatment.  Continued follow-up is suggested. The remainder of the MRI of the head  is normal.         ASSESSMENT:  This is a 34 y.o. male with:  1.  Bilateral lower extremity DVT: He remains on warfarin.  His INR today is a little high at 3.7.  He will decrease his warfarin dose per protocol.  Return monthly.    2.  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  NOT 1p19q co-deleted.  Overall, this is a good molecular profile.  I discussed his case with Dr. Galvan, Dr. Daley, and Dr. Dobbins and I reviewed NCCN guidelines.   He will require one year of adjuvant Temodar following radiation.  The big question was whether to administer concurrent therapy with Temodar along with radiation.  Ultimately, we decided not to do this given the overall good molecular profile and the potential adverse effects from concurrent therapy.  He did initiate radiation alone on 7/31/2018 and completed it on 9/14/2018.     He initiated adjuvant Temodar in mid October 2018.    Due to persistent abnormalities on MRI, on 1/24/2019 he did have a repeat resection by Dr. Galvan.  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  However, there was no evidence of progression to a higher grade in the new resected specimen.  Mostly the proliferative activity was very low with only rare mitoses and a low Ki-67 of just 2-3%.    He proceeds with Temodar at  400 mg daily for 5 out of 28 days.      MRI brain imaging from 9/23/2019 appears stable.    He completed 1 year of Temodar with an MRI on 12/12/2019 showing stable findings with some improvement in the intensity of the abnormal uptake.    3.  Given the possibility of infertility with treatment he banked sperm at the [facility] [place].  He has a 3-year-old and a 7-month-old now conceived naturally without the banked sperm.    4.  Elevated liver labs: Liver labs normalized.  Unclear reason.  Medication could have contributed.  Follow-up liver labs from today.    5.  History of zoster rash on the left face: He completed a course of Famvir.  We did continue acyclovir for prophylaxis while on Temodar which has not been stopped.    6.  Urticaria: He has significant environmental allergies related to pollens, etc.  He has not had any recent issues.    PLAN:  1.  Continue warfarin.  Monthly INRs.  2.  No further Temodar at this point  3.  MRI in about 3 months and I will see him back after that.

## 2019-12-16 NOTE — PROGRESS NOTES
Subjective   Patient ID: Bryan Valadez is a 34 y.o. male is here today for follow-up with a new Brain MRI. He has no complaints. He has history of 2 craniotomy's done 1/24/2019 and 6/16/2018 done by Dr. Galvan.    History of Present Illness    This patient returns today.  He is doing well.  He really does not have any symptoms at all.  He has no headaches and no other abnormalities.    The following portions of the patient's history were reviewed and updated as appropriate: allergies, current medications, past family history, past medical history, past social history, past surgical history and problem list.    Review of Systems   Eyes: Negative for visual disturbance.   Respiratory: Negative for chest tightness and shortness of breath.    Cardiovascular: Negative for chest pain.   Neurological: Negative for dizziness and headaches.   All other systems reviewed and are negative.      Objective   Physical Exam   Constitutional: He is oriented to person, place, and time. He appears well-developed and well-nourished.   Neurological: He is oriented to person, place, and time.     Neurologic Exam     Mental Status   Oriented to person, place, and time.       Assessment/Plan   Independent Review of Radiographic Studies:      I reviewed his MRI which was done on the 12th of this month.  This shows no obvious residual tumor at all.  There is a substantial resection cavity and some tenting of the anterior horn of the lateral ventricle.  This is compared to a scan that was done before his other surgery in January of this year which showed significant enhancing material in the lateral and posterior aspect of what is now the resection cavity.    Medical Decision Making:      I told the patient that from my point of view I really do not think we need to scan him again for about 6 months but the oncologist has already ordered an MRI from March 3.  I told him we would just see him back after that has been done.  At some point I  think we can start lengthening out the time between the scans.    Bryan was seen today for follow-up.    Diagnoses and all orders for this visit:    Anaplastic astrocytoma of frontal lobe (CMS/HCC)      Return in about 3 months (around 3/17/2020).

## 2019-12-17 ENCOUNTER — OFFICE VISIT (OUTPATIENT)
Dept: NEUROSURGERY | Facility: CLINIC | Age: 34
End: 2019-12-17

## 2019-12-17 VITALS — SYSTOLIC BLOOD PRESSURE: 130 MMHG | HEART RATE: 69 BPM | DIASTOLIC BLOOD PRESSURE: 94 MMHG

## 2019-12-17 DIAGNOSIS — C71.1 ANAPLASTIC ASTROCYTOMA OF FRONTAL LOBE (HCC): Primary | ICD-10-CM

## 2019-12-17 PROCEDURE — 99213 OFFICE O/P EST LOW 20 MIN: CPT | Performed by: NEUROLOGICAL SURGERY

## 2020-01-04 DIAGNOSIS — C71.1 ANAPLASTIC ASTROCYTOMA OF FRONTAL LOBE (HCC): ICD-10-CM

## 2020-01-06 RX ORDER — SULFAMETHOXAZOLE AND TRIMETHOPRIM 800; 160 MG/1; MG/1
TABLET ORAL
Qty: 36 TABLET | Refills: 3 | OUTPATIENT
Start: 2020-01-06

## 2020-01-14 ENCOUNTER — LAB (OUTPATIENT)
Dept: OTHER | Facility: HOSPITAL | Age: 35
End: 2020-01-14

## 2020-01-14 ENCOUNTER — CLINICAL SUPPORT (OUTPATIENT)
Dept: ONCOLOGY | Facility: HOSPITAL | Age: 35
End: 2020-01-14

## 2020-01-14 VITALS
DIASTOLIC BLOOD PRESSURE: 65 MMHG | OXYGEN SATURATION: 98 % | SYSTOLIC BLOOD PRESSURE: 115 MMHG | HEART RATE: 61 BPM | BODY MASS INDEX: 25.97 KG/M2 | WEIGHT: 181.4 LBS | TEMPERATURE: 97.9 F

## 2020-01-14 DIAGNOSIS — Z86.711 HISTORY OF PULMONARY EMBOLUS (PE): ICD-10-CM

## 2020-01-14 DIAGNOSIS — C71.1 ANAPLASTIC ASTROCYTOMA OF FRONTAL LOBE (HCC): ICD-10-CM

## 2020-01-14 LAB
INR PPP: 2.3
PROTHROMBIN TIME: 27.8 SECONDS (ref 11–15)

## 2020-01-14 PROCEDURE — 85610 PROTHROMBIN TIME: CPT

## 2020-01-14 NOTE — NURSING NOTE
Pt is here for lab with RN review.  INR on the low side of therapeutic at 2.3. Prior dose confirmed. Pt has no complaints, denies any active bleeding or excessive bruising, changes in diet or new medicaion. Copy of standing stone given to pt with dosing instructions reviewed.  F/u appt reviewed with pt and instructed to call the office with any concerns or new symptoms prior to next visit. Pt vu and discharged ambulatory    Lab Results   Component Value Date    INR 2.30 01/14/2020    INR 3.70 (H) 12/16/2019    INR 2.40 11/27/2019    PROTIME 27.8 (H) 01/14/2020    PROTIME 44.1 (H) 12/16/2019    PROTIME 28.6 (H) 11/27/2019

## 2020-01-22 DIAGNOSIS — J30.9 ALLERGIC RHINITIS: ICD-10-CM

## 2020-01-22 RX ORDER — MONTELUKAST SODIUM 10 MG/1
10 TABLET ORAL NIGHTLY
Qty: 90 TABLET | Refills: 3 | Status: SHIPPED | OUTPATIENT
Start: 2020-01-22 | End: 2020-01-27

## 2020-01-25 DIAGNOSIS — J30.9 ALLERGIC RHINITIS: ICD-10-CM

## 2020-01-25 DIAGNOSIS — M54.50 ACUTE BILATERAL LOW BACK PAIN WITHOUT SCIATICA: ICD-10-CM

## 2020-01-27 DIAGNOSIS — J30.9 ALLERGIC RHINITIS: ICD-10-CM

## 2020-01-27 RX ORDER — BACLOFEN 10 MG/1
TABLET ORAL
Qty: 270 TABLET | Refills: 1 | Status: SHIPPED | OUTPATIENT
Start: 2020-01-27 | End: 2020-03-13 | Stop reason: SDDI

## 2020-01-27 RX ORDER — MONTELUKAST SODIUM 10 MG/1
10 TABLET ORAL NIGHTLY
Qty: 90 TABLET | Refills: 3 | OUTPATIENT
Start: 2020-01-27

## 2020-01-27 RX ORDER — MONTELUKAST SODIUM 10 MG/1
TABLET ORAL
Qty: 90 TABLET | Refills: 3 | Status: SHIPPED | OUTPATIENT
Start: 2020-01-27 | End: 2020-02-05 | Stop reason: SDUPTHER

## 2020-02-05 DIAGNOSIS — J30.9 ALLERGIC RHINITIS: ICD-10-CM

## 2020-02-05 RX ORDER — MONTELUKAST SODIUM 10 MG/1
10 TABLET ORAL DAILY
Qty: 90 TABLET | Refills: 3 | Status: SHIPPED | OUTPATIENT
Start: 2020-02-05 | End: 2020-02-05 | Stop reason: SDUPTHER

## 2020-02-05 RX ORDER — MONTELUKAST SODIUM 10 MG/1
10 TABLET ORAL DAILY
Qty: 90 TABLET | Refills: 3 | Status: SHIPPED | OUTPATIENT
Start: 2020-02-05 | End: 2020-03-11 | Stop reason: SDUPTHER

## 2020-02-11 ENCOUNTER — OFFICE VISIT (OUTPATIENT)
Dept: ONCOLOGY | Facility: CLINIC | Age: 35
End: 2020-02-11

## 2020-02-11 ENCOUNTER — LAB (OUTPATIENT)
Dept: OTHER | Facility: HOSPITAL | Age: 35
End: 2020-02-11

## 2020-02-11 ENCOUNTER — APPOINTMENT (OUTPATIENT)
Dept: ONCOLOGY | Facility: HOSPITAL | Age: 35
End: 2020-02-11

## 2020-02-11 VITALS
RESPIRATION RATE: 18 BRPM | HEIGHT: 70 IN | BODY MASS INDEX: 26.13 KG/M2 | SYSTOLIC BLOOD PRESSURE: 116 MMHG | DIASTOLIC BLOOD PRESSURE: 77 MMHG | HEART RATE: 71 BPM | WEIGHT: 182.5 LBS | TEMPERATURE: 98.6 F | OXYGEN SATURATION: 99 %

## 2020-02-11 DIAGNOSIS — Z79.01 ANTICOAGULATION MANAGEMENT ENCOUNTER: Primary | ICD-10-CM

## 2020-02-11 DIAGNOSIS — Z86.711 HISTORY OF PULMONARY EMBOLUS (PE): ICD-10-CM

## 2020-02-11 DIAGNOSIS — Z51.81 ANTICOAGULATION MANAGEMENT ENCOUNTER: Primary | ICD-10-CM

## 2020-02-11 DIAGNOSIS — C71.1 ANAPLASTIC ASTROCYTOMA OF FRONTAL LOBE (HCC): ICD-10-CM

## 2020-02-11 LAB
INR PPP: 2
PROTHROMBIN TIME: 23.9 SECONDS (ref 11–15)

## 2020-02-11 PROCEDURE — 85610 PROTHROMBIN TIME: CPT

## 2020-02-11 PROCEDURE — 99212-NC PR NO CHARGE CBC OFFICE OUTPATIENT VISIT 10 MINUTES: Performed by: NURSE PRACTITIONER

## 2020-02-11 RX ORDER — RANITIDINE 150 MG/1
CAPSULE ORAL
COMMUNITY
Start: 2020-01-11 | End: 2020-02-11 | Stop reason: CLARIF

## 2020-02-11 NOTE — PROGRESS NOTES
Patient is here for a lab review.  His INR is at the low end of therapeutic at 2.0.  He is currently on 57.5 mg weekly of Coumadin.  He denies any bleeding, excess bruising, new swelling, or chest pain.  He is feeling quite well.  We will increase his Coumadin to 60 mg weekly.    He will return in 1 month as already scheduled.  I have asked him to call our office prior to this time with any new questions or concerns.  He has verbalized understanding.    Lab Results   Component Value Date    INR 2.00 02/11/2020    INR 2.30 01/14/2020    INR 3.70 (H) 12/16/2019    PROTIME 23.9 (H) 02/11/2020    PROTIME 27.8 (H) 01/14/2020    PROTIME 44.1 (H) 12/16/2019     NICOLE Stauffer

## 2020-02-27 ENCOUNTER — HOSPITAL ENCOUNTER (OUTPATIENT)
Dept: MRI IMAGING | Facility: HOSPITAL | Age: 35
Discharge: HOME | End: 2020-02-27
Admitting: INTERNAL MEDICINE

## 2020-02-27 DIAGNOSIS — Z86.711 HISTORY OF PULMONARY EMBOLUS (PE): ICD-10-CM

## 2020-02-27 DIAGNOSIS — C71.1 ANAPLASTIC ASTROCYTOMA OF FRONTAL LOBE (HCC): ICD-10-CM

## 2020-02-27 PROCEDURE — 82565 ASSAY OF CREATININE: CPT

## 2020-02-27 PROCEDURE — 0 GADOBENATE DIMEGLUMINE 529 MG/ML SOLUTION: Performed by: INTERNAL MEDICINE

## 2020-02-27 PROCEDURE — A9577 INJ MULTIHANCE: HCPCS | Performed by: INTERNAL MEDICINE

## 2020-02-27 PROCEDURE — 70553 MRI BRAIN STEM W/O & W/DYE: CPT

## 2020-02-27 RX ADMIN — GADOBENATE DIMEGLUMINE 16 ML: 529 INJECTION, SOLUTION INTRAVENOUS at 08:26

## 2020-02-28 LAB — CREAT BLDA-MCNC: 0.9 MG/DL (ref 0.6–1.3)

## 2020-03-02 ENCOUNTER — TELEPHONE (OUTPATIENT)
Dept: ONCOLOGY | Facility: HOSPITAL | Age: 35
End: 2020-03-02

## 2020-03-02 DIAGNOSIS — I26.99 PULMONARY EMBOLISM WITH INFARCTION (HCC): ICD-10-CM

## 2020-03-02 RX ORDER — WARFARIN SODIUM 5 MG/1
TABLET ORAL
Qty: 90 TABLET | Refills: 0 | Status: SHIPPED | OUTPATIENT
Start: 2020-03-02 | End: 2020-03-11 | Stop reason: SDUPTHER

## 2020-03-02 NOTE — TELEPHONE ENCOUNTER
Pt calling wanting to know his coumadin schedule from when he was last dosed on 2/11/2020.  He states that he is in [place] and had it wrote down at home, he just wants to make sure he is taking the correct dose.    I reviewed with patient that per his instructions on 2/11/2020 he should be taking Coumadin 10mg Sun/Tue/Sat and 7.5mg all other days.  The patient wrote this down and repeated information back.  No further questions or concerns.

## 2020-03-04 NOTE — PROGRESS NOTES
Subjective   Patient ID: Bryan Valadez is a 34 y.o. male is here today for follow-up on Anaplastic astrocytoma of frontal lobe. He has an MRI that was ordered by Dr. Jefferson.   He denies any headaches, dizziness or vision changes.     History of Present Illness     This patient has been doing okay.  He had a momentary lapse of memory a month or 2 ago when he was getting off the plane and forgot where he was.  His wife is very concerned about that and thinks he should have some occupational therapy.  Otherwise he feels pretty good.    The following portions of the patient's history were reviewed and updated as appropriate: allergies, current medications, past family history, past medical history, past social history, past surgical history and problem list.    Review of Systems   Eyes: Negative for visual disturbance.   Respiratory: Negative for chest tightness and shortness of breath.    Gastrointestinal: Negative for nausea.   Neurological: Negative for dizziness and headaches.       Objective   Physical Exam   Constitutional: He is oriented to person, place, and time. He appears well-developed and well-nourished.   Neurological: He is oriented to person, place, and time.     Neurologic Exam     Mental Status   Oriented to person, place, and time.       Assessment/Plan   Independent Review of Radiographic Studies:      I reviewed his MRI which was done on 27 February.  This shows no evidence of enhancement and no evidence of recurrent tumor.  There is an area of encephalomalacia as expected.    Medical Decision Making:      I told the patient about the imaging.  I told him and his wife that I think we can just scan him again in 6 months unless the oncologist wants one sooner.  We will also get him into see an occupational therapist.    Bryan was seen today for follow-up.    Diagnoses and all orders for this visit:    Anaplastic astrocytoma of frontal lobe (CMS/HCC)  -     MRI Brain With & Without Contrast;  Future  -     Ambulatory Referral to Occupational Therapy      Return in about 6 months (around 9/10/2020).

## 2020-03-10 ENCOUNTER — OFFICE VISIT (OUTPATIENT)
Dept: NEUROSURGERY | Facility: CLINIC | Age: 35
End: 2020-03-10

## 2020-03-10 VITALS — SYSTOLIC BLOOD PRESSURE: 119 MMHG | DIASTOLIC BLOOD PRESSURE: 81 MMHG | HEART RATE: 70 BPM

## 2020-03-10 DIAGNOSIS — C71.1 ANAPLASTIC ASTROCYTOMA OF FRONTAL LOBE (HCC): Primary | ICD-10-CM

## 2020-03-10 PROCEDURE — 99213 OFFICE O/P EST LOW 20 MIN: CPT | Performed by: NEUROLOGICAL SURGERY

## 2020-03-11 DIAGNOSIS — J30.9 ALLERGIC RHINITIS: ICD-10-CM

## 2020-03-11 DIAGNOSIS — I26.99 PULMONARY EMBOLISM WITH INFARCTION (HCC): ICD-10-CM

## 2020-03-12 RX ORDER — WARFARIN SODIUM 5 MG/1
TABLET ORAL
Qty: 90 TABLET | Refills: 1 | Status: SHIPPED | OUTPATIENT
Start: 2020-03-12 | End: 2020-04-09

## 2020-03-12 RX ORDER — MONTELUKAST SODIUM 10 MG/1
10 TABLET ORAL DAILY
Qty: 90 TABLET | Refills: 3 | Status: SHIPPED | OUTPATIENT
Start: 2020-03-12 | End: 2020-03-13 | Stop reason: SDUPTHER

## 2020-03-13 ENCOUNTER — OFFICE VISIT (OUTPATIENT)
Dept: ONCOLOGY | Facility: CLINIC | Age: 35
End: 2020-03-13

## 2020-03-13 ENCOUNTER — LAB (OUTPATIENT)
Dept: OTHER | Facility: HOSPITAL | Age: 35
End: 2020-03-13

## 2020-03-13 VITALS
SYSTOLIC BLOOD PRESSURE: 121 MMHG | RESPIRATION RATE: 16 BRPM | BODY MASS INDEX: 25.81 KG/M2 | HEIGHT: 70 IN | WEIGHT: 180.3 LBS | OXYGEN SATURATION: 99 % | HEART RATE: 80 BPM | TEMPERATURE: 97.9 F | DIASTOLIC BLOOD PRESSURE: 85 MMHG

## 2020-03-13 DIAGNOSIS — C71.1 ANAPLASTIC ASTROCYTOMA OF FRONTAL LOBE (HCC): ICD-10-CM

## 2020-03-13 DIAGNOSIS — Z86.711 HISTORY OF PULMONARY EMBOLUS (PE): ICD-10-CM

## 2020-03-13 DIAGNOSIS — C71.1 ANAPLASTIC ASTROCYTOMA OF FRONTAL LOBE (HCC): Primary | ICD-10-CM

## 2020-03-13 DIAGNOSIS — J30.9 ALLERGIC RHINITIS: ICD-10-CM

## 2020-03-13 LAB
BASOPHILS # BLD AUTO: 0.1 10*3/MM3 (ref 0–0.2)
BASOPHILS NFR BLD AUTO: 1.7 % (ref 0–1.5)
DEPRECATED RDW RBC AUTO: 36.8 FL (ref 37–54)
EOSINOPHIL # BLD AUTO: 0.12 10*3/MM3 (ref 0–0.4)
EOSINOPHIL NFR BLD AUTO: 2 % (ref 0.3–6.2)
ERYTHROCYTE [DISTWIDTH] IN BLOOD BY AUTOMATED COUNT: 11.7 % (ref 12.3–15.4)
HCT VFR BLD AUTO: 45.5 % (ref 37.5–51)
HGB BLD-MCNC: 15.7 G/DL (ref 13–17.7)
IMM GRANULOCYTES # BLD AUTO: 0.21 10*3/MM3 (ref 0–0.05)
IMM GRANULOCYTES NFR BLD AUTO: 3.5 % (ref 0–0.5)
INR PPP: 2.3
LYMPHOCYTES # BLD AUTO: 2.81 10*3/MM3 (ref 0.7–3.1)
LYMPHOCYTES NFR BLD AUTO: 47.2 % (ref 19.6–45.3)
MCH RBC QN AUTO: 29.7 PG (ref 26.6–33)
MCHC RBC AUTO-ENTMCNC: 34.5 G/DL (ref 31.5–35.7)
MCV RBC AUTO: 86.2 FL (ref 79–97)
MONOCYTES # BLD AUTO: 0.4 10*3/MM3 (ref 0.1–0.9)
MONOCYTES NFR BLD AUTO: 6.7 % (ref 5–12)
NEUTROPHILS # BLD AUTO: 2.31 10*3/MM3 (ref 1.7–7)
NEUTROPHILS NFR BLD AUTO: 38.9 % (ref 42.7–76)
NRBC BLD AUTO-RTO: 0 /100 WBC (ref 0–0.2)
PLAT MORPH BLD: NORMAL
PLATELET # BLD AUTO: 231 10*3/MM3 (ref 140–450)
PMV BLD AUTO: 10.2 FL (ref 6–12)
PROTHROMBIN TIME: 27.9 SECONDS (ref 11–15)
RBC # BLD AUTO: 5.28 10*6/MM3 (ref 4.14–5.8)
RBC MORPH BLD: NORMAL
WBC MORPH BLD: NORMAL
WBC NRBC COR # BLD: 5.95 10*3/MM3 (ref 3.4–10.8)

## 2020-03-13 PROCEDURE — 85610 PROTHROMBIN TIME: CPT

## 2020-03-13 PROCEDURE — 99214 OFFICE O/P EST MOD 30 MIN: CPT | Performed by: INTERNAL MEDICINE

## 2020-03-13 PROCEDURE — 85007 BL SMEAR W/DIFF WBC COUNT: CPT | Performed by: INTERNAL MEDICINE

## 2020-03-13 PROCEDURE — 85025 COMPLETE CBC W/AUTO DIFF WBC: CPT | Performed by: INTERNAL MEDICINE

## 2020-03-13 PROCEDURE — 36415 COLL VENOUS BLD VENIPUNCTURE: CPT

## 2020-03-13 RX ORDER — MONTELUKAST SODIUM 10 MG/1
10 TABLET ORAL DAILY
Qty: 90 TABLET | Refills: 3 | Status: SHIPPED | OUTPATIENT
Start: 2020-03-13 | End: 2020-03-14 | Stop reason: SDUPTHER

## 2020-03-13 NOTE — PROGRESS NOTES
[facility] OUTPATIENT FOLLOW UP CLINIC VISIT    REASON FOR FOLLOW-UP:    1.  Left lower extremity DVT with progression of symptoms and extension of the left lower extremity DVT into the femoral vein from the popliteal/calf vein swallowing for next set.  Currently anticoagulated with warfarin.  2.  Right lower extremity DVT noted in the common femoral, profunda femoral, and calf veins while anticoagulated with a DOAC.  Therefore, failure of DOAC.  3.  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  Other thrombophilia labs negative.    4.  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  NOT 1p19q co-deleted.    5.  Radiation initiated on 7/31/2018.  Plan for Temodar ×1 year following radiation.  6.  4500 cGy in 25 fractions administered from 7/31/2018 through 9/14/2018  7.  MRI brain 10/23/2018 with resolving hemorrhage in the resection cavity.  However, there is hyperintensity measuring 4.6 x 4.3 x 3 cm along the margins of the resection cavity.  8.  Repeat venous duplex on 10/23 with chronic right CVT and chronic LLE DVT from the mid femoral vein distally.     9.  He initiated adjuvant therapy with Temodar in mid October 2018.  10.  MRI brain 12/3/2018 with stable findings.  11.  On 1/24/2019 he did have a repeat resection by Dr. Galvan.  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  However, there was no evidence of progression to a higher grade in the new resected specimen.  Mostly the proliferative activity was very low with only rare mitoses and a low Ki-67 of just 2-3%.  12.  Temodar completed December 2019      HISTORY OF PRESENT ILLNESS:  Bryan Valadez is a 34 y.o. male who returns today for follow up of the above issues.     He continues to do well.  He did have one episode after flying a few weeks ago when he landed and was briefly disoriented.  This was transient and resolved.  No other  neurologic symptoms.    ONCOLOGIC HISTORY:  He had about 6 months of headaches treated as sinusitis and presented on 6/15 with visual changes and headache and was found to have a large right frontal mass which was resected on 6/16 by Dr. Galvan and consistent with a glioma.  Pathology was reviewed at [facility] showing infiltrating glioma.  Large 10 cm partially cystic and solid lobulated tumor mass at diagnosis.  Negative IDH1-R132H immunostain excludes the most common IDH1 mutation; however loss of ATRX expression suggests possibility of glioma harboring another less common IDH1 mutation.  p53 overexpressed.  Ki67 10% but variable.  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  There was no evidence for a 1p19q co-deletion.       Chromosome microarray showed a complex molecular karyotype including loss of 1q, loss of 2q, gain of 7q, gain of 8q, loss of 9p, REBECCA of 17p, and gain of 18p.  These abnormalities are typically associated with  IDH-mutant astrocytic gliomas.  No 1p and 19q whole arm co-deletion was noted.  CT head on 7/1 c/w residual tumor circumscribing the resection cavity and a remote cerebellar hemisphere hemorrhage.       He was discharged and subsequently admitted with left leg pain and chest pain, with LLE popliteal and calf vein clot noted and bilateral small PE. He was treated with heparin and discharged on Pradaxa.     He developed worsening leg pain up into the thigh and presented to the ER where a venous duplex showed progression of the clot to the femoral vein.  He was given Lovenox and admitted.  Warfarin was initiated.       A partial thrombophilia evaluation showed that he is a heterozygote for the factor V Leiden mutation.  Labs otherwise unremarkable.    He was subsequently found to have a right lower extremity DVT on 7/8/2018.    He remains anticoagulated with warfarin.    Radiation initiated on 7/31/2018.  Plan for Temodar ×1 year after radiation is complete.    Radiation complete as of  9/14/2018.    4500 cGy in 25 fractions administered from 7/31/2018 through 9/14/2018    MRI brain 10/23/2018 with resolving hemorrhage in the resection cavity.  However, there is hyperintensity measuring 4.6 x 4.3 x 3 cm along the margins of the resection cavity.    Repeat venous duplex on 10/23 with chronic right CVT and chronic LLE DVT from the mid femoral vein distally.      Repeat brain MRI on 12/3/2018 with stable findings.    On 1/24/2019 he did have a repeat resection by Dr. Galvan.  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  However, there was no evidence of progression to a higher grade in the new resected specimen.  Mostly the proliferative activity was very low with only rare mitoses and a low Ki-67 of just 2-3%.    MRI brain's been stable including an MRI brain from 9/23/2019.    As of 9/25/2019 due to delays in therapy from his resection on 1/24/2019 we will go ahead and proceed with 3 more months of Temodar and he will complete therapy before the end of the year in early December.  We will plan for an MRI then in mid to late December and if everything appears stable we will proceed with observation at that time.    Follow-up brain imaging on 12/12/2019 with some improvement.  Potential for residual tumor.  Proceed with observation.  MRI in 3 months.    Follow-up imaging 2/27/2020 with similar findings.      ALLERGIES:  Allergies   Allergen Reactions   • Avocado Itching   • Other Itching     Insect stings: Bee stings, throat swelling (has Epi pen)    Allergy to fruit, Avocado, Cherry Tomato (can have blue berries)   • Tomato Itching     Cherry tomato       MEDICATIONS:  The medication list has been reviewed with the patient by the medical assistant, and the list has been updated in the electronic medical record, which I reviewed.  Medication dosages and frequencies were confirmed to be accurate.    REVIEW OF SYSTEMS:  PAIN:  See Vital Signs below.  GENERAL:  No fevers, chills, night  "sweats, or unintended weight loss.  SKIN: Urticaria has resolved  HEME/LYMPH:  No abnormal bleeding.  No palpable lymphadenopathy.  EYES:  No vision changes or diplopia.  ENT:  No sore throat or difficulty swallowing.  RESPIRATORY:  No cough, shortness of breath, hemoptysis, or wheezing.  CARDIOVASCULAR:  No chest pain, palpitations, orthopnea, or dyspnea on exertion.  GASTROINTESTINAL:  No abdominal pain, nausea, vomiting, constipation, diarrhea, melena, or hematochezia.  GENITOURINARY:  No dysuria or hematuria.  MUSCULOSKELETAL:  No joint pain, swelling, or erythema.  Muscle cramping improved  NEUROLOGIC:  No dizziness, loss of consciousness, or seizures.  PSYCHIATRIC:  No depression, anxiety, or mood changes.    Vitals:    03/13/20 1036   BP: 121/85   Pulse: 80   Resp: 16   Temp: 97.9 °F (36.6 °C)   TempSrc: Oral   SpO2: 99%   Weight: 81.8 kg (180 lb 4.8 oz)   Height: 178 cm (70.08\")   PainSc: 0-No pain  Comment: anaplastic astrocytoma of frontal lobe       PHYSICAL EXAMINATION:  GENERAL:  Well-developed well-nourished male; awake, alert and oriented, in no acute distress.  He has a normal respiratory rate and does not appear to be respiratory distress.  He has a well-healed surgical incision present on the scalp.  Otherwise not examined today.    DIAGNOSTIC DATA:  Results for orders placed or performed in visit on 03/13/20   Protime-INR, Fingerstick   Result Value Ref Range    Protime 27.9 (H) 11.0 - 15.0 Seconds    INR 2.30    CBC Auto Differential   Result Value Ref Range    WBC 5.95 3.40 - 10.80 10*3/mm3    RBC 5.28 4.14 - 5.80 10*6/mm3    Hemoglobin 15.7 13.0 - 17.7 g/dL    Hematocrit 45.5 37.5 - 51.0 %    MCV 86.2 79.0 - 97.0 fL    MCH 29.7 26.6 - 33.0 pg    MCHC 34.5 31.5 - 35.7 g/dL    RDW 11.7 (L) 12.3 - 15.4 %    RDW-SD 36.8 (L) 37.0 - 54.0 fl    MPV 10.2 6.0 - 12.0 fL    Platelets 231 140 - 450 10*3/mm3    Neutrophil % 38.9 (L) 42.7 - 76.0 %    Lymphocyte % 47.2 (H) 19.6 - 45.3 %    Monocyte % 6.7 5.0 " - 12.0 %    Eosinophil % 2.0 0.3 - 6.2 %    Basophil % 1.7 (H) 0.0 - 1.5 %    Immature Grans % 3.5 (H) 0.0 - 0.5 %    Neutrophils, Absolute 2.31 1.70 - 7.00 10*3/mm3    Lymphocytes, Absolute 2.81 0.70 - 3.10 10*3/mm3    Monocytes, Absolute 0.40 0.10 - 0.90 10*3/mm3    Eosinophils, Absolute 0.12 0.00 - 0.40 10*3/mm3    Basophils, Absolute 0.10 0.00 - 0.20 10*3/mm3    Immature Grans, Absolute 0.21 (H) 0.00 - 0.05 10*3/mm3    nRBC 0.0 0.0 - 0.2 /100 WBC   Scan Slide   Result Value Ref Range    RBC Morphology Normal Normal    WBC Morphology Normal Normal    Platelet Morphology Normal Normal       IMAGING:    MRI brain images from 2/27/2020 images personally reviewed.  No significant change from prior.  No evidence for progression.    IMPRESSION:  The patient has undergone resection of the right frontal  mass proven to be an astrocytoma. The resection cavity is unchanged in  size as compared to 12/12/2019. There is no evidence of increased or new  enhancement to suggest increasing tumor grade. Areas of T2 FLAIR  hyperintensity are noted adjacent to the resection cavity similar in  appearance as compared to the prior examination inferiorly, but slightly  less prominent posteriorly. The areas of T2 FLAIR hyperintensity are  nonspecific. Areas of nonenhancing tumor cannot be excluded. Continued  surveillance is required.    ASSESSMENT:  This is a 34 y.o. male with:  1.  Bilateral lower extremity DVT: He remains on warfarin.  His INR today is a little high at 3.7.  He will decrease his warfarin dose per protocol.  Return monthly.    2.  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  NOT 1p19q co-deleted.  Overall, this is a good molecular profile.  I discussed his case with Dr. Galvan, Dr. Daley, and Dr. Dobbins and I reviewed [facility] guidelines.   He will require one year of adjuvant Temodar following radiation.  The big question was whether to administer  concurrent therapy with Temodar along with radiation.  Ultimately, we decided not to do this given the overall good molecular profile and the potential adverse effects from concurrent therapy.  He did initiate radiation alone on 7/31/2018 and completed it on 9/14/2018.     He initiated adjuvant Temodar in mid October 2018.    Due to persistent abnormalities on MRI, on 1/24/2019 he did have a repeat resection by Dr. Galvan.  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  However, there was no evidence of progression to a higher grade in the new resected specimen.  Mostly the proliferative activity was very low with only rare mitoses and a low Ki-67 of just 2-3%.    He proceeds with Temodar at 400 mg daily for 5 out of 28 days.      MRI brain imaging from 9/23/2019 appears stable.    He completed 1 year of Temodar with an MRI on 12/12/2019 showing stable findings with some improvement in the intensity of the abnormal uptake    MRI brain 2/27/2020 stable..    3.  Given the possibility of infertility with treatment he banked sperm at the [facility].  He has a 2 children conceived naturally without the banked sperm.    4.  Elevated liver labs: Liver labs normalized.  Unclear reason.  Medication could have contributed.  Follow-up liver labs from today.    5.  History of zoster rash on the left face: He completed a course of Famvir.  We did continue acyclovir for prophylaxis while on Temodar which has not been stopped.    6.  Urticaria: He has significant environmental allergies related to pollens, etc.  He has not had any recent issues.  Montelukast refilled today.    6.  Recent memory issue at the end of the flight while landing: This does not sound like a seizure.  Suspected sensory overload as he was doing multiple things at that time including listening to music, watching a movie, and working in [employer].  He will closely monitor for any new neurologic symptoms.  This was a new  issue discussed today.    PLAN:  1.  Continue warfarin.  Monthly INRs.  2.  No further Temodar at this point  3.  MRI in about 3 months and I will see him back after that.

## 2020-03-14 DIAGNOSIS — J30.9 ALLERGIC RHINITIS: ICD-10-CM

## 2020-03-16 RX ORDER — MONTELUKAST SODIUM 10 MG/1
10 TABLET ORAL DAILY
Qty: 90 TABLET | Refills: 3 | Status: SHIPPED | OUTPATIENT
Start: 2020-03-16 | End: 2020-07-05 | Stop reason: SDUPTHER

## 2020-03-17 ENCOUNTER — TELEPHONE (OUTPATIENT)
Dept: ONCOLOGY | Facility: HOSPITAL | Age: 35
End: 2020-03-17

## 2020-03-17 NOTE — TELEPHONE ENCOUNTER
----- Message from Bryan Valadez sent at 3/15/2020  1:36 PM EDT -----  Regarding: Test Results Question  Contact: [phone]  I have a question about CBC W/ AUTO DIFFERENTIAL resulted on 3/13/20, 11:24 AM.    What's your opinion on the high absolute grans and the overall CBC test?    Seems white cell count is normal but others seem high.    Thanks  Mic

## 2020-04-09 ENCOUNTER — CLINICAL SUPPORT (OUTPATIENT)
Dept: ONCOLOGY | Facility: HOSPITAL | Age: 35
End: 2020-04-09

## 2020-04-09 ENCOUNTER — LAB (OUTPATIENT)
Dept: OTHER | Facility: HOSPITAL | Age: 35
End: 2020-04-09

## 2020-04-09 VITALS
OXYGEN SATURATION: 100 % | DIASTOLIC BLOOD PRESSURE: 81 MMHG | WEIGHT: 176.4 LBS | RESPIRATION RATE: 16 BRPM | HEART RATE: 72 BPM | SYSTOLIC BLOOD PRESSURE: 122 MMHG | TEMPERATURE: 97.6 F | BODY MASS INDEX: 25.25 KG/M2

## 2020-04-09 DIAGNOSIS — I26.99 PULMONARY EMBOLISM WITH INFARCTION (HCC): ICD-10-CM

## 2020-04-09 DIAGNOSIS — C71.1 ANAPLASTIC ASTROCYTOMA OF FRONTAL LOBE (HCC): ICD-10-CM

## 2020-04-09 LAB
INR PPP: 2.7
PROTHROMBIN TIME: 32.2 SECONDS (ref 11–15)

## 2020-04-09 PROCEDURE — 85610 PROTHROMBIN TIME: CPT

## 2020-04-09 PROCEDURE — G0463 HOSPITAL OUTPT CLINIC VISIT: HCPCS

## 2020-04-09 RX ORDER — WARFARIN SODIUM 5 MG/1
TABLET ORAL
Qty: 180 TABLET | Refills: 1 | Status: SHIPPED | OUTPATIENT
Start: 2020-04-09 | End: 2020-07-05 | Stop reason: SDUPTHER

## 2020-05-07 ENCOUNTER — LAB (OUTPATIENT)
Dept: OTHER | Facility: HOSPITAL | Age: 35
End: 2020-05-07

## 2020-05-07 ENCOUNTER — OFFICE VISIT (OUTPATIENT)
Dept: ONCOLOGY | Facility: CLINIC | Age: 35
End: 2020-05-07

## 2020-05-07 VITALS
HEART RATE: 67 BPM | WEIGHT: 177.4 LBS | RESPIRATION RATE: 16 BRPM | TEMPERATURE: 97.7 F | BODY MASS INDEX: 25.4 KG/M2 | SYSTOLIC BLOOD PRESSURE: 124 MMHG | DIASTOLIC BLOOD PRESSURE: 84 MMHG | HEIGHT: 70 IN

## 2020-05-07 DIAGNOSIS — I82.532 CHRONIC DEEP VEIN THROMBOSIS (DVT) OF LEFT POPLITEAL VEIN (HCC): ICD-10-CM

## 2020-05-07 DIAGNOSIS — C71.1 ANAPLASTIC ASTROCYTOMA OF FRONTAL LOBE (HCC): ICD-10-CM

## 2020-05-07 DIAGNOSIS — D68.51 FACTOR 5 LEIDEN MUTATION, HETEROZYGOUS (HCC): ICD-10-CM

## 2020-05-07 DIAGNOSIS — C71.1 ANAPLASTIC ASTROCYTOMA OF FRONTAL LOBE (HCC): Primary | ICD-10-CM

## 2020-05-07 LAB
INR PPP: 2.1
PROTHROMBIN TIME: 25.6 SECONDS (ref 11–15)

## 2020-05-07 PROCEDURE — 85610 PROTHROMBIN TIME: CPT

## 2020-05-07 PROCEDURE — 99212-NC PR NO CHARGE CBC OFFICE OUTPATIENT VISIT 10 MINUTES: Performed by: NURSE PRACTITIONER

## 2020-05-07 NOTE — PROGRESS NOTES
Patient is here for monthly INR review.  His INR is at the low end of therapeutic at 2.1.  He denies any medication or dietary changes.  He denies any new lower extremity swelling, excess bleeding or bruising, or shortness of breath.    We will increase his INR dose to 65 mg weekly.  He will return in 1 month for MD evaluation with Dr. Jefferson and repeat INR and labs at that time.  Have asked him to call with any new issues before this.  He has verbalized understanding.    Lab Results   Component Value Date    INR 2.10 05/07/2020    INR 2.70 04/09/2020    INR 2.30 03/13/2020    PROTIME 25.6 (H) 05/07/2020    PROTIME 32.2 (H) 04/09/2020    PROTIME 27.9 (H) 03/13/2020     NICOLE Stauffer

## 2020-05-27 ENCOUNTER — HOSPITAL ENCOUNTER (OUTPATIENT)
Dept: MRI IMAGING | Facility: HOSPITAL | Age: 35
Discharge: HOME | End: 2020-05-27
Admitting: INTERNAL MEDICINE

## 2020-05-27 DIAGNOSIS — C71.1 ANAPLASTIC ASTROCYTOMA OF FRONTAL LOBE (HCC): ICD-10-CM

## 2020-05-27 PROCEDURE — A9577 INJ MULTIHANCE: HCPCS | Performed by: INTERNAL MEDICINE

## 2020-05-27 PROCEDURE — 0 GADOBENATE DIMEGLUMINE 529 MG/ML SOLUTION: Performed by: INTERNAL MEDICINE

## 2020-05-27 PROCEDURE — 82565 ASSAY OF CREATININE: CPT

## 2020-05-27 PROCEDURE — 70553 MRI BRAIN STEM W/O & W/DYE: CPT

## 2020-05-27 RX ADMIN — GADOBENATE DIMEGLUMINE 16 ML: 529 INJECTION, SOLUTION INTRAVENOUS at 08:06

## 2020-05-28 LAB — CREAT BLDA-MCNC: 0.9 MG/DL (ref 0.6–1.3)

## 2020-06-05 ENCOUNTER — OFFICE VISIT (OUTPATIENT)
Dept: ONCOLOGY | Facility: CLINIC | Age: 35
End: 2020-06-05

## 2020-06-05 ENCOUNTER — LAB (OUTPATIENT)
Dept: OTHER | Facility: HOSPITAL | Age: 35
End: 2020-06-05

## 2020-06-05 VITALS
WEIGHT: 175.9 LBS | BODY MASS INDEX: 25.18 KG/M2 | TEMPERATURE: 98.6 F | HEIGHT: 70 IN | SYSTOLIC BLOOD PRESSURE: 138 MMHG | OXYGEN SATURATION: 98 % | HEART RATE: 63 BPM | RESPIRATION RATE: 16 BRPM | DIASTOLIC BLOOD PRESSURE: 86 MMHG

## 2020-06-05 DIAGNOSIS — Z86.711 HISTORY OF PULMONARY EMBOLUS (PE): ICD-10-CM

## 2020-06-05 DIAGNOSIS — C71.1 ANAPLASTIC ASTROCYTOMA OF FRONTAL LOBE (HCC): Primary | ICD-10-CM

## 2020-06-05 DIAGNOSIS — C71.1 ANAPLASTIC ASTROCYTOMA OF FRONTAL LOBE (HCC): ICD-10-CM

## 2020-06-05 LAB
BASOPHILS # BLD AUTO: 0.05 10*3/MM3 (ref 0–0.2)
BASOPHILS NFR BLD AUTO: 0.9 % (ref 0–1.5)
DEPRECATED RDW RBC AUTO: 37.7 FL (ref 37–54)
EOSINOPHIL # BLD AUTO: 0.11 10*3/MM3 (ref 0–0.4)
EOSINOPHIL # BLD MANUAL: 0.05 10*3/MM3 (ref 0–0.4)
EOSINOPHIL NFR BLD AUTO: 2.1 % (ref 0.3–6.2)
EOSINOPHIL NFR BLD MANUAL: 1 % (ref 0.3–6.2)
ERYTHROCYTE [DISTWIDTH] IN BLOOD BY AUTOMATED COUNT: 11.9 % (ref 12.3–15.4)
HCT VFR BLD AUTO: 43.7 % (ref 37.5–51)
HGB BLD-MCNC: 15.4 G/DL (ref 13–17.7)
IMM GRANULOCYTES # BLD AUTO: 0.09 10*3/MM3 (ref 0–0.05)
IMM GRANULOCYTES NFR BLD AUTO: 1.7 % (ref 0–0.5)
INR PPP: 2.4
LYMPHOCYTES # BLD AUTO: 1.76 10*3/MM3 (ref 0.7–3.1)
LYMPHOCYTES # BLD MANUAL: 1.48 10*3/MM3 (ref 0.7–3.1)
LYMPHOCYTES NFR BLD AUTO: 33.2 % (ref 19.6–45.3)
LYMPHOCYTES NFR BLD MANUAL: 10 % (ref 5–12)
LYMPHOCYTES NFR BLD MANUAL: 28 % (ref 19.6–45.3)
MCH RBC QN AUTO: 30.3 PG (ref 26.6–33)
MCHC RBC AUTO-ENTMCNC: 35.2 G/DL (ref 31.5–35.7)
MCV RBC AUTO: 86 FL (ref 79–97)
MONOCYTES # BLD AUTO: 0.53 10*3/MM3 (ref 0.1–0.9)
MONOCYTES # BLD AUTO: 0.59 10*3/MM3 (ref 0.1–0.9)
MONOCYTES NFR BLD AUTO: 11.1 % (ref 5–12)
NEUTROPHILS # BLD AUTO: 2.7 10*3/MM3 (ref 1.7–7)
NEUTROPHILS # BLD AUTO: 3.23 10*3/MM3 (ref 1.7–7)
NEUTROPHILS NFR BLD AUTO: 51 % (ref 42.7–76)
NEUTROPHILS NFR BLD MANUAL: 61 % (ref 42.7–76)
NRBC BLD AUTO-RTO: 0 /100 WBC (ref 0–0.2)
PLAT MORPH BLD: NORMAL
PLATELET # BLD AUTO: 222 10*3/MM3 (ref 140–450)
PMV BLD AUTO: 10 FL (ref 6–12)
PROTHROMBIN TIME: 28.4 SECONDS (ref 11–15)
RBC # BLD AUTO: 5.08 10*6/MM3 (ref 4.14–5.8)
RBC MORPH BLD: NORMAL
WBC MORPH BLD: NORMAL
WBC NRBC COR # BLD: 5.3 10*3/MM3 (ref 3.4–10.8)

## 2020-06-05 PROCEDURE — 99214 OFFICE O/P EST MOD 30 MIN: CPT | Performed by: INTERNAL MEDICINE

## 2020-06-05 PROCEDURE — 85007 BL SMEAR W/DIFF WBC COUNT: CPT | Performed by: INTERNAL MEDICINE

## 2020-06-05 PROCEDURE — 36415 COLL VENOUS BLD VENIPUNCTURE: CPT

## 2020-06-05 PROCEDURE — 85025 COMPLETE CBC W/AUTO DIFF WBC: CPT | Performed by: INTERNAL MEDICINE

## 2020-06-05 PROCEDURE — 85610 PROTHROMBIN TIME: CPT

## 2020-06-05 NOTE — PROGRESS NOTES
[facility] OUTPATIENT FOLLOW UP CLINIC VISIT    REASON FOR FOLLOW-UP:    1.  Left lower extremity DVT with progression of symptoms and extension of the left lower extremity DVT into the femoral vein from the popliteal/calf vein swallowing for next set.  Currently anticoagulated with warfarin.  2.  Right lower extremity DVT noted in the common femoral, profunda femoral, and calf veins while anticoagulated with a DOAC.  Therefore, failure of DOAC.  3.  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  Other thrombophilia labs negative.    4.  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  NOT 1p19q co-deleted.    5.  Radiation initiated on 7/31/2018.  Plan for Temodar ×1 year following radiation.  6.  4500 cGy in 25 fractions administered from 7/31/2018 through 9/14/2018  7.  MRI brain 10/23/2018 with resolving hemorrhage in the resection cavity.  However, there is hyperintensity measuring 4.6 x 4.3 x 3 cm along the margins of the resection cavity.  8.  Repeat venous duplex on 10/23 with chronic right CVT and chronic LLE DVT from the mid femoral vein distally.     9.  He initiated adjuvant therapy with Temodar in mid October 2018.  10.  MRI brain 12/3/2018 with stable findings.  11.  On 1/24/2019 he did have a repeat resection by Dr. Galvan.  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  However, there was no evidence of progression to a higher grade in the new resected specimen.  Mostly the proliferative activity was very low with only rare mitoses and a low Ki-67 of just 2-3%.  12.  Temodar completed December 2019      HISTORY OF PRESENT ILLNESS:  Bryan Valadez is a 34 y.o. male who returns today for follow up of the above issues.     He continues to do well.  Prior to his last visit he had a strange neurologic event on an airplane.  He has not had any recurrent issues.  He continues anticoagulation.   No bleeding.  No seizures.  He continues Keppra twice daily.    ONCOLOGIC HISTORY:  He had about 6 months of headaches treated as sinusitis and presented on 6/15 with visual changes and headache and was found to have a large right frontal mass which was resected on 6/16 by Dr. Galvan and consistent with a glioma.  Pathology was reviewed at [facility] showing infiltrating glioma.  Large 10 cm partially cystic and solid lobulated tumor mass at diagnosis.  Negative IDH1-R132H immunostain excludes the most common IDH1 mutation; however loss of ATRX expression suggests possibility of glioma harboring another less common IDH1 mutation.  p53 overexpressed.  Ki67 10% but variable.  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  There was no evidence for a 1p19q co-deletion.       Chromosome microarray showed a complex molecular karyotype including loss of 1q, loss of 2q, gain of 7q, gain of 8q, loss of 9p, REBECCA of 17p, and gain of 18p.  These abnormalities are typically associated with  IDH-mutant astrocytic gliomas.  No 1p and 19q whole arm co-deletion was noted.  CT head on 7/1 c/w residual tumor circumscribing the resection cavity and a remote cerebellar hemisphere hemorrhage.       He was discharged and subsequently admitted with left leg pain and chest pain, with LLE popliteal and calf vein clot noted and bilateral small PE. He was treated with heparin and discharged on Pradaxa.     He developed worsening leg pain up into the thigh and presented to the ER where a venous duplex showed progression of the clot to the femoral vein.  He was given Lovenox and admitted.  Warfarin was initiated.       A partial thrombophilia evaluation showed that he is a heterozygote for the factor V Leiden mutation.  Labs otherwise unremarkable.    He was subsequently found to have a right lower extremity DVT on 7/8/2018.    He remains anticoagulated with warfarin.    Radiation initiated on 7/31/2018.  Plan for Temodar ×1 year after radiation  is complete.    Radiation complete as of 9/14/2018.    4500 cGy in 25 fractions administered from 7/31/2018 through 9/14/2018    MRI brain 10/23/2018 with resolving hemorrhage in the resection cavity.  However, there is hyperintensity measuring 4.6 x 4.3 x 3 cm along the margins of the resection cavity.    Repeat venous duplex on 10/23 with chronic right CVT and chronic LLE DVT from the mid femoral vein distally.      Repeat brain MRI on 12/3/2018 with stable findings.    On 1/24/2019 he did have a repeat resection by Dr. Galvan.  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  However, there was no evidence of progression to a higher grade in the new resected specimen.  Mostly the proliferative activity was very low with only rare mitoses and a low Ki-67 of just 2-3%.    MRI brain's been stable including an MRI brain from 9/23/2019.    As of 9/25/2019 due to delays in therapy from his resection on 1/24/2019 we will go ahead and proceed with 3 more months of Temodar and he will complete therapy before the end of the year in early December.  We will plan for an MRI then in mid to late December and if everything appears stable we will proceed with observation at that time.    Follow-up brain imaging on 12/12/2019 with some improvement.  Potential for residual tumor.  Proceed with observation.  MRI in 3 months.    Follow-up imaging 2/27/2020 with similar findings.    Follow-up imaging 5/27/2020 with stable findings      ALLERGIES:  Allergies   Allergen Reactions   • Avocado Itching   • Other Itching     Insect stings: Bee stings, throat swelling (has Epi pen)    Allergy to fruit, Avocado, Cherry Tomato (can have blue berries)   • Tomato Itching     Cherry tomato       MEDICATIONS:  The medication list has been reviewed with the patient by the medical assistant, and the list has been updated in the electronic medical record, which I reviewed.  Medication dosages and frequencies were confirmed to be  "accurate.    REVIEW OF SYSTEMS:  PAIN:  See Vital Signs below.  GENERAL:  No fevers, chills, night sweats, or unintended weight loss.  SKIN: Urticaria has resolved  HEME/LYMPH:  No abnormal bleeding.  No palpable lymphadenopathy.  EYES:  No vision changes or diplopia.  ENT:  No sore throat or difficulty swallowing.  RESPIRATORY:  No cough, shortness of breath, hemoptysis, or wheezing.  CARDIOVASCULAR:  No chest pain, palpitations, orthopnea, or dyspnea on exertion.  GASTROINTESTINAL:  No abdominal pain, nausea, vomiting, constipation, diarrhea, melena, or hematochezia.  GENITOURINARY:  No dysuria or hematuria.  MUSCULOSKELETAL:  No joint pain, swelling, or erythema.  Muscle cramping improved  NEUROLOGIC:  No dizziness, loss of consciousness, or seizures.  PSYCHIATRIC:  No depression, anxiety, or mood changes.    Vitals:    06/05/20 1230   BP: 138/86   Pulse: 63   Resp: 16   Temp: 98.6 °F (37 °C)   TempSrc: Oral   SpO2: 98%   Weight: 79.8 kg (175 lb 14.4 oz)   Height: 178 cm (70.08\")   PainSc: 0-No pain  Comment: astrocytoma of frontal lobe       PHYSICAL EXAMINATION:  GENERAL:  Well-developed well-nourished male; awake, alert and oriented, in no acute distress.  He has a normal respiratory rate and does not appear to be respiratory distress.  He has a well-healed surgical incision present on the scalp.  Skin shows no rash or purpura.    DIAGNOSTIC DATA:  Results for orders placed or performed in visit on 06/05/20   Protime-INR, Fingerstick   Result Value Ref Range    Protime 28.4 (H) 11.0 - 15.0 Seconds    INR 2.40    CBC Auto Differential   Result Value Ref Range    WBC 5.30 3.40 - 10.80 10*3/mm3    RBC 5.08 4.14 - 5.80 10*6/mm3    Hemoglobin 15.4 13.0 - 17.7 g/dL    Hematocrit 43.7 37.5 - 51.0 %    MCV 86.0 79.0 - 97.0 fL    MCH 30.3 26.6 - 33.0 pg    MCHC 35.2 31.5 - 35.7 g/dL    RDW 11.9 (L) 12.3 - 15.4 %    RDW-SD 37.7 37.0 - 54.0 fl    MPV 10.0 6.0 - 12.0 fL    Platelets 222 140 - 450 10*3/mm3    Neutrophil % " 51.0 42.7 - 76.0 %    Lymphocyte % 33.2 19.6 - 45.3 %    Monocyte % 11.1 5.0 - 12.0 %    Eosinophil % 2.1 0.3 - 6.2 %    Basophil % 0.9 0.0 - 1.5 %    Immature Grans % 1.7 (H) 0.0 - 0.5 %    Neutrophils, Absolute 2.70 1.70 - 7.00 10*3/mm3    Lymphocytes, Absolute 1.76 0.70 - 3.10 10*3/mm3    Monocytes, Absolute 0.59 0.10 - 0.90 10*3/mm3    Eosinophils, Absolute 0.11 0.00 - 0.40 10*3/mm3    Basophils, Absolute 0.05 0.00 - 0.20 10*3/mm3    Immature Grans, Absolute 0.09 (H) 0.00 - 0.05 10*3/mm3    nRBC 0.0 0.0 - 0.2 /100 WBC       IMAGING:    MRI brain images from 5/27/2020 without change from prior.  No evidence for progressive disease.  Images personally reviewed.    ASSESSMENT:  This is a 34 y.o. male with:  1.  Bilateral lower extremity DVT: He remains on warfarin.  His INR today is a little high at 3.7.  He will decrease his warfarin dose per protocol.  Return monthly.    2.  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  NOT 1p19q co-deleted.  Overall, this is a good molecular profile. He did initiate radiation alone on 7/31/2018 and completed it on 9/14/2018.     He initiated adjuvant Temodar in mid October 2018.    Due to persistent abnormalities on MRI, on 1/24/2019 he did have a repeat resection by Dr. Galvan.  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  However, there was no evidence of progression to a higher grade in the new resected specimen.  Mostly the proliferative activity was very low with only rare mitoses and a low Ki-67 of just 2-3%.    MRI brain imaging from 9/23/2019 appeared stable.    He completed 1 year of Temodar with an MRI on 12/12/2019 showing stable findings with some improvement in the intensity of the abnormal uptake    MRI brain 2/27/2020 stable..    MRI brain 5/27/2020 stable.      3.  Given the possibility of infertility with treatment he banked sperm at the [facility].  He  has a 2 children conceived naturally without the banked sperm.    4.  Elevated liver labs: Liver labs normalized.      5.  History of zoster rash on the left face: He completed a course of Famvir.  We did continue acyclovir for prophylaxis while on Temodar which has not been stopped.    6.  Urticaria: He has significant environmental allergies related to pollens, etc.  He has not had any recent issues.  Montelukast refilled today.      PLAN:  1.  Continue warfarin at the same dose.  Instructions provided for him today.  Monthly INRs.  2.  No further Temodar at this point  3.  MRI in about 4 months and I will see him back after that with a CBC and INR.

## 2020-06-29 NOTE — PROGRESS NOTES
Bryan Valadez is a 33 y.o. male presents for   Chief Complaint   Patient presents with   • Brain Tumor                CHIEF COMPLAINT:  Neuro-oncology consultation from Dr. 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 Chetan Galvan, Neurosurgery  Dr. Sean Jefferson, Medical Oncology  Dr. Robin Daley, Radiation Oncology  Dr. Beatriz Benavidez, PCP    History of Present Illness  Bryan is a 34 YO, right-handed comes accompanied by his wife for neuro-oncology consultation by referral from Dr. Galvan.  I have interviewed him and his wife  I have reviewed in details his Epic chart  I have reviewed his pathology from the [facility]  I have also reviewed his MRI scans from post-op June's study, October's study and the last MRI  January 07 study.    Bryan was symptomatic for a prolonged period of time prior to the discovery of his brain tumor:  He was treated , including with steroids, for recurrent sinusitis and with every treatment his headaches  would get better.  He presented to the ED in [place] with complex partial seizure  Prior to that he had awoken and his whole elft side, face included, was numb for prolonged period of time. He was seen in the ED in [place] and having had normal neurological examinations he was sent home without imaging studies    On June 16, 2018 he was taken to the OR by our Dr. Galvan for partial resection of a large right frontal mass withcystic component and significnat right to left shift  Postop he did well and has continued to do well. Dr. Galvan was aware there was residual tumor but had achieved MSR..  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 ATRX as well as P53 were both deleted.  MGMT was not done      He received standard RT w/o chemo keshav Dr Daley;s direction 59.4 Gy 7/31/2018-09/14/2018    He was started on standard adjuvant Temodar under the direction of DR Sean Jefferson and has recovered 3 cycle so  VIDEO VISIT NOTE  via live interactive two-way video   due to COVID-19 Pandemic Health Precautions    Worker's Compensation Follow-Up Office Visit    Date of Visit:  6/30/2020  Employer:  [employer]  Date of Injury:  6/9/2020    Subjective:   This video call was made to Jeffery Shafer to discuss   Chief Complaint   Patient presents with   • Worker's Compensation   • Video Visit   • Follow-up     He is in [place] and his identity has been established.   Jeffery understands that we are limiting office visits due to the coronavirus pandemic and consents to a virtual visit with charges submitted to the appropriate insurance.  Without the patient being seen and evaluated in person, there is a risk that the information and/or assessment may be incomplete or inaccurate. The patient has been advised that an office visit is available if desired and necessary.    Occupation:  Manager x 1 year  Current Problems/Injuries:  Right shoulder strain  RICARDO:  Assisting a customer, he lifted a box weighing 40-70# up and felt numbness in right anterior shoulder.  Current Restrictions:  30# lift to waist  Functional Status:  working limited duties with restrictions.     HPI:   Jeffery Shafer is a 31 year old year old male who returns for a follow up of the above injuries.  Chart was reviewed for initial/follow up injury visits as well as PT notes.      It has been 3 weeks since injury to his right shoulder.  At last visit, he was started on PT and ibuprofen prn.  He feels improvement with PT and home exercises and stretches which he is doing twice daily.  He states the pain is no longer constant, but now only with certain movements, especially lifting something overhead.  Otherwise, the pain is 0/10.  At worst, just \"mild.\"  He still cannot lift things overhead well enough, but without lifting, he is able to have full range of motion with a little catch midway up. Radiation - does not radiate.  Associations - No associated  far and is due for #4  He has developed bilateral DVT LE and is currently on Coumadin. He is Factor V Leiden heterozygote,    Today's neurological review is negative: he is fully employed and doing well according to him    He has been followed with serial MRI scans and yesterday Dr Galvan asked me to review his latest MRI scan with him and I agreed that further surgery to take out maximum safe tumor would be helpful      The following portions of the patient's history were reviewed and updated as appropriate: allergies, current medications, past family history, past medical history, past social history, past surgical history and problem list.        Review of Systems   Constitutional: Negative for diaphoresis, fatigue and fever.   HENT: Negative for hearing loss, tinnitus and trouble swallowing.    Eyes: Negative for pain, redness and itching.   Respiratory: Negative for cough, shortness of breath and wheezing.    Cardiovascular: Negative for chest pain and palpitations.   Gastrointestinal: Negative for abdominal pain, nausea and vomiting.   Endocrine: Negative for cold intolerance, heat intolerance and polydipsia.   Genitourinary: Negative for decreased urine volume, difficulty urinating, frequency and urgency.   Musculoskeletal: Negative for back pain, neck pain and neck stiffness.   Skin: Negative for color change, rash and wound.   Allergic/Immunologic: Positive for food allergies. Negative for environmental allergies and immunocompromised state.   Neurological: Negative for dizziness, tremors, seizures, syncope, facial asymmetry, speech difficulty, weakness, light-headedness, numbness and headaches.   Hematological: Negative for adenopathy. Bruises/bleeds easily.   Psychiatric/Behavioral: Negative for agitation, behavioral problems, confusion, decreased concentration, dysphoric mood, hallucinations, self-injury and sleep disturbance. The patient is not nervous/anxious and is not hyperactive.          Past Medical  symptoms are reported.  Time course - gradually improving    He denies any other precipitating event or activity.  He has no history of previous problems with this part of the body.    ROS:  See HPI.  Otherwise, no pertinent cardiovascular or respiratory symptoms were elicited.    MEDS/ALLERGIES:  Medications relevant to this injury include:  See HPI, otherwise reviewed and updated in the EMR.      Objective:   There were no vitals taken for this visit.    Physical Exam   Constitutional: He is oriented to person, place, and time and well-developed, well-nourished, and in no distress. No distress.   HENT:   Head: Normocephalic and atraumatic.   Eyes: Conjunctivae and EOM are normal. No scleral icterus.   Neck: Normal range of motion.   Pulmonary/Chest: Effort normal. No respiratory distress.   Musculoskeletal:      Right shoulder: He exhibits normal range of motion, no deformity and no pain.   Neurological: He is alert and oriented to person, place, and time. Gait normal.   Psychiatric: Mood and affect normal.       Assessment:     Encounter Diagnoses   Name Primary?   • Strain of right shoulder, subsequent encounter IMPROVING Yes     CAUSATION STATEMENT:  WORK RELATED.    Anticipated MMI:  2-3 weeks    Plan:   RETURN TO WORK:  Employee may return to work with restrictions.     Return Date: 6/30/2020            RESTRICTIONS:   Restrictions are to be followed at work and at home.  Restrictions are in effect until next follow-up visit.  Lift up to 30 pounds to waist level regular frequency.  Lift up to 30 pounds above shoulder as tolerated.    TREATMENT PLAN:  Medications for this injury/condition:   Tylenol or ibuprofen may be taken as needed    Referral/Consult:  Physical Therapy:  Continue       Instructions:   Continue with your home exercises and stretches.    NEXT RETURN VISIT: July 14th, Tues at 8am video visit    Total time spent in this encounter:  10 minutes.  Time also includes pre visit review of the  History:   Diagnosis Date   • Allergic rhinitis    • Asthma     Pulmonary Dr. Alexandra   • Chest pain    • DVT (deep venous thrombosis) (CMS/HCC) 06/2018    LEFT LEG   • Factor V Leiden (CMS/HCC)    • GERD (gastroesophageal reflux disease)    • H/O echocardiogram 2006   • Headache    • History of ETT    • History of Holter monitoring    • Hyperlipidemia    • PE (pulmonary thromboembolism) (CMS/HCC) 06/2018    AFTER CRANIOTOMY   • Primary brain tumor (CMS/HCC) 2018         Social History     Socioeconomic History   • Marital status:      Spouse name: Mary   • Number of children: Not on file   • Years of education: Not on file   • Highest education level: Not on file   Social Needs   • Financial resource strain: Not on file   • Food insecurity - worry: Not on file   • Food insecurity - inability: Not on file   • Transportation needs - medical: Not on file   • Transportation needs - non-medical: Not on file   Occupational History     Employer: [employer]     Comment: full time   Tobacco Use   • Smoking status: Never Smoker   • Smokeless tobacco: Never Used   Substance and Sexual Activity   • Alcohol use: No     Comment: Moderate   • Drug use: No   • Sexual activity: Defer   Other Topics Concern   • Not on file   Social History Narrative   • Not on file          Family History   Problem Relation Age of Onset   • Diabetes Mother    • Malig Hyperthermia Neg Hx            Current Outpatient Medications:   •  dexamethasone (DECADRON) 2 MG tablet, Take 1 tablet by mouth 2 (Two) Times a Day With Meals., Disp: 90 tablet, Rfl: 1  •  enoxaparin (LOVENOX) 80 MG/0.8ML solution syringe, Inject 0.8 mL under the skin into the appropriate area as directed Every 12 (Twelve) Hours., Disp: 20 syringe, Rfl: 1  •  EPINEPHrine (EPIPEN) 0.3 MG/0.3ML solution auto-injector injection, Inject 0.3 mg into the shoulder, thigh, or buttocks As Needed., Disp: , Rfl:   •  levETIRAcetam (KEPPRA) 1000 MG tablet, Take 1,000 mg by mouth 2  chart/records/tests.          (Two) Times a Day., Disp: , Rfl:   •  montelukast (SINGULAIR) 10 MG tablet, Take 10 mg by mouth Daily., Disp: , Rfl:   •  omeprazole (PriLOSEC) 20 MG capsule, Take 20 mg by mouth Daily As Needed., Disp: , Rfl:   •  ondansetron (ZOFRAN) 8 MG tablet, Take 1 tablet by mouth 3 (Three) Times a Day As Needed for Nausea or Vomiting., Disp: 30 tablet, Rfl: 5  •  sulfamethoxazole-trimethoprim (BACTRIM DS,SEPTRA DS) 800-160 MG per tablet, Take 1 tablet by mouth 3 (Three) Times a Week. Take 1 tablet on Mondays, Wednesdays and Fridays., Disp: 36 tablet, Rfl: 4  •  temozolomide (TEMODAR) 100 MG chemo capsule, TAKE 4 CAPSULES BY MOUTH DAILY FOR 5 DAYS OUT OF 28 DAYS, Disp: 20 capsule, Rfl: 0  •  warfarin (COUMADIN) 5 MG tablet, Take 5mg by mouth Mon,Wed,Fri and 7.5mg Sun,Tues,Thurs,Sat unless directed otherwise by MD (Patient taking differently: Take 5mg by mouth Mon,Wed,Fri and 7.5mg Sun,Tues,Thurs,Sat unless directed otherwise by MD), Disp: 60 tablet, Rfl: 0      Vitals:    01/10/19 0753   BP: 114/68         Physical Exam   Constitutional: He appears well-developed and well-nourished. No distress.   HENT:   Head: Normocephalic and atraumatic.   Eyes: EOM are normal. Pupils are equal, round, and reactive to light.   Neurological: He has normal strength. He has a normal Finger-Nose-Finger Test, a normal Heel to Shin Test, a normal Romberg Test and a normal Tandem Gait Test. Gait normal.   Reflex Scores:       Tricep reflexes are 2+ on the right side and 2+ on the left side.       Bicep reflexes are 2+ on the right side and 2+ on the left side.       Brachioradialis reflexes are 2+ on the right side and 2+ on the left side.       Patellar reflexes are 2+ on the right side and 2+ on the left side.       Achilles reflexes are 2+ on the right side and 2+ on the left side.  Skin: He is not diaphoretic.   Psychiatric: He has a normal mood and affect. His behavior is normal. Judgment and thought content normal.         Neurologic Exam      Mental Status   Mini mental status examination reveals difficulty with recall 2 out of 3 correct  He really struggled with the clock draw and even when he tried to correct himself he still struggled with it. He ahd no difficulty copying 3-d figures     Cranial Nerves     CN II   Visual fields full to confrontation.     CN III, IV, VI   Pupils are equal, round, and reactive to light.  Extraocular motions are normal.   Right pupil: Accommodation: intact.   Left pupil: Accommodation: intact.   CN III: no CN III palsy  CN VI: no CN VI palsy  Nystagmus: none   Diplopia: none  Ophthalmoparesis: none  Upgaze: normal  Downgaze: normal  Conjugate gaze: present    CN V   Facial sensation intact.     CN VII   Facial expression full, symmetric.     CN VIII   CN VIII normal.     CN IX, X   CN IX normal.     CN XI   CN XI normal.     CN XII   CN XII normal.     Motor Exam   Muscle bulk: normal  Overall muscle tone: normal  Right arm tone: normal  Left arm tone: normal  Right arm pronator drift: absent  Left arm pronator drift: absent  Right leg tone: normal  Left leg tone: normal    Strength   Strength 5/5 throughout.     Sensory Exam   Light touch normal.   Vibration normal.   Proprioception normal.   Pinprick normal.   Graphesthesia: normal  Stereognosis: normal    Gait, Coordination, and Reflexes     Gait  Gait: normal    Coordination   Romberg: negative  Finger to nose coordination: normal  Heel to shin coordination: normal  Tandem walking coordination: normal    Tremor   Resting tremor: absent  Intention tremor: absent  Action tremor: absent    Reflexes   Right brachioradialis: 2+  Left brachioradialis: 2+  Right biceps: 2+  Left biceps: 2+  Right triceps: 2+  Left triceps: 2+  Right patellar: 2+  Left patellar: 2+  Right achilles: 2+  Left achilles: 2+  Right : 2+  Left : 2+  Right Rahman: absent  Left Rahman: absent  Right ankle clonus: absent  Left ankle clonus: absent  Right pendular knee jerk: absent  Left  pendular knee jerk: absent                   REVIEW OF STUDIES:  I brought up the 3 brain MRI scans from post-op June, October and January 07/2019 and reviewed with them. The changes posterior inferior to the resection cavity is new on the October scan. The right to left shift has definitely decreased.       DIAGNOSIS, IMPRESSION AND PLAN:  Right frontal large mass with cystic component s/p maximum safe resection in June 2018, has received RT followed by 3 cycles of standard adjuvant Temodar now ahs evidence for new change with DDx recurrence versus radaition necrosis.  I doubt this is treatment related.  He is clealrly cognitively has some deficit.  His initial sympotm besides the headaches was left hemisensory seizure (much less likely vascular) raising question about the safety of further resection.  I reveiwed the natural history of his disease using the most up-to-date kameron from the clinical trials which data was recommended for his treatment by Dr Jefferson and DR. Daley.  He has IDH-mutation which is  A powerful prognostic factor.  I reviewed with them the studies that could be done to help in differentiating tumor from radiation effects with the limitations of each: MRS versus MR perfusion and the role for  functional MRI. In my many years of experience with MRS versus perfusion studies neither has 100% specificity or sensitivity: that is the nature of the best technology we have that is non-invasive.  My thoughts would be that he needs surgery and if it is up to me the best of the 3 teast would be functional MRI.   I deferred surgical discussions to Dr. Galvan.    I would strongly recommend checking MGMT on the next tissue. Having IDH mutation significantly raises the chance for MGMT methylation but does not guarantee it.    He is doing well on Keppra w/o further seizures    DVT is being managed by DR Jefferson.    Total time60 minutes more than 50% in reviewing their options and discussions of process of decision  making.

## 2020-07-02 ENCOUNTER — OFFICE VISIT (OUTPATIENT)
Dept: ONCOLOGY | Facility: CLINIC | Age: 35
End: 2020-07-02

## 2020-07-02 ENCOUNTER — APPOINTMENT (OUTPATIENT)
Dept: ONCOLOGY | Facility: HOSPITAL | Age: 35
End: 2020-07-02

## 2020-07-02 ENCOUNTER — LAB (OUTPATIENT)
Dept: OTHER | Facility: HOSPITAL | Age: 35
End: 2020-07-02

## 2020-07-02 VITALS — TEMPERATURE: 97.1 F

## 2020-07-02 DIAGNOSIS — Z86.711 HISTORY OF PULMONARY EMBOLUS (PE): ICD-10-CM

## 2020-07-02 DIAGNOSIS — D68.51 FACTOR 5 LEIDEN MUTATION, HETEROZYGOUS (HCC): Primary | ICD-10-CM

## 2020-07-02 DIAGNOSIS — C71.1 ANAPLASTIC ASTROCYTOMA OF FRONTAL LOBE (HCC): ICD-10-CM

## 2020-07-02 LAB
INR PPP: 3.8
PROTHROMBIN TIME: 45.6 SECONDS (ref 11–15)

## 2020-07-02 PROCEDURE — 85610 PROTHROMBIN TIME: CPT

## 2020-07-02 PROCEDURE — 99212-NC PR NO CHARGE CBC OFFICE OUTPATIENT VISIT 10 MINUTES: Performed by: NURSE PRACTITIONER

## 2020-07-02 NOTE — PROGRESS NOTES
Patient is here today for a lab and INR review.  His INR is supratherapeutic today at 3.8.  He denies any medication or dietary changes.  No excess bleeding or bruising noted.  No new, lower extremity swelling reported.  He is feeling well.    We will adjust his dose from 65 mg of Coumadin weekly down to 60 mg weekly.    He will return in 1 week for repeat review.    I have asked him to call with any new issues prior.  He has verbalized understanding.    Lab Results   Component Value Date    INR 3.80 07/02/2020    INR 2.40 06/05/2020    INR 2.10 05/07/2020    PROTIME 45.6 (H) 07/02/2020    PROTIME 28.4 (H) 06/05/2020    PROTIME 25.6 (H) 05/07/2020     NICOLE Stauffer

## 2020-07-05 DIAGNOSIS — I26.99 PULMONARY EMBOLISM WITH INFARCTION (HCC): ICD-10-CM

## 2020-07-05 DIAGNOSIS — J30.9 ALLERGIC RHINITIS: ICD-10-CM

## 2020-07-06 RX ORDER — MONTELUKAST SODIUM 10 MG/1
10 TABLET ORAL DAILY
Qty: 90 TABLET | Refills: 3 | Status: SHIPPED | OUTPATIENT
Start: 2020-07-06 | End: 2021-08-04

## 2020-07-06 RX ORDER — WARFARIN SODIUM 5 MG/1
TABLET ORAL
Qty: 180 TABLET | Refills: 1 | OUTPATIENT
Start: 2020-07-06

## 2020-07-06 RX ORDER — MONTELUKAST SODIUM 10 MG/1
10 TABLET ORAL DAILY
Qty: 90 TABLET | Refills: 3 | OUTPATIENT
Start: 2020-07-06

## 2020-07-06 RX ORDER — WARFARIN SODIUM 5 MG/1
TABLET ORAL
Qty: 180 TABLET | Refills: 0 | Status: SHIPPED | OUTPATIENT
Start: 2020-07-06 | End: 2021-01-28 | Stop reason: SDUPTHER

## 2020-07-09 ENCOUNTER — LAB (OUTPATIENT)
Dept: OTHER | Facility: HOSPITAL | Age: 35
End: 2020-07-09

## 2020-07-09 ENCOUNTER — OFFICE VISIT (OUTPATIENT)
Dept: ONCOLOGY | Facility: CLINIC | Age: 35
End: 2020-07-09

## 2020-07-09 DIAGNOSIS — Z86.711 HISTORY OF PULMONARY EMBOLUS (PE): ICD-10-CM

## 2020-07-09 DIAGNOSIS — C71.1 ANAPLASTIC ASTROCYTOMA OF FRONTAL LOBE (HCC): ICD-10-CM

## 2020-07-09 DIAGNOSIS — I82.413 ACUTE DEEP VEIN THROMBOSIS (DVT) OF FEMORAL VEIN OF BOTH LOWER EXTREMITIES (HCC): Primary | ICD-10-CM

## 2020-07-09 LAB
INR PPP: 2.9
PROTHROMBIN TIME: 34.9 SECONDS (ref 11–15)

## 2020-07-09 PROCEDURE — 99212-NC PR NO CHARGE CBC OFFICE OUTPATIENT VISIT 10 MINUTES: Performed by: NURSE PRACTITIONER

## 2020-07-09 PROCEDURE — 85610 PROTHROMBIN TIME: CPT

## 2020-07-09 NOTE — PROGRESS NOTES
The patient returns for PT/INR with nurse review.  His INR is noted to be 2.9, it was 3.8 1-week ago with only a slight dose adjustment in his Coumadin.  With the drop with slight dose adjustment in 1 week, we will maintain current dosing.  He will follow-up as scheduled in 3 weeks for repeat INR with nurse review.  We will continue 60 mg weekly.  Patient was provided written and verbal instructions.    Lab Results   Component Value Date    INR 2.90 07/09/2020    INR 3.80 07/02/2020    INR 2.40 06/05/2020    PROTIME 34.9 (H) 07/09/2020    PROTIME 45.6 (H) 07/02/2020    PROTIME 28.4 (H) 06/05/2020     NICOLE España

## 2020-07-31 ENCOUNTER — LAB (OUTPATIENT)
Dept: OTHER | Facility: HOSPITAL | Age: 35
End: 2020-07-31

## 2020-07-31 ENCOUNTER — CLINICAL SUPPORT (OUTPATIENT)
Dept: ONCOLOGY | Facility: HOSPITAL | Age: 35
End: 2020-07-31

## 2020-07-31 VITALS
HEIGHT: 71 IN | RESPIRATION RATE: 18 BRPM | TEMPERATURE: 98.5 F | BODY MASS INDEX: 24.89 KG/M2 | SYSTOLIC BLOOD PRESSURE: 125 MMHG | WEIGHT: 177.8 LBS | DIASTOLIC BLOOD PRESSURE: 80 MMHG | OXYGEN SATURATION: 100 % | HEART RATE: 58 BPM

## 2020-07-31 DIAGNOSIS — C71.1 ANAPLASTIC ASTROCYTOMA OF FRONTAL LOBE (HCC): ICD-10-CM

## 2020-07-31 DIAGNOSIS — Z86.711 HISTORY OF PULMONARY EMBOLUS (PE): ICD-10-CM

## 2020-07-31 LAB
INR PPP: 2.3
PROTHROMBIN TIME: 27.2 SECONDS (ref 11–15)

## 2020-07-31 PROCEDURE — G0463 HOSPITAL OUTPT CLINIC VISIT: HCPCS

## 2020-07-31 PROCEDURE — 85610 PROTHROMBIN TIME: CPT

## 2020-07-31 NOTE — NURSING NOTE
Patient arrived ambulatory for PT/INR RN review. Standing stone utilized however it increased the patient's dose to 62.5 mg and patient states every increase over 60 mg of Coumadin always spikes his INR over 3. This RN spoke to Dr. Jefferson and he is okay with the patient remaining on 60 mg weekly. Patient v/u that if any concerns before next appointment to please contact the MD. Schedule given to patient along with dosing schedule.

## 2020-08-03 RX ORDER — LEVETIRACETAM 1000 MG/1
TABLET ORAL
Qty: 60 TABLET | Refills: 11 | Status: SHIPPED | OUTPATIENT
Start: 2020-08-03 | End: 2021-08-03

## 2020-08-21 NOTE — INTERVAL H&P NOTE
H&P reviewed. The patient was examined and there are no changes to the H&P.   I discussed r/b/a at length last night with he, his wife, his mother.  He wishes to proceed this am.       Doxycycline Counseling:  Patient counseled regarding possible photosensitivity and increased risk for sunburn.  Patient instructed to avoid sunlight, if possible.  When exposed to sunlight, patients should wear protective clothing, sunglasses, and sunscreen.  The patient was instructed to call the office immediately if the following severe adverse effects occur:  hearing changes, easy bruising/bleeding, severe headache, or vision changes.  The patient verbalized understanding of the proper use and possible adverse effects of doxycycline.  All of the patient's questions and concerns were addressed. Sarecycline Counseling: Patient advised regarding possible photosensitivity and discoloration of the teeth, skin, lips, tongue and gums.  Patient instructed to avoid sunlight, if possible.  When exposed to sunlight, patients should wear protective clothing, sunglasses, and sunscreen.  The patient was instructed to call the office immediately if the following severe adverse effects occur:  hearing changes, easy bruising/bleeding, severe headache, or vision changes.  The patient verbalized understanding of the proper use and possible adverse effects of sarecycline.  All of the patient's questions and concerns were addressed. Include Pregnancy/Lactation Warning?: No Topical Clindamycin Counseling: Patient counseled that this medication may cause skin irritation or allergic reactions.  In the event of skin irritation, the patient was advised to reduce the amount of the drug applied or use it less frequently.   The patient verbalized understanding of the proper use and possible adverse effects of clindamycin.  All of the patient's questions and concerns were addressed. Dapsone Pregnancy And Lactation Text: This medication is Pregnancy Category C and is not considered safe during pregnancy or breast feeding. Azithromycin Counseling:  I discussed with the patient the risks of azithromycin including but not limited to GI upset, allergic reaction, drug rash, diarrhea, and yeast infections. Topical Sulfur Applications Pregnancy And Lactation Text: This medication is Pregnancy Category C and has an unknown safety profile during pregnancy. It is unknown if this topical medication is excreted in breast milk. Spironolactone Counseling: Patient advised regarding risks of diarrhea, abdominal pain, hyperkalemia, birth defects (for female patients), liver toxicity and renal toxicity. The patient may need blood work to monitor liver and kidney function and potassium levels while on therapy. The patient verbalized understanding of the proper use and possible adverse effects of spironolactone.  All of the patient's questions and concerns were addressed. Tetracycline Pregnancy And Lactation Text: This medication is Pregnancy Category D and not consider safe during pregnancy. It is also excreted in breast milk. Tazorac Pregnancy And Lactation Text: This medication is not safe during pregnancy. It is unknown if this medication is excreted in breast milk. Topical Sulfur Applications Counseling: Topical Sulfur Counseling: Patient counseled that this medication may cause skin irritation or allergic reactions.  In the event of skin irritation, the patient was advised to reduce the amount of the drug applied or use it less frequently.   The patient verbalized understanding of the proper use and possible adverse effects of topical sulfur application.  All of the patient's questions and concerns were addressed. Spironolactone Pregnancy And Lactation Text: This medication can cause feminization of the male fetus and should be avoided during pregnancy. The active metabolite is also found in breast milk. Topical Retinoid Pregnancy And Lactation Text: This medication is Pregnancy Category C. It is unknown if this medication is excreted in breast milk. Detail Level: Generalized Birth Control Pills Counseling: Birth Control Pill Counseling: I discussed with the patient the potential side effects of OCPs including but not limited to increased risk of stroke, heart attack, thrombophlebitis, deep venous thrombosis, hepatic adenomas, breast changes, GI upset, headaches, and depression.  The patient verbalized understanding of the proper use and possible adverse effects of OCPs. All of the patient's questions and concerns were addressed. Erythromycin Pregnancy And Lactation Text: This medication is Pregnancy Category B and is considered safe during pregnancy. It is also excreted in breast milk. Benzoyl Peroxide Counseling: Patient counseled that medicine may cause skin irritation and bleach clothing.  In the event of skin irritation, the patient was advised to reduce the amount of the drug applied or use it less frequently.   The patient verbalized understanding of the proper use and possible adverse effects of benzoyl peroxide.  All of the patient's questions and concerns were addressed. Doxycycline Pregnancy And Lactation Text: This medication is Pregnancy Category D and not consider safe during pregnancy. It is also excreted in breast milk but is considered safe for shorter treatment courses. High Dose Vitamin A Pregnancy And Lactation Text: High dose vitamin A therapy is contraindicated during pregnancy and breast feeding. Isotretinoin Counseling: Patient should get monthly blood tests, not donate blood, not drive at night if vision affected, not share medication, and not undergo elective surgery for 6 months after tx completed. Side effects reviewed, pt to contact office should one occur. Birth Control Pills Pregnancy And Lactation Text: This medication should be avoided if pregnant and for the first 30 days post-partum. Bactrim Pregnancy And Lactation Text: This medication is Pregnancy Category D and is known to cause fetal risk.  It is also excreted in breast milk. Erythromycin Counseling:  I discussed with the patient the risks of erythromycin including but not limited to GI upset, allergic reaction, drug rash, diarrhea, increase in liver enzymes, and yeast infections. Tazorac Counseling:  Patient advised that medication is irritating and drying.  Patient may need to apply sparingly and wash off after an hour before eventually leaving it on overnight.  The patient verbalized understanding of the proper use and possible adverse effects of tazorac.  All of the patient's questions and concerns were addressed. Minocycline Counseling: Patient advised regarding possible photosensitivity and discoloration of the teeth, skin, lips, tongue and gums.  Patient instructed to avoid sunlight, if possible.  When exposed to sunlight, patients should wear protective clothing, sunglasses, and sunscreen.  The patient was instructed to call the office immediately if the following severe adverse effects occur:  hearing changes, easy bruising/bleeding, severe headache, or vision changes.  The patient verbalized understanding of the proper use and possible adverse effects of minocycline.  All of the patient's questions and concerns were addressed. Azithromycin Pregnancy And Lactation Text: This medication is considered safe during pregnancy and is also secreted in breast milk. High Dose Vitamin A Counseling: Side effects reviewed, pt to contact office should one occur. Dapsone Counseling: I discussed with the patient the risks of dapsone including but not limited to hemolytic anemia, agranulocytosis, rashes, methemoglobinemia, kidney failure, peripheral neuropathy, headaches, GI upset, and liver toxicity.  Patients who start dapsone require monitoring including baseline LFTs and weekly CBCs for the first month, then every month thereafter.  The patient verbalized understanding of the proper use and possible adverse effects of dapsone.  All of the patient's questions and concerns were addressed. Topical Clindamycin Pregnancy And Lactation Text: This medication is Pregnancy Category B and is considered safe during pregnancy. It is unknown if it is excreted in breast milk. Topical Retinoid counseling:  Patient advised to apply a pea-sized amount only at bedtime and wait 30 minutes after washing their face before applying.  If too drying, patient may add a non-comedogenic moisturizer. The patient verbalized understanding of the proper use and possible adverse effects of retinoids.  All of the patient's questions and concerns were addressed. Benzoyl Peroxide Pregnancy And Lactation Text: This medication is Pregnancy Category C. It is unknown if benzoyl peroxide is excreted in breast milk. Bactrim Counseling:  I discussed with the patient the risks of sulfa antibiotics including but not limited to GI upset, allergic reaction, drug rash, diarrhea, dizziness, photosensitivity, and yeast infections.  Rarely, more serious reactions can occur including but not limited to aplastic anemia, agranulocytosis, methemoglobinemia, blood dyscrasias, liver or kidney failure, lung infiltrates or desquamative/blistering drug rashes. Isotretinoin Pregnancy And Lactation Text: This medication is Pregnancy Category X and is considered extremely dangerous during pregnancy. It is unknown if it is excreted in breast milk. Tetracycline Counseling: Patient counseled regarding possible photosensitivity and increased risk for sunburn.  Patient instructed to avoid sunlight, if possible.  When exposed to sunlight, patients should wear protective clothing, sunglasses, and sunscreen.  The patient was instructed to call the office immediately if the following severe adverse effects occur:  hearing changes, easy bruising/bleeding, severe headache, or vision changes.  The patient verbalized understanding of the proper use and possible adverse effects of tetracycline.  All of the patient's questions and concerns were addressed. Patient understands to avoid pregnancy while on therapy due to potential birth defects. Detail Level: Zone

## 2020-08-24 ENCOUNTER — TELEPHONE (OUTPATIENT)
Dept: ONCOLOGY | Facility: CLINIC | Age: 35
End: 2020-08-24

## 2020-08-24 NOTE — TELEPHONE ENCOUNTER
Patient wanting to reschedule his 8/28/20 appointment to 8/27/20 or 8/26/20. His callback is [phone]

## 2020-08-27 ENCOUNTER — OFFICE VISIT (OUTPATIENT)
Dept: ONCOLOGY | Facility: CLINIC | Age: 35
End: 2020-08-27

## 2020-08-27 ENCOUNTER — LAB (OUTPATIENT)
Dept: OTHER | Facility: HOSPITAL | Age: 35
End: 2020-08-27

## 2020-08-27 VITALS — BODY MASS INDEX: 25.23 KG/M2 | WEIGHT: 180.9 LBS

## 2020-08-27 DIAGNOSIS — I82.413 ACUTE DEEP VEIN THROMBOSIS (DVT) OF FEMORAL VEIN OF BOTH LOWER EXTREMITIES (HCC): ICD-10-CM

## 2020-08-27 DIAGNOSIS — D68.51 FACTOR 5 LEIDEN MUTATION, HETEROZYGOUS (HCC): ICD-10-CM

## 2020-08-27 DIAGNOSIS — I82.413 ACUTE DEEP VEIN THROMBOSIS (DVT) OF FEMORAL VEIN OF BOTH LOWER EXTREMITIES (HCC): Primary | ICD-10-CM

## 2020-08-27 LAB
INR PPP: 2.5
PROTHROMBIN TIME: 30.5 SECONDS (ref 11–15)

## 2020-08-27 PROCEDURE — 99212-NC PR NO CHARGE CBC OFFICE OUTPATIENT VISIT 10 MINUTES: Performed by: NURSE PRACTITIONER

## 2020-08-27 PROCEDURE — 85610 PROTHROMBIN TIME: CPT

## 2020-08-27 NOTE — PROGRESS NOTES
The patient returns the office today for PT/INR with nurse review.  He continues on Coumadin 60 mg weekly.  His INR is 2.5, therapeutic.  No bleeding, no signs or symptoms of thrombosis.  Current dosing will be continued.  We will repeat PT/INR in 1 month at which time we will also undergo MRI of the brain and follow-up with Dr. Jefferson.    Radha Bateman, APRN  08/27/2020

## 2020-08-28 ENCOUNTER — APPOINTMENT (OUTPATIENT)
Dept: OTHER | Facility: HOSPITAL | Age: 35
End: 2020-08-28

## 2020-08-28 ENCOUNTER — APPOINTMENT (OUTPATIENT)
Dept: ONCOLOGY | Facility: HOSPITAL | Age: 35
End: 2020-08-28

## 2020-09-08 ENCOUNTER — TELEPHONE (OUTPATIENT)
Dept: ONCOLOGY | Facility: CLINIC | Age: 35
End: 2020-09-08

## 2020-09-08 NOTE — TELEPHONE ENCOUNTER
Patient needs to r/s 9/18 lab appointment to this week, if possible.  He will be out of town 9/18    [phone]

## 2020-09-09 ENCOUNTER — TELEPHONE (OUTPATIENT)
Dept: ONCOLOGY | Facility: CLINIC | Age: 35
End: 2020-09-09

## 2020-09-09 NOTE — TELEPHONE ENCOUNTER
Pt has another  appt on Fri 09/11 @ 8am so he's asking for his Fri 09/11 lab appt to be rescheduled to 10-10:30am.    His ph# is [phone]

## 2020-09-11 ENCOUNTER — APPOINTMENT (OUTPATIENT)
Dept: OTHER | Facility: HOSPITAL | Age: 35
End: 2020-09-11

## 2020-09-11 ENCOUNTER — LAB (OUTPATIENT)
Dept: OTHER | Facility: HOSPITAL | Age: 35
End: 2020-09-11

## 2020-09-11 ENCOUNTER — CLINICAL SUPPORT (OUTPATIENT)
Dept: ONCOLOGY | Facility: HOSPITAL | Age: 35
End: 2020-09-11

## 2020-09-11 VITALS
DIASTOLIC BLOOD PRESSURE: 78 MMHG | BODY MASS INDEX: 24.3 KG/M2 | SYSTOLIC BLOOD PRESSURE: 124 MMHG | WEIGHT: 179.4 LBS | TEMPERATURE: 97.3 F | RESPIRATION RATE: 18 BRPM | HEART RATE: 64 BPM | OXYGEN SATURATION: 100 % | HEIGHT: 72 IN

## 2020-09-11 DIAGNOSIS — Z86.711 HISTORY OF PULMONARY EMBOLUS (PE): ICD-10-CM

## 2020-09-11 DIAGNOSIS — C71.1 ANAPLASTIC ASTROCYTOMA OF FRONTAL LOBE (HCC): ICD-10-CM

## 2020-09-11 LAB
BASOPHILS # BLD AUTO: 0.04 10*3/MM3 (ref 0–0.2)
BASOPHILS NFR BLD AUTO: 0.9 % (ref 0–1.5)
DEPRECATED RDW RBC AUTO: 39.4 FL (ref 37–54)
EOSINOPHIL # BLD AUTO: 0.24 10*3/MM3 (ref 0–0.4)
EOSINOPHIL NFR BLD AUTO: 5.1 % (ref 0.3–6.2)
ERYTHROCYTE [DISTWIDTH] IN BLOOD BY AUTOMATED COUNT: 12.2 % (ref 12.3–15.4)
HCT VFR BLD AUTO: 42.9 % (ref 37.5–51)
HGB BLD-MCNC: 14.8 G/DL (ref 13–17.7)
IMM GRANULOCYTES # BLD AUTO: 0.09 10*3/MM3 (ref 0–0.05)
IMM GRANULOCYTES NFR BLD AUTO: 1.9 % (ref 0–0.5)
INR PPP: 2.5
LYMPHOCYTES # BLD AUTO: 1.32 10*3/MM3 (ref 0.7–3.1)
LYMPHOCYTES NFR BLD AUTO: 28.2 % (ref 19.6–45.3)
MCH RBC QN AUTO: 30.3 PG (ref 26.6–33)
MCHC RBC AUTO-ENTMCNC: 34.5 G/DL (ref 31.5–35.7)
MCV RBC AUTO: 87.9 FL (ref 79–97)
MONOCYTES # BLD AUTO: 0.45 10*3/MM3 (ref 0.1–0.9)
MONOCYTES NFR BLD AUTO: 9.6 % (ref 5–12)
NEUTROPHILS NFR BLD AUTO: 2.54 10*3/MM3 (ref 1.7–7)
NEUTROPHILS NFR BLD AUTO: 54.3 % (ref 42.7–76)
NRBC BLD AUTO-RTO: 0 /100 WBC (ref 0–0.2)
PLATELET # BLD AUTO: 196 10*3/MM3 (ref 140–450)
PMV BLD AUTO: 9.7 FL (ref 6–12)
PROTHROMBIN TIME: 29.9 SECONDS (ref 11–15)
RBC # BLD AUTO: 4.88 10*6/MM3 (ref 4.14–5.8)
WBC # BLD AUTO: 4.68 10*3/MM3 (ref 3.4–10.8)

## 2020-09-11 PROCEDURE — 36415 COLL VENOUS BLD VENIPUNCTURE: CPT

## 2020-09-11 PROCEDURE — 85025 COMPLETE CBC W/AUTO DIFF WBC: CPT | Performed by: INTERNAL MEDICINE

## 2020-09-11 PROCEDURE — 85610 PROTHROMBIN TIME: CPT

## 2020-09-18 ENCOUNTER — APPOINTMENT (OUTPATIENT)
Dept: OTHER | Facility: HOSPITAL | Age: 35
End: 2020-09-18

## 2020-09-18 ENCOUNTER — APPOINTMENT (OUTPATIENT)
Dept: MRI IMAGING | Facility: HOSPITAL | Age: 35
End: 2020-09-18

## 2020-09-21 ENCOUNTER — HOSPITAL ENCOUNTER (OUTPATIENT)
Dept: MRI IMAGING | Facility: HOSPITAL | Age: 35
Discharge: HOME | End: 2020-09-21
Admitting: INTERNAL MEDICINE

## 2020-09-21 DIAGNOSIS — Z86.711 HISTORY OF PULMONARY EMBOLUS (PE): ICD-10-CM

## 2020-09-21 DIAGNOSIS — C71.1 ANAPLASTIC ASTROCYTOMA OF FRONTAL LOBE (HCC): ICD-10-CM

## 2020-09-21 LAB — CREAT BLDA-MCNC: 0.9 MG/DL (ref 0.6–1.3)

## 2020-09-21 PROCEDURE — A9575 INJ GADOTERATE MEGLUMI 0.1ML: HCPCS | Performed by: INTERNAL MEDICINE

## 2020-09-21 PROCEDURE — 70553 MRI BRAIN STEM W/O & W/DYE: CPT

## 2020-09-21 PROCEDURE — 82565 ASSAY OF CREATININE: CPT

## 2020-09-21 PROCEDURE — 25010000002 GADOTERATE MEGLUMINE 7.5 MMOL/15ML SOLUTION: Performed by: INTERNAL MEDICINE

## 2020-09-21 RX ORDER — GADOTERATE MEGLUMINE 376.9 MG/ML
15 INJECTION INTRAVENOUS
Status: COMPLETED | OUTPATIENT
Start: 2020-09-21 | End: 2020-09-21

## 2020-09-21 RX ADMIN — GADOTERATE MEGLUMINE 15 ML: 376.9 INJECTION, SOLUTION INTRAVENOUS at 08:20

## 2020-09-25 ENCOUNTER — APPOINTMENT (OUTPATIENT)
Dept: OTHER | Facility: HOSPITAL | Age: 35
End: 2020-09-25

## 2020-09-25 ENCOUNTER — OFFICE VISIT (OUTPATIENT)
Dept: ONCOLOGY | Facility: CLINIC | Age: 35
End: 2020-09-25

## 2020-09-25 VITALS
SYSTOLIC BLOOD PRESSURE: 126 MMHG | OXYGEN SATURATION: 100 % | HEIGHT: 72 IN | DIASTOLIC BLOOD PRESSURE: 79 MMHG | BODY MASS INDEX: 24.52 KG/M2 | WEIGHT: 181 LBS | TEMPERATURE: 97.5 F | RESPIRATION RATE: 16 BRPM | HEART RATE: 73 BPM

## 2020-09-25 DIAGNOSIS — D68.51 FACTOR 5 LEIDEN MUTATION, HETEROZYGOUS (HCC): ICD-10-CM

## 2020-09-25 DIAGNOSIS — Z86.711 HISTORY OF PULMONARY EMBOLUS (PE): ICD-10-CM

## 2020-09-25 DIAGNOSIS — C71.1 ANAPLASTIC ASTROCYTOMA OF FRONTAL LOBE (HCC): Primary | ICD-10-CM

## 2020-09-25 PROCEDURE — 99214 OFFICE O/P EST MOD 30 MIN: CPT | Performed by: INTERNAL MEDICINE

## 2020-09-25 NOTE — PROGRESS NOTES
[facility] OUTPATIENT FOLLOW UP CLINIC VISIT    REASON FOR FOLLOW-UP:    1.  Left lower extremity DVT with progression of symptoms and extension of the left lower extremity DVT into the femoral vein from the popliteal/calf vein swallowing for next set.  Currently anticoagulated with warfarin.  2.  Right lower extremity DVT noted in the common femoral, profunda femoral, and calf veins while anticoagulated with a DOAC.  Therefore, failure of DOAC.  3.  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  Other thrombophilia labs negative.    4.  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  NOT 1p19q co-deleted.    5.  Radiation initiated on 7/31/2018.  Plan for Temodar ×1 year following radiation.  6.  4500 cGy in 25 fractions administered from 7/31/2018 through 9/14/2018  7.  MRI brain 10/23/2018 with resolving hemorrhage in the resection cavity.  However, there is hyperintensity measuring 4.6 x 4.3 x 3 cm along the margins of the resection cavity.  8.  Repeat venous duplex on 10/23 with chronic right CVT and chronic LLE DVT from the mid femoral vein distally.     9.  He initiated adjuvant therapy with Temodar in mid October 2018.  10.  MRI brain 12/3/2018 with stable findings.  11.  On 1/24/2019 he did have a repeat resection by Dr. Galvan.  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  However, there was no evidence of progression to a higher grade in the new resected specimen.  Mostly the proliferative activity was very low with only rare mitoses and a low Ki-67 of just 2-3%.  12.  Temodar completed December 2019      HISTORY OF PRESENT ILLNESS:  Bryan Valadez is a 34 y.o. male who returns today for follow up of the above issues.     He feels very well.  He continues warfarin.  INR values have been therapeutic.  No bleeding.  He has a consistent diet.  No new neurologic symptoms.  He continues  Keppra.    ONCOLOGIC HISTORY:  He had about 6 months of headaches treated as sinusitis and presented on 6/15 with visual changes and headache and was found to have a large right frontal mass which was resected on 6/16 by Dr. Galvan and consistent with a glioma.  Pathology was reviewed at [facility] showing infiltrating glioma.  Large 10 cm partially cystic and solid lobulated tumor mass at diagnosis.  Negative IDH1-R132H immunostain excludes the most common IDH1 mutation; however loss of ATRX expression suggests possibility of glioma harboring another less common IDH1 mutation.  p53 overexpressed.  Ki67 10% but variable.  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  There was no evidence for a 1p19q co-deletion.       Chromosome microarray showed a complex molecular karyotype including loss of 1q, loss of 2q, gain of 7q, gain of 8q, loss of 9p, REBECCA of 17p, and gain of 18p.  These abnormalities are typically associated with  IDH-mutant astrocytic gliomas.  No 1p and 19q whole arm co-deletion was noted.  CT head on 7/1 c/w residual tumor circumscribing the resection cavity and a remote cerebellar hemisphere hemorrhage.       He was discharged and subsequently admitted with left leg pain and chest pain, with LLE popliteal and calf vein clot noted and bilateral small PE. He was treated with heparin and discharged on Pradaxa.     He developed worsening leg pain up into the thigh and presented to the ER where a venous duplex showed progression of the clot to the femoral vein.  He was given Lovenox and admitted.  Warfarin was initiated.       A partial thrombophilia evaluation showed that he is a heterozygote for the factor V Leiden mutation.  Labs otherwise unremarkable.    He was subsequently found to have a right lower extremity DVT on 7/8/2018.    He remains anticoagulated with warfarin.    Radiation initiated on 7/31/2018.  Plan for Temodar ×1 year after radiation is complete.    Radiation complete as of  9/14/2018.    4500 cGy in 25 fractions administered from 7/31/2018 through 9/14/2018    MRI brain 10/23/2018 with resolving hemorrhage in the resection cavity.  However, there is hyperintensity measuring 4.6 x 4.3 x 3 cm along the margins of the resection cavity.    Repeat venous duplex on 10/23 with chronic right CVT and chronic LLE DVT from the mid femoral vein distally.      Repeat brain MRI on 12/3/2018 with stable findings.    On 1/24/2019 he did have a repeat resection by Dr. Galvan.  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  However, there was no evidence of progression to a higher grade in the new resected specimen.  Mostly the proliferative activity was very low with only rare mitoses and a low Ki-67 of just 2-3%.    MRI brain's been stable including an MRI brain from 9/23/2019.    As of 9/25/2019 due to delays in therapy from his resection on 1/24/2019 we will go ahead and proceed with 3 more months of Temodar and he will complete therapy before the end of the year in early December.  We will plan for an MRI then in mid to late December and if everything appears stable we will proceed with observation at that time.    Follow-up brain imaging on 12/12/2019 with some improvement.  Potential for residual tumor.  Proceed with observation.  MRI in 3 months.    Follow-up imaging 2/27/2020 with similar findings.    Follow-up imaging 5/27/2020 with stable findings    Brain MRI 9/21/2020 stable without changes      ALLERGIES:  Allergies   Allergen Reactions   • Avocado Itching   • Other Itching     Insect stings: Bee stings, throat swelling (has Epi pen)    Allergy to fruit, Avocado, Cherry Tomato (can have blue berries)   • Tomato Itching     Cherry tomato       MEDICATIONS:  The medication list has been reviewed with the patient by the medical assistant, and the list has been updated in the electronic medical record, which I reviewed.  Medication dosages and frequencies were confirmed to be  "accurate.    REVIEW OF SYSTEMS:  PAIN:  See Vital Signs below.  GENERAL:  No fevers, chills, night sweats, or unintended weight loss.  SKIN: Urticaria has resolved  HEME/LYMPH:  No abnormal bleeding.  No palpable lymphadenopathy.  EYES:  No vision changes or diplopia.  ENT:  No sore throat or difficulty swallowing.  RESPIRATORY:  No cough, shortness of breath, hemoptysis, or wheezing.  CARDIOVASCULAR:  No chest pain, palpitations, orthopnea, or dyspnea on exertion.  GASTROINTESTINAL:  No abdominal pain, nausea, vomiting, constipation, diarrhea, melena, or hematochezia.  GENITOURINARY:  No dysuria or hematuria.  MUSCULOSKELETAL:  No joint pain, swelling, or erythema.   NEUROLOGIC:  No dizziness, loss of consciousness, or seizures.  PSYCHIATRIC:  No depression, anxiety, or mood changes.    Vitals:    09/25/20 0814 09/25/20 0818   BP:  126/79   Pulse:  73   Resp:  16   Temp:  97.5 °F (36.4 °C)   TempSrc:  Temporal   SpO2:  100%   Weight:  82.1 kg (181 lb)   Height:  182.9 cm (72.01\")   PainSc: Comment: frontal lobe astrocytoma 0-No pain       PHYSICAL EXAMINATION:  General:  No acute distress, awake, alert and oriented  Skin:  Warm and dry, no visible rash  HEENT:  normocephalic/atraumatic.  Wearing a mask.  Chest:  Normal respiratory effort.  Lungs clear to auscultation bilaterally.  Heart: Regular rate and rhythm without murmurs gallops or rubs  Lymphatics: No cervical supraclavicular axillary adenopathy  Extremities:  No visible clubbing, cyanosis, or edema  Neuro/psych:  Grossly non-focal.  Normal mood and affect.    DIAGNOSTIC DATA:  Results for orders placed or performed during the hospital encounter of 09/21/20   POC Creatinine    Specimen: Blood   Result Value Ref Range    Creatinine 0.90 0.60 - 1.30 mg/dL       IMAGING:    MRI brain images from 9/21/2020 without change from prior.  No evidence for progressive disease.  Images personally reviewed.    ASSESSMENT:  This is a 34 y.o. male with:  1.  Bilateral " lower extremity DVT: He remains on warfarin.  His INR has been therapeutic.  Recheck every 4 weeks or so.    2.  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  NOT 1p19q co-deleted.  Overall, this is a good molecular profile. He did initiate radiation alone on 7/31/2018 and completed it on 9/14/2018.     He initiated adjuvant Temodar in mid October 2018.    Due to persistent abnormalities on MRI, on 1/24/2019 he did have a repeat resection by Dr. Galvan.  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  However, there was no evidence of progression to a higher grade in the new resected specimen.  Mostly the proliferative activity was very low with only rare mitoses and a low Ki-67 of just 2-3%.    MRI brain imaging from 9/23/2019 appeared stable.    He completed 1 year of Temodar with an MRI on 12/12/2019 showing stable findings with some improvement in the intensity of the abnormal uptake    MRI brain 2/27/2020 stable..    MRI brain 5/27/2020 stable.      MRI brain 9/21/2020 stable.    3.  Given the possibility of infertility with treatment he banked sperm at the [facility].  He has a 2 children conceived naturally without the banked sperm.    4.  Elevated liver labs: Liver labs normalized.  Recheck when I see him next.    5.  History of zoster rash on the left face: He completed a course of Famvir.  We did continue acyclovir for prophylaxis while on Temodar which has not been stopped.    6.  Urticaria: He has significant environmental allergies related to pollens, etc.  He has not had any recent issues.  Not discussed today.      PLAN:  1.  Continue warfarin at the same dose.  Monthly INR.  2.  MRI in about 4 months and I will see him back after that with a CBC, CMP and INR.  If everything looks okay on that MRI we may space things out a little bit more.

## 2020-10-13 ENCOUNTER — TELEPHONE (OUTPATIENT)
Dept: INTERNAL MEDICINE | Facility: CLINIC | Age: 35
End: 2020-10-13

## 2020-10-13 NOTE — TELEPHONE ENCOUNTER
I left message that he needs to clear shingrix vaccine with oncologist 1st. I also warned him of potential for flu-like sx for 2-3 days (might be best to put this off for several mos).

## 2020-10-13 NOTE — TELEPHONE ENCOUNTER
Patient is wanting to get the shingles vaccine because he is immune compromised.  He states the pharmacy told him that he will need to get an RX for the shot , otherwise it will cost over $200.00.    Please advise.    Patient call back [phone]     [facility] [address] [facility] & [facility] - [phone] [facility] [phone]   [phone]

## 2020-10-22 ENCOUNTER — CLINICAL SUPPORT (OUTPATIENT)
Dept: ONCOLOGY | Facility: HOSPITAL | Age: 35
End: 2020-10-22

## 2020-10-22 ENCOUNTER — LAB (OUTPATIENT)
Dept: OTHER | Facility: HOSPITAL | Age: 35
End: 2020-10-22

## 2020-10-22 VITALS — WEIGHT: 183.6 LBS | BODY MASS INDEX: 24.9 KG/M2

## 2020-10-22 DIAGNOSIS — D68.51 FACTOR 5 LEIDEN MUTATION, HETEROZYGOUS (HCC): ICD-10-CM

## 2020-10-22 DIAGNOSIS — Z86.711 HISTORY OF PULMONARY EMBOLUS (PE): ICD-10-CM

## 2020-10-22 DIAGNOSIS — C71.1 ANAPLASTIC ASTROCYTOMA OF FRONTAL LOBE (HCC): ICD-10-CM

## 2020-10-22 LAB
INR PPP: 2.4
PROTHROMBIN TIME: 29.4 SECONDS (ref 11–15)

## 2020-10-22 PROCEDURE — G0463 HOSPITAL OUTPT CLINIC VISIT: HCPCS

## 2020-10-22 PROCEDURE — 85610 PROTHROMBIN TIME: CPT

## 2020-10-22 NOTE — NURSING NOTE
Patient arrived ambulatory for RN Review. No complaints of shortness of breath, excessive bruising, n/v/d or active bleeding. Dosing did not change. Patient will continue his 60 mg Coumadin weekly schedule.Patient v/u if any concerns before next appointment to contact physician.   Sun/Tue/Thu   10 mg  Mon/Wed/Fri/Sat   7.5 mg  Lab Results   Component Value Date    INR 2.40 10/22/2020    INR 2.50 09/11/2020    INR 2.50 08/27/2020    PROTIME 29.4 (H) 10/22/2020    PROTIME 29.9 (H) 09/11/2020    PROTIME 30.5 (H) 08/27/2020

## 2020-11-19 ENCOUNTER — CLINICAL SUPPORT (OUTPATIENT)
Dept: ONCOLOGY | Facility: HOSPITAL | Age: 35
End: 2020-11-19

## 2020-11-19 ENCOUNTER — LAB (OUTPATIENT)
Dept: OTHER | Facility: HOSPITAL | Age: 35
End: 2020-11-19

## 2020-11-19 VITALS — WEIGHT: 183 LBS | RESPIRATION RATE: 18 BRPM | TEMPERATURE: 97.7 F | HEIGHT: 72 IN | BODY MASS INDEX: 24.79 KG/M2

## 2020-11-19 DIAGNOSIS — D68.51 FACTOR 5 LEIDEN MUTATION, HETEROZYGOUS (HCC): ICD-10-CM

## 2020-11-19 DIAGNOSIS — C71.1 ANAPLASTIC ASTROCYTOMA OF FRONTAL LOBE (HCC): ICD-10-CM

## 2020-11-19 DIAGNOSIS — Z86.711 HISTORY OF PULMONARY EMBOLUS (PE): ICD-10-CM

## 2020-11-19 LAB
INR PPP: 1.7
PROTHROMBIN TIME: 20.5 SECONDS (ref 11–15)

## 2020-11-19 PROCEDURE — 85610 PROTHROMBIN TIME: CPT

## 2020-11-19 PROCEDURE — G0463 HOSPITAL OUTPT CLINIC VISIT: HCPCS

## 2020-11-19 NOTE — NURSING NOTE
Here for PT/INRand review. INR 1.7 Per ACELIS he was to increase from 60 mg weekly to 67.5 mg weekly . This was discussed with ADA Garcia and she was ok with him following that and returning in 1-2 weeks for recheck. Pt. Declined and states that it will raise him to 3.4 or more but is agreeable to take 10mg 5 days and 7.5 mg 2 days for a total of 65mg weekly and to return next Wednesday 11- at 0800/0830 for lab and review. Copt of ACELIS guideline given

## 2020-11-25 ENCOUNTER — CLINICAL SUPPORT (OUTPATIENT)
Dept: ONCOLOGY | Facility: HOSPITAL | Age: 35
End: 2020-11-25

## 2020-11-25 ENCOUNTER — LAB (OUTPATIENT)
Dept: OTHER | Facility: HOSPITAL | Age: 35
End: 2020-11-25

## 2020-11-25 VITALS — WEIGHT: 183 LBS | RESPIRATION RATE: 18 BRPM | HEIGHT: 72 IN | TEMPERATURE: 97.1 F | BODY MASS INDEX: 24.79 KG/M2

## 2020-11-25 DIAGNOSIS — D68.51 FACTOR 5 LEIDEN MUTATION, HETEROZYGOUS (HCC): ICD-10-CM

## 2020-11-25 DIAGNOSIS — C71.1 ANAPLASTIC ASTROCYTOMA OF FRONTAL LOBE (HCC): ICD-10-CM

## 2020-11-25 DIAGNOSIS — Z86.711 HISTORY OF PULMONARY EMBOLUS (PE): ICD-10-CM

## 2020-11-25 LAB
INR PPP: 2.8
PROTHROMBIN TIME: 33.8 SECONDS (ref 11–15)

## 2020-11-25 PROCEDURE — G0463 HOSPITAL OUTPT CLINIC VISIT: HCPCS

## 2020-11-25 PROCEDURE — 85610 PROTHROMBIN TIME: CPT

## 2020-11-25 NOTE — NURSING NOTE
Pt is here for lab with RN review.  INR therapeutic at 2.8. Prior dose confirmed. Pt has no complaints, denies any active bleeding or excessive bruising, changes in diet or new medicaion. Copy of standing stone given to pt with dosing instructions reviewed. Per standing stone, pt is to take 7.5 mg of Coumadin on MWF  and 10mg all other days. F/u appt reviewed with pt and instructed to call the office with any concerns or new symptoms prior to next visit. Pt vu and discharged ambulatory    Lab Results   Component Value Date    INR 2.80 11/25/2020    INR 1.70 11/19/2020    INR 2.40 10/22/2020    PROTIME 33.8 (H) 11/25/2020    PROTIME 20.5 (H) 11/19/2020    PROTIME 29.4 (H) 10/22/2020

## 2020-12-17 ENCOUNTER — CLINICAL SUPPORT (OUTPATIENT)
Dept: ONCOLOGY | Facility: HOSPITAL | Age: 35
End: 2020-12-17

## 2020-12-17 ENCOUNTER — LAB (OUTPATIENT)
Dept: OTHER | Facility: HOSPITAL | Age: 35
End: 2020-12-17

## 2020-12-17 DIAGNOSIS — I82.413 ACUTE DEEP VEIN THROMBOSIS (DVT) OF FEMORAL VEIN OF BOTH LOWER EXTREMITIES (HCC): Primary | ICD-10-CM

## 2020-12-17 LAB
INR PPP: 2.9
PROTHROMBIN TIME: 34.8 SECONDS (ref 11–15)

## 2020-12-17 PROCEDURE — 85610 PROTHROMBIN TIME: CPT

## 2020-12-17 PROCEDURE — G0463 HOSPITAL OUTPT CLINIC VISIT: HCPCS

## 2020-12-17 NOTE — NURSING NOTE
Lab Results   Component Value Date    INR 2.90 12/17/2020    INR 2.80 11/25/2020    INR 1.70 11/19/2020    PROTIME 34.8 (H) 12/17/2020    PROTIME 33.8 (H) 11/25/2020    PROTIME 20.5 (H) 11/19/2020     Here for review. No voiced concerns. Has been taking 7.5mg M-W-F and 10mg all other days.  Per ACELIS his dose has been decreased to 60 mg weekly ie 7.5 M-W-F-Sat and 10mg all other days.  F/u appt. Reviewed for 1- with lab at 0750 and Dr. Jefferson 0820. States that he has a MRI scheduled for 01-. Printed copy of new dosing given.

## 2020-12-21 ENCOUNTER — PATIENT MESSAGE (OUTPATIENT)
Dept: NEUROSURGERY | Facility: CLINIC | Age: 35
End: 2020-12-21

## 2020-12-21 ENCOUNTER — TELEPHONE (OUTPATIENT)
Dept: NEUROSURGERY | Facility: CLINIC | Age: 35
End: 2020-12-21

## 2020-12-21 RX ORDER — METHYLPREDNISOLONE 4 MG/1
TABLET ORAL
Qty: 1 EACH | Refills: 0 | Status: SHIPPED | OUTPATIENT
Start: 2020-12-21 | End: 2021-05-21

## 2020-12-21 NOTE — TELEPHONE ENCOUNTER
Okay with me to schedule him for an appointment.  We can try him on a Medrol Dosepak now if he wishes.

## 2020-12-21 NOTE — TELEPHONE ENCOUNTER
----- Message from Bryan Valadez sent at 12/21/2020 10:36 AM EST -----  Regarding: Visit Follow-Up Question  Contact: [phone]   and ,    I believe I somehow strained my back right neck muscle last Wednesday and now I'm having back of head headaches reaching over to top of my head and my right ear canal hurts.    Do I need to come in and get checked out along with a MRI?     Thank you,  Mic Valadez  [phone]

## 2020-12-23 ENCOUNTER — OFFICE VISIT (OUTPATIENT)
Dept: NEUROSURGERY | Facility: CLINIC | Age: 35
End: 2020-12-23

## 2020-12-23 VITALS
BODY MASS INDEX: 24.79 KG/M2 | DIASTOLIC BLOOD PRESSURE: 81 MMHG | TEMPERATURE: 98.1 F | SYSTOLIC BLOOD PRESSURE: 133 MMHG | HEIGHT: 72 IN | HEART RATE: 66 BPM | WEIGHT: 183 LBS

## 2020-12-23 DIAGNOSIS — C71.1 ANAPLASTIC ASTROCYTOMA OF FRONTAL LOBE (HCC): Primary | ICD-10-CM

## 2020-12-23 PROCEDURE — 99213 OFFICE O/P EST LOW 20 MIN: CPT | Performed by: NEUROLOGICAL SURGERY

## 2020-12-23 NOTE — PROGRESS NOTES
"Subjective   Patient ID: Bryan Valadez is a 35 y.o. male is here today for follow-up. He has neck pain that radiates into his occipital region with headaches. He also has pain that radiates into his right eye. He has no arm symptoms. He is currently taking MDP.    He has history of 2 craniotomy's by Dr. Galvan 1/25/2019 and 6/16/2018.    History of Present Illness     This patient has been having pain over his right radiating back along the right side of his head and down into his neck on the right for a couple of weeks.  The problem originally began without a particular incident.  He has no pain in his arms.  He says it feels better in the morning if he has slept with his head in the straight position and not moved around.  He started a Medrol Dosepak a couple of days ago but has not noticed much difference so far.    The following portions of the patient's history were reviewed and updated as appropriate: allergies, current medications, past family history, past medical history, past social history, past surgical history and problem list.    Review of Systems   HENT: Positive for ear pain (Right ear).    Eyes: Positive for pain (Right ). Negative for visual disturbance.   Respiratory: Negative for chest tightness and shortness of breath.    Cardiovascular: Negative for chest pain.   Musculoskeletal: Positive for neck pain.   Neurological: Positive for headaches. Negative for dizziness.       I have reviewed the review of systems as documented by my MA.      Objective     Vitals:    12/23/20 0850   BP: 133/81   Pulse: 66   Temp: 98.1 °F (36.7 °C)   Weight: 83 kg (183 lb)   Height: 182.9 cm (72\")     Body mass index is 24.82 kg/m².      Physical Exam  Neurological:      Mental Status: He is alert and oriented to person, place, and time.       Neurologic Exam     Mental Status   Oriented to person, place, and time.           Assessment/Plan   Independent Review of Radiographic Studies:      I personally reviewed the " images from the following studies.    I reviewed his MRI from September of this year almost exactly 3 months ago.  This showed no evidence of recurrent tumor.    Medical Decision Making:      I told the patient that with these symptoms I think we should probably check a new MRI of his brain with and without contrast and also and also check an MRI of his cervical spine without contrast.  We can do a telephone visit to go over the results.    There are no diagnoses linked to this encounter.  No follow-ups on file.

## 2020-12-28 ENCOUNTER — TELEPHONE (OUTPATIENT)
Dept: ONCOLOGY | Facility: CLINIC | Age: 35
End: 2020-12-28

## 2020-12-28 NOTE — TELEPHONE ENCOUNTER
Called pt and have changed to video visit, will do labs on mri day, also linked c spine order, he vu to all and all printouts sent via mail, df

## 2020-12-28 NOTE — TELEPHONE ENCOUNTER
Caller: Bryan    Relationship to patient: Pt    Best call back number: [phone]    Type of visit: MRI    Requested date: Week of 12/28    If rescheduling, when is the original appointment: 01/07    Additional notes:Pt's will be on the road starting 01/05. He;d also like to move up his appt with Dr. Jefferson if his MRI can be sooner.

## 2021-01-04 ENCOUNTER — TELEPHONE (OUTPATIENT)
Dept: ONCOLOGY | Facility: CLINIC | Age: 36
End: 2021-01-04

## 2021-01-04 NOTE — TELEPHONE ENCOUNTER
PATIENT CALLING    PATIENT WANTS TO CHANGE HIS APPT FOR 1-15-21 FROM A VIDEO TO IN PERSON, HE'S GOING TO BE IN TOWN NOW, PLEASE ADVISE?    PT CALL BACK # [phone]

## 2021-01-07 ENCOUNTER — HOSPITAL ENCOUNTER (OUTPATIENT)
Dept: MRI IMAGING | Facility: HOSPITAL | Age: 36
Discharge: HOME | End: 2021-01-07

## 2021-01-07 ENCOUNTER — LAB (OUTPATIENT)
Dept: LAB | Facility: HOSPITAL | Age: 36
End: 2021-01-07

## 2021-01-07 DIAGNOSIS — Z86.711 HISTORY OF PULMONARY EMBOLUS (PE): ICD-10-CM

## 2021-01-07 DIAGNOSIS — C71.1 ANAPLASTIC ASTROCYTOMA OF FRONTAL LOBE (HCC): ICD-10-CM

## 2021-01-07 DIAGNOSIS — D68.51 FACTOR 5 LEIDEN MUTATION, HETEROZYGOUS (HCC): ICD-10-CM

## 2021-01-07 LAB
ALBUMIN SERPL-MCNC: 4.8 G/DL (ref 3.5–5.2)
ALBUMIN/GLOB SERPL: 2.1 G/DL
ALP SERPL-CCNC: 46 U/L (ref 39–117)
ALT SERPL W P-5'-P-CCNC: 18 U/L (ref 1–41)
ANION GAP SERPL CALCULATED.3IONS-SCNC: 7.2 MMOL/L (ref 5–15)
AST SERPL-CCNC: 16 U/L (ref 1–40)
BASOPHILS # BLD AUTO: 0.02 10*3/MM3 (ref 0–0.2)
BASOPHILS NFR BLD AUTO: 0.4 % (ref 0–1.5)
BILIRUB SERPL-MCNC: 0.3 MG/DL (ref 0–1.2)
BUN SERPL-MCNC: 16 MG/DL (ref 6–20)
BUN/CREAT SERPL: 19 (ref 7–25)
CALCIUM SPEC-SCNC: 9.2 MG/DL (ref 8.6–10.5)
CHLORIDE SERPL-SCNC: 103 MMOL/L (ref 98–107)
CO2 SERPL-SCNC: 29.8 MMOL/L (ref 22–29)
CREAT SERPL-MCNC: 0.84 MG/DL (ref 0.76–1.27)
DEPRECATED RDW RBC AUTO: 39.5 FL (ref 37–54)
EOSINOPHIL # BLD AUTO: 0.12 10*3/MM3 (ref 0–0.4)
EOSINOPHIL NFR BLD AUTO: 2.4 % (ref 0.3–6.2)
ERYTHROCYTE [DISTWIDTH] IN BLOOD BY AUTOMATED COUNT: 11.9 % (ref 12.3–15.4)
GFR SERPL CREATININE-BSD FRML MDRD: 103 ML/MIN/1.73
GLOBULIN UR ELPH-MCNC: 2.3 GM/DL
GLUCOSE SERPL-MCNC: 98 MG/DL (ref 65–99)
HCT VFR BLD AUTO: 46.2 % (ref 37.5–51)
HGB BLD-MCNC: 15.5 G/DL (ref 13–17.7)
IMM GRANULOCYTES # BLD AUTO: 0.02 10*3/MM3 (ref 0–0.05)
IMM GRANULOCYTES NFR BLD AUTO: 0.4 % (ref 0–0.5)
INR PPP: 1.9 (ref 0.8–1.2)
LYMPHOCYTES # BLD AUTO: 1.22 10*3/MM3 (ref 0.7–3.1)
LYMPHOCYTES NFR BLD AUTO: 24.8 % (ref 19.6–45.3)
MCH RBC QN AUTO: 30.3 PG (ref 26.6–33)
MCHC RBC AUTO-ENTMCNC: 33.5 G/DL (ref 31.5–35.7)
MCV RBC AUTO: 90.4 FL (ref 79–97)
MONOCYTES # BLD AUTO: 0.45 10*3/MM3 (ref 0.1–0.9)
MONOCYTES NFR BLD AUTO: 9.2 % (ref 5–12)
NEUTROPHILS NFR BLD AUTO: 3.08 10*3/MM3 (ref 1.7–7)
NEUTROPHILS NFR BLD AUTO: 62.8 % (ref 42.7–76)
NRBC BLD AUTO-RTO: 0 /100 WBC (ref 0–0.2)
PLATELET # BLD AUTO: 212 10*3/MM3 (ref 140–450)
PMV BLD AUTO: 9.5 FL (ref 6–12)
POTASSIUM SERPL-SCNC: 4.1 MMOL/L (ref 3.5–5.2)
PROT SERPL-MCNC: 7.1 G/DL (ref 6–8.5)
PROTHROMBIN TIME: 22.4 SECONDS (ref 12.8–15.2)
RBC # BLD AUTO: 5.11 10*6/MM3 (ref 4.14–5.8)
SODIUM SERPL-SCNC: 140 MMOL/L (ref 136–145)
WBC # BLD AUTO: 4.91 10*3/MM3 (ref 3.4–10.8)

## 2021-01-07 PROCEDURE — A9577 INJ MULTIHANCE: HCPCS | Performed by: INTERNAL MEDICINE

## 2021-01-07 PROCEDURE — 80053 COMPREHEN METABOLIC PANEL: CPT

## 2021-01-07 PROCEDURE — 85025 COMPLETE CBC W/AUTO DIFF WBC: CPT

## 2021-01-07 PROCEDURE — 70553 MRI BRAIN STEM W/O & W/DYE: CPT

## 2021-01-07 PROCEDURE — 82565 ASSAY OF CREATININE: CPT

## 2021-01-07 PROCEDURE — 85610 PROTHROMBIN TIME: CPT

## 2021-01-07 PROCEDURE — 0 GADOBENATE DIMEGLUMINE 529 MG/ML SOLUTION: Performed by: INTERNAL MEDICINE

## 2021-01-07 PROCEDURE — 36415 COLL VENOUS BLD VENIPUNCTURE: CPT

## 2021-01-07 RX ADMIN — GADOBENATE DIMEGLUMINE 16 ML: 529 INJECTION, SOLUTION INTRAVENOUS at 08:15

## 2021-01-07 NOTE — PROGRESS NOTES
Case Management Discharge Note    Final Note: Home w/ spouse and [facility]    Destination     No service coordination in this encounter.      Durable Medical Equipment     No service coordination in this encounter.      Dialysis/Infusion     No service coordination in this encounter.      Home Medical Care - Selection Complete     Service Request Status Selected Specialties Address Phone Number Fax Number    [facility] [place] [facility] [address] [address] [phone]      Social Care     No service coordination in this encounter.        Other: Other    Final Discharge Disposition Code: 06 - home with home health care   Physical Exam:    CONSTITUTIONAL:  Generally well appearing, no acute distress, alert, awake and oriented  HEENT:  Moist mucous membranes, normal voice  PULM:  No accessory muscle use, speaking full sentences  SKIN:  Normal in appearance, normal color

## 2021-01-08 LAB — CREAT BLDA-MCNC: 0.8 MG/DL (ref 0.6–1.3)

## 2021-01-13 ENCOUNTER — TELEPHONE (OUTPATIENT)
Dept: ONCOLOGY | Facility: CLINIC | Age: 36
End: 2021-01-13

## 2021-01-14 ENCOUNTER — TELEPHONE (OUTPATIENT)
Dept: ONCOLOGY | Facility: CLINIC | Age: 36
End: 2021-01-14

## 2021-01-14 NOTE — TELEPHONE ENCOUNTER
PT: SELF    PT CALLING TO SEE IF HE CAN GET A COPY OF  ALL OF HIS IMAGING THAT HAS BEEN DONE THROUGH THIS OFFICE?  HE ASK IF THERE IS A PAPER TO SIGN IF IT CAN BE EMAILED TO HIM?   [email or web address]    PT #: [phone]

## 2021-01-15 ENCOUNTER — APPOINTMENT (OUTPATIENT)
Dept: LAB | Facility: HOSPITAL | Age: 36
End: 2021-01-15

## 2021-01-15 ENCOUNTER — OFFICE VISIT (OUTPATIENT)
Dept: ONCOLOGY | Facility: CLINIC | Age: 36
End: 2021-01-15

## 2021-01-15 DIAGNOSIS — Z79.01 CHRONIC ANTICOAGULATION: ICD-10-CM

## 2021-01-15 DIAGNOSIS — Z86.711 HISTORY OF PULMONARY EMBOLUS (PE): ICD-10-CM

## 2021-01-15 DIAGNOSIS — C71.1 ANAPLASTIC ASTROCYTOMA OF FRONTAL LOBE (HCC): Primary | ICD-10-CM

## 2021-01-15 DIAGNOSIS — D68.51 FACTOR 5 LEIDEN MUTATION, HETEROZYGOUS (HCC): ICD-10-CM

## 2021-01-15 PROCEDURE — 99443 PR PHYS/QHP TELEPHONE EVALUATION 21-30 MIN: CPT | Performed by: INTERNAL MEDICINE

## 2021-01-15 NOTE — PROGRESS NOTES
[facility] OUTPATIENT FOLLOW UP VISIT    Telephone visit today    REASON FOR FOLLOW-UP:    1.  Left lower extremity DVT with progression of symptoms and extension of the left lower extremity DVT into the femoral vein from the popliteal/calf vein swallowing for next set.  Currently anticoagulated with warfarin.  2.  Right lower extremity DVT noted in the common femoral, profunda femoral, and calf veins while anticoagulated with a DOAC.  Therefore, failure of DOAC.  3.  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  Other thrombophilia labs negative.    4.  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  NOT 1p19q co-deleted.    5.  Radiation initiated on 7/31/2018.  Plan for Temodar ×1 year following radiation.  6.  4500 cGy in 25 fractions administered from 7/31/2018 through 9/14/2018  7.  MRI brain 10/23/2018 with resolving hemorrhage in the resection cavity.  However, there is hyperintensity measuring 4.6 x 4.3 x 3 cm along the margins of the resection cavity.  8.  Repeat venous duplex on 10/23 with chronic right CVT and chronic LLE DVT from the mid femoral vein distally.     9.  He initiated adjuvant therapy with Temodar in mid October 2018.  10.  MRI brain 12/3/2018 with stable findings.  11.  On 1/24/2019 he did have a repeat resection by Dr. Galvan.  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  However, there was no evidence of progression to a higher grade in the new resected specimen.  Mostly the proliferative activity was very low with only rare mitoses and a low Ki-67 of just 2-3%.  12.  Temodar completed December 2019      HISTORY OF PRESENT ILLNESS:  Bryan Valadez is a 35 y.o. male who returns today for follow up of the above issues.     Telephone visit today as he is out of town.    He did see Dr. Hidalgo with neurosurgery on 12/23 with new pain on the right side of his head and down into  his neck. At that time a Medrol dose mauricio had not helped much. Dr. Hidalgo recommended an MRI of the brain and C spine. Pain has actually improved.    He did have his previously scheduled MRI brain that I had ordered on 1/7 which shows no new findings. MRI C spine is scheduled for tomorrow.    No bleeding.    REVIEW OF SYSTEMS:  Neck pain, improved    There were no vitals filed for this visit.    PHYSICAL EXAMINATION:  Telephone visit    DIAGNOSTIC DATA:  MRI Brain With & Without Contrast (01/07/2021 08:51)    MRI 1/7/2020 no evidence for recurrence. Images personally reviewed.      ASSESSMENT:  This is a 35 y.o. male with:  *Bilateral lower extremity DVT: He remains on warfarin.  His INR has been therapeutic.  Recheck every 4 weeks or so.    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 Galvan.  IDH mutated.  NOT 1p19q co-deleted.  Overall, this is a good molecular profile. He did initiate radiation alone on 7/31/2018 and completed it on 9/14/2018.   · He initiated adjuvant Temodar in mid October 2018.  · Due to persistent abnormalities on MRI, on 1/24/2019 he did have a repeat resection by Dr. Galvan.  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  However, there was no evidence of progression to a higher grade in the new resected specimen.  Mostly the proliferative activity was very low with only rare mitoses and a low Ki-67 of just 2-3%.  · MRI brain imaging from 9/23/2019 appeared stable.  · He completed 1 year of Temodar with an MRI on 12/12/2019 showing stable findings with some improvement in the intensity of the abnormal uptake  · MRI brain 2/27/2020 stable..  · MRI brain 5/27/2020 stable.    · MRI brain 9/21/2020 stable.  · MRI 1/7/2020 stable    *New right sided head and neck pain:   · He did see Dr. Hidalgo with neurosurgery for this with an MRI of the cervical spine planned for tomorrow.  · This has improved with time and a steroid dose  mauricio    *Given the possibility of infertility with treatment he banked sperm at the [facility] [place].  He has a 2 children conceived naturally without the banked sperm.      PLAN:  1. MRI C-spine tomorrow  2. Continue warfarin.  Monthly INR. Increase warfarin by 2.5 mg weekly for INR of 1.9 last week.  2.  MRI brain in about 4-5 months and I will see him back after that with a CBC, CMP and INR.     You have chosen to receive care through a telephone visit. Do you consent to use a telephone visit for your medical care today? Yes    22 minutes

## 2021-01-16 ENCOUNTER — HOSPITAL ENCOUNTER (OUTPATIENT)
Dept: MRI IMAGING | Facility: HOSPITAL | Age: 36
Discharge: HOME | End: 2021-01-16
Admitting: NEUROLOGICAL SURGERY

## 2021-01-16 DIAGNOSIS — C71.1 ANAPLASTIC ASTROCYTOMA OF FRONTAL LOBE (HCC): ICD-10-CM

## 2021-01-16 PROCEDURE — 72141 MRI NECK SPINE W/O DYE: CPT

## 2021-01-22 NOTE — PROGRESS NOTES
"Subjective   Patient ID: Bryan Valadez is a 35 y.o. male is here today for follow-up with a new Brain and Cervical MRI that was ordered at his last visit for neck pain and headaches.  He has history of 2 craniotomy's done by Dr. Galvan 1/25/2019 and 6/16/2018. Patient completed MDP.    Today patient is not having Ha's, or neck pain. Patient states MDP resolved previous symptoms.    Patient, provider and MA are all wearing a mask in our office today.      History of Present Illness    This patient returns today.  He has a history of 2 craniotomies in the past for anaplastic astrocytoma.  When he was here last he was having quite a bit of pain in his neck primarily on the right side.  This has since completely gone away.    The following portions of the patient's history were reviewed and updated as appropriate: allergies, current medications, past family history, past medical history, past social history, past surgical history and problem list.    Review of Systems   Constitutional: Negative for chills and fever.   HENT: Negative for congestion.    Musculoskeletal: Negative for neck pain and neck stiffness.   Neurological: Negative for weakness, light-headedness, numbness and headaches.       I have reviewed the review of systems as documented by my MA.      Objective     Vitals:    01/26/21 1037   BP: 134/92   Cuff Size: Adult   Pulse: 74   Temp: 97.8 °F (36.6 °C)   Weight: 83 kg (183 lb)   Height: 182.9 cm (72\")     Body mass index is 24.82 kg/m².      Physical Exam  Neurological:      Mental Status: He is alert and oriented to person, place, and time.       Neurologic Exam     Mental Status   Oriented to person, place, and time.           Assessment/Plan   Independent Review of Radiographic Studies:      I personally reviewed the images from the following studies.    I reviewed an MRI of his cervical spine and an MRI of his brain done earlier this month.  The MRI of his brain shows no change since the MRI in " September of last year.  There is really nothing that is terribly concerning on the MRI of his brain.  The MRI of his cervical spine shows a widely patent canal and neuroforamina at C2-3.  C3-4 is also open.  There is a little foraminal narrowing on the right at C4-5 but not severe.  C5-6 shows some bilateral foraminal narrowing.  C6-7 is much more open.  There is a small hemangioma in the C6 vertebral body.    Medical Decision Making:      I told the patient I do not think we need to do anything else at this point.  We will check another MRI of his brain in about 4 months.  I do not think we need to continue to use contrast.    Diagnoses and all orders for this visit:    1. Anaplastic astrocytoma of frontal lobe (CMS/HCC) (Primary)      Return in about 4 months (around 5/26/2021).

## 2021-01-26 ENCOUNTER — OFFICE VISIT (OUTPATIENT)
Dept: NEUROSURGERY | Facility: CLINIC | Age: 36
End: 2021-01-26

## 2021-01-26 VITALS
HEART RATE: 74 BPM | BODY MASS INDEX: 24.79 KG/M2 | TEMPERATURE: 97.8 F | WEIGHT: 183 LBS | DIASTOLIC BLOOD PRESSURE: 92 MMHG | HEIGHT: 72 IN | SYSTOLIC BLOOD PRESSURE: 134 MMHG

## 2021-01-26 DIAGNOSIS — C71.1 ANAPLASTIC ASTROCYTOMA OF FRONTAL LOBE (HCC): Primary | ICD-10-CM

## 2021-01-26 PROCEDURE — 99213 OFFICE O/P EST LOW 20 MIN: CPT | Performed by: NEUROLOGICAL SURGERY

## 2021-01-28 ENCOUNTER — CLINICAL SUPPORT (OUTPATIENT)
Dept: ONCOLOGY | Facility: HOSPITAL | Age: 36
End: 2021-01-28

## 2021-01-28 ENCOUNTER — LAB (OUTPATIENT)
Dept: OTHER | Facility: HOSPITAL | Age: 36
End: 2021-01-28

## 2021-01-28 ENCOUNTER — TELEPHONE (OUTPATIENT)
Dept: ONCOLOGY | Facility: CLINIC | Age: 36
End: 2021-01-28

## 2021-01-28 VITALS
SYSTOLIC BLOOD PRESSURE: 136 MMHG | HEIGHT: 72 IN | DIASTOLIC BLOOD PRESSURE: 80 MMHG | OXYGEN SATURATION: 99 % | RESPIRATION RATE: 18 BRPM | HEART RATE: 80 BPM | WEIGHT: 185 LBS | TEMPERATURE: 97.5 F | BODY MASS INDEX: 25.06 KG/M2

## 2021-01-28 DIAGNOSIS — Z86.711 HISTORY OF PULMONARY EMBOLUS (PE): ICD-10-CM

## 2021-01-28 DIAGNOSIS — I26.99 PULMONARY EMBOLISM WITH INFARCTION (HCC): ICD-10-CM

## 2021-01-28 DIAGNOSIS — Z79.01 CHRONIC ANTICOAGULATION: ICD-10-CM

## 2021-01-28 DIAGNOSIS — C71.1 ANAPLASTIC ASTROCYTOMA OF FRONTAL LOBE (HCC): ICD-10-CM

## 2021-01-28 DIAGNOSIS — D68.51 FACTOR 5 LEIDEN MUTATION, HETEROZYGOUS (HCC): ICD-10-CM

## 2021-01-28 LAB
INR PPP: 1.9
PROTHROMBIN TIME: 22.6 SECONDS (ref 11–15)

## 2021-01-28 PROCEDURE — 36416 COLLJ CAPILLARY BLOOD SPEC: CPT

## 2021-01-28 PROCEDURE — 85610 PROTHROMBIN TIME: CPT

## 2021-01-28 RX ORDER — WARFARIN SODIUM 5 MG/1
TABLET ORAL
Qty: 180 TABLET | Refills: 0 | Status: SHIPPED | OUTPATIENT
Start: 2021-01-28 | End: 2021-02-11 | Stop reason: SDUPTHER

## 2021-01-28 NOTE — NURSING NOTE
Patient here today for INR review. INR today 1.9. Patient denies any bleeding, changes in diet or medications. Patient taking 62.5 mg weekly (7.5 mg M,W,Sat; and 10 mg all other days). Per [facility] recommendations patient to take 67.5 mg weekly. Pt request only increasing to 65 mg weekly (7.5 mg Sun/Thur; 10 mg all other days). Reviewed with NICOLE Lagos who agreed with plan. Patient scheduled to return in one week for recheck of INR. Patient verbalized understanding.     Lab Results   Component Value Date    INR 1.90 01/28/2021    INR 1.9 (H) 01/07/2021    INR 2.90 12/17/2020    PROTIME 22.6 (H) 01/28/2021    PROTIME 22.4 (H) 01/07/2021    PROTIME 34.8 (H) 12/17/2020

## 2021-01-28 NOTE — TELEPHONE ENCOUNTER
----- Message from Araceli Orosco RN sent at 1/28/2021  8:37 AM EST -----  Regarding: next apt  Can we get this patient scheduled for lab/RN review in on week at 0800.    Thanks

## 2021-02-05 ENCOUNTER — LAB (OUTPATIENT)
Dept: OTHER | Facility: HOSPITAL | Age: 36
End: 2021-02-05

## 2021-02-05 ENCOUNTER — CLINICAL SUPPORT (OUTPATIENT)
Dept: ONCOLOGY | Facility: HOSPITAL | Age: 36
End: 2021-02-05

## 2021-02-05 VITALS
HEART RATE: 67 BPM | BODY MASS INDEX: 25.4 KG/M2 | HEIGHT: 72 IN | SYSTOLIC BLOOD PRESSURE: 118 MMHG | WEIGHT: 187.5 LBS | TEMPERATURE: 97.7 F | RESPIRATION RATE: 18 BRPM | DIASTOLIC BLOOD PRESSURE: 77 MMHG | OXYGEN SATURATION: 100 %

## 2021-02-05 DIAGNOSIS — Z86.711 HISTORY OF PULMONARY EMBOLUS (PE): ICD-10-CM

## 2021-02-05 DIAGNOSIS — D68.51 FACTOR 5 LEIDEN MUTATION, HETEROZYGOUS (HCC): ICD-10-CM

## 2021-02-05 DIAGNOSIS — C71.1 ANAPLASTIC ASTROCYTOMA OF FRONTAL LOBE (HCC): ICD-10-CM

## 2021-02-05 LAB
INR PPP: 2.3
PROTHROMBIN TIME: 28.1 SECONDS (ref 11–15)

## 2021-02-05 PROCEDURE — G0463 HOSPITAL OUTPT CLINIC VISIT: HCPCS

## 2021-02-05 PROCEDURE — 85610 PROTHROMBIN TIME: CPT

## 2021-02-05 NOTE — NURSING NOTE
Lab Results   Component Value Date    INR 2.30 02/05/2021    INR 1.90 01/28/2021    INR 1.9 (H) 01/07/2021    PROTIME 28.1 (H) 02/05/2021    PROTIME 22.6 (H) 01/28/2021    PROTIME 22.4 (H) 01/07/2021     Patient here today for INR check, Todays INR 2.3. Patient reports taking 7.5 mg Coumadin Sun/Thurs and 10 mg all other days. Patient denies any changes in diet, new medications, or missed doses. Per [facility] patient to increase weekly dose to Coumadin 7.5 mg on Sun, and 10 mg all other days. Per patient he wishes to stay at current dose since within therapeutic range. Reviewed follow up appointment with patient. Pt instructed to call office for any questions or concerns, all questions answered.

## 2021-02-11 ENCOUNTER — TELEPHONE (OUTPATIENT)
Dept: ONCOLOGY | Facility: CLINIC | Age: 36
End: 2021-02-11

## 2021-02-11 DIAGNOSIS — I26.99 PULMONARY EMBOLISM WITH INFARCTION (HCC): ICD-10-CM

## 2021-02-11 RX ORDER — WARFARIN SODIUM 5 MG/1
TABLET ORAL
Qty: 180 TABLET | Refills: 0 | Status: SHIPPED | OUTPATIENT
Start: 2021-02-11 | End: 2021-05-07

## 2021-02-11 NOTE — TELEPHONE ENCOUNTER
DELETE AFTER REVIEWING: Telephone encounter to be sent to the clinical pool.  If patient has less than a 3 day supply left, send the encounter HIGH Priority.    Caller: MARK VALADEZZ    Relationship: SELF    Best call back number: [phone]    Medication needed:   Requested Prescriptions      No prescriptions requested or ordered in this encounter   WARFARIN 5  MG TABL    When do you need the refill by: ASAP    What details did the patient provide when requesting the medication: PT STATES THAT THIS HAS BEEN DENIED AND PT NEEDS A REFILL   Does the patient have less than a 3 day supply:  [x] Yes  [] No    What is the patient's preferred pharmacy:      [facility]; [phone]  [address] [facility] [address]

## 2021-02-25 ENCOUNTER — LAB (OUTPATIENT)
Dept: OTHER | Facility: HOSPITAL | Age: 36
End: 2021-02-25

## 2021-02-25 ENCOUNTER — CLINICAL SUPPORT (OUTPATIENT)
Dept: ONCOLOGY | Facility: HOSPITAL | Age: 36
End: 2021-02-25

## 2021-02-25 VITALS — OXYGEN SATURATION: 100 % | TEMPERATURE: 97.3 F | HEART RATE: 60 BPM

## 2021-02-25 DIAGNOSIS — C71.1 ANAPLASTIC ASTROCYTOMA OF FRONTAL LOBE (HCC): ICD-10-CM

## 2021-02-25 DIAGNOSIS — Z86.711 HISTORY OF PULMONARY EMBOLUS (PE): ICD-10-CM

## 2021-02-25 DIAGNOSIS — D68.51 FACTOR 5 LEIDEN MUTATION, HETEROZYGOUS (HCC): ICD-10-CM

## 2021-02-25 DIAGNOSIS — Z79.01 CHRONIC ANTICOAGULATION: ICD-10-CM

## 2021-02-25 LAB
INR PPP: 2.9
PROTHROMBIN TIME: 35.1 SECONDS (ref 11–15)

## 2021-02-25 PROCEDURE — 85610 PROTHROMBIN TIME: CPT

## 2021-02-25 PROCEDURE — G0463 HOSPITAL OUTPT CLINIC VISIT: HCPCS

## 2021-02-25 NOTE — NURSING NOTE
Pt is here for lab with RN review.  INR slightly supra-therapeutic at 2.9 as target INR is 2.5. Prior dose confirmed. Pt has no complaints, denies any active bleeding or excessive bruising, changes in diet or new medicaion. Copy of dosing instructions per [employer] given to pt and reviewed. Per [employer], pt is to take 7.5mg of Coumadin on Monday, Wednesday and Friday and 10mg all other days. F/u appt reviewed with pt and instructed to call the office with any concerns or new symptoms prior to next visit. Pt will be out of town at next appointment time and will stop by scheduling to re-schedule. Pt vu and discharged ambulatory.    Lab Results   Component Value Date    INR 2.90 02/25/2021    INR 2.30 02/05/2021    INR 1.90 01/28/2021    PROTIME 35.1 (H) 02/25/2021    PROTIME 28.1 (H) 02/05/2021    PROTIME 22.6 (H) 01/28/2021

## 2021-02-27 ENCOUNTER — IMMUNIZATION (OUTPATIENT)
Dept: VACCINE CLINIC | Facility: HOSPITAL | Age: 36
End: 2021-02-27

## 2021-02-27 PROCEDURE — 91300 HC SARSCOV02 VAC 30MCG/0.3ML IM: CPT | Performed by: INTERNAL MEDICINE

## 2021-02-27 PROCEDURE — 0001A: CPT | Performed by: INTERNAL MEDICINE

## 2021-03-01 ENCOUNTER — TELEPHONE (OUTPATIENT)
Dept: ONCOLOGY | Facility: CLINIC | Age: 36
End: 2021-03-01

## 2021-03-01 NOTE — TELEPHONE ENCOUNTER
Pt wants to r/s MRI to week of 5/10.    Also, r/s 6/4 appt with Dr. Jefferson to the 5/20 when he will be in the office for INR check.    Both of these appts need to be at [place].    [phone]

## 2021-03-11 NOTE — PROGRESS NOTES
Subjective   Patient ID: Bryan Valadez is a 33 y.o. male is here today for follow-up astrocytoma. Patient had a repeat MRI brain 12/3/18 and presents accompanied by his wife and young daughter.     History of Present Illness Patient reports feeling well.  No seizures.  No leg swelling or SOB.  He does have some calf tightness in the evening.  He reports no weakness or numbness or wound issues.     The following portions of the patient's history were reviewed and updated as appropriate: allergies, current medications, past family history, past medical history, past social history, past surgical history and problem list.    Review of Systems   HENT: Negative for hearing loss and tinnitus.    Eyes: Negative for visual disturbance.   Musculoskeletal: Negative for gait problem.   Neurological: Negative for dizziness, tremors, seizures, syncope, speech difficulty, weakness, light-headedness, numbness and headaches.   Psychiatric/Behavioral: Negative for confusion and decreased concentration.       Objective   Physical Exam   Constitutional: He is oriented to person, place, and time.   Neurological: He is oriented to person, place, and time. Gait normal.     Neurologic Exam     Mental Status   Oriented to person, place, and time.     Motor Exam     Strength   Right deltoid: 5/5  Left deltoid: 5/5  Right biceps: 5/5  Left biceps: 5/5  Right triceps: 5/5  Left triceps: 5/5  Right wrist extension: 5/5  Left wrist extension: 5/5  Right interossei: 5/5  Left interossei: 5/5  Right iliopsoas: 5/5  Left iliopsoas: 5/5    Sensory Exam   Right arm light touch: normal  Left arm light touch: normal  Right leg light touch: normal  Left leg light touch: normal  Right arm pinprick: normal  Left arm pinprick: normal  Right leg pinprick: normal  Left leg pinprick: normal    Gait, Coordination, and Reflexes     Gait  Gait: normal    C/d/i    Assessment/Plan   Independent Review of Radiographic Studies:  Stable area of contrast  enhancement.    Medical Decision Making:  I think this are should be resected.  I plan on reviewing his imaging with neuroradiology and Dr. Bailon to optimize his operative plan.  We discussed a filter for his clotting diathesis.  I will discuss with vascular surgery.  We discussed r/b/a.  We discussed the elevated risk of wound issues or csf related issues in revision surgery.  We discussed perioperative complications and medical issues.  We discussed risk of death.  We discussed the incision and plan in detail.      There are no diagnoses linked to this encounter.  No Follow-up on file.                  today

## 2021-03-20 ENCOUNTER — IMMUNIZATION (OUTPATIENT)
Dept: VACCINE CLINIC | Facility: HOSPITAL | Age: 36
End: 2021-03-20

## 2021-03-20 PROCEDURE — 0002A: CPT | Performed by: INTERNAL MEDICINE

## 2021-03-20 PROCEDURE — 91300 HC SARSCOV02 VAC 30MCG/0.3ML IM: CPT | Performed by: INTERNAL MEDICINE

## 2021-03-25 ENCOUNTER — CLINICAL SUPPORT (OUTPATIENT)
Dept: ONCOLOGY | Facility: HOSPITAL | Age: 36
End: 2021-03-25

## 2021-03-25 ENCOUNTER — LAB (OUTPATIENT)
Dept: OTHER | Facility: HOSPITAL | Age: 36
End: 2021-03-25

## 2021-03-25 VITALS — WEIGHT: 185.4 LBS | BODY MASS INDEX: 25.11 KG/M2 | RESPIRATION RATE: 18 BRPM | HEIGHT: 72 IN

## 2021-03-25 DIAGNOSIS — C71.1 ANAPLASTIC ASTROCYTOMA OF FRONTAL LOBE (HCC): ICD-10-CM

## 2021-03-25 DIAGNOSIS — Z79.01 CHRONIC ANTICOAGULATION: ICD-10-CM

## 2021-03-25 DIAGNOSIS — D68.51 FACTOR 5 LEIDEN MUTATION, HETEROZYGOUS (HCC): ICD-10-CM

## 2021-03-25 DIAGNOSIS — Z86.711 HISTORY OF PULMONARY EMBOLUS (PE): ICD-10-CM

## 2021-03-25 LAB
INR PPP: 4.2
PROTHROMBIN TIME: 50.8 SECONDS (ref 11–15)

## 2021-03-25 PROCEDURE — 85610 PROTHROMBIN TIME: CPT

## 2021-03-25 PROCEDURE — G0463 HOSPITAL OUTPT CLINIC VISIT: HCPCS

## 2021-03-30 ENCOUNTER — TELEPHONE (OUTPATIENT)
Dept: ONCOLOGY | Facility: HOSPITAL | Age: 36
End: 2021-03-30

## 2021-03-30 NOTE — TELEPHONE ENCOUNTER
Pt scheduled for INR Thursday and no phlebotomist available.  Left message for pt to return call to see if he can reschedule.

## 2021-03-31 ENCOUNTER — CLINICAL SUPPORT (OUTPATIENT)
Dept: ONCOLOGY | Facility: HOSPITAL | Age: 36
End: 2021-03-31

## 2021-03-31 ENCOUNTER — LAB (OUTPATIENT)
Dept: OTHER | Facility: HOSPITAL | Age: 36
End: 2021-03-31

## 2021-03-31 VITALS
RESPIRATION RATE: 18 BRPM | HEIGHT: 71 IN | HEART RATE: 80 BPM | BODY MASS INDEX: 25.51 KG/M2 | SYSTOLIC BLOOD PRESSURE: 132 MMHG | OXYGEN SATURATION: 99 % | WEIGHT: 182.2 LBS | TEMPERATURE: 97.3 F | DIASTOLIC BLOOD PRESSURE: 89 MMHG

## 2021-03-31 DIAGNOSIS — D68.51 FACTOR 5 LEIDEN MUTATION, HETEROZYGOUS (HCC): ICD-10-CM

## 2021-03-31 DIAGNOSIS — Z86.711 HISTORY OF PULMONARY EMBOLUS (PE): ICD-10-CM

## 2021-03-31 DIAGNOSIS — Z79.01 CHRONIC ANTICOAGULATION: ICD-10-CM

## 2021-03-31 DIAGNOSIS — C71.1 ANAPLASTIC ASTROCYTOMA OF FRONTAL LOBE (HCC): ICD-10-CM

## 2021-03-31 LAB
INR PPP: 2.4
PROTHROMBIN TIME: 29.3 SECONDS (ref 11–15)

## 2021-03-31 PROCEDURE — 85610 PROTHROMBIN TIME: CPT

## 2021-04-01 ENCOUNTER — APPOINTMENT (OUTPATIENT)
Dept: OTHER | Facility: HOSPITAL | Age: 36
End: 2021-04-01

## 2021-04-01 ENCOUNTER — APPOINTMENT (OUTPATIENT)
Dept: ONCOLOGY | Facility: HOSPITAL | Age: 36
End: 2021-04-01

## 2021-04-05 ENCOUNTER — TELEPHONE (OUTPATIENT)
Dept: NEUROSURGERY | Facility: CLINIC | Age: 36
End: 2021-04-05

## 2021-04-05 ENCOUNTER — TELEPHONE (OUTPATIENT)
Dept: ONCOLOGY | Facility: CLINIC | Age: 36
End: 2021-04-05

## 2021-04-05 DIAGNOSIS — C71.1 ANAPLASTIC ASTROCYTOMA OF FRONTAL LOBE (HCC): Primary | ICD-10-CM

## 2021-04-05 NOTE — TELEPHONE ENCOUNTER
Patient called and wanted to know if he should do his brain mri without. He thought that Dr Hidalgo said he didn't have to do them with all the time or if it was Dr Jefferson. Please advise.

## 2021-04-05 NOTE — TELEPHONE ENCOUNTER
Caller: MARK GOLD    Relationship to patient: SELF    Best call back number: [phone]    Chief complaint: REQUESTING HIS APPOINTMENT BE MOVED BACK A DAY    Type of visit: LAB AND RN REVIEW    Requested date: 04/23/21    If rescheduling, when is the original appointment: 04/22/21

## 2021-04-23 ENCOUNTER — APPOINTMENT (OUTPATIENT)
Dept: ONCOLOGY | Facility: HOSPITAL | Age: 36
End: 2021-04-23

## 2021-04-23 ENCOUNTER — CLINICAL SUPPORT (OUTPATIENT)
Dept: ONCOLOGY | Facility: HOSPITAL | Age: 36
End: 2021-04-23

## 2021-04-23 ENCOUNTER — APPOINTMENT (OUTPATIENT)
Dept: OTHER | Facility: HOSPITAL | Age: 36
End: 2021-04-23

## 2021-04-23 ENCOUNTER — LAB (OUTPATIENT)
Dept: OTHER | Facility: HOSPITAL | Age: 36
End: 2021-04-23

## 2021-04-23 VITALS — RESPIRATION RATE: 18 BRPM

## 2021-04-23 DIAGNOSIS — Z79.01 CHRONIC ANTICOAGULATION: ICD-10-CM

## 2021-04-23 LAB
INR PPP: 3.6
PROTHROMBIN TIME: 43.6 SECONDS (ref 11–15)

## 2021-04-23 PROCEDURE — 36416 COLLJ CAPILLARY BLOOD SPEC: CPT

## 2021-04-23 PROCEDURE — 96416 CHEMO PROLONG INFUSE W/PUMP: CPT

## 2021-04-23 PROCEDURE — 85610 PROTHROMBIN TIME: CPT

## 2021-04-23 NOTE — NURSING NOTE
Pt today 3.6 does reduced per [facility] and patient will return [facility] Friday for re check prior to leaving on vacations.

## 2021-04-30 ENCOUNTER — LAB (OUTPATIENT)
Dept: OTHER | Facility: HOSPITAL | Age: 36
End: 2021-04-30

## 2021-04-30 ENCOUNTER — CLINICAL SUPPORT (OUTPATIENT)
Dept: ONCOLOGY | Facility: HOSPITAL | Age: 36
End: 2021-04-30

## 2021-04-30 VITALS
HEIGHT: 71 IN | DIASTOLIC BLOOD PRESSURE: 82 MMHG | TEMPERATURE: 97.3 F | HEART RATE: 68 BPM | RESPIRATION RATE: 18 BRPM | SYSTOLIC BLOOD PRESSURE: 123 MMHG | OXYGEN SATURATION: 100 % | WEIGHT: 187.4 LBS | BODY MASS INDEX: 26.23 KG/M2

## 2021-04-30 DIAGNOSIS — Z79.01 CHRONIC ANTICOAGULATION: ICD-10-CM

## 2021-04-30 DIAGNOSIS — Z86.711 HISTORY OF PULMONARY EMBOLUS (PE): Primary | ICD-10-CM

## 2021-04-30 DIAGNOSIS — Z86.711 HISTORY OF PULMONARY EMBOLUS (PE): ICD-10-CM

## 2021-04-30 LAB
INR PPP: 1.7
PROTHROMBIN TIME: 19.9 SECONDS (ref 11–15)

## 2021-04-30 PROCEDURE — 85610 PROTHROMBIN TIME: CPT

## 2021-04-30 PROCEDURE — G0463 HOSPITAL OUTPT CLINIC VISIT: HCPCS

## 2021-04-30 NOTE — NURSING NOTE
Lab Results   Component Value Date    INR 1.70 04/30/2021    INR 3.60 04/23/2021    INR 2.40 03/31/2021    PROTIME 19.9 (H) 04/30/2021    PROTIME 43.6 (H) 04/23/2021    PROTIME 29.3 (H) 03/31/2021     Patient here today for PT/INR review. Patient states he is currently taking Coumadin 5 mg on Mon and Fri, and 7.5 mg all other days. Patient denies any bleeding, missed doses, changes in diet, or new medications. Todays INR 1.7, per [facility] recommendations, patient to take 10 mg on Sunday and 7.5 all other days. Patient does not want to increase to 10 mg because he states in the past that dosage has elevated him too high. Patient states he would feel more comfortable increasing to 7.5 mg daily and returning next week to recheck lab, all questions answered. Patient will return in one week, and call for any questions or concerns.

## 2021-05-06 ENCOUNTER — APPOINTMENT (OUTPATIENT)
Dept: ONCOLOGY | Facility: HOSPITAL | Age: 36
End: 2021-05-06

## 2021-05-06 ENCOUNTER — APPOINTMENT (OUTPATIENT)
Dept: OTHER | Facility: HOSPITAL | Age: 36
End: 2021-05-06

## 2021-05-06 DIAGNOSIS — I26.99 PULMONARY EMBOLISM WITH INFARCTION (HCC): ICD-10-CM

## 2021-05-07 ENCOUNTER — CLINICAL SUPPORT (OUTPATIENT)
Dept: ONCOLOGY | Facility: HOSPITAL | Age: 36
End: 2021-05-07

## 2021-05-07 ENCOUNTER — LAB (OUTPATIENT)
Dept: OTHER | Facility: HOSPITAL | Age: 36
End: 2021-05-07

## 2021-05-07 DIAGNOSIS — Z86.711 HISTORY OF PULMONARY EMBOLUS (PE): ICD-10-CM

## 2021-05-07 DIAGNOSIS — Z79.01 CHRONIC ANTICOAGULATION: ICD-10-CM

## 2021-05-07 LAB
INR PPP: 1.7
PROTHROMBIN TIME: 20.3 SECONDS (ref 11–15)

## 2021-05-07 PROCEDURE — 85610 PROTHROMBIN TIME: CPT

## 2021-05-07 PROCEDURE — G0463 HOSPITAL OUTPT CLINIC VISIT: HCPCS

## 2021-05-07 RX ORDER — WARFARIN SODIUM 5 MG/1
TABLET ORAL
Qty: 180 TABLET | Refills: 0 | Status: SHIPPED | OUTPATIENT
Start: 2021-05-07 | End: 2021-08-12

## 2021-05-07 NOTE — NURSING NOTE
INR 1.7 again today. Was the same last week.  He is going out of town in a little over a week and he would like to have INR rechecked before he goes out of town.  Coumadin increased from 7.5mg daily to 10mg Friday/Monday and 7.5mg all other days.    Copy of [facility] given and sent to scheduling for appt in 1 week. He will call with any questions or concerns.

## 2021-05-10 ENCOUNTER — HOSPITAL ENCOUNTER (OUTPATIENT)
Dept: MRI IMAGING | Facility: HOSPITAL | Age: 36
Discharge: HOME | End: 2021-05-10
Admitting: NEUROLOGICAL SURGERY

## 2021-05-10 DIAGNOSIS — C71.1 ANAPLASTIC ASTROCYTOMA OF FRONTAL LOBE (HCC): ICD-10-CM

## 2021-05-10 PROCEDURE — 70551 MRI BRAIN STEM W/O DYE: CPT

## 2021-05-14 ENCOUNTER — CLINICAL SUPPORT (OUTPATIENT)
Dept: ONCOLOGY | Facility: HOSPITAL | Age: 36
End: 2021-05-14

## 2021-05-14 ENCOUNTER — LAB (OUTPATIENT)
Dept: OTHER | Facility: HOSPITAL | Age: 36
End: 2021-05-14

## 2021-05-14 VITALS — BODY MASS INDEX: 25.68 KG/M2 | WEIGHT: 189.6 LBS | RESPIRATION RATE: 18 BRPM | TEMPERATURE: 97.3 F | HEIGHT: 72 IN

## 2021-05-14 DIAGNOSIS — I82.532 CHRONIC DEEP VEIN THROMBOSIS (DVT) OF LEFT POPLITEAL VEIN (HCC): Primary | ICD-10-CM

## 2021-05-14 LAB
INR PPP: 1.7
PROTHROMBIN TIME: 19.9 SECONDS (ref 11–15)

## 2021-05-14 PROCEDURE — 85610 PROTHROMBIN TIME: CPT

## 2021-05-14 PROCEDURE — G0463 HOSPITAL OUTPT CLINIC VISIT: HCPCS

## 2021-05-14 NOTE — NURSING NOTE
Pt is here for lab with RN review.  INR sub-therapeutic at 1.7. Prior dose confirmed. Pt has no complaints, denies any active bleeding or excessive bruising, changes in diet or new medicaion.    Copy of dosing instructions per [facility] given to pt and reviewed. Per [facility], pt is to take 7.5 mg of Coumadin on Monday and Friday and 10mg all other days. F/u appt reviewed with pt and instructed to call the office with any concerns or new symptoms prior to next visit. Pt vu and discharged ambulatory.    Lab Results   Component Value Date    INR 1.70 05/14/2021    INR 1.70 05/07/2021    INR 1.70 04/30/2021    PROTIME 19.9 (H) 05/14/2021    PROTIME 20.3 (H) 05/07/2021    PROTIME 19.9 (H) 04/30/2021

## 2021-05-19 NOTE — PROGRESS NOTES
[facility] GROUP OUTPATIENT FOLLOW UP VISIT    REASON FOR FOLLOW-UP:    1.  Left lower extremity DVT with progression of symptoms and extension of the left lower extremity DVT into the femoral vein from the popliteal/calf vein swallowing for next set.  Currently anticoagulated with warfarin.  2.  Right lower extremity DVT noted in the common femoral, profunda femoral, and calf veins while anticoagulated with a DOAC.  Therefore, failure of DOAC.  3.  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  Other thrombophilia labs negative.    4.  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  NOT 1p19q co-deleted.    5.  Radiation initiated on 7/31/2018.  Plan for Temodar ×1 year following radiation.  6.  4500 cGy in 25 fractions administered from 7/31/2018 through 9/14/2018  7.  MRI brain 10/23/2018 with resolving hemorrhage in the resection cavity.  However, there is hyperintensity measuring 4.6 x 4.3 x 3 cm along the margins of the resection cavity.  8.  Repeat venous duplex on 10/23 with chronic right CVT and chronic LLE DVT from the mid femoral vein distally.     9.  He initiated adjuvant therapy with Temodar in mid October 2018.  10.  MRI brain 12/3/2018 with stable findings.  11.  On 1/24/2019 he did have a repeat resection by Dr. Galvan.  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  However, there was no evidence of progression to a higher grade in the new resected specimen.  Mostly the proliferative activity was very low with only rare mitoses and a low Ki-67 of just 2-3%.  12.  Temodar completed December 2019      HISTORY OF PRESENT ILLNESS:  Bryan Valadez is a 35 y.o. male who returns today for follow up of the above issues.     He overall is doing very well.  In spite of a consistent diet, his INR values have been more variable and he attributes this to his COVID-19 vaccines.  No bleeding issues.  No  "neurologic issues.      REVIEW OF SYSTEMS:  Neck pain resolved.  No bleeding.  No neurologic symptoms.    PHYSICAL EXAMINATION:    Vitals:    05/21/21 0805   BP: 146/85   Pulse: 71   Resp: 16   Temp: 97.5 °F (36.4 °C)   TempSrc: Temporal   SpO2: 97%   Weight: 86.2 kg (190 lb 1.6 oz)   Height: 182.9 cm (72.01\")   PainSc: 0-No pain       General:  No acute distress, awake, alert and oriented  Skin:  Warm and dry, no visible rash  HEENT: Healed surgical incisions.  Wearing a facemask.  Chest:  Normal respiratory effort.  Lungs clear to auscultation bilaterally.  Heart: Regular rate and rhythm without murmurs gallops or rubs  Extremities:  No clubbing, cyanosis, or edema  Neuro/psych:  Grossly non-focal.  Normal mood and affect.        DIAGNOSTIC DATA:  CBC & Differential (05/21/2021 07:52)  Protime-INR, Fingerstick (05/21/2021 07:52)      IMAGING:  MRI Brain Without Contrast (05/10/2021 10:58)    MRI images reviewed. No definite recurrence.     ASSESSMENT:  This is a 35 y.o. 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 Galvan.  IDH mutated.  NOT 1p19q co-deleted.  Overall, this is a good molecular profile. He did initiate radiation alone on 7/31/2018 and completed it on 9/14/2018.   · He initiated adjuvant Temodar in mid October 2018.  · Due to persistent abnormalities on MRI, on 1/24/2019 he did have a repeat resection by Dr. Galvan.  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  However, there was no evidence of progression to a higher grade in the new resected specimen.  Mostly the proliferative activity was very low with only rare mitoses and a low Ki-67 of just 2-3%.  · MRI brain imaging from 9/23/2019 appeared stable.  · He completed 1 year of Temodar with an MRI on " 12/12/2019 showing stable findings with some improvement in the intensity of the abnormal uptake  · MRI brain 2/27/2020 stable..  · MRI brain 5/27/2020 stable.    · MRI brain 9/21/2020 stable.  · MRI 1/7/2021 stable  · MRI 5/10/2021 without contrast stable    *Given the possibility of infertility with treatment he banked sperm at the [facility].  He has a 2 children conceived naturally without the banked sperm.      PLAN:  1. Continue warfarin with a slight dose increase per protocol today.    2. Monthly INR.  3. MRI imaging of the brain without contrast per Dr. Hidalgo at this point  4. I will see him back in 6 months or sooner if needed

## 2021-05-20 ENCOUNTER — TELEPHONE (OUTPATIENT)
Dept: NEUROSURGERY | Facility: CLINIC | Age: 36
End: 2021-05-20

## 2021-05-20 NOTE — TELEPHONE ENCOUNTER
Caller: Bryan Valadez    Relationship to patient: Self    Best call back number: [phone]    Chief complaint: PATIENT ALREADY SCHEDULED PRIOR TO CALL BUT CALLED REQUESTING TO RESCHEDULE INITIAL APPT TO A TELEVISIT IF POSSIBLE DUE STILL BEING ON A BUSINESS TRIP IN [place] THAT INITIAL DATE. UNABLE TO ASSIST AT TIME OF CALL DUE TO IT BEING TELEVISIT RELATED.    PATIENT CAN BE CONTACTED WITH AN UPDATE      Type of visit: FOLLOW UP    Requested date: TELEVISIT    If rescheduling, when is the original appointment: 06/01/21    Additional

## 2021-05-21 ENCOUNTER — LAB (OUTPATIENT)
Dept: OTHER | Facility: HOSPITAL | Age: 36
End: 2021-05-21

## 2021-05-21 ENCOUNTER — OFFICE VISIT (OUTPATIENT)
Dept: ONCOLOGY | Facility: CLINIC | Age: 36
End: 2021-05-21

## 2021-05-21 VITALS
BODY MASS INDEX: 25.75 KG/M2 | HEART RATE: 71 BPM | HEIGHT: 72 IN | WEIGHT: 190.1 LBS | SYSTOLIC BLOOD PRESSURE: 146 MMHG | RESPIRATION RATE: 16 BRPM | TEMPERATURE: 97.5 F | DIASTOLIC BLOOD PRESSURE: 85 MMHG | OXYGEN SATURATION: 97 %

## 2021-05-21 DIAGNOSIS — C71.1 ANAPLASTIC ASTROCYTOMA OF FRONTAL LOBE (HCC): Primary | ICD-10-CM

## 2021-05-21 DIAGNOSIS — C71.1 ANAPLASTIC ASTROCYTOMA OF FRONTAL LOBE (HCC): ICD-10-CM

## 2021-05-21 DIAGNOSIS — I82.532 CHRONIC DEEP VEIN THROMBOSIS (DVT) OF LEFT POPLITEAL VEIN (HCC): Primary | ICD-10-CM

## 2021-05-21 DIAGNOSIS — Z79.01 CHRONIC ANTICOAGULATION: ICD-10-CM

## 2021-05-21 DIAGNOSIS — Z86.711 HISTORY OF PULMONARY EMBOLUS (PE): ICD-10-CM

## 2021-05-21 DIAGNOSIS — D68.51 FACTOR 5 LEIDEN MUTATION, HETEROZYGOUS (HCC): ICD-10-CM

## 2021-05-21 LAB
ALBUMIN SERPL-MCNC: 4.6 G/DL (ref 3.5–5.2)
ALBUMIN/GLOB SERPL: 2 G/DL
ALP SERPL-CCNC: 45 U/L (ref 39–117)
ALT SERPL W P-5'-P-CCNC: 13 U/L (ref 1–41)
ANION GAP SERPL CALCULATED.3IONS-SCNC: 6.3 MMOL/L (ref 5–15)
AST SERPL-CCNC: 17 U/L (ref 1–40)
BASOPHILS # BLD AUTO: 0.03 10*3/MM3 (ref 0–0.2)
BASOPHILS NFR BLD AUTO: 0.7 % (ref 0–1.5)
BILIRUB SERPL-MCNC: 0.2 MG/DL (ref 0–1.2)
BUN SERPL-MCNC: 14 MG/DL (ref 6–20)
BUN/CREAT SERPL: 14.6 (ref 7–25)
CALCIUM SPEC-SCNC: 9.2 MG/DL (ref 8.6–10.5)
CHLORIDE SERPL-SCNC: 106 MMOL/L (ref 98–107)
CO2 SERPL-SCNC: 27.7 MMOL/L (ref 22–29)
CREAT SERPL-MCNC: 0.96 MG/DL (ref 0.76–1.27)
DEPRECATED RDW RBC AUTO: 39.9 FL (ref 37–54)
EOSINOPHIL # BLD AUTO: 0.12 10*3/MM3 (ref 0–0.4)
EOSINOPHIL NFR BLD AUTO: 2.7 % (ref 0.3–6.2)
ERYTHROCYTE [DISTWIDTH] IN BLOOD BY AUTOMATED COUNT: 12 % (ref 12.3–15.4)
GFR SERPL CREATININE-BSD FRML MDRD: 88 ML/MIN/1.73
GLOBULIN UR ELPH-MCNC: 2.3 GM/DL
GLUCOSE SERPL-MCNC: 102 MG/DL (ref 65–99)
HCT VFR BLD AUTO: 43.1 % (ref 37.5–51)
HGB BLD-MCNC: 14.4 G/DL (ref 13–17.7)
IMM GRANULOCYTES # BLD AUTO: 0.02 10*3/MM3 (ref 0–0.05)
IMM GRANULOCYTES NFR BLD AUTO: 0.4 % (ref 0–0.5)
INR PPP: 2.2
LYMPHOCYTES # BLD AUTO: 1.49 10*3/MM3 (ref 0.7–3.1)
LYMPHOCYTES NFR BLD AUTO: 33 % (ref 19.6–45.3)
MCH RBC QN AUTO: 30.3 PG (ref 26.6–33)
MCHC RBC AUTO-ENTMCNC: 33.4 G/DL (ref 31.5–35.7)
MCV RBC AUTO: 90.5 FL (ref 79–97)
MONOCYTES # BLD AUTO: 0.47 10*3/MM3 (ref 0.1–0.9)
MONOCYTES NFR BLD AUTO: 10.4 % (ref 5–12)
NEUTROPHILS NFR BLD AUTO: 2.38 10*3/MM3 (ref 1.7–7)
NEUTROPHILS NFR BLD AUTO: 52.8 % (ref 42.7–76)
NRBC BLD AUTO-RTO: 0 /100 WBC (ref 0–0.2)
PLATELET # BLD AUTO: 209 10*3/MM3 (ref 140–450)
PMV BLD AUTO: 10 FL (ref 6–12)
POTASSIUM SERPL-SCNC: 4.3 MMOL/L (ref 3.5–5.2)
PROT SERPL-MCNC: 6.9 G/DL (ref 6–8.5)
PROTHROMBIN TIME: 26.8 SECONDS (ref 11–15)
RBC # BLD AUTO: 4.76 10*6/MM3 (ref 4.14–5.8)
SODIUM SERPL-SCNC: 140 MMOL/L (ref 136–145)
WBC # BLD AUTO: 4.51 10*3/MM3 (ref 3.4–10.8)

## 2021-05-21 PROCEDURE — 80053 COMPREHEN METABOLIC PANEL: CPT | Performed by: INTERNAL MEDICINE

## 2021-05-21 PROCEDURE — 85025 COMPLETE CBC W/AUTO DIFF WBC: CPT | Performed by: INTERNAL MEDICINE

## 2021-05-21 PROCEDURE — 85610 PROTHROMBIN TIME: CPT

## 2021-05-21 PROCEDURE — 36415 COLL VENOUS BLD VENIPUNCTURE: CPT

## 2021-05-21 PROCEDURE — 99214 OFFICE O/P EST MOD 30 MIN: CPT | Performed by: INTERNAL MEDICINE

## 2021-05-21 NOTE — TELEPHONE ENCOUNTER
Spoke with pt and explained that we no longer are able to do telephone visits. We changed it to a video visit and I told him that if  doesn't want to do a video we will need to change date.

## 2021-05-22 ENCOUNTER — APPOINTMENT (OUTPATIENT)
Dept: MRI IMAGING | Facility: HOSPITAL | Age: 36
End: 2021-05-22

## 2021-06-01 ENCOUNTER — TELEMEDICINE (OUTPATIENT)
Dept: NEUROSURGERY | Facility: CLINIC | Age: 36
End: 2021-06-01

## 2021-06-01 DIAGNOSIS — C71.1 ANAPLASTIC ASTROCYTOMA OF FRONTAL LOBE (HCC): Primary | ICD-10-CM

## 2021-06-01 PROCEDURE — 99213 OFFICE O/P EST LOW 20 MIN: CPT | Performed by: NEUROLOGICAL SURGERY

## 2021-06-01 NOTE — PROGRESS NOTES
Subjective   Patient ID: Bryan Valadez is a 35 y.o. male is here today video for 4 month follow-up with a new MRI Brain that was ordered on 04.05.2021.    You have chosen to receive care through a telehealth visit.  Do you consent to use a video/audio connection for your medical care today? Yes    The use of a video visit has been reviewed with the patient and verbal informed consent has been obtained. The patient was at home and I was in the office. We talked for 5 minutes.    History of Present Illness     This patient says that he feels pretty good overall. He really has no particular complaints at this point.    The following portions of the patient's history were reviewed and updated as appropriate: allergies, current medications, past family history, past medical history, past social history, past surgical history and problem list.    Review of Systems    I have reviewed the review of systems as documented by my MA.      Objective     There were no vitals filed for this visit.  There is no height or weight on file to calculate BMI.      Physical Exam  Neurological:      Mental Status: He is alert and oriented to person, place, and time.       Neurologic Exam     Mental Status   Oriented to person, place, and time.           Assessment/Plan   Independent Review of Radiographic Studies:      I personally reviewed the images from the following studies.    I reviewed his MRI which was done without contrast because he had not had any enhancement recently. This does not show any change from the last MRI which was done in early January.     Medical Decision Making:      I told the patient about the imaging.  I suggested that from here we can just scan him once every 6 months or so. He agrees.    Diagnoses and all orders for this visit:    1. Anaplastic astrocytoma of frontal lobe (CMS/HCC) (Primary)  -     MRI Brain Without Contrast; Future      Return in about 6 months (around 12/1/2021).

## 2021-06-17 ENCOUNTER — LAB (OUTPATIENT)
Dept: OTHER | Facility: HOSPITAL | Age: 36
End: 2021-06-17

## 2021-06-17 ENCOUNTER — CLINICAL SUPPORT (OUTPATIENT)
Dept: ONCOLOGY | Facility: HOSPITAL | Age: 36
End: 2021-06-17

## 2021-06-17 VITALS — WEIGHT: 193.2 LBS | BODY MASS INDEX: 26.2 KG/M2 | TEMPERATURE: 96.9 F | RESPIRATION RATE: 18 BRPM

## 2021-06-17 DIAGNOSIS — Z86.711 HISTORY OF PULMONARY EMBOLUS (PE): ICD-10-CM

## 2021-06-17 DIAGNOSIS — C71.1 ANAPLASTIC ASTROCYTOMA OF FRONTAL LOBE (HCC): ICD-10-CM

## 2021-06-17 DIAGNOSIS — D68.51 FACTOR 5 LEIDEN MUTATION, HETEROZYGOUS (HCC): ICD-10-CM

## 2021-06-17 DIAGNOSIS — Z79.01 CHRONIC ANTICOAGULATION: ICD-10-CM

## 2021-06-17 LAB
INR PPP: 2.9
PROTHROMBIN TIME: 34.6 SECONDS (ref 11–15)

## 2021-06-17 PROCEDURE — 85610 PROTHROMBIN TIME: CPT

## 2021-06-17 PROCEDURE — G0463 HOSPITAL OUTPT CLINIC VISIT: HCPCS

## 2021-06-17 NOTE — NURSING NOTE
Pt is here for lab with RN review.  INR slightly supra-therapeutic at 2.9. Prior dose confirmed. Pt had accidentally taken 7.5mg Mon, Friday and 10mg all other days for a total of 65mg for the week which was his dosing from 5/14. He had seen Dr. Jefferson on 5/21 and was supposed to be taking 7.5mg Sunday and 10mg all other days for a weekly total of 67.5mg. Pt has no complaints, denies any active bleeding or excessive bruising, changes in diet or new medication. Pt would like to remain on the 65mg weekly dosing. Spoke with NICOLE Stauffer and per More ontiveros for pt to remain on this dose. Copy of dosing instructions per sera given to pt and reviewed. Pt will remain taking 7.5mg on Monday and Friday and 10mg all other days. F/u appt reviewed with pt and instructed to call the office with any concerns or new symptoms prior to next visit. Pt vu and discharged ambulatory.    Lab Results   Component Value Date    INR 2.90 06/17/2021    INR 2.20 05/21/2021    INR 1.70 05/14/2021    PROTIME 34.6 (H) 06/17/2021    PROTIME 26.8 (H) 05/21/2021    PROTIME 19.9 (H) 05/14/2021

## 2021-06-21 NOTE — NURSING NOTE
INR 4.2 today. Pt denies any new medications. He did say he received his 2nd covid vaccine in the last week, but nothing new other than that.  Denies bleeding concerns.      Placed in [facility] and reviewed with NICOLE Spencer.  Pt now to take coumadin 10mg Sunday and 7.5mg all other days. Recheck in 1 week.  Copy of [facility] given and pt sent to scheduling.  V/u.  Will call with any bleeding issues or other concerns.     DC instructions

## 2021-07-08 ENCOUNTER — TELEPHONE (OUTPATIENT)
Dept: ONCOLOGY | Facility: CLINIC | Age: 36
End: 2021-07-08

## 2021-07-08 NOTE — TELEPHONE ENCOUNTER
Caller: Bryan Valadez    Relationship to patient: Self    Best call back number: [phone]    Chief complaint: PATIENT WOULD LIKE TO RESCHEDULE HIS APPT. ON 7/15/21    Type of visit: LAB/NURSE REVIEW    Requested date: 7/13/21 EARLY IN THE MORNING IF POSSIBLE OR EARLY AFTERNOON OTHERWISE.     If rescheduling, when is the original appointment: 7/15/21    Additional notes: PLEASE CONTACT PATIENT TO ADVISE.      THANKS

## 2021-07-13 ENCOUNTER — CLINICAL SUPPORT (OUTPATIENT)
Dept: ONCOLOGY | Facility: HOSPITAL | Age: 36
End: 2021-07-13

## 2021-07-13 ENCOUNTER — LAB (OUTPATIENT)
Dept: OTHER | Facility: HOSPITAL | Age: 36
End: 2021-07-13

## 2021-07-13 DIAGNOSIS — C71.1 ANAPLASTIC ASTROCYTOMA OF FRONTAL LOBE (HCC): ICD-10-CM

## 2021-07-13 DIAGNOSIS — D68.51 FACTOR 5 LEIDEN MUTATION, HETEROZYGOUS (HCC): ICD-10-CM

## 2021-07-13 DIAGNOSIS — Z79.01 CHRONIC ANTICOAGULATION: ICD-10-CM

## 2021-07-13 DIAGNOSIS — Z86.711 HISTORY OF PULMONARY EMBOLUS (PE): ICD-10-CM

## 2021-07-13 LAB
INR PPP: 2.5
PROTHROMBIN TIME: 30.4 SECONDS (ref 11–15)

## 2021-07-13 PROCEDURE — 36416 COLLJ CAPILLARY BLOOD SPEC: CPT

## 2021-07-13 PROCEDURE — 85610 PROTHROMBIN TIME: CPT

## 2021-07-13 NOTE — NURSING NOTE
Lab Results   Component Value Date    INR 2.50 07/13/2021    INR 2.90 06/17/2021    INR 2.20 05/21/2021    PROTIME 30.4 (H) 07/13/2021    PROTIME 34.6 (H) 06/17/2021    PROTIME 26.8 (H) 05/21/2021     Patient left prior to RN review. Contacted patient via telephone, patient with no new complaints or concerns. Patient denies any changes in medications or diet, as well as no bleeding. Patient currently taking coumadin 7.5 mg on Monday and Friday and 10 mg all other days. Per standing stone recommendations patient to continue taking same dose. Patient verbalized understanding and instructed to call office for any complaints or concerns. Follow up appointments reviewed.

## 2021-07-15 ENCOUNTER — APPOINTMENT (OUTPATIENT)
Dept: OTHER | Facility: HOSPITAL | Age: 36
End: 2021-07-15

## 2021-07-15 ENCOUNTER — APPOINTMENT (OUTPATIENT)
Dept: ONCOLOGY | Facility: HOSPITAL | Age: 36
End: 2021-07-15

## 2021-07-16 ENCOUNTER — OFFICE VISIT (OUTPATIENT)
Dept: INTERNAL MEDICINE | Facility: CLINIC | Age: 36
End: 2021-07-16

## 2021-07-16 VITALS
DIASTOLIC BLOOD PRESSURE: 84 MMHG | HEART RATE: 78 BPM | BODY MASS INDEX: 25.08 KG/M2 | SYSTOLIC BLOOD PRESSURE: 136 MMHG | WEIGHT: 185 LBS | TEMPERATURE: 98.2 F | OXYGEN SATURATION: 98 %

## 2021-07-16 DIAGNOSIS — L50.9 HIVES: Primary | ICD-10-CM

## 2021-07-16 PROCEDURE — 99213 OFFICE O/P EST LOW 20 MIN: CPT | Performed by: FAMILY MEDICINE

## 2021-07-16 RX ORDER — PREDNISONE 10 MG/1
TABLET ORAL
Qty: 12 TABLET | Refills: 0 | Status: SHIPPED | OUTPATIENT
Start: 2021-07-16 | End: 2021-07-16 | Stop reason: SDUPTHER

## 2021-07-16 RX ORDER — TRIAMCINOLONE ACETONIDE 0.25 MG/G
CREAM TOPICAL 3 TIMES DAILY PRN
Qty: 80 G | Refills: 0 | Status: SHIPPED | OUTPATIENT
Start: 2021-07-16 | End: 2021-11-19

## 2021-07-16 RX ORDER — PREDNISONE 10 MG/1
TABLET ORAL
Qty: 12 TABLET | Refills: 0 | Status: SHIPPED | OUTPATIENT
Start: 2021-07-16 | End: 2021-07-24

## 2021-07-16 NOTE — PROGRESS NOTES
Chief Complaint  Urticaria    Subjective          Bryan Valadez presents to [facility]  Very pleasant gentleman who went swimming with his children last night and developed hives with a rash over the thorax and no systemic symptoms.  He has history of asthma but is not flared.  He is on Singulair every day.  Otherwise he developed some hives between the fingers on both hands but has had no angioedema no difficulty breathing or swelling of the lips or throat.    He said no new exposures other than going for swimming in a swimming pool.    His INR is 2.5 and he has history of factor V Leiden deficiency and also DVT PE.  Other history includes surveillance for removal of brain tumor and pathology is reviewed.  He is 3 years post craniotomy.      Objective   Vital Signs:   /84 (BP Location: Left arm, Patient Position: Sitting, Cuff Size: Adult)   Pulse 78   Temp 98.2 °F (36.8 °C) (Tympanic)   Wt 83.9 kg (185 lb)   SpO2 98%   BMI 25.08 kg/m²     Physical Exam  Vitals reviewed.   Constitutional:       Appearance: He is well-developed.   HENT:      Head: Normocephalic and atraumatic.        Right Ear: Tympanic membrane and external ear normal.      Left Ear: Tympanic membrane and external ear normal.   Eyes:      Conjunctiva/sclera: Conjunctivae normal.      Pupils: Pupils are equal, round, and reactive to light.   Neck:      Thyroid: No thyromegaly.      Vascular: No JVD.   Cardiovascular:      Rate and Rhythm: Normal rate and regular rhythm.      Heart sounds: Normal heart sounds.   Pulmonary:      Effort: Pulmonary effort is normal.      Breath sounds: Normal breath sounds.   Abdominal:      General: Bowel sounds are normal.      Palpations: Abdomen is soft.   Musculoskeletal:         General: Normal range of motion.      Cervical back: Normal range of motion and neck supple.   Lymphadenopathy:      Cervical: No cervical adenopathy.   Skin:     General: Skin is warm and dry.       Findings: No rash.          Neurological:      Mental Status: He is alert and oriented to person, place, and time.      Cranial Nerves: No cranial nerve deficit.      Coordination: Coordination normal.   Psychiatric:         Behavior: Behavior normal.         Thought Content: Thought content normal.         Judgment: Judgment normal.        Result Review :                 Assessment and Plan    Diagnoses and all orders for this visit:    1. Hives (Primary)    Other orders  -     Discontinue: predniSONE (DELTASONE) 10 MG tablet; Take 1 tablet by mouth 2 (Two) Times a Day for 4 days, THEN 1 tablet Daily for 4 days.  Dispense: 12 tablet; Refill: 0  -     predniSONE (DELTASONE) 10 MG tablet; Take 1 tablet by mouth 2 (Two) Times a Day for 4 days, THEN 1 tablet Daily for 4 days.  Dispense: 12 tablet; Refill: 0  -     triamcinolone (KENALOG) 0.025 % cream; Apply  topically to the appropriate area as directed 3 (Three) Times a Day As Needed for Rash.  Dispense: 80 g; Refill: 0    Also may use over-the-counter Benadryl or Zyrtec or Allegra or Claritin.  Call for problems recheck as needed    Follow Up   No follow-ups on file.  Patient was given instructions and counseling regarding his condition or for health maintenance advice. Please see specific information pulled into the AVS if appropriate.

## 2021-07-16 NOTE — PATIENT INSTRUCTIONS
Hives  Hives (urticaria) are itchy, red, swollen areas on the skin. Hives can appear on any part of the body. Hives often fade within 24 hours (acute hives). Sometimes, new hives appear after old ones fade and the cycle can continue for several days or weeks (chronic hives). Hives do not spread from person to person (are not contagious).  Hives come from the body's reaction to something a person is allergic to (allergen), something that causes irritation, or various other triggers. When a person is exposed to a trigger, his or her body releases a chemical (histamine) that causes redness, itching, and swelling. Hives can appear right after exposure to a trigger or hours later.  What are the causes?  This condition may be caused by:  · Allergies to foods or ingredients.  · Insect bites or stings.  · Exposure to pollen or pets.  · Contact with latex or chemicals.  · Spending time in sunlight, heat, or cold (exposure).  · Exercise.  · Stress.  · Certain medicines.  You can also get hives from other medical conditions and treatments, such as:  · Viruses, including the common cold.  · Bacterial infections, such as urinary tract infections and strep throat.  · Certain medicines.  · Allergy shots.  · Blood transfusions.  Sometimes, the cause of this condition is not known (idiopathic hives).  What increases the risk?  You are more likely to develop this condition if you:  · Are a woman.  · Have food allergies, especially to citrus fruits, milk, eggs, peanuts, tree nuts, or shellfish.  · Are allergic to:  ? Medicines.  ? Latex.  ? Insects.  ? Animals.  ? Pollen.  What are the signs or symptoms?  Common symptoms of this condition include raised, itchy, red or white bumps or patches on your skin. These areas may:  · Become large and swollen (welts).  · Change in shape and location, quickly and repeatedly.  · Be separate hives or connect over a large area of skin.  · Sting or become painful.  · Turn white when pressed in the  [place] ([place]).  In severe cases, your hands, feet, and face may also become swollen. This may occur if hives develop deeper in your skin.  How is this diagnosed?  This condition may be diagnosed by your symptoms, medical history, and physical exam.  · Your skin, urine, or blood may be tested to find out what is causing your hives and to rule out other health issues.  · Your health care provider may also remove a small sample of skin from the affected area and examine it under a microscope (biopsy).  How is this treated?  Treatment for this condition depends on the cause and severity of your symptoms. Your health care provider may recommend using cool, wet cloths (cool compresses) or taking cool showers to relieve itching. Treatment may include:  · Medicines that help:  ? Relieve itching (antihistamines).  ? Reduce swelling (corticosteroids).  ? Treat infection (antibiotics).  · An injectable medicine (omalizumab). Your health care provider may prescribe this if you have chronic idiopathic hives and you continue to have symptoms even after treatment with antihistamines.  Severe cases may require an emergency injection of adrenaline (epinephrine) to prevent a life-threatening allergic reaction (anaphylaxis).  Follow these instructions at home:  Medicines  · Take and apply over-the-counter and prescription medicines only as told by your health care provider.  · If you were prescribed an antibiotic medicine, take it as told by your health care provider. Do not stop using the antibiotic even if you start to feel better.  Skin care  · Apply cool compresses to the affected areas.  · Do not scratch or rub your skin.  General instructions  · Do not take hot showers or baths. This can make itching worse.  · Do not wear tight-fitting clothing.  · Use sunscreen and wear protective clothing when you are outside.  · Avoid any substances that cause your hives. Keep a journal to help track what causes your hives. Write  down:  ? What medicines you take.  ? What you eat and drink.  ? What products you use on your skin.  · Keep all follow-up visits as told by your health care provider. This is important.  Contact a health care provider if:  · Your symptoms are not controlled with medicine.  · Your joints are painful or swollen.  Get help right away if:  · You have a fever.  · You have pain in your abdomen.  · Your tongue or lips are swollen.  · Your eyelids are swollen.  · Your chest or throat feels tight.  · You have trouble breathing or swallowing.  These symptoms may represent a serious problem that is an emergency. Do not wait to see if the symptoms will go away. Get medical help right away. Call your local emergency services (911 in the [place] Do not drive yourself to the hospital.  Summary  · Hives (urticaria) are itchy, red, swollen areas on your skin. Hives come from the body's reaction to something a person is allergic to (allergen), something that causes irritation, or various other triggers.  · Treatment for this condition depends on the cause and severity of your symptoms.  · Avoid any substances that cause your hives. Keep a journal to help track what causes your hives.  · Take and apply over-the-counter and prescription medicines only as told by your health care provider.  · Keep all follow-up visits as told by your health care provider. This is important.  This information is not intended to replace advice given to you by your health care provider. Make sure you discuss any questions you have with your health care provider.  Document Revised: 07/03/2019 Document Reviewed: 07/03/2019  Elsevier Patient Education © 2021 [employer]

## 2021-07-29 ENCOUNTER — TELEPHONE (OUTPATIENT)
Dept: NEUROSURGERY | Facility: CLINIC | Age: 36
End: 2021-07-29

## 2021-08-03 DIAGNOSIS — C71.1 ANAPLASTIC ASTROCYTOMA OF FRONTAL LOBE (HCC): Primary | ICD-10-CM

## 2021-08-03 RX ORDER — LEVETIRACETAM 1000 MG/1
TABLET ORAL
Qty: 60 TABLET | Refills: 11 | Status: SHIPPED | OUTPATIENT
Start: 2021-08-03 | End: 2021-10-06 | Stop reason: SDUPTHER

## 2021-08-04 DIAGNOSIS — J30.9 ALLERGIC RHINITIS: ICD-10-CM

## 2021-08-04 RX ORDER — MONTELUKAST SODIUM 10 MG/1
10 TABLET ORAL DAILY
Qty: 90 TABLET | Refills: 3 | Status: SHIPPED | OUTPATIENT
Start: 2021-08-04 | End: 2022-05-16

## 2021-08-04 NOTE — TELEPHONE ENCOUNTER
Singulair refill request rec from [facility]. The rx was written by Dr Jefferson one year ago. Pt last filled a 90 day supply on 5/6/2021.    I have routed the rx to Dr Jefferson for signature if refill is appropriate. Once signed it will be escribed to [facility].    Confirmation rec that Dr Jefferson has signed the Singulair rx refill. This was sent to [facility].

## 2021-08-11 ENCOUNTER — TELEPHONE (OUTPATIENT)
Dept: PHARMACY | Facility: HOSPITAL | Age: 36
End: 2021-08-11

## 2021-08-11 DIAGNOSIS — I26.99 PULMONARY EMBOLISM WITH INFARCTION (HCC): ICD-10-CM

## 2021-08-11 NOTE — TELEPHONE ENCOUNTER
Called pt to ask if he had enough pills to get him thru until next appointment- he does. I told him that I was going to wait to process the refill until he gets seen so I know what dose to give him he v/u.

## 2021-08-12 ENCOUNTER — LAB (OUTPATIENT)
Dept: OTHER | Facility: HOSPITAL | Age: 36
End: 2021-08-12

## 2021-08-12 ENCOUNTER — CLINICAL SUPPORT (OUTPATIENT)
Dept: ONCOLOGY | Facility: HOSPITAL | Age: 36
End: 2021-08-12

## 2021-08-12 DIAGNOSIS — Z86.711 HISTORY OF PULMONARY EMBOLUS (PE): ICD-10-CM

## 2021-08-12 DIAGNOSIS — C71.1 ANAPLASTIC ASTROCYTOMA OF FRONTAL LOBE (HCC): ICD-10-CM

## 2021-08-12 DIAGNOSIS — D68.51 FACTOR 5 LEIDEN MUTATION, HETEROZYGOUS (HCC): ICD-10-CM

## 2021-08-12 DIAGNOSIS — Z79.01 CHRONIC ANTICOAGULATION: ICD-10-CM

## 2021-08-12 LAB
INR PPP: 3.3
PROTHROMBIN TIME: 39.2 SECONDS (ref 11–15)

## 2021-08-12 PROCEDURE — 36416 COLLJ CAPILLARY BLOOD SPEC: CPT

## 2021-08-12 PROCEDURE — 85610 PROTHROMBIN TIME: CPT

## 2021-08-12 RX ORDER — WARFARIN SODIUM 5 MG/1
TABLET ORAL
Qty: 180 TABLET | Refills: 2 | Status: SHIPPED | OUTPATIENT
Start: 2021-08-12 | End: 2022-03-15

## 2021-08-12 NOTE — NURSING NOTE
Pt here for INR review. INR 3.3 today. Pt reports no missed doses or changes. He does not want to follow the [facility] protocol and would like to change his dose to 7.5mg MWF and 10mg all other days. Understands to call with any changes, bleeding or other concerns prior to next visit.

## 2021-09-16 ENCOUNTER — LAB (OUTPATIENT)
Dept: OTHER | Facility: HOSPITAL | Age: 36
End: 2021-09-16

## 2021-09-16 ENCOUNTER — CLINICAL SUPPORT (OUTPATIENT)
Dept: ONCOLOGY | Facility: HOSPITAL | Age: 36
End: 2021-09-16

## 2021-09-16 VITALS — TEMPERATURE: 96.9 F

## 2021-09-16 DIAGNOSIS — Z79.01 CHRONIC ANTICOAGULATION: ICD-10-CM

## 2021-09-16 DIAGNOSIS — C71.1 ANAPLASTIC ASTROCYTOMA OF FRONTAL LOBE (HCC): ICD-10-CM

## 2021-09-16 DIAGNOSIS — D68.51 FACTOR 5 LEIDEN MUTATION, HETEROZYGOUS (HCC): ICD-10-CM

## 2021-09-16 DIAGNOSIS — Z86.711 HISTORY OF PULMONARY EMBOLUS (PE): ICD-10-CM

## 2021-09-16 LAB
INR PPP: 2.2
PROTHROMBIN TIME: 26.4 SECONDS (ref 11–15)

## 2021-09-16 PROCEDURE — 85610 PROTHROMBIN TIME: CPT

## 2021-09-16 PROCEDURE — G0463 HOSPITAL OUTPT CLINIC VISIT: HCPCS

## 2021-09-16 NOTE — NURSING NOTE
Pt is here for lab with RN review.  INR slightly sub-therapeutic at 2.2. Prior dose confirmed. Pt has no complaints, denies any active bleeding or excessive bruising, changes in diet or new medicaion. Copy of dosing instructions per acelis given to pt and reviewed. Per acelis, pt is to take 7.5mg of Coumadin on Monday, Friday and 10mg all other days. F/u appt reviewed with pt and instructed to call the office with any concerns or new symptoms prior to next visit. Pt vu and discharged ambulatory.    Lab Results   Component Value Date    INR 2.20 09/16/2021    INR 3.30 08/12/2021    INR 2.50 07/13/2021    PROTIME 26.4 (H) 09/16/2021    PROTIME 39.2 (H) 08/12/2021    PROTIME 30.4 (H) 07/13/2021

## 2021-09-30 ENCOUNTER — TELEPHONE (OUTPATIENT)
Dept: NEUROSURGERY | Facility: CLINIC | Age: 36
End: 2021-09-30

## 2021-09-30 ENCOUNTER — HOSPITAL ENCOUNTER (OUTPATIENT)
Dept: MRI IMAGING | Facility: HOSPITAL | Age: 36
Discharge: HOME | End: 2021-09-30
Admitting: NEUROLOGICAL SURGERY

## 2021-09-30 DIAGNOSIS — C71.1 ANAPLASTIC ASTROCYTOMA OF FRONTAL LOBE (HCC): ICD-10-CM

## 2021-09-30 PROCEDURE — 70551 MRI BRAIN STEM W/O DYE: CPT

## 2021-09-30 NOTE — TELEPHONE ENCOUNTER
Caller: MARK GOLD    Relationship to patient: SELF    Best call back number: [phone]    Patient is needing: PATIENT IS SCHEDULED FOR A TELEVISIT 10/5/21 WITH DR. FAUSTIN. PATIENT IS NOW GOING TO BE IN TOWN THAT DAY AND CAN COME IN PERSON TO SEE DR. FAUSTIN, IF HE PREFERS. PATIENT IS FINE EITHER WAY, PLEASE ADVISE.

## 2021-10-05 ENCOUNTER — OFFICE VISIT (OUTPATIENT)
Dept: NEUROSURGERY | Facility: CLINIC | Age: 36
End: 2021-10-05

## 2021-10-05 VITALS
SYSTOLIC BLOOD PRESSURE: 110 MMHG | WEIGHT: 185 LBS | BODY MASS INDEX: 25.06 KG/M2 | HEART RATE: 88 BPM | HEIGHT: 72 IN | DIASTOLIC BLOOD PRESSURE: 78 MMHG | TEMPERATURE: 98 F

## 2021-10-05 DIAGNOSIS — C71.1 ANAPLASTIC ASTROCYTOMA OF FRONTAL LOBE (HCC): Primary | ICD-10-CM

## 2021-10-05 PROCEDURE — 99213 OFFICE O/P EST LOW 20 MIN: CPT | Performed by: NEUROLOGICAL SURGERY

## 2021-10-05 NOTE — PROGRESS NOTES
"Subjective   Patient ID: Bryan Valadez is a 36 y.o. male is here today for 6 month follow-up with a new MRI Brain that was done on 09/30/2021 at [facility].    Today patient denies HA's, visual changes, dizziness, lightheadedness.     Patient, Provider, and MA are all wearing a mask in our office today.     History of Present Illness     This patient returns today.  He is doing pretty well.  He does not have much in the way of symptoms at all.  He had a craniotomy for anaplastic astrocytoma in January 2019.    The following portions of the patient's history were reviewed and updated as appropriate: allergies, current medications, past family history, past medical history, past social history, past surgical history and problem list.    Review of Systems   Constitutional: Negative for chills and fever.   HENT: Negative for congestion.    Eyes: Negative for visual disturbance.   Neurological: Negative for dizziness, seizures, light-headedness, numbness and headaches.       I have reviewed the review of systems as documented by my MA.      Objective     Vitals:    10/05/21 0931   BP: 110/78   Cuff Size: Adult   Pulse: 88   Temp: 98 °F (36.7 °C)   Weight: 83.9 kg (185 lb)   Height: 182.9 cm (72.01\")     Body mass index is 25.08 kg/m².      Physical Exam  Neurological:      Mental Status: He is alert and oriented to person, place, and time.       Neurologic Exam     Mental Status   Oriented to person, place, and time.           Assessment/Plan   Independent Review of Radiographic Studies:      I personally reviewed the images from the following studies.    I reviewed his MRI of the brain done on 30 September without contrast.  This shows the same cavity had before.  There is some surrounding white matter changes consistent with possible low-grade astrocytoma or treatment related changes.  We did not use enhancement because the last enhanced MRI he had in January of this year did not show any enhancement.    Medical " Decision Making:      I told the patient we should scan him again in January of this next year.  This should be done with and without contrast to be sure that he has not changed the character of the tumor.    Diagnoses and all orders for this visit:    1. Anaplastic astrocytoma of frontal lobe (HCC) (Primary)  -     MRI Brain With & Without Contrast; Future      Return in about 4 months (around 2/5/2022).

## 2021-10-06 DIAGNOSIS — C71.1 ANAPLASTIC ASTROCYTOMA OF FRONTAL LOBE (HCC): ICD-10-CM

## 2021-10-06 RX ORDER — LEVETIRACETAM 1000 MG/1
1000 TABLET ORAL EVERY 12 HOURS
Qty: 60 TABLET | Refills: 11 | Status: SHIPPED | OUTPATIENT
Start: 2021-10-06 | End: 2022-07-12

## 2021-10-06 NOTE — TELEPHONE ENCOUNTER
----- Message from Bryan Valadez sent at 10/5/2021  8:19 PM EDT -----  Regarding: Non-Urgent Medical Question  Contact: [phone]  ,  I forgot when I had my appointment today if you could prescribed me 2 weeks worth of Kappra since I'll be in [place] on vacation. I've asked [facility] to refill my prescription early but needed a doctors request first.    Thanks   Mic

## 2021-10-18 ENCOUNTER — TELEPHONE (OUTPATIENT)
Dept: ONCOLOGY | Facility: CLINIC | Age: 36
End: 2021-10-18

## 2021-10-18 NOTE — TELEPHONE ENCOUNTER
Caller: GILLIAN    Relationship to patient: Self    Best call back number: [phone]    Chief complaint: FERNANDA.    Type of visit: LAB/RN REVIEW    If rescheduling, when is the original appointment: 10/14/2021

## 2021-10-19 ENCOUNTER — LAB (OUTPATIENT)
Dept: OTHER | Facility: HOSPITAL | Age: 36
End: 2021-10-19

## 2021-10-19 ENCOUNTER — CLINICAL SUPPORT (OUTPATIENT)
Dept: ONCOLOGY | Facility: HOSPITAL | Age: 36
End: 2021-10-19

## 2021-10-19 LAB
INR PPP: 2.1 (ref 0.8–1.2)
PROTHROMBIN TIME: 24.8 SECONDS (ref 12.8–15.2)

## 2021-10-19 PROCEDURE — G0463 HOSPITAL OUTPT CLINIC VISIT: HCPCS

## 2021-10-19 PROCEDURE — 85610 PROTHROMBIN TIME: CPT | Performed by: INTERNAL MEDICINE

## 2021-11-18 NOTE — PROGRESS NOTES
[facility] OUTPATIENT FOLLOW UP VISIT    REASON FOR FOLLOW-UP:    1.  Left lower extremity DVT with progression of symptoms and extension of the left lower extremity DVT into the femoral vein from the popliteal/calf vein swallowing for next set.  Currently anticoagulated with warfarin.  2.  Right lower extremity DVT noted in the common femoral, profunda femoral, and calf veins while anticoagulated with a DOAC.  Therefore, failure of DOAC.  3.  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  Other thrombophilia labs negative.    4.  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  NOT 1p19q co-deleted.    5.  Radiation initiated on 7/31/2018.  Plan for Temodar ×1 year following radiation.  6.  4500 cGy in 25 fractions administered from 7/31/2018 through 9/14/2018  7.  MRI brain 10/23/2018 with resolving hemorrhage in the resection cavity.  However, there is hyperintensity measuring 4.6 x 4.3 x 3 cm along the margins of the resection cavity.  8.  Repeat venous duplex on 10/23 with chronic right CVT and chronic LLE DVT from the mid femoral vein distally.     9.  He initiated adjuvant therapy with Temodar in mid October 2018.  10.  MRI brain 12/3/2018 with stable findings.  11.  On 1/24/2019 he did have a repeat resection by Dr. Galvan.  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  However, there was no evidence of progression to a higher grade in the new resected specimen.  Mostly the proliferative activity was very low with only rare mitoses and a low Ki-67 of just 2-3%.  12.  Temodar completed December 2019      HISTORY OF PRESENT ILLNESS:  Bryan Valadez is a 36 y.o. male who returns today for follow up of the above issues.     He is doing very well.  He continues warfarin without any bleeding.  INR values have been stable.  He continues to travel a lot for work.  No neurologic symptoms.      REVIEW OF  "SYSTEMS:  As per the HPI    PHYSICAL EXAMINATION:    Vitals:    11/19/21 0803   BP: 133/86   Pulse: 70   Resp: 16   Temp: 97.6 °F (36.4 °C)   TempSrc: Temporal   SpO2: 98%   Weight: 90.9 kg (200 lb 4.8 oz)   Height: 182.9 cm (72.01\")   PainSc: 0-No pain  Comment: anaplastic astrocytoma of fronal lobe       General:  No acute distress, awake, alert and oriented  Skin:  Warm and dry, no visible rash  HEENT:  Normocephalic/atraumatic.  Wearing a facemask.  Chest:  Normal respiratory effort  Extremities:  No visible clubbing, cyanosis, or edema  Neuro/psych:  Grossly non-focal.  Normal mood and affect.    DIAGNOSTIC DATA:  Protime-INR, Fingerstick (11/19/2021 08:22)  CBC & Differential (11/19/2021 08:22)      IMAGING:  MRI Brain Without Contrast (09/30/2021 08:20)    Images personally reviewed  No change from prior.    ASSESSMENT:  This is a 36 y.o. 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   · INR at goal today.  Continue warfarin at the same dose.    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 Galvan.  IDH mutated.  NOT 1p19q co-deleted.  Overall, this is a good molecular profile. He did initiate radiation alone on 7/31/2018 and completed it on 9/14/2018.   · He initiated adjuvant Temodar in mid October 2018.  · Due to persistent abnormalities on MRI, on 1/24/2019 he did have a repeat resection by Dr. Galvan.  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  However, there was no evidence of progression to a higher grade in the new resected specimen.  Mostly the proliferative activity was very low with only rare mitoses and a low Ki-67 of just 2-3%.  · MRI brain imaging from 9/23/2019 appeared stable.  · He completed 1 year of Temodar with an MRI on 12/12/2019 showing stable findings with some improvement in the intensity of " the abnormal uptake  · MRI brain 2/27/2020 stable..  · MRI brain 5/27/2020 stable.    · MRI brain 9/21/2020 stable.  · MRI 1/7/2021 stable  · MRI 5/10/2021 without contrast stable  · MRI brain 9/30/21 without contrast stable  · MRI brain with and without contrast planned for February 2      PLAN:   1. Continue warfarin at the same dose  2. Monthly INR.  3. MRI imaging of the brain without contrast per Dr. Hidalgo at this point, currently scheduled for 2/2.  I will follow up this result.  4. Otherwise, I will see him back in 6 months or sooner if needed

## 2021-11-19 ENCOUNTER — OFFICE VISIT (OUTPATIENT)
Dept: ONCOLOGY | Facility: CLINIC | Age: 36
End: 2021-11-19

## 2021-11-19 ENCOUNTER — LAB (OUTPATIENT)
Dept: OTHER | Facility: HOSPITAL | Age: 36
End: 2021-11-19

## 2021-11-19 VITALS
BODY MASS INDEX: 27.13 KG/M2 | HEIGHT: 72 IN | RESPIRATION RATE: 16 BRPM | WEIGHT: 200.3 LBS | OXYGEN SATURATION: 98 % | TEMPERATURE: 97.6 F | SYSTOLIC BLOOD PRESSURE: 133 MMHG | DIASTOLIC BLOOD PRESSURE: 86 MMHG | HEART RATE: 70 BPM

## 2021-11-19 DIAGNOSIS — Z79.01 CHRONIC ANTICOAGULATION: ICD-10-CM

## 2021-11-19 DIAGNOSIS — C71.1 ANAPLASTIC ASTROCYTOMA OF FRONTAL LOBE (HCC): ICD-10-CM

## 2021-11-19 DIAGNOSIS — D68.51 FACTOR 5 LEIDEN MUTATION, HETEROZYGOUS (HCC): ICD-10-CM

## 2021-11-19 DIAGNOSIS — Z86.711 HISTORY OF PULMONARY EMBOLUS (PE): Primary | ICD-10-CM

## 2021-11-19 DIAGNOSIS — Z86.711 HISTORY OF PULMONARY EMBOLUS (PE): ICD-10-CM

## 2021-11-19 LAB
BASOPHILS # BLD AUTO: 0.04 10*3/MM3 (ref 0–0.2)
BASOPHILS NFR BLD AUTO: 0.7 % (ref 0–1.5)
DEPRECATED RDW RBC AUTO: 38.5 FL (ref 37–54)
EOSINOPHIL # BLD AUTO: 0.21 10*3/MM3 (ref 0–0.4)
EOSINOPHIL NFR BLD AUTO: 3.5 % (ref 0.3–6.2)
ERYTHROCYTE [DISTWIDTH] IN BLOOD BY AUTOMATED COUNT: 11.9 % (ref 12.3–15.4)
HCT VFR BLD AUTO: 45 % (ref 37.5–51)
HGB BLD-MCNC: 15.5 G/DL (ref 13–17.7)
IMM GRANULOCYTES # BLD AUTO: 0.1 10*3/MM3 (ref 0–0.05)
IMM GRANULOCYTES NFR BLD AUTO: 1.7 % (ref 0–0.5)
INR PPP: 2.6
LYMPHOCYTES # BLD AUTO: 1.91 10*3/MM3 (ref 0.7–3.1)
LYMPHOCYTES NFR BLD AUTO: 32.2 % (ref 19.6–45.3)
MCH RBC QN AUTO: 29.9 PG (ref 26.6–33)
MCHC RBC AUTO-ENTMCNC: 34.4 G/DL (ref 31.5–35.7)
MCV RBC AUTO: 86.9 FL (ref 79–97)
MONOCYTES # BLD AUTO: 0.6 10*3/MM3 (ref 0.1–0.9)
MONOCYTES NFR BLD AUTO: 10.1 % (ref 5–12)
NEUTROPHILS NFR BLD AUTO: 3.07 10*3/MM3 (ref 1.7–7)
NEUTROPHILS NFR BLD AUTO: 51.8 % (ref 42.7–76)
NRBC BLD AUTO-RTO: 0 /100 WBC (ref 0–0.2)
PLATELET # BLD AUTO: 235 10*3/MM3 (ref 140–450)
PMV BLD AUTO: 9.7 FL (ref 6–12)
PROTHROMBIN TIME: 31.1 SECONDS (ref 11–15)
RBC # BLD AUTO: 5.18 10*6/MM3 (ref 4.14–5.8)
WBC NRBC COR # BLD: 5.93 10*3/MM3 (ref 3.4–10.8)

## 2021-11-19 PROCEDURE — 85610 PROTHROMBIN TIME: CPT

## 2021-11-19 PROCEDURE — 99214 OFFICE O/P EST MOD 30 MIN: CPT | Performed by: INTERNAL MEDICINE

## 2021-11-19 PROCEDURE — 36415 COLL VENOUS BLD VENIPUNCTURE: CPT

## 2021-11-19 PROCEDURE — 85025 COMPLETE CBC W/AUTO DIFF WBC: CPT | Performed by: INTERNAL MEDICINE

## 2021-12-15 ENCOUNTER — TELEPHONE (OUTPATIENT)
Dept: ONCOLOGY | Facility: CLINIC | Age: 36
End: 2021-12-15

## 2021-12-15 NOTE — TELEPHONE ENCOUNTER
Caller: Bryan Valadez    Relationship to patient: Self    Best call back number: [phone]    Chief complaint:PATIENT TO RESCHED INR 12/20    Type of visit: LAB AND RN REVIEW    Requested date: 12/20 AROUND 330/ 4 PM

## 2021-12-20 ENCOUNTER — APPOINTMENT (OUTPATIENT)
Dept: OTHER | Facility: HOSPITAL | Age: 36
End: 2021-12-20

## 2021-12-20 ENCOUNTER — APPOINTMENT (OUTPATIENT)
Dept: ONCOLOGY | Facility: HOSPITAL | Age: 36
End: 2021-12-20

## 2021-12-21 ENCOUNTER — LAB (OUTPATIENT)
Dept: OTHER | Facility: HOSPITAL | Age: 36
End: 2021-12-21

## 2021-12-21 ENCOUNTER — TELEPHONE (OUTPATIENT)
Dept: ONCOLOGY | Facility: CLINIC | Age: 36
End: 2021-12-21

## 2021-12-21 ENCOUNTER — CLINICAL SUPPORT (OUTPATIENT)
Dept: ONCOLOGY | Facility: HOSPITAL | Age: 36
End: 2021-12-21

## 2021-12-21 DIAGNOSIS — Z79.01 CHRONIC ANTICOAGULATION: ICD-10-CM

## 2021-12-21 DIAGNOSIS — Z86.711 HISTORY OF PULMONARY EMBOLUS (PE): ICD-10-CM

## 2021-12-21 DIAGNOSIS — D68.51 FACTOR 5 LEIDEN MUTATION, HETEROZYGOUS (HCC): ICD-10-CM

## 2021-12-21 LAB
INR PPP: 3
PROTHROMBIN TIME: 35.7 SECONDS (ref 11–15)

## 2021-12-21 PROCEDURE — G0463 HOSPITAL OUTPT CLINIC VISIT: HCPCS

## 2021-12-21 PROCEDURE — 85610 PROTHROMBIN TIME: CPT

## 2021-12-21 NOTE — TELEPHONE ENCOUNTER
Caller: Mark Valadez    Relationship: Self    Best call back number: [phone]    What is the best time to reach you: ANYTIME    Who are you requesting to speak with (clinical staff, provider,  specific staff member): [facility] OFFICE NURSE     Do you know the name of the person who called: MARK    What was the call regarding:   WAS JUST IN FOR LAB/INR CHECK TODAY HAD SPOKE WITH TYRON OR AMADEO EARLIER AND JUST MISSED A CALL FROM THE [place] OFFICE RETURNING THE CALL BELIEVES MAY HAVE BEEN NURSE THAT CALLED NO VOICE MAIL WAS LEFT JUST RETURNING THE CALL    Do you require a callback: YES

## 2021-12-27 ENCOUNTER — OFFICE VISIT (OUTPATIENT)
Dept: INTERNAL MEDICINE | Facility: CLINIC | Age: 36
End: 2021-12-27

## 2021-12-27 VITALS
BODY MASS INDEX: 26.68 KG/M2 | SYSTOLIC BLOOD PRESSURE: 118 MMHG | WEIGHT: 197 LBS | OXYGEN SATURATION: 100 % | TEMPERATURE: 98.4 F | HEIGHT: 72 IN | RESPIRATION RATE: 16 BRPM | HEART RATE: 80 BPM | DIASTOLIC BLOOD PRESSURE: 72 MMHG

## 2021-12-27 DIAGNOSIS — H92.02 LEFT EAR PAIN: Primary | ICD-10-CM

## 2021-12-27 PROCEDURE — 99212 OFFICE O/P EST SF 10 MIN: CPT | Performed by: NURSE PRACTITIONER

## 2021-12-27 NOTE — PATIENT INSTRUCTIONS
Sinusitis, Adult  Sinusitis is soreness and swelling (inflammation) of your sinuses. Sinuses are hollow spaces in the bones around your face. They are located:  · Around your eyes.  · In the middle of your forehead.  · Behind your nose.  · In your cheekbones.  Your sinuses and nasal passages are lined with a fluid called mucus. Mucus drains out of your sinuses. Swelling can trap mucus in your sinuses. This lets germs (bacteria, virus, or fungus) grow, which leads to infection. Most of the time, this condition is caused by a virus.  What are the causes?  This condition is caused by:  · Allergies.  · Asthma.  · Germs.  · Things that block your nose or sinuses.  · Growths in the nose (nasal polyps).  · Chemicals or irritants in the air.  · Fungus (rare).  What increases the risk?  You are more likely to develop this condition if:  · You have a weak body defense system (immune system).  · You do a lot of swimming or diving.  · You use nasal sprays too much.  · You smoke.  What are the signs or symptoms?  The main symptoms of this condition are pain and a feeling of pressure around the sinuses. Other symptoms include:  · Stuffy nose (congestion).  · Runny nose (drainage).  · Swelling and warmth in the sinuses.  · Headache.  · Toothache.  · A cough that may get worse at night.  · Mucus that collects in the throat or the back of the nose (postnasal drip).  · Being unable to smell and taste.  · Being very tired (fatigue).  · A fever.  · Sore throat.  · Bad breath.  How is this diagnosed?  This condition is diagnosed based on:  · Your symptoms.  · Your medical history.  · A physical exam.  · Tests to find out if your condition is short-term (acute) or long-term (chronic). Your doctor may:  ? Check your nose for growths (polyps).  ? Check your sinuses using a tool that has a light (endoscope).  ? Check for allergies or germs.  ? Do imaging tests, such as an MRI or CT scan.  How is this treated?  Treatment for this condition  depends on the cause and whether it is short-term or long-term.  · If caused by a virus, your symptoms should go away on their own within 10 days. You may be given medicines to relieve symptoms. They include:  ? Medicines that shrink swollen tissue in the nose.  ? Medicines that treat allergies (antihistamines).  ? A spray that treats swelling of the nostrils.   ? Rinses that help get rid of thick mucus in your nose (nasal saline washes).  · If caused by bacteria, your doctor may wait to see if you will get better without treatment. You may be given antibiotic medicine if you have:  ? A very bad infection.  ? A weak body defense system.  · If caused by growths in the nose, you may need to have surgery.  Follow these instructions at home:  Medicines  · Take, use, or apply over-the-counter and prescription medicines only as told by your doctor. These may include nasal sprays.  · If you were prescribed an antibiotic medicine, take it as told by your doctor. Do not stop taking the antibiotic even if you start to feel better.  Hydrate and humidify    · Drink enough water to keep your pee (urine) pale yellow.  · Use a cool mist humidifier to keep the humidity level in your home above 50%.  · Breathe in steam for 10-15 minutes, 3-4 times a day, or as told by your doctor. You can do this in the bathroom while a hot shower is running.  · Try not to spend time in cool or dry air.    Rest  · Rest as much as you can.  · Sleep with your head raised (elevated).  · Make sure you get enough sleep each night.  General instructions    · Put a warm, moist washcloth on your face 3-4 times a day, or as often as told by your doctor. This will help with discomfort.  · Wash your hands often with soap and water. If there is no soap and water, use hand .  · Do not smoke. Avoid being around people who are smoking (secondhand smoke).  · Keep all follow-up visits as told by your doctor. This is important.    Contact a doctor if:  · You  have a fever.  · Your symptoms get worse.  · Your symptoms do not get better within 10 days.  Get help right away if:  · You have a very bad headache.  · You cannot stop throwing up (vomiting).  · You have very bad pain or swelling around your face or eyes.  · You have trouble seeing.  · You feel confused.  · Your neck is stiff.  · You have trouble breathing.  Summary  · Sinusitis is swelling of your sinuses. Sinuses are hollow spaces in the bones around your face.  · This condition is caused by tissues in your nose that become inflamed or swollen. This traps germs. These can lead to infection.  · If you were prescribed an antibiotic medicine, take it as told by your doctor. Do not stop taking it even if you start to feel better.  · Keep all follow-up visits as told by your doctor. This is important.  This information is not intended to replace advice given to you by your health care provider. Make sure you discuss any questions you have with your health care provider.  Document Revised: 05/20/2019 Document Reviewed: 05/20/2019  [employer] Patient Education © 2021 [employer]

## 2022-01-14 ENCOUNTER — LAB (OUTPATIENT)
Dept: OTHER | Facility: HOSPITAL | Age: 37
End: 2022-01-14

## 2022-01-14 ENCOUNTER — APPOINTMENT (OUTPATIENT)
Dept: OTHER | Facility: HOSPITAL | Age: 37
End: 2022-01-14

## 2022-01-14 ENCOUNTER — APPOINTMENT (OUTPATIENT)
Dept: ONCOLOGY | Facility: HOSPITAL | Age: 37
End: 2022-01-14

## 2022-01-14 ENCOUNTER — CLINICAL SUPPORT (OUTPATIENT)
Dept: ONCOLOGY | Facility: HOSPITAL | Age: 37
End: 2022-01-14

## 2022-01-14 DIAGNOSIS — Z86.711 HISTORY OF PULMONARY EMBOLUS (PE): ICD-10-CM

## 2022-01-14 DIAGNOSIS — Z79.01 CHRONIC ANTICOAGULATION: ICD-10-CM

## 2022-01-14 DIAGNOSIS — D68.51 FACTOR 5 LEIDEN MUTATION, HETEROZYGOUS: ICD-10-CM

## 2022-01-14 LAB
INR PPP: 1.7
PROTHROMBIN TIME: 20 SECONDS (ref 11–15)

## 2022-01-14 PROCEDURE — 85610 PROTHROMBIN TIME: CPT

## 2022-01-14 PROCEDURE — G0463 HOSPITAL OUTPT CLINIC VISIT: HCPCS

## 2022-01-14 NOTE — NURSING NOTE
Pt arrived for PT/INR.  PT 20.0 INR- 1.7. per [employer] pt is to increase mg weekly from 65 to 75. Reviewed with Bonita RIVERA advised to only increase to 70 mg weekly and recheck in 1 week.  Called pt, pt was not comfortable with the 10 mg daily = 70 mg weekly, he would like to do 10 mg daily except for Mon, and 7.5 mg Mon. Scheduled for 1/21 for lab and review.

## 2022-01-21 ENCOUNTER — LAB (OUTPATIENT)
Dept: OTHER | Facility: HOSPITAL | Age: 37
End: 2022-01-21

## 2022-01-21 ENCOUNTER — CLINICAL SUPPORT (OUTPATIENT)
Dept: ONCOLOGY | Facility: HOSPITAL | Age: 37
End: 2022-01-21

## 2022-01-21 DIAGNOSIS — D68.51 FACTOR 5 LEIDEN MUTATION, HETEROZYGOUS: ICD-10-CM

## 2022-01-21 DIAGNOSIS — Z86.711 HISTORY OF PULMONARY EMBOLUS (PE): ICD-10-CM

## 2022-01-21 DIAGNOSIS — Z79.01 CHRONIC ANTICOAGULATION: ICD-10-CM

## 2022-01-21 LAB
INR PPP: 2.9
PROTHROMBIN TIME: 34.8 SECONDS (ref 11–15)

## 2022-01-21 PROCEDURE — 36416 COLLJ CAPILLARY BLOOD SPEC: CPT

## 2022-01-21 PROCEDURE — 85610 PROTHROMBIN TIME: CPT

## 2022-01-21 NOTE — NURSING NOTE
PT/INR reviewed and phoned with the results. His dose will now decrease to 10mg 5 days a week and 7.5 mg 2 days a week. F/U appt  2- at 1100 reviewed and verbalizes understanding.

## 2022-02-02 ENCOUNTER — HOSPITAL ENCOUNTER (OUTPATIENT)
Dept: MRI IMAGING | Facility: HOSPITAL | Age: 37
Discharge: HOME | End: 2022-02-02
Admitting: NEUROLOGICAL SURGERY

## 2022-02-02 DIAGNOSIS — C71.1 ANAPLASTIC ASTROCYTOMA OF FRONTAL LOBE: ICD-10-CM

## 2022-02-02 PROCEDURE — 0 GADOBENATE DIMEGLUMINE 529 MG/ML SOLUTION: Performed by: NEUROLOGICAL SURGERY

## 2022-02-02 PROCEDURE — A9577 INJ MULTIHANCE: HCPCS | Performed by: NEUROLOGICAL SURGERY

## 2022-02-02 PROCEDURE — 70553 MRI BRAIN STEM W/O & W/DYE: CPT

## 2022-02-02 RX ADMIN — GADOBENATE DIMEGLUMINE 17 ML: 529 INJECTION, SOLUTION INTRAVENOUS at 08:17

## 2022-02-03 NOTE — PROGRESS NOTES
Subjective   Patient ID: Bryan Valadez is a 36 y.o. male is here today for 4 month follow-up with a new MRI Brain that was done on 02.02.2022 at [facility].    Today Patient states that he is doing well overall. Patient denies HA's, dizziness, lightheadedness, visual change.     Patient, Provider, and MA are all wearing a mask in our office today.     History of Present Illness    This patient returns today.  He is doing well.  He has no particular complaints at all.  He has no headaches or other neurologic complaints.    The following portions of the patient's history were reviewed and updated as appropriate: allergies, current medications, past family history, past medical history, past social history, past surgical history and problem list.    Review of Systems   Constitutional: Negative for chills and fever.   HENT: Negative for congestion.    Eyes: Negative for visual disturbance.   Neurological: Negative for dizziness, facial asymmetry, light-headedness and headaches.       I have reviewed the review of systems as documented by my MA.      Objective     There were no vitals filed for this visit.  There is no height or weight on file to calculate BMI.      Physical Exam  Neurological:      Mental Status: He is alert and oriented to person, place, and time.       Neurologic Exam     Mental Status   Oriented to person, place, and time.           Assessment/Plan   Independent Review of Radiographic Studies:      I personally reviewed the images from the following studies.    I reviewed his MRI done on 2 February.  This shows no evidence of residual or recurrent tumor.    Medical Decision Making:      I told the patient we will scan him again in about 5 months.  He is in agreement with that plan.    Diagnoses and all orders for this visit:    1. Anaplastic astrocytoma of frontal lobe (HCC) (Primary)  -     MRI Brain With & Without Contrast; Future      Return in about 5 months (around 7/8/2022).

## 2022-02-08 ENCOUNTER — TELEPHONE (OUTPATIENT)
Dept: NEUROSURGERY | Facility: CLINIC | Age: 37
End: 2022-02-08

## 2022-02-08 ENCOUNTER — OFFICE VISIT (OUTPATIENT)
Dept: NEUROSURGERY | Facility: CLINIC | Age: 37
End: 2022-02-08

## 2022-02-08 DIAGNOSIS — C71.1 ANAPLASTIC ASTROCYTOMA OF FRONTAL LOBE: Primary | ICD-10-CM

## 2022-02-08 PROCEDURE — 99213 OFFICE O/P EST LOW 20 MIN: CPT | Performed by: NEUROLOGICAL SURGERY

## 2022-02-08 NOTE — TELEPHONE ENCOUNTER
Pt called after his office appt today. He wants to know if we can continue his original regimen of scans every 3 months and alternating between without contrast then with/without the next time.

## 2022-02-11 ENCOUNTER — CLINICAL SUPPORT (OUTPATIENT)
Dept: ONCOLOGY | Facility: HOSPITAL | Age: 37
End: 2022-02-11

## 2022-02-11 ENCOUNTER — LAB (OUTPATIENT)
Dept: OTHER | Facility: HOSPITAL | Age: 37
End: 2022-02-11

## 2022-02-11 VITALS
TEMPERATURE: 97.8 F | RESPIRATION RATE: 18 BRPM | WEIGHT: 193.8 LBS | SYSTOLIC BLOOD PRESSURE: 135 MMHG | BODY MASS INDEX: 26.25 KG/M2 | DIASTOLIC BLOOD PRESSURE: 88 MMHG | HEIGHT: 72 IN | HEART RATE: 73 BPM | OXYGEN SATURATION: 100 %

## 2022-02-11 DIAGNOSIS — D68.51 FACTOR 5 LEIDEN MUTATION, HETEROZYGOUS: ICD-10-CM

## 2022-02-11 DIAGNOSIS — Z86.711 HISTORY OF PULMONARY EMBOLUS (PE): ICD-10-CM

## 2022-02-11 DIAGNOSIS — Z79.01 CHRONIC ANTICOAGULATION: ICD-10-CM

## 2022-02-11 LAB
INR PPP: 4.4
INR PPP: 4.4 (ref 0.8–1.2)
PROTHROMBIN TIME: 49.5 SECONDS (ref 11–15)
PROTHROMBIN TIME: 49.5 SECONDS (ref 12.8–15.2)

## 2022-02-11 PROCEDURE — 85610 PROTHROMBIN TIME: CPT

## 2022-02-11 PROCEDURE — 85610 PROTHROMBIN TIME: CPT | Performed by: INTERNAL MEDICINE

## 2022-02-11 PROCEDURE — G0463 HOSPITAL OUTPT CLINIC VISIT: HCPCS

## 2022-02-11 NOTE — NURSING NOTE
INR 4.4 today.  Pt accidentally took double dose of coumadin last night (2) 10mg tabs last night for a total of 20mg instead of 10mg.  No bleeding issues or concerns.  He is normally consistent with his INR levels.    Reviewed with NICOLE Garcia.  Pt to take coumadin 7.5mg tonight as scheduled and then 7.5mg again tomorrow (Saturday). He is then to go back to his normal dosing of 7.5mg Mon/Fri and 10mg all other days.    Written info given and pt v/u. He can return next month as scheduled for INR check, but he is aware to call us before then with any bleeding concerns or other issues.

## 2022-03-03 ENCOUNTER — TELEPHONE (OUTPATIENT)
Dept: ONCOLOGY | Facility: CLINIC | Age: 37
End: 2022-03-03

## 2022-03-03 NOTE — TELEPHONE ENCOUNTER
Caller: MARK    Relationship to patient:  SELF    Best call back number: [phone]    Patient is needing: IS WANTING TO PUSH 4-8-2022 AND 5-6-2022 CBC LABS BY ONE WEEK.

## 2022-03-07 ENCOUNTER — TELEPHONE (OUTPATIENT)
Dept: ONCOLOGY | Facility: CLINIC | Age: 37
End: 2022-03-07

## 2022-03-07 NOTE — TELEPHONE ENCOUNTER
Caller: Bryan Valadez    Relationship: Self    Best call back number: [phone]    What is the best time to reach you: ASAP    Who are you requesting to speak with (clinical staff, provider,  specific staff member):     Do you know the name of the person who called:     What was the call regarding: PT WANTS TO R/S UPCOMING APPT    Do you require a callback: YES

## 2022-03-11 ENCOUNTER — APPOINTMENT (OUTPATIENT)
Dept: OTHER | Facility: HOSPITAL | Age: 37
End: 2022-03-11

## 2022-03-11 ENCOUNTER — APPOINTMENT (OUTPATIENT)
Dept: ONCOLOGY | Facility: HOSPITAL | Age: 37
End: 2022-03-11

## 2022-03-15 ENCOUNTER — TELEPHONE (OUTPATIENT)
Dept: ONCOLOGY | Facility: CLINIC | Age: 37
End: 2022-03-15

## 2022-03-15 DIAGNOSIS — I26.99 PULMONARY EMBOLISM WITH INFARCTION: ICD-10-CM

## 2022-03-15 RX ORDER — WARFARIN SODIUM 5 MG/1
TABLET ORAL
Qty: 180 TABLET | Refills: 2 | Status: SHIPPED | OUTPATIENT
Start: 2022-03-15 | End: 2022-05-20 | Stop reason: SDUPTHER

## 2022-03-15 NOTE — TELEPHONE ENCOUNTER
Caller: Bryan Valadez    Relationship: Self    Best call back number:[phone]  MAY CALL BACK ANYTIME AND LEAVE  IF NEEDED.    Requested Prescriptions: warfarin (COUMADIN) 5 MG tablet PATIENT HAS TWO DAYS LEFT AND IS LEAVING OUT OF TOWN SOON FOR [place].        Pharmacy where request should be sent: [id number] [place], [place] - [address] AT [address] - [phone]  - [phone] FX     Additional details provided by patient: PATIENT HAS TWO TABLETS LEFT AND IS LEAVING TOWN VERY SOON.  PATIENT NEEDS THIS CALLED IN TO THE [facility] ASAP.  ONCE COMPLETED PLEASE CONTACT PATIENT INFORMING PRESCRIPTION HAS BEEN CALLED IN.        Does the patient have less than a 3 day supply:  [x] Yes  [] No    DJC/Tanisha Cintron Rep   03/15/22 09:58 EDT

## 2022-03-21 ENCOUNTER — CLINICAL SUPPORT (OUTPATIENT)
Dept: ONCOLOGY | Facility: HOSPITAL | Age: 37
End: 2022-03-21

## 2022-03-21 ENCOUNTER — LAB (OUTPATIENT)
Dept: OTHER | Facility: HOSPITAL | Age: 37
End: 2022-03-21

## 2022-03-21 DIAGNOSIS — Z86.711 HISTORY OF PULMONARY EMBOLUS (PE): ICD-10-CM

## 2022-03-21 DIAGNOSIS — D68.51 FACTOR 5 LEIDEN MUTATION, HETEROZYGOUS: ICD-10-CM

## 2022-03-21 DIAGNOSIS — Z79.01 CHRONIC ANTICOAGULATION: ICD-10-CM

## 2022-03-21 LAB
INR PPP: 2.8
PROTHROMBIN TIME: 33 SECONDS (ref 11–15)

## 2022-03-21 PROCEDURE — 36416 COLLJ CAPILLARY BLOOD SPEC: CPT

## 2022-03-21 PROCEDURE — 85610 PROTHROMBIN TIME: CPT

## 2022-04-15 ENCOUNTER — LAB (OUTPATIENT)
Dept: OTHER | Facility: HOSPITAL | Age: 37
End: 2022-04-15

## 2022-04-15 ENCOUNTER — CLINICAL SUPPORT (OUTPATIENT)
Dept: ONCOLOGY | Facility: HOSPITAL | Age: 37
End: 2022-04-15

## 2022-04-15 ENCOUNTER — APPOINTMENT (OUTPATIENT)
Dept: ONCOLOGY | Facility: HOSPITAL | Age: 37
End: 2022-04-15

## 2022-04-15 ENCOUNTER — APPOINTMENT (OUTPATIENT)
Dept: OTHER | Facility: HOSPITAL | Age: 37
End: 2022-04-15

## 2022-04-15 DIAGNOSIS — Z79.01 CHRONIC ANTICOAGULATION: ICD-10-CM

## 2022-04-15 DIAGNOSIS — D68.51 FACTOR 5 LEIDEN MUTATION, HETEROZYGOUS: ICD-10-CM

## 2022-04-15 DIAGNOSIS — Z86.711 HISTORY OF PULMONARY EMBOLUS (PE): ICD-10-CM

## 2022-04-15 LAB
INR PPP: 3
PROTHROMBIN TIME: 35.5 SECONDS (ref 11–15)

## 2022-04-15 PROCEDURE — 36416 COLLJ CAPILLARY BLOOD SPEC: CPT

## 2022-04-15 PROCEDURE — 85610 PROTHROMBIN TIME: CPT

## 2022-04-15 PROCEDURE — 36415 COLL VENOUS BLD VENIPUNCTURE: CPT

## 2022-04-15 NOTE — NURSING NOTE
Called pt to review INR result. INR 3.0. Pt confirmed previous dosing of 65mg/week and denies any missed doses, new medications, bleeding or bruising. Per [facility], do to be reduced 5mg for the week. Pt requests reducing by 2.5mg for the week. Will take 7.5mg M/W/F and 10mg all other days. Pt will return as scheduled in early May. Encouraged him to call with any new changes or concerns. He v/u.

## 2022-04-26 ENCOUNTER — TELEPHONE (OUTPATIENT)
Dept: ONCOLOGY | Facility: CLINIC | Age: 37
End: 2022-04-26

## 2022-04-26 NOTE — TELEPHONE ENCOUNTER
PT HAS BEEN CALLED AND RESCHEDULED TO THE PT DATE THAT HE CAN COME AS DR VALADEZ IS OUT ON 5/13/22

## 2022-04-26 NOTE — TELEPHONE ENCOUNTER
----- Message from Sabrina Donaldson RN sent at 4/26/2022  8:01 AM EDT -----  Pt called to infusion area wanting to reschedule appt this Friday to next Friday.  He said after lunch would be best.  Please call pt to confirm [phone].  Thanks,  Sabrina

## 2022-04-26 NOTE — TELEPHONE ENCOUNTER
----- Message from Sabrina Donaldson RN sent at 4/26/2022  8:01 AM EDT -----  Pt called to infusion area wanting to reschedule appt this Friday to next Friday.  He said after lunch would be best.  Please call pt to confirm [phone].  Thanks,  Sabrina

## 2022-04-26 NOTE — TELEPHONE ENCOUNTER
PT HAS BEEN SCHEDULED ON 5/20/22 AS PT CANNOT MAKE HIS APPT ON 5/6/22 HE IS OUT OF TOWN - DR VALADEZ IS NOT IN ON 5/13/22 - SO THIS WAS THE NEXT APPT THAT THE PATIENT HAS APPROVED

## 2022-05-03 ENCOUNTER — HOSPITAL ENCOUNTER (OUTPATIENT)
Dept: MRI IMAGING | Facility: HOSPITAL | Age: 37
Discharge: HOME | End: 2022-05-03
Admitting: NEUROLOGICAL SURGERY

## 2022-05-03 DIAGNOSIS — C71.1 ANAPLASTIC ASTROCYTOMA OF FRONTAL LOBE: ICD-10-CM

## 2022-05-03 PROCEDURE — 0 GADOBENATE DIMEGLUMINE 529 MG/ML SOLUTION: Performed by: NEUROLOGICAL SURGERY

## 2022-05-03 PROCEDURE — A9577 INJ MULTIHANCE: HCPCS | Performed by: NEUROLOGICAL SURGERY

## 2022-05-03 PROCEDURE — 70553 MRI BRAIN STEM W/O & W/DYE: CPT

## 2022-05-03 RX ADMIN — GADOBENATE DIMEGLUMINE 17 ML: 529 INJECTION, SOLUTION INTRAVENOUS at 08:12

## 2022-05-06 ENCOUNTER — APPOINTMENT (OUTPATIENT)
Dept: OTHER | Facility: HOSPITAL | Age: 37
End: 2022-05-06

## 2022-05-15 DIAGNOSIS — J30.9 ALLERGIC RHINITIS: ICD-10-CM

## 2022-05-16 RX ORDER — MONTELUKAST SODIUM 10 MG/1
10 TABLET ORAL DAILY
Qty: 90 TABLET | Refills: 3 | Status: SHIPPED | OUTPATIENT
Start: 2022-05-16

## 2022-05-16 NOTE — PROGRESS NOTES
[facility] OUTPATIENT FOLLOW UP VISIT    REASON FOR FOLLOW-UP:    1.  Left lower extremity DVT with progression of symptoms and extension of the left lower extremity DVT into the femoral vein from the popliteal/calf vein swallowing for next set.  Currently anticoagulated with warfarin.  2.  Right lower extremity DVT noted in the common femoral, profunda femoral, and calf veins while anticoagulated with a DOAC.  Therefore, failure of DOAC.  3.  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  Other thrombophilia labs negative.    4.  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  NOT 1p19q co-deleted.    5.  Radiation initiated on 7/31/2018.  Plan for Temodar ×1 year following radiation.  6.  4500 cGy in 25 fractions administered from 7/31/2018 through 9/14/2018  7.  MRI brain 10/23/2018 with resolving hemorrhage in the resection cavity.  However, there is hyperintensity measuring 4.6 x 4.3 x 3 cm along the margins of the resection cavity.  8.  Repeat venous duplex on 10/23 with chronic right CVT and chronic LLE DVT from the mid femoral vein distally.     9.  He initiated adjuvant therapy with Temodar in mid October 2018.  10.  MRI brain 12/3/2018 with stable findings.  11.  On 1/24/2019 he did have a repeat resection by Dr. Galvan.  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  However, there was no evidence of progression to a higher grade in the new resected specimen.  Mostly the proliferative activity was very low with only rare mitoses and a low Ki-67 of just 2-3%.  12.  Temodar completed December 2019      HISTORY OF PRESENT ILLNESS:  Bryan Valadez is a 36 y.o. male who returns today for follow up of the above issues.     He continues to do very well.  He continues to travel a lot for work.  No new neurologic issues.  No bleeding.  He continues warfarin which he tolerates well and he has not  "missed any doses.    REVIEW OF SYSTEMS:  As per the HPI    PHYSICAL EXAMINATION:    Vitals:    05/20/22 0953   BP: 122/82   Pulse: 64   Resp: 16   Temp: 97.6 °F (36.4 °C)   TempSrc: Temporal   SpO2: 98%   Weight: 87.5 kg (192 lb 14.4 oz)   Height: 182.9 cm (72.01\")   PainSc: 0-No pain  Comment: PE     General:  No acute distress, awake, alert and oriented  Skin:  Warm and dry, no visible rash  HEENT:  Normocephalic/atraumatic.  Wearing a face mask.  Chest:  Normal respiratory effort  Extremities:  No visible clubbing, cyanosis, or edema  Neuro/psych:  Grossly nonfocal.  Normal mood and affect.        DIAGNOSTIC DATA:  CBC & Differential (05/20/2022 09:43)  Protime-INR, Fingerstick (05/20/2022 09:43)      IMAGING:  MRI Brain With & Without Contrast (05/03/2022 08:34)    MRI images personally reviewed  No change from prior.    ASSESSMENT:  This is a 36 y.o. 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   · INR 1.9 today.  Continue warfarin with a slight dose increase.    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 Galvan.  IDH mutated.  NOT 1p19q co-deleted.  Overall, this is a good molecular profile. He did initiate radiation alone on 7/31/2018 and completed it on 9/14/2018.   · He initiated adjuvant Temodar in mid October 2018.  · Due to persistent abnormalities on MRI, on 1/24/2019 he did have a repeat resection by Dr. Galvan.  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  However, there was no evidence of progression to a higher grade in the new resected specimen.  Mostly the proliferative activity was very low with only rare mitoses and a low Ki-67 of just 2-3%.  · MRI brain imaging from 9/23/2019 appeared stable.  · He completed 1 year of Temodar with an MRI on 12/12/2019 showing stable findings with some improvement in " the intensity of the abnormal uptake  · MRI brain 2/27/2020 stable..  · MRI brain 5/27/2020 stable.    · MRI brain 9/21/2020 stable.  · MRI 1/7/2021 stable  · MRI 5/10/2021 without contrast stable  · MRI brain 9/30/21 without contrast stable  · MRI brain 2/2/22 with and without contrast stable  · MRI brain 5/3/22 with and without contrast stable      PLAN:   1. Continue warfarin with a slight dose increase today  2. Monthly INR.  3. MRI imaging of the brain without contrast per Dr. Hidalgo at this point  4. I will see him back in 6 months with a CBC and INR and I encouraged him to notify us if he needs us prior to that.

## 2022-05-19 ENCOUNTER — OFFICE VISIT (OUTPATIENT)
Dept: NEUROSURGERY | Facility: CLINIC | Age: 37
End: 2022-05-19

## 2022-05-19 VITALS
OXYGEN SATURATION: 97 % | BODY MASS INDEX: 26.25 KG/M2 | HEIGHT: 72 IN | TEMPERATURE: 98 F | DIASTOLIC BLOOD PRESSURE: 80 MMHG | SYSTOLIC BLOOD PRESSURE: 129 MMHG | WEIGHT: 193.8 LBS | HEART RATE: 80 BPM

## 2022-05-19 DIAGNOSIS — C71.9 ASTROCYTOMA BRAIN TUMOR: Primary | ICD-10-CM

## 2022-05-19 PROCEDURE — 99213 OFFICE O/P EST LOW 20 MIN: CPT | Performed by: NEUROLOGICAL SURGERY

## 2022-05-19 NOTE — PROGRESS NOTES
"Subjective   Patient ID: Bryan Valadez is a 36 y.o. male is here today for 5 month follow-up with a new MRI Brain done on 05.03.2022 at [facility].    Today patient states that he feels well overall. Patient denies HA's, lightheadedness, dizziness, visual changes.     Patient, Provider, and MA are all wearing a mask in our office today.     History of Present Illness    This patient returns today.  He is doing pretty well overall.  He really has no particular complaints.    The following portions of the patient's history were reviewed and updated as appropriate: allergies, current medications, past family history, past medical history, past social history, past surgical history and problem list.    Review of Systems   Constitutional: Negative for chills and fever.   HENT: Negative for congestion.    Eyes: Negative for photophobia and visual disturbance.   Neurological: Negative for dizziness, seizures and headaches.       I have reviewed the review of systems as documented by my MA.      Objective     Vitals:    05/19/22 0832   BP: 129/80   Cuff Size: Adult   Pulse: 80   Temp: 98 °F (36.7 °C)   SpO2: 97%   Weight: 87.9 kg (193 lb 12.8 oz)   Height: 182.9 cm (72\")     Body mass index is 26.28 kg/m².      Physical Exam  Neurological:      Mental Status: He is alert and oriented to person, place, and time.       Neurologic Exam     Mental Status   Oriented to person, place, and time.           Assessment & Plan   Independent Review of Radiographic Studies:      I personally reviewed the images from the following studies.    I reviewed his MRI done on 3 May of this year.  This shows no evidence of recurrent or residual tumor.  There is no enhancement suggesting malignant degeneration.  There is some microvascular disease Abely is secondary effect of treatment with radiation.    Medical Decision Making:      Told the patient that we will scan him again in about 3 months and see him back after that.  I do not think we " need contrast as there is no enhancement right now anyway.    Diagnoses and all orders for this visit:    1. Astrocytoma brain tumor (HCC) (Primary)  -     MRI Brain Without Contrast; Future      Return in about 3 months (around 8/19/2022).

## 2022-05-20 ENCOUNTER — OFFICE VISIT (OUTPATIENT)
Dept: ONCOLOGY | Facility: CLINIC | Age: 37
End: 2022-05-20

## 2022-05-20 ENCOUNTER — LAB (OUTPATIENT)
Dept: OTHER | Facility: HOSPITAL | Age: 37
End: 2022-05-20

## 2022-05-20 VITALS
OXYGEN SATURATION: 98 % | HEART RATE: 64 BPM | WEIGHT: 192.9 LBS | RESPIRATION RATE: 16 BRPM | SYSTOLIC BLOOD PRESSURE: 122 MMHG | HEIGHT: 72 IN | TEMPERATURE: 97.6 F | BODY MASS INDEX: 26.13 KG/M2 | DIASTOLIC BLOOD PRESSURE: 82 MMHG

## 2022-05-20 DIAGNOSIS — D68.51 FACTOR 5 LEIDEN MUTATION, HETEROZYGOUS: Primary | ICD-10-CM

## 2022-05-20 DIAGNOSIS — Z86.711 HISTORY OF PULMONARY EMBOLUS (PE): ICD-10-CM

## 2022-05-20 DIAGNOSIS — Z79.01 CHRONIC ANTICOAGULATION: ICD-10-CM

## 2022-05-20 DIAGNOSIS — C71.1 ANAPLASTIC ASTROCYTOMA OF FRONTAL LOBE: ICD-10-CM

## 2022-05-20 DIAGNOSIS — E78.5 HYPERLIPIDEMIA, UNSPECIFIED HYPERLIPIDEMIA TYPE: Primary | ICD-10-CM

## 2022-05-20 DIAGNOSIS — I26.99 PULMONARY EMBOLISM WITH INFARCTION: ICD-10-CM

## 2022-05-20 DIAGNOSIS — D68.51 FACTOR 5 LEIDEN MUTATION, HETEROZYGOUS: ICD-10-CM

## 2022-05-20 LAB
BASOPHILS # BLD AUTO: 0.03 10*3/MM3 (ref 0–0.2)
BASOPHILS NFR BLD AUTO: 0.6 % (ref 0–1.5)
DEPRECATED RDW RBC AUTO: 39.2 FL (ref 37–54)
EOSINOPHIL # BLD AUTO: 0.09 10*3/MM3 (ref 0–0.4)
EOSINOPHIL NFR BLD AUTO: 1.7 % (ref 0.3–6.2)
ERYTHROCYTE [DISTWIDTH] IN BLOOD BY AUTOMATED COUNT: 12.2 % (ref 12.3–15.4)
HCT VFR BLD AUTO: 44 % (ref 37.5–51)
HGB BLD-MCNC: 15.2 G/DL (ref 13–17.7)
IMM GRANULOCYTES # BLD AUTO: 0.05 10*3/MM3 (ref 0–0.05)
IMM GRANULOCYTES NFR BLD AUTO: 0.9 % (ref 0–0.5)
INR PPP: 1.9
LYMPHOCYTES # BLD AUTO: 1.81 10*3/MM3 (ref 0.7–3.1)
LYMPHOCYTES NFR BLD AUTO: 33.7 % (ref 19.6–45.3)
MCH RBC QN AUTO: 30.3 PG (ref 26.6–33)
MCHC RBC AUTO-ENTMCNC: 34.5 G/DL (ref 31.5–35.7)
MCV RBC AUTO: 87.8 FL (ref 79–97)
MONOCYTES # BLD AUTO: 0.63 10*3/MM3 (ref 0.1–0.9)
MONOCYTES NFR BLD AUTO: 11.7 % (ref 5–12)
NEUTROPHILS NFR BLD AUTO: 2.76 10*3/MM3 (ref 1.7–7)
NEUTROPHILS NFR BLD AUTO: 51.4 % (ref 42.7–76)
NRBC BLD AUTO-RTO: 0 /100 WBC (ref 0–0.2)
PLAT MORPH BLD: NORMAL
PLATELET # BLD AUTO: 213 10*3/MM3 (ref 140–450)
PMV BLD AUTO: 10.1 FL (ref 6–12)
PROTHROMBIN TIME: 22.2 SECONDS (ref 11–15)
RBC # BLD AUTO: 5.01 10*6/MM3 (ref 4.14–5.8)
RBC MORPH BLD: NORMAL
WBC MORPH BLD: NORMAL
WBC NRBC COR # BLD: 5.37 10*3/MM3 (ref 3.4–10.8)

## 2022-05-20 PROCEDURE — 36415 COLL VENOUS BLD VENIPUNCTURE: CPT

## 2022-05-20 PROCEDURE — 85610 PROTHROMBIN TIME: CPT

## 2022-05-20 PROCEDURE — 99214 OFFICE O/P EST MOD 30 MIN: CPT | Performed by: INTERNAL MEDICINE

## 2022-05-20 PROCEDURE — 85007 BL SMEAR W/DIFF WBC COUNT: CPT | Performed by: INTERNAL MEDICINE

## 2022-05-20 PROCEDURE — 85025 COMPLETE CBC W/AUTO DIFF WBC: CPT | Performed by: INTERNAL MEDICINE

## 2022-05-20 RX ORDER — WARFARIN SODIUM 5 MG/1
TABLET ORAL
Qty: 180 TABLET | Refills: 2 | Status: SHIPPED | OUTPATIENT
Start: 2022-05-20 | End: 2023-02-21

## 2022-06-17 ENCOUNTER — LAB (OUTPATIENT)
Dept: OTHER | Facility: HOSPITAL | Age: 37
End: 2022-06-17

## 2022-06-17 ENCOUNTER — APPOINTMENT (OUTPATIENT)
Dept: ONCOLOGY | Facility: HOSPITAL | Age: 37
End: 2022-06-17

## 2022-06-17 ENCOUNTER — APPOINTMENT (OUTPATIENT)
Dept: OTHER | Facility: HOSPITAL | Age: 37
End: 2022-06-17

## 2022-06-17 ENCOUNTER — CLINICAL SUPPORT (OUTPATIENT)
Dept: ONCOLOGY | Facility: HOSPITAL | Age: 37
End: 2022-06-17

## 2022-06-17 DIAGNOSIS — D68.51 FACTOR 5 LEIDEN MUTATION, HETEROZYGOUS: ICD-10-CM

## 2022-06-17 DIAGNOSIS — Z86.711 HISTORY OF PULMONARY EMBOLUS (PE): ICD-10-CM

## 2022-06-17 DIAGNOSIS — Z79.01 CHRONIC ANTICOAGULATION: ICD-10-CM

## 2022-06-17 LAB
INR PPP: 3.9
PROTHROMBIN TIME: 46.6 SECONDS (ref 11–15)

## 2022-06-17 PROCEDURE — G0463 HOSPITAL OUTPT CLINIC VISIT: HCPCS

## 2022-06-17 PROCEDURE — 85610 PROTHROMBIN TIME: CPT

## 2022-06-17 NOTE — NURSING NOTE
Patient here for PT/INR.  INR 3.9.  Patient states that he has been taking 7.5mg on Mondays, Wednesdays, Fridays and 10mg all other days.  Patient states that he took 10mg on Wednesday instead of 7.5mg.  Reviewed INR and dosing with Dr. Jefferson.  Per Dr Jefferson- patient to take 7.5mg Monday-Friday and 10mg on Saturdays and Sundays.  Reviewed dose with patient.  Advised patient to contact office sooner than scheduled 07/2022 appt with any questions/concerns.  Patient v/u and was discharged in stable condition.

## 2022-07-06 ENCOUNTER — APPOINTMENT (OUTPATIENT)
Dept: ONCOLOGY | Facility: HOSPITAL | Age: 37
End: 2022-07-06

## 2022-07-06 ENCOUNTER — APPOINTMENT (OUTPATIENT)
Dept: OTHER | Facility: HOSPITAL | Age: 37
End: 2022-07-06

## 2022-07-07 ENCOUNTER — LAB (OUTPATIENT)
Dept: OTHER | Facility: HOSPITAL | Age: 37
End: 2022-07-07

## 2022-07-07 ENCOUNTER — CLINICAL SUPPORT (OUTPATIENT)
Dept: ONCOLOGY | Facility: HOSPITAL | Age: 37
End: 2022-07-07

## 2022-07-07 DIAGNOSIS — Z86.711 HISTORY OF PULMONARY EMBOLUS (PE): ICD-10-CM

## 2022-07-07 DIAGNOSIS — D68.51 FACTOR 5 LEIDEN MUTATION, HETEROZYGOUS: Primary | ICD-10-CM

## 2022-07-07 DIAGNOSIS — Z79.01 CHRONIC ANTICOAGULATION: ICD-10-CM

## 2022-07-07 LAB
BASOPHILS # BLD AUTO: 0.05 10*3/MM3 (ref 0–0.2)
BASOPHILS NFR BLD AUTO: 0.8 % (ref 0–1.5)
DEPRECATED RDW RBC AUTO: 39.6 FL (ref 37–54)
EOSINOPHIL # BLD AUTO: 0.27 10*3/MM3 (ref 0–0.4)
EOSINOPHIL NFR BLD AUTO: 4.4 % (ref 0.3–6.2)
ERYTHROCYTE [DISTWIDTH] IN BLOOD BY AUTOMATED COUNT: 12.2 % (ref 12.3–15.4)
HCT VFR BLD AUTO: 47.6 % (ref 37.5–51)
HGB BLD-MCNC: 16.4 G/DL (ref 13–17.7)
IMM GRANULOCYTES # BLD AUTO: 0.03 10*3/MM3 (ref 0–0.05)
IMM GRANULOCYTES NFR BLD AUTO: 0.5 % (ref 0–0.5)
INR PPP: 4.1
LYMPHOCYTES # BLD AUTO: 2.03 10*3/MM3 (ref 0.7–3.1)
LYMPHOCYTES NFR BLD AUTO: 32.9 % (ref 19.6–45.3)
MCH RBC QN AUTO: 30.3 PG (ref 26.6–33)
MCHC RBC AUTO-ENTMCNC: 34.5 G/DL (ref 31.5–35.7)
MCV RBC AUTO: 88 FL (ref 79–97)
MONOCYTES # BLD AUTO: 0.57 10*3/MM3 (ref 0.1–0.9)
MONOCYTES NFR BLD AUTO: 9.2 % (ref 5–12)
NEUTROPHILS NFR BLD AUTO: 3.22 10*3/MM3 (ref 1.7–7)
NEUTROPHILS NFR BLD AUTO: 52.2 % (ref 42.7–76)
NRBC BLD AUTO-RTO: 0 /100 WBC (ref 0–0.2)
PLATELET # BLD AUTO: 229 10*3/MM3 (ref 140–450)
PMV BLD AUTO: 10.1 FL (ref 6–12)
PROTHROMBIN TIME: 48.7 SECONDS (ref 11–15)
RBC # BLD AUTO: 5.41 10*6/MM3 (ref 4.14–5.8)
WBC NRBC COR # BLD: 6.17 10*3/MM3 (ref 3.4–10.8)

## 2022-07-07 PROCEDURE — 36415 COLL VENOUS BLD VENIPUNCTURE: CPT

## 2022-07-07 PROCEDURE — G0463 HOSPITAL OUTPT CLINIC VISIT: HCPCS

## 2022-07-07 PROCEDURE — 85610 PROTHROMBIN TIME: CPT

## 2022-07-07 PROCEDURE — 85025 COMPLETE CBC W/AUTO DIFF WBC: CPT | Performed by: INTERNAL MEDICINE

## 2022-07-07 NOTE — NURSING NOTE
Reviewed INR 4.1 with GERI Davies and that pt diet has increased salads and he plans to continue.  Coumadin adjusted per SS but 5mg to be taken today and 7.5mg all other days.  Pt will return in 2 weeks since he will be out of town next week.  Pt instructed to call sooner with concerns and v/u.

## 2022-07-08 DIAGNOSIS — C71.1 ANAPLASTIC ASTROCYTOMA OF FRONTAL LOBE: ICD-10-CM

## 2022-07-08 NOTE — TELEPHONE ENCOUNTER
Rx Refill Note  Requested Prescriptions     Pending Prescriptions Disp Refills   • levETIRAcetam (KEPPRA) 1000 MG tablet [Pharmacy Med Name: LEVETIRACETAM 1000MG TABLETS] 60 tablet 11     Sig: TAKE 1 TABLET BY MOUTH EVERY 12 HOURS      Last office visit with prescribing clinician: 5/19/2022      Next office visit with prescribing clinician: 8/18/2022            Ivy Rodgers MA  07/08/22, 10:45 EDT

## 2022-07-12 RX ORDER — LEVETIRACETAM 1000 MG/1
TABLET ORAL
Qty: 60 TABLET | Refills: 11 | Status: SHIPPED | OUTPATIENT
Start: 2022-07-12

## 2022-07-21 ENCOUNTER — CLINICAL SUPPORT (OUTPATIENT)
Dept: ONCOLOGY | Facility: HOSPITAL | Age: 37
End: 2022-07-21

## 2022-07-21 ENCOUNTER — LAB (OUTPATIENT)
Dept: OTHER | Facility: HOSPITAL | Age: 37
End: 2022-07-21

## 2022-07-21 DIAGNOSIS — Z79.01 CHRONIC ANTICOAGULATION: ICD-10-CM

## 2022-07-21 DIAGNOSIS — Z86.711 HISTORY OF PULMONARY EMBOLUS (PE): ICD-10-CM

## 2022-07-21 DIAGNOSIS — D68.51 FACTOR 5 LEIDEN MUTATION, HETEROZYGOUS: ICD-10-CM

## 2022-07-21 LAB
BASOPHILS # BLD AUTO: 0.04 10*3/MM3 (ref 0–0.2)
BASOPHILS NFR BLD AUTO: 0.7 % (ref 0–1.5)
DEPRECATED RDW RBC AUTO: 39.5 FL (ref 37–54)
EOSINOPHIL # BLD AUTO: 0.22 10*3/MM3 (ref 0–0.4)
EOSINOPHIL NFR BLD AUTO: 3.8 % (ref 0.3–6.2)
ERYTHROCYTE [DISTWIDTH] IN BLOOD BY AUTOMATED COUNT: 12.2 % (ref 12.3–15.4)
HCT VFR BLD AUTO: 46.3 % (ref 37.5–51)
HGB BLD-MCNC: 16.1 G/DL (ref 13–17.7)
IMM GRANULOCYTES # BLD AUTO: 0.04 10*3/MM3 (ref 0–0.05)
IMM GRANULOCYTES NFR BLD AUTO: 0.7 % (ref 0–0.5)
INR PPP: 2
LYMPHOCYTES # BLD AUTO: 1.87 10*3/MM3 (ref 0.7–3.1)
LYMPHOCYTES NFR BLD AUTO: 32.3 % (ref 19.6–45.3)
MCH RBC QN AUTO: 30.7 PG (ref 26.6–33)
MCHC RBC AUTO-ENTMCNC: 34.8 G/DL (ref 31.5–35.7)
MCV RBC AUTO: 88.4 FL (ref 79–97)
MONOCYTES # BLD AUTO: 0.56 10*3/MM3 (ref 0.1–0.9)
MONOCYTES NFR BLD AUTO: 9.7 % (ref 5–12)
NEUTROPHILS NFR BLD AUTO: 3.06 10*3/MM3 (ref 1.7–7)
NEUTROPHILS NFR BLD AUTO: 52.8 % (ref 42.7–76)
NRBC BLD AUTO-RTO: 0 /100 WBC (ref 0–0.2)
PLATELET # BLD AUTO: 215 10*3/MM3 (ref 140–450)
PMV BLD AUTO: 10.3 FL (ref 6–12)
PROTHROMBIN TIME: 24.1 SECONDS (ref 11–15)
RBC # BLD AUTO: 5.24 10*6/MM3 (ref 4.14–5.8)
WBC NRBC COR # BLD: 5.79 10*3/MM3 (ref 3.4–10.8)

## 2022-07-21 PROCEDURE — 85610 PROTHROMBIN TIME: CPT

## 2022-07-21 PROCEDURE — 85025 COMPLETE CBC W/AUTO DIFF WBC: CPT | Performed by: INTERNAL MEDICINE

## 2022-07-21 PROCEDURE — 36415 COLL VENOUS BLD VENIPUNCTURE: CPT

## 2022-08-05 ENCOUNTER — LAB (OUTPATIENT)
Dept: OTHER | Facility: HOSPITAL | Age: 37
End: 2022-08-05

## 2022-08-05 ENCOUNTER — CLINICAL SUPPORT (OUTPATIENT)
Dept: ONCOLOGY | Facility: HOSPITAL | Age: 37
End: 2022-08-05

## 2022-08-05 VITALS
OXYGEN SATURATION: 98 % | BODY MASS INDEX: 27.3 KG/M2 | HEIGHT: 71 IN | DIASTOLIC BLOOD PRESSURE: 81 MMHG | RESPIRATION RATE: 18 BRPM | WEIGHT: 195 LBS | HEART RATE: 60 BPM | TEMPERATURE: 98.4 F | SYSTOLIC BLOOD PRESSURE: 118 MMHG

## 2022-08-05 DIAGNOSIS — Z86.711 HISTORY OF PULMONARY EMBOLUS (PE): ICD-10-CM

## 2022-08-05 DIAGNOSIS — D68.51 FACTOR 5 LEIDEN MUTATION, HETEROZYGOUS: ICD-10-CM

## 2022-08-05 DIAGNOSIS — Z79.01 CHRONIC ANTICOAGULATION: ICD-10-CM

## 2022-08-05 LAB
INR PPP: 1.5
PROTHROMBIN TIME: 17.7 SECONDS (ref 11–15)

## 2022-08-05 PROCEDURE — G0463 HOSPITAL OUTPT CLINIC VISIT: HCPCS

## 2022-08-05 PROCEDURE — 85610 PROTHROMBIN TIME: CPT

## 2022-08-05 NOTE — NURSING NOTE
Patient here for PT/INR.  INR today 1.5.  Patient states that he has been taking 7.5mg daily without complications.  Per [facility], patient's dose to be increased to 10mg 4x weekly, 7.5mg 3x weekly.  Reviewed with Stephany Garcia NP.  Per NP- patient to instead take 10mg on Fridays and 7.5mg all other days, repeat PT/INR in 1 week.  Reviewed plan with patient.  Patient states that he will be out of town from 08/08-08/12.  Advised patient to come to office on 08/15/2022 for recheck and to contact office sooner with any new complaints.  Patient v/u and was discharged in stable condition.

## 2022-08-08 ENCOUNTER — TELEPHONE (OUTPATIENT)
Dept: ONCOLOGY | Facility: CLINIC | Age: 37
End: 2022-08-08

## 2022-08-08 NOTE — TELEPHONE ENCOUNTER
Caller: Bryan Valadez    Relationship to patient: Self    Best call back number: [phone]    Type of visit: LAB AND RN REVIEW    Requested date: SAME DAY BUT 10:30    If rescheduling, when is the original appointment: 08/15    Additional notes: PLEASE CALL ONCE R/S

## 2022-08-15 ENCOUNTER — CLINICAL SUPPORT (OUTPATIENT)
Dept: ONCOLOGY | Facility: HOSPITAL | Age: 37
End: 2022-08-15

## 2022-08-15 ENCOUNTER — LAB (OUTPATIENT)
Dept: OTHER | Facility: HOSPITAL | Age: 37
End: 2022-08-15

## 2022-08-15 ENCOUNTER — APPOINTMENT (OUTPATIENT)
Dept: OTHER | Facility: HOSPITAL | Age: 37
End: 2022-08-15

## 2022-08-15 ENCOUNTER — APPOINTMENT (OUTPATIENT)
Dept: ONCOLOGY | Facility: HOSPITAL | Age: 37
End: 2022-08-15

## 2022-08-15 ENCOUNTER — TELEPHONE (OUTPATIENT)
Dept: ONCOLOGY | Facility: CLINIC | Age: 37
End: 2022-08-15

## 2022-08-15 DIAGNOSIS — Z79.01 CHRONIC ANTICOAGULATION: ICD-10-CM

## 2022-08-15 DIAGNOSIS — Z86.711 HISTORY OF PULMONARY EMBOLUS (PE): ICD-10-CM

## 2022-08-15 DIAGNOSIS — D68.51 FACTOR 5 LEIDEN MUTATION, HETEROZYGOUS: ICD-10-CM

## 2022-08-15 LAB
INR PPP: 1.5
PROTHROMBIN TIME: 18.4 SECONDS (ref 11–15)

## 2022-08-15 PROCEDURE — G0463 HOSPITAL OUTPT CLINIC VISIT: HCPCS

## 2022-08-15 PROCEDURE — 85610 PROTHROMBIN TIME: CPT

## 2022-08-15 NOTE — TELEPHONE ENCOUNTER
----- Message from Horace Orlando RN sent at 8/15/2022 10:50 AM EDT -----  Please reschedule pt's lab and RN review on 9/2 to 8/29 at 1030 and call pt to confirm.    Thank you!

## 2022-08-15 NOTE — NURSING NOTE
Pt here for INR and RN review. INR 1.5. Pt confirmed previous dosing, denies missed doses, or new medications. Pt stated he has been taking Vital Protein in the morning in place of coffee. Per [employer] there is no interaction with that and coumadin. INR placed in [facility]. Pt will now take 10 mg Mon and Fri and 7.5 mg AOD. Pt denied a copy of dosing instructions and v/u. Will move pt's next scheduled appt up to 8/29 to recheck INR, pt v/u.

## 2022-08-22 ENCOUNTER — TELEPHONE (OUTPATIENT)
Dept: ONCOLOGY | Facility: CLINIC | Age: 37
End: 2022-08-22

## 2022-08-22 NOTE — TELEPHONE ENCOUNTER
Caller: MARK    Relationship to patient: SELF    Best call back number: [phone]    Patient is needing: TO CHANGE 8- LAB AND RN REVIEW AT EP TO 8-.

## 2022-08-29 ENCOUNTER — APPOINTMENT (OUTPATIENT)
Dept: OTHER | Facility: HOSPITAL | Age: 37
End: 2022-08-29

## 2022-08-29 ENCOUNTER — APPOINTMENT (OUTPATIENT)
Dept: ONCOLOGY | Facility: HOSPITAL | Age: 37
End: 2022-08-29

## 2022-08-30 ENCOUNTER — CLINICAL SUPPORT (OUTPATIENT)
Dept: ONCOLOGY | Facility: HOSPITAL | Age: 37
End: 2022-08-30

## 2022-08-30 ENCOUNTER — LAB (OUTPATIENT)
Dept: OTHER | Facility: HOSPITAL | Age: 37
End: 2022-08-30

## 2022-08-30 DIAGNOSIS — Z79.01 CHRONIC ANTICOAGULATION: ICD-10-CM

## 2022-08-30 DIAGNOSIS — Z86.711 HISTORY OF PULMONARY EMBOLUS (PE): ICD-10-CM

## 2022-08-30 DIAGNOSIS — D68.51 FACTOR 5 LEIDEN MUTATION, HETEROZYGOUS: Primary | ICD-10-CM

## 2022-08-30 LAB
INR PPP: 2.9
PROTHROMBIN TIME: 35.1 SECONDS (ref 11–15)

## 2022-08-30 PROCEDURE — G0463 HOSPITAL OUTPT CLINIC VISIT: HCPCS

## 2022-08-30 PROCEDURE — 85610 PROTHROMBIN TIME: CPT

## 2022-09-26 ENCOUNTER — TELEPHONE (OUTPATIENT)
Dept: ONCOLOGY | Facility: CLINIC | Age: 37
End: 2022-09-26

## 2022-09-26 NOTE — TELEPHONE ENCOUNTER
Caller: GILLIAN    Relationship to patient: Self    Best call back number: [phone]    Chief complaint: CANC./FERNANDA. DUE TO BEING OUT OF TOWN.    Type of visit: LAB/RN REVIEW    Requested date: 9/27/2022 AS HE WILL BE IN TOWN & IS OUT OF TOWN A LOT.    If rescheduling, when is the original appointment: 10/4/2022

## 2022-09-27 ENCOUNTER — TELEPHONE (OUTPATIENT)
Dept: ONCOLOGY | Facility: CLINIC | Age: 37
End: 2022-09-27

## 2022-09-27 ENCOUNTER — CLINICAL SUPPORT (OUTPATIENT)
Dept: ONCOLOGY | Facility: HOSPITAL | Age: 37
End: 2022-09-27

## 2022-09-27 ENCOUNTER — LAB (OUTPATIENT)
Dept: OTHER | Facility: HOSPITAL | Age: 37
End: 2022-09-27

## 2022-09-27 DIAGNOSIS — D68.51 FACTOR 5 LEIDEN MUTATION, HETEROZYGOUS: Primary | ICD-10-CM

## 2022-09-27 LAB
BASOPHILS # BLD AUTO: 0.03 10*3/MM3 (ref 0–0.2)
BASOPHILS NFR BLD AUTO: 0.5 % (ref 0–1.5)
DEPRECATED RDW RBC AUTO: 39.7 FL (ref 37–54)
EOSINOPHIL # BLD AUTO: 0.29 10*3/MM3 (ref 0–0.4)
EOSINOPHIL NFR BLD AUTO: 4.4 % (ref 0.3–6.2)
ERYTHROCYTE [DISTWIDTH] IN BLOOD BY AUTOMATED COUNT: 12.1 % (ref 12.3–15.4)
HCT VFR BLD AUTO: 46.2 % (ref 37.5–51)
HGB BLD-MCNC: 15.6 G/DL (ref 13–17.7)
IMM GRANULOCYTES # BLD AUTO: 0.03 10*3/MM3 (ref 0–0.05)
IMM GRANULOCYTES NFR BLD AUTO: 0.5 % (ref 0–0.5)
INR PPP: 33.5
LYMPHOCYTES # BLD AUTO: 1.64 10*3/MM3 (ref 0.7–3.1)
LYMPHOCYTES NFR BLD AUTO: 24.6 % (ref 19.6–45.3)
MCH RBC QN AUTO: 30.1 PG (ref 26.6–33)
MCHC RBC AUTO-ENTMCNC: 33.8 G/DL (ref 31.5–35.7)
MCV RBC AUTO: 89 FL (ref 79–97)
MONOCYTES # BLD AUTO: 0.53 10*3/MM3 (ref 0.1–0.9)
MONOCYTES NFR BLD AUTO: 8 % (ref 5–12)
NEUTROPHILS NFR BLD AUTO: 4.14 10*3/MM3 (ref 1.7–7)
NEUTROPHILS NFR BLD AUTO: 62 % (ref 42.7–76)
NRBC BLD AUTO-RTO: 0 /100 WBC (ref 0–0.2)
PLATELET # BLD AUTO: 240 10*3/MM3 (ref 140–450)
PMV BLD AUTO: 10 FL (ref 6–12)
PROTHROMBIN TIME: 2.8 SECONDS (ref 11–15)
RBC # BLD AUTO: 5.19 10*6/MM3 (ref 4.14–5.8)
WBC NRBC COR # BLD: 6.66 10*3/MM3 (ref 3.4–10.8)

## 2022-09-27 PROCEDURE — 36415 COLL VENOUS BLD VENIPUNCTURE: CPT

## 2022-09-27 PROCEDURE — 36416 COLLJ CAPILLARY BLOOD SPEC: CPT

## 2022-09-27 PROCEDURE — 85610 PROTHROMBIN TIME: CPT

## 2022-09-27 PROCEDURE — 85025 COMPLETE CBC W/AUTO DIFF WBC: CPT | Performed by: INTERNAL MEDICINE

## 2022-09-27 NOTE — TELEPHONE ENCOUNTER
Caller: Mark Valadez    Relationship: Self    Best call back number: [phone]    Who are you requesting to speak with (clinical staff, provider,  specific staff member): ANGELINE AT [place]    What was the call regarding: MARK WAS RETURNING ANGELINE [facility]ENDARIZ [facility] CALL.    Do you require a callback:YES

## 2022-09-27 NOTE — NURSING NOTE
Called pt to review INR results. No answer; left message for pt to return call.   Pt returned call. INR 2.8. Confirmed previous dosing and pt denies any missed doses or new medications. No complaints voiced. Per protocol, dose to stay the same- 10mg Sun/Thurs and 7.5mg all other days. Pt v/u.

## 2022-10-04 ENCOUNTER — APPOINTMENT (OUTPATIENT)
Dept: OTHER | Facility: HOSPITAL | Age: 37
End: 2022-10-04

## 2022-10-04 ENCOUNTER — APPOINTMENT (OUTPATIENT)
Dept: ONCOLOGY | Facility: HOSPITAL | Age: 37
End: 2022-10-04

## 2022-10-04 ENCOUNTER — TELEPHONE (OUTPATIENT)
Dept: ONCOLOGY | Facility: CLINIC | Age: 37
End: 2022-10-04

## 2022-10-04 NOTE — TELEPHONE ENCOUNTER
Caller: Bryan Valadez    Relationship to patient: Self    Best call back number: [phone]    Chief complaint: PATIENT CALLED TO CANCEL APPOINTMENT STATED HE HAD HIS INR REVIEW LAST WEEK AND IT WAS GOOD. AND WAS SUPPOSED TO HAVE BEEN CANCELED      Type of visit: LAB AND INR REVIEW         If rescheduling, when is the original appointment: 10-4-22     Additional notes:CALL PATIENT IF ANY QUESTIONS

## 2022-10-25 DIAGNOSIS — C71.1 ANAPLASTIC ASTROCYTOMA OF FRONTAL LOBE: Primary | ICD-10-CM

## 2022-10-26 NOTE — PROGRESS NOTES
[facility] OUTPATIENT FOLLOW UP VISIT    REASON FOR FOLLOW-UP:    1.  Left lower extremity DVT with progression of symptoms and extension of the left lower extremity DVT into the femoral vein from the popliteal/calf vein swallowing for next set.  Currently anticoagulated with warfarin.  2.  Right lower extremity DVT noted in the common femoral, profunda femoral, and calf veins while anticoagulated with a DOAC.  Therefore, failure of DOAC.  3.  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  Other thrombophilia labs negative.    4.  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  NOT 1p19q co-deleted.    5.  Radiation initiated on 7/31/2018.  Plan for Temodar ×1 year following radiation.  6.  4500 cGy in 25 fractions administered from 7/31/2018 through 9/14/2018  7.  MRI brain 10/23/2018 with resolving hemorrhage in the resection cavity.  However, there is hyperintensity measuring 4.6 x 4.3 x 3 cm along the margins of the resection cavity.  8.  Repeat venous duplex on 10/23 with chronic right CVT and chronic LLE DVT from the mid femoral vein distally.     9.  He initiated adjuvant therapy with Temodar in mid October 2018.  10.  MRI brain 12/3/2018 with stable findings.  11.  On 1/24/2019 he did have a repeat resection by Dr. Galvan.  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  However, there was no evidence of progression to a higher grade in the new resected specimen.  Mostly the proliferative activity was very low with only rare mitoses and a low Ki-67 of just 2-3%.  12.  Temodar completed December 2019      HISTORY OF PRESENT ILLNESS:  Bryna Valadez is a 37 y.o. male who returns today for follow up of the above issues.     He continues to do well.  He continues warfarin without bleeding.  He is now seeing Dr. Bustamante with neurosurgery at the [facility].  He had an MRI in August and  "has another one scheduled in a few weeks.    REVIEW OF SYSTEMS:  As per the HPI    PHYSICAL EXAMINATION:    Vitals:    10/28/22 1004   BP: 135/84   Pulse: 80   Resp: 18   Temp: 98.6 °F (37 °C)   TempSrc: Temporal   SpO2: 100%   Weight: 87.4 kg (192 lb 9.6 oz)   Height: 180.3 cm (70.98\")   PainSc: 0-No pain     General:  No acute distress, awake, alert and oriented  Skin:  Warm and dry, no visible rash  HEENT:  Normocephalic/atraumatic.  Wearing a face mask.  Chest:  Normal respiratory effort.  Lungs clear to auscultation bilaterally.  Heart: Regular rate and rhythm  Lymphatics: No palpable cervical supraclavicular axillary adenopathy  Extremities:  No visible clubbing, cyanosis, or edema  Neuro/psych:  Grossly nonfocal.  Normal mood and affect.        DIAGNOSTIC DATA:  PSA Diagnostic (10/28/2022 09:47)  CBC & Differential (10/28/2022 09:47)  Protime-INR, Fingerstick (10/28/2022 09:47)      IMAGING:  MR head wo IV contrast (08/04/2022 11:11)    He had an MRI of the brain at [facility] on 8/15/2022 showing no evidence of recurrence.  Multiple suspected telangiectasias noted.      ASSESSMENT:  This is a 37 y.o. 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   · INR 2.0 today.  Slight dose increase today by 2.5 mg weekly.    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 Galvan.  IDH mutated.  NOT 1p19q co-deleted.  Overall, this is a good molecular profile. He did initiate radiation alone on 7/31/2018 and completed it on 9/14/2018.   · He initiated adjuvant Temodar in mid October 2018.  · Due to persistent abnormalities on MRI, on 1/24/2019 he did have a repeat resection by Dr. Galvan.  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  However, there was no evidence of progression to a higher grade in the new resected specimen.  Mostly the " proliferative activity was very low with only rare mitoses and a low Ki-67 of just 2-3%.  · MRI brain imaging from 9/23/2019 appeared stable.  · He completed 1 year of Temodar with an MRI on 12/12/2019 showing stable findings with some improvement in the intensity of the abnormal uptake  · MRI brain 2/27/2020 stable..  · MRI brain 5/27/2020 stable.    · MRI brain 9/21/2020 stable.  · MRI 1/7/2021 stable  · MRI 5/10/2021 without contrast stable  · MRI brain 9/30/21 without contrast stable  · MRI brain 2/2/22 with and without contrast stable  · MRI brain 5/3/22 with and without contrast stable  · MRI brain August 2022 without recurrence.  He is now following with Dr. Bustamante with neurosurgery at [facility].    *He did request a PSA today which has already resulted and is normal.    PLAN:   1. Continue warfarin with a slight dose increase today with an INR of 2.0  2. He will follow-up with Dr. Bustamante with [facility] neurosurgery and has an MRI scheduled in a few weeks  3. Monthly INR and I will see him back in 6 months with a CBC and INR

## 2022-10-28 ENCOUNTER — LAB (OUTPATIENT)
Dept: OTHER | Facility: HOSPITAL | Age: 37
End: 2022-10-28

## 2022-10-28 ENCOUNTER — OFFICE VISIT (OUTPATIENT)
Dept: ONCOLOGY | Facility: CLINIC | Age: 37
End: 2022-10-28

## 2022-10-28 VITALS
TEMPERATURE: 98.6 F | DIASTOLIC BLOOD PRESSURE: 84 MMHG | HEART RATE: 80 BPM | HEIGHT: 71 IN | OXYGEN SATURATION: 100 % | SYSTOLIC BLOOD PRESSURE: 135 MMHG | BODY MASS INDEX: 26.96 KG/M2 | WEIGHT: 192.6 LBS | RESPIRATION RATE: 18 BRPM

## 2022-10-28 DIAGNOSIS — Z79.01 CHRONIC ANTICOAGULATION: ICD-10-CM

## 2022-10-28 DIAGNOSIS — C71.1 ANAPLASTIC ASTROCYTOMA OF FRONTAL LOBE: ICD-10-CM

## 2022-10-28 DIAGNOSIS — C71.1 ANAPLASTIC ASTROCYTOMA OF FRONTAL LOBE: Primary | ICD-10-CM

## 2022-10-28 LAB
BASOPHILS # BLD AUTO: 0.02 10*3/MM3 (ref 0–0.2)
BASOPHILS NFR BLD AUTO: 0.4 % (ref 0–1.5)
DEPRECATED RDW RBC AUTO: 39.4 FL (ref 37–54)
EOSINOPHIL # BLD AUTO: 0.1 10*3/MM3 (ref 0–0.4)
EOSINOPHIL NFR BLD AUTO: 1.8 % (ref 0.3–6.2)
ERYTHROCYTE [DISTWIDTH] IN BLOOD BY AUTOMATED COUNT: 12 % (ref 12.3–15.4)
HCT VFR BLD AUTO: 43.8 % (ref 37.5–51)
HGB BLD-MCNC: 14.8 G/DL (ref 13–17.7)
IMM GRANULOCYTES # BLD AUTO: 0.02 10*3/MM3 (ref 0–0.05)
IMM GRANULOCYTES NFR BLD AUTO: 0.4 % (ref 0–0.5)
INR PPP: 2
LYMPHOCYTES # BLD AUTO: 1.35 10*3/MM3 (ref 0.7–3.1)
LYMPHOCYTES NFR BLD AUTO: 24.7 % (ref 19.6–45.3)
MCH RBC QN AUTO: 30.3 PG (ref 26.6–33)
MCHC RBC AUTO-ENTMCNC: 33.8 G/DL (ref 31.5–35.7)
MCV RBC AUTO: 89.8 FL (ref 79–97)
MONOCYTES # BLD AUTO: 0.55 10*3/MM3 (ref 0.1–0.9)
MONOCYTES NFR BLD AUTO: 10.1 % (ref 5–12)
NEUTROPHILS NFR BLD AUTO: 3.43 10*3/MM3 (ref 1.7–7)
NEUTROPHILS NFR BLD AUTO: 62.6 % (ref 42.7–76)
NRBC BLD AUTO-RTO: 0 /100 WBC (ref 0–0.2)
PLATELET # BLD AUTO: 263 10*3/MM3 (ref 140–450)
PMV BLD AUTO: 9.6 FL (ref 6–12)
PROTHROMBIN TIME: 23.9 SECONDS (ref 11–15)
PSA SERPL-MCNC: 1.32 NG/ML (ref 0–4)
RBC # BLD AUTO: 4.88 10*6/MM3 (ref 4.14–5.8)
WBC NRBC COR # BLD: 5.47 10*3/MM3 (ref 3.4–10.8)

## 2022-10-28 PROCEDURE — 99214 OFFICE O/P EST MOD 30 MIN: CPT | Performed by: INTERNAL MEDICINE

## 2022-10-28 PROCEDURE — 84153 ASSAY OF PSA TOTAL: CPT | Performed by: INTERNAL MEDICINE

## 2022-10-28 PROCEDURE — 85025 COMPLETE CBC W/AUTO DIFF WBC: CPT | Performed by: INTERNAL MEDICINE

## 2022-10-28 PROCEDURE — 85610 PROTHROMBIN TIME: CPT

## 2022-10-28 PROCEDURE — 36415 COLL VENOUS BLD VENIPUNCTURE: CPT

## 2022-11-23 ENCOUNTER — CLINICAL SUPPORT (OUTPATIENT)
Dept: ONCOLOGY | Facility: HOSPITAL | Age: 37
End: 2022-11-23

## 2022-11-23 ENCOUNTER — LAB (OUTPATIENT)
Dept: OTHER | Facility: HOSPITAL | Age: 37
End: 2022-11-23

## 2022-11-23 DIAGNOSIS — Z79.01 CHRONIC ANTICOAGULATION: ICD-10-CM

## 2022-11-23 DIAGNOSIS — C71.1 ANAPLASTIC ASTROCYTOMA OF FRONTAL LOBE: ICD-10-CM

## 2022-11-23 LAB
INR PPP: 1.9
PROTHROMBIN TIME: 23.3 SECONDS (ref 11–15)

## 2022-11-23 PROCEDURE — 36415 COLL VENOUS BLD VENIPUNCTURE: CPT

## 2022-11-23 PROCEDURE — 85610 PROTHROMBIN TIME: CPT

## 2022-11-23 NOTE — NURSING NOTE
INR 1.9. Called pt to review. He confirmed previous dosing of 10mg Sun,Tue,Thurs and 7.5mg all other days. Denies any new medications or missed doses. Pt denies any recent swelling, bleeding, bruising, or other concerns. Per [employer], dose to increase from 60mg/week to 65mg/week. Pt states he prefers to only increase by 2.5mg/week. Dose adjusted to 10mg Sun, Tue, Thurs, and Sat and 7.5mg M,W,F for total of 62.5mg/week. Pt scheduled to return in one month and states he does not want to come in sooner for recheck but understands to call with any new concerns.

## 2023-01-20 ENCOUNTER — CLINICAL SUPPORT (OUTPATIENT)
Dept: ONCOLOGY | Facility: HOSPITAL | Age: 38
End: 2023-01-20
Payer: COMMERCIAL

## 2023-01-20 ENCOUNTER — LAB (OUTPATIENT)
Dept: OTHER | Facility: HOSPITAL | Age: 38
End: 2023-01-20
Payer: COMMERCIAL

## 2023-01-20 ENCOUNTER — APPOINTMENT (OUTPATIENT)
Dept: ONCOLOGY | Facility: HOSPITAL | Age: 38
End: 2023-01-20
Payer: COMMERCIAL

## 2023-01-20 DIAGNOSIS — C71.1 ANAPLASTIC ASTROCYTOMA OF FRONTAL LOBE: ICD-10-CM

## 2023-01-20 DIAGNOSIS — Z79.01 CHRONIC ANTICOAGULATION: ICD-10-CM

## 2023-01-20 LAB
INR PPP: 2.3
PROTHROMBIN TIME: 27.5 SECONDS (ref 11–15)

## 2023-01-20 PROCEDURE — G0463 HOSPITAL OUTPT CLINIC VISIT: HCPCS

## 2023-01-20 PROCEDURE — 85610 PROTHROMBIN TIME: CPT

## 2023-01-20 NOTE — NURSING NOTE
Pt is here for lab with RN review.  INR therapeutic at 2.3 . Prior dose confirmed. Pt has no complaints, denies any active bleeding or excessive bruising, changes in diet or new medicaion. Pt prefers not to change dose. Per [employer], pt is to take 10 mg of Coumadin on Sun Tues Thurs   and   7.5 mg all other days. F/u appt reviewed with pt and instructed to call the office with any concerns or new symptoms prior to next visit. Pt vu and discharged in stable condition.      Lab Results   Component Value Date    INR 2.30 01/20/2023    INR 1.90 11/23/2022    INR 2.00 10/28/2022    PROTIME 27.5 (H) 01/20/2023    PROTIME 23.3 (H) 11/23/2022    PROTIME 23.9 (H) 10/28/2022

## 2023-02-10 ENCOUNTER — TELEPHONE (OUTPATIENT)
Dept: ONCOLOGY | Facility: CLINIC | Age: 38
End: 2023-02-10
Payer: COMMERCIAL

## 2023-02-21 ENCOUNTER — LAB (OUTPATIENT)
Dept: OTHER | Facility: HOSPITAL | Age: 38
End: 2023-02-21
Payer: COMMERCIAL

## 2023-02-21 ENCOUNTER — CLINICAL SUPPORT (OUTPATIENT)
Dept: ONCOLOGY | Facility: HOSPITAL | Age: 38
End: 2023-02-21
Payer: COMMERCIAL

## 2023-02-21 DIAGNOSIS — C71.1 ANAPLASTIC ASTROCYTOMA OF FRONTAL LOBE: ICD-10-CM

## 2023-02-21 DIAGNOSIS — Z79.01 CHRONIC ANTICOAGULATION: ICD-10-CM

## 2023-02-21 DIAGNOSIS — I26.99 PULMONARY EMBOLISM WITH INFARCTION: ICD-10-CM

## 2023-02-21 LAB
BASOPHILS # BLD AUTO: 0.02 10*3/MM3 (ref 0–0.2)
BASOPHILS NFR BLD AUTO: 0.3 % (ref 0–1.5)
DEPRECATED RDW RBC AUTO: 39.3 FL (ref 37–54)
EOSINOPHIL # BLD AUTO: 0.11 10*3/MM3 (ref 0–0.4)
EOSINOPHIL NFR BLD AUTO: 1.8 % (ref 0.3–6.2)
ERYTHROCYTE [DISTWIDTH] IN BLOOD BY AUTOMATED COUNT: 12.2 % (ref 12.3–15.4)
HCT VFR BLD AUTO: 47.6 % (ref 37.5–51)
HGB BLD-MCNC: 16.1 G/DL (ref 13–17.7)
IMM GRANULOCYTES # BLD AUTO: 0.03 10*3/MM3 (ref 0–0.05)
IMM GRANULOCYTES NFR BLD AUTO: 0.5 % (ref 0–0.5)
INR PPP: 3.6
LYMPHOCYTES # BLD AUTO: 1.54 10*3/MM3 (ref 0.7–3.1)
LYMPHOCYTES NFR BLD AUTO: 25.8 % (ref 19.6–45.3)
MCH RBC QN AUTO: 29.8 PG (ref 26.6–33)
MCHC RBC AUTO-ENTMCNC: 33.8 G/DL (ref 31.5–35.7)
MCV RBC AUTO: 88 FL (ref 79–97)
MONOCYTES # BLD AUTO: 0.64 10*3/MM3 (ref 0.1–0.9)
MONOCYTES NFR BLD AUTO: 10.7 % (ref 5–12)
NEUTROPHILS NFR BLD AUTO: 3.62 10*3/MM3 (ref 1.7–7)
NEUTROPHILS NFR BLD AUTO: 60.9 % (ref 42.7–76)
NRBC BLD AUTO-RTO: 0 /100 WBC (ref 0–0.2)
PLATELET # BLD AUTO: 237 10*3/MM3 (ref 140–450)
PMV BLD AUTO: 9.9 FL (ref 6–12)
PROTHROMBIN TIME: 43.3 SECONDS (ref 11–15)
RBC # BLD AUTO: 5.41 10*6/MM3 (ref 4.14–5.8)
WBC NRBC COR # BLD: 5.96 10*3/MM3 (ref 3.4–10.8)

## 2023-02-21 PROCEDURE — 36415 COLL VENOUS BLD VENIPUNCTURE: CPT

## 2023-02-21 PROCEDURE — 85025 COMPLETE CBC W/AUTO DIFF WBC: CPT | Performed by: INTERNAL MEDICINE

## 2023-02-21 PROCEDURE — 85610 PROTHROMBIN TIME: CPT

## 2023-02-21 RX ORDER — WARFARIN SODIUM 5 MG/1
TABLET ORAL
Qty: 180 TABLET | Refills: 2 | Status: SHIPPED | OUTPATIENT
Start: 2023-02-21

## 2023-03-17 ENCOUNTER — LAB (OUTPATIENT)
Dept: OTHER | Facility: HOSPITAL | Age: 38
End: 2023-03-17
Payer: COMMERCIAL

## 2023-03-17 ENCOUNTER — ANTICOAGULATION VISIT (OUTPATIENT)
Dept: ONCOLOGY | Facility: HOSPITAL | Age: 38
End: 2023-03-17
Payer: COMMERCIAL

## 2023-03-17 DIAGNOSIS — C71.1 ANAPLASTIC ASTROCYTOMA OF FRONTAL LOBE: ICD-10-CM

## 2023-03-17 DIAGNOSIS — I26.99 PULMONARY EMBOLISM WITH INFARCTION: Primary | ICD-10-CM

## 2023-03-17 DIAGNOSIS — D68.51 FACTOR 5 LEIDEN MUTATION, HETEROZYGOUS: ICD-10-CM

## 2023-03-17 DIAGNOSIS — I82.532 CHRONIC DEEP VEIN THROMBOSIS (DVT) OF LEFT POPLITEAL VEIN: ICD-10-CM

## 2023-03-17 DIAGNOSIS — Z79.01 CHRONIC ANTICOAGULATION: ICD-10-CM

## 2023-03-17 LAB
INR PPP: 2.2
PROTHROMBIN TIME: 25.8 SECONDS (ref 11–15)

## 2023-03-17 PROCEDURE — 85610 PROTHROMBIN TIME: CPT

## 2023-03-17 PROCEDURE — G0463 HOSPITAL OUTPT CLINIC VISIT: HCPCS

## 2023-03-17 PROCEDURE — 36416 COLLJ CAPILLARY BLOOD SPEC: CPT

## 2023-03-17 NOTE — PROGRESS NOTES
Anticoagulation Clinic Progress Note    Patient's visit was held in office today.    Anticoagulation Summary  As of 3/17/2023    INR goal:  2.0-3.0   TTR:  --   INR used for dosin.20 (3/17/2023)   Warfarin maintenance plan:  7.5 mg (5 mg x 1.5) every day; Starting 3/17/2023   Weekly warfarin total:  52.5 mg   Plan last modified:  Sharifa Cruz RPH (3/17/2023)   Next INR check:  2023   Priority:  Maintenance   Target end date:  Indefinite    Indications    Pulmonary embolism with infarction (HCC) [I26.99]  Factor 5 Leiden mutation  heterozygous (HCC) [D68.51]  Chronic deep vein thrombosis (DVT) of left popliteal vein (HCC) [I82.532]             Anticoagulation Episode Summary     INR check location:      Preferred lab:      Send INR reminders to:  [facility] CBC ANTICOAG POOL    Comments:  EP lab/ coag phone call      Anticoagulation Care Providers     Provider Role Specialty Phone number    Sean Jefferson MD Referring Hematology and Oncology [phone]          Drug interactions: has remained unchanged.  Diet: has remained unchanged.    Clinic Interview:  No pertinent clinical findings have been reported.    INR History:  Anticoagulation Monitoring 3/17/2023   INR 2.20   INR Date 3/17/2023   INR Goal 2.0-3.0   Last Week Total 37.5 mg   Next Week Total 52.5 mg   Sun 7.5 mg   Mon 7.5 mg   Tue 7.5 mg   Wed 7.5 mg   Thu 7.5 mg   Fri 7.5 mg   Sat 7.5 mg   Visit Report -       Plan:  1. INR is Therapeutic today- see above in Anticoagulation Summary.   Will instruct Bryan Valadez to Continue their warfarin regimen- see above in Anticoagulation Summary.  2. Follow up in 4 weeks  3.They have been instructed to call if any changes in medications, doses, concerns, etc. Patient expresses understanding and has no further questions at this time.    Sharifa Cruz RPH

## 2023-03-27 ENCOUNTER — TELEPHONE (OUTPATIENT)
Dept: ONCOLOGY | Facility: CLINIC | Age: 38
End: 2023-03-27

## 2023-03-27 NOTE — TELEPHONE ENCOUNTER
"    Caller: Bryan Valadez \"GILLIAN\"    Relationship to patient: Self    Best call back number: [phone]    Chief complaint: R/S    Type of visit: LAB AND FOLLOW UP 1    Requested date: 4-13 IF POSSIBLE MORNING     If rescheduling, when is the original appointment: 4-14            "

## 2023-04-13 NOTE — PROGRESS NOTES
[facility] OUTPATIENT FOLLOW UP VISIT    REASON FOR FOLLOW-UP:    1.  Left lower extremity DVT with progression of symptoms and extension of the left lower extremity DVT into the femoral vein from the popliteal/calf vein swallowing for next set.  Currently anticoagulated with warfarin.  2.  Right lower extremity DVT noted in the common femoral, profunda femoral, and calf veins while anticoagulated with a DOAC.  Therefore, failure of DOAC.  3.  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  Other thrombophilia labs negative.    4.  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  NOT 1p19q co-deleted.    5.  Radiation initiated on 7/31/2018.  Plan for Temodar ×1 year following radiation.  6.  4500 cGy in 25 fractions administered from 7/31/2018 through 9/14/2018  7.  MRI brain 10/23/2018 with resolving hemorrhage in the resection cavity.  However, there is hyperintensity measuring 4.6 x 4.3 x 3 cm along the margins of the resection cavity.  8.  Repeat venous duplex on 10/23 with chronic right CVT and chronic LLE DVT from the mid femoral vein distally.     9.  He initiated adjuvant therapy with Temodar in mid October 2018.  10.  MRI brain 12/3/2018 with stable findings.  11.  On 1/24/2019 he did have a repeat resection by Dr. Galvan.  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  However, there was no evidence of progression to a higher grade in the new resected specimen.  Mostly the proliferative activity was very low with only rare mitoses and a low Ki-67 of just 2-3%.  12.  Temodar completed December 2019      HISTORY OF PRESENT ILLNESS:  Bryan Valadez is a 37 y.o. male who returns today for follow up of the above issues.     He continues to do well.  No bleeding.  He continues warfarin.  He is following up at the [facility] with Dr. Bustamante for follow-up MRI imaging which thankfully  "has shown no evidence for recurrence or progression.    REVIEW OF SYSTEMS:  As per the HPI    PHYSICAL EXAMINATION:    Vitals:    04/14/23 1038   BP: 130/90   Pulse: 64   Resp: 18   Temp: 97.5 °F (36.4 °C)   TempSrc: Temporal   SpO2: 100%   Weight: 90.1 kg (198 lb 9.6 oz)   Height: 180.3 cm (70.98\")   PainSc: 0-No pain     General:  No acute distress, awake, alert and oriented  Skin:  Warm and dry, no visible rash  HEENT: Well-healed surgical incisions from his distant history of craniotomy.  Chest:  Normal respiratory effort.  Lungs clear to auscultation bilaterally.  Heart: Regular rate and rhythm  Lymphatics: No palpable cervical supraclavicular axillary adenopathy  Extremities:  No visible clubbing, cyanosis, or edema  Neuro/psych:  Grossly nonfocal.  Normal mood and affect.    DIAGNOSTIC DATA:  CBC & Differential (04/14/2023 10:30)      IMAGING:  MR head wo IV contrast (02/26/2023 09:15)    He had an MRI of the brain at [facility] on 2/26/23 showing stable findings, no evidence for recurrence      ASSESSMENT:  This is a 37 y.o. 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 Galvan.  IDH mutated.  NOT 1p19q co-deleted.  Overall, this is a good molecular profile. He did initiate radiation alone on 7/31/2018 and completed it on 9/14/2018.   · He initiated adjuvant Temodar in mid October 2018.  · Due to persistent abnormalities on MRI, on 1/24/2019 he did have a repeat resection by Dr. Galvan.  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  However, there was no evidence of progression to a higher grade in the new resected specimen.  Mostly the proliferative activity was very low with only rare mitoses and a low Ki-67 of just 2-3%.  · MRI brain imaging from " 9/23/2019 appeared stable.  · He completed 1 year of Temodar with an MRI on 12/12/2019 showing stable findings with some improvement in the intensity of the abnormal uptake  · MRI brain 2/27/2020 stable..  · MRI brain 5/27/2020 stable.    · MRI brain 9/21/2020 stable.  · MRI 1/7/2021 stable  · MRI 5/10/2021 without contrast stable  · MRI brain 9/30/21 without contrast stable  · MRI brain 2/2/22 with and without contrast stable  · MRI brain 5/3/22 with and without contrast stable  · MRI brain August 2022 without recurrence.  He is now following with Dr. Bustamante with neurosurgery at [facility].  · MRI of the brain at [facility] on 2/26/23 showing stable findings, no evidence for recurrence    PLAN:   1. Continue warfarin.  Dose adjustment per our pharmacy staff at this point.  2. Continue to follow-up with Dr. Bustamante at the [facility] with follow-up MRI imaging there is appropriate  3. He desires referral to genetic counseling due to his personal history and family history of malignancy.  This referral will be placed.  4. Monthly INR.  I will see him back in 6 months for follow-up with a CBC and INR.

## 2023-04-14 ENCOUNTER — OFFICE VISIT (OUTPATIENT)
Dept: ONCOLOGY | Facility: CLINIC | Age: 38
End: 2023-04-14
Payer: COMMERCIAL

## 2023-04-14 ENCOUNTER — ANTICOAGULATION VISIT (OUTPATIENT)
Dept: PHARMACY | Facility: HOSPITAL | Age: 38
End: 2023-04-14
Payer: COMMERCIAL

## 2023-04-14 ENCOUNTER — LAB (OUTPATIENT)
Dept: OTHER | Facility: HOSPITAL | Age: 38
End: 2023-04-14
Payer: COMMERCIAL

## 2023-04-14 VITALS
BODY MASS INDEX: 27.8 KG/M2 | HEART RATE: 64 BPM | SYSTOLIC BLOOD PRESSURE: 130 MMHG | RESPIRATION RATE: 18 BRPM | OXYGEN SATURATION: 100 % | TEMPERATURE: 97.5 F | HEIGHT: 71 IN | DIASTOLIC BLOOD PRESSURE: 90 MMHG | WEIGHT: 198.6 LBS

## 2023-04-14 DIAGNOSIS — Z79.01 CHRONIC ANTICOAGULATION: Primary | ICD-10-CM

## 2023-04-14 DIAGNOSIS — C71.1 ANAPLASTIC ASTROCYTOMA OF FRONTAL LOBE: ICD-10-CM

## 2023-04-14 DIAGNOSIS — Z79.01 CHRONIC ANTICOAGULATION: ICD-10-CM

## 2023-04-14 DIAGNOSIS — D68.51 FACTOR 5 LEIDEN MUTATION, HETEROZYGOUS: ICD-10-CM

## 2023-04-14 DIAGNOSIS — I82.532 CHRONIC DEEP VEIN THROMBOSIS (DVT) OF LEFT POPLITEAL VEIN: ICD-10-CM

## 2023-04-14 DIAGNOSIS — C71.1 ANAPLASTIC ASTROCYTOMA OF FRONTAL LOBE: Primary | ICD-10-CM

## 2023-04-14 DIAGNOSIS — I26.99 PULMONARY EMBOLISM WITH INFARCTION: Primary | ICD-10-CM

## 2023-04-14 LAB
BASOPHILS # BLD AUTO: 0.05 10*3/MM3 (ref 0–0.2)
BASOPHILS NFR BLD AUTO: 1 % (ref 0–1.5)
DEPRECATED RDW RBC AUTO: 39 FL (ref 37–54)
EOSINOPHIL # BLD AUTO: 0.18 10*3/MM3 (ref 0–0.4)
EOSINOPHIL NFR BLD AUTO: 3.6 % (ref 0.3–6.2)
ERYTHROCYTE [DISTWIDTH] IN BLOOD BY AUTOMATED COUNT: 12 % (ref 12.3–15.4)
HCT VFR BLD AUTO: 45.3 % (ref 37.5–51)
HGB BLD-MCNC: 15.8 G/DL (ref 13–17.7)
IMM GRANULOCYTES # BLD AUTO: 0.09 10*3/MM3 (ref 0–0.05)
IMM GRANULOCYTES NFR BLD AUTO: 1.8 % (ref 0–0.5)
INR PPP: 2.2
LYMPHOCYTES # BLD AUTO: 1.5 10*3/MM3 (ref 0.7–3.1)
LYMPHOCYTES NFR BLD AUTO: 29.9 % (ref 19.6–45.3)
MCH RBC QN AUTO: 30.7 PG (ref 26.6–33)
MCHC RBC AUTO-ENTMCNC: 34.9 G/DL (ref 31.5–35.7)
MCV RBC AUTO: 88.1 FL (ref 79–97)
MONOCYTES # BLD AUTO: 0.58 10*3/MM3 (ref 0.1–0.9)
MONOCYTES NFR BLD AUTO: 11.6 % (ref 5–12)
NEUTROPHILS NFR BLD AUTO: 2.61 10*3/MM3 (ref 1.7–7)
NEUTROPHILS NFR BLD AUTO: 52.1 % (ref 42.7–76)
NRBC BLD AUTO-RTO: 0 /100 WBC (ref 0–0.2)
PLATELET # BLD AUTO: 225 10*3/MM3 (ref 140–450)
PMV BLD AUTO: 10.8 FL (ref 6–12)
PROTHROMBIN TIME: 26.1 SECONDS (ref 11–15)
RBC # BLD AUTO: 5.14 10*6/MM3 (ref 4.14–5.8)
WBC NRBC COR # BLD: 5.01 10*3/MM3 (ref 3.4–10.8)

## 2023-04-14 PROCEDURE — 85610 PROTHROMBIN TIME: CPT

## 2023-04-14 PROCEDURE — 85025 COMPLETE CBC W/AUTO DIFF WBC: CPT | Performed by: INTERNAL MEDICINE

## 2023-04-14 PROCEDURE — 36415 COLL VENOUS BLD VENIPUNCTURE: CPT

## 2023-04-17 NOTE — PROGRESS NOTES
Anticoagulation Clinic Progress Note    Patient's visit was held by phone today.    Anticoagulation Summary  As of 2023    INR goal:  2.0-3.0   TTR:  100.0 % (2.6 wk)   INR used for dosin.20 (2023)   Warfarin maintenance plan:  7.5 mg (5 mg x 1.5) every day; Starting 2023   Weekly warfarin total:  52.5 mg   No change documented:  Sharifa Cruz RPH   Plan last modified:  Sharifa Cruz RPH (3/17/2023)   Next INR check:     Priority:  Maintenance   Target end date:  Indefinite    Indications    Pulmonary embolism with infarction [I26.99]  Factor 5 Leiden mutation  heterozygous [D68.51]  Chronic deep vein thrombosis (DVT) of left popliteal vein [I82.532]             Anticoagulation Episode Summary     INR check location:      Preferred lab:      Send INR reminders to:   [facility] ON[facility] CBC ANTICOAG POOL    Comments:  EP lab/ coag phone call      Anticoagulation Care Providers     Provider Role Specialty Phone number    Sean Jefferson MD Referring Hematology and Oncology [phone]          Drug interactions: has remained unchanged.  Diet: has remained unchanged.    Clinic Interview:  No pertinent clinical findings have been reported.    INR History:      2022    10:28 AM 2023    10:50 AM 2023    10:30 AM 3/17/2023    10:35 AM 3/17/2023    11:00 AM 2023    10:30 AM 2023     3:30 PM   Anticoagulation Monitoring   INR     2.20  2.20   INR Date     3/17/2023  2023   INR Goal     2.0-3.0  2.0-3.0   Trend       Same   Last Week Total     37.5 mg  52.5 mg   Next Week Total     52.5 mg  52.5 mg   Sun     7.5 mg  7.5 mg   Mon     7.5 mg  7.5 mg   Tue     7.5 mg  7.5 mg   Wed     7.5 mg  7.5 mg   Thu     7.5 mg  7.5 mg   Fri     7.5 mg  7.5 mg   Sat     7.5 mg  7.5 mg   Historical INR 1.90   2.30   3.60   2.20    2.20      Visit Report      Report Report       Plan:  1. INR is Therapeutic today- see above in Anticoagulation Summary.   Will instruct Bryan Valadez to Continue their  warfarin regimen- see above in Anticoagulation Summary.  2. Follow up in 4 weeks  3.They have been instructed to call if any changes in medications, doses, concerns, etc. Left VM to call and confirm dosing.    Sharifa Cruz RPH

## 2023-05-10 ENCOUNTER — LAB (OUTPATIENT)
Dept: LAB | Facility: HOSPITAL | Age: 38
End: 2023-05-10
Payer: COMMERCIAL

## 2023-05-10 ENCOUNTER — CLINICAL SUPPORT (OUTPATIENT)
Dept: GENETICS | Facility: HOSPITAL | Age: 38
End: 2023-05-10
Payer: COMMERCIAL

## 2023-05-10 ENCOUNTER — ANTICOAGULATION VISIT (OUTPATIENT)
Dept: ONCOLOGY | Facility: HOSPITAL | Age: 38
End: 2023-05-10
Payer: COMMERCIAL

## 2023-05-10 DIAGNOSIS — I26.99 PULMONARY EMBOLISM WITH INFARCTION: Primary | ICD-10-CM

## 2023-05-10 DIAGNOSIS — I82.532 CHRONIC DEEP VEIN THROMBOSIS (DVT) OF LEFT POPLITEAL VEIN: ICD-10-CM

## 2023-05-10 DIAGNOSIS — C71.1 ANAPLASTIC ASTROCYTOMA OF FRONTAL LOBE: ICD-10-CM

## 2023-05-10 DIAGNOSIS — Z80.42 FAMILY HISTORY OF PROSTATE CANCER: ICD-10-CM

## 2023-05-10 DIAGNOSIS — Z13.79 GENETIC TESTING: Primary | ICD-10-CM

## 2023-05-10 DIAGNOSIS — D68.51 FACTOR 5 LEIDEN MUTATION, HETEROZYGOUS: ICD-10-CM

## 2023-05-10 DIAGNOSIS — Z79.01 CHRONIC ANTICOAGULATION: ICD-10-CM

## 2023-05-10 LAB
INR PPP: 1.5 (ref 0.9–1.1)
PROTHROMBIN TIME: 17.5 SECONDS (ref 11–13.5)

## 2023-05-10 PROCEDURE — 85610 PROTHROMBIN TIME: CPT

## 2023-05-10 PROCEDURE — 96040: CPT

## 2023-05-10 PROCEDURE — 36416 COLLJ CAPILLARY BLOOD SPEC: CPT

## 2023-05-10 NOTE — PROGRESS NOTES
Bryan Valadez, a 37-year-old male, was seen for genetic counseling due to a personal history of astrocytoma. Mr. Valadez was diagnosed with an astrocytoma in 2018 and underwent a resection. He underwent radiation and completed adjuvant therapy in 2019. 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 He is currently being monitored with MRIs every 3-4 months for sign of recurrence. He has not started colonoscopies yet. He has PSA screenings annually. He was interested in discussing his risk for a hereditary cancer syndrome.  Mr. Valadez was interested in pursuing a multigene panel, and therefore the CancerNext-Expanded panel was ordered through [facility] [facility] which analyzes 77 genes associated with an increased cancer risk. He had his blood drawn on 5/10/2023 for testing. Results from genetic testing are expected in 2-3 weeks after the lab receives the sample.     FAMILY HISTORY (see attached pedigree):    Mat. Uncle:   Bladder cancer, 65  Mat. Grandfather:  Leukemia, 56  Father:   Prostate cancer, 55  Pat. Grandfather:  Prostate cancer, 78     Stomach cancer, 78    We do not have medical records regarding the diagnoses in Mr. Valadez’s family.    RISK ASSESSMENT: Mr. Valadez’s personal and family history of cancer led to concern regarding a hereditary cancer syndrome. He does not meet NCCN guidelines for genetic testing. Despite this, we discussed how genetic testing is still available to him. We discussed that there are other genes that can contribute to hereditary risk for cancer. Mr. Valadez opted to pursue multigene panel testing via the CancerNext-Expanded panel. We discussed the standard approach to genetic testing is through comprehensive multigene panel testing that would evaluate a greater number of genes associated with hereditary cancer.  These risk assessments are based on the family history information provided at the time of the appointment and could change in the future should  new information be obtained.    GENETIC COUNSELING: (30 minutes) We reviewed the family history information in detail. Cases of cancer follow three general patterns: sporadic, familial, and hereditary.  While most cancer is sporadic, some cases appear to occur in family clusters.  These cases are said to be familial and account for 10-20% of cancer cases.  Familial cases may be due to a combination of shared genes and environmental factors among family members.  In even fewer families, the cancer is said to be inherited, and the genes responsible for the cancer are known.      Family histories typical of hereditary cancer syndromes usually include multiple first- and second-degree relatives diagnosed with cancer types that define a syndrome. These cases tend to be diagnosed at younger-than-expected ages and can be bilateral or multifocal. The cancer in these families follows an autosomal dominant inheritance pattern, which indicates the likely presence of a mutation in a cancer susceptibility gene. Children and siblings of an individual believed to carry this mutation have a 50% chance of inheriting that mutation, thereby inheriting the increased risk to develop cancer. These mutations can be passed down from the maternal or the paternal lineage.    Family histories typical of hereditary cancer syndromes usually include multiple first- and second-degree relatives diagnosed with cancer types that define a syndrome.  These cases tend to be diagnosed at younger-than-expected ages and can be bilateral or multifocal.  The cancer in these families follows an autosomal dominant inheritance pattern, which indicates the likely presence of a mutation in a cancer susceptibility gene. Children and siblings of an individual believed to carry this mutation have a 50% chance of inheriting that mutation, thereby inheriting the increased risk to develop cancer. These mutations can be passed down from the maternal or the paternal  lineage.  While most brain tumors are thought to be sporadic, there are a number of hereditary cancer syndromes that are associated with some increased risk for brain tumors, including Li Fraumeni syndrome and Parks syndrome. We discussed how the specific brain cancer found in him can sometimes be associated with tuberous sclerosis and neurofibromatosis. We discussed that results of genetic testing may change screening or other medical management for both himself and family members.    We discussed that there are other hereditary cancer syndromes. Some of these conditions have well defined cancer risks and established management guidelines.  Other genes that can be tested for have been more recently described, and there may be less data regarding the risks and therefore may not have established management guidelines.  We discussed these limitations at length. Mr. Valadez elected to pursue genetic testing to learn more information about potential risks to himself and relatives.    GENETIC TESTING:  The risks, benefits and limitations of genetic testing and implications for clinical management following testing were reviewed. DNA test results can influence decisions regarding screening and prevention.  Genetic testing can have significant psychological implications for both individuals and families. Also discussed was the possibility of employment and insurance discrimination based on genetic test results and the federal and states laws that are in place to prevent this ([place]).         We discussed panel testing, which would involve testing 77 genes associated with increased cancer risk. The benefits and limitations of genetic testing were discussed. The implications of a positive or negative test result were discussed. We discussed the possibility that, in some cases, genetic test results may be ambiguous due to the identification of a genetic variant of uncertain significance (VUS). These variants may or may not be  associated with an increased cancer risk. With multigene panel testing, it is not uncommon for a VUS to be identified.  If a VUS is identified, testing family members is not recommended and screening recommendations are made based on the family history. The laboratories that perform genetic testing work to reclassify the VUS and send out an amended report if and when a VUS is reclassified.  The majority of variant findings are ultimately reclassified to a negative result.     PLAN: Genetic testing was ordered via the CancerNext-Expanded panel through [employer]. He had his blood drawn on 5/10/2023 for testing. Results are expected in 2-3 weeks once [facility] [employer] receives the sample.?If he has any questions in the meantime, he is welcome to call me at [phone].     Alice Gomez MS, [facility] [place] [facility], [facility]  Licensed Certified Genetic Counselor

## 2023-05-10 NOTE — PROGRESS NOTES
Anticoagulation Clinic Progress Note    Patient's visit was held by phone today.    Anticoagulation Summary  As of 5/10/2023    INR goal:  2.0-3.0   TTR:  57.6 % (1.5 mo)   INR used for dosin.50 (5/10/2023)   Warfarin maintenance plan:  7.5 mg (5 mg x 1.5) every day; Starting 5/10/2023   Weekly warfarin total:  52.5 mg   Plan last modified:  Sharifa Cruz RPH (3/17/2023)   Next INR check:  2023   Priority:  Maintenance   Target end date:  Indefinite    Indications    Pulmonary embolism with infarction [I26.99]  Factor 5 Leiden mutation  heterozygous [D68.51]  Chronic deep vein thrombosis (DVT) of left popliteal vein [I82.532]             Anticoagulation Episode Summary     INR check location:      Preferred lab:      Send INR reminders to:   [facility] ON[facility] CBC ANTICOAG POOL    Comments:  EP lab/ coag phone call      Anticoagulation Care Providers     Provider Role Specialty Phone number    Sean Jefferson MD Referring Hematology and Oncology [phone]          Drug interactions: has remained unchanged.  Diet: has remained unchanged.  Patient missed 2 doses last week    Clinic Interview:  No pertinent clinical findings have been reported.    INR History:      Latest Ref Rng & Units 2023    10:30 AM 3/17/2023    10:35 AM 3/17/2023    11:00 AM 2023    10:30 AM 2023     3:30 PM 5/10/2023    11:44 AM 5/10/2023    11:45 AM   Anticoagulation Monitoring   INR    2.20  2.20  1.50   INR Date    3/17/2023  2023  5/10/2023   INR Goal    2.0-3.0  2.0-3.0  2.0-3.0   Trend      Same  Same   Last Week Total    37.5 mg  52.5 mg  52.5 mg   Next Week Total    52.5 mg  52.5 mg  60 mg   Sun    7.5 mg  7.5 mg  7.5 mg   Mon    7.5 mg  7.5 mg  10 mg (5/15, )   Tue    7.5 mg  7.5 mg  7.5 mg   Wed    7.5 mg  7.5 mg  10 mg (5/10); Otherwise 7.5 mg   Thu    7.5 mg  7.5 mg  10 mg (); Otherwise 7.5 mg   Fri    7.5 mg  7.5 mg  7.5 mg   Sat    7.5 mg  7.5 mg  7.5 mg   Historical INR 0.90 - 1.10 3.60   2.20    2.20     1.50      Visit Report     Report Report         Plan:  1. INR is Subtherapeutic today- see above in Anticoagulation Summary.   Will instruct Bryan Valadez to Change their warfarin regimen- see above in Anticoagulation Summary.  2. Follow up in 2 weeks  3.They have been instructed to call if any changes in medications, doses, concerns, etc. Patient expresses understanding and has no further questions at this time.    Martha Lord [facility]

## 2023-05-10 NOTE — PROGRESS NOTES
Can you make sure the [facility] knows he had this done today? It was originally scheduled on Friday but he was here for something else today. Thanks!

## 2023-05-12 NOTE — PROGRESS NOTES
Discharge Planning Assessment  [facility]     Patient Name: Bryan Valadez  MRN: [id number]  Today's Date: 7/9/2018    Admit Date: 7/7/2018          Discharge Needs Assessment     Row Name 07/09/18 1303       Living Environment    Lives With spouse;child(jesus), dependent    Name(s) of Who Lives With Patient Wife, Mary  and 2 year old daughter.     Current Living Arrangements home/apartment/condo            Discharge Plan     Row Name 07/09/18 1304       Plan    Plan Home, wife to transport     Plan Comments Face sheet and pharmacy verified . Patient does not have a PCP but family plans to call Dr. Abreu's office to see if they can accept  Also left family with phone number  to [facility] physician  liaison. . CCP to follow for  medication assistance. Patient lives with his wife and 2 year old daughter  in  a house. Family is watching 2 year old while patient is hospitalized No DME , NO HH. 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   KRYSTAL Felix    Row Name 07/09/18 1234       Plan    Plan Comments Per CCP RN Ivon Landry,  with CCP is checking  re [facility] for patient's Lovenox. .........  KRYSTAL Felix        Destination     No service coordination in this encounter.      Durable Medical Equipment     No service coordination in this encounter.      Dialysis/Infusion     No service coordination in this encounter.      Home Medical Care     No service coordination in this encounter.      Social Care     No service coordination in this encounter.        Expected Discharge Date and Time     Expected Discharge Date Expected Discharge Time    Jul 9, 2018               Demographic Summary     Row Name 07/09/18 1302       General Information    Admission Type inpatient    Arrived From home    Referral Source admission list    Reason for Consult discharge planning    Preferred Language English            Functional Status     Row Name 07/09/18 1302       Functional Status    Usual  Activity Tolerance excellent    Current Activity Tolerance good            Psychosocial    No documentation.           Abuse/Neglect    No documentation.           Legal     Row Name 07/09/18 0720       Financial/Legal    Who Manages Finances if Patient Unable No POA, No Healthcare surrogate             Substance Abuse    No documentation.           Patient Forms    No documentation.         KRYSTAL Felix     No

## 2023-05-17 DIAGNOSIS — J30.9 ALLERGIC RHINITIS: ICD-10-CM

## 2023-05-17 RX ORDER — MONTELUKAST SODIUM 10 MG/1
10 TABLET ORAL DAILY
Qty: 90 TABLET | Refills: 3 | Status: SHIPPED | OUTPATIENT
Start: 2023-05-17

## 2023-05-23 ENCOUNTER — TELEPHONE (OUTPATIENT)
Dept: GENETICS | Facility: HOSPITAL | Age: 38
End: 2023-05-23
Payer: COMMERCIAL

## 2023-05-23 NOTE — TELEPHONE ENCOUNTER
Called pt to disclose genetic test results. Pt is unable to do so, but will be calling me back later today.

## 2023-05-24 ENCOUNTER — LAB (OUTPATIENT)
Dept: OTHER | Facility: HOSPITAL | Age: 38
End: 2023-05-24
Payer: COMMERCIAL

## 2023-05-24 ENCOUNTER — APPOINTMENT (OUTPATIENT)
Dept: ONCOLOGY | Facility: HOSPITAL | Age: 38
End: 2023-05-24
Payer: COMMERCIAL

## 2023-05-24 ENCOUNTER — ANTICOAGULATION VISIT (OUTPATIENT)
Dept: PHARMACY | Facility: HOSPITAL | Age: 38
End: 2023-05-24
Payer: COMMERCIAL

## 2023-05-24 DIAGNOSIS — Z79.01 CHRONIC ANTICOAGULATION: ICD-10-CM

## 2023-05-24 DIAGNOSIS — I26.99 PULMONARY EMBOLISM WITH INFARCTION: Primary | ICD-10-CM

## 2023-05-24 DIAGNOSIS — D68.51 FACTOR 5 LEIDEN MUTATION, HETEROZYGOUS: ICD-10-CM

## 2023-05-24 DIAGNOSIS — C71.1 ANAPLASTIC ASTROCYTOMA OF FRONTAL LOBE: ICD-10-CM

## 2023-05-24 DIAGNOSIS — I82.532 CHRONIC DEEP VEIN THROMBOSIS (DVT) OF LEFT POPLITEAL VEIN: ICD-10-CM

## 2023-05-24 LAB
INR PPP: 1.7
PROTHROMBIN TIME: 20 SECONDS (ref 11–15)

## 2023-05-24 PROCEDURE — 85610 PROTHROMBIN TIME: CPT

## 2023-05-24 PROCEDURE — 36416 COLLJ CAPILLARY BLOOD SPEC: CPT

## 2023-05-24 NOTE — PROGRESS NOTES
Anticoagulation Clinic Progress Note    Patient's visit was held by phone today.    Anticoagulation Summary  As of 2023    INR goal:  2.0-3.0   TTR:  43.8 % (1.9 mo)   INR used for dosin.70 (2023)   Warfarin maintenance plan:  10 mg (5 mg x 2) every Mon, Thu; 7.5 mg (5 mg x 1.5) all other days; Starting 2023   Weekly warfarin total:  57.5 mg   Plan last modified:  Martha Lord RPH (2023)   Next INR check:  2023   Priority:  Maintenance   Target end date:  Indefinite    Indications    Pulmonary embolism with infarction [I26.99]  Factor 5 Leiden mutation  heterozygous [D68.51]  Chronic deep vein thrombosis (DVT) of left popliteal vein [I82.532]             Anticoagulation Episode Summary     INR check location:      Preferred lab:      Send INR reminders to:  [facility] CBC ANTICOAG POOL    Comments:  EP lab/ coag phone call      Anticoagulation Care Providers     Provider Role Specialty Phone number    Sean Jefferson MD Referring Hematology and Oncology [phone]          Drug interactions: has remained unchanged.  Diet: has remained unchanged.    Clinic Interview:  No pertinent clinical findings have been reported.    INR History:      2023    10:30 AM 2023     3:30 PM 5/10/2023    11:44 AM 5/10/2023    11:45 AM 2023    10:00 AM 2023    10:15 AM 2023    11:51 AM   Anticoagulation Monitoring   INR  2.20  1.50   1.70   INR Date  2023  5/10/2023   2023   INR Goal  2.0-3.0  2.0-3.0 2.0-3.0  2.0-3.0   Trend  Same  Same   Up   Last Week Total  52.5 mg  52.5 mg   52.5 mg   Next Week Total  52.5 mg  60 mg   60 mg   Sun  7.5 mg  7.5 mg   7.5 mg   Mon  7.5 mg  10 mg (5/15, )   10 mg   Tue  7.5 mg  7.5 mg   7.5 mg   Wed  7.5 mg  10 mg (5/10); Otherwise 7.5 mg   10 mg (); Otherwise 7.5 mg   Thu  7.5 mg  10 mg (); Otherwise 7.5 mg   10 mg   Fri  7.5 mg  7.5 mg   7.5 mg   Sat  7.5 mg  7.5 mg   7.5 mg   Historical INR 2.20    1.50     1.70      Visit  Report Report Report            Plan:  1. INR is Subtherapeutic today- see above in Anticoagulation Summary.   Will instruct Bryan Jeffersonz to take 10mg today and then increase weekly regimen by 5mg and take 10mg (2 tablets) on Monday's and Thursdays. Patient eating more greens/salads. See above in Anticoagulation Summary.  2. Follow up in 2 weeks  3.They have been instructed to call if any changes in medications, doses, concerns, etc. Patient expresses understanding and has no further questions at this time.    Martha Lord, [facility]

## 2023-05-30 ENCOUNTER — DOCUMENTATION (OUTPATIENT)
Dept: GENETICS | Facility: HOSPITAL | Age: 38
End: 2023-05-30

## 2023-05-30 ENCOUNTER — TELEPHONE (OUTPATIENT)
Dept: GENETICS | Facility: HOSPITAL | Age: 38
End: 2023-05-30

## 2023-05-30 NOTE — TELEPHONE ENCOUNTER
Called patient to disclose genetic test results. Patient says he will call me back. Told patient if he forgets, I'll be mailing him the results letter. Pt is okay with this and confirmed his address.

## 2023-05-30 NOTE — PROGRESS NOTES
Bryan Valadez, a 37-year-old male, was seen for genetic counseling due to a personal history of astrocytoma. Mr. Valadez was diagnosed with an astrocytoma in 2018 and underwent a resection. He underwent radiation and completed adjuvant therapy in 2019. 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 He is currently being monitored with MRIs every 3-4 months for sign of recurrence. He has not started colonoscopies yet. He has PSA screenings annually. He was interested in discussing his risk for a hereditary cancer syndrome.  Mr. Valadez was interested in pursuing a multigene panel, and therefore the CancerNext-Expanded panel was ordered through [employer] which analyzes 77 genes associated with an increased cancer risk. Genetic testing was negative for known pathogenic mutations in 77 genes on this panel. Continued efforts are being made to contact Mr. Valadez with results. A copy of this note and results have been mailed to the patient and he is welcome to contact our office with any questions.     FAMILY HISTORY (see attached pedigree):                                                                      Mat. Uncle:                  Bladder cancer, 65  Mat. Grandfather:        Leukemia, 56  Father:                         Prostate cancer, 55  Pat. Grandfather:        Prostate cancer, 78                                      Stomach cancer, 78     We do not have medical records regarding the diagnoses in Mr. Valadez’s family.     RISK ASSESSMENT: Mr. Valadez’s personal and family history of cancer led to concern regarding a hereditary cancer syndrome. He does not meet NCCN guidelines for genetic testing. Despite this, we discussed how genetic testing is still available to him. We discussed that there are other genes that can contribute to hereditary risk for cancer. Mr. Valadez opted to pursue multigene panel testing via the CancerNext-Expanded panel. We discussed the standard approach to  genetic testing is through comprehensive multigene panel testing that would evaluate a greater number of genes associated with hereditary cancer.  These risk assessments are based on the family history information provided at the time of the appointment and could change in the future should new information be obtained.     GENETIC COUNSELING: (30 minutes) We reviewed the family history information in detail. Cases of cancer follow three general patterns: sporadic, familial, and hereditary.  While most cancer is sporadic, some cases appear to occur in family clusters.  These cases are said to be familial and account for 10-20% of cancer cases.  Familial cases may be due to a combination of shared genes and environmental factors among family members.  In even fewer families, the cancer is said to be inherited, and the genes responsible for the cancer are known.       Family histories typical of hereditary cancer syndromes usually include multiple first- and second-degree relatives diagnosed with cancer types that define a syndrome. These cases tend to be diagnosed at younger-than-expected ages and can be bilateral or multifocal. The cancer in these families follows an autosomal dominant inheritance pattern, which indicates the likely presence of a mutation in a cancer susceptibility gene. Children and siblings of an individual believed to carry this mutation have a 50% chance of inheriting that mutation, thereby inheriting the increased risk to develop cancer. These mutations can be passed down from the maternal or the paternal lineage.     Family histories typical of hereditary cancer syndromes usually include multiple first- and second-degree relatives diagnosed with cancer types that define a syndrome.  These cases tend to be diagnosed at younger-than-expected ages and can be bilateral or multifocal.  The cancer in these families follows an autosomal dominant inheritance pattern, which indicates the likely presence  of a mutation in a cancer susceptibility gene. Children and siblings of an individual believed to carry this mutation have a 50% chance of inheriting that mutation, thereby inheriting the increased risk to develop cancer. These mutations can be passed down from the maternal or the paternal lineage.  While most brain tumors are thought to be sporadic, there are a number of hereditary cancer syndromes that are associated with some increased risk for brain tumors, including Li Fraumeni syndrome and Parks syndrome. We discussed how the specific brain cancer found in him can sometimes be associated with tuberous sclerosis and neurofibromatosis. We discussed that results of genetic testing may change screening or other medical management for both himself and family members.     We discussed that there are other hereditary cancer syndromes. Some of these conditions have well defined cancer risks and established management guidelines.  Other genes that can be tested for have been more recently described, and there may be less data regarding the risks and therefore may not have established management guidelines.  We discussed these limitations at length. Mr. Valadez elected to pursue genetic testing to learn more information about potential risks to himself and relatives.     GENETIC TESTING:  The risks, benefits and limitations of genetic testing and implications for clinical management following testing were reviewed. DNA test results can influence decisions regarding screening and prevention.  Genetic testing can have significant psychological implications for both individuals and families. Also discussed was the possibility of employment and insurance discrimination based on genetic test results and the federal and states laws that are in place to prevent this ([place]).         We discussed panel testing, which would involve testing 77 genes associated with increased cancer risk. The benefits and limitations of genetic  testing were discussed. The implications of a positive or negative test result were discussed. We discussed the possibility that, in some cases, genetic test results may be ambiguous due to the identification of a genetic variant of uncertain significance (VUS). These variants may or may not be associated with an increased cancer risk. With multigene panel testing, it is not uncommon for a VUS to be identified.  If a VUS is identified, testing family members is not recommended and screening recommendations are made based on the family history. The laboratories that perform genetic testing work to reclassify the VUS and send out an amended report if and when a VUS is reclassified.  The majority of variant findings are ultimately reclassified to a negative result.      TEST RESULTS:  Genetic testing was negative for known pathogenic mutations by sequencing, rearrangement testing, and RNA analysis for the 77 genes included on the CancerNext Expanded panel.   This negative result greatly lowers but does not eliminate the risk of a hereditary cancer syndrome for Mr. Valadez. This assessment is based on the information provided at the time of consultation and could change should new information be obtained regarding his personal and/or family history.     PLAN:  Genetic counseling remains available to Mr. Valadez and his family.  Mr. Valadez is welcome to contact us with any questions in the future at [phone].    Alice Gomez MS, [facility] [place]  Licensed Certified Genetic Counselor     Cc: Bryan Jefferson MD

## 2023-06-08 ENCOUNTER — TELEPHONE (OUTPATIENT)
Dept: GENETICS | Facility: HOSPITAL | Age: 38
End: 2023-06-08
Payer: COMMERCIAL

## 2023-06-08 NOTE — TELEPHONE ENCOUNTER
Spoke with patient and disclosed negative genetic results. Informed patient these results would be on [facility] and sent to his Dr. I have already mailed him a copy of his results, but told pt to let me know if he doesn't receive it by Monday and I will mail him another one.

## 2023-06-09 ENCOUNTER — ANTICOAGULATION VISIT (OUTPATIENT)
Dept: ONCOLOGY | Facility: HOSPITAL | Age: 38
End: 2023-06-09
Payer: COMMERCIAL

## 2023-06-09 ENCOUNTER — LAB (OUTPATIENT)
Dept: OTHER | Facility: HOSPITAL | Age: 38
End: 2023-06-09
Payer: COMMERCIAL

## 2023-06-09 DIAGNOSIS — I82.532 CHRONIC DEEP VEIN THROMBOSIS (DVT) OF LEFT POPLITEAL VEIN: ICD-10-CM

## 2023-06-09 DIAGNOSIS — I26.99 PULMONARY EMBOLISM WITH INFARCTION: Primary | ICD-10-CM

## 2023-06-09 DIAGNOSIS — D68.51 FACTOR 5 LEIDEN MUTATION, HETEROZYGOUS: ICD-10-CM

## 2023-06-09 DIAGNOSIS — Z79.01 CHRONIC ANTICOAGULATION: ICD-10-CM

## 2023-06-09 LAB
INR PPP: 2.7
PROTHROMBIN TIME: 32.3 SECONDS (ref 11–15)

## 2023-06-09 PROCEDURE — 85610 PROTHROMBIN TIME: CPT

## 2023-06-09 PROCEDURE — 36416 COLLJ CAPILLARY BLOOD SPEC: CPT

## 2023-06-09 NOTE — PROGRESS NOTES
Anticoagulation Clinic Progress Note    Patient's visit was held by phone today.    Anticoagulation Summary  As of 2023      INR goal:  2.0-3.0   TTR:  49.6 % (2.5 mo)   INR used for dosin.70 (2023)   Warfarin maintenance plan:  10 mg (5 mg x 2) every Mon, Thu; 7.5 mg (5 mg x 1.5) all other days; Starting 2023   Weekly warfarin total:  57.5 mg   No change documented:  Sharifa Cruz RPH   Plan last modified:  Martha Lord RPH (2023)   Next INR check:  2023   Priority:  Maintenance   Target end date:  Indefinite    Indications    Pulmonary embolism with infarction [I26.99]  Factor 5 Leiden mutation  heterozygous [D68.51]  Chronic deep vein thrombosis (DVT) of left popliteal vein [I82.532]                 Anticoagulation Episode Summary       INR check location:      Preferred lab:      Send INR reminders to:   [facility] CBC ANTICOAG POOL    Comments:  EP lab/ coag phone call          Anticoagulation Care Providers       Provider Role Specialty Phone number    Sean Jefferson MD Referring Hematology and Oncology [phone]            Drug interactions: has remained unchanged.  Diet: has remained unchanged.    Clinic Interview:  No pertinent clinical findings have been reported.    INR History:      5/10/2023    11:44 AM 5/10/2023    11:45 AM 2023    10:00 AM 2023    10:15 AM 2023    11:51 AM 2023    10:00 AM 2023    10:16 AM   Anticoagulation Monitoring   INR  1.50   1.70 2.70    INR Date  5/10/2023   2023 2023    INR Goal  2.0-3.0 2.0-3.0  2.0-3.0 2.0-3.0    Trend  Same   Up Same    Last Week Total  52.5 mg   52.5 mg 57.5 mg    Next Week Total  60 mg   60 mg 57.5 mg    Sun  7.5 mg   7.5 mg 7.5 mg    Mon  10 mg (5/15, )   10 mg 10 mg    Tue  7.5 mg   7.5 mg 7.5 mg    Wed  10 mg (5/10); Otherwise 7.5 mg   10 mg (); Otherwise 7.5 mg 7.5 mg    Thu  10 mg (); Otherwise 7.5 mg   10 mg 10 mg    Fri  7.5 mg   7.5 mg 7.5 mg    Sat  7.5 mg   7.5 mg 7.5 mg     Historical INR 1.50    1.70    2.70        Plan:  1. INR is Therapeutic today- see above in Anticoagulation Summary.   Will instruct Bryan Valadez to Continue their warfarin regimen- see above in Anticoagulation Summary.  2. Follow up in 4 weeks  3.They have been instructed to call if any changes in medications, doses, concerns, etc.Left VM with instructions and to call back with updates or questions.   .    Sharifa Cruz [facility]

## 2023-08-04 ENCOUNTER — ANTICOAGULATION VISIT (OUTPATIENT)
Dept: ONCOLOGY | Facility: HOSPITAL | Age: 38
End: 2023-08-04
Payer: COMMERCIAL

## 2023-08-04 ENCOUNTER — LAB (OUTPATIENT)
Dept: OTHER | Facility: HOSPITAL | Age: 38
End: 2023-08-04
Payer: COMMERCIAL

## 2023-08-04 DIAGNOSIS — Z79.01 CHRONIC ANTICOAGULATION: ICD-10-CM

## 2023-08-04 DIAGNOSIS — I82.532 CHRONIC DEEP VEIN THROMBOSIS (DVT) OF LEFT POPLITEAL VEIN: ICD-10-CM

## 2023-08-04 DIAGNOSIS — I26.99 PULMONARY EMBOLISM WITH INFARCTION: Primary | ICD-10-CM

## 2023-08-04 DIAGNOSIS — D68.51 FACTOR 5 LEIDEN MUTATION, HETEROZYGOUS: ICD-10-CM

## 2023-08-04 LAB
INR PPP: 2.1
PROTHROMBIN TIME: 25.7 SECONDS (ref 11–15)

## 2023-08-04 PROCEDURE — 85610 PROTHROMBIN TIME: CPT

## 2023-08-04 PROCEDURE — 36416 COLLJ CAPILLARY BLOOD SPEC: CPT

## 2023-08-21 DIAGNOSIS — I26.99 PULMONARY EMBOLISM WITH INFARCTION: ICD-10-CM

## 2023-08-21 RX ORDER — WARFARIN SODIUM 5 MG/1
TABLET ORAL
Qty: 156 TABLET | Refills: 2 | Status: SHIPPED | OUTPATIENT
Start: 2023-08-21

## 2023-09-01 ENCOUNTER — ANTICOAGULATION VISIT (OUTPATIENT)
Dept: ONCOLOGY | Facility: HOSPITAL | Age: 38
End: 2023-09-01
Payer: COMMERCIAL

## 2023-09-01 ENCOUNTER — LAB (OUTPATIENT)
Dept: OTHER | Facility: HOSPITAL | Age: 38
End: 2023-09-01
Payer: COMMERCIAL

## 2023-09-01 DIAGNOSIS — Z79.01 CHRONIC ANTICOAGULATION: ICD-10-CM

## 2023-09-01 DIAGNOSIS — I26.99 PULMONARY EMBOLISM WITH INFARCTION: Primary | ICD-10-CM

## 2023-09-01 DIAGNOSIS — D68.51 FACTOR 5 LEIDEN MUTATION, HETEROZYGOUS: ICD-10-CM

## 2023-09-01 DIAGNOSIS — I82.532 CHRONIC DEEP VEIN THROMBOSIS (DVT) OF LEFT POPLITEAL VEIN: ICD-10-CM

## 2023-09-01 LAB
INR PPP: 2.3
PROTHROMBIN TIME: 27.7 SECONDS (ref 11–15)

## 2023-09-01 PROCEDURE — 85610 PROTHROMBIN TIME: CPT

## 2023-09-01 PROCEDURE — 36416 COLLJ CAPILLARY BLOOD SPEC: CPT

## 2023-09-01 NOTE — PROGRESS NOTES
Anticoagulation Clinic Progress Note    Patient's visit was held by phone today.    Anticoagulation Summary  As of 2023      INR goal:  2.0-3.0   TTR:  76.4 % (5.3 mo)   INR used for dosin.30 (2023)   Warfarin maintenance plan:  10 mg (5 mg x 2) every Mon, Thu; 7.5 mg (5 mg x 1.5) all other days   Weekly warfarin total:  57.5 mg   No change documented:  Sharifa Cruz RPH   Plan last modified:  Martha Lord RPH (2023)   Next INR check:  2023   Priority:  Maintenance   Target end date:  Indefinite    Indications    Pulmonary embolism with infarction [I26.99]  Factor 5 Leiden mutation  heterozygous [D68.51]  Chronic deep vein thrombosis (DVT) of left popliteal vein [I82.532]                 Anticoagulation Episode Summary       INR check location:      Preferred lab:      Send INR reminders to:  [facility] CBC ANTICOAG POOL    Comments:  EP lab/ coag phone call          Anticoagulation Care Providers       Provider Role Specialty Phone number    Sean Jefferson MD Referring Hematology and Oncology [phone]            Drug interactions: has remained unchanged.  Diet: has remained unchanged.    Clinic Interview:  No pertinent clinical findings have been reported.    INR History:      2023    10:16 AM 2023     8:36 AM 2023    10:00 AM 2023     9:29 AM 2023    10:15 AM 2023     9:46 AM 2023    10:15 AM   Anticoagulation Monitoring   INR   2.80  2.10  2.30   INR Date   2023   INR Goal   2.0-3.0  2.0-3.0  2.0-3.0   Trend   Same  Same  Same   Last Week Total   57.5 mg  57.5 mg  57.5 mg   Next Week Total   57.5 mg  57.5 mg  57.5 mg   Sun   7.5 mg  7.5 mg  7.5 mg   Mon   10 mg  10 mg  10 mg   Tue   7.5 mg  7.5 mg  7.5 mg   Wed   7.5 mg  7.5 mg  7.5 mg   Thu   10 mg  10 mg  10 mg   Fri   7.5 mg  7.5 mg  7.5 mg   Sat   7.5 mg  7.5 mg  7.5 mg   Historical INR 2.70  2.80   2.10   2.30         Plan:  1. INR is Therapeutic today- see above in  Anticoagulation Summary.   Will instruct Bryan Valadez to Continue their warfarin regimen- see above in Anticoagulation Summary.  2. Follow up in 4 weeks  3.They have been instructed to call if any changes in medications, doses, concerns, etc. Patient expresses understanding and has no further questions at this time.    Sharifa Cruz [facility]

## 2023-09-28 NOTE — PROGRESS NOTES
[facility] OUTPATIENT FOLLOW UP VISIT    REASON FOR FOLLOW-UP:    1.  Left lower extremity DVT with progression of symptoms and extension of the left lower extremity DVT into the femoral vein from the popliteal/calf vein swallowing for next set.  Currently anticoagulated with warfarin.  2.  Right lower extremity DVT noted in the common femoral, profunda femoral, and calf veins while anticoagulated with a DOAC.  Therefore, failure of DOAC.  3.  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  Other thrombophilia labs negative.    4.  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  NOT 1p19q co-deleted.    5.  Radiation initiated on 7/31/2018.  Plan for Temodar ×1 year following radiation.  6.  4500 cGy in 25 fractions administered from 7/31/2018 through 9/14/2018  7.  MRI brain 10/23/2018 with resolving hemorrhage in the resection cavity.  However, there is hyperintensity measuring 4.6 x 4.3 x 3 cm along the margins of the resection cavity.  8.  Repeat venous duplex on 10/23 with chronic right CVT and chronic LLE DVT from the mid femoral vein distally.     9.  He initiated adjuvant therapy with Temodar in mid October 2018.  10.  MRI brain 12/3/2018 with stable findings.  11.  On 1/24/2019 he did have a repeat resection by Dr. Galvan.  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  However, there was no evidence of progression to a higher grade in the new resected specimen.  Mostly the proliferative activity was very low with only rare mitoses and a low Ki-67 of just 2-3%.  12.  Temodar completed December 2019      HISTORY OF PRESENT ILLNESS:  Bryan Valadez is a 38 y.o. male who returns today for follow up of the above issues.     No new recent issues.  No new neurologic problems.  No bleeding issues.  He continues warfarin.    He recently had some MRI imaging at [facility] that initially  "showed some concerns but follow-up imaging done 2 days ago was without an abnormality      REVIEW OF SYSTEMS:  As per the HPI    PHYSICAL EXAMINATION:    Vitals:    10/06/23 1126   BP: 125/84   Pulse: 57   Resp: 16   Temp: 97.8 °F (36.6 °C)   TempSrc: Temporal   SpO2: 98%   Weight: 85.7 kg (189 lb)   Height: 180 cm (70.87\")   PainSc: 0-No pain       General:  No acute distress, awake, alert and oriented  Skin:  Warm and dry, no visible rash  HEENT: Well-healed surgical incisions from his distant history of craniotomy.  Chest:  Normal respiratory effort.  Lungs clear to auscultation bilaterally.  Heart: Regular rate and rhythm  Lymphatics: No palpable cervical supraclavicular axillary adenopathy again today  Extremities:  No visible clubbing, cyanosis, or edema  Neuro/psych:  Grossly nonfocal.  Normal mood and affect.    DIAGNOSTIC DATA:  CBC & Differential (10/06/2023 11:24)  Protime-INR, Fingerstick (10/06/2023 11:24)    IMAGING:  MRI imaging on 9/14/2023 with a 3 mm area of concern.  This was not present on CT imaging with and without contrast on 10/4.    ASSESSMENT:  This is a 38 y.o. male with:    *History of bilateral lower extremity DVT:   Factor V Leiden heterozygote  Failure of DOAC in the past  He remains on warfarin.   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 Galvan.  IDH mutated.  NOT 1p19q co-deleted.  Overall, this is a good molecular profile. He did initiate radiation alone on 7/31/2018 and completed it on 9/14/2018.   He initiated adjuvant Temodar in mid October 2018.  Due to persistent abnormalities on MRI, on 1/24/2019 he did have a repeat resection by Dr. Galvan.  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  However, there was no evidence of progression to a higher grade in the new resected specimen.  Mostly the proliferative activity was very low with only rare mitoses and a " low Ki-67 of just 2-3%.  MRI brain imaging from 9/23/2019 appeared stable.  He completed 1 year of Temodar with an MRI on 12/12/2019 showing stable findings with some improvement in the intensity of the abnormal uptake  MRI brain 2/27/2020 stable..  MRI brain 5/27/2020 stable.    MRI brain 9/21/2020 stable.  MRI 1/7/2021 stable  MRI 5/10/2021 without contrast stable  MRI brain 9/30/21 without contrast stable  MRI brain 2/2/22 with and without contrast stable  MRI brain 5/3/22 with and without contrast stable  MRI brain August 2022 without recurrence.  He is now following with Dr. Bustamante with neurosurgery at [facility].  MRI of the brain at [facility] on 2/26/23 showing stable findings, no evidence for recurrence  MRI brain 6/8/23 stable  MRI brain 9/14/2023 with a 3 mm area of concern at the posterior margin of the right frontal resection cavity that was not present on follow-up imaging of the brain with and without contrast on 10/4/2023.    PLAN:   Continue warfarin.  Dose adjustment per our pharmacy staff at this point.  Continue to follow-up with Dr. Bustamante at the [facility] with follow-up MRI imaging there as appropriate, currently planned for December or January.  Monthly INR.  I will see him back in 6 months for follow-up with a CBC and INR.

## 2023-10-03 ENCOUNTER — PATIENT MESSAGE (OUTPATIENT)
Dept: ONCOLOGY | Facility: CLINIC | Age: 38
End: 2023-10-03
Payer: COMMERCIAL

## 2023-10-03 DIAGNOSIS — R89.9 ABNORMAL LABORATORY TEST RESULT: Primary | ICD-10-CM

## 2023-10-06 ENCOUNTER — OFFICE VISIT (OUTPATIENT)
Dept: ONCOLOGY | Facility: CLINIC | Age: 38
End: 2023-10-06
Payer: COMMERCIAL

## 2023-10-06 ENCOUNTER — ANTICOAGULATION VISIT (OUTPATIENT)
Dept: ONCOLOGY | Facility: HOSPITAL | Age: 38
End: 2023-10-06
Payer: COMMERCIAL

## 2023-10-06 ENCOUNTER — LAB (OUTPATIENT)
Dept: OTHER | Facility: HOSPITAL | Age: 38
End: 2023-10-06
Payer: COMMERCIAL

## 2023-10-06 VITALS
TEMPERATURE: 97.8 F | WEIGHT: 189 LBS | OXYGEN SATURATION: 98 % | RESPIRATION RATE: 16 BRPM | SYSTOLIC BLOOD PRESSURE: 125 MMHG | DIASTOLIC BLOOD PRESSURE: 84 MMHG | HEIGHT: 71 IN | HEART RATE: 57 BPM | BODY MASS INDEX: 26.46 KG/M2

## 2023-10-06 DIAGNOSIS — C71.1 ANAPLASTIC ASTROCYTOMA OF FRONTAL LOBE: ICD-10-CM

## 2023-10-06 DIAGNOSIS — D68.51 FACTOR 5 LEIDEN MUTATION, HETEROZYGOUS: ICD-10-CM

## 2023-10-06 DIAGNOSIS — I82.532 CHRONIC DEEP VEIN THROMBOSIS (DVT) OF LEFT POPLITEAL VEIN: ICD-10-CM

## 2023-10-06 DIAGNOSIS — Z79.01 CHRONIC ANTICOAGULATION: ICD-10-CM

## 2023-10-06 DIAGNOSIS — I26.99 PULMONARY EMBOLISM WITH INFARCTION: Primary | ICD-10-CM

## 2023-10-06 DIAGNOSIS — R89.9 ABNORMAL LABORATORY TEST RESULT: ICD-10-CM

## 2023-10-06 DIAGNOSIS — D68.51 FACTOR 5 LEIDEN MUTATION, HETEROZYGOUS: Primary | ICD-10-CM

## 2023-10-06 LAB
BASOPHILS # BLD AUTO: 0.03 10*3/MM3 (ref 0–0.2)
BASOPHILS NFR BLD AUTO: 0.7 % (ref 0–1.5)
DEPRECATED RDW RBC AUTO: 40.7 FL (ref 37–54)
EOSINOPHIL # BLD AUTO: 0.19 10*3/MM3 (ref 0–0.4)
EOSINOPHIL NFR BLD AUTO: 4.4 % (ref 0.3–6.2)
ERYTHROCYTE [DISTWIDTH] IN BLOOD BY AUTOMATED COUNT: 12.3 % (ref 12.3–15.4)
HCT VFR BLD AUTO: 45.3 % (ref 37.5–51)
HGB BLD-MCNC: 15.1 G/DL (ref 13–17.7)
IMM GRANULOCYTES # BLD AUTO: 0.01 10*3/MM3 (ref 0–0.05)
IMM GRANULOCYTES NFR BLD AUTO: 0.2 % (ref 0–0.5)
INR PPP: 3.4
LYMPHOCYTES # BLD AUTO: 1.51 10*3/MM3 (ref 0.7–3.1)
LYMPHOCYTES NFR BLD AUTO: 35 % (ref 19.6–45.3)
MCH RBC QN AUTO: 30.2 PG (ref 26.6–33)
MCHC RBC AUTO-ENTMCNC: 33.3 G/DL (ref 31.5–35.7)
MCV RBC AUTO: 90.6 FL (ref 79–97)
MONOCYTES # BLD AUTO: 0.42 10*3/MM3 (ref 0.1–0.9)
MONOCYTES NFR BLD AUTO: 9.7 % (ref 5–12)
NEUTROPHILS NFR BLD AUTO: 2.16 10*3/MM3 (ref 1.7–7)
NEUTROPHILS NFR BLD AUTO: 50 % (ref 42.7–76)
NRBC BLD AUTO-RTO: 0 /100 WBC (ref 0–0.2)
PLATELET # BLD AUTO: 220 10*3/MM3 (ref 140–450)
PMV BLD AUTO: 9.9 FL (ref 6–12)
PROTHROMBIN TIME: 40.7 SECONDS (ref 11–15)
PSA SERPL-MCNC: 1.72 NG/ML (ref 0–4)
RBC # BLD AUTO: 5 10*6/MM3 (ref 4.14–5.8)
WBC NRBC COR # BLD: 4.32 10*3/MM3 (ref 3.4–10.8)

## 2023-10-06 PROCEDURE — 36415 COLL VENOUS BLD VENIPUNCTURE: CPT

## 2023-10-06 PROCEDURE — 85610 PROTHROMBIN TIME: CPT

## 2023-10-06 PROCEDURE — 85025 COMPLETE CBC W/AUTO DIFF WBC: CPT | Performed by: INTERNAL MEDICINE

## 2023-10-06 PROCEDURE — 84153 ASSAY OF PSA TOTAL: CPT | Performed by: INTERNAL MEDICINE

## 2023-10-12 NOTE — PROGRESS NOTES
Anticoagulation Clinic Progress Note    Patient's visit was held by phone today.    Anticoagulation Summary  As of 10/6/2023      INR goal:  2.0-3.0   TTR:  74.1% (6.4 mo)   INR used for dosing:  3.40 (10/6/2023)   Warfarin maintenance plan:  10 mg (5 mg x 2) every Mon, Thu; 7.5 mg (5 mg x 1.5) all other days   Weekly warfarin total:  57.5 mg   No change documented:  Martha Lord RPH   Plan last modified:  Martha Lord RPH (5/24/2023)   Next INR check:     Priority:  Maintenance   Target end date:  Indefinite    Indications    Pulmonary embolism with infarction [I26.99]  Factor 5 Leiden mutation  heterozygous [D68.51]  Chronic deep vein thrombosis (DVT) of left popliteal vein [I82.532]                 Anticoagulation Episode Summary       INR check location:      Preferred lab:      Send INR reminders to:  [facility] CBC ANTICOAG POOL    Comments:  EP lab/ coag phone call          Anticoagulation Care Providers       Provider Role Specialty Phone number    Sean Jefferson MD Referring Hematology and Oncology [phone]            Drug interactions: has remained unchanged.  Diet: has remained unchanged.    Clinic Interview:  No pertinent clinical findings have been reported.  This phone appointmen was regarding INR resulted on 10/6 of 3.4. Patient thinks he took 2.5 tablets on Monday 10/2 which would explain the elevated INR of 3.4 on 10/6. Patient states he as had no s/sx of bleeding or any other changes - continue same dose and RTC on 11/3 and to call if any changes or need of warfarin refills.     INR History:      7/7/2023    10:00 AM 8/4/2023     9:29 AM 8/4/2023    10:15 AM 9/1/2023     9:46 AM 9/1/2023    10:15 AM 10/6/2023    10:45 AM 10/6/2023    11:24 AM   Anticoagulation Monitoring   INR 2.80  2.10  2.30 3.40    INR Date 7/7/2023  8/4/2023  9/1/2023 10/6/2023    INR Goal 2.0-3.0  2.0-3.0  2.0-3.0 2.0-3.0    Trend Same  Same  Same Same    Last Week Total 57.5 mg  57.5 mg  57.5 mg 57.5 mg    Next Week  Total 57.5 mg  57.5 mg  57.5 mg 57.5 mg    Sun 7.5 mg  7.5 mg  7.5 mg 7.5 mg    Mon 10 mg  10 mg  10 mg 10 mg    Tue 7.5 mg  7.5 mg  7.5 mg 7.5 mg    Wed 7.5 mg  7.5 mg  7.5 mg 7.5 mg    Thu 10 mg  10 mg  10 mg 10 mg    Fri 7.5 mg  7.5 mg  7.5 mg 7.5 mg    Sat 7.5 mg  7.5 mg  7.5 mg 7.5 mg    Historical INR  2.10   2.30    3.40    Visit Report      Report Report       Plan:  1. INR is Supratherapeutic today- see above in Anticoagulation Summary.   Will instruct Bryan Valadez to Continue their warfarin regimen- see above in Anticoagulation Summary.  2. Follow up in 4 weeks  3.They have been instructed to call if any changes in medications, doses, concerns, etc. Patient expresses understanding and has no further questions at this time.    Martha Lord RP

## 2023-10-25 ENCOUNTER — OFFICE VISIT (OUTPATIENT)
Dept: INTERNAL MEDICINE | Facility: CLINIC | Age: 38
End: 2023-10-25
Payer: COMMERCIAL

## 2023-10-25 ENCOUNTER — LAB (OUTPATIENT)
Facility: HOSPITAL | Age: 38
End: 2023-10-25
Payer: COMMERCIAL

## 2023-10-25 DIAGNOSIS — D68.51 FACTOR 5 LEIDEN MUTATION, HETEROZYGOUS: Chronic | ICD-10-CM

## 2023-10-25 DIAGNOSIS — C71.1 ANAPLASTIC ASTROCYTOMA OF FRONTAL LOBE: ICD-10-CM

## 2023-10-25 DIAGNOSIS — J45.20 MILD INTERMITTENT ASTHMA WITHOUT COMPLICATION: Chronic | ICD-10-CM

## 2023-10-25 DIAGNOSIS — D68.51 FACTOR 5 LEIDEN MUTATION, HETEROZYGOUS: Primary | ICD-10-CM

## 2023-10-25 DIAGNOSIS — Z00.00 PE (PHYSICAL EXAM), ANNUAL: Primary | ICD-10-CM

## 2023-10-25 LAB
ALBUMIN SERPL-MCNC: 5.1 G/DL (ref 3.5–5.2)
ALBUMIN/GLOB SERPL: 2 G/DL
ALP SERPL-CCNC: 52 U/L (ref 39–117)
ALT SERPL W P-5'-P-CCNC: 21 U/L (ref 1–41)
ANION GAP SERPL CALCULATED.3IONS-SCNC: 10 MMOL/L (ref 5–15)
AST SERPL-CCNC: 18 U/L (ref 1–40)
BILIRUB SERPL-MCNC: 0.4 MG/DL (ref 0–1.2)
BILIRUB UR QL STRIP: NEGATIVE
BUN SERPL-MCNC: 9 MG/DL (ref 6–20)
BUN/CREAT SERPL: 10.2 (ref 7–25)
CALCIUM SPEC-SCNC: 9.9 MG/DL (ref 8.6–10.5)
CHLORIDE SERPL-SCNC: 103 MMOL/L (ref 98–107)
CHOLEST SERPL-MCNC: 232 MG/DL (ref 0–200)
CLARITY UR: CLEAR
CO2 SERPL-SCNC: 27 MMOL/L (ref 22–29)
COLOR UR: YELLOW
CREAT SERPL-MCNC: 0.88 MG/DL (ref 0.76–1.27)
EGFRCR SERPLBLD CKD-EPI 2021: 111.5 ML/MIN/1.73
GLOBULIN UR ELPH-MCNC: 2.5 GM/DL
GLUCOSE SERPL-MCNC: 102 MG/DL (ref 65–99)
GLUCOSE UR STRIP-MCNC: NEGATIVE MG/DL
HDLC SERPL-MCNC: 57 MG/DL (ref 40–60)
HGB UR QL STRIP.AUTO: NEGATIVE
KETONES UR QL STRIP: NEGATIVE
LDLC SERPL CALC-MCNC: 157 MG/DL (ref 0–100)
LDLC/HDLC SERPL: 2.71 {RATIO}
LEUKOCYTE ESTERASE UR QL STRIP.AUTO: NEGATIVE
NITRITE UR QL STRIP: NEGATIVE
PH UR STRIP.AUTO: 6.5 [PH] (ref 5–8)
POTASSIUM SERPL-SCNC: 4.6 MMOL/L (ref 3.5–5.2)
PROT SERPL-MCNC: 7.6 G/DL (ref 6–8.5)
PROT UR QL STRIP: NEGATIVE
SODIUM SERPL-SCNC: 140 MMOL/L (ref 136–145)
SP GR UR STRIP: 1.01 (ref 1–1.03)
TRIGL SERPL-MCNC: 102 MG/DL (ref 0–150)
TSH SERPL DL<=0.05 MIU/L-ACNC: 1.73 UIU/ML (ref 0.27–4.2)
UROBILINOGEN UR QL STRIP: NORMAL
VLDLC SERPL-MCNC: 18 MG/DL (ref 5–40)
WHOLE BLOOD HOLD SPECIMEN: NORMAL

## 2023-10-25 PROCEDURE — 99396 PREV VISIT EST AGE 40-64: CPT | Performed by: NURSE PRACTITIONER

## 2023-10-25 PROCEDURE — 81003 URINALYSIS AUTO W/O SCOPE: CPT | Performed by: NURSE PRACTITIONER

## 2023-10-25 PROCEDURE — 84443 ASSAY THYROID STIM HORMONE: CPT | Performed by: NURSE PRACTITIONER

## 2023-10-25 PROCEDURE — 80061 LIPID PANEL: CPT | Performed by: NURSE PRACTITIONER

## 2023-10-25 PROCEDURE — 36415 COLL VENOUS BLD VENIPUNCTURE: CPT | Performed by: NURSE PRACTITIONER

## 2023-10-25 PROCEDURE — 80053 COMPREHEN METABOLIC PANEL: CPT | Performed by: NURSE PRACTITIONER

## 2023-10-26 VITALS
HEART RATE: 65 BPM | HEIGHT: 71 IN | WEIGHT: 188.6 LBS | OXYGEN SATURATION: 100 % | SYSTOLIC BLOOD PRESSURE: 130 MMHG | DIASTOLIC BLOOD PRESSURE: 80 MMHG | BODY MASS INDEX: 26.4 KG/M2

## 2023-10-26 PROBLEM — I82.432 ACUTE DEEP VEIN THROMBOSIS (DVT) OF POPLITEAL VEIN OF LEFT LOWER EXTREMITY: Status: RESOLVED | Noted: 2018-06-29 | Resolved: 2023-10-26

## 2023-10-26 PROBLEM — Z78.9 FAILURE OF OUTPATIENT TREATMENT: Status: RESOLVED | Noted: 2018-07-08 | Resolved: 2023-10-26

## 2023-10-26 PROBLEM — C71.9 ASTROCYTOMA BRAIN TUMOR: Status: RESOLVED | Noted: 2018-12-12 | Resolved: 2023-10-26

## 2023-10-26 PROBLEM — D49.6 PRIMARY BRAIN TUMOR: Status: RESOLVED | Noted: 2018-06-15 | Resolved: 2023-10-26

## 2023-10-26 PROBLEM — D68.51 FACTOR 5 LEIDEN MUTATION, HETEROZYGOUS: Chronic | Status: ACTIVE | Noted: 2018-07-08

## 2023-10-26 PROBLEM — C71.1: Chronic | Status: ACTIVE | Noted: 2018-07-23

## 2023-10-26 PROBLEM — I26.99 PULMONARY EMBOLUS: Status: RESOLVED | Noted: 2018-06-29 | Resolved: 2023-10-26

## 2023-10-26 PROBLEM — Z86.711 HISTORY OF PULMONARY EMBOLUS (PE): Status: RESOLVED | Noted: 2018-07-08 | Resolved: 2023-10-26

## 2023-10-26 NOTE — ASSESSMENT & PLAN NOTE
6/16/2018-resection by Dr. Galvan  1/24/2019-resection by Dr. 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

## 2023-10-26 NOTE — PROGRESS NOTES
Subjective   Bryan Valadez is a 38 y.o. male who is here for a physical exam.    History of Present Illness  He is doing well, followed by [facility] neurology (last 10/4/23) with regular MRI brain without recurrence of astrocytoma. He is also followed by oncology.  He reports feeling well, maintains busy lifestyle with traveling for work (just returned from [place]) and with his family. He notes a good energy level overall.       The following portions of the patient's history were reviewed and updated as appropriate: allergies, current medications, past social history and problem list.    Past Medical History:   Diagnosis Date    Allergic rhinitis     Asthma     Pulmonary Dr. Alexandra    Chest pain     DVT (deep venous thrombosis) 06/2018    LEFT LEG    Factor V Leiden     GERD (gastroesophageal reflux disease)     H/O echocardiogram 2006    Headache     History of ETT     History of Holter monitoring     Hyperlipidemia     PE (pulmonary thromboembolism) 06/2018    AFTER CRANIOTOMY    Primary brain tumor 2018         Current Outpatient Medications:     levETIRAcetam (KEPPRA) 1000 MG tablet, TAKE 1 TABLET BY MOUTH EVERY 12 HOURS, Disp: 60 tablet, Rfl: 11    montelukast (SINGULAIR) 10 MG tablet, TAKE 1 TABLET BY MOUTH DAILY, Disp: 90 tablet, Rfl: 3    warfarin (COUMADIN) 5 MG tablet, Take 2 tablets (10mg) on Mondays and Thursdays and 1.5 tablets (7.5mg) on all other days or as directed, Disp: 156 tablet, Rfl: 2    Allergies   Allergen Reactions    Avocado Itching    Other Itching     Insect stings: Bee stings, throat swelling (has Epi pen)    Allergy to fruit, Avocado, Cherry Tomato (can have blue berries)    Tomato Itching     Cherry tomato       Review of Systems   Constitutional:  Negative for activity change, appetite change, chills, diaphoresis, fatigue, fever and unexpected weight change.   HENT:  Positive for congestion, postnasal drip and rhinorrhea. Negative for dental problem, drooling, ear discharge, ear pain,  "facial swelling, hearing loss, mouth sores, nosebleeds, sinus pressure, sore throat, tinnitus and trouble swallowing.    Eyes:  Negative for photophobia, pain, discharge, redness, itching and visual disturbance.   Respiratory:  Negative for apnea, cough, choking, chest tightness, shortness of breath and wheezing.    Cardiovascular:  Negative for chest pain, palpitations and leg swelling.        No orthopnea, PND, CH   Gastrointestinal:  Negative for abdominal pain, blood in stool, constipation, diarrhea, nausea and vomiting.   Endocrine: Negative for cold intolerance, heat intolerance, polydipsia and polyuria.   Genitourinary:  Negative for decreased urine volume, dysuria, enuresis, flank pain, frequency, hematuria and urgency.   Musculoskeletal:  Negative for arthralgias, back pain, gait problem, joint swelling, myalgias, neck pain and neck stiffness.   Skin:  Negative for color change and rash.        No hair changes, no nail changes   Allergic/Immunologic: Negative for environmental allergies, food allergies and immunocompromised state.   Neurological:  Negative for dizziness, tremors, seizures, syncope, speech difficulty, weakness, light-headedness, numbness and headaches.   Hematological:  Negative for adenopathy. Does not bruise/bleed easily.   Psychiatric/Behavioral:  Negative for agitation, confusion, decreased concentration, dysphoric mood, sleep disturbance and suicidal ideas. The patient is not nervous/anxious.        Objective   Vitals:    10/25/23 1247 10/25/23 1310   BP: 140/84 130/80   BP Location: Left arm Left arm   Patient Position: Sitting Sitting   Cuff Size: Adult Adult   Pulse: 65    SpO2: 100%    Weight: 85.5 kg (188 lb 9.6 oz)    Height: 180 cm (70.87\")      Physical Exam  Constitutional:       General: He is not in acute distress.     Appearance: Normal appearance. He is not diaphoretic.   HENT:      Head: Normocephalic and atraumatic.      Right Ear: Tympanic membrane, ear canal and " external ear normal.      Left Ear: Tympanic membrane, ear canal and external ear normal.      Nose: Nose normal. No rhinorrhea.      Mouth/Throat:      Mouth: Mucous membranes are moist.      Pharynx: Oropharynx is clear.   Eyes:      General:         Right eye: No discharge.         Left eye: No discharge.      Conjunctiva/sclera: Conjunctivae normal.   Cardiovascular:      Rate and Rhythm: Normal rate and regular rhythm.      Pulses: Normal pulses.      Heart sounds: Normal heart sounds.   Pulmonary:      Effort: Pulmonary effort is normal.      Breath sounds: Normal breath sounds.   Abdominal:      General: Bowel sounds are normal.      Tenderness: There is no abdominal tenderness.   Musculoskeletal:         General: No swelling or tenderness.      Cervical back: Normal range of motion.   Skin:     General: Skin is warm and dry.   Neurological:      General: No focal deficit present.      Mental Status: He is alert and oriented to person, place, and time.   Psychiatric:         Mood and Affect: Mood normal.         Behavior: Behavior normal.         Judgment: Judgment normal.     BMI is >= 25 and <30. (Overweight) The following options were offered after discussion;: exercise counseling/recommendations and nutrition counseling/recommendations      Assessment & Plan   Diagnoses and all orders for this visit:    1. PE (physical exam), annual (Primary)  -     Comprehensive Metabolic Panel  -     Lipid Panel  -     TSH  -     Urinalysis With Microscopic If Indicated (No Culture) - Urine, Clean Catch    2. Factor 5 Leiden mutation, heterozygous  Assessment & Plan:  He is managed on Warfarin with regular protime monitoring      3. Mild intermittent asthma without complication  Assessment & Plan:  He has seen pulm (Dr. Alexandra) in past, currently managed on Singulair daily without need of Albuterol for rescue      4. Anaplastic astrocytoma of frontal lobe  Assessment & Plan:  6/16/2018-resection by   oCle  1/24/2019-resection by Dr. 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  His Body mass index is 26.4 kg/m². - He remains active and tries to follow a low-fat, low-cholesterol diet.    Prevention:  Health Maintenance   Topic Date Due    Pneumococcal Vaccine 0-64 (1 - PCV) Never done    ANNUAL PHYSICAL  10/31/2018    BMI FOLLOWUP  12/17/2020    COVID-19 Vaccine (3 - 2023-24 season) 10/27/2023 (Originally 9/1/2023)    INFLUENZA VACCINE  03/31/2024 (Originally 8/1/2023)    LIPID PANEL  10/25/2024    TDAP/TD VACCINES (2 - Td or Tdap) 03/29/2026    HEPATITIS C SCREENING  Completed       Discussed healthy lifestyle choices such as maintaining a balanced diet low in carbohydrates and limiting caffeine and alcohol intake.  Recommended routine exercise for bone strength and cardiovascular health.

## 2023-10-26 NOTE — ASSESSMENT & PLAN NOTE
He has seen pulm (Dr. Alexandra) in past, currently managed on Singulair daily without need of Albuterol for rescue

## 2023-11-03 ENCOUNTER — ANTICOAGULATION VISIT (OUTPATIENT)
Dept: ONCOLOGY | Facility: HOSPITAL | Age: 38
End: 2023-11-03
Payer: COMMERCIAL

## 2023-11-03 ENCOUNTER — LAB (OUTPATIENT)
Dept: OTHER | Facility: HOSPITAL | Age: 38
End: 2023-11-03
Payer: COMMERCIAL

## 2023-11-03 DIAGNOSIS — D68.51 FACTOR 5 LEIDEN MUTATION, HETEROZYGOUS: ICD-10-CM

## 2023-11-03 DIAGNOSIS — I26.99 PULMONARY EMBOLISM WITH INFARCTION: Primary | ICD-10-CM

## 2023-11-03 DIAGNOSIS — D68.51 FACTOR 5 LEIDEN MUTATION, HETEROZYGOUS: Chronic | ICD-10-CM

## 2023-11-03 DIAGNOSIS — I82.532 CHRONIC DEEP VEIN THROMBOSIS (DVT) OF LEFT POPLITEAL VEIN: ICD-10-CM

## 2023-11-03 DIAGNOSIS — C71.1 ANAPLASTIC ASTROCYTOMA OF FRONTAL LOBE: ICD-10-CM

## 2023-11-03 DIAGNOSIS — Z79.01 CHRONIC ANTICOAGULATION: ICD-10-CM

## 2023-11-03 LAB
INR PPP: 1.6
PROTHROMBIN TIME: 18.9 SECONDS (ref 11–15)

## 2023-11-03 PROCEDURE — 85610 PROTHROMBIN TIME: CPT

## 2023-11-03 PROCEDURE — 36416 COLLJ CAPILLARY BLOOD SPEC: CPT

## 2023-11-03 NOTE — PROGRESS NOTES
Anticoagulation Clinic Progress Note    Patient's visit was held by phone today.    Anticoagulation Summary  As of 11/3/2023      INR goal:  2.0-3.0   TTR:  71.8% (7.4 mo)   INR used for dosin.60 (11/3/2023)   Warfarin maintenance plan:  10 mg (5 mg x 2) every Mon, Wed, Fri; 7.5 mg (5 mg x 1.5) all other days   Weekly warfarin total:  60 mg   Plan last modified:  Sharifa Cruz RPH (11/3/2023)   Next INR check:  11/10/2023   Priority:  Maintenance   Target end date:  Indefinite    Indications    Pulmonary embolism with infarction [I26.99]  Factor 5 Leiden mutation  heterozygous [D68.51]  Chronic deep vein thrombosis (DVT) of left popliteal vein [I82.532]                 Anticoagulation Episode Summary       INR check location:      Preferred lab:      Send INR reminders to:   [facility] CBC ANTICOAG POOL    Comments:  EP lab/ coag phone call          Anticoagulation Care Providers       Provider Role Specialty Phone number    Sean Jefferson MD Referring Hematology and Oncology [phone]            Drug interactions: started red yeast rice---supplement that can increase INR  Diet: he is eating more greens and losing weight.    INR History:      2023    10:15 AM 2023     9:46 AM 2023    10:15 AM 10/6/2023    10:45 AM 10/6/2023    11:24 AM 11/3/2023     8:12 AM 11/3/2023     8:15 AM   Anticoagulation Monitoring   INR 2.10  2.30 3.40   1.60   INR Date 2023  2023 10/6/2023   11/3/2023   INR Goal 2.0-3.0  2.0-3.0 2.0-3.0   2.0-3.0   Trend Same  Same Same   Up   Last Week Total 57.5 mg  57.5 mg 57.5 mg   57.5 mg   Next Week Total 57.5 mg  57.5 mg 57.5 mg   60 mg   Sun 7.5 mg  7.5 mg 7.5 mg   7.5 mg   Mon 10 mg  10 mg 10 mg   10 mg   Tue 7.5 mg  7.5 mg 7.5 mg   7.5 mg   Wed 7.5 mg  7.5 mg 7.5 mg   10 mg   Thu 10 mg  10 mg 10 mg   7.5 mg   Fri 7.5 mg  7.5 mg 7.5 mg   10 mg (11/3)   Sat 7.5 mg  7.5 mg 7.5 mg   7.5 mg   Historical INR  2.30    3.40  1.60     Visit Report    Report Report          Plan:  1. INR is Subtherapeutic today- see above in Anticoagulation Summary.   Will instruct Bryan Valadez to Increase their warfarin regimen to 10mg MWF, 7.5mg AOD- see above in Anticoagulation Summary.  2. Follow up in 1 week--to see if he needs further warfarin adjustment for recent dietary changes  3.They have been instructed to call if any changes in medications, doses, concerns, etc. Patient expresses understanding and has no further questions at this time.    Sharifa Cruz RP

## 2023-11-10 ENCOUNTER — ANTICOAGULATION VISIT (OUTPATIENT)
Dept: ONCOLOGY | Facility: HOSPITAL | Age: 38
End: 2023-11-10
Payer: COMMERCIAL

## 2023-11-10 ENCOUNTER — LAB (OUTPATIENT)
Dept: OTHER | Facility: HOSPITAL | Age: 38
End: 2023-11-10
Payer: COMMERCIAL

## 2023-11-10 DIAGNOSIS — D68.51 FACTOR 5 LEIDEN MUTATION, HETEROZYGOUS: Chronic | ICD-10-CM

## 2023-11-10 DIAGNOSIS — I82.532 CHRONIC DEEP VEIN THROMBOSIS (DVT) OF LEFT POPLITEAL VEIN: ICD-10-CM

## 2023-11-10 DIAGNOSIS — D68.51 FACTOR 5 LEIDEN MUTATION, HETEROZYGOUS: Primary | ICD-10-CM

## 2023-11-10 DIAGNOSIS — I26.99 PULMONARY EMBOLISM WITH INFARCTION: Primary | ICD-10-CM

## 2023-11-10 LAB
INR PPP: 1.7
PROTHROMBIN TIME: 20.1 SECONDS (ref 11–15)

## 2023-11-10 PROCEDURE — 36416 COLLJ CAPILLARY BLOOD SPEC: CPT

## 2023-11-10 PROCEDURE — 85610 PROTHROMBIN TIME: CPT

## 2023-11-10 NOTE — PROGRESS NOTES
Anticoagulation Clinic Progress Note    Patient's visit was held by phone today.    Anticoagulation Summary  As of 11/10/2023      INR goal:  2.0-3.0   TTR:  69.6% (7.6 mo)   INR used for dosin.70 (11/10/2023)   Warfarin maintenance plan:  7.5 mg (5 mg x 1.5) every Mon, Fri; 10 mg (5 mg x 2) all other days   Weekly warfarin total:  65 mg   Plan last modified:  Sharifa Cruz RPH (11/10/2023)   Next INR check:  2023   Priority:  Maintenance   Target end date:  Indefinite    Indications    Pulmonary embolism with infarction [I26.99]  Factor 5 Leiden mutation  heterozygous [D68.51]  Chronic deep vein thrombosis (DVT) of left popliteal vein [I82.532]                 Anticoagulation Episode Summary       INR check location:      Preferred lab:      Send INR reminders to:   [facility] CBC ANTICOAG POOL    Comments:  EP lab/ coag phone call          Anticoagulation Care Providers       Provider Role Specialty Phone number    Sean Jefferson MD Referring Hematology and Oncology [phone]            Drug interactions: continuing on red rice yeast which may increase INR (similar to statin)  Diet: continuing on diet to reduce cholesterol/lose weight which has more greens    Clinic Interview:  No pertinent clinical findings have been reported.    INR History:      2023    10:15 AM 10/6/2023    10:45 AM 10/6/2023    11:24 AM 11/3/2023     8:12 AM 11/3/2023     8:15 AM 11/10/2023     8:23 AM 11/10/2023     8:45 AM   Anticoagulation Monitoring   INR 2.30 3.40   1.60  1.70   INR Date 2023 10/6/2023   11/3/2023  11/10/2023   INR Goal 2.0-3.0 2.0-3.0   2.0-3.0  2.0-3.0   Trend Same Same   Up  Up   Last Week Total 57.5 mg 57.5 mg   57.5 mg  60 mg   Next Week Total 57.5 mg 57.5 mg   60 mg  67.5 mg   Sun 7.5 mg 7.5 mg   7.5 mg  10 mg   Mon 10 mg 10 mg   10 mg  7.5 mg   Tue 7.5 mg 7.5 mg   7.5 mg  10 mg   Wed 7.5 mg 7.5 mg   10 mg  10 mg   Thu 10 mg 10 mg   7.5 mg  10 mg   Fri 7.5 mg 7.5 mg   10 mg (11/3)  10 mg  (11/10); Otherwise 7.5 mg   Sat 7.5 mg 7.5 mg   7.5 mg  10 mg   Historical INR   3.40  1.60   1.70     Visit Report  Report Report           Plan:  1. INR is Subtherapeutic today- see above in Anticoagulation Summary.   Will instruct Bryan Valadez to Increase their warfarin regimen since likely change in diet is leading to subtherapeutic INRs- see above in Anticoagulation Summary.  He will take 10gm today, then 7.5mg on Mon/Fri and 10mg on all other days.  2. Follow up in 1.5 weeks  3.They have been instructed to call if any changes in medications, doses, concerns, etc. Patient expresses understanding and has no further questions at this time.    Sharifa Cruz [facility]

## 2023-11-21 ENCOUNTER — ANTICOAGULATION VISIT (OUTPATIENT)
Dept: ONCOLOGY | Facility: HOSPITAL | Age: 38
End: 2023-11-21
Payer: COMMERCIAL

## 2023-11-21 ENCOUNTER — LAB (OUTPATIENT)
Dept: OTHER | Facility: HOSPITAL | Age: 38
End: 2023-11-21
Payer: COMMERCIAL

## 2023-11-21 DIAGNOSIS — I26.99 PULMONARY EMBOLISM WITH INFARCTION: Primary | ICD-10-CM

## 2023-11-21 DIAGNOSIS — C71.1 ANAPLASTIC ASTROCYTOMA OF FRONTAL LOBE: ICD-10-CM

## 2023-11-21 DIAGNOSIS — D68.51 FACTOR 5 LEIDEN MUTATION, HETEROZYGOUS: ICD-10-CM

## 2023-11-21 DIAGNOSIS — I82.532 CHRONIC DEEP VEIN THROMBOSIS (DVT) OF LEFT POPLITEAL VEIN: ICD-10-CM

## 2023-11-21 DIAGNOSIS — Z79.01 CHRONIC ANTICOAGULATION: ICD-10-CM

## 2023-11-21 DIAGNOSIS — D68.51 FACTOR 5 LEIDEN MUTATION, HETEROZYGOUS: Chronic | ICD-10-CM

## 2023-11-21 LAB
INR PPP: 3.4 (ref 0.8–1.2)
PROTHROMBIN TIME: 38.9 SECONDS (ref 12.8–15.2)

## 2023-11-21 PROCEDURE — 85610 PROTHROMBIN TIME: CPT | Performed by: INTERNAL MEDICINE

## 2023-11-21 PROCEDURE — 36416 COLLJ CAPILLARY BLOOD SPEC: CPT

## 2023-11-21 NOTE — PROGRESS NOTES
Attempted to contact patient regarding INR monitoring and warfarin regimen. Left voicemail for patient to return call to Anticoagulation Clinic [phone] when available.

## 2023-11-21 NOTE — PROGRESS NOTES
Anticoagulation Clinic Progress Note    Patient's visit was held via telephone today.    Anticoagulation Summary  As of 11/21/2023      INR goal:  2.0-3.0   TTR:  69.1% (8 mo)   INR used for dosing:  3.4 (11/21/2023)   Warfarin maintenance plan:  7.5 mg (5 mg x 1.5) every Mon, Wed, Fri; 10 mg (5 mg x 2) all other days   Weekly warfarin total:  62.5 mg   Plan last modified:  Carissa Flores [facility] (11/21/2023)   Next INR check:  12/1/2023   Priority:  Maintenance   Target end date:  Indefinite    Indications    Pulmonary embolism with infarction [I26.99]  Factor 5 Leiden mutation  heterozygous [D68.51]  Chronic deep vein thrombosis (DVT) of left popliteal vein [I82.532]                 Anticoagulation Episode Summary       INR check location:      Preferred lab:      Send INR reminders to:   [facility] CBC ANTICOAG POOL    Comments:  EP lab/ coag phone call          Anticoagulation Care Providers       Provider Role Specialty Phone number    Sean Jefferson MD Referring Hematology and Oncology [phone]            Clinic Interview:  Patient Findings     Negatives:  Signs/symptoms of thrombosis, Signs/symptoms of bleeding,   Laboratory test error suspected, Change in health, Change in alcohol use,   Change in activity, Upcoming invasive procedure, Emergency department   visit, Upcoming dental procedure, Missed doses, Extra doses, Change in   medications, Change in diet/appetite, Hospital admission, Bruising, Other   complaints      Clinical Outcomes     Negatives:  Major bleeding event, Thromboembolic event,   Anticoagulation-related hospital admission, Anticoagulation-related ED   visit, Anticoagulation-related fatality        INR History:      10/6/2023    10:45 AM 10/6/2023    11:24 AM 11/3/2023     8:12 AM 11/3/2023     8:15 AM 11/10/2023     8:23 AM 11/10/2023     8:45 AM 11/21/2023     8:15 AM   Anticoagulation Monitoring   INR 3.40   1.60  1.70 3.4   INR Date 10/6/2023   11/3/2023  11/10/2023 11/21/2023   INR Goal  2.0-3.0   2.0-3.0  2.0-3.0 2.0-3.0   Trend Same   Up  Up Down   Last Week Total 57.5 mg   57.5 mg  60 mg 65 mg   Next Week Total 57.5 mg   60 mg  67.5 mg 55 mg   Sun 7.5 mg   7.5 mg  10 mg 10 mg   Mon 10 mg   10 mg  7.5 mg 7.5 mg   Tue 7.5 mg   7.5 mg  10 mg 2.5 mg (11/21); Otherwise 10 mg   Wed 7.5 mg   10 mg  10 mg 7.5 mg   Thu 10 mg   7.5 mg  10 mg 10 mg   Fri 7.5 mg   10 mg (11/3)  10 mg (11/10); Otherwise 7.5 mg 7.5 mg   Sat 7.5 mg   7.5 mg  10 mg 10 mg   Historical INR  3.40  1.60   1.70      Visit Report Report Report            Plan:  1. INR is Supratherapeutic today- see above in Anticoagulation Summary.  Will instruct Bryan Valadez to Change their warfarin regimen to a reduced dose of 2.5 mg today, then 7.5 mg Monday/Wednesday/Friday and 10 mg all other days until INR recheck - see above in Anticoagulation Summary.  2. Follow up in ~1.5 weeks.  3. Patient declines warfarin refills.  4. Verbal and written information provided. Patient expresses understanding and has no further questions at this time.    Carissa Flores [facility]

## 2023-12-06 ENCOUNTER — TELEPHONE (OUTPATIENT)
Dept: ONCOLOGY | Facility: CLINIC | Age: 38
End: 2023-12-06
Payer: COMMERCIAL

## 2023-12-06 NOTE — TELEPHONE ENCOUNTER
"    Caller: Bryan Valadez \"GILLIAN\"    Relationship to patient: Self    Best call back number: [phone]    Chief complaint: R/S CANCELLED APPT     Type of visit: LAB AND ANTI COAG FOLLOW UP AT [facility]     Requested date:  12/12 OR 12/13 NEEDING MORNING APPT TIME     If rescheduling, when is the original appointment: 12/01     Additional notes:PLEASE ADVISE          "

## 2023-12-12 ENCOUNTER — ANTICOAGULATION VISIT (OUTPATIENT)
Dept: ONCOLOGY | Facility: HOSPITAL | Age: 38
End: 2023-12-12
Payer: COMMERCIAL

## 2023-12-12 ENCOUNTER — LAB (OUTPATIENT)
Dept: OTHER | Facility: HOSPITAL | Age: 38
End: 2023-12-12
Payer: COMMERCIAL

## 2023-12-12 DIAGNOSIS — I82.532 CHRONIC DEEP VEIN THROMBOSIS (DVT) OF LEFT POPLITEAL VEIN: ICD-10-CM

## 2023-12-12 DIAGNOSIS — D68.51 FACTOR 5 LEIDEN MUTATION, HETEROZYGOUS: Chronic | ICD-10-CM

## 2023-12-12 DIAGNOSIS — I26.99 PULMONARY EMBOLISM WITH INFARCTION: Primary | ICD-10-CM

## 2023-12-12 LAB
INR PPP: 3.4
PROTHROMBIN TIME: 40.9 SECONDS (ref 11–15)

## 2023-12-12 NOTE — PROGRESS NOTES
Anticoagulation Clinic Progress Note    Patient's visit was held by phone today.    Anticoagulation Summary  As of 12/12/2023      INR goal:  2.0-3.0   TTR:  63.5% (8.7 mo)   INR used for dosing:  3.40 (12/12/2023)   Warfarin maintenance plan:  10 mg (5 mg x 2) every Sun, Tue, Thu; 7.5 mg (5 mg x 1.5) all other days   Weekly warfarin total:  60 mg   Plan last modified:  Emily Bell RPH (12/12/2023)   Next INR check:  12/27/2023   Priority:  Maintenance   Target end date:  Indefinite    Indications    Pulmonary embolism with infarction [I26.99]  Factor 5 Leiden mutation  heterozygous [D68.51]  Chronic deep vein thrombosis (DVT) of left popliteal vein [I82.532]                 Anticoagulation Episode Summary       INR check location:      Preferred lab:      Send INR reminders to:   [facility] CBC ANTICOAG POOL    Comments:  EP lab/ coag phone call          Anticoagulation Care Providers       Provider Role Specialty Phone number    Sean Jefferson MD Referring Hematology and Oncology [phone]            Drug interactions: has remained unchanged.  Diet: has remained unchanged.    Clinic Interview:  Patient concerned that INR will not decrease enough with change to 10mg three times a week (as opposed to 10mg two times a week that patient suggested). Discussed possibility of changing to 10mg two times a week if level is elevated again in 2 weeks at follow up.    INR History:      11/3/2023     8:12 AM 11/3/2023     8:15 AM 11/10/2023     8:23 AM 11/10/2023     8:45 AM 11/21/2023     8:15 AM 12/12/2023     9:09 AM 12/12/2023     9:15 AM   Anticoagulation Monitoring   INR  1.60  1.70 3.4  3.40   INR Date  11/3/2023  11/10/2023 11/21/2023  12/12/2023   INR Goal  2.0-3.0  2.0-3.0 2.0-3.0  2.0-3.0   Trend  Up  Up Down  Down   Last Week Total  57.5 mg  60 mg 65 mg  62.5 mg   Next Week Total  60 mg  67.5 mg 55 mg  55 mg   Sun  7.5 mg  10 mg 10 mg  10 mg   Mon  10 mg  7.5 mg 7.5 mg  7.5 mg   Tue  7.5 mg  10 mg 2.5 mg  (11/21); Otherwise 10 mg  5 mg (12/12); Otherwise 10 mg   Wed  10 mg  10 mg 7.5 mg  7.5 mg   Thu  7.5 mg  10 mg 10 mg  10 mg   Fri  10 mg (11/3)  10 mg (11/10); Otherwise 7.5 mg 7.5 mg  7.5 mg   Sat  7.5 mg  10 mg 10 mg  7.5 mg   Historical INR 1.60   1.70    3.40         Plan:  1. INR is Supratherapeutic today- see above in Anticoagulation Summary.   Will instruct Bryan Valadez to Change their warfarin regimen to10mg on Sunday's, Tuesday's, and Thursdays and 7.5mg all other days.- see above in Anticoagulation Summary.   2. Follow up in 2 weeks  3.They have been instructed to call if any changes in medications, doses, concerns, etc. Patient expresses understanding and has no further questions at this time.    Emily Bell [facility]

## 2023-12-18 ENCOUNTER — OFFICE VISIT (OUTPATIENT)
Dept: INTERNAL MEDICINE | Facility: CLINIC | Age: 38
End: 2023-12-18
Payer: COMMERCIAL

## 2023-12-18 VITALS
TEMPERATURE: 98 F | DIASTOLIC BLOOD PRESSURE: 90 MMHG | BODY MASS INDEX: 26.88 KG/M2 | SYSTOLIC BLOOD PRESSURE: 130 MMHG | WEIGHT: 187.8 LBS | OXYGEN SATURATION: 98 % | HEIGHT: 70 IN | HEART RATE: 70 BPM

## 2023-12-18 DIAGNOSIS — M54.50 ACUTE BILATERAL LOW BACK PAIN WITHOUT SCIATICA: Primary | ICD-10-CM

## 2023-12-18 RX ORDER — METHYLPREDNISOLONE 4 MG/1
TABLET ORAL
Qty: 21 TABLET | Refills: 0 | Status: SHIPPED | OUTPATIENT
Start: 2023-12-18

## 2023-12-18 RX ORDER — CYCLOBENZAPRINE HCL 10 MG
10 TABLET ORAL 3 TIMES DAILY PRN
Qty: 30 TABLET | Refills: 0 | Status: SHIPPED | OUTPATIENT
Start: 2023-12-18

## 2023-12-18 NOTE — PROGRESS NOTES
"Chief Complaint  Back Pain    Subjective        Bryan Valadez presents to [facility]  History of Present Illness  New onset back pain, felt that he \"pulled\" his back while taking his daughter to school. Has pain with sitting, bending and worse at night. Has had the same injury in the past felt similar. 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  Feels that his back is \"tight \"and \"feels like the muscles will not relax\".  Back Pain  This is a new problem. The current episode started in the past 7 days. The problem occurs intermittently. The problem has been gradually worsening since onset. The pain is at a severity of 8/10. The pain is moderate. The symptoms are aggravated by bending, lying down and sitting. Stiffness is present At night. Pertinent negatives include no abdominal pain, bladder incontinence, bowel incontinence, dysuria, headaches, leg pain, numbness, pelvic pain, perianal numbness, tingling or weakness. He has tried heat and NSAIDs for the symptoms. The treatment provided mild relief.       Objective   Vital Signs:  /90 (BP Location: Left arm, Patient Position: Standing)   Pulse 70   Temp 98 °F (36.7 °C) (Oral)   Ht 177.8 cm (70\")   Wt 85.2 kg (187 lb 12.8 oz)   SpO2 98%   BMI 26.95 kg/m²   Estimated body mass index is 26.95 kg/m² as calculated from the following:    Height as of this encounter: 177.8 cm (70\").    Weight as of this encounter: 85.2 kg (187 lb 12.8 oz).               Physical Exam  Vitals reviewed.   Constitutional:       Appearance: Normal appearance.   HENT:      Head: Normocephalic.   Eyes:      General: Lids are normal.   Cardiovascular:      Rate and Rhythm: Normal rate and regular rhythm.      Pulses: Normal pulses.      Heart sounds: Normal heart sounds.   Pulmonary:      Effort: Pulmonary effort is normal.      Breath sounds: Normal breath sounds.   Abdominal:      General: Abdomen is flat. Bowel sounds are " normal.   Musculoskeletal:      Thoracic back: No edema, deformity or signs of trauma. Normal range of motion.      Lumbar back: Spasms and tenderness present. No signs of trauma.   Skin:     General: Skin is warm and dry.      Capillary Refill: Capillary refill takes less than 2 seconds.   Neurological:      General: No focal deficit present.      Mental Status: He is alert.   Psychiatric:         Mood and Affect: Mood normal.         Behavior: Behavior normal.        Result Review :                   Assessment and Plan   Diagnoses and all orders for this visit:    1. Acute bilateral low back pain without sciatica (Primary)  -     methylPREDNISolone (MEDROL) 4 MG dose pack; Take as directed on package instructions.  Dispense: 21 tablet; Refill: 0  -     cyclobenzaprine (FLEXERIL) 10 MG tablet; Take 1 tablet by mouth 3 (Three) Times a Day As Needed for Muscle Spasms.  Dispense: 30 tablet; Refill: 0             Follow Up   Return if symptoms worsen or fail to improve.  Patient advised to continue ice and heat as needed and side effects of medication.Patient was given instructions and counseling regarding his condition or for health maintenance advice. Please see specific information pulled into the AVS if appropriate.

## 2023-12-26 DIAGNOSIS — M54.50 ACUTE BILATERAL LOW BACK PAIN, UNSPECIFIED WHETHER SCIATICA PRESENT: Primary | ICD-10-CM

## 2023-12-27 ENCOUNTER — LAB (OUTPATIENT)
Dept: OTHER | Facility: HOSPITAL | Age: 38
End: 2023-12-27
Payer: COMMERCIAL

## 2023-12-27 ENCOUNTER — ANTICOAGULATION VISIT (OUTPATIENT)
Dept: ONCOLOGY | Facility: HOSPITAL | Age: 38
End: 2023-12-27
Payer: COMMERCIAL

## 2023-12-27 DIAGNOSIS — D68.51 FACTOR 5 LEIDEN MUTATION, HETEROZYGOUS: ICD-10-CM

## 2023-12-27 DIAGNOSIS — I26.99 PULMONARY EMBOLISM WITH INFARCTION: Primary | ICD-10-CM

## 2023-12-27 DIAGNOSIS — Z79.01 CHRONIC ANTICOAGULATION: ICD-10-CM

## 2023-12-27 DIAGNOSIS — D68.51 FACTOR 5 LEIDEN MUTATION, HETEROZYGOUS: Chronic | ICD-10-CM

## 2023-12-27 DIAGNOSIS — C71.1 ANAPLASTIC ASTROCYTOMA OF FRONTAL LOBE: ICD-10-CM

## 2023-12-27 DIAGNOSIS — I82.532 CHRONIC DEEP VEIN THROMBOSIS (DVT) OF LEFT POPLITEAL VEIN: ICD-10-CM

## 2023-12-27 LAB
INR PPP: 1.7
PROTHROMBIN TIME: 20.5 SECONDS (ref 11–15)

## 2023-12-27 PROCEDURE — 36416 COLLJ CAPILLARY BLOOD SPEC: CPT

## 2023-12-27 PROCEDURE — 85610 PROTHROMBIN TIME: CPT

## 2023-12-27 NOTE — PROGRESS NOTES
Anticoagulation Clinic Progress Note    Patient's visit was held by phone today.    Anticoagulation Summary  As of 2023      INR goal:  2.0-3.0   TTR:  63.2% (9.2 mo)   INR used for dosin.70 (2023)   Warfarin maintenance plan:  7.5 mg (5 mg x 1.5) every Mon, Wed, Fri; 10 mg (5 mg x 2) all other days   Weekly warfarin total:  62.5 mg   Plan last modified:  Sharifa Cruz RPH (2023)   Next INR check:  2024   Priority:  Maintenance   Target end date:  Indefinite    Indications    Pulmonary embolism with infarction [I26.99]  Factor 5 Leiden mutation  heterozygous [D68.51]  Chronic deep vein thrombosis (DVT) of left popliteal vein [I82.532]                 Anticoagulation Episode Summary       INR check location:      Preferred lab:      Send INR reminders to:   [facility] CBC ANTICOAG POOL    Comments:  EP lab/ coag phone call          Anticoagulation Care Providers       Provider Role Specialty Phone number    Sean Jefferson MD Referring Hematology and Oncology [phone]            Drug interactions: has remained unchanged.  Diet: has remained unchanged---he continues with yeast supplement and more greens in diet.    Clinic Interview:  No pertinent clinical findings have been reported.    INR History:      11/10/2023     8:23 AM 11/10/2023     8:45 AM 2023     8:15 AM 2023     9:09 AM 2023     9:15 AM 2023     8:42 AM 2023     9:00 AM   Anticoagulation Monitoring   INR  1.70 3.4  3.40  1.70   INR Date  11/10/2023 2023  2023  2023   INR Goal  2.0-3.0 2.0-3.0  2.0-3.0  2.0-3.0   Trend  Up Down  Down  Up   Last Week Total  60 mg 65 mg  62.5 mg  60 mg   Next Week Total  67.5 mg 55 mg  55 mg  62.5 mg   Sun  10 mg 10 mg  10 mg  10 mg   Mon  7.5 mg 7.5 mg  7.5 mg  7.5 mg   Tue  10 mg 2.5 mg (); Otherwise 10 mg  5 mg (); Otherwise 10 mg  10 mg   Wed  10 mg 7.5 mg  7.5 mg  7.5 mg   Thu  10 mg 10 mg  10 mg  10 mg   Fri  10 mg (11/10); Otherwise  7.5 mg 7.5 mg  7.5 mg  7.5 mg   Sat  10 mg 10 mg  7.5 mg  10 mg   Historical INR 1.70    3.40   1.70         Plan:  1. INR is Subtherapeutic today- see above in Anticoagulation Summary.   Will instruct Bryan Valadez to Increase their warfarin regimen back to 10mg on 4 days per week and 7.5mg the other 3 days per week-- see above in Anticoagulation Summary.  His last 2 INRs were 3.4 with no bleeding issues but dropped to 1.7 with warfarin decrease so will try resuming previous dosing.  2. Follow up in 2 weeks  3.They have been instructed to call if any changes in medications, doses, concerns, etc. Patient expresses understanding and has no further questions at this time.    Sharifa Cruz [facility]

## 2023-12-29 ENCOUNTER — TELEPHONE (OUTPATIENT)
Dept: INTERNAL MEDICINE | Facility: CLINIC | Age: 38
End: 2023-12-29
Payer: COMMERCIAL

## 2023-12-29 DIAGNOSIS — M54.50 BILATERAL LOW BACK PAIN WITHOUT SCIATICA, UNSPECIFIED CHRONICITY: Primary | ICD-10-CM

## 2023-12-29 NOTE — TELEPHONE ENCOUNTER
MRI of lumbar spine without contrast was ordered as requested.  Be advised, this may not be covered fully by insurance.  Physical therapy or other requirements may need to be completed prior to MRI for cost coverage of the MRI.  I would advise you to call your insurance company for verification prior to completing MRI.

## 2024-01-09 ENCOUNTER — LAB (OUTPATIENT)
Dept: OTHER | Facility: HOSPITAL | Age: 39
End: 2024-01-09
Payer: COMMERCIAL

## 2024-01-09 ENCOUNTER — ANTICOAGULATION VISIT (OUTPATIENT)
Dept: ONCOLOGY | Facility: HOSPITAL | Age: 39
End: 2024-01-09
Payer: COMMERCIAL

## 2024-01-09 DIAGNOSIS — I82.532 CHRONIC DEEP VEIN THROMBOSIS (DVT) OF LEFT POPLITEAL VEIN: ICD-10-CM

## 2024-01-09 DIAGNOSIS — C71.1 ANAPLASTIC ASTROCYTOMA OF FRONTAL LOBE: ICD-10-CM

## 2024-01-09 DIAGNOSIS — I26.99 PULMONARY EMBOLISM WITH INFARCTION: Primary | ICD-10-CM

## 2024-01-09 DIAGNOSIS — Z79.01 CHRONIC ANTICOAGULATION: ICD-10-CM

## 2024-01-09 DIAGNOSIS — D68.51 FACTOR 5 LEIDEN MUTATION, HETEROZYGOUS: ICD-10-CM

## 2024-01-09 DIAGNOSIS — D68.51 FACTOR 5 LEIDEN MUTATION, HETEROZYGOUS: Chronic | ICD-10-CM

## 2024-01-09 LAB
INR PPP: 2.7
PROTHROMBIN TIME: 32 SECONDS (ref 11–15)

## 2024-01-09 PROCEDURE — 85610 PROTHROMBIN TIME: CPT

## 2024-01-09 PROCEDURE — 36416 COLLJ CAPILLARY BLOOD SPEC: CPT

## 2024-01-12 ENCOUNTER — TELEPHONE (OUTPATIENT)
Dept: INTERNAL MEDICINE | Facility: CLINIC | Age: 39
End: 2024-01-12
Payer: COMMERCIAL

## 2024-01-12 NOTE — TELEPHONE ENCOUNTER
[facility]lalli from [facility].     MRI Lumbar Spine was denied needing a Peer to Peer.    [phone] 10 business days through the denial.     Order ID [id number]

## 2024-01-17 ENCOUNTER — DOCUMENTATION (OUTPATIENT)
Dept: PHARMACY | Facility: HOSPITAL | Age: 39
End: 2024-01-17
Payer: COMMERCIAL

## 2024-01-17 ENCOUNTER — TELEPHONE (OUTPATIENT)
Dept: PHARMACY | Facility: HOSPITAL | Age: 39
End: 2024-01-17
Payer: COMMERCIAL

## 2024-01-17 NOTE — TELEPHONE ENCOUNTER
Sent a message to patient via [employer] that I have prepared a bridging calendar for his upcoming procedure on 2/15/24 which we will give him at the time of his appt w/ us on 2/5 (can send via [employer]). We can also send a Lovenox RX to his preferred RX and set up an appointment w/ him for 2/19 for an INR check after his procedure 2/15 (restarting warfarin 2/15).

## 2024-02-05 ENCOUNTER — ANTICOAGULATION VISIT (OUTPATIENT)
Dept: ONCOLOGY | Facility: HOSPITAL | Age: 39
End: 2024-02-05
Payer: COMMERCIAL

## 2024-02-05 ENCOUNTER — LAB (OUTPATIENT)
Dept: OTHER | Facility: HOSPITAL | Age: 39
End: 2024-02-05
Payer: COMMERCIAL

## 2024-02-05 ENCOUNTER — APPOINTMENT (OUTPATIENT)
Dept: ONCOLOGY | Facility: HOSPITAL | Age: 39
End: 2024-02-05
Payer: COMMERCIAL

## 2024-02-05 DIAGNOSIS — I26.99 PULMONARY EMBOLISM WITH INFARCTION: Primary | ICD-10-CM

## 2024-02-05 DIAGNOSIS — I82.532 CHRONIC DEEP VEIN THROMBOSIS (DVT) OF LEFT POPLITEAL VEIN: ICD-10-CM

## 2024-02-05 DIAGNOSIS — D68.51 FACTOR 5 LEIDEN MUTATION, HETEROZYGOUS: ICD-10-CM

## 2024-02-05 DIAGNOSIS — C71.1 ANAPLASTIC ASTROCYTOMA OF FRONTAL LOBE: ICD-10-CM

## 2024-02-05 DIAGNOSIS — D68.51 FACTOR 5 LEIDEN MUTATION, HETEROZYGOUS: Chronic | ICD-10-CM

## 2024-02-05 DIAGNOSIS — Z79.01 CHRONIC ANTICOAGULATION: ICD-10-CM

## 2024-02-05 LAB
INR PPP: 5.7
INR PPP: 5.7 (ref 0.8–1.2)
PROTHROMBIN TIME: 63.1 SECONDS (ref 11–15)
PROTHROMBIN TIME: 63.1 SECONDS (ref 12.8–15.2)

## 2024-02-05 PROCEDURE — 36416 COLLJ CAPILLARY BLOOD SPEC: CPT

## 2024-02-05 PROCEDURE — 85610 PROTHROMBIN TIME: CPT

## 2024-02-05 PROCEDURE — 85610 PROTHROMBIN TIME: CPT | Performed by: INTERNAL MEDICINE

## 2024-02-05 RX ORDER — ENOXAPARIN SODIUM 100 MG/ML
INJECTION SUBCUTANEOUS
Qty: 16 ML | Refills: 0 | Status: SHIPPED | OUTPATIENT
Start: 2024-02-05

## 2024-02-05 NOTE — PROGRESS NOTES
Anticoagulation Clinic Progress Note    Patient's visit was held by phone today.    Anticoagulation Summary  As of 2024      INR goal:  2.0-3.0   TTR:  59.0% (10.5 mo)   INR used for dosin.7 (2024)   Warfarin maintenance plan:  7.5 mg (5 mg x 1.5) every Mon, Wed, Fri; 10 mg (5 mg x 2) all other days   Weekly warfarin total:  62.5 mg   Plan last modified:  Sharifa Cruz RPH (2023)   Next INR check:  2024   Priority:  Maintenance   Target end date:  Indefinite    Indications    Pulmonary embolism with infarction [I26.99]  Factor 5 Leiden mutation  heterozygous [D68.51]  Chronic deep vein thrombosis (DVT) of left popliteal vein [I82.532]                 Anticoagulation Episode Summary       INR check location:      Preferred lab:      Send INR reminders to:   [facility] CBC ANTICOAG POOL    Comments:  EP lab/ coag phone call          Anticoagulation Care Providers       Provider Role Specialty Phone number    Sean Jefferson MD Referring Hematology and Oncology [phone]            Drug interactions: has remained unchanged.  Diet: has remained unchanged.    Clinic Interview:  No pertinent clinical findings have been reported.    INR History:      2023     9:15 AM 2023     8:42 AM 2023     9:00 AM 2024     8:26 AM 2024     8:45 AM 2024     8:00 AM 2024     8:18 AM   Anticoagulation Monitoring   INR 3.40  1.70  2.70 5.7    INR Date 2023    INR Goal 2.0-3.0  2.0-3.0  2.0-3.0 2.0-3.0    Trend Down  Up  Same Same    Last Week Total 62.5 mg  60 mg  62.5 mg 62.5 mg    Next Week Total 55 mg  62.5 mg  62.5 mg 25 mg    Sun 10 mg  10 mg  10 mg -    Mon 7.5 mg  7.5 mg  7.5 mg Hold ()    Tue 5 mg (); Otherwise 10 mg  10 mg  10 mg Hold ()    Wed 7.5 mg  7.5 mg  7.5 mg -    Thu 10 mg  10 mg  10 mg -    Fri 7.5 mg  7.5 mg  7.5 mg -    Sat 7.5 mg  10 mg  10 mg -    Historical INR  1.70   2.70    5.70        Plan:  1. INR is  Supratherapeutic today via fingerstick INR- see above in Anticoagulation Summary.   Will instruct Bryan KAROL Valadez to HOLD warfarin x 2 days- see above in Anticoagulation Summary.  2. Follow up on 24 with repeat INR. Will then give warfarin doses until he begins holding on  for 2/15 procedure.  3.They have been instructed to call if any changes in medications, doses, concerns, etc. Patient expresses understanding and has no further questions at this time.  4. Calendar for 2/15 procedure below:  confirm with proceduralist on 2/15 when to restart enoxaparin injections (shown as  am but they may delay if bleed risk)    Sharifa Cruz [facility]    Instructions for Stopping Warfarin (Coumadin®) for Procedure    Patient Name:  Bryan Valadez “Mic                  : ____1985____________________Today's Date: ____24___________    Procedure Date: ___2/15/24 _______________________   Enoxaparin (Lovenox®) : __80mg every 12 hours   NOTE: Warfarin held 5 days before procedure per Dr. Kenyon at [facility]                  Month    24   AM  AM  AM  AM  AM  AM  AM    PM  PM  PM  PM  PM    PM Last dose warfarin PM   1 shot  No Warfarin                    Kyle  Monday  Tuesday  Wednesday  Thursday  Friday  Saturday    2/13/24  2/12/24  2/13/24  2/14/24  2/15/24  2/16/24  2/17/24   AM 1 shot AM 1 shot AM 1 shot AM 1 shot AM Procedure Date AM 1 shot* AM 1 shot   PM 1 shot  No Warfarin PM 1 shot  No Warfarin PM 1 shot  No Warfarin PM   No shot  No Warfarin PM Restart  Warfarin* PM 1 shot*  Warfarin PM 1 shot  Warfarin                    24           AM 1 shot AM 1 shot AM 1 shot AM  AM  AM  AM    PM 1 shot  Warfarin PM 1 shot  INR check   Warfarin PM   1 shot  Warfarin PM      PM  PM  PM    *Unless instructed  otherwise by procedure provider    Important Points to Remember:  Rotate injection sites  Dispose of syringes in appropriate container

## 2024-02-05 NOTE — PATIENT INSTRUCTIONS
Please confirm with proceduralist on 2/15 to confirm when it is ok for you to restart the enoxaparin injections after surgery.      Instructions for Stopping Warfarin (Coumadin®) for Procedure    Patient Name:  Bryan Valadez “Mic                  : ____1985____________________Today's Date: ____24___________    Procedure Date: ___2/15/24 _______________________   Enoxaparin (Lovenox®) : __80mg every 12 hours   NOTE: Warfarin held 5 days before procedure per Dr. Kenyon at [facility]                  Month    24   AM  AM  AM  AM  AM  AM  AM    PM  PM  PM  PM  PM    PM Last dose warfarin PM   1 shot  No Warfarin                    Kyle  Monday  Tuesday  Wednesday  Thursday  Friday  Saturday    2/13/24  2/12/24  2/13/24  2/14/24  2/15/24  2/16/24  2/17/24   AM 1 shot AM 1 shot AM 1 shot AM 1 shot AM Procedure Date AM 1 shot* AM 1 shot   PM 1 shot  No Warfarin PM 1 shot  No Warfarin PM 1 shot  No Warfarin PM   No shot  No Warfarin PM Restart  Warfarin* PM 1 shot*  Warfarin PM 1 shot  Warfarin                    24           AM 1 shot AM 1 shot AM 1 shot AM  AM  AM  AM    PM 1 shot  Warfarin PM 1 shot  INR check   Warfarin PM   1 shot  Warfarin PM      PM  PM  PM    *Unless instructed otherwise by procedure provider    Important Points to Remember:  Rotate injection sites  Dispose of syringes in appropriate container

## 2024-02-07 ENCOUNTER — LAB (OUTPATIENT)
Dept: OTHER | Facility: HOSPITAL | Age: 39
End: 2024-02-07
Payer: COMMERCIAL

## 2024-02-07 ENCOUNTER — ANTICOAGULATION VISIT (OUTPATIENT)
Dept: ONCOLOGY | Facility: HOSPITAL | Age: 39
End: 2024-02-07
Payer: COMMERCIAL

## 2024-02-07 DIAGNOSIS — Z79.01 CHRONIC ANTICOAGULATION: ICD-10-CM

## 2024-02-07 DIAGNOSIS — I26.99 PULMONARY EMBOLISM WITH INFARCTION: Primary | ICD-10-CM

## 2024-02-07 DIAGNOSIS — C71.1 ANAPLASTIC ASTROCYTOMA OF FRONTAL LOBE: ICD-10-CM

## 2024-02-07 DIAGNOSIS — D68.51 FACTOR 5 LEIDEN MUTATION, HETEROZYGOUS: Chronic | ICD-10-CM

## 2024-02-07 DIAGNOSIS — D68.51 FACTOR 5 LEIDEN MUTATION, HETEROZYGOUS: ICD-10-CM

## 2024-02-07 DIAGNOSIS — I82.532 CHRONIC DEEP VEIN THROMBOSIS (DVT) OF LEFT POPLITEAL VEIN: ICD-10-CM

## 2024-02-07 LAB
INR PPP: 2
PROTHROMBIN TIME: 24.4 SECONDS (ref 11–15)

## 2024-02-07 PROCEDURE — 36416 COLLJ CAPILLARY BLOOD SPEC: CPT

## 2024-02-07 PROCEDURE — 85610 PROTHROMBIN TIME: CPT

## 2024-02-07 NOTE — PROGRESS NOTES
Anticoagulation Clinic Progress Note    Patient's visit was held by phone today.    Anticoagulation Summary  As of 2024      INR goal:  2.0-3.0   TTR:  58.7% (10.6 mo)   INR used for dosin.00 (2024)   Warfarin maintenance plan:  10 mg (5 mg x 2) every Sun, Tue, Thu; 7.5 mg (5 mg x 1.5) all other days   Weekly warfarin total:  60 mg   Plan last modified:  Sharifa Cruz RPH (2024)   Next INR check:  2024   Priority:  Maintenance   Target end date:  Indefinite    Indications    Pulmonary embolism with infarction [I26.99]  Factor 5 Leiden mutation  heterozygous [D68.51]  Chronic deep vein thrombosis (DVT) of left popliteal vein [I82.532]                 Anticoagulation Episode Summary       INR check location:      Preferred lab:      Send INR reminders to:   [facility] CBC ANTICOAG POOL    Comments:  EP lab/ coag phone call          Anticoagulation Care Providers       Provider Role Specialty Phone number    Sean Jefferson MD Referring Hematology and Oncology [phone]            Drug interactions: has remained unchanged.  Diet: has remained unchanged.    Clinic Interview:  No pertinent clinical findings have been reported.    INR History:      2023     9:00 AM 2024     8:26 AM 2024     8:45 AM 2024     8:00 AM 2024     8:18 AM 2024     2:45 PM 2024     3:13 PM   Anticoagulation Monitoring   INR 1.70  2.70   2.00    INR Date 2023    INR Goal 2.0-3.0  2.0-3.0 2.0-3.0  2.0-3.0    Trend Up  Same   Down    Last Week Total 60 mg  62.5 mg   45 mg    Next Week Total 62.5 mg  62.5 mg   25 mg    Sun 10 mg  10 mg   Hold (); Otherwise 10 mg    Mon 7.5 mg  7.5 mg   Hold ()    Tue 10 mg  10 mg   Hold ()    Wed 7.5 mg  7.5 mg   Hold (); Otherwise 7.5 mg    Thu 10 mg  10 mg   10 mg    Fri 7.5 mg  7.5 mg   7.5 mg    Sat 10 mg  10 mg   Hold (2/10); Otherwise 7.5 mg    Historical INR  2.70    5.70   2.00        Plan:  1. INR is Therapeutic  today- see above in Anticoagulation Summary.   Will instruct Bryan Valadez to Restart their warfarin regimen at 10mg on Sun, Tues, Thurs and 7.5mg on all other days, only until he will hold warfarin dose starting Sat 2/10- see above in Anticoagulation Summary.  He will start enoxaparin on Sat 2/10 PM as per calendar below.  2. Follow up on 2/19 to determine when he can stop enoxaparin  3.They have been instructed to call if any changes in medications, doses, concerns, etc. Patient expresses understanding and has no further questions at this time.    Sharifa Cruz [facility]

## 2024-02-07 NOTE — PATIENT INSTRUCTIONS
Change dose of warfarin to 10mg on Sun, Tues, Thurs and 7.5mg on all other days.  Take warfarin doses for next 3 days then you will start HOLDING warfarin on Sat 2/10.      Start enoxaparin injections on Sat PM and continue every 12 hours until Wed 2/14 AM (do NOT take enox injection on PM of 2/14 )    Restart warfarin the night of the procedure 2/15. Restart enoxaparin injections the day after the procedure (2/16 am) IF OK WITH SURGEON.    Check INR on Mon 2/19 to determine when we can stop enoxaparin injections as INR becomes therapeutic.

## 2024-02-19 ENCOUNTER — ANTICOAGULATION VISIT (OUTPATIENT)
Dept: ONCOLOGY | Facility: HOSPITAL | Age: 39
End: 2024-02-19
Payer: COMMERCIAL

## 2024-02-19 ENCOUNTER — LAB (OUTPATIENT)
Dept: OTHER | Facility: HOSPITAL | Age: 39
End: 2024-02-19
Payer: COMMERCIAL

## 2024-02-19 DIAGNOSIS — I82.532 CHRONIC DEEP VEIN THROMBOSIS (DVT) OF LEFT POPLITEAL VEIN: ICD-10-CM

## 2024-02-19 DIAGNOSIS — I26.99 PULMONARY EMBOLISM WITH INFARCTION: ICD-10-CM

## 2024-02-19 DIAGNOSIS — I82.532 CHRONIC DEEP VEIN THROMBOSIS (DVT) OF LEFT POPLITEAL VEIN: Primary | ICD-10-CM

## 2024-02-19 DIAGNOSIS — D68.51 FACTOR 5 LEIDEN MUTATION, HETEROZYGOUS: Chronic | ICD-10-CM

## 2024-02-19 LAB
INR PPP: 1.1
PROTHROMBIN TIME: 13.5 SECONDS (ref 11–15)

## 2024-02-19 PROCEDURE — 36416 COLLJ CAPILLARY BLOOD SPEC: CPT

## 2024-02-19 PROCEDURE — 85610 PROTHROMBIN TIME: CPT

## 2024-02-19 RX ORDER — ENOXAPARIN SODIUM 100 MG/ML
INJECTION SUBCUTANEOUS
Qty: 8 ML | Refills: 1 | Status: SHIPPED | OUTPATIENT
Start: 2024-02-19 | End: 2024-02-23 | Stop reason: SDUPTHER

## 2024-02-19 NOTE — PROGRESS NOTES
Anticoagulation Clinic Progress Note    Patient's visit was held by phone today.    Anticoagulation Summary  As of 2024      INR goal:  2.0-3.0   TTR:  56.6% (11 mo)   INR used for dosin.10 (2024)   Warfarin maintenance plan:  10 mg (5 mg x 2) every Sun, Tue, Thu; 7.5 mg (5 mg x 1.5) all other days   Weekly warfarin total:  60 mg   Plan last modified:  Sharifa Cruz RPH (2024)   Next INR check:  2024   Priority:  Maintenance   Target end date:  Indefinite    Indications    Pulmonary embolism with infarction [I26.99]  Factor 5 Leiden mutation  heterozygous [D68.51]  Chronic deep vein thrombosis (DVT) of left popliteal vein [I82.532]                 Anticoagulation Episode Summary       INR check location:      Preferred lab:      Send INR reminders to:   [facility] CBC ANTICOAG POOL    Comments:  EP lab/ coag phone call          Anticoagulation Care Providers       Provider Role Specialty Phone number    Sean Jefferson MD Referring Hematology and Oncology [phone]            Drug interactions: has remained unchanged.  Diet: has remained unchanged.    Clinic Interview:  Patient had a vasectomy on 2/15 where his warfarin was held for 5 days prior. Warfarin and Lovenox was started the day following procedure on . Patient will be out of town this week and requests that he tests on Thursday at a nearby facility so that he can stop Lovenox injections as soon as possible. He has enough injections to last through Wednesday, so if INR is subtherapeutic Thursday will need to  refill for more Lovenox syringes while out of town. Will provide patient with written lab order for INR and written RX for Lovenox to take with him.    INR History:      2024     8:45 AM 2024     8:00 AM 2024     8:18 AM 2024     2:45 PM 2024     3:13 PM 2024     7:49 AM 2024     8:00 AM   Anticoagulation Monitoring   INR 2.70   2.00   1.10   INR Date 2024   INR  Goal 2.0-3.0 2.0-3.0  2.0-3.0   2.0-3.0   Trend Same   Down   Same   Last Week Total 62.5 mg   45 mg   25 mg   Next Week Total 62.5 mg   25 mg   62.5 mg   Sun 10 mg   Hold (2/11); Otherwise 10 mg   -   Mon 7.5 mg   Hold (2/12)   10 mg (2/19)   Tue 10 mg   Hold (2/13)   10 mg   Wed 7.5 mg   Hold (2/14); Otherwise 7.5 mg   7.5 mg   Thu 10 mg   10 mg   10 mg   Fri 7.5 mg   7.5 mg   -   Sat 10 mg   Hold (2/10); Otherwise 7.5 mg   -   Historical INR   5.70   2.00  1.10         Plan:  1. INR is Subtherapeutic today- see above in Anticoagulation Summary.   Will instruct Bryan Valadez to take 10mg of warfarin today, then continue with 10mg on Sundays, Tuesdays, and Thursdays, and 7.5mg all other days - see above in Anticoagulation Summary. He will also continue Lovenox 80mg twice daily SQ injections until INR is therapeutic. Patient is to recheck INR on Thursday while out of town.   2. Follow up on Thursday  3.They have been instructed to call if any changes in medications, doses, concerns, etc. Patient expresses understanding and has no further questions at this time.    Emily Bell [facility]

## 2024-02-22 LAB — INR PPP: 1.4

## 2024-02-23 ENCOUNTER — ANTICOAGULATION VISIT (OUTPATIENT)
Dept: PHARMACY | Facility: HOSPITAL | Age: 39
End: 2024-02-23
Payer: COMMERCIAL

## 2024-02-23 DIAGNOSIS — D68.51 FACTOR 5 LEIDEN MUTATION, HETEROZYGOUS: Chronic | ICD-10-CM

## 2024-02-23 DIAGNOSIS — I82.532 CHRONIC DEEP VEIN THROMBOSIS (DVT) OF LEFT POPLITEAL VEIN: ICD-10-CM

## 2024-02-23 DIAGNOSIS — I26.99 PULMONARY EMBOLISM WITH INFARCTION: Primary | ICD-10-CM

## 2024-02-23 RX ORDER — ENOXAPARIN SODIUM 100 MG/ML
INJECTION SUBCUTANEOUS
Qty: 8 ML | Refills: 1 | Status: SHIPPED | OUTPATIENT
Start: 2024-02-23

## 2024-02-23 RX ORDER — ENOXAPARIN SODIUM 100 MG/ML
INJECTION SUBCUTANEOUS
Qty: 8 ML | Refills: 1 | OUTPATIENT
Start: 2024-02-23 | End: 2024-02-23 | Stop reason: SDUPTHER

## 2024-02-23 NOTE — PROGRESS NOTES
Anticoagulation Clinic Progress Note    Patient's visit was held via telephone today.    Anticoagulation Summary  As of 2024      INR goal:  2.0-3.0   TTR:  56.2% (11.1 mo)   INR used for dosin.40 (2024)   Warfarin maintenance plan:  10 mg (5 mg x 2) every Sun, Tue, Thu; 7.5 mg (5 mg x 1.5) all other days   Weekly warfarin total:  60 mg   Plan last modified:  Sharifa Cruz RPH (2024)   Next INR check:  2024   Priority:  Maintenance   Target end date:  Indefinite    Indications    Pulmonary embolism with infarction [I26.99]  Factor 5 Leiden mutation  heterozygous [D68.51]  Chronic deep vein thrombosis (DVT) of left popliteal vein [I82.532]                 Anticoagulation Episode Summary       INR check location:      Preferred lab:      Send INR reminders to:   [facility] CBC ANTICOAG POOL    Comments:  EP lab/ coag phone call          Anticoagulation Care Providers       Provider Role Specialty Phone number    Sean Jefferson MD Referring Hematology and Oncology [phone]            INR History:      2024     8:18 AM 2024     2:45 PM 2024     3:13 PM 2024     7:49 AM 2024     8:00 AM 2024    12:00 AM 2024     9:07 AM   Anticoagulation Monitoring   INR  2.00   1.10  1.40   INR Date  2024   INR Goal  2.0-3.0   2.0-3.0  2.0-3.0   Trend  Down   Same  Same   Last Week Total  45 mg   25 mg  62.5 mg   Next Week Total  25 mg   62.5 mg  62.5 mg   Sun  Hold (); Otherwise 10 mg   -  10 mg   Mon  Hold ()   10 mg ()  -   Tue  Hold ()   10 mg  -   Wed  Hold (); Otherwise 7.5 mg   7.5 mg  -   Thu  10 mg   10 mg  -   Fri  7.5 mg   -  10 mg ()   Sat  Hold (2/10); Otherwise 7.5 mg   -  7.5 mg   Historical INR 5.70   2.00  1.10   1.40            This result is from an external source.       Plan:  1. INR is Subtherapeutic today- see above in Anticoagulation Summary.  Will instruct Bryan Valadez to continue giving enoxaparin  injections twice daily and warfarin regimen of 10 mg today and Sunday, and 7.5 mg Saturday - see above in Anticoagulation Summary.  2. Follow up on Monday to assess if INR at or near therapeutic range.  3. Patient declines warfarin refills, but does request enoxaparin refill be sent to his preferred pharmacy.  4. Verbal and written information provided. Patient expresses understanding and has no further questions at this time.    Carissa Flores [facility]

## 2024-02-26 ENCOUNTER — LAB (OUTPATIENT)
Dept: OTHER | Facility: HOSPITAL | Age: 39
End: 2024-02-26
Payer: COMMERCIAL

## 2024-02-26 ENCOUNTER — ANTICOAGULATION VISIT (OUTPATIENT)
Dept: ONCOLOGY | Facility: HOSPITAL | Age: 39
End: 2024-02-26
Payer: COMMERCIAL

## 2024-02-26 DIAGNOSIS — I82.532 CHRONIC DEEP VEIN THROMBOSIS (DVT) OF LEFT POPLITEAL VEIN: ICD-10-CM

## 2024-02-26 DIAGNOSIS — I26.99 PULMONARY EMBOLISM WITH INFARCTION: Primary | ICD-10-CM

## 2024-02-26 DIAGNOSIS — D68.51 FACTOR 5 LEIDEN MUTATION, HETEROZYGOUS: Chronic | ICD-10-CM

## 2024-02-26 LAB
INR PPP: 1.8
PROTHROMBIN TIME: 21.8 SECONDS (ref 11–15)

## 2024-02-26 PROCEDURE — 85610 PROTHROMBIN TIME: CPT

## 2024-02-26 PROCEDURE — G0463 HOSPITAL OUTPT CLINIC VISIT: HCPCS

## 2024-02-26 NOTE — PROGRESS NOTES
Anticoagulation Clinic Progress Note    Patient's visit was held in clinic today.    Anticoagulation Summary  As of 2024      INR goal:  2.0-3.0   TTR:  55.5% (11.2 mo)   INR used for dosin.80 (2024)   Warfarin maintenance plan:  10 mg (5 mg x 2) every Sun, e, Thu; 7.5 mg (5 mg x 1.5) all other days   Weekly warfarin total:  60 mg   Plan last modified:  Sharifa Cruz RPH (2024)   Next INR check:  3/5/2024   Priority:  Maintenance   Target end date:  Indefinite    Indications    Pulmonary embolism with infarction [I26.99]  Factor 5 Leiden mutation  heterozygous [D68.51]  Chronic deep vein thrombosis (DVT) of left popliteal vein [I82.532]                 Anticoagulation Episode Summary       INR check location:      Preferred lab:      Send INR reminders to:   [facility] CBC ANTICOAG POOL    Comments:  EP lab/ coag phone call          Anticoagulation Care Providers       Provider Role Specialty Phone number    Sean Jefferson MD Referring Hematology and Oncology [phone]            Clinic Interview:  Patient Findings     Negatives:  Signs/symptoms of thrombosis, Signs/symptoms of bleeding,   Laboratory test error suspected, Change in health, Change in alcohol use,   Change in activity, Upcoming invasive procedure, Emergency department   visit, Upcoming dental procedure, Missed doses, Extra doses, Change in   medications, Change in diet/appetite, Hospital admission, Bruising, Other   complaints      Clinical Outcomes     Negatives:  Major bleeding event, Thromboembolic event,   Anticoagulation-related hospital admission, Anticoagulation-related ED   visit, Anticoagulation-related fatality        INR History:      2024     3:13 PM 2024     7:49 AM 2024     8:00 AM 2024    12:00 AM 2024     9:07 AM 2024     8:13 AM 2024     8:30 AM   Anticoagulation Monitoring   INR   1.10  1.40  1.80   INR Date   2024   INR Goal   2.0-3.0  2.0-3.0   2.0-3.0   Trend   Same  Same  Same   Last Week Total   25 mg  62.5 mg  65 mg   Next Week Total   62.5 mg  62.5 mg  62.5 mg   Sun   -  10 mg  10 mg   Mon   10 mg (2/19)  -  10 mg (2/26); Otherwise 7.5 mg   Tue   10 mg  -  10 mg   Wed   7.5 mg  -  7.5 mg   Thu   10 mg  -  10 mg   Fri   -  10 mg (2/23)  7.5 mg   Sat   -  7.5 mg  7.5 mg   Historical INR 2.00  1.10   1.40      1.80         This result is from an external source.       Plan:  1. INR is Subtherapeutic today, INR is trending up as expected following perioperative warfarin hold and anticoagulation bridging - see above in Anticoagulation Summary.  Will instruct Bryan Valadez to discontinue enoxaparin injections, take a slightly increased dose of warfarin 10 mg today, then continue their warfarin regimen of 10 mg on Sunday/Tuesday/Thursday and 7.5 mg all other days - see above in Anticoagulation Summary.  2. Follow up in 1 week.  3. Patient declines warfarin refills.  4. Verbal and written information provided. Patient expresses understanding and has no further questions at this time.    Carissa Flores [facility]

## 2024-02-26 NOTE — PATIENT INSTRUCTIONS
Please contact the Anticoagulation Clinic if you have any questions or concerns; [phone].?

## 2024-03-05 ENCOUNTER — ANTICOAGULATION VISIT (OUTPATIENT)
Dept: ONCOLOGY | Facility: HOSPITAL | Age: 39
End: 2024-03-05
Payer: COMMERCIAL

## 2024-03-05 ENCOUNTER — LAB (OUTPATIENT)
Dept: OTHER | Facility: HOSPITAL | Age: 39
End: 2024-03-05
Payer: COMMERCIAL

## 2024-03-05 DIAGNOSIS — I82.532 CHRONIC DEEP VEIN THROMBOSIS (DVT) OF LEFT POPLITEAL VEIN: ICD-10-CM

## 2024-03-05 DIAGNOSIS — D68.51 FACTOR 5 LEIDEN MUTATION, HETEROZYGOUS: Chronic | ICD-10-CM

## 2024-03-05 DIAGNOSIS — I26.99 PULMONARY EMBOLISM WITH INFARCTION: Primary | ICD-10-CM

## 2024-03-05 LAB
INR PPP: 2.5
PROTHROMBIN TIME: 30.5 SECONDS (ref 11–15)

## 2024-03-05 PROCEDURE — 36416 COLLJ CAPILLARY BLOOD SPEC: CPT

## 2024-03-05 PROCEDURE — 85610 PROTHROMBIN TIME: CPT

## 2024-03-05 NOTE — PROGRESS NOTES
Anticoagulation Clinic Progress Note    Patient's visit was held via telephone today.    Anticoagulation Summary  As of 3/5/2024      INR goal:  2.0-3.0   TTR:  55.8% (11.5 mo)   INR used for dosin.50 (3/5/2024)   Warfarin maintenance plan:  10 mg (5 mg x 2) every Sun, e, Thu; 7.5 mg (5 mg x 1.5) all other days   Weekly warfarin total:  60 mg   No change documented:  Carissa Flores RPH   Plan last modified:  Sharifa Cruz RPH (2024)   Next INR check:  3/15/2024   Priority:  Maintenance   Target end date:  Indefinite    Indications    Pulmonary embolism with infarction [I26.99]  Factor 5 Leiden mutation  heterozygous [D68.51]  Chronic deep vein thrombosis (DVT) of left popliteal vein [I82.532]                 Anticoagulation Episode Summary       INR check location:      Preferred lab:      Send INR reminders to:   [facility] CBC ANTICOAG POOL    Comments:  EP lab/ coag phone call          Anticoagulation Care Providers       Provider Role Specialty Phone number    Sean Jefferson MD Referring Hematology and Oncology [phone]            Clinic Interview:  Patient Findings     Negatives:  Signs/symptoms of thrombosis, Signs/symptoms of bleeding,   Laboratory test error suspected, Change in health, Change in alcohol use,   Change in activity, Upcoming invasive procedure, Emergency department   visit, Upcoming dental procedure, Missed doses, Extra doses, Change in   medications, Change in diet/appetite, Hospital admission, Bruising, Other   complaints      Clinical Outcomes     Negatives:  Major bleeding event, Thromboembolic event,   Anticoagulation-related hospital admission, Anticoagulation-related ED   visit, Anticoagulation-related fatality        INR History:      2024     8:00 AM 2024    12:00 AM 2024     9:07 AM 2024     8:13 AM 2024     8:30 AM 3/5/2024     8:00 AM 3/5/2024     8:17 AM   Anticoagulation Monitoring   INR 1.10  1.40  1.80 2.50    INR Date 2024   2/26/2024 3/5/2024    INR Goal 2.0-3.0  2.0-3.0  2.0-3.0 2.0-3.0    Trend Same  Same  Same Same    Last Week Total 25 mg  62.5 mg  65 mg 60 mg    Next Week Total 62.5 mg  62.5 mg  62.5 mg 60 mg    Sun -  10 mg  10 mg 10 mg    Mon 10 mg (2/19)  -  10 mg (2/26); Otherwise 7.5 mg 7.5 mg    Tue 10 mg  -  10 mg 10 mg    Wed 7.5 mg  -  7.5 mg 7.5 mg    Thu 10 mg  -  10 mg 10 mg    Fri -  10 mg (2/23)  7.5 mg 7.5 mg    Sat -  7.5 mg  7.5 mg 7.5 mg    Historical INR  1.40      1.80    2.50        This result is from an external source.       Plan:  1. INR is Therapeutic today- see above in Anticoagulation Summary.  Will instruct Bryan Valadez to resume their usual warfarin regimen (prior to procedure and bridging) of 10 mg Sunday/Tuesday/Thursday and 7.5 mg all other days - see above in Anticoagulation Summary.  2. Follow up in 1 week when patient is also scheduled for an appointment with Dr. Jefferson.  3. Patient declines warfarin refills.  4. Verbal and written information provided. Patient expresses understanding and has no further questions at this time.    Carissa Flores [facility]

## 2024-03-15 ENCOUNTER — LAB (OUTPATIENT)
Dept: OTHER | Facility: HOSPITAL | Age: 39
End: 2024-03-15
Payer: COMMERCIAL

## 2024-03-22 ENCOUNTER — ANTICOAGULATION VISIT (OUTPATIENT)
Dept: ONCOLOGY | Facility: HOSPITAL | Age: 39
End: 2024-03-22
Payer: COMMERCIAL

## 2024-03-22 ENCOUNTER — LAB (OUTPATIENT)
Dept: OTHER | Facility: HOSPITAL | Age: 39
End: 2024-03-22
Payer: COMMERCIAL

## 2024-03-22 DIAGNOSIS — D68.51 FACTOR 5 LEIDEN MUTATION, HETEROZYGOUS: Chronic | ICD-10-CM

## 2024-03-22 DIAGNOSIS — I82.532 CHRONIC DEEP VEIN THROMBOSIS (DVT) OF LEFT POPLITEAL VEIN: ICD-10-CM

## 2024-03-22 DIAGNOSIS — I26.99 PULMONARY EMBOLISM WITH INFARCTION: Primary | ICD-10-CM

## 2024-03-22 LAB
INR PPP: 2.2
PROTHROMBIN TIME: 26.8 SECONDS (ref 11–15)

## 2024-03-22 PROCEDURE — 85610 PROTHROMBIN TIME: CPT

## 2024-03-22 PROCEDURE — 36416 COLLJ CAPILLARY BLOOD SPEC: CPT

## 2024-03-25 NOTE — PROGRESS NOTES
Anticoagulation Clinic Progress Note    Patient's visit was held via telephone today.    Anticoagulation Summary  As of 3/22/2024      INR goal:  2.0-3.0   TTR:  57.9% (1 y)   INR used for dosin.20 (3/22/2024)   Warfarin maintenance plan:  10 mg (5 mg x 2) every Sun, Tue, u; 7.5 mg (5 mg x 1.5) all other days   Weekly warfarin total:  60 mg   No change documented:  Carissa Flores RPH   Plan last modified:  Sharifa Cruz RPH (2024)   Next INR check:  2024   Priority:  Maintenance   Target end date:  Indefinite    Indications    Pulmonary embolism with infarction [I26.99]  Factor 5 Leiden mutation  heterozygous [D68.51]  Chronic deep vein thrombosis (DVT) of left popliteal vein [I82.532]                 Anticoagulation Episode Summary       INR check location:      Preferred lab:      Send INR reminders to:  [facility] CBC ANTICOAG POOL    Comments:  EP lab/ coag phone call          Anticoagulation Care Providers       Provider Role Specialty Phone number    Sean Jefferson MD Referring Hematology and Oncology [phone]            Clinic Interview:  Patient Findings     Negatives:  Signs/symptoms of thrombosis, Signs/symptoms of bleeding,   Laboratory test error suspected, Change in health, Change in alcohol use,   Change in activity, Upcoming invasive procedure, Emergency department   visit, Upcoming dental procedure, Missed doses, Extra doses, Change in   medications, Change in diet/appetite, Hospital admission, Bruising, Other   complaints      Clinical Outcomes     Negatives:  Major bleeding event, Thromboembolic event,   Anticoagulation-related hospital admission, Anticoagulation-related ED   visit, Anticoagulation-related fatality        INR History:      2024     9:07 AM 2024     8:13 AM 2024     8:30 AM 3/5/2024     8:00 AM 3/5/2024     8:17 AM 3/22/2024     8:08 AM 3/22/2024     8:30 AM   Anticoagulation Monitoring   INR 1.40  1.80 2.50   2.20   INR Date 2024  3/5/2024   3/22/2024   INR Goal 2.0-3.0  2.0-3.0 2.0-3.0   2.0-3.0   Trend Same  Same Same   Same   Last Week Total 62.5 mg  65 mg 60 mg   60 mg   Next Week Total 62.5 mg  62.5 mg 60 mg   60 mg   Sun 10 mg  10 mg 10 mg   10 mg   Mon -  10 mg (2/26); Otherwise 7.5 mg 7.5 mg   7.5 mg   Tue -  10 mg 10 mg   10 mg   Wed -  7.5 mg 7.5 mg   7.5 mg   Thu -  10 mg 10 mg   10 mg   Fri 10 mg (2/23)  7.5 mg 7.5 mg   7.5 mg   Sat 7.5 mg  7.5 mg 7.5 mg   7.5 mg   Historical INR  1.80    2.50  2.20         Plan:  1. INR is Therapeutic today- see above in Anticoagulation Summary.  Will instruct Bryan Valadez to continue their warfarin regimen of 10 mg on Sunday/Tuesday/Thursday see above in Anticoagulation Summary.  2. Follow up in 1 month when also scheduled with APRN.   3. Patient declines warfarin refills.  4. Verbal and written information provided. Patient expresses understanding and has no further questions at this time.    Carissa Flores [facility]

## 2024-04-01 ENCOUNTER — TELEPHONE (OUTPATIENT)
Dept: PHARMACY | Facility: HOSPITAL | Age: 39
End: 2024-04-01
Payer: COMMERCIAL

## 2024-04-01 NOTE — TELEPHONE ENCOUNTER
He is on spring break and needs warfarin 5mg Rx called to [facility] at [phone].  Called in Rx for #20 tablets.

## 2024-05-01 ENCOUNTER — ANTICOAGULATION VISIT (OUTPATIENT)
Dept: ONCOLOGY | Facility: HOSPITAL | Age: 39
End: 2024-05-01
Payer: COMMERCIAL

## 2024-05-01 ENCOUNTER — LAB (OUTPATIENT)
Dept: OTHER | Facility: HOSPITAL | Age: 39
End: 2024-05-01
Payer: COMMERCIAL

## 2024-05-01 DIAGNOSIS — I82.532 CHRONIC DEEP VEIN THROMBOSIS (DVT) OF LEFT POPLITEAL VEIN: ICD-10-CM

## 2024-05-01 DIAGNOSIS — D68.51 FACTOR 5 LEIDEN MUTATION, HETEROZYGOUS: Chronic | ICD-10-CM

## 2024-05-01 DIAGNOSIS — I26.99 PULMONARY EMBOLISM WITH INFARCTION: Primary | ICD-10-CM

## 2024-05-01 LAB
INR PPP: 2.1
PROTHROMBIN TIME: 25.3 SECONDS (ref 11–15)

## 2024-05-01 PROCEDURE — 85610 PROTHROMBIN TIME: CPT

## 2024-05-01 PROCEDURE — 36416 COLLJ CAPILLARY BLOOD SPEC: CPT

## 2024-05-20 DIAGNOSIS — J30.9 ALLERGIC RHINITIS: ICD-10-CM

## 2024-05-20 RX ORDER — MONTELUKAST SODIUM 10 MG/1
10 TABLET ORAL DAILY
Qty: 90 TABLET | Refills: 3 | Status: SHIPPED | OUTPATIENT
Start: 2024-05-20

## 2024-05-24 ENCOUNTER — ANTICOAGULATION VISIT (OUTPATIENT)
Dept: ONCOLOGY | Facility: HOSPITAL | Age: 39
End: 2024-05-24
Payer: COMMERCIAL

## 2024-05-24 ENCOUNTER — LAB (OUTPATIENT)
Dept: OTHER | Facility: HOSPITAL | Age: 39
End: 2024-05-24
Payer: COMMERCIAL

## 2024-05-24 DIAGNOSIS — D68.51 FACTOR 5 LEIDEN MUTATION, HETEROZYGOUS: Chronic | ICD-10-CM

## 2024-05-24 DIAGNOSIS — I26.99 PULMONARY EMBOLISM WITH INFARCTION: Primary | ICD-10-CM

## 2024-05-24 DIAGNOSIS — I82.532 CHRONIC DEEP VEIN THROMBOSIS (DVT) OF LEFT POPLITEAL VEIN: ICD-10-CM

## 2024-05-24 LAB
INR PPP: 1.9
PROTHROMBIN TIME: 22.6 SECONDS (ref 11–15)

## 2024-05-24 PROCEDURE — 85610 PROTHROMBIN TIME: CPT

## 2024-05-24 PROCEDURE — 36416 COLLJ CAPILLARY BLOOD SPEC: CPT

## 2024-05-24 NOTE — PROGRESS NOTES
Anticoagulation Clinic Progress Note    Patient's visit was held via telephone today.    Anticoagulation Summary  As of 2024      INR goal:  2.0-3.0   TTR:  61.5% (1.2 y)   INR used for dosin.90 (2024)   Warfarin maintenance plan:  10 mg (5 mg x 2) every Sun, Tue, Thu; 7.5 mg (5 mg x 1.5) all other days   Weekly warfarin total:  60 mg   Plan last modified:  Sharifa Cruz RPH (2024)   Next INR check:  2024   Priority:  Maintenance   Target end date:  Indefinite    Indications    Pulmonary embolism with infarction [I26.99]  Factor 5 Leiden mutation  heterozygous [D68.51]  Chronic deep vein thrombosis (DVT) of left popliteal vein [I82.532]                 Anticoagulation Episode Summary       INR check location:      Preferred lab:      Send INR reminders to:   [facility] CBC ANTICOAG POOL    Comments:  EP lab/ coag phone call          Anticoagulation Care Providers       Provider Role Specialty Phone number    Sean Jefferson MD Referring Hematology and Oncology [phone]            Clinic Interview:  Patient Findings     Positives:  Missed doses (Patient reports mistakenly taking a lower dose   this Tuesday (7.5 mg instead of 10 mg as planned))    Negatives:  Signs/symptoms of thrombosis, Signs/symptoms of bleeding,   Laboratory test error suspected, Change in health, Change in alcohol use,   Change in activity, Upcoming invasive procedure, Emergency department   visit, Upcoming dental procedure, Extra doses, Change in medications,   Change in diet/appetite, Hospital admission, Bruising, Other complaints      Clinical Outcomes     Negatives:  Major bleeding event, Thromboembolic event,   Anticoagulation-related hospital admission, Anticoagulation-related ED   visit, Anticoagulation-related fatality        INR History:      3/5/2024     8:17 AM 3/22/2024     8:08 AM 3/22/2024     8:30 AM 2024     9:08 AM 2024     9:30 AM 2024     8:11 AM 2024     8:30 AM   Anticoagulation  Monitoring   INR   2.20  2.10  1.90   INR Date   3/22/2024  5/1/2024  5/24/2024   INR Goal   2.0-3.0  2.0-3.0  2.0-3.0   Trend   Same  Same  Same   Last Week Total   60 mg  60 mg  57.5 mg   Next Week Total   60 mg  60 mg  60 mg   Sun   10 mg  10 mg  10 mg   Mon   7.5 mg  7.5 mg  7.5 mg   Tue   10 mg  10 mg  10 mg   Wed   7.5 mg  7.5 mg  7.5 mg   Thu   10 mg  10 mg  10 mg   Fri   7.5 mg  7.5 mg  7.5 mg   Sat   7.5 mg  7.5 mg  7.5 mg   Historical INR 2.50  2.20   2.10   1.90         Plan:  1. INR is slightly Subtherapeutic today (Patient reports mistakenly taking a lower dose this Tuesday (7.5 mg instead of 10 mg as planned))  - see above in Anticoagulation Summary.  Will instruct Bryan Valadez to Continue their warfarin regimen of 10 mg Sunday/Tuesday/Thursday and 7.5 mg all other days; patient preferred no changes be made to his dose at this time - see above in Anticoagulation Summary.  2. Follow up in 1 month.   3. Patient declines warfarin refills.  4. Verbal and written information provided. Patient expresses understanding and has no further questions at this time.    Carissa Flores [facility]

## 2024-06-04 DIAGNOSIS — C71.1 ANAPLASTIC ASTROCYTOMA OF FRONTAL LOBE: ICD-10-CM

## 2024-06-04 RX ORDER — LEVETIRACETAM 1000 MG/1
TABLET ORAL
Qty: 180 TABLET | Refills: 0 | Status: SHIPPED | OUTPATIENT
Start: 2024-06-04

## 2024-06-20 DIAGNOSIS — C71.1 ANAPLASTIC ASTROCYTOMA OF FRONTAL LOBE: ICD-10-CM

## 2024-06-21 ENCOUNTER — LAB (OUTPATIENT)
Dept: OTHER | Facility: HOSPITAL | Age: 39
End: 2024-06-21
Payer: COMMERCIAL

## 2024-06-21 ENCOUNTER — ANTICOAGULATION VISIT (OUTPATIENT)
Dept: ONCOLOGY | Facility: HOSPITAL | Age: 39
End: 2024-06-21
Payer: COMMERCIAL

## 2024-06-21 DIAGNOSIS — D68.51 FACTOR 5 LEIDEN MUTATION, HETEROZYGOUS: Chronic | ICD-10-CM

## 2024-06-21 DIAGNOSIS — I82.532 CHRONIC DEEP VEIN THROMBOSIS (DVT) OF LEFT POPLITEAL VEIN: ICD-10-CM

## 2024-06-21 DIAGNOSIS — I26.99 PULMONARY EMBOLISM WITH INFARCTION: Primary | ICD-10-CM

## 2024-06-21 LAB
INR PPP: 3
PROTHROMBIN TIME: 36.1 SECONDS (ref 11–15)

## 2024-06-21 PROCEDURE — 85610 PROTHROMBIN TIME: CPT

## 2024-06-21 PROCEDURE — 36416 COLLJ CAPILLARY BLOOD SPEC: CPT

## 2024-06-21 RX ORDER — LEVETIRACETAM 1000 MG/1
TABLET ORAL
Qty: 180 TABLET | Refills: 0 | Status: SHIPPED | OUTPATIENT
Start: 2024-06-21

## 2024-06-21 NOTE — PROGRESS NOTES
Anticoagulation Clinic Progress Note    Patient's visit was held by phone today.    Anticoagulation Summary  As of 6/21/2024      INR goal:  2.0-3.0   TTR:  63.3% (1.2 y)   INR used for dosing:  3.00 (6/21/2024)   Warfarin maintenance plan:  10 mg (5 mg x 2) every Sun, Tue, Thu; 7.5 mg (5 mg x 1.5) all other days   Weekly warfarin total:  60 mg   No change documented:  Carissa Flores RPH   Plan last modified:  Sharifa Cruz RPH (2/7/2024)   Next INR check:  7/19/2024   Priority:  Maintenance   Target end date:  Indefinite    Indications    Pulmonary embolism with infarction [I26.99]  Factor 5 Leiden mutation  heterozygous [D68.51]  Chronic deep vein thrombosis (DVT) of left popliteal vein [I82.532]                 Anticoagulation Episode Summary       INR check location:      Preferred lab:      Send INR reminders to:  [facility] CBC ANTICOAG POOL    Comments:  EP lab/ coag phone call          Anticoagulation Care Providers       Provider Role Specialty Phone number    Sean Jefferson MD Referring Hematology and Oncology [phone]            Drug interactions: has remained unchanged.  Diet: has remained unchanged.    Clinic Interview:  No pertinent clinical findings have been reported.    INR History:      3/22/2024     8:30 AM 5/1/2024     9:08 AM 5/1/2024     9:30 AM 5/24/2024     8:11 AM 5/24/2024     8:30 AM 6/21/2024     8:00 AM 6/21/2024     8:15 AM   Anticoagulation Monitoring   INR 2.20  2.10  1.90 3.00    INR Date 3/22/2024  5/1/2024  5/24/2024 6/21/2024    INR Goal 2.0-3.0  2.0-3.0  2.0-3.0 2.0-3.0    Trend Same  Same  Same Same    Last Week Total 60 mg  60 mg  57.5 mg 60 mg    Next Week Total 60 mg  60 mg  60 mg 60 mg    Sun 10 mg  10 mg  10 mg 10 mg    Mon 7.5 mg  7.5 mg  7.5 mg 7.5 mg    Tue 10 mg  10 mg  10 mg 10 mg    Wed 7.5 mg  7.5 mg  7.5 mg 7.5 mg    Thu 10 mg  10 mg  10 mg 10 mg    Fri 7.5 mg  7.5 mg  7.5 mg 7.5 mg    Sat 7.5 mg  7.5 mg  7.5 mg 7.5 mg    Historical INR  2.10   1.90    3.00         Plan:  1. INR is Therapeutic today- see above in Anticoagulation Summary.   Will instruct Bryan Valadez to Continue their warfarin regimen of 10 mg Sunday/Tuesday/Thursday and 7.5 mg all other days - see above in Anticoagulation Summary.  2. Follow up in 1 month.   3.They have been instructed to call if any changes in medications, doses, concerns, etc. Patient expresses understanding and has no further questions at this time.    Carissa Flores [facility]

## 2024-07-11 NOTE — PROGRESS NOTES
Physician Weekly Management Note    Diagnosis:     Diagnosis Plan   1. Anaplastic astrocytoma of frontal lobe (CMS/HCC)  MRI Brain With & Without Contrast       RT Details:  Treatment #32/33      Postop right frontal lobe      XRT alone        Temodar to follow    Notes on Treatment course, Films, Medical progress:   Completes tomorrow.  Has demonstrated excellent tolerance -  continues to work every day.  Treated without steroids, no headaches no visual or speech issues.  Sees Dr. Jefferson this morning.  We'll plan follow-up MRI of the brain in 5 weeks, then I believe Temodar will be initiated.   we'll see him back 3 or 4 days after his MRI.    Weekly Management:  Medication reviewed?   Yes  New medications given?   No  Problemlist reviewed?   Yes  Problem added?   No       Technical aspects reviewed:  Weekly OBI approved?   Yes  Weekly port films approved?   Yes  Change requests noted on port film?   No  Patient setup and plan reviewed?   Yes    Chart Reviewed:  Continue current treatment plan?   Yes  Treatment plan change requested?   No  CBC reviewed?   No  Concurrent Chemo?   No    Objective     Toxicities:   As above     Review of Systems   As above    Vitals:    09/13/18 0820   BP: 124/88   Pulse: 81   Temp: 98.2 °F (36.8 °C)   SpO2: 100%       Current Status 8/16/2018   ECOG score 0       Physical Exam  As above      Problem Summary List    Diagnosis:     Diagnosis Plan   1. Anaplastic astrocytoma of frontal lobe (CMS/HCC)  MRI Brain With & Without Contrast     Pathology:       Past Medical History:   Diagnosis Date   • Allergic rhinitis    • Asthma     Pulmonary Dr. Alexandra   • Chest pain    • DVT (deep venous thrombosis) (CMS/HCC)    • Factor V Leiden (CMS/HCC)    • GERD (gastroesophageal reflux disease)    • H/O echocardiogram 2006   • Headache    • History of ETT    • History of Holter monitoring    • Hyperlipidemia    • PE (pulmonary thromboembolism) (CMS/HCC)    • Primary brain tumor (CMS/HCC) 2018          Past Surgical History:   Procedure Laterality Date   • CRANIOTOMY FOR TUMOR Right 6/16/2018    Procedure: CRANIOTOMY FOR  RESECTION OF LARGE RIGHT FRONTAL MASS WITH AUGUSTIN NAVIGATION;  Surgeon: Chetan Galvan IV, MD;  Location: [facility];  Service: Neurosurgery         Current Outpatient Prescriptions on File Prior to Visit   Medication Sig Dispense Refill   • baclofen (LIORESAL) 10 MG tablet Take 1 tablet by mouth 3 (Three) Times a Day As Needed for Muscle Spasms. 50 tablet 0   • betamethasone valerate (VALISONE) 0.1 % cream Apply  topically to the appropriate area as directed 2 (Two) Times a Day. 45 g 0   • dexamethasone (DECADRON) 2 MG tablet Take 1 tablet by mouth 3 (Three) Times a Day With Meals. 60 tablet 0   • docusate sodium 100 MG capsule Take 100 mg by mouth 2 (Two) Times a Day As Needed (constipation). 60 each 1   • EPINEPHrine (EPIPEN) 0.3 MG/0.3ML solution auto-injector injection Inject 0.3 mg into the shoulder, thigh, or buttocks As Needed.     • HYDROcodone-acetaminophen (NORCO) 5-325 MG per tablet Take 1 tablet by mouth Every 6 (Six) Hours As Needed.     • levETIRAcetam (KEPPRA) 1000 MG tablet TAKE 1 TABLET BY MOUTH EVERY 12 HOURS 180 tablet 3   • MethylPREDNISolone (MEDROL, JOHANNA,) 4 MG tablet Take as directed on package instructions. 21 tablet 0   • montelukast (SINGULAIR) 10 MG tablet Take 10 mg by mouth Daily.     • Multiple Vitamins-Minerals (MULTIVITAMIN WITH MINERALS) tablet tablet Take 1 tablet by mouth Daily.     • omeprazole (PriLOSEC) 20 MG capsule Take 20 mg by mouth Daily As Needed.     • polyethylene glycol (MIRALAX) packet Take 17 g by mouth Daily. 10 each 0   • warfarin (COUMADIN) 5 MG tablet Take 5mg by mouth Mon,Wed,Fri and 7.5mg Sun,Tues,Thurs,Sat unless directed otherwise by MD 60 tablet 0   • warfarin (COUMADIN) 5 MG tablet Take 5 mg Monday and Friday and 7.5 mg the remaining days unless directed by your doctor 60 tablet 1     No current facility-administered  medications on file prior to visit.        Allergies   Allergen Reactions   • Avocado Itching   • Other Itching     Insect stings: Bee stings, throat swelling (has Epi pen)    Allergy to fruit, Avocado, Cherry Tomato (can have blue berries)   • Tomato Itching     Cherry tomato       Primary care MD:    Opal Benavidez MD    Oncologist:   HEIDI Jefferson M.D.    Seen and approved by:  Robin Daley MD  09/13/2018   Name band;

## 2024-07-29 ENCOUNTER — TELEPHONE (OUTPATIENT)
Dept: INTERNAL MEDICINE | Facility: CLINIC | Age: 39
End: 2024-07-29
Payer: COMMERCIAL

## 2024-07-29 DIAGNOSIS — Z83.1 FAMILY HISTORY OF PINWORM INFECTION: Primary | ICD-10-CM

## 2024-07-29 RX ORDER — ALBENDAZOLE 200 MG/1
400 TABLET, FILM COATED ORAL DAILY
Qty: 4 TABLET | Refills: 0 | Status: SHIPPED | OUTPATIENT
Start: 2024-07-29

## 2024-07-29 NOTE — TELEPHONE ENCOUNTER
Duplicate - see [employer] PChart message. Sabrina is aware of message.     Patient advised per Silvia she is still seeing patients and we will give him a call back with next steps.

## 2024-07-29 NOTE — TELEPHONE ENCOUNTER
"    Caller: Bryan Valadez \"Mic\"    Relationship: Self    Best call back number: [phone]    What medication are you requesting: Albendazole 400ml     What are your current symptoms: PATIENT'S DAUGHTER HAS PINWORMS FROM A PUPPY SHE WAS AROUND AND PATIENT WOULD LIKE THE MEDICATION THAT SHE WAS PRESCRIBED.    If a prescription is needed, what is your preferred pharmacy and phone number:  [id number] [place], [place] - [address] AT [facility] [address] - [phone]  - [phone]     Additional notes: PLEASE NOTIFY PATIENT ASAP.        "

## 2024-07-29 NOTE — TELEPHONE ENCOUNTER
"Caller: Bryan Valadez \"Mic\"    Relationship: Self    Best call back number: [phone]     What medication are you requesting: Albendazole 400 MG    If a prescription is needed, what is your preferred pharmacy and phone number: [id number] [address] AT [facility] & [address] - [phone] [facility] [phone]      Additional notes:PATIENT STATES THAT HE HAD SUBMITTED A [employer] MESSAGE TO REQUESTS MEDICATION, DUE TO CONFIRMED CASE OF PIN WORMS FOR ONE OF HIS CHILDREN BUT HAD NOT HEARD BACK. PLEASE CALL IF ADDITIONAL FOLLOW UP NEEDED.      "

## 2024-07-30 ENCOUNTER — PRIOR AUTHORIZATION (OUTPATIENT)
Dept: INTERNAL MEDICINE | Facility: CLINIC | Age: 39
End: 2024-07-30
Payer: COMMERCIAL

## 2024-07-30 NOTE — TELEPHONE ENCOUNTER
Ablation of the Liver                                                         WHAT YOU NEED TO KNOW:                             Liver ablation is a treatment that destroys liver tumors without removing them  Using image guidance, a probe is inserted into the tumor  Ablations can be done using high energy radio waves (RFA)  Or using microwave energy  Heat destroys the abnormal tissue  A cyroablation destroys abnormal tissue by freezing it  DISCHARGE INSTRUCTIONS:         You may resume your normal diet and medications  Small sips of flat soda will help with nausea  Limit your activity for 24 hours  Dennie Artis and Danny  Patients Contact Interventional  Radiology at [phone] PATIENTS: Contact Interventional Radiology at [phone])      [place] PATIENTS: Contact Interventional Radiology at [phone]) if any of the following occur:    Contact your healthcare provider if:  You have severe pain that does not get better with medicine  Difficulty breathing, nausea or vomiting  Chills or fever above 101 degrees F  Pain at the probe sites not relieved by medication  Develop any redness, swelling, heat, unusual drainage, heavy bruising or                              bleeding from the probe sites  You continue to have pain a week after your procedure  You have questions or concerns about your condition or care  Follow up with your healthcare provider as directed: You will need to return within a month for a CT scan or MRI of your liver  Write down your questions so you remember to ask them during your visits  Rest as needed: Slowly start to do more each day  Return to your daily activities as directed by your healthcare provider     © 2017 [employer] is for End User's use only and may not be sold, redistributed or PA submitted for Albendazole [id number]   otherwise used for commercial purposes  All illustrations and images included in CareNotes® are the copyrighted property of A D A M , Inc  or Valentin Cameron  The above information is an  only  It is not intended as medical advice for individual conditions or treatments  Talk to your doctor, nurse or pharmacist before following any medical regimen to see if it is safe and effective for you

## 2024-08-09 ENCOUNTER — TELEPHONE (OUTPATIENT)
Dept: ONCOLOGY | Facility: CLINIC | Age: 39
End: 2024-08-09

## 2024-08-09 NOTE — TELEPHONE ENCOUNTER
"    Caller: Bryan Valadez \"Mic\"    Relationship to patient: Self    Best call back number: [phone]    Chief complaint: REQUEST TO RESCHEDULE     Type of visit: LAB AND ANTI  COAG FOLLOW UP     Requested date: 08/14 EARLIEST POSSIBLE 7:30AM OR 7:40AM     If rescheduling, when is the original appointment: 08/16       "

## 2024-08-14 ENCOUNTER — LAB (OUTPATIENT)
Dept: OTHER | Facility: HOSPITAL | Age: 39
End: 2024-08-14
Payer: COMMERCIAL

## 2024-08-14 ENCOUNTER — ANTICOAGULATION VISIT (OUTPATIENT)
Dept: ONCOLOGY | Facility: HOSPITAL | Age: 39
End: 2024-08-14
Payer: COMMERCIAL

## 2024-08-14 DIAGNOSIS — I82.532 CHRONIC DEEP VEIN THROMBOSIS (DVT) OF LEFT POPLITEAL VEIN: ICD-10-CM

## 2024-08-14 DIAGNOSIS — D68.51 FACTOR 5 LEIDEN MUTATION, HETEROZYGOUS: Chronic | ICD-10-CM

## 2024-08-14 DIAGNOSIS — I26.99 PULMONARY EMBOLISM WITH INFARCTION: Primary | ICD-10-CM

## 2024-08-14 LAB
INR PPP: 1.6
PROTHROMBIN TIME: 19.2 SECONDS (ref 11–15)

## 2024-08-14 PROCEDURE — 36416 COLLJ CAPILLARY BLOOD SPEC: CPT

## 2024-08-14 PROCEDURE — 85610 PROTHROMBIN TIME: CPT

## 2024-08-14 NOTE — PROGRESS NOTES
Anticoagulation Clinic Progress Note    Patient's visit was held via telephone today.    Anticoagulation Summary  As of 2024      INR goal:  2.0-3.0   TTR:  64.1% (1.4 y)   INR used for dosin.60 (2024)   Warfarin maintenance plan:  10 mg (5 mg x 2) every Sun, Tue, u; 7.5 mg (5 mg x 1.5) all other days   Weekly warfarin total:  60 mg   Plan last modified:  Sharifa Cruz RPH (2024)   Next INR check:  2024   Priority:  Maintenance   Target end date:  Indefinite    Indications    Pulmonary embolism with infarction [I26.99]  Factor 5 Leiden mutation  heterozygous [D68.51]  Chronic deep vein thrombosis (DVT) of left popliteal vein [I82.532]                 Anticoagulation Episode Summary       INR check location:      Preferred lab:      Send INR reminders to:   [facility] ONC CBC ANTICOAG POOL    Comments:  EP lab/ coag phone call          Anticoagulation Care Providers       Provider Role Specialty Phone number    Sean Jefferson MD Referring Hematology and Oncology [phone]            Clinic Interview:  Patient Findings     Positives:  Missed doses (missed 5mg on  - 2.5mg taken only)    Negatives:  Signs/symptoms of thrombosis, Signs/symptoms of bleeding,   Laboratory test error suspected, Change in health, Change in alcohol use,   Change in activity, Upcoming invasive procedure, Emergency department   visit, Upcoming dental procedure, Extra doses, Change in medications,   Change in diet/appetite, Hospital admission, Bruising, Other complaints      Clinical Outcomes     Negatives:  Major bleeding event, Thromboembolic event,   Anticoagulation-related hospital admission, Anticoagulation-related ED   visit, Anticoagulation-related fatality      INR 1.6 only took 1/2 tab (2.5mg) on Monday - replace tonight; pt out of town next week so will schedule for labs  when returns; no other changes reported     INR History:      2024     9:30 AM 2024     8:11 AM 2024     8:30  AM 6/21/2024     8:00 AM 6/21/2024     8:15 AM 8/14/2024     8:31 AM 8/14/2024     8:45 AM   Anticoagulation Monitoring   INR 2.10  1.90 3.00   1.60   INR Date 5/1/2024 5/24/2024 6/21/2024 8/14/2024   INR Goal 2.0-3.0  2.0-3.0 2.0-3.0   2.0-3.0   Trend Same  Same Same   Same   Last Week Total 60 mg  57.5 mg 60 mg   55 mg   Next Week Total 60 mg  60 mg 60 mg   65 mg   Sun 10 mg  10 mg 10 mg   10 mg   Mon 7.5 mg  7.5 mg 7.5 mg   7.5 mg   Tue 10 mg  10 mg 10 mg   10 mg   Wed 7.5 mg  7.5 mg 7.5 mg   12.5 mg (8/14); Otherwise 7.5 mg   Thu 10 mg  10 mg 10 mg   10 mg   Fri 7.5 mg  7.5 mg 7.5 mg   7.5 mg   Sat 7.5 mg  7.5 mg 7.5 mg   7.5 mg   Historical INR  1.90    3.00  1.60         Plan:  1. INR is Subtherapeutic today- see above in Anticoagulation Summary.  Will instruct Bryan Valadez to Change their warfarin regimen- see above in Anticoagulation Summary.  3. Verbal and written information provided. Patient expresses understanding and has no further questions at this time.    Martha Lord [facility]

## 2024-08-15 RX ORDER — WARFARIN SODIUM 5 MG/1
TABLET ORAL
Qty: 145 TABLET | Refills: 1 | Status: SHIPPED | OUTPATIENT
Start: 2024-08-15

## 2024-09-04 ENCOUNTER — TELEPHONE (OUTPATIENT)
Dept: ONCOLOGY | Facility: CLINIC | Age: 39
End: 2024-09-04

## 2024-09-04 NOTE — TELEPHONE ENCOUNTER
"Caller: Bryan Valadez \"Mic\"    Relationship to patient: Self    Best call back number: [phone]    Chief complaint: RESCHEDULE     Type of visit: LAB, COAG, FOLLOW UP    Requested date: 9-17 @ 3:00 OR 4:30     If rescheduling, when is the original appointment: 9-13     Additional notes:PLEASE ADVISE           "

## 2024-10-04 ENCOUNTER — ANTICOAGULATION VISIT (OUTPATIENT)
Dept: ONCOLOGY | Facility: HOSPITAL | Age: 39
End: 2024-10-04
Payer: COMMERCIAL

## 2024-10-04 ENCOUNTER — LAB (OUTPATIENT)
Dept: OTHER | Facility: HOSPITAL | Age: 39
End: 2024-10-04
Payer: COMMERCIAL

## 2024-10-04 ENCOUNTER — OFFICE VISIT (OUTPATIENT)
Dept: ONCOLOGY | Facility: CLINIC | Age: 39
End: 2024-10-04
Payer: COMMERCIAL

## 2024-10-04 VITALS
HEART RATE: 67 BPM | RESPIRATION RATE: 16 BRPM | HEIGHT: 70 IN | BODY MASS INDEX: 26.7 KG/M2 | DIASTOLIC BLOOD PRESSURE: 94 MMHG | WEIGHT: 186.5 LBS | OXYGEN SATURATION: 98 % | SYSTOLIC BLOOD PRESSURE: 134 MMHG | TEMPERATURE: 97.6 F

## 2024-10-04 DIAGNOSIS — D68.51 FACTOR 5 LEIDEN MUTATION, HETEROZYGOUS: Chronic | ICD-10-CM

## 2024-10-04 DIAGNOSIS — I26.99 PULMONARY EMBOLISM WITH INFARCTION: Primary | ICD-10-CM

## 2024-10-04 DIAGNOSIS — D68.51 FACTOR 5 LEIDEN MUTATION, HETEROZYGOUS: Primary | ICD-10-CM

## 2024-10-04 DIAGNOSIS — I82.532 CHRONIC DEEP VEIN THROMBOSIS (DVT) OF LEFT POPLITEAL VEIN: ICD-10-CM

## 2024-10-04 LAB
INR PPP: 5.2 (ref 0.8–1.2)
PROTHROMBIN TIME: 52.9 SECONDS (ref 12.8–15.2)

## 2024-10-04 PROCEDURE — 36416 COLLJ CAPILLARY BLOOD SPEC: CPT

## 2024-10-04 PROCEDURE — 85610 PROTHROMBIN TIME: CPT | Performed by: INTERNAL MEDICINE

## 2024-10-04 NOTE — PROGRESS NOTES
Anticoagulation Clinic Progress Note    Patient's visit was held via telephone today.    Anticoagulation Summary  As of 10/4/2024      INR goal:  2.0-3.0   TTR:  60.8% (1.5 y)   INR used for dosin.2 (10/4/2024)   Warfarin maintenance plan:  10 mg (5 mg x 2) every Sun, e, Thu; 7.5 mg (5 mg x 1.5) all other days   Weekly warfarin total:  60 mg   Plan last modified:  Sharifa Cruz RPH (2024)   Next INR check:  10/21/2024   Priority:  Maintenance   Target end date:  Indefinite    Indications    Pulmonary embolism with infarction [I26.99]  Factor 5 Leiden mutation  heterozygous [D68.51]  Chronic deep vein thrombosis (DVT) of left popliteal vein [I82.532]                 Anticoagulation Episode Summary       INR check location:      Preferred lab:      Send INR reminders to:   [facility] ONC CBC ANTICOAG POOL    Comments:  EP lab/ coag phone call          Anticoagulation Care Providers       Provider Role Specialty Phone number    Sean Jefferson MD Referring Hematology and Oncology [phone]            Clinic Interview:  Patient Findings     Positives:  Extra doses (Patient reports taking double doses 1-2 days in   the previous week d/t forgetting he took dose. Counselled patient on   importance of adherence, patient plans to use pill box when travelling to   [place] the next two weeks.)    Negatives:  Signs/symptoms of thrombosis, Signs/symptoms of bleeding,   Laboratory test error suspected, Change in health, Change in alcohol use,   Change in activity, Upcoming invasive procedure, Emergency department   visit, Upcoming dental procedure, Missed doses, Change in medications,   Change in diet/appetite, Hospital admission, Bruising, Other complaints      Clinical Outcomes     Negatives:  Major bleeding event, Thromboembolic event,   Anticoagulation-related hospital admission, Anticoagulation-related ED   visit, Anticoagulation-related fatality        INR History:      2024     8:11 AM 2024     8:30 AM  6/21/2024     8:00 AM 6/21/2024     8:15 AM 8/14/2024     8:31 AM 8/14/2024     8:45 AM 10/4/2024     2:50 PM   Anticoagulation Monitoring   INR  1.90 3.00   1.60 5.2   INR Date  5/24/2024 6/21/2024   8/14/2024 10/4/2024   INR Goal  2.0-3.0 2.0-3.0   2.0-3.0 2.0-3.0   Trend  Same Same   Same Same   Last Week Total  57.5 mg 60 mg   55 mg 72.5 mg   Next Week Total  60 mg 60 mg   65 mg 40 mg   Sun  10 mg 10 mg   10 mg 5 mg (10/6); Otherwise 10 mg   Mon  7.5 mg 7.5 mg   7.5 mg 7.5 mg   Tue  10 mg 10 mg   10 mg 10 mg   Wed  7.5 mg 7.5 mg   12.5 mg (8/14); Otherwise 7.5 mg 7.5 mg   Thu  10 mg 10 mg   10 mg 10 mg   Fri  7.5 mg 7.5 mg   7.5 mg Hold (10/4); Otherwise 7.5 mg   Sat  7.5 mg 7.5 mg   7.5 mg Hold (10/5); Otherwise 7.5 mg   Historical INR 1.90    3.00  1.60      Visit Report       Report       Plan:  1. INR is Supratherapeutic today. Patient reports taking double doses 1-2 days in   the previous week d/t forgetting he took original dose. Counselled patient on   importance of adherence; patient plans to use daily pill box when travelling to   [place] the next two weeks. Counseled patient on signs/symptoms of bleeding/thromboembolic events, appropriate monitoring, and when to seek medical attention.   Will instruct Bryan Valadez to hold x2 doses of warfarin today and tomorrow, then take a reduced dose of 5 mg on Sunday before resuming their usual warfarin regimen of 10 mg Sunday/Tuesday/Thursday and 7.5 mg all other days - see above in Anticoagulation Summary.  2. Follow up in 2 weeks as patient is leaving for a 2-week vacation in [place] this weekend. Patient has been educated on supratherapeutic INR, dose plan, and monitoring.  3. Patient declines warfarin refills.  4. Verbal information provided. Patient expresses understanding and has no further questions at this time.    Carissa Flores, [facility]

## 2024-10-04 NOTE — PROGRESS NOTES
[facility] OUTPATIENT FOLLOW UP VISIT    REASON FOR FOLLOW-UP:    1.  Left lower extremity DVT with progression of symptoms and extension of the left lower extremity DVT into the femoral vein from the popliteal/calf vein swallowing for next set.  Currently anticoagulated with warfarin.  2.  Right lower extremity DVT noted in the common femoral, profunda femoral, and calf veins while anticoagulated with a DOAC.  Therefore, failure of DOAC.  3.  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  Other thrombophilia labs negative.    4.  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  NOT 1p19q co-deleted.    5.  Radiation initiated on 7/31/2018.  Plan for Temodar ×1 year following radiation.  6.  4500 cGy in 25 fractions administered from 7/31/2018 through 9/14/2018  7.  MRI brain 10/23/2018 with resolving hemorrhage in the resection cavity.  However, there is hyperintensity measuring 4.6 x 4.3 x 3 cm along the margins of the resection cavity.  8.  Repeat venous duplex on 10/23 with chronic right CVT and chronic LLE DVT from the mid femoral vein distally.     9.  He initiated adjuvant therapy with Temodar in mid October 2018.  10.  MRI brain 12/3/2018 with stable findings.  11.  On 1/24/2019 he did have a repeat resection by Dr. Galvan.  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  However, there was no evidence of progression to a higher grade in the new resected specimen.  Mostly the proliferative activity was very low with only rare mitoses and a low Ki-67 of just 2-3%.  12.  Temodar completed December 2019      HISTORY OF PRESENT ILLNESS:  Bryan Valadez is a 39 y.o. male who returns today for follow up of the above issues.     He returns today for follow-up while on Coumadin.  He is doing well.  No new concerns.      PHYSICAL EXAMINATION:    Vitals:    10/04/24 1433   BP: 134/94   Pulse: 67   Resp: 16  "  Temp: 97.6 °F (36.4 °C)   TempSrc: Oral   SpO2: 98%   Weight: 84.6 kg (186 lb 8 oz)   Height: 177 cm (69.69\")   PainSc: 0-No pain         General:  No acute distress, awake, alert and oriented  Skin:  Warm and dry, no visible rash  HEENT: Well-healed surgical incisions from his distant history of craniotomy.  Chest:  Normal respiratory effort.  Lungs clear to auscultation bilaterally.  Heart: Regular rate and rhythm  Lymphatics: No palpable cervical supraclavicular axillary adenopathy again today  Extremities:  No visible clubbing, cyanosis, or edema  Neuro/psych:  Grossly nonfocal.  Normal mood and affect.    DIAGNOSTIC DATA:   POC Protime / INR (10/04/2024 14:27)  Protime-INR, Fingerstick (10/04/2024 14:12)    IMAGING:  No new imaging reviewed at this time.     ASSESSMENT:  This is a 39 y.o. male with:    *History of bilateral lower extremity DVT:   Factor V Leiden heterozygote  Failure of DOAC in the past  He remains on warfarin.   INR 5.2 today.  Continue adjustment per our Coumadin clinic.    *Factor V Leiden heterozygote    *Anaplastic astrocytoma involving the right frontal lobe  Status post resection on 6/16/2018 by Dr. Galvan.  IDH mutated.  NOT 1p19q co-deleted.  Overall, this is a good molecular profile. He did initiate radiation alone on 7/31/2018 and completed it on 9/14/2018.   He initiated adjuvant Temodar in mid October 2018.  Due to persistent abnormalities on MRI, on 1/24/2019 he did have a repeat resection by Dr. Galvan.  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  However, there was no evidence of progression to a higher grade in the new resected specimen.  Mostly the proliferative activity was very low with only rare mitoses and a low Ki-67 of just 2-3%.  MRI brain imaging from 9/23/2019 appeared stable.  He completed 1 year of Temodar with an MRI on 12/12/2019 showing stable findings with some improvement in the intensity of the abnormal uptake  MRI brain 2/27/2020 " stable..  MRI brain 5/27/2020 stable.    MRI brain 9/21/2020 stable.  MRI 1/7/2021 stable  MRI 5/10/2021 without contrast stable  MRI brain 9/30/21 without contrast stable  MRI brain 2/2/22 with and without contrast stable  MRI brain 5/3/22 with and without contrast stable  MRI brain August 2022 without recurrence.  He is now following with Dr. Bustamante with neurosurgery at [facility].  MRI of the brain at [facility] on 2/26/23 showing stable findings, no evidence for recurrence  MRI brain 6/8/23 stable  MRI brain 9/14/2023 with a 3 mm area of concern at the posterior margin of the right frontal resection cavity that was not present on follow-up imaging of the brain with and without contrast on 10/4/2023.  MRI brain 8/27/2024 stable.  Seen by [facility] neurosurgery and they recommended repeat imaging in 3 months.    PLAN:   Continue warfarin as per management of anticoagulant clinic.   Continue to follow-up with Dr. Bustamante at the [facility] with follow-up MRI imaging there as appropriate.  Continue monthly INR per anticoag clinic.    MD in 6 months for follow-up with a CBC and INR.    NICOLE Mohan

## 2024-10-08 NOTE — TELEPHONE ENCOUNTER
----- Message from Brock Hinojosa sent at 1/14/2019 11:06 AM EST -----   Jv with  off needs a call back about pt        [phone]      Called Jv back and she was busy. Left message with reception asking her to return our call.   
[phone]

## 2024-10-21 ENCOUNTER — LAB (OUTPATIENT)
Dept: OTHER | Facility: HOSPITAL | Age: 39
End: 2024-10-21
Payer: COMMERCIAL

## 2024-10-21 ENCOUNTER — ANTICOAGULATION VISIT (OUTPATIENT)
Dept: ONCOLOGY | Facility: HOSPITAL | Age: 39
End: 2024-10-21
Payer: COMMERCIAL

## 2024-10-21 DIAGNOSIS — I82.532 CHRONIC DEEP VEIN THROMBOSIS (DVT) OF LEFT POPLITEAL VEIN: ICD-10-CM

## 2024-10-21 DIAGNOSIS — I26.99 PULMONARY EMBOLISM WITH INFARCTION: Primary | ICD-10-CM

## 2024-10-21 DIAGNOSIS — D68.51 FACTOR 5 LEIDEN MUTATION, HETEROZYGOUS: Chronic | ICD-10-CM

## 2024-10-21 LAB
INR PPP: 3.3
PROTHROMBIN TIME: 40 SECONDS (ref 11–15)

## 2024-10-21 PROCEDURE — 36416 COLLJ CAPILLARY BLOOD SPEC: CPT

## 2024-10-21 PROCEDURE — 85610 PROTHROMBIN TIME: CPT

## 2024-10-23 NOTE — PROGRESS NOTES
Anticoagulation Clinic Progress Note    Patient's visit was held via telephone today.    Anticoagulation Summary  As of 10/21/2024      INR goal:  2.0-3.0   TTR:  59.0% (1.6 y)   INR used for dosing:  3.30 (10/21/2024)   Warfarin maintenance plan:  10 mg (5 mg x 2) every Sun, Thu; 7.5 mg (5 mg x 1.5) all other days   Weekly warfarin total:  57.5 mg   Plan last modified:  Carissa Flores RP (10/23/2024)   Next INR check:  11/13/2024   Priority:  Maintenance   Target end date:  Indefinite    Indications    Pulmonary embolism with infarction [I26.99]  Factor 5 Leiden mutation  heterozygous [D68.51]  Chronic deep vein thrombosis (DVT) of left popliteal vein [I82.532]                 Anticoagulation Episode Summary       INR check location:  --    Preferred lab:  --    Send INR reminders to:  [facility] CBC ANTICOAG POOL    Comments:  EP lab/ coag phone call          Anticoagulation Care Providers       Provider Role Specialty Phone number    Sean Jefferson MD Referring Hematology and Oncology [phone]            Clinic Interview:  Patient Findings     Negatives:  Signs/symptoms of thrombosis, Signs/symptoms of bleeding,   Laboratory test error suspected, Change in health, Change in alcohol use,   Change in activity, Upcoming invasive procedure, Emergency department   visit, Upcoming dental procedure, Missed doses, Extra doses, Change in   medications, Change in diet/appetite, Hospital admission, Bruising, Other   complaints      Clinical Outcomes     Negatives:  Major bleeding event, Thromboembolic event,   Anticoagulation-related hospital admission, Anticoagulation-related ED   visit, Anticoagulation-related fatality        INR History:      6/21/2024     8:00 AM 6/21/2024     8:15 AM 8/14/2024     8:31 AM 8/14/2024     8:45 AM 10/4/2024     2:50 PM 10/21/2024    12:59 PM 10/21/2024     1:15 PM   Anticoagulation Monitoring   INR 3.00   1.60 5.2  3.30   INR Date 6/21/2024   8/14/2024 10/4/2024  10/21/2024   INR Goal  2.0-3.0   2.0-3.0 2.0-3.0  2.0-3.0   Trend Same   Same Same  Down   Last Week Total 60 mg   55 mg 72.5 mg  60 mg   Next Week Total 60 mg   65 mg 40 mg  57.5 mg   Sun 10 mg   10 mg 5 mg (10/6); Otherwise 10 mg  10 mg   Mon 7.5 mg   7.5 mg 7.5 mg  7.5 mg   Tue 10 mg   10 mg 10 mg  7.5 mg   Wed 7.5 mg   12.5 mg (8/14); Otherwise 7.5 mg 7.5 mg  7.5 mg   Thu 10 mg   10 mg 10 mg  10 mg   Fri 7.5 mg   7.5 mg Hold (10/4); Otherwise 7.5 mg  7.5 mg   Sat 7.5 mg   7.5 mg Hold (10/5); Otherwise 7.5 mg  7.5 mg   Historical INR  3.00  1.60    3.30     Visit Report     Report         Plan:  1. INR is Supratherapeutic today- see above in Anticoagulation Summary.  Will instruct Bryan Valadez to reduce their weekly warfarin regimen slightly to 10 mg on Sunday/Thursday and 7.5 mg all other days- see above in Anticoagulation Summary.  2. Follow up in 3 weeks per patient preference.   3. Patient declines warfarin refills.  4. Verbal information provided. Patient expresses understanding and has no further questions at this time.    Carissa Flores [facility]

## 2024-10-24 ENCOUNTER — TELEPHONE (OUTPATIENT)
Dept: INTERNAL MEDICINE | Facility: CLINIC | Age: 39
End: 2024-10-24
Payer: COMMERCIAL

## 2024-10-24 NOTE — TELEPHONE ENCOUNTER
Left detailed message per  verbal- physical scheduled with Suzanne will be canceled. Please call back to s/w Silvia to be fit in for physical with PCP Sabrina.

## 2024-11-01 ENCOUNTER — OFFICE VISIT (OUTPATIENT)
Dept: INTERNAL MEDICINE | Facility: CLINIC | Age: 39
End: 2024-11-01
Payer: COMMERCIAL

## 2024-11-01 VITALS
OXYGEN SATURATION: 99 % | DIASTOLIC BLOOD PRESSURE: 78 MMHG | TEMPERATURE: 97.3 F | HEIGHT: 70 IN | WEIGHT: 187 LBS | SYSTOLIC BLOOD PRESSURE: 122 MMHG | HEART RATE: 65 BPM | BODY MASS INDEX: 26.77 KG/M2

## 2024-11-01 DIAGNOSIS — Z83.3 FAMILY HISTORY OF DIABETES MELLITUS: ICD-10-CM

## 2024-11-01 DIAGNOSIS — Z12.5 SCREENING FOR PROSTATE CANCER: ICD-10-CM

## 2024-11-01 DIAGNOSIS — J45.20 MILD INTERMITTENT ASTHMA WITHOUT COMPLICATION: Chronic | ICD-10-CM

## 2024-11-01 DIAGNOSIS — M48.061 SPINAL STENOSIS OF LUMBAR REGION WITHOUT NEUROGENIC CLAUDICATION: ICD-10-CM

## 2024-11-01 DIAGNOSIS — Z00.00 PHYSICAL EXAM: Primary | ICD-10-CM

## 2024-11-01 LAB
ALBUMIN SERPL-MCNC: 4.7 G/DL (ref 3.5–5.2)
ALBUMIN/GLOB SERPL: 1.9 G/DL
ALP SERPL-CCNC: 55 U/L (ref 39–117)
ALT SERPL-CCNC: 18 U/L (ref 1–41)
AST SERPL-CCNC: 14 U/L (ref 1–40)
BILIRUB SERPL-MCNC: 0.4 MG/DL (ref 0–1.2)
BUN SERPL-MCNC: 11 MG/DL (ref 6–20)
BUN/CREAT SERPL: 12.1 (ref 7–25)
CALCIUM SERPL-MCNC: 9.7 MG/DL (ref 8.6–10.5)
CHLORIDE SERPL-SCNC: 102 MMOL/L (ref 98–107)
CHOLEST SERPL-MCNC: 226 MG/DL (ref 0–200)
CO2 SERPL-SCNC: 27.4 MMOL/L (ref 22–29)
CREAT SERPL-MCNC: 0.91 MG/DL (ref 0.76–1.27)
EGFRCR SERPLBLD CKD-EPI 2021: 108.6 ML/MIN/1.73
ERYTHROCYTE [DISTWIDTH] IN BLOOD BY AUTOMATED COUNT: 12.2 % (ref 12.3–15.4)
GLOBULIN SER CALC-MCNC: 2.5 GM/DL
GLUCOSE SERPL-MCNC: 98 MG/DL (ref 65–99)
HBA1C MFR BLD: 5.1 % (ref 4.8–5.6)
HCT VFR BLD AUTO: 44.9 % (ref 37.5–51)
HDLC SERPL-MCNC: 61 MG/DL (ref 40–60)
HGB BLD-MCNC: 15.5 G/DL (ref 13–17.7)
LDLC SERPL CALC-MCNC: 141 MG/DL (ref 0–100)
MCH RBC QN AUTO: 30.9 PG (ref 26.6–33)
MCHC RBC AUTO-ENTMCNC: 34.5 G/DL (ref 31.5–35.7)
MCV RBC AUTO: 89.6 FL (ref 79–97)
PLATELET # BLD AUTO: 257 10*3/MM3 (ref 140–450)
POTASSIUM SERPL-SCNC: 4.4 MMOL/L (ref 3.5–5.2)
PROT SERPL-MCNC: 7.2 G/DL (ref 6–8.5)
PSA SERPL-MCNC: 1.46 NG/ML (ref 0–4)
RBC # BLD AUTO: 5.01 10*6/MM3 (ref 4.14–5.8)
SODIUM SERPL-SCNC: 139 MMOL/L (ref 136–145)
TRIGL SERPL-MCNC: 133 MG/DL (ref 0–150)
TSH SERPL DL<=0.005 MIU/L-ACNC: 1.94 UIU/ML (ref 0.27–4.2)
VLDLC SERPL CALC-MCNC: 24 MG/DL (ref 5–40)
WBC # BLD AUTO: 3.96 10*3/MM3 (ref 3.4–10.8)

## 2024-11-01 PROCEDURE — 99396 PREV VISIT EST AGE 40-64: CPT | Performed by: NURSE PRACTITIONER

## 2024-11-01 NOTE — PROGRESS NOTES
Subjective   Bryan Valadez is a 39 y.o. male who is here for a physical exam.    History of Present Illness   History of Present Illness    He reports an improvement in his low back pain, attributing it to physical therapy. He has been engaging in cycling exercises but believes his seat height may not be optimal causing him to bend his lumbar region.     He reports occasional neck discomfort but no headaches or dizziness. His work involves financial projections and underwriting which often leads to headaches and eye strain/discomfort.    He underwent a vasectomy 2/15/24 without any difficulty.    He was in the ER in 08/2024. While at work, he had a [employer] gift basket and ate steak with hot sauce. He started having tunnel vision and his tongue became numb. He went home to  his daughters and his symptoms persisted. He went to the ER where all studies including a CTA of his neck and a CT of his head were normal. He also had a PET CT from the skull to the base of the mid-thigh and a complete workup. He saw hematology in 10/2024, and no changes were made to his treatment.     FAMILY HISTORY  He has a family history (dad) of prostate cancer.    IMMUNIZATIONS  He has had 2 COVID-19 vaccines.      Past Medical History:   Diagnosis Date    Allergic rhinitis     Asthma     Pulmonary Dr. Alexandra    Chest pain     DVT (deep venous thrombosis) 06/2018    LEFT LEG    Factor V Leiden     GERD (gastroesophageal reflux disease)     H/O echocardiogram 2006    Headache     History of ETT     History of Holter monitoring     Hyperlipidemia     PE (pulmonary thromboembolism) 06/2018    AFTER CRANIOTOMY    Primary brain tumor 2018         Current Outpatient Medications:     levETIRAcetam (KEPPRA) 1000 MG tablet, TAKE 1 TABLET BY MOUTH EVERY 12 HOURS, Disp: 180 tablet, Rfl: 0    montelukast (SINGULAIR) 10 MG tablet, TAKE 1 TABLET BY MOUTH DAILY, Disp: 90 tablet, Rfl: 3    warfarin (COUMADIN) 5 MG tablet, Take 2 tablets (10mg) on  Mondays and Thursdays and 1.5 tablets (7.5mg) on all other days or as directed, Disp: 156 tablet, Rfl: 2    warfarin (Coumadin) 5 MG tablet, Take 2 tabs (10mg) every Sun, Tue, Thu and 1 1/2 tab (7.5 mg) all other days, Disp: 145 tablet, Rfl: 1    Allergies   Allergen Reactions    Avocado Itching    Other Itching     Insect stings: Bee stings, throat swelling (has Epi pen)    Allergy to fruit, Avocado, Cherry Tomato (can have blue berries)    Tomato Itching     Cherry tomato       Review of Systems   Constitutional:  Negative for activity change, appetite change, chills, diaphoresis, fatigue, fever and unexpected weight change.   HENT:  Positive for congestion and postnasal drip. Negative for dental problem, drooling, ear discharge, ear pain, facial swelling, hearing loss, mouth sores, nosebleeds, rhinorrhea, sinus pressure, sore throat, tinnitus and trouble swallowing.    Eyes:  Negative for photophobia, pain, discharge, redness, itching and visual disturbance.   Respiratory:  Negative for apnea, cough, choking, chest tightness, shortness of breath and wheezing.    Cardiovascular:  Negative for chest pain, palpitations and leg swelling.        No orthopnea, PND, CH   Gastrointestinal:  Negative for abdominal pain, blood in stool, constipation, diarrhea, nausea and vomiting.   Endocrine: Negative for cold intolerance, heat intolerance, polydipsia and polyuria.   Genitourinary:  Negative for decreased urine volume, dysuria, enuresis, flank pain, frequency, hematuria and urgency.   Musculoskeletal:  Positive for arthralgias and back pain. Negative for gait problem, joint swelling, myalgias, neck pain and neck stiffness.   Skin:  Negative for color change and rash.        No hair changes, no nail changes   Allergic/Immunologic: Negative for environmental allergies, food allergies and immunocompromised state.   Neurological:  Negative for dizziness, tremors, seizures, syncope, speech difficulty, weakness,  "light-headedness, numbness and headaches.   Hematological:  Negative for adenopathy. Does not bruise/bleed easily.   Psychiatric/Behavioral:  Negative for agitation, confusion, decreased concentration, dysphoric mood, sleep disturbance and suicidal ideas. The patient is not nervous/anxious.        Objective   Vitals:    11/01/24 0746   BP: 122/78   BP Location: Left arm   Patient Position: Sitting   Cuff Size: Adult   Pulse: 65   Temp: 97.3 °F (36.3 °C)   SpO2: 99%   Weight: 84.8 kg (187 lb)   Height: 177 cm (69.69\")     Physical Exam  Constitutional:       General: He is not in acute distress.     Appearance: Normal appearance. He is not diaphoretic.   HENT:      Head: Normocephalic and atraumatic.      Right Ear: Tympanic membrane, ear canal and external ear normal.      Left Ear: Tympanic membrane, ear canal and external ear normal.      Nose: Nose normal. No rhinorrhea.      Mouth/Throat:      Mouth: Mucous membranes are moist.      Pharynx: Oropharynx is clear.   Eyes:      General:         Right eye: No discharge.         Left eye: No discharge.      Conjunctiva/sclera: Conjunctivae normal.   Cardiovascular:      Rate and Rhythm: Normal rate and regular rhythm.      Pulses: Normal pulses.      Heart sounds: Normal heart sounds.   Pulmonary:      Effort: Pulmonary effort is normal.      Breath sounds: Normal breath sounds.   Abdominal:      General: Bowel sounds are normal.      Tenderness: There is no abdominal tenderness.   Musculoskeletal:         General: No swelling or tenderness.      Cervical back: Normal range of motion.   Skin:     General: Skin is warm and dry.   Neurological:      General: No focal deficit present.      Mental Status: He is alert and oriented to person, place, and time.   Psychiatric:         Mood and Affect: Mood normal.         Behavior: Behavior normal.         Judgment: Judgment normal.       Physical Exam  Vital Signs  Blood pressure reading is 122/78.    Results  Imaging  CTA " of neck and CT of head showed no abnormalities. PET CT from the skull to the base of the mid thigh showed no abnormalities.       Assessment & Plan   Diagnoses and all orders for this visit:    1. Physical exam (Primary)  -     CBC (No Diff)  -     Comprehensive Metabolic Panel  -     Lipid Panel  -     TSH    2. Spinal stenosis of lumbar region without neurogenic claudication  Assessment & Plan:  He reports improvement in his back pain after adjusting his bike seat height and attending physical therapy. No further treatment is needed at this time.      3. Mild intermittent asthma without complication  Assessment & Plan:  Sx manage with daily Singulair, denies dyspnea.      4. Family history of diabetes mellitus  -     Hemoglobin A1c    5. Screening for prostate cancer  -     Cancel: PSA Screen    Other orders  -     PSA Screen      Assessment & Plan  Routine Checkup.  His weight remains stable. Blood pressure is 122/78. A hemoglobin A1c test will be conducted to ensure his blood sugar levels are within the normal range due to family history of diabetes. A PSA test will also be ordered due to family history of prostate cancer.    Risk Assessment:  Family History   Problem Relation Age of Onset    Diabetes Father     Ronal Hyperthermia Neg Hx      His Body mass index is 27.07 kg/m². He remains active and tries to follow a low-fat, low-cholesterol diet.    Prevention:  Health Maintenance   Topic Date Due    Pneumococcal Vaccine 0-64 (1 of 2 - PCV) Never done    ANNUAL PHYSICAL  10/25/2024    INFLUENZA VACCINE  03/31/2025 (Originally 8/1/2024)    COVID-19 Vaccine (3 - 2024-25 season) 10/31/2025 (Originally 9/1/2024)    LIPID PANEL  11/01/2025    BMI FOLLOWUP  11/01/2025    TDAP/TD VACCINES (2 - Td or Tdap) 03/29/2026    HEPATITIS C SCREENING  Completed       Discussed healthy lifestyle choices such as maintaining a balanced diet low in carbohydrates and limiting caffeine and alcohol intake.  Recommended routine  exercise for bone strength and cardiovascular health.         Patient or patient representative verbalized consent for the use of Ambient Listening during the visit with  NICOLE Portillo for chart documentation. 11/9/2024  11:27 EST

## 2024-11-09 PROBLEM — M48.061 SPINAL STENOSIS OF LUMBAR REGION: Chronic | Status: ACTIVE | Noted: 2024-02-12

## 2024-11-09 PROBLEM — Z98.890 STATUS POST CRANIOTOMY: Status: RESOLVED | Noted: 2018-06-29 | Resolved: 2024-11-09

## 2024-11-09 PROBLEM — I82.413 ACUTE DEEP VEIN THROMBOSIS (DVT) OF FEMORAL VEIN OF BOTH LOWER EXTREMITIES: Status: RESOLVED | Noted: 2018-07-07 | Resolved: 2024-11-09

## 2024-11-09 NOTE — ASSESSMENT & PLAN NOTE
He reports improvement in his back pain after adjusting his bike seat height and attending physical therapy. No further treatment is needed at this time.

## 2024-11-13 ENCOUNTER — LAB (OUTPATIENT)
Dept: OTHER | Facility: HOSPITAL | Age: 39
End: 2024-11-13
Payer: COMMERCIAL

## 2024-11-13 ENCOUNTER — ANTICOAGULATION VISIT (OUTPATIENT)
Dept: ONCOLOGY | Facility: HOSPITAL | Age: 39
End: 2024-11-13
Payer: COMMERCIAL

## 2024-11-13 DIAGNOSIS — D68.51 FACTOR 5 LEIDEN MUTATION, HETEROZYGOUS: Chronic | ICD-10-CM

## 2024-11-13 DIAGNOSIS — I26.99 PULMONARY EMBOLISM WITH INFARCTION: Primary | ICD-10-CM

## 2024-11-13 DIAGNOSIS — I82.532 CHRONIC DEEP VEIN THROMBOSIS (DVT) OF LEFT POPLITEAL VEIN: ICD-10-CM

## 2024-11-13 LAB
INR PPP: 1.6
PROTHROMBIN TIME: 19.4 SECONDS (ref 11–15)

## 2024-11-13 PROCEDURE — 85610 PROTHROMBIN TIME: CPT

## 2024-11-13 PROCEDURE — 36416 COLLJ CAPILLARY BLOOD SPEC: CPT

## 2024-11-13 NOTE — PROGRESS NOTES
Anticoagulation Clinic Progress Note    Patient's visit was held via telephone today.    Anticoagulation Summary  As of 2024      INR goal:  2.0-3.0   TTR:  59.0% (1.6 y)   INR used for dosin.60 (2024)   Warfarin maintenance plan:  10 mg (5 mg x 2) every Sun, Thu; 7.5 mg (5 mg x 1.5) all other days   Weekly warfarin total:  57.5 mg   Plan last modified:  Carissa Flores RP (10/23/2024)   Next INR check:  2024   Priority:  Maintenance   Target end date:  Indefinite    Indications    Pulmonary embolism with infarction [I26.99]  Factor 5 Leiden mutation  heterozygous [D68.51]  Chronic deep vein thrombosis (DVT) of left popliteal vein [I82.532]                 Anticoagulation Episode Summary       INR check location:  --    Preferred lab:  --    Send INR reminders to:   [facility] CBC ANTICOAG POOL    Comments:  EP lab/ coag phone call          Anticoagulation Care Providers       Provider Role Specialty Phone number    Sean Jefferson MD Referring Hematology and Oncology [phone]            Clinic Interview:  Patient Findings     Negatives:  Signs/symptoms of thrombosis, Signs/symptoms of bleeding,   Laboratory test error suspected, Change in health, Change in alcohol use,   Change in activity, Upcoming invasive procedure, Emergency department   visit, Upcoming dental procedure, Missed doses, Extra doses, Change in   medications, Change in diet/appetite, Hospital admission, Bruising, Other   complaints      Clinical Outcomes     Negatives:  Major bleeding event, Thromboembolic event,   Anticoagulation-related hospital admission, Anticoagulation-related ED   visit, Anticoagulation-related fatality      INR 1.6 no changes reported, no missed doses, patient desires to return to Sun/Tues/Thur 10mg dosing 7.5mg AOD's and recheck in 1 mo. Patient did not want to return any earlier for recheck as he will be traveling for work. He's will report any changes in diet/meds/health and seek medical  attention if s/sx clotting.     INR History:      8/14/2024     8:31 AM 8/14/2024     8:45 AM 10/4/2024     2:50 PM 10/21/2024    12:59 PM 10/21/2024     1:15 PM 11/13/2024     8:14 AM 11/13/2024     8:30 AM   Anticoagulation Monitoring   INR  1.60 5.2  3.30  1.60   INR Date  8/14/2024 10/4/2024  10/21/2024  11/13/2024   INR Goal  2.0-3.0 2.0-3.0  2.0-3.0  2.0-3.0   Trend  Same Same  Down  Same   Last Week Total  55 mg 72.5 mg  60 mg  57.5 mg   Next Week Total  65 mg 40 mg  57.5 mg  60 mg   Sun  10 mg 5 mg (10/6); Otherwise 10 mg  10 mg  10 mg   Mon  7.5 mg 7.5 mg  7.5 mg  7.5 mg   Tue  10 mg 10 mg  7.5 mg  7.5 mg   Wed  12.5 mg (8/14); Otherwise 7.5 mg 7.5 mg  7.5 mg  10 mg (11/13); Otherwise 7.5 mg   Thu  10 mg 10 mg  10 mg  10 mg   Fri  7.5 mg Hold (10/4); Otherwise 7.5 mg  7.5 mg  7.5 mg   Sat  7.5 mg Hold (10/5); Otherwise 7.5 mg  7.5 mg  7.5 mg   Historical INR 1.60    3.30   1.60     Visit Report   Report           Plan:  1. INR is Subtherapeutic today- see above in Anticoagulation Summary.  Will instruct Bryan Valadez to Change their warfarin regimen- see above in Anticoagulation Summary.  2. Follow up in 1 month  3. Verbal and written information provided. Patient expresses understanding and has no further questions at this time.    Martha Lord [facility]

## 2024-11-16 ENCOUNTER — DOCUMENTATION (OUTPATIENT)
Dept: ONCOLOGY | Facility: CLINIC | Age: 39
End: 2024-11-16
Payer: COMMERCIAL

## 2024-11-16 DIAGNOSIS — I26.99 PULMONARY EMBOLISM WITH INFARCTION: Primary | ICD-10-CM

## 2024-11-16 RX ORDER — WARFARIN SODIUM 5 MG/1
TABLET ORAL
Qty: 156 TABLET | Refills: 2 | Status: SHIPPED | OUTPATIENT
Start: 2024-11-16

## 2024-12-11 ENCOUNTER — ANTICOAGULATION VISIT (OUTPATIENT)
Dept: ONCOLOGY | Facility: HOSPITAL | Age: 39
End: 2024-12-11
Payer: COMMERCIAL

## 2024-12-11 ENCOUNTER — LAB (OUTPATIENT)
Dept: OTHER | Facility: HOSPITAL | Age: 39
End: 2024-12-11
Payer: COMMERCIAL

## 2024-12-11 DIAGNOSIS — I26.99 PULMONARY EMBOLISM WITH INFARCTION: Primary | ICD-10-CM

## 2024-12-11 DIAGNOSIS — D68.51 FACTOR 5 LEIDEN MUTATION, HETEROZYGOUS: Chronic | ICD-10-CM

## 2024-12-11 DIAGNOSIS — I82.532 CHRONIC DEEP VEIN THROMBOSIS (DVT) OF LEFT POPLITEAL VEIN: ICD-10-CM

## 2024-12-11 LAB
INR PPP: 2
PROTHROMBIN TIME: 24.2 SECONDS (ref 11–15)

## 2024-12-11 PROCEDURE — 36416 COLLJ CAPILLARY BLOOD SPEC: CPT

## 2024-12-11 PROCEDURE — 85610 PROTHROMBIN TIME: CPT

## 2024-12-12 NOTE — PROGRESS NOTES
Anticoagulation Clinic Progress Note    Patient's visit was held by phone today.    Anticoagulation Summary  As of 2024      INR goal:  2.0-3.0   TTR:  56.4% (1.7 y)   INR used for dosin.00 (2024)   Warfarin maintenance plan:  10 mg (5 mg x 2) every Sun, Tue, Thu; 7.5 mg (5 mg x 1.5) all other days   Weekly warfarin total:  60 mg   No change documented:  Carissa Flores [facility]   Plan last modified:  Martha Lord [facility] (2024)   Next INR check:  2025   Priority:  Maintenance   Target end date:  Indefinite    Indications    Pulmonary embolism with infarction [I26.99]  Factor 5 Leiden mutation  heterozygous [D68.51]  Chronic deep vein thrombosis (DVT) of left popliteal vein [I82.532]                 Anticoagulation Episode Summary       INR check location:  --    Preferred lab:  --    Send INR reminders to:   [facility] CBC ANTICOAG POOL    Comments:  EP lab/ coag phone call          Anticoagulation Care Providers       Provider Role Specialty Phone number    Sean Jefferson MD Referring Hematology and Oncology [phone]            Drug interactions: has remained unchanged.  Diet: has remained unchanged.    Clinic Interview:  No pertinent clinical findings have been reported.    INR History:      10/4/2024     2:50 PM 10/21/2024    12:59 PM 10/21/2024     1:15 PM 2024     8:14 AM 2024     8:30 AM 2024     8:10 AM 2024     8:30 AM   Anticoagulation Monitoring   INR 5.2  3.30  1.60  2.00   INR Date 10/4/2024  10/21/2024  2024  2024   INR Goal 2.0-3.0  2.0-3.0  2.0-3.0  2.0-3.0   Trend Same  Down  Up  Same   Last Week Total 72.5 mg  60 mg  57.5 mg  60 mg   Next Week Total 40 mg  57.5 mg  62.5 mg  60 mg   Sun 5 mg (10/6); Otherwise 10 mg  10 mg  10 mg  10 mg   Mon 7.5 mg  7.5 mg  7.5 mg  7.5 mg   Tue 10 mg  7.5 mg  10 mg  10 mg   Wed 7.5 mg  7.5 mg  10 mg (); Otherwise 7.5 mg  7.5 mg   Thu 10 mg  10 mg  10 mg  10 mg   Fri Hold (10/4); Otherwise 7.5 mg  7.5 mg   7.5 mg  7.5 mg   Sat Hold (10/5); Otherwise 7.5 mg  7.5 mg  7.5 mg  7.5 mg   Historical INR  3.30   1.60   2.00     Visit Report Report             Plan:  1. INR is Therapeutic today- see above in Anticoagulation Summary.   Will instruct Bryan Valadez to Continue their warfarin regimen of 10 mg Sunday/Tuesday/Thursday and 7.5 mg all other days - see above in Anticoagulation Summary.  2. Follow up in 1 month.   3.They have been instructed to call if any changes in medications, doses, concerns, etc. Patient expresses understanding and has no further questions at this time.    Carissa Flores [facility]

## 2024-12-19 DIAGNOSIS — C71.1 ANAPLASTIC ASTROCYTOMA OF FRONTAL LOBE: ICD-10-CM

## 2024-12-19 RX ORDER — LEVETIRACETAM 1000 MG/1
TABLET ORAL
Qty: 180 TABLET | Refills: 0 | OUTPATIENT
Start: 2024-12-19

## 2024-12-19 NOTE — TELEPHONE ENCOUNTER
"Per Dr. Hidalgo    \"I have not seen this patient for 2 years.  It looks like he is following with the [facility] oncology department.  I would recommend that he call them for a refill. \"  "

## 2024-12-19 NOTE — TELEPHONE ENCOUNTER
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  Have never seen him before and have never talked to him that I do not know anything about him.  Not sure how I came to be his prescriber for Keppra.

## 2024-12-31 ENCOUNTER — TELEPHONE (OUTPATIENT)
Dept: ONCOLOGY | Facility: CLINIC | Age: 39
End: 2024-12-31
Payer: COMMERCIAL

## 2024-12-31 NOTE — TELEPHONE ENCOUNTER
Provider: Ronny  Caller: patient  Relationship to Patient: self  Call Back Phone Number: [phone]  Reason for Call: patient needs a refill for levetiracetam.  He is completely out

## 2024-12-31 NOTE — TELEPHONE ENCOUNTER
I called the patient, let him know that Dr. Jefferson does not prescribe the Keppra and that he needs to contact his neurologist. Pt v/u

## 2025-01-08 ENCOUNTER — ANTICOAGULATION VISIT (OUTPATIENT)
Dept: ONCOLOGY | Facility: HOSPITAL | Age: 40
End: 2025-01-08
Payer: COMMERCIAL

## 2025-01-08 ENCOUNTER — LAB (OUTPATIENT)
Dept: OTHER | Facility: HOSPITAL | Age: 40
End: 2025-01-08
Payer: COMMERCIAL

## 2025-01-08 DIAGNOSIS — I26.99 PULMONARY EMBOLISM WITH INFARCTION: Primary | ICD-10-CM

## 2025-01-08 DIAGNOSIS — I82.532 CHRONIC DEEP VEIN THROMBOSIS (DVT) OF LEFT POPLITEAL VEIN: ICD-10-CM

## 2025-01-08 DIAGNOSIS — D68.51 FACTOR 5 LEIDEN MUTATION, HETEROZYGOUS: Chronic | ICD-10-CM

## 2025-01-08 LAB
INR PPP: 2.7
PROTHROMBIN TIME: 31.9 SECONDS (ref 11–15)

## 2025-01-08 PROCEDURE — 36416 COLLJ CAPILLARY BLOOD SPEC: CPT

## 2025-01-08 PROCEDURE — 85610 PROTHROMBIN TIME: CPT

## 2025-01-08 RX ORDER — WARFARIN SODIUM 5 MG/1
TABLET ORAL
Qty: 180 TABLET | Refills: 1 | Status: SHIPPED | OUTPATIENT
Start: 2025-01-08

## 2025-01-08 NOTE — PROGRESS NOTES
Anticoagulation Clinic Progress Note    Patient's visit was held via telephone today.    Anticoagulation Summary  As of 2025      INR goal:  2.0-3.0   TTR:  58.2% (1.8 y)   INR used for dosin.70 (2025)   Warfarin maintenance plan:  10 mg (5 mg x 2) every Sun, e, Thu; 7.5 mg (5 mg x 1.5) all other days   Weekly warfarin total:  60 mg   No change documented:  Carissa Flores RP   Plan last modified:  Martha Lord RP (2024)   Next INR check:  2025   Priority:  Maintenance   Target end date:  Indefinite    Indications    Pulmonary embolism with infarction [I26.99]  Factor 5 Leiden mutation  heterozygous [D68.51]  Chronic deep vein thrombosis (DVT) of left popliteal vein [I82.532]                 Anticoagulation Episode Summary       INR check location:  --    Preferred lab:  --    Send INR reminders to:   [facility] CBC ANTICOAG POOL    Comments:  EP lab/ coag phone call          Anticoagulation Care Providers       Provider Role Specialty Phone number    Sean Jefferson MD Referring Hematology and Oncology [phone]            Clinic Interview:  Patient Findings     Negatives:  Signs/symptoms of thrombosis, Signs/symptoms of bleeding,   Laboratory test error suspected, Change in health, Change in alcohol use,   Change in activity, Upcoming invasive procedure, Emergency department   visit, Upcoming dental procedure, Missed doses, Extra doses, Change in   medications, Change in diet/appetite, Hospital admission, Bruising, Other   complaints      Clinical Outcomes     Negatives:  Major bleeding event, Thromboembolic event,   Anticoagulation-related hospital admission, Anticoagulation-related ED   visit, Anticoagulation-related fatality        INR History:      10/21/2024     1:15 PM 2024     8:14 AM 2024     8:30 AM 2024     8:10 AM 2024     8:30 AM 2025     7:41 AM 2025     8:00 AM   Anticoagulation Monitoring   INR 3.30  1.60  2.00  2.70   INR Date 10/21/2024   11/13/2024 12/11/2024 1/8/2025   INR Goal 2.0-3.0  2.0-3.0  2.0-3.0  2.0-3.0   Trend Down  Up  Same  Same   Last Week Total 60 mg  57.5 mg  60 mg  60 mg   Next Week Total 57.5 mg  62.5 mg  60 mg  60 mg   Sun 10 mg  10 mg  10 mg  10 mg   Mon 7.5 mg  7.5 mg  7.5 mg  7.5 mg   Tue 7.5 mg  10 mg  10 mg  10 mg   Wed 7.5 mg  10 mg (11/13); Otherwise 7.5 mg  7.5 mg  7.5 mg   Thu 10 mg  10 mg  10 mg  10 mg   Fri 7.5 mg  7.5 mg  7.5 mg  7.5 mg   Sat 7.5 mg  7.5 mg  7.5 mg  7.5 mg   Historical INR  1.60   2.00   2.70         Plan:  1. INR is Therapeutic today- see above in Anticoagulation Summary.  Will instruct Bryan Valadez to Continue their warfarin regimen of 10 mg Sunday/Tuesday/Thursday and 7.5 mg all other days - see above in Anticoagulation Summary.  2. Follow up in 1 month.  3. Patient desires warfarin refills; sent to patient's preferred pharmacy.  4. Verbal information provided. Patient expresses understanding and has no further questions at this time.    Carissa Flores [facility]

## 2025-02-07 ENCOUNTER — ANTICOAGULATION VISIT (OUTPATIENT)
Dept: ONCOLOGY | Facility: HOSPITAL | Age: 40
End: 2025-02-07
Payer: COMMERCIAL

## 2025-02-07 ENCOUNTER — LAB (OUTPATIENT)
Dept: OTHER | Facility: HOSPITAL | Age: 40
End: 2025-02-07
Payer: COMMERCIAL

## 2025-02-07 DIAGNOSIS — I82.532 CHRONIC DEEP VEIN THROMBOSIS (DVT) OF LEFT POPLITEAL VEIN: ICD-10-CM

## 2025-02-07 DIAGNOSIS — I26.99 PULMONARY EMBOLISM WITH INFARCTION: ICD-10-CM

## 2025-02-07 DIAGNOSIS — D68.51 FACTOR 5 LEIDEN MUTATION, HETEROZYGOUS: Chronic | ICD-10-CM

## 2025-02-07 DIAGNOSIS — I26.99 PULMONARY EMBOLISM WITH INFARCTION: Primary | ICD-10-CM

## 2025-02-07 LAB
INR PPP: 2.2
PROTHROMBIN TIME: 25.9 SECONDS (ref 11–15)

## 2025-02-07 PROCEDURE — 36416 COLLJ CAPILLARY BLOOD SPEC: CPT

## 2025-02-07 PROCEDURE — 85610 PROTHROMBIN TIME: CPT

## 2025-02-07 NOTE — PROGRESS NOTES
Anticoagulation Clinic Progress Note    Patient's visit was held via telephone today.    Anticoagulation Summary  As of 2025      INR goal:  2.0-3.0   TTR:  60.1% (1.9 y)   INR used for dosin.20 (2025)   Warfarin maintenance plan:  10 mg (5 mg x 2) every Sun, e, Thu; 7.5 mg (5 mg x 1.5) all other days   Weekly warfarin total:  60 mg   Plan last modified:  Martha Lord RPH (2024)   Next INR check:  3/7/2025   Priority:  Maintenance   Target end date:  Indefinite    Indications    Pulmonary embolism with infarction [I26.99]  Factor 5 Leiden mutation  heterozygous [D68.51]  Chronic deep vein thrombosis (DVT) of left popliteal vein [I82.532]                 Anticoagulation Episode Summary       INR check location:  --    Preferred lab:  --    Send INR reminders to:   [facility] CBC ANTICOAG POOL    Comments:  EP lab/ coag phone call          Anticoagulation Care Providers       Provider Role Specialty Phone number    Sean Jefferson MD Referring Hematology and Oncology [phone]            Clinic Interview:  Patient Findings     Negatives:  Signs/symptoms of thrombosis, Signs/symptoms of bleeding,   Laboratory test error suspected, Change in health, Change in alcohol use,   Change in activity, Upcoming invasive procedure, Emergency department   visit, Upcoming dental procedure, Missed doses, Extra doses, Change in   medications, Change in diet/appetite, Hospital admission, Bruising, Other   complaints      Clinical Outcomes     Negatives:  Major bleeding event, Thromboembolic event,   Anticoagulation-related hospital admission, Anticoagulation-related ED   visit, Anticoagulation-related fatality        INR History:      2024     8:30 AM 2024     8:10 AM 2024     8:30 AM 2025     7:41 AM 2025     8:00 AM 2025     8:00 AM 2025     8:42 AM   Anticoagulation Monitoring   INR 1.60  2.00  2.70 2.20    INR Date 2024    INR Goal  2.0-3.0  2.0-3.0  2.0-3.0 2.0-3.0    Trend Up  Same  Same Same    Last Week Total 57.5 mg  60 mg  60 mg 60 mg    Next Week Total 62.5 mg  60 mg  60 mg 60 mg    Sun 10 mg  10 mg  10 mg 10 mg    Mon 7.5 mg  7.5 mg  7.5 mg 7.5 mg    Tue 10 mg  10 mg  10 mg 10 mg    Wed 10 mg (11/13); Otherwise 7.5 mg  7.5 mg  7.5 mg 7.5 mg    Thu 10 mg  10 mg  10 mg 10 mg    Fri 7.5 mg  7.5 mg  7.5 mg 7.5 mg    Sat 7.5 mg  7.5 mg  7.5 mg 7.5 mg    Historical INR  2.00   2.70    2.20        Plan:  1. INR is Therapeutic today- see above in Anticoagulation Summary.  Will instruct Bryan Valadez to Continue their warfarin regimen of 10 mg Sunday/Tuesday/Thursday and 7.5 mg all other days - see above in Anticoagulation Summary.  2. Follow up in 1 month.  3. Patient declines warfarin refills.  4. Verbal information provided. Patient expresses understanding and has no further questions at this time.    Carissa Flores [facility]

## 2025-02-25 ENCOUNTER — TELEPHONE (OUTPATIENT)
Dept: ONCOLOGY | Facility: CLINIC | Age: 40
End: 2025-02-25

## 2025-02-25 NOTE — TELEPHONE ENCOUNTER
"Caller: Bryan Valadez \"Mic\"    Relationship to patient: Self    Best call back number: [phone]    Chief complaint: RESCHEDULE     Type of visit: LAB     Requested date: 3-17 IN THE MORNING  AROUND     If rescheduling, when is the original appointment: 3-12     Additional notes:PLEASE ADVISE           "

## 2025-02-26 ENCOUNTER — TELEPHONE (OUTPATIENT)
Dept: ONCOLOGY | Facility: CLINIC | Age: 40
End: 2025-02-26
Payer: COMMERCIAL

## 2025-02-26 NOTE — TELEPHONE ENCOUNTER
"  Caller: Bryan Valadez \"Mic\"    Relationship: Self    Best call back number: [phone]    What is the best time to reach you: ANY    Who are you requesting to speak with (clinical staff, provider,  specific staff member): SCHEDULING    What was the call regarding: PT CALLING BACK REGARDING HIS APPT. CHANGE, ASSURED HIM HE WILL BE GETTING A CALL BACK.        "

## 2025-03-14 DIAGNOSIS — I26.99 PULMONARY EMBOLISM WITH INFARCTION: ICD-10-CM

## 2025-03-14 DIAGNOSIS — D68.51 FACTOR 5 LEIDEN MUTATION, HETEROZYGOUS: Primary | ICD-10-CM

## 2025-03-14 DIAGNOSIS — I82.532 CHRONIC DEEP VEIN THROMBOSIS (DVT) OF LEFT POPLITEAL VEIN: ICD-10-CM

## 2025-03-17 ENCOUNTER — ANTICOAGULATION VISIT (OUTPATIENT)
Dept: ONCOLOGY | Facility: HOSPITAL | Age: 40
End: 2025-03-17
Payer: COMMERCIAL

## 2025-03-17 ENCOUNTER — LAB (OUTPATIENT)
Dept: OTHER | Facility: HOSPITAL | Age: 40
End: 2025-03-17
Payer: COMMERCIAL

## 2025-03-17 DIAGNOSIS — I82.532 CHRONIC DEEP VEIN THROMBOSIS (DVT) OF LEFT POPLITEAL VEIN: ICD-10-CM

## 2025-03-17 DIAGNOSIS — D68.51 FACTOR 5 LEIDEN MUTATION, HETEROZYGOUS: Chronic | ICD-10-CM

## 2025-03-17 DIAGNOSIS — J30.9 ALLERGIC RHINITIS: ICD-10-CM

## 2025-03-17 DIAGNOSIS — I26.99 PULMONARY EMBOLISM WITH INFARCTION: Primary | ICD-10-CM

## 2025-03-17 DIAGNOSIS — D68.51 FACTOR 5 LEIDEN MUTATION, HETEROZYGOUS: ICD-10-CM

## 2025-03-17 DIAGNOSIS — I26.99 PULMONARY EMBOLISM WITH INFARCTION: ICD-10-CM

## 2025-03-17 LAB
INR PPP: 2.9
PROTHROMBIN TIME: 34.4 SECONDS (ref 11–15)

## 2025-03-17 PROCEDURE — 36416 COLLJ CAPILLARY BLOOD SPEC: CPT

## 2025-03-17 PROCEDURE — 85610 PROTHROMBIN TIME: CPT

## 2025-03-17 RX ORDER — MONTELUKAST SODIUM 10 MG/1
10 TABLET ORAL DAILY
Qty: 90 TABLET | Refills: 3 | Status: SHIPPED | OUTPATIENT
Start: 2025-03-17

## 2025-03-17 NOTE — PROGRESS NOTES
Anticoagulation Clinic Progress Note    Patient's visit was held via telephone today.    Anticoagulation Summary  As of 3/17/2025      INR goal:  2.0-3.0   TTR:  62.2% (2 y)   INR used for dosin.90 (3/17/2025)   Warfarin maintenance plan:  10 mg (5 mg x 2) every Sun, Tue, u; 7.5 mg (5 mg x 1.5) all other days   Weekly warfarin total:  60 mg   No change documented:  Carissa Flores RPH   Plan last modified:  Martha Lord RPH (2024)   Next INR check:  2025   Priority:  Maintenance   Target end date:  Indefinite    Indications    Pulmonary embolism with infarction [I26.99]  Factor 5 Leiden mutation  heterozygous [D68.51]  Chronic deep vein thrombosis (DVT) of left popliteal vein [I82.532]                 Anticoagulation Episode Summary       INR check location:  --    Preferred lab:  --    Send INR reminders to:   [facility] CBC ANTICOAG POOL    Comments:  EP lab/ coag phone call          Anticoagulation Care Providers       Provider Role Specialty Phone number    Sean Jefferson MD Referring Hematology and Oncology [phone]            Clinic Interview:  Patient Findings     Negatives:  Signs/symptoms of thrombosis, Signs/symptoms of bleeding,   Laboratory test error suspected, Change in health, Change in alcohol use,   Change in activity, Upcoming invasive procedure, Emergency department   visit, Upcoming dental procedure, Missed doses, Extra doses, Change in   medications, Change in diet/appetite, Hospital admission, Bruising, Other   complaints      Clinical Outcomes     Negatives:  Major bleeding event, Thromboembolic event,   Anticoagulation-related hospital admission, Anticoagulation-related ED   visit, Anticoagulation-related fatality        INR History:      2024     8:30 AM 2025     7:41 AM 2025     8:00 AM 2025     8:00 AM 2025     8:42 AM 3/17/2025     8:07 AM 3/17/2025     8:30 AM   Anticoagulation Monitoring   INR 2.00  2.70 2.20   2.90   INR Date 2024   1/8/2025 2/7/2025   3/17/2025   INR Goal 2.0-3.0  2.0-3.0 2.0-3.0   2.0-3.0   Trend Same  Same Same   Same   Last Week Total 60 mg  60 mg 60 mg   60 mg   Next Week Total 60 mg  60 mg 60 mg   60 mg   Sun 10 mg  10 mg 10 mg   10 mg   Mon 7.5 mg  7.5 mg 7.5 mg   7.5 mg   Tue 10 mg  10 mg 10 mg   10 mg   Wed 7.5 mg  7.5 mg 7.5 mg   7.5 mg   Thu 10 mg  10 mg 10 mg   10 mg   Fri 7.5 mg  7.5 mg 7.5 mg   7.5 mg   Sat 7.5 mg  7.5 mg 7.5 mg   7.5 mg   Historical INR  2.70    2.20  2.90         Plan:  1. INR is Therapeutic today- see above in Anticoagulation Summary.  Will instruct Bryan Valadez to Continue their warfarin regimen of 10 mg Sunday/Tuesday/Thursday and 7.5 mg all other days - see above in Anticoagulation Summary.  2. Follow up in 1 month when also scheduled with Dr. Jefferson.   3. Patient declines warfarin refills.  4. Verbal information provided. Patient expresses understanding and has no further questions at this time.    Carissa Flores [facility]

## 2025-04-17 ENCOUNTER — TELEPHONE (OUTPATIENT)
Dept: ONCOLOGY | Facility: CLINIC | Age: 40
End: 2025-04-17

## 2025-04-17 NOTE — TELEPHONE ENCOUNTER
"    Caller: Bryan Valadez \"GILLIAN\"    Relationship to patient: Self    Best call back number: [phone]     Chief complaint: PATIENT TO RESCHEDULE 4/25/25 APPTS    Type of visit: LAB, FU1, ANTICOAG    Requested date: SAME DAY STARTING AT 11 AM, 4/24/25 @ 11 AM, 4/29/25 BETWEEN [phone] ANY OTHER TIMES WOULD NEED TO CALL TO DISCUSS       "

## 2025-04-25 ENCOUNTER — ANTICOAGULATION VISIT (OUTPATIENT)
Dept: ONCOLOGY | Facility: HOSPITAL | Age: 40
End: 2025-04-25
Payer: COMMERCIAL

## 2025-04-25 ENCOUNTER — OFFICE VISIT (OUTPATIENT)
Dept: ONCOLOGY | Facility: CLINIC | Age: 40
End: 2025-04-25
Payer: COMMERCIAL

## 2025-04-25 ENCOUNTER — LAB (OUTPATIENT)
Dept: OTHER | Facility: HOSPITAL | Age: 40
End: 2025-04-25
Payer: COMMERCIAL

## 2025-04-25 VITALS
HEART RATE: 72 BPM | RESPIRATION RATE: 16 BRPM | DIASTOLIC BLOOD PRESSURE: 86 MMHG | BODY MASS INDEX: 26.51 KG/M2 | SYSTOLIC BLOOD PRESSURE: 128 MMHG | TEMPERATURE: 98.1 F | WEIGHT: 185.2 LBS | HEIGHT: 70 IN | OXYGEN SATURATION: 100 %

## 2025-04-25 DIAGNOSIS — C71.1 ANAPLASTIC ASTROCYTOMA OF FRONTAL LOBE: ICD-10-CM

## 2025-04-25 DIAGNOSIS — Z79.01 CHRONIC ANTICOAGULATION: ICD-10-CM

## 2025-04-25 DIAGNOSIS — I26.99 PULMONARY EMBOLISM WITH INFARCTION: Primary | ICD-10-CM

## 2025-04-25 DIAGNOSIS — I82.532 CHRONIC DEEP VEIN THROMBOSIS (DVT) OF LEFT POPLITEAL VEIN: ICD-10-CM

## 2025-04-25 DIAGNOSIS — D68.51 FACTOR 5 LEIDEN MUTATION, HETEROZYGOUS: ICD-10-CM

## 2025-04-25 DIAGNOSIS — D68.51 FACTOR 5 LEIDEN MUTATION, HETEROZYGOUS: Chronic | ICD-10-CM

## 2025-04-25 LAB
BASOPHILS # BLD AUTO: 0.06 10*3/MM3 (ref 0–0.2)
BASOPHILS NFR BLD AUTO: 1 % (ref 0–1.5)
DEPRECATED RDW RBC AUTO: 38.6 FL (ref 37–54)
EOSINOPHIL # BLD AUTO: 0.25 10*3/MM3 (ref 0–0.4)
EOSINOPHIL NFR BLD AUTO: 4.1 % (ref 0.3–6.2)
ERYTHROCYTE [DISTWIDTH] IN BLOOD BY AUTOMATED COUNT: 12.1 % (ref 12.3–15.4)
HCT VFR BLD AUTO: 44.9 % (ref 37.5–51)
HGB BLD-MCNC: 15.8 G/DL (ref 13–17.7)
IMM GRANULOCYTES # BLD AUTO: 0.04 10*3/MM3 (ref 0–0.05)
IMM GRANULOCYTES NFR BLD AUTO: 0.7 % (ref 0–0.5)
INR PPP: 1.7
LYMPHOCYTES # BLD AUTO: 1.95 10*3/MM3 (ref 0.7–3.1)
LYMPHOCYTES NFR BLD AUTO: 31.9 % (ref 19.6–45.3)
MCH RBC QN AUTO: 30.6 PG (ref 26.6–33)
MCHC RBC AUTO-ENTMCNC: 35.2 G/DL (ref 31.5–35.7)
MCV RBC AUTO: 87 FL (ref 79–97)
MONOCYTES # BLD AUTO: 0.59 10*3/MM3 (ref 0.1–0.9)
MONOCYTES NFR BLD AUTO: 9.7 % (ref 5–12)
NEUTROPHILS NFR BLD AUTO: 3.22 10*3/MM3 (ref 1.7–7)
NEUTROPHILS NFR BLD AUTO: 52.6 % (ref 42.7–76)
NRBC BLD AUTO-RTO: 0 /100 WBC (ref 0–0.2)
PLATELET # BLD AUTO: 226 10*3/MM3 (ref 140–450)
PMV BLD AUTO: 9.9 FL (ref 6–12)
PROTHROMBIN TIME: 20.3 SECONDS (ref 11–15)
RBC # BLD AUTO: 5.16 10*6/MM3 (ref 4.14–5.8)
WBC NRBC COR # BLD AUTO: 6.11 10*3/MM3 (ref 3.4–10.8)

## 2025-04-25 PROCEDURE — 85025 COMPLETE CBC W/AUTO DIFF WBC: CPT

## 2025-04-25 PROCEDURE — 85610 PROTHROMBIN TIME: CPT

## 2025-04-25 PROCEDURE — 36416 COLLJ CAPILLARY BLOOD SPEC: CPT

## 2025-04-25 NOTE — PROGRESS NOTES
Anticoagulation Clinic Progress Note    Patient's visit was held via telephone today.    Anticoagulation Summary  As of 2025      INR goal:  2.0-3.0   TTR:  62.8% (2.1 y)   INR used for dosin.70 (2025)   Warfarin maintenance plan:  7.5 mg (5 mg x 1.5) every Mon, Wed, Fri; 10 mg (5 mg x 2) all other days   Weekly warfarin total:  62.5 mg   Plan last modified:  Carissa Flores RPH (2025)   Next INR check:  2025   Priority:  Maintenance   Target end date:  Indefinite    Indications    Pulmonary embolism with infarction [I26.99]  Factor 5 Leiden mutation  heterozygous [D68.51]  Chronic deep vein thrombosis (DVT) of left popliteal vein [I82.532]                 Anticoagulation Episode Summary       INR check location:  --    Preferred lab:  --    Send INR reminders to:   [facility] CBC ANTICOAG POOL    Comments:  EP lab/ coag phone call          Anticoagulation Care Providers       Provider Role Specialty Phone number    Sean Jefferson MD Referring Hematology and Oncology [phone]            Clinic Interview:  Patient Findings     Positives:  Change in diet/appetite (Patient reports he is eating more   mixed green salads and vegetables; more vitamin K intake.)    Negatives:  Signs/symptoms of thrombosis, Signs/symptoms of bleeding,   Laboratory test error suspected, Change in health, Change in alcohol use,   Change in activity, Upcoming invasive procedure, Emergency department   visit, Upcoming dental procedure, Missed doses, Extra doses, Change in   medications, Hospital admission, Bruising, Other complaints      Clinical Outcomes     Negatives:  Major bleeding event, Thromboembolic event,   Anticoagulation-related hospital admission, Anticoagulation-related ED   visit, Anticoagulation-related fatality        INR History:      2025     8:00 AM 2025     8:00 AM 2025     8:42 AM 3/17/2025     8:07 AM 3/17/2025     8:30 AM 2025    11:38 AM 2025    12:45 PM   Anticoagulation  Monitoring   INR 2.70 2.20   2.90  1.70   INR Date 1/8/2025 2/7/2025   3/17/2025  4/25/2025   INR Goal 2.0-3.0 2.0-3.0   2.0-3.0  2.0-3.0   Trend Same Same   Same  Up   Last Week Total 60 mg 60 mg   60 mg  60 mg   Next Week Total 60 mg 60 mg   60 mg  65 mg   Sun 10 mg 10 mg   10 mg  10 mg   Mon 7.5 mg 7.5 mg   7.5 mg  7.5 mg   Tue 10 mg 10 mg   10 mg  10 mg   Wed 7.5 mg 7.5 mg   7.5 mg  7.5 mg   Thu 10 mg 10 mg   10 mg  10 mg   Fri 7.5 mg 7.5 mg   7.5 mg  10 mg (4/25); Otherwise 7.5 mg   Sat 7.5 mg 7.5 mg   7.5 mg  10 mg   Historical INR   2.20  2.90   1.70     Visit Report      Report Report       Plan:  1. INR is Subtherapeutic today- see above in Anticoagulation Summary.  Will instruct Bryan Valadez to increase warfarin dose to 10 mg today, then trial slightly increased weekly warfarin regimen of 7.5 mg Monday/Wednesday/Friday and 10 mg all other days for a 4.2% weekly dose increase - see above in Anticoagulation Summary.  2. Follow up in 2 weeks for close monitoring in setting of dose adjustment.   3. Patient declines warfarin refills.  4. Verbal information provided. Patient expresses understanding and has no further questions at this time.    Carissa Flores [facility]

## 2025-04-25 NOTE — PROGRESS NOTES
[facility] OUTPATIENT FOLLOW UP VISIT    REASON FOR FOLLOW-UP:    1.  Left lower extremity DVT with progression of symptoms and extension of the left lower extremity DVT into the femoral vein from the popliteal/calf vein swallowing for next set.  Currently anticoagulated with warfarin.  2.  Right lower extremity DVT noted in the common femoral, profunda femoral, and calf veins while anticoagulated with a DOAC.  Therefore, failure of DOAC.  3.  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  Other thrombophilia labs negative.    4.  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  NOT 1p19q co-deleted.    5.  Radiation initiated on 7/31/2018.  Plan for Temodar ×1 year following radiation.  6.  4500 cGy in 25 fractions administered from 7/31/2018 through 9/14/2018  7.  MRI brain 10/23/2018 with resolving hemorrhage in the resection cavity.  However, there is hyperintensity measuring 4.6 x 4.3 x 3 cm along the margins of the resection cavity.  8.  Repeat venous duplex on 10/23 with chronic right CVT and chronic LLE DVT from the mid femoral vein distally.     9.  He initiated adjuvant therapy with Temodar in mid October 2018.  10.  MRI brain 12/3/2018 with stable findings.  11.  On 1/24/2019 he did have a repeat resection by Dr. Galvan.  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  However, there was no evidence of progression to a higher grade in the new resected specimen.  Mostly the proliferative activity was very low with only rare mitoses and a low Ki-67 of just 2-3%.  12.  Temodar completed December 2019      HISTORY OF PRESENT ILLNESS:  Bryan Valadez is a 39 y.o. male who returns today for follow up of the above issues.     The patient returns the office today for 6-month follow-up and review.  He continues on Coumadin with dose adjustments per pharmacy coag clinic.  He has no new concerns.  He has  "no signs or symptoms of bleeding, signs or symptoms of thrombosis.  He continues to follow-up with [facility] regarding previous astrocytoma.  He has no new neurologic symptoms.    PHYSICAL EXAMINATION:    Vitals:    04/25/25 1144   BP: 128/86   Pulse: 72   Resp: 16   Temp: 98.1 °F (36.7 °C)   TempSrc: Oral   SpO2: 100%   Weight: 84 kg (185 lb 3.2 oz)   Height: 177 cm (69.69\")   PainSc: 0-No pain         General:  No acute distress, awake, alert and oriented  Skin:  Warm and dry, no visible rash  HEENT: Well-healed surgical incisions from his distant history of craniotomy.  Chest:  Normal respiratory effort.  Lungs clear to auscultation bilaterally.  Heart: Regular rate and rhythm  Lymphatics: No palpable cervical supraclavicular axillary adenopathy again today  Extremities:  No visible clubbing, cyanosis, or edema  Neuro/psych:  Grossly nonfocal.  Normal mood and affect.    DIAGNOSTIC DATA:   Protime-INR (04/25/2025 11:38)  CBC & Differential (04/25/2025 11:38)    IMAGING:  No new imaging reviewed at this time.     ASSESSMENT:  This is a 39 y.o. male with:    *History of bilateral lower extremity DVT:   Factor V Leiden heterozygote  Failure of DOAC in the past  He remains on warfarin.   Subtherapeutic today at 1.7.  Dose adjustments per pharmacy coag clinic.  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 Galvan.  IDH mutated.  NOT 1p19q co-deleted.  Overall, this is a good molecular profile. He did initiate radiation alone on 7/31/2018 and completed it on 9/14/2018.   He initiated adjuvant Temodar in mid October 2018.  Due to persistent abnormalities on MRI, on 1/24/2019 he did have a repeat resection by Dr. Galvan.  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  However, there was no evidence of progression to a higher grade in " the new resected specimen.  Mostly the proliferative activity was very low with only rare mitoses and a low Ki-67 of just 2-3%.  MRI brain imaging from 9/23/2019 appeared stable.  He completed 1 year of Temodar with an MRI on 12/12/2019 showing stable findings with some improvement in the intensity of the abnormal uptake  MRI brain 2/27/2020 stable..  MRI brain 5/27/2020 stable.    MRI brain 9/21/2020 stable.  MRI 1/7/2021 stable  MRI 5/10/2021 without contrast stable  MRI brain 9/30/21 without contrast stable  MRI brain 2/2/22 with and without contrast stable  MRI brain 5/3/22 with and without contrast stable  MRI brain August 2022 without recurrence.  He is now following with Dr. Bustamante with neurosurgery at [facility].  MRI of the brain at [facility] on 2/26/23 showing stable findings, no evidence for recurrence  MRI brain 6/8/23 stable  MRI brain 9/14/2023 with a 3 mm area of concern at the posterior margin of the right frontal resection cavity that was not present on follow-up imaging of the brain with and without contrast on 10/4/2023.  MRI brain 8/27/2024 stable.  Seen by [facility] neurosurgery and they recommended repeat imaging in 3 months.  3/10/2025 follow-up MRI of the brain stable.  Continue to follow with [facility] neurosurgery    PLAN:   Continue warfarin with dose adjustments per pharmacy coag clinic currently slightly subtherapeutic  Continue to follow-up with Dr. Bustamante at the [facility] with follow-up MRI imaging there as appropriate.  Continue monthly INR per anticoag clinic.    MD in 6 months for follow-up with a CBC and INR.  The patient understands to call if he develops signs or symptoms of bleeding, signs or symptoms of thrombosis.      Radha Bateman, APRN  04/25/2025

## 2025-05-04 ENCOUNTER — HOSPITAL ENCOUNTER (EMERGENCY)
Facility: HOSPITAL | Age: 40
Discharge: HOME | End: 2025-05-04
Attending: EMERGENCY MEDICINE | Admitting: EMERGENCY MEDICINE
Payer: COMMERCIAL

## 2025-05-04 ENCOUNTER — APPOINTMENT (OUTPATIENT)
Dept: CT IMAGING | Facility: HOSPITAL | Age: 40
End: 2025-05-04
Payer: COMMERCIAL

## 2025-05-04 VITALS
HEIGHT: 72 IN | BODY MASS INDEX: 25.06 KG/M2 | DIASTOLIC BLOOD PRESSURE: 53 MMHG | TEMPERATURE: 98.7 F | WEIGHT: 185 LBS | SYSTOLIC BLOOD PRESSURE: 127 MMHG | HEART RATE: 56 BPM | OXYGEN SATURATION: 98 % | RESPIRATION RATE: 16 BRPM

## 2025-05-04 DIAGNOSIS — T45.515A WARFARIN-INDUCED COAGULOPATHY: ICD-10-CM

## 2025-05-04 DIAGNOSIS — S39.012A ACUTE MYOFASCIAL STRAIN OF LUMBAR REGION, INITIAL ENCOUNTER: Primary | ICD-10-CM

## 2025-05-04 DIAGNOSIS — D68.32 WARFARIN-INDUCED COAGULOPATHY: ICD-10-CM

## 2025-05-04 LAB
INR PPP: 2.78 (ref 0.9–1.1)
PROTHROMBIN TIME: 29.6 SECONDS (ref 11.7–14.2)

## 2025-05-04 PROCEDURE — 25010000002 DIAZEPAM PER 5 MG: Performed by: EMERGENCY MEDICINE

## 2025-05-04 PROCEDURE — 96376 TX/PRO/DX INJ SAME DRUG ADON: CPT

## 2025-05-04 PROCEDURE — 25010000002 KETOROLAC TROMETHAMINE PER 15 MG: Performed by: EMERGENCY MEDICINE

## 2025-05-04 PROCEDURE — 25010000002 HYDROMORPHONE PER 4 MG: Performed by: EMERGENCY MEDICINE

## 2025-05-04 PROCEDURE — 25010000002 ONDANSETRON PER 1 MG: Performed by: EMERGENCY MEDICINE

## 2025-05-04 PROCEDURE — 96375 TX/PRO/DX INJ NEW DRUG ADDON: CPT

## 2025-05-04 PROCEDURE — 96374 THER/PROPH/DIAG INJ IV PUSH: CPT

## 2025-05-04 PROCEDURE — 72131 CT LUMBAR SPINE W/O DYE: CPT

## 2025-05-04 PROCEDURE — 99284 EMERGENCY DEPT VISIT MOD MDM: CPT

## 2025-05-04 PROCEDURE — 85610 PROTHROMBIN TIME: CPT | Performed by: EMERGENCY MEDICINE

## 2025-05-04 RX ORDER — HYDROMORPHONE HYDROCHLORIDE 1 MG/ML
0.5 INJECTION, SOLUTION INTRAMUSCULAR; INTRAVENOUS; SUBCUTANEOUS ONCE
Status: COMPLETED | OUTPATIENT
Start: 2025-05-04 | End: 2025-05-04

## 2025-05-04 RX ORDER — HYDROCODONE BITARTRATE AND ACETAMINOPHEN 7.5; 325 MG/1; MG/1
1 TABLET ORAL EVERY 8 HOURS PRN
Qty: 10 TABLET | Refills: 0 | Status: SHIPPED | OUTPATIENT
Start: 2025-05-04

## 2025-05-04 RX ORDER — SODIUM CHLORIDE 0.9 % (FLUSH) 0.9 %
10 SYRINGE (ML) INJECTION AS NEEDED
Status: DISCONTINUED | OUTPATIENT
Start: 2025-05-04 | End: 2025-05-04 | Stop reason: HOSPADM

## 2025-05-04 RX ORDER — DIAZEPAM 10 MG/2ML
2.5 INJECTION, SOLUTION INTRAMUSCULAR; INTRAVENOUS ONCE
Status: COMPLETED | OUTPATIENT
Start: 2025-05-04 | End: 2025-05-04

## 2025-05-04 RX ORDER — HYDROMORPHONE HYDROCHLORIDE 1 MG/ML
0.5 INJECTION, SOLUTION INTRAMUSCULAR; INTRAVENOUS; SUBCUTANEOUS ONCE
Refills: 0 | Status: COMPLETED | OUTPATIENT
Start: 2025-05-04 | End: 2025-05-04

## 2025-05-04 RX ORDER — BACLOFEN 10 MG/1
10 TABLET ORAL 2 TIMES DAILY
Qty: 10 TABLET | Refills: 0 | Status: SHIPPED | OUTPATIENT
Start: 2025-05-04 | End: 2025-05-09

## 2025-05-04 RX ORDER — KETOROLAC TROMETHAMINE 15 MG/ML
15 INJECTION, SOLUTION INTRAMUSCULAR; INTRAVENOUS ONCE
Status: COMPLETED | OUTPATIENT
Start: 2025-05-04 | End: 2025-05-04

## 2025-05-04 RX ORDER — ONDANSETRON 2 MG/ML
4 INJECTION INTRAMUSCULAR; INTRAVENOUS ONCE
Status: COMPLETED | OUTPATIENT
Start: 2025-05-04 | End: 2025-05-04

## 2025-05-04 RX ADMIN — HYDROMORPHONE HYDROCHLORIDE 0.5 MG: 1 INJECTION, SOLUTION INTRAMUSCULAR; INTRAVENOUS; SUBCUTANEOUS at 19:37

## 2025-05-04 RX ADMIN — HYDROMORPHONE HYDROCHLORIDE 0.5 MG: 1 INJECTION, SOLUTION INTRAMUSCULAR; INTRAVENOUS; SUBCUTANEOUS at 17:11

## 2025-05-04 RX ADMIN — KETOROLAC TROMETHAMINE 15 MG: 15 INJECTION, SOLUTION INTRAMUSCULAR; INTRAVENOUS at 17:12

## 2025-05-04 RX ADMIN — DIAZEPAM 2.5 MG: 5 INJECTION INTRAMUSCULAR; INTRAVENOUS at 17:11

## 2025-05-04 RX ADMIN — Medication 10 ML: at 17:17

## 2025-05-04 RX ADMIN — ONDANSETRON 4 MG: 2 INJECTION, SOLUTION INTRAMUSCULAR; INTRAVENOUS at 17:12

## 2025-05-04 NOTE — DISCHARGE INSTRUCTIONS
As we discussed use Tylenol and Motrin for pain.  Use Lortab for breakthrough pain.  Also use baclofen as prescribed for muscle spasm.  You can apply Lidoderm patches as well as warm compresses.  Ambulate and do activities as tolerated, but no heavy lifting more than 5 pounds.  No physical activity.

## 2025-05-04 NOTE — ED PROVIDER NOTES
EMERGENCY DEPARTMENT ENCOUNTER    Room Number:  17/17  Date of encounter:  5/4/2025  PCP: Sabrina Greenwood APRN  Historian: Patient and spouse  Relevant information and history provided by sources other than the patient will be included below and in the ED Course.  Review of pertinent past medical records may also be included in record below and ED Course.    HPI:  Chief Complaint: Severe back pain after injury  A complete HPI/ROS/PMH/PSH/SH/FH are unobtainable due to: Not applicable  Context: Bryan Valadez is a 39 y.o. male who presents to the ED c/o this is a gentleman who yesterday morning was out in his garage the rain on his garage floor because it to be slippery.  He slipped and has he was falling he grabbed the edge of a golf cart in his garage.  It twisted his back.  Since that time he has had severe pain in his lower lumbar back.  Seems to start more in the left lower portion of his lumbar sacral region and then radiates to the right.  It is worse with movement such as bending over, trying to wipe his bottom going from a lying to a sitting position and sitting to standing.  He states when he gets up and stands and walks it will improve with time but does not resolve.  Denies any saddle anesthesia denies any urinary or fecal incontinence or retention.  Denies any fevers or chills.  Denies any radiation of the pain down his leg denies any weakness numbness or tingling.  He has tried baclofen as well as Zanaflex for muscle relaxer with little relief.  He has had a history of back pain in the past and is seen a physical therapist in the past for his back pain and does have the exercises for back pain.  He has not had any recent images of his back he has never had an any MRI of his back.  He is on warfarin as he does have factor V Leiden deficiency and history of previous PE.  Initially INR was a little bit low and they did up his level of Coumadin.  He denies chest pain or shortness of breath.  Denies any  headache denies any focal weakness to arms or legs.  Important to note that this gentleman did not fall and he did not have any direct trauma to his back.  He caught himself on the golf cart which twisted his back      Previous Episodes: History of back pain before but this is worse.  Current Symptoms: See above    MEDICAL HISTORY REVIEWED  I can see this gentleman has had a history of PE in the past he has factor V Leiden deficiency.  He is anticoagulated on Coumadin.  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  This was in 2018 and 2019.  He is on Keppra.  Did review his other medicine list.      PAST MEDICAL HISTORY  Active Ambulatory Problems     Diagnosis Date Noted    Hyperlipidemia 03/29/2016    Asthma 03/29/2016    GERD (gastroesophageal reflux disease) 03/29/2016    Pulmonary embolism with infarction 06/29/2018    Chronic deep vein thrombosis (DVT) of left popliteal vein 06/29/2018    Factor 5 Leiden mutation, heterozygous 07/08/2018    Anaplastic astrocytoma of frontal lobe 07/23/2018    Chronic anticoagulation 01/15/2021    Spinal stenosis of lumbar region 02/12/2024     Resolved Ambulatory Problems     Diagnosis Date Noted    Primary brain tumor 06/15/2018    Pulmonary embolus 06/29/2018    Leukocytosis 06/29/2018    Status post craniotomy 06/29/2018    Acute deep vein thrombosis (DVT) of popliteal vein of left lower extremity 06/29/2018    Acute deep vein thrombosis (DVT) of femoral vein of both lower extremities 07/07/2018    Failure of outpatient treatment 07/08/2018    History of pulmonary embolus (PE) 07/08/2018    Astrocytoma brain tumor 12/12/2018     Past Medical History:   Diagnosis Date    Allergic rhinitis     Chest pain     DVT (deep venous thrombosis) 06/2018    Factor V Leiden     H/O echocardiogram 2006    Headache     History of ETT     History of Holter monitoring     PE (pulmonary thromboembolism) 06/2018         PAST SURGICAL HISTORY  Past  Surgical History:   Procedure Laterality Date    CRANIOTOMY FOR TUMOR Right 06/16/2018    Procedure: CRANIOTOMY FOR  RESECTION OF LARGE RIGHT FRONTAL MASS WITH AUGUSTIN NAVIGATION;  Surgeon: Chetan Galvan IV, MD;  Location: [facility] OR;  Service: Neurosurgery    CRANIOTOMY FOR TUMOR Right 01/24/2019    Procedure: CRANIOTOMY FOR TUMOR STEREOTACTIC, bicoronal for right frontal mass;  Surgeon: Chetan Galvan IV, MD;  Location: [facility] OR;  Service: Neurosurgery    VASECTOMY  02/15/2024    WISDOM TOOTH EXTRACTION  1996         FAMILY HISTORY  Family History   Problem Relation Age of Onset    Diabetes Father     Malig Hyperthermia Neg Hx          SOCIAL HISTORY  Social History     Socioeconomic History    Marital status:      Spouse name: Mary   Tobacco Use    Smoking status: Never    Smokeless tobacco: Never   Vaping Use    Vaping status: Never Used   Substance and Sexual Activity    Alcohol use: No     Comment: Moderate    Drug use: No    Sexual activity: Defer         ALLERGIES  Avocado, Other, and Tomato        REVIEW OF SYSTEMS  Review of Systems     All systems reviewed and negative except for those discussed in HPI.       PHYSICAL EXAM    I have reviewed the triage vital signs and nursing notes.    ED Triage Vitals [05/04/25 1517]   Temp Heart Rate Resp BP SpO2   96.8 °F (36 °C) 70 18 (!) 146/103 100 %      Temp src Heart Rate Source Patient Position BP Location FiO2 (%)   Tympanic Monitor Sitting Right arm --       GENERAL: Pleasant young male.  No acute distress.Vital signs on my initial evaluation have been reviewed and unremarkable  HENT: nares patent  Head/neck/ face are symmetric without gross deformity, signs of trauma, or swelling  EYES: no scleral icterus, no conjunctival pallor.  NECK: Supple, no meningismus  CV: regular rhythm, regular rate with intact distal pulses.  RESPIRATORY: normal effort and no respiratory distress.  ABDOMEN: soft and nontender.  Back exam.  His pain is  reproducible when he goes to sit up from a sitting chair when he goes to lay down and when he gets up from sitting to standing.  You can see that he he appears to be standing at an angle and leading to the left.  His location of pain is mainly in the lower lumbar sacral region has obvious muscle spasm on exam.  He has normal saddle sensation he does not have any focal midline pain and his inspection is normal there is no swelling or any signs of bruising.  MUSCULOSKELETAL:5/5 strength to hip flexion, knee extension/flexion, dorsiflexion, plantarflexion, and EHL.  No pain with bilateral external and internal rotation of hips.  Intact distal pulses with no cyanosis, pallor, or temperature change on palpation. Normal inspection and palpation with soft compartments.  SILT at bilateral superficial peroneal, deep peroneal, sural, and saphenous nerves  He is able after he sits up and has pain able to stand and ambulate.  Again his body is tilted slightly to the left very likely from the lumbosacral muscle spasm.  NEURO: alert and appropriate, moves all extremities, follows commands.  No focal motor or sensory changes  SKIN: warm, dry    Vital signs and nursing notes reviewed.        LAB RESULTS  Recent Results (from the past 24 hours)   Protime-INR    Collection Time: 05/04/25  5:12 PM    Specimen: Arm, Left; Blood   Result Value Ref Range    Protime 29.6 (H) 11.7 - 14.2 Seconds    INR 2.78 (H) 0.90 - 1.10       Ordered the above labs and independently reviewed the results.        RADIOLOGY  CT Lumbar Spine Without Contrast  Result Date: 5/4/2025  CT LUMBAR SPINE WITHOUT CONTRAST  HISTORY: Fall, pain.  COMPARISON: None.  The alignment of the lumbar spine is within normal limits. There is no evidence of a compression fracture or compression deformity.  L1-L2: There is no evidence of disc bulge or herniation.  L2-L3: There is no evidence of disc bulge or herniation.  L3-L4: A mild central disc bulge is present.  L4-L5: A  mild central disc bulge is present.  L5-S1: There is no evidence of disc bulge or herniation.      Mild disc bulges are present at L3-L4 and L4-L5. There is no evidence of a focal herniation or a fracture. Further evaluation could be performed with a MRI examination of the lumbar spine as indicated.   Radiation dose reduction techniques were utilized, including automated exposure control and exposure modulation based on body size.   This report was finalized on 5/4/2025 7:06 PM by Dr. Antonino Cat M.D on Workstation: [id number]        I ordered the above noted radiological studies. Reviewed by me and discussed with radiologist.  See dictation for official radiology interpretation.      PROCEDURES    Procedures      MEDICATIONS GIVEN IN ER    Medications   sodium chloride 0.9 % flush 10 mL (10 mL Intravenous Given 5/4/25 1717)   ketorolac (TORADOL) injection 15 mg (15 mg Intravenous Given 5/4/25 1712)   HYDROmorphone (DILAUDID) injection 0.5 mg (0.5 mg Intravenous Given 5/4/25 1711)   diazePAM (VALIUM) injection 2.5 mg (2.5 mg Intravenous Given 5/4/25 1711)   ondansetron (ZOFRAN) injection 4 mg (4 mg Intravenous Given 5/4/25 1712)         All labs have been independently reviewed by me.  All radiology studies have been reviewed by me and I discussed with radiologist dictating the report when indicated below.  All EKG's independently viewed and interpreted by me.  Discussion below represents my analysis of pertinent findings related to patient's condition, differential diagnosis, treatment plan and final disposition.        PROGRESS, DATA ANALYSIS, CONSULTS, AND MEDICAL DECISION MAKING    I talked at length with the patient as well as spouse answered all their questions.  I really think this is muscle spasm.  He has never had any recent imaging and his back has had problems with his back in the past and has exercises and is seeing a physical therapist.  He is not seeing a physical therapist recently.  Will go  ahead and do a CT scan of his lumbar spine.  He would like medicine that is can help his pain.  He prefer IV medicine.  Will order muscle relaxer consisting of Valium, Dilaudid, Zofran and Toradol.  All questions answered at this time.      ED Course as of 05/04/25 1923   Sun May 04, 2025   1852 INR(!): 2.78  Patient is on warfarin [MM]   1913 CT scan of the lumbar spine shows mild disc bulge at L3-L4 and L4-L5.  No evidence of focal herniation no evidence of any fracture.  Please see complete dictated report from radiologist I reviewed the radiologist report. [MM]   1917 I have reevaluated this patient.  Talk with the patient as well as his spouse again at length about the results of the test and my assessment.  Very likely this is musculoskeletal very likely this is muscular with a lumbar strain.  Patient is doing better his pain is doing better he is able to move better.  He wants to go home.  I did offer admission to him if he was not able to get up and move because the pain was so severe and he refused as he states he is doing better.  I am going to give him a short course of Lortab.  He is out of baclofen.  I will give him some baclofen as well.  He is tolerated pain medicine without any complications in the past.  Also instructed him to use warm compresses and to try to do very gently his physical therapy exercises for his back pain in the past.  Instructed him to return if he has any worsening pain, fever, any saddle anesthesia, urinary or fecal incontinence or retention, or any new weakness or numbness or tingling to lower extremities.  Patient agrees with that plan all questions answered [MM]      ED Course User Index  [MM] Cheko Ta MD       AS OF 19:23 EDT VITALS:    BP - 127/53  HR - (!) 49  TEMP - 98.7 °F (37.1 °C) (Oral)  02 SATS - 100%    SOCIAL DETERMINANTS OF HEALTH THAT IMPACT OR LIMIT CARE (For example..Homelessness,safe discharge, inability to obtain care, follow up, or  prescriptions):      DIAGNOSIS  Final diagnoses:   Acute myofascial strain of lumbar region, initial encounter   Warfarin-induced coagulopathy         DISPOSITION  Talked with the patient at length he is doing better he is good to be discharged home.  Informed him of emergent conditions if they develop to return to the emergency department and the necessity for follow-up.  All questions answered          DICTATED UTILIZING DRAGON DICTATION    Note Disclaimer: At [facility], we believe that sharing information builds trust and better relationships. You are receiving this note because you recently visited [facility]. It is possible you will see health information before a provider has talked with you about it. This kind of information can be easy to misunderstand. To help you fully understand what it means for your health, we urge you to discuss this note with your provider.       Cheko Ta MD  05/04/25 192

## 2025-05-06 ENCOUNTER — TELEPHONE (OUTPATIENT)
Dept: INTERNAL MEDICINE | Facility: CLINIC | Age: 40
End: 2025-05-06
Payer: COMMERCIAL

## 2025-05-06 NOTE — TELEPHONE ENCOUNTER
"Caller: Bryan Valadez \"GILLIAN\"    Relationship: Self    Best call back number: [phone]    What medication are you requesting:   STEROIDS  What are your current symptoms: THREW BACK OUT ON 5.4.25    How long have you been experiencing symptoms: 5.4.25    Have you had these symptoms before:    [x] Yes  [] No    Have you been treated for these symptoms before:   [x] Yes  [] No    If a prescription is needed, what is your preferred pharmacy and phone number: [facility] [phone] - [facility] [address] AT [facility] & ([facility] [phone] [facility] [phone] FX     Additional notes:    PATIENT WENT TO THE [facility] ER ON 5.4.25 AND WAS GIVEN PAIN MEDICATION BUT NO STEROIDS.     PLEASE CALL TO CONFIRM OR DENY.   "

## 2025-05-06 NOTE — TELEPHONE ENCOUNTER
Bryan Valadez called office went to the emergency room pulled his back wanted to know if Sabrina could write a steroid prescription. I explained Sabrina Greenwood would need to see him for appointment to be evaluated before any prescriptions could be written.

## 2025-05-12 ENCOUNTER — OFFICE VISIT (OUTPATIENT)
Dept: INTERNAL MEDICINE | Facility: CLINIC | Age: 40
End: 2025-05-12
Payer: COMMERCIAL

## 2025-05-12 VITALS
DIASTOLIC BLOOD PRESSURE: 70 MMHG | BODY MASS INDEX: 25.17 KG/M2 | HEIGHT: 72 IN | WEIGHT: 185.8 LBS | OXYGEN SATURATION: 97 % | HEART RATE: 69 BPM | SYSTOLIC BLOOD PRESSURE: 120 MMHG

## 2025-05-12 DIAGNOSIS — M51.369 BULGING OF LUMBAR INTERVERTEBRAL DISC: Primary | ICD-10-CM

## 2025-05-12 PROCEDURE — 99213 OFFICE O/P EST LOW 20 MIN: CPT | Performed by: NURSE PRACTITIONER

## 2025-05-12 RX ORDER — PREDNISONE 10 MG/1
TABLET ORAL
Qty: 36 TABLET | Refills: 0 | Status: SHIPPED | OUTPATIENT
Start: 2025-05-12 | End: 2025-05-12 | Stop reason: SDUPTHER

## 2025-05-12 RX ORDER — BACLOFEN 10 MG/1
10 TABLET ORAL 3 TIMES DAILY
Qty: 30 TABLET | Refills: 0 | Status: SHIPPED | OUTPATIENT
Start: 2025-05-12 | End: 2025-05-12 | Stop reason: SDUPTHER

## 2025-05-12 RX ORDER — BACLOFEN 10 MG/1
10 TABLET ORAL 3 TIMES DAILY
Qty: 30 TABLET | Refills: 0 | Status: SHIPPED | OUTPATIENT
Start: 2025-05-12

## 2025-05-12 RX ORDER — PREDNISONE 10 MG/1
TABLET ORAL
Qty: 36 TABLET | Refills: 0 | Status: SHIPPED | OUTPATIENT
Start: 2025-05-12 | End: 2025-05-28

## 2025-05-12 NOTE — PROGRESS NOTES
"Chief Complaint  Back Pain  Subjective        Bryan Valadez presents to [facility]  History of Present Illness  History of Present Illness  The patient presents for evaluation of back pain.    Discomfort exacerbated by sitting, alleviated by standing. Severe pain onset last Saturday after a slip in his garage, prevented fall but immediate back twinge. Pain worsened, unable to rise from bed for 30 minutes on Sunday. Sought emergency care at [facility] ER 5/4/25, received Dilaudid, baclofen, steroid injection, and 3-day prednisone course. Adhering to prescribed physical therapy exercises, reports increased stiffness post-exercise. Difficulty getting into bed, finding comfortable sleeping position.     CT lumbar spine 5/4/25 showed mild bulging discs at L3-4 and L4-5.    Pain radiated to right hip. Using corset for support, relief upon removal. Prescribed hydrocodone and tizanidine, prefers baclofen. Unable to work Monday to Wednesday. History of MRI scans and physical therapy. Scheduled pro time check on 05/23/2025. No change in bowel or bladder function.    Objective   Vital Signs:  /70 (BP Location: Left arm, Patient Position: Sitting, Cuff Size: Adult)   Pulse 69   Ht 182.9 cm (72\")   Wt 84.3 kg (185 lb 12.8 oz)   SpO2 97%   BMI 25.20 kg/m²   Estimated body mass index is 25.2 kg/m² as calculated from the following:    Height as of this encounter: 182.9 cm (72\").    Weight as of this encounter: 84.3 kg (185 lb 12.8 oz).            Physical Exam  Vitals and nursing note reviewed.   Constitutional:       General: He is in acute distress (appears uncomfortable).      Appearance: He is well-developed.   HENT:      Head: Normocephalic.   Eyes:      Conjunctiva/sclera: Conjunctivae normal.   Cardiovascular:      Rate and Rhythm: Normal rate and regular rhythm.      Heart sounds: Normal heart sounds.   Pulmonary:      Effort: Pulmonary effort is normal.      Breath sounds: Normal " breath sounds.   Abdominal:      General: There is no distension.      Palpations: Abdomen is soft.      Tenderness: There is no abdominal tenderness.   Musculoskeletal:      Cervical back: Normal range of motion.      Lumbar back: Spasms and tenderness present. No swelling. Decreased range of motion.      Comments: Moderate tenderness to palpation of paravertebral muscles of lumbar area     Skin:     General: Skin is warm and dry.      Findings: No erythema.   Neurological:      Mental Status: He is alert and oriented to person, place, and time.      Sensory: No sensory deficit.      Motor: No abnormal muscle tone (5/5 normal muscle strength bilateral lower extremities. Strength and tone 5/5 in lower extremities (right quadriceps, left quadriceps, right tibialis anterior, left tibialis anterior)).      Coordination: Coordination normal.      Gait: Gait abnormal (Antalgic. Is able to do heel and toe walking bilaterally.).      Deep Tendon Reflexes:      Reflex Scores:       Patellar reflexes are 2+ on the right side and 2+ on the left side.       Achilles reflexes are 2+ on the right side and 2+ on the left side.     Comments: Negative straight leg raising test   Psychiatric:         Behavior: Behavior normal.            Result Review :  The following data was reviewed by: NICOLE Portillo on 05/12/2025:  Common labs          11/1/2024    08:26 4/25/2025    11:38   Common Labs   Glucose 98     BUN 11     Creatinine 0.91     Sodium 139     Potassium 4.4     Chloride 102     Calcium 9.7     Albumin 4.7     Total Bilirubin 0.4     Alkaline Phosphatase 55     AST (SGOT) 14     ALT (SGPT) 18     WBC 3.96  6.11    Hemoglobin 15.5  15.8    Hematocrit 44.9  44.9    Platelets 257  226    Total Cholesterol 226     Triglycerides 133     HDL Cholesterol 61     LDL Cholesterol  141     Hemoglobin A1C 5.10     PSA 1.460       Data reviewed : Radiologic studies CT lumbar spine 5/4/25      Results  Pro time was 2.8 on  05/04/2025.    CT scan: bulging disk at L3-4 and L4-5, no herniated disk or fracture.  MRI from 01/29/2024: stenosis and bulging disk.             Assessment and Plan   Diagnoses and all orders for this visit:    1. Bulging of lumbar intervertebral disc (Primary)  -     Discontinue: predniSONE (DELTASONE) 10 MG tablet; 1 tab qidx 3d then 1 tab tid x3d then 1 tab bid x5d then 1 tab qd x5d  Dispense: 36 tablet; Refill: 0  -     Discontinue: baclofen (LIORESAL) 10 MG tablet; Take 1 tablet by mouth 3 (Three) Times a Day.  Dispense: 30 tablet; Refill: 0  -     Ambulatory Referral to Physical Therapy for Evaluation & Treatment  -     predniSONE (DELTASONE) 10 MG tablet; Take 1 tablet by mouth four times daily for 3 days then 1 tab three times daily for 3 days then 1 tab twice daily for 5 days then 1 tab once daily for 5 days  Dispense: 36 tablet; Refill: 0  -     baclofen (LIORESAL) 10 MG tablet; Take 1 tablet by mouth 3 (Three) Times a Day.  Dispense: 30 tablet; Refill: 0      Assessment & Plan  Back pain.  Back pain likely due to bulging disks at L3-4 and L4-5, confirmed by CT scan. No herniated disks or fractures. MRI from 01/29/2024 showed chronic stenosis and bulging disks. Muscle spasms and significant discomfort worsened after overexertion. Prefers baclofen for muscle relaxation. Monitor pro time due to prednisone and Coumadin interaction. Prescription for baclofen and prednisone to [facility]. Physical therapy recommended, order to be printed. Consider another MRI if no improvement.         Follow Up   Return if symptoms worsen or fail to improve, for Next scheduled follow up.  Patient was given instructions and counseling regarding his condition or for health maintenance advice. Please see specific information pulled into the AVS if appropriate.     Patient or patient representative verbalized consent for the use of Ambient Listening during the visit with  NICOLE Portillo for chart documentation. 5/12/2025   19:42 EDT

## 2025-05-23 ENCOUNTER — LAB (OUTPATIENT)
Dept: OTHER | Facility: HOSPITAL | Age: 40
End: 2025-05-23
Payer: COMMERCIAL

## 2025-05-23 ENCOUNTER — ANTICOAGULATION VISIT (OUTPATIENT)
Dept: ONCOLOGY | Facility: HOSPITAL | Age: 40
End: 2025-05-23
Payer: COMMERCIAL

## 2025-05-23 DIAGNOSIS — I26.99 PULMONARY EMBOLISM WITH INFARCTION: ICD-10-CM

## 2025-05-23 DIAGNOSIS — D68.51 FACTOR 5 LEIDEN MUTATION, HETEROZYGOUS: Chronic | ICD-10-CM

## 2025-05-23 DIAGNOSIS — I26.99 PULMONARY EMBOLISM WITH INFARCTION: Primary | ICD-10-CM

## 2025-05-23 DIAGNOSIS — D68.51 FACTOR 5 LEIDEN MUTATION, HETEROZYGOUS: ICD-10-CM

## 2025-05-23 DIAGNOSIS — I82.532 CHRONIC DEEP VEIN THROMBOSIS (DVT) OF LEFT POPLITEAL VEIN: ICD-10-CM

## 2025-05-23 DIAGNOSIS — Z79.01 CHRONIC ANTICOAGULATION: Primary | ICD-10-CM

## 2025-05-23 LAB
BASOPHILS # BLD AUTO: 0.07 10*3/MM3 (ref 0–0.2)
BASOPHILS NFR BLD AUTO: 0.9 % (ref 0–1.5)
DEPRECATED RDW RBC AUTO: 41.1 FL (ref 37–54)
EOSINOPHIL # BLD AUTO: 0.24 10*3/MM3 (ref 0–0.4)
EOSINOPHIL NFR BLD AUTO: 3 % (ref 0.3–6.2)
ERYTHROCYTE [DISTWIDTH] IN BLOOD BY AUTOMATED COUNT: 12.4 % (ref 12.3–15.4)
HCT VFR BLD AUTO: 47.7 % (ref 37.5–51)
HGB BLD-MCNC: 16.3 G/DL (ref 13–17.7)
IMM GRANULOCYTES # BLD AUTO: 0.07 10*3/MM3 (ref 0–0.05)
IMM GRANULOCYTES NFR BLD AUTO: 0.9 % (ref 0–0.5)
INR PPP: 2.5
LYMPHOCYTES # BLD AUTO: 1.65 10*3/MM3 (ref 0.7–3.1)
LYMPHOCYTES NFR BLD AUTO: 20.5 % (ref 19.6–45.3)
MCH RBC QN AUTO: 30.9 PG (ref 26.6–33)
MCHC RBC AUTO-ENTMCNC: 34.2 G/DL (ref 31.5–35.7)
MCV RBC AUTO: 90.5 FL (ref 79–97)
MONOCYTES # BLD AUTO: 0.66 10*3/MM3 (ref 0.1–0.9)
MONOCYTES NFR BLD AUTO: 8.2 % (ref 5–12)
NEUTROPHILS NFR BLD AUTO: 5.37 10*3/MM3 (ref 1.7–7)
NEUTROPHILS NFR BLD AUTO: 66.5 % (ref 42.7–76)
NRBC BLD AUTO-RTO: 0 /100 WBC (ref 0–0.2)
PLATELET # BLD AUTO: 250 10*3/MM3 (ref 140–450)
PMV BLD AUTO: 9.7 FL (ref 6–12)
PROTHROMBIN TIME: 30.1 SECONDS (ref 11–15)
RBC # BLD AUTO: 5.27 10*6/MM3 (ref 4.14–5.8)
WBC NRBC COR # BLD AUTO: 8.06 10*3/MM3 (ref 3.4–10.8)

## 2025-05-23 PROCEDURE — 85025 COMPLETE CBC W/AUTO DIFF WBC: CPT | Performed by: INTERNAL MEDICINE

## 2025-05-23 PROCEDURE — 36415 COLL VENOUS BLD VENIPUNCTURE: CPT

## 2025-05-23 PROCEDURE — 85610 PROTHROMBIN TIME: CPT

## 2025-05-23 NOTE — PROGRESS NOTES
Anticoagulation Clinic Progress Note    Patient's visit was held by phone today.    Anticoagulation Summary  As of 2025      INR goal:  2.0-3.0   TTR:  63.8% (2.2 y)   INR used for dosin.50 (2025)   Warfarin maintenance plan:  7.5 mg (5 mg x 1.5) every Mon, Wed, Fri; 10 mg (5 mg x 2) all other days   Weekly warfarin total:  62.5 mg   No change documented:  Carissa Flores RPH   Plan last modified:  Carissa Flores RPH (2025)   Next INR check:  2025   Priority:  Maintenance   Target end date:  Indefinite    Indications    Pulmonary embolism with infarction [I26.99]  Factor 5 Leiden mutation  heterozygous [D68.51]  Chronic deep vein thrombosis (DVT) of left popliteal vein [I82.532]                 Anticoagulation Episode Summary       INR check location:  --    Preferred lab:  --    Send INR reminders to:   [facility] CBC ANTICOAG POOL    Comments:  EP lab/ coag phone call          Anticoagulation Care Providers       Provider Role Specialty Phone number    Sean Jefferson MD Referring Hematology and Oncology [phone]          Clinic Interview:  No pertinent clinical findings have been reported.    INR History:      3/17/2025     8:07 AM 3/17/2025     8:30 AM 2025    11:38 AM 2025    12:45 PM 2025     5:12 PM 2025     8:05 AM 2025     8:30 AM   Anticoagulation Monitoring   INR  2.90  1.70   2.50   INR Date  3/17/2025  2025   2025   INR Goal  2.0-3.0  2.0-3.0   2.0-3.0   Trend  Same  Up   Same   Last Week Total  60 mg  60 mg   62.5 mg   Next Week Total  60 mg  65 mg   62.5 mg   Sun  10 mg  10 mg   10 mg   Mon  7.5 mg  7.5 mg   7.5 mg   Tue  10 mg  10 mg   10 mg   Wed  7.5 mg  7.5 mg   7.5 mg   Thu  10 mg  10 mg   10 mg   Fri  7.5 mg  10 mg (); Otherwise 7.5 mg   7.5 mg   Sat  7.5 mg  10 mg   10 mg   Historical INR 2.90   1.70   2.78  2.50     Visit Report   Report Report          Plan:  1. INR is Therapeutic today- see above in Anticoagulation Summary.   Will  instruct Bryan Valadez to Continue their warfarin regimen of 7.5 mg Monday/Wednesday/Friday and 10 mg all other days - see above in Anticoagulation Summary.  2. Follow up in 1 month.  3.They have been instructed to call if any changes in medications, doses, concerns, etc.     Carissa Flores RP

## 2025-06-23 ENCOUNTER — LAB (OUTPATIENT)
Dept: OTHER | Facility: HOSPITAL | Age: 40
End: 2025-06-23
Payer: COMMERCIAL

## 2025-06-23 ENCOUNTER — ANTICOAGULATION VISIT (OUTPATIENT)
Dept: ONCOLOGY | Facility: HOSPITAL | Age: 40
End: 2025-06-23
Payer: COMMERCIAL

## 2025-06-23 DIAGNOSIS — D68.51 FACTOR 5 LEIDEN MUTATION, HETEROZYGOUS: Chronic | ICD-10-CM

## 2025-06-23 DIAGNOSIS — I82.532 CHRONIC DEEP VEIN THROMBOSIS (DVT) OF LEFT POPLITEAL VEIN: ICD-10-CM

## 2025-06-23 DIAGNOSIS — D68.51 FACTOR 5 LEIDEN MUTATION, HETEROZYGOUS: ICD-10-CM

## 2025-06-23 DIAGNOSIS — I26.99 PULMONARY EMBOLISM WITH INFARCTION: Primary | ICD-10-CM

## 2025-06-23 DIAGNOSIS — I26.99 PULMONARY EMBOLISM WITH INFARCTION: ICD-10-CM

## 2025-06-23 LAB
INR PPP: 2.1
PROTHROMBIN TIME: 24.9 SECONDS (ref 11–15)

## 2025-06-23 PROCEDURE — 85610 PROTHROMBIN TIME: CPT

## 2025-06-23 PROCEDURE — 36416 COLLJ CAPILLARY BLOOD SPEC: CPT

## 2025-06-23 NOTE — PROGRESS NOTES
Anticoagulation Clinic Progress Note    Patient's visit was held via telephone today.    Anticoagulation Summary  As of 2025      INR goal:  2.0-3.0   TTR:  65.2% (2.2 y)   INR used for dosin.10 (2025)   Warfarin maintenance plan:  10 mg (5 mg x 2) every Sun, e, Thu; 7.5 mg (5 mg x 1.5) all other days   Weekly warfarin total:  60 mg   Plan last modified:  Carissa Flores RPH (2025)   Next INR check:  2025   Priority:  Maintenance   Target end date:  Indefinite    Indications    Pulmonary embolism with infarction [I26.99]  Factor 5 Leiden mutation  heterozygous [D68.51]  Chronic deep vein thrombosis (DVT) of left popliteal vein [I82.532]                 Anticoagulation Episode Summary       INR check location:  --    Preferred lab:  --    Send INR reminders to:   [facility] CBC ANTICOAG POOL    Comments:  EP lab/ coag phone call          Anticoagulation Care Providers       Provider Role Specialty Phone number    Sean Jefferson MD Referring Hematology and Oncology [phone]            Clinic Interview:  Patient Findings     Positives:  Change in medications (Patient reports taking baclofen prn   for muscle spasms.)    Negatives:  Signs/symptoms of thrombosis, Signs/symptoms of bleeding,   Laboratory test error suspected, Change in health, Change in alcohol use,   Change in activity, Upcoming invasive procedure, Emergency department   visit, Upcoming dental procedure, Missed doses, Extra doses, Change in   diet/appetite, Hospital admission, Bruising, Other complaints      Clinical Outcomes     Negatives:  Major bleeding event, Thromboembolic event,   Anticoagulation-related hospital admission, Anticoagulation-related ED   visit, Anticoagulation-related fatality        INR History:      2025    11:38 AM 2025    12:45 PM 2025     5:12 PM 2025     8:05 AM 2025     8:30 AM 2025     7:52 AM 2025     8:30 AM   Anticoagulation Monitoring   INR  1.70   2.50  2.10    INR Date  4/25/2025 5/23/2025 6/23/2025   INR Goal  2.0-3.0   2.0-3.0  2.0-3.0   Trend  Up   Same  Down   Last Week Total  60 mg   62.5 mg  57.5 mg   Next Week Total  65 mg   62.5 mg  60 mg   Sun  10 mg   10 mg  10 mg   Mon  7.5 mg   7.5 mg  7.5 mg   Tue  10 mg   10 mg  10 mg   Wed  7.5 mg   7.5 mg  7.5 mg   Thu  10 mg   10 mg  10 mg   Fri  10 mg (4/25); Otherwise 7.5 mg   7.5 mg  7.5 mg   Sat  10 mg   10 mg  7.5 mg   Historical INR 1.70   2.78  2.50   2.10     Visit Report Report Report            Plan:  1. INR is Therapeutic today- see above in Anticoagulation Summary.  Will instruct Bryan Valadez to Continue their warfarin regimen of 10 mg Sunday/Tuesday/Thursday and 7.5 mg all other days - see above in Anticoagulation Summary.  2. Follow up in 1 month.  3. Patient declines warfarin refills.  4. Verbal information provided. Patient expresses understanding and has no further questions at this time.    Carissa Flores [facility]

## 2025-07-03 DIAGNOSIS — I26.99 PULMONARY EMBOLISM WITH INFARCTION: ICD-10-CM

## 2025-07-07 RX ORDER — WARFARIN SODIUM 5 MG/1
TABLET ORAL
Qty: 150 TABLET | Refills: 2 | Status: SHIPPED | OUTPATIENT
Start: 2025-07-07

## 2025-07-14 ENCOUNTER — TELEPHONE (OUTPATIENT)
Dept: ONCOLOGY | Facility: CLINIC | Age: 40
End: 2025-07-14

## 2025-07-25 ENCOUNTER — LAB (OUTPATIENT)
Dept: OTHER | Facility: HOSPITAL | Age: 40
End: 2025-07-25
Payer: COMMERCIAL

## 2025-07-25 ENCOUNTER — ANTICOAGULATION VISIT (OUTPATIENT)
Dept: ONCOLOGY | Facility: HOSPITAL | Age: 40
End: 2025-07-25
Payer: COMMERCIAL

## 2025-07-25 DIAGNOSIS — I26.99 PULMONARY EMBOLISM WITH INFARCTION: ICD-10-CM

## 2025-07-25 DIAGNOSIS — D68.51 FACTOR 5 LEIDEN MUTATION, HETEROZYGOUS: ICD-10-CM

## 2025-07-25 DIAGNOSIS — I26.99 PULMONARY EMBOLISM WITH INFARCTION: Primary | ICD-10-CM

## 2025-07-25 DIAGNOSIS — I82.532 CHRONIC DEEP VEIN THROMBOSIS (DVT) OF LEFT POPLITEAL VEIN: ICD-10-CM

## 2025-07-25 DIAGNOSIS — D68.51 FACTOR 5 LEIDEN MUTATION, HETEROZYGOUS: Chronic | ICD-10-CM

## 2025-07-25 LAB
INR PPP: 1.9
PROTHROMBIN TIME: 22.2 SECONDS (ref 11–15)

## 2025-07-25 PROCEDURE — 85610 PROTHROMBIN TIME: CPT

## 2025-07-25 PROCEDURE — 36416 COLLJ CAPILLARY BLOOD SPEC: CPT

## 2025-07-25 NOTE — PROGRESS NOTES
Anticoagulation Clinic Progress Note    Patient's visit was held via telephone today.    Anticoagulation Summary  As of 2025      INR goal:  2.0-3.0   TTR:  64.6% (2.3 y)   INR used for dosin.90 (2025)   Warfarin maintenance plan:  10 mg (5 mg x 2) every Sun, Tue, Thu; 7.5 mg (5 mg x 1.5) all other days   Weekly warfarin total:  60 mg   Plan last modified:  Carissa Flores RPH (2025)   Next INR check:  2025   Priority:  Maintenance   Target end date:  Indefinite    Indications    Pulmonary embolism with infarction [I26.99]  Factor 5 Leiden mutation  heterozygous [D68.51]  Chronic deep vein thrombosis (DVT) of left popliteal vein [I82.532]                 Anticoagulation Episode Summary       INR check location:  --    Preferred lab:  --    Send INR reminders to:   [facility] CBC ANTICOAG POOL    Comments:  EP lab/ coag phone call          Anticoagulation Care Providers       Provider Role Specialty Phone number    Sean Jefferson MD Referring Hematology and Oncology [phone]            Clinic Interview:  Patient Findings     Positives:  Missed doses (Patient reports x1 missed warfarin 10 mg dose   the previous Tuesday. Patient is aware of importance of adherence.)    Negatives:  Signs/symptoms of thrombosis, Signs/symptoms of bleeding,   Laboratory test error suspected, Change in health, Change in alcohol use,   Change in activity, Upcoming invasive procedure, Emergency department   visit, Upcoming dental procedure, Extra doses, Change in medications,   Change in diet/appetite, Hospital admission, Bruising, Other complaints      Clinical Outcomes     Negatives:  Major bleeding event, Thromboembolic event,   Anticoagulation-related hospital admission, Anticoagulation-related ED   visit, Anticoagulation-related fatality        INR History:      2025     5:12 PM 2025     8:05 AM 2025     8:30 AM 2025     7:52 AM 2025     8:30 AM 2025     8:10 AM 2025     8:30 AM    Anticoagulation Monitoring   INR   2.50  2.10  1.90   INR Date   5/23/2025 6/23/2025 7/25/2025   INR Goal   2.0-3.0  2.0-3.0  2.0-3.0   Trend   Same  Down  Same   Last Week Total   62.5 mg  57.5 mg  50 mg   Next Week Total   62.5 mg  60 mg  62.5 mg   Sun   10 mg  10 mg  10 mg   Mon   7.5 mg  7.5 mg  7.5 mg   Tue   10 mg  10 mg  10 mg   Wed   7.5 mg  7.5 mg  7.5 mg   Thu   10 mg  10 mg  10 mg   Fri   7.5 mg  7.5 mg  10 mg (7/25); Otherwise 7.5 mg   Sat   10 mg  7.5 mg  7.5 mg   Historical INR 2.78  2.50   2.10   1.90         Plan:  1. INR is slightly Subtherapeutic today- see above in Anticoagulation Summary.  Will instruct Bryan Valadez to take a dose of warfarin 10 mg today only then continue their warfarin regimen of 10 mg Sunday/Tuesday/Thursday and 7.5 mg all other days - see above in Anticoagulation Summary.  2. Follow up in 1 month.  3. Patient declines warfarin refills.  4. Verbal information provided. Patient expresses understanding and has no further questions at this time.    Carissa Flores [facility]

## 2025-08-22 ENCOUNTER — ANTICOAGULATION VISIT (OUTPATIENT)
Dept: ONCOLOGY | Facility: HOSPITAL | Age: 40
End: 2025-08-22
Payer: COMMERCIAL

## 2025-08-22 ENCOUNTER — LAB (OUTPATIENT)
Dept: OTHER | Facility: HOSPITAL | Age: 40
End: 2025-08-22
Payer: COMMERCIAL

## 2025-08-22 DIAGNOSIS — D68.51 FACTOR 5 LEIDEN MUTATION, HETEROZYGOUS: Chronic | ICD-10-CM

## 2025-08-22 DIAGNOSIS — I26.99 PULMONARY EMBOLISM WITH INFARCTION: Primary | ICD-10-CM

## 2025-08-22 DIAGNOSIS — I82.532 CHRONIC DEEP VEIN THROMBOSIS (DVT) OF LEFT POPLITEAL VEIN: ICD-10-CM

## 2025-08-26 ENCOUNTER — LAB (OUTPATIENT)
Dept: OTHER | Facility: HOSPITAL | Age: 40
End: 2025-08-26
Payer: COMMERCIAL

## 2025-08-26 ENCOUNTER — ANTICOAGULATION VISIT (OUTPATIENT)
Dept: ONCOLOGY | Facility: HOSPITAL | Age: 40
End: 2025-08-26
Payer: COMMERCIAL

## 2025-08-26 DIAGNOSIS — D68.51 FACTOR 5 LEIDEN MUTATION, HETEROZYGOUS: ICD-10-CM

## 2025-08-26 DIAGNOSIS — I26.99 PULMONARY EMBOLISM WITH INFARCTION: Primary | ICD-10-CM

## 2025-08-26 DIAGNOSIS — I82.532 CHRONIC DEEP VEIN THROMBOSIS (DVT) OF LEFT POPLITEAL VEIN: ICD-10-CM

## 2025-08-26 DIAGNOSIS — D68.51 FACTOR 5 LEIDEN MUTATION, HETEROZYGOUS: Chronic | ICD-10-CM

## 2025-08-26 DIAGNOSIS — I26.99 PULMONARY EMBOLISM WITH INFARCTION: ICD-10-CM

## 2025-08-26 LAB
INR PPP: 2.5
PROTHROMBIN TIME: 30.4 SECONDS (ref 11–15)

## 2025-08-26 PROCEDURE — 85610 PROTHROMBIN TIME: CPT

## 2025-08-26 PROCEDURE — 36416 COLLJ CAPILLARY BLOOD SPEC: CPT

## (undated) DEVICE — ELECTRD BLD EDGE/INSUL1P 2.4X5.1MM STRL

## (undated) DEVICE — ADHS SKIN DERMABOND TOP ADVANCED

## (undated) DEVICE — INTENDED FOR TISSUE SEPARATION, AND OTHER PROCEDURES THAT REQUIRE A SHARP SURGICAL BLADE TO PUNCTURE OR CUT.: Brand: BARD-PARKER ® CARBON RIB-BACK BLADES

## (undated) DEVICE — CONN TBG Y 5 IN 1 LF STRL

## (undated) DEVICE — ZYPHR DISPOSABLE CRANIAL PERFORATOR, LARGE 14/11MM: Brand: ZYPHR

## (undated) DEVICE — SUT SILK 2/0 FS BLK 18IN 685G

## (undated) DEVICE — 1010 S-DRAPE TOWEL DRAPE 10/BX: Brand: STERI-DRAPE™

## (undated) DEVICE — SOL ISO/ALC RUB 70PCT 4OZ

## (undated) DEVICE — DRP MICROSCOPE 4 BINOCULAR CV 54X150IN

## (undated) DEVICE — GOWN,PREVENTION PLUS,XLARGE,STERILE: Brand: MEDLINE

## (undated) DEVICE — CVR PROB 96IN LF STRL

## (undated) DEVICE — DRSNG TELFA PAD NONADH STR 1S 3X8IN

## (undated) DEVICE — DRP SLUSH WARMR MACH 52X66IN OM-ORS-301

## (undated) DEVICE — APPL CHLORAPREP W/TINT 10.5ML PERC STRL

## (undated) DEVICE — SOL IRR PHYSIOSOL BTL 1000ML

## (undated) DEVICE — SPNG GZ WOVN 4X4IN 12PLY 10/BX STRL

## (undated) DEVICE — MAYFIELD® DISPOSABLE ADULT SKULL PIN (PLASTIC BASE): Brand: MAYFIELD®

## (undated) DEVICE — ANTIBACTERIAL UNDYED BRAIDED (POLYGLACTIN 910), SYNTHETIC ABSORBABLE SUTURE: Brand: COATED VICRYL

## (undated) DEVICE — GLV SURG SENSICARE PI LF PF 7.5 GRN STRL

## (undated) DEVICE — SCRW CRS/DRV DRL FREE MICRO TI 1.5X5MM
Type: IMPLANTABLE DEVICE | Site: CRANIAL | Status: NON-FUNCTIONAL
Removed: 2019-01-24

## (undated) DEVICE — JACKSON-PRATT 100CC BULB RESERVOIR: Brand: CARDINAL HEALTH

## (undated) DEVICE — CODMAN® SURGICAL PATTIES 1/2" X 1/2" (1.27CM X 1.27CM): Brand: CODMAN®

## (undated) DEVICE — SMOKE EVACUATION TUBING WITH 7/8 IN TO 1/4 IN REDUCER: Brand: BUFFALO FILTER

## (undated) DEVICE — STRAIGHT TIP, UNIVERSAL

## (undated) DEVICE — DISPOSABLE TUBING SET AND EXTENDER FILTER TUBING

## (undated) DEVICE — DISPOSABLE IRRIGATION BIPOLAR CORD, M1000 TYPE: Brand: KIRWAN

## (undated) DEVICE — PK CRANI 40

## (undated) DEVICE — SUT PROLN 2/0 SH 36IN 8523H

## (undated) DEVICE — MARKR SKIN W/RULR ULTRAFINETP

## (undated) DEVICE — GLV SURG BIOGEL LTX PF 7 1/2

## (undated) DEVICE — SUT NUROLON 4/0 TF18 CR8 I8IN C584D

## (undated) DEVICE — CODMAN® BICOL® COLLAGEN SPONGE: Brand: CODMAN® BICOL®

## (undated) DEVICE — DRP MICROSCP LEICA W/GLASS LENS

## (undated) DEVICE — SUT MNCRYL PLS ANTIB UD 4/0 PS2 18IN

## (undated) DEVICE — CODMAN® SURGICAL PATTIES 1/2" X 3" (1.27CM X 7.62CM): Brand: CODMAN®

## (undated) DEVICE — DRN WND JP RND W TROC SIL 10F 1/8IN

## (undated) DEVICE — 3M™ STERI-DRAPE™ INSTRUMENT POUCH 1018L: Brand: STERI-DRAPE™

## (undated) DEVICE — GLV SURG BIOGEL LTX PF 8

## (undated) DEVICE — CODMAN® SURGICAL PATTIES 1" X 1" (2.54CM X 2.54CM): Brand: CODMAN®

## (undated) DEVICE — BANDAGE ROLL,100% COTTON, 6 PLY, LARGE: Brand: KERLIX

## (undated) DEVICE — BNDG ELAS CO-FLEX SLF ADHR 4IN5YD LF STRL

## (undated) DEVICE — 2.3MM TAPERED ROUTER

## (undated) DEVICE — GLV SURG TRIUMPH CLASSIC PF LTX 8.5 STRL

## (undated) DEVICE — Device

## (undated) DEVICE — GLV SURG SENSICARE ALOE LF PF SZ7.5 GRN

## (undated) DEVICE — KIT CG8901 CLIP GUN 10 PK: Brand: CLIP GUN

## (undated) DEVICE — MAGAZINE CM8902 RANEY CLIP GUN 5PK: Brand: CLIP GUN

## (undated) DEVICE — STPLR SKIN VISISTAT WD 35CT

## (undated) DEVICE — DRSNG TELFA PAD NONADH STR 1S 3X4IN

## (undated) DEVICE — INSTRUMENT BATTERY

## (undated) DEVICE — GLV SURG BIOGEL LTX PF 8 1/2

## (undated) DEVICE — GAUZE,SPONGE,FLUFF,6"X6.75",STRL,10/TRAY: Brand: MEDLINE

## (undated) DEVICE — CRANIALMASK TRACKER: Brand: CRANIALMASK TRACKER